# Patient Record
Sex: MALE | Race: WHITE | NOT HISPANIC OR LATINO | Employment: OTHER | URBAN - METROPOLITAN AREA
[De-identification: names, ages, dates, MRNs, and addresses within clinical notes are randomized per-mention and may not be internally consistent; named-entity substitution may affect disease eponyms.]

---

## 2019-01-16 ENCOUNTER — HOSPITAL ENCOUNTER (OUTPATIENT)
Dept: ORTHOPEDIC SURGERY | Facility: CLINIC | Age: 64
Discharge: HOME | End: 2019-01-16
Attending: PHYSICIAN ASSISTANT | Admitting: PHYSICIAN ASSISTANT

## 2019-08-16 ENCOUNTER — TELEPHONE (OUTPATIENT)
Dept: ORTHOPEDIC SURGERY | Facility: CLINIC | Age: 64
End: 2019-08-16

## 2019-08-16 NOTE — TELEPHONE ENCOUNTER
Pts last appt in February requesting refill neurontin     I called pt and let him know he has not been seen in some time  I asked if he had continued issues and that we would likely do a 30 day supply on rx but either we need to see him or he needs to follow up with pcp.  Pt indicated he will be seeing pain management soon.    [place]

## 2021-01-23 ENCOUNTER — APPOINTMENT (OUTPATIENT)
Dept: GENERAL RADIOLOGY | Facility: HOSPITAL | Age: 66
End: 2021-01-23

## 2021-01-23 ENCOUNTER — HOSPITAL ENCOUNTER (EMERGENCY)
Facility: HOSPITAL | Age: 66
Discharge: HOME | End: 2021-01-23
Attending: EMERGENCY MEDICINE | Admitting: EMERGENCY MEDICINE

## 2021-01-23 VITALS
WEIGHT: 225.31 LBS | BODY MASS INDEX: 28.92 KG/M2 | DIASTOLIC BLOOD PRESSURE: 82 MMHG | HEIGHT: 74 IN | TEMPERATURE: 98 F | SYSTOLIC BLOOD PRESSURE: 122 MMHG | RESPIRATION RATE: 17 BRPM | OXYGEN SATURATION: 99 % | HEART RATE: 96 BPM

## 2021-01-23 DIAGNOSIS — S70.01XA CONTUSION OF RIGHT HIP, INITIAL ENCOUNTER: Primary | ICD-10-CM
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
## 2021-01-23 PROCEDURE — 99282 EMERGENCY DEPT VISIT SF MDM: CPT

## 2021-01-23 PROCEDURE — 72170 X-RAY EXAM OF PELVIS: CPT

## 2021-01-23 RX ORDER — ACETAMINOPHEN AND CODEINE PHOSPHATE 300; 30 MG/1; MG/1
1 TABLET ORAL EVERY 6 HOURS PRN
Qty: 12 TABLET | Refills: 0 | Status: SHIPPED | OUTPATIENT
Start: 2021-01-23 | End: 2022-01-05

## 2021-01-23 NOTE — ED PROVIDER NOTES
Subjective   Patient is a 65-year-old male who had slipped and fallen several days ago.  He complains of right hip pain.  The pain is constant and moderate in intensity.  He denies other complaint of injury          Review of Systems  Negative for head pain loss of consciousness dizziness vomiting neck pain chest pain abdominal pain back pain or other complaint of injury  No past medical history on file.    No Known Allergies    No past surgical history on file.    No family history on file.    Social History     Socioeconomic History   • Marital status:      Spouse name: Not on file   • Number of children: Not on file   • Years of education: Not on file   • Highest education level: Not on file           Objective   Physical Exam  HEENT exam is no tenderness.  Neck is nontender.  Lungs are clear.  Chest nontender.  Abdomen soft nontender.  Extremity exam shows mild pain on palpation of his right hip.  Does have full range of motion of his right leg.  Procedures      Xr Pelvis 1 Or 2 View    Result Date: 1/23/2021  1.No acute displaced fracture is visualized on this limited single view. 2.Mild bilateral hip osteoarthritis and mild degenerative changes at the sacroiliac joints and pubic symphysis.  Electronically Signed By-Fabio Holland MD On:1/23/2021 5:03 PM This report was finalized on [id number] by  Fabio Holland MD.        ED Course                                           MDM  Number of Diagnoses or Management Options  Diagnosis management comments: Patient has findings consistent with right hip contusion after falling.  Patient will be discharged.  Will be placed on Tylenol 3 and will see his MD for recheck as needed       Amount and/or Complexity of Data Reviewed  Tests in the radiology section of CPT®: reviewed    Risk of Complications, Morbidity, and/or Mortality  Presenting problems: moderate  Diagnostic procedures: moderate  Management options: moderate    Patient Progress  Patient progress:  stable      Final diagnoses:   Contusion of right hip, initial encounter   Fall, initial encounter            Brendon Geiger MD  01/23/21 3819

## 2022-01-05 ENCOUNTER — APPOINTMENT (OUTPATIENT)
Dept: GENERAL RADIOLOGY | Facility: HOSPITAL | Age: 67
End: 2022-01-05

## 2022-01-05 ENCOUNTER — HOSPITAL ENCOUNTER (INPATIENT)
Facility: HOSPITAL | Age: 67
LOS: 7 days | Discharge: HOME HEALTH | End: 2022-01-12
Attending: EMERGENCY MEDICINE | Admitting: HOSPITALIST

## 2022-01-05 DIAGNOSIS — J18.9 PNEUMONIA OF BOTH LOWER LOBES DUE TO INFECTIOUS ORGANISM: ICD-10-CM

## 2022-01-05 DIAGNOSIS — N17.9 ACUTE RENAL FAILURE, UNSPECIFIED ACUTE RENAL FAILURE TYPE: ICD-10-CM

## 2022-01-05 DIAGNOSIS — R09.02 HYPOXIA: ICD-10-CM

## 2022-01-05 DIAGNOSIS — R06.00 DYSPNEA, UNSPECIFIED TYPE: Primary | ICD-10-CM

## 2022-01-05 PROBLEM — Z72.0 TOBACCO ABUSE: Chronic | Status: ACTIVE | Noted: 2022-01-05

## 2022-01-05 PROBLEM — M54.50 LOW BACK PAIN: Status: ACTIVE | Noted: 2019-01-16

## 2022-01-05 PROBLEM — F12.90 MARIJUANA USE: Chronic | Status: ACTIVE | Noted: 2022-01-05

## 2022-01-05 PROBLEM — F10.10 ETOH ABUSE: Status: ACTIVE | Noted: 2022-01-05

## 2022-01-05 PROBLEM — E66.9 OBESITY: Status: ACTIVE | Noted: 2022-01-05

## 2022-01-05 PROBLEM — F90.9 ADHD: Status: ACTIVE | Noted: 2022-01-05

## 2022-01-05 PROBLEM — M41.9 ACQUIRED SCOLIOSIS: Status: ACTIVE | Noted: 2019-01-16

## 2022-01-05 PROBLEM — I10 HYPERTENSION: Status: ACTIVE | Noted: 2022-01-05

## 2022-01-05 PROBLEM — F32.A DEPRESSION: Status: ACTIVE | Noted: 2022-01-05

## 2022-01-05 PROBLEM — E78.5 HYPERLIPIDEMIA: Status: ACTIVE | Noted: 2022-01-05

## 2022-01-05 PROBLEM — M47.27 OSTEOARTHRITIS OF LUMBOSACRAL SPINE WITH RADICULOPATHY: Status: ACTIVE | Noted: 2019-01-16

## 2022-01-05 PROBLEM — D69.6 THROMBOCYTOPENIA (HCC): Status: ACTIVE | Noted: 2022-01-05

## 2022-01-05 PROBLEM — E78.2 MIXED HYPERLIPIDEMIA: Status: ACTIVE | Noted: 2022-01-05

## 2022-01-05 LAB
ALBUMIN SERPL-MCNC: 3 G/DL (ref 3.5–5.2)
ALBUMIN SERPL-MCNC: 3.1 G/DL (ref 3.5–5.2)
ALBUMIN/GLOB SERPL: 0.8 G/DL
ALBUMIN/GLOB SERPL: 0.8 G/DL
ALP SERPL-CCNC: 151 U/L (ref 39–117)
ALP SERPL-CCNC: 159 U/L (ref 39–117)
ALT SERPL W P-5'-P-CCNC: 24 U/L (ref 1–41)
ALT SERPL W P-5'-P-CCNC: 25 U/L (ref 1–41)
ANION GAP SERPL CALCULATED.3IONS-SCNC: 17 MMOL/L (ref 5–15)
ANION GAP SERPL CALCULATED.3IONS-SCNC: 20 MMOL/L (ref 5–15)
ANION GAP SERPL CALCULATED.3IONS-SCNC: 20 MMOL/L (ref 5–15)
APTT PPP: 30.7 SECONDS (ref 24–31)
ARTERIAL PATENCY WRIST A: POSITIVE
AST SERPL-CCNC: 37 U/L (ref 1–40)
AST SERPL-CCNC: 40 U/L (ref 1–40)
ATMOSPHERIC PRESS: ABNORMAL MM[HG]
B PARAPERT DNA SPEC QL NAA+PROBE: NOT DETECTED
B PERT DNA SPEC QL NAA+PROBE: NOT DETECTED
BASE EXCESS BLDA CALC-SCNC: -11.7 MMOL/L (ref 0–3)
BASOPHILS # BLD AUTO: 0.1 10*3/MM3 (ref 0–0.2)
BASOPHILS NFR BLD AUTO: 0.7 % (ref 0–1.5)
BDY SITE: ABNORMAL
BILIRUB SERPL-MCNC: 0.3 MG/DL (ref 0–1.2)
BILIRUB SERPL-MCNC: 0.3 MG/DL (ref 0–1.2)
BUN SERPL-MCNC: 155 MG/DL (ref 8–23)
BUN SERPL-MCNC: 156 MG/DL (ref 8–23)
BUN SERPL-MCNC: 159 MG/DL (ref 8–23)
BUN/CREAT SERPL: 12.4 (ref 7–25)
BUN/CREAT SERPL: 12.6 (ref 7–25)
BUN/CREAT SERPL: 12.9 (ref 7–25)
C PNEUM DNA NPH QL NAA+NON-PROBE: NOT DETECTED
CA-I SERPL ISE-MCNC: 1.24 MMOL/L (ref 1.2–1.3)
CALCIUM SPEC-SCNC: 9 MG/DL (ref 8.6–10.5)
CALCIUM SPEC-SCNC: 9.1 MG/DL (ref 8.6–10.5)
CALCIUM SPEC-SCNC: 9.6 MG/DL (ref 8.6–10.5)
CHLORIDE SERPL-SCNC: 102 MMOL/L (ref 98–107)
CHLORIDE SERPL-SCNC: 102 MMOL/L (ref 98–107)
CHLORIDE SERPL-SCNC: 103 MMOL/L (ref 98–107)
CO2 BLDA-SCNC: 13.9 MMOL/L (ref 22–29)
CO2 SERPL-SCNC: 11 MMOL/L (ref 22–29)
CO2 SERPL-SCNC: 14 MMOL/L (ref 22–29)
CO2 SERPL-SCNC: 15 MMOL/L (ref 22–29)
CREAT SERPL-MCNC: 12.35 MG/DL (ref 0.76–1.27)
CREAT SERPL-MCNC: 12.36 MG/DL (ref 0.76–1.27)
CREAT SERPL-MCNC: 12.52 MG/DL (ref 0.76–1.27)
CRP SERPL-MCNC: 17.18 MG/DL (ref 0–0.5)
D-LACTATE SERPL-SCNC: 0.9 MMOL/L (ref 0.5–2)
DEPRECATED RDW RBC AUTO: 49.9 FL (ref 37–54)
DEPRECATED RDW RBC AUTO: 50.3 FL (ref 37–54)
EOSINOPHIL # BLD AUTO: 0.2 10*3/MM3 (ref 0–0.4)
EOSINOPHIL NFR BLD AUTO: 2.2 % (ref 0.3–6.2)
ERYTHROCYTE [DISTWIDTH] IN BLOOD BY AUTOMATED COUNT: 14.3 % (ref 12.3–15.4)
ERYTHROCYTE [DISTWIDTH] IN BLOOD BY AUTOMATED COUNT: 14.4 % (ref 12.3–15.4)
ETHANOL UR QL: <0.01 %
FLUAV SUBTYP SPEC NAA+PROBE: NOT DETECTED
FLUBV RNA ISLT QL NAA+PROBE: NOT DETECTED
GFR SERPL CREATININE-BSD FRML MDRD: 4 ML/MIN/1.73
GFR SERPL CREATININE-BSD FRML MDRD: ABNORMAL ML/MIN/{1.73_M2}
GLOBULIN UR ELPH-MCNC: 3.8 GM/DL
GLOBULIN UR ELPH-MCNC: 3.9 GM/DL
GLUCOSE SERPL-MCNC: 108 MG/DL (ref 65–99)
GLUCOSE SERPL-MCNC: 149 MG/DL (ref 65–99)
GLUCOSE SERPL-MCNC: 208 MG/DL (ref 65–99)
HADV DNA SPEC NAA+PROBE: NOT DETECTED
HCO3 BLDA-SCNC: 13.1 MMOL/L (ref 21–28)
HCOV 229E RNA SPEC QL NAA+PROBE: NOT DETECTED
HCOV HKU1 RNA SPEC QL NAA+PROBE: NOT DETECTED
HCOV NL63 RNA SPEC QL NAA+PROBE: NOT DETECTED
HCOV OC43 RNA SPEC QL NAA+PROBE: NOT DETECTED
HCT VFR BLD AUTO: 24.2 % (ref 37.5–51)
HCT VFR BLD AUTO: 26.3 % (ref 37.5–51)
HEMODILUTION: NO
HGB BLD-MCNC: 8.3 G/DL (ref 13–17.7)
HGB BLD-MCNC: 9.1 G/DL (ref 13–17.7)
HMPV RNA NPH QL NAA+NON-PROBE: NOT DETECTED
HPIV1 RNA ISLT QL NAA+PROBE: NOT DETECTED
HPIV2 RNA SPEC QL NAA+PROBE: NOT DETECTED
HPIV3 RNA NPH QL NAA+PROBE: NOT DETECTED
HPIV4 P GENE NPH QL NAA+PROBE: NOT DETECTED
INHALED O2 CONCENTRATION: 21 %
INR PPP: 1.11 (ref 0.93–1.1)
LDH SERPL-CCNC: 255 U/L (ref 135–225)
LYMPHOCYTES # BLD AUTO: 0.8 10*3/MM3 (ref 0.7–3.1)
LYMPHOCYTES NFR BLD AUTO: 9.2 % (ref 19.6–45.3)
M PNEUMO IGG SER IA-ACNC: NOT DETECTED
MAGNESIUM SERPL-MCNC: 1.8 MG/DL (ref 1.6–2.4)
MAGNESIUM SERPL-MCNC: 1.9 MG/DL (ref 1.6–2.4)
MCH RBC QN AUTO: 33.5 PG (ref 26.6–33)
MCH RBC QN AUTO: 34.5 PG (ref 26.6–33)
MCHC RBC AUTO-ENTMCNC: 34.5 G/DL (ref 31.5–35.7)
MCHC RBC AUTO-ENTMCNC: 34.7 G/DL (ref 31.5–35.7)
MCV RBC AUTO: 100 FL (ref 79–97)
MCV RBC AUTO: 96.8 FL (ref 79–97)
MODALITY: ABNORMAL
MONOCYTES # BLD AUTO: 1.1 10*3/MM3 (ref 0.1–0.9)
MONOCYTES NFR BLD AUTO: 11.7 % (ref 5–12)
NEUTROPHILS NFR BLD AUTO: 6.9 10*3/MM3 (ref 1.7–7)
NEUTROPHILS NFR BLD AUTO: 76.2 % (ref 42.7–76)
NRBC BLD AUTO-RTO: 0.1 /100 WBC (ref 0–0.2)
NT-PROBNP SERPL-MCNC: ABNORMAL PG/ML (ref 0–900)
PCO2 BLDA: 25.5 MM HG (ref 35–48)
PH BLDA: 7.32 PH UNITS (ref 7.35–7.45)
PHOSPHATE SERPL-MCNC: 4.9 MG/DL (ref 2.5–4.5)
PLATELET # BLD AUTO: 113 10*3/MM3 (ref 140–450)
PLATELET # BLD AUTO: 92 10*3/MM3 (ref 140–450)
PMV BLD AUTO: 8.3 FL (ref 6–12)
PMV BLD AUTO: 8.7 FL (ref 6–12)
PO2 BLDA: 75.3 MM HG (ref 83–108)
POTASSIUM SERPL-SCNC: 6.2 MMOL/L (ref 3.5–5.2)
POTASSIUM SERPL-SCNC: 6.5 MMOL/L (ref 3.5–5.2)
POTASSIUM SERPL-SCNC: 6.7 MMOL/L (ref 3.5–5.2)
PROCALCITONIN SERPL-MCNC: 0.82 NG/ML (ref 0–0.25)
PROT SERPL-MCNC: 6.8 G/DL (ref 6–8.5)
PROT SERPL-MCNC: 7 G/DL (ref 6–8.5)
PROTHROMBIN TIME: 12.2 SECONDS (ref 9.6–11.7)
QT INTERVAL: 310 MS
RBC # BLD AUTO: 2.42 10*6/MM3 (ref 4.14–5.8)
RBC # BLD AUTO: 2.72 10*6/MM3 (ref 4.14–5.8)
RHINOVIRUS RNA SPEC NAA+PROBE: NOT DETECTED
RSV RNA NPH QL NAA+NON-PROBE: NOT DETECTED
SAO2 % BLDCOA: 94.1 % (ref 94–98)
SARS-COV-2 RNA NPH QL NAA+NON-PROBE: NOT DETECTED
SARS-COV-2 RNA PNL SPEC NAA+PROBE: NOT DETECTED
SODIUM SERPL-SCNC: 133 MMOL/L (ref 136–145)
SODIUM SERPL-SCNC: 134 MMOL/L (ref 136–145)
SODIUM SERPL-SCNC: 137 MMOL/L (ref 136–145)
TSH SERPL DL<=0.05 MIU/L-ACNC: 2.84 UIU/ML (ref 0.27–4.2)
WBC NRBC COR # BLD: 7.5 10*3/MM3 (ref 3.4–10.8)
WBC NRBC COR # BLD: 9 10*3/MM3 (ref 3.4–10.8)

## 2022-01-05 PROCEDURE — 94640 AIRWAY INHALATION TREATMENT: CPT

## 2022-01-05 PROCEDURE — 36415 COLL VENOUS BLD VENIPUNCTURE: CPT | Performed by: NURSE PRACTITIONER

## 2022-01-05 PROCEDURE — 85730 THROMBOPLASTIN TIME PARTIAL: CPT

## 2022-01-05 PROCEDURE — 25010000002 CEFTRIAXONE PER 250 MG: Performed by: EMERGENCY MEDICINE

## 2022-01-05 PROCEDURE — 0202U NFCT DS 22 TRGT SARS-COV-2: CPT | Performed by: NURSE PRACTITIONER

## 2022-01-05 PROCEDURE — 83735 ASSAY OF MAGNESIUM: CPT | Performed by: NURSE PRACTITIONER

## 2022-01-05 PROCEDURE — 94799 UNLISTED PULMONARY SVC/PX: CPT

## 2022-01-05 PROCEDURE — 87040 BLOOD CULTURE FOR BACTERIA: CPT | Performed by: EMERGENCY MEDICINE

## 2022-01-05 PROCEDURE — 25010000002 MORPHINE PER 10 MG: Performed by: EMERGENCY MEDICINE

## 2022-01-05 PROCEDURE — U0003 INFECTIOUS AGENT DETECTION BY NUCLEIC ACID (DNA OR RNA); SEVERE ACUTE RESPIRATORY SYNDROME CORONAVIRUS 2 (SARS-COV-2) (CORONAVIRUS DISEASE [COVID-19]), AMPLIFIED PROBE TECHNIQUE, MAKING USE OF HIGH THROUGHPUT TECHNOLOGIES AS DESCRIBED BY CMS-2020-01-R: HCPCS | Performed by: EMERGENCY MEDICINE

## 2022-01-05 PROCEDURE — 83615 LACTATE (LD) (LDH) ENZYME: CPT | Performed by: INTERNAL MEDICINE

## 2022-01-05 PROCEDURE — 83605 ASSAY OF LACTIC ACID: CPT | Performed by: NURSE PRACTITIONER

## 2022-01-05 PROCEDURE — 99284 EMERGENCY DEPT VISIT MOD MDM: CPT

## 2022-01-05 PROCEDURE — 63710000001 INSULIN REGULAR HUMAN PER 5 UNITS: Performed by: INTERNAL MEDICINE

## 2022-01-05 PROCEDURE — 82803 BLOOD GASES ANY COMBINATION: CPT

## 2022-01-05 PROCEDURE — 80050 GENERAL HEALTH PANEL: CPT | Performed by: EMERGENCY MEDICINE

## 2022-01-05 PROCEDURE — 84100 ASSAY OF PHOSPHORUS: CPT | Performed by: NURSE PRACTITIONER

## 2022-01-05 PROCEDURE — 93005 ELECTROCARDIOGRAM TRACING: CPT | Performed by: EMERGENCY MEDICINE

## 2022-01-05 PROCEDURE — 25010000002 CALCIUM GLUCONATE 2-0.675 GM/100ML-% SOLUTION: Performed by: INTERNAL MEDICINE

## 2022-01-05 PROCEDURE — 25010000002 THIAMINE PER 100 MG: Performed by: INTERNAL MEDICINE

## 2022-01-05 PROCEDURE — 25010000002 AZITHROMYCIN PER 500 MG: Performed by: EMERGENCY MEDICINE

## 2022-01-05 PROCEDURE — 83880 ASSAY OF NATRIURETIC PEPTIDE: CPT | Performed by: NURSE PRACTITIONER

## 2022-01-05 PROCEDURE — 85610 PROTHROMBIN TIME: CPT

## 2022-01-05 PROCEDURE — 86140 C-REACTIVE PROTEIN: CPT | Performed by: INTERNAL MEDICINE

## 2022-01-05 PROCEDURE — 85027 COMPLETE CBC AUTOMATED: CPT

## 2022-01-05 PROCEDURE — 71045 X-RAY EXAM CHEST 1 VIEW: CPT

## 2022-01-05 PROCEDURE — 93005 ELECTROCARDIOGRAM TRACING: CPT | Performed by: NURSE PRACTITIONER

## 2022-01-05 PROCEDURE — 84145 PROCALCITONIN (PCT): CPT | Performed by: NURSE PRACTITIONER

## 2022-01-05 PROCEDURE — 25010000002 METHYLPREDNISOLONE PER 125 MG: Performed by: NURSE PRACTITIONER

## 2022-01-05 PROCEDURE — 99222 1ST HOSP IP/OBS MODERATE 55: CPT | Performed by: NURSE PRACTITIONER

## 2022-01-05 PROCEDURE — 36600 WITHDRAWAL OF ARTERIAL BLOOD: CPT

## 2022-01-05 PROCEDURE — 25010000002 HEPARIN (PORCINE) PER 1000 UNITS: Performed by: NURSE PRACTITIONER

## 2022-01-05 PROCEDURE — 93005 ELECTROCARDIOGRAM TRACING: CPT

## 2022-01-05 PROCEDURE — 80053 COMPREHEN METABOLIC PANEL: CPT | Performed by: EMERGENCY MEDICINE

## 2022-01-05 PROCEDURE — 82330 ASSAY OF CALCIUM: CPT | Performed by: NURSE PRACTITIONER

## 2022-01-05 PROCEDURE — 82077 ASSAY SPEC XCP UR&BREATH IA: CPT | Performed by: NURSE PRACTITIONER

## 2022-01-05 RX ORDER — CHOLECALCIFEROL (VITAMIN D3) 125 MCG
5 CAPSULE ORAL NIGHTLY PRN
Status: DISCONTINUED | OUTPATIENT
Start: 2022-01-05 | End: 2022-01-12 | Stop reason: HOSPADM

## 2022-01-05 RX ORDER — METHYLPREDNISOLONE SODIUM SUCCINATE 125 MG/2ML
125 INJECTION, POWDER, LYOPHILIZED, FOR SOLUTION INTRAMUSCULAR; INTRAVENOUS ONCE
Status: COMPLETED | OUTPATIENT
Start: 2022-01-05 | End: 2022-01-05

## 2022-01-05 RX ORDER — SODIUM CHLORIDE 0.9 % (FLUSH) 0.9 %
10 SYRINGE (ML) INJECTION AS NEEDED
Status: DISCONTINUED | OUTPATIENT
Start: 2022-01-05 | End: 2022-01-12 | Stop reason: HOSPADM

## 2022-01-05 RX ORDER — IPRATROPIUM BROMIDE AND ALBUTEROL SULFATE 2.5; .5 MG/3ML; MG/3ML
3 SOLUTION RESPIRATORY (INHALATION)
Status: DISCONTINUED | OUTPATIENT
Start: 2022-01-05 | End: 2022-01-12 | Stop reason: HOSPADM

## 2022-01-05 RX ORDER — ONDANSETRON 2 MG/ML
4 INJECTION INTRAMUSCULAR; INTRAVENOUS EVERY 6 HOURS PRN
Status: DISCONTINUED | OUTPATIENT
Start: 2022-01-05 | End: 2022-01-12 | Stop reason: HOSPADM

## 2022-01-05 RX ORDER — MORPHINE SULFATE 2 MG/ML
2 INJECTION, SOLUTION INTRAMUSCULAR; INTRAVENOUS ONCE
Status: COMPLETED | OUTPATIENT
Start: 2022-01-05 | End: 2022-01-05

## 2022-01-05 RX ORDER — ACETAMINOPHEN 650 MG/1
650 SUPPOSITORY RECTAL EVERY 4 HOURS PRN
Status: DISCONTINUED | OUTPATIENT
Start: 2022-01-05 | End: 2022-01-12 | Stop reason: HOSPADM

## 2022-01-05 RX ORDER — ALUMINA, MAGNESIA, AND SIMETHICONE 2400; 2400; 240 MG/30ML; MG/30ML; MG/30ML
15 SUSPENSION ORAL EVERY 6 HOURS PRN
Status: DISCONTINUED | OUTPATIENT
Start: 2022-01-05 | End: 2022-01-12 | Stop reason: HOSPADM

## 2022-01-05 RX ORDER — ACETAMINOPHEN 325 MG/1
650 TABLET ORAL EVERY 4 HOURS PRN
Status: DISCONTINUED | OUTPATIENT
Start: 2022-01-05 | End: 2022-01-12 | Stop reason: HOSPADM

## 2022-01-05 RX ORDER — ALBUTEROL SULFATE 2.5 MG/3ML
10 SOLUTION RESPIRATORY (INHALATION) ONCE
Status: COMPLETED | OUTPATIENT
Start: 2022-01-05 | End: 2022-01-05

## 2022-01-05 RX ORDER — METHYLPREDNISOLONE SODIUM SUCCINATE 40 MG/ML
40 INJECTION, POWDER, LYOPHILIZED, FOR SOLUTION INTRAMUSCULAR; INTRAVENOUS EVERY 8 HOURS
Status: DISCONTINUED | OUTPATIENT
Start: 2022-01-06 | End: 2022-01-08

## 2022-01-05 RX ORDER — NITROGLYCERIN 0.4 MG/1
0.4 TABLET SUBLINGUAL
Status: DISCONTINUED | OUTPATIENT
Start: 2022-01-05 | End: 2022-01-12 | Stop reason: HOSPADM

## 2022-01-05 RX ORDER — DEXTROSE MONOHYDRATE 25 G/50ML
50 INJECTION, SOLUTION INTRAVENOUS ONCE
Status: COMPLETED | OUTPATIENT
Start: 2022-01-05 | End: 2022-01-05

## 2022-01-05 RX ORDER — LABETALOL HYDROCHLORIDE 5 MG/ML
10 INJECTION, SOLUTION INTRAVENOUS EVERY 6 HOURS PRN
Status: DISCONTINUED | OUTPATIENT
Start: 2022-01-05 | End: 2022-01-12 | Stop reason: HOSPADM

## 2022-01-05 RX ORDER — CALCIUM GLUCONATE 20 MG/ML
2 INJECTION, SOLUTION INTRAVENOUS ONCE
Status: COMPLETED | OUTPATIENT
Start: 2022-01-05 | End: 2022-01-06

## 2022-01-05 RX ORDER — SODIUM CHLORIDE 0.9 % (FLUSH) 0.9 %
10 SYRINGE (ML) INJECTION EVERY 12 HOURS SCHEDULED
Status: DISCONTINUED | OUTPATIENT
Start: 2022-01-05 | End: 2022-01-12 | Stop reason: HOSPADM

## 2022-01-05 RX ORDER — HEPARIN SODIUM 5000 [USP'U]/ML
5000 INJECTION, SOLUTION INTRAVENOUS; SUBCUTANEOUS EVERY 12 HOURS SCHEDULED
Status: DISCONTINUED | OUTPATIENT
Start: 2022-01-05 | End: 2022-01-12 | Stop reason: HOSPADM

## 2022-01-05 RX ORDER — ACETAMINOPHEN 160 MG/5ML
650 SOLUTION ORAL EVERY 4 HOURS PRN
Status: DISCONTINUED | OUTPATIENT
Start: 2022-01-05 | End: 2022-01-12 | Stop reason: HOSPADM

## 2022-01-05 RX ORDER — ONDANSETRON 4 MG/1
4 TABLET, FILM COATED ORAL EVERY 6 HOURS PRN
Status: DISCONTINUED | OUTPATIENT
Start: 2022-01-05 | End: 2022-01-12 | Stop reason: HOSPADM

## 2022-01-05 RX ADMIN — ALBUTEROL SULFATE 10 MG: 2.5 SOLUTION RESPIRATORY (INHALATION) at 21:50

## 2022-01-05 RX ADMIN — DEXTROSE MONOHYDRATE 50 ML: 25 INJECTION, SOLUTION INTRAVENOUS at 22:15

## 2022-01-05 RX ADMIN — AZITHROMYCIN MONOHYDRATE 500 MG: 500 INJECTION, POWDER, LYOPHILIZED, FOR SOLUTION INTRAVENOUS at 19:39

## 2022-01-05 RX ADMIN — METHYLPREDNISOLONE SODIUM SUCCINATE 125 MG: 125 INJECTION, POWDER, FOR SOLUTION INTRAMUSCULAR; INTRAVENOUS at 22:40

## 2022-01-05 RX ADMIN — SODIUM CHLORIDE, PRESERVATIVE FREE 10 ML: 5 INJECTION INTRAVENOUS at 22:21

## 2022-01-05 RX ADMIN — MORPHINE SULFATE 2 MG: 2 INJECTION, SOLUTION INTRAMUSCULAR; INTRAVENOUS at 19:46

## 2022-01-05 RX ADMIN — INSULIN HUMAN 10 UNITS: 100 INJECTION, SOLUTION PARENTERAL at 22:16

## 2022-01-05 RX ADMIN — CEFTRIAXONE 2 G: 10 INJECTION, POWDER, FOR SOLUTION INTRAVENOUS at 19:08

## 2022-01-05 RX ADMIN — SODIUM BICARBONATE 100 MEQ: 84 INJECTION, SOLUTION INTRAVENOUS at 22:15

## 2022-01-05 RX ADMIN — IPRATROPIUM BROMIDE AND ALBUTEROL SULFATE 3 ML: 2.5; .5 SOLUTION RESPIRATORY (INHALATION) at 22:00

## 2022-01-05 RX ADMIN — SODIUM BICARBONATE 150 MEQ: 84 INJECTION, SOLUTION INTRAVENOUS at 22:42

## 2022-01-05 RX ADMIN — THIAMINE HYDROCHLORIDE 100 MG: 100 INJECTION, SOLUTION INTRAMUSCULAR; INTRAVENOUS at 23:59

## 2022-01-05 RX ADMIN — SODIUM ZIRCONIUM CYCLOSILICATE 10 G: 10 POWDER, FOR SUSPENSION ORAL at 23:01

## 2022-01-05 RX ADMIN — HEPARIN SODIUM 5000 UNITS: 5000 INJECTION INTRAVENOUS; SUBCUTANEOUS at 21:14

## 2022-01-05 RX ADMIN — CALCIUM GLUCONATE 2 G: 20 INJECTION, SOLUTION INTRAVENOUS at 22:29

## 2022-01-06 ENCOUNTER — APPOINTMENT (OUTPATIENT)
Dept: CT IMAGING | Facility: HOSPITAL | Age: 67
End: 2022-01-06

## 2022-01-06 ENCOUNTER — APPOINTMENT (OUTPATIENT)
Dept: CARDIOLOGY | Facility: HOSPITAL | Age: 67
End: 2022-01-06

## 2022-01-06 ENCOUNTER — APPOINTMENT (OUTPATIENT)
Dept: GENERAL RADIOLOGY | Facility: HOSPITAL | Age: 67
End: 2022-01-06

## 2022-01-06 ENCOUNTER — APPOINTMENT (OUTPATIENT)
Dept: ULTRASOUND IMAGING | Facility: HOSPITAL | Age: 67
End: 2022-01-06

## 2022-01-06 LAB
AMMONIA BLD-SCNC: 17 UMOL/L (ref 16–60)
AMPHET+METHAMPHET UR QL: NEGATIVE
BARBITURATES UR QL SCN: NEGATIVE
BASOPHILS # BLD AUTO: 0 10*3/MM3 (ref 0–0.2)
BASOPHILS NFR BLD AUTO: 0 % (ref 0–1.5)
BENZODIAZ UR QL SCN: NEGATIVE
BILIRUB UR QL STRIP: NEGATIVE
CANNABINOIDS SERPL QL: NEGATIVE
CLARITY UR: CLEAR
COCAINE UR QL: NEGATIVE
COLOR UR: YELLOW
D-LACTATE SERPL-SCNC: 1.5 MMOL/L (ref 0.5–2)
DEPRECATED RDW RBC AUTO: 49.4 FL (ref 37–54)
DEPRECATED RDW RBC AUTO: 50.3 FL (ref 37–54)
EOSINOPHIL # BLD AUTO: 0 10*3/MM3 (ref 0–0.4)
EOSINOPHIL NFR BLD AUTO: 0.1 % (ref 0.3–6.2)
ERYTHROCYTE [DISTWIDTH] IN BLOOD BY AUTOMATED COUNT: 14.4 % (ref 12.3–15.4)
ERYTHROCYTE [DISTWIDTH] IN BLOOD BY AUTOMATED COUNT: 14.5 % (ref 12.3–15.4)
FERRITIN SERPL-MCNC: 433 NG/ML (ref 30–400)
GLUCOSE BLDC GLUCOMTR-MCNC: 258 MG/DL (ref 70–105)
GLUCOSE BLDC GLUCOMTR-MCNC: 307 MG/DL (ref 70–105)
GLUCOSE UR STRIP-MCNC: NEGATIVE MG/DL
HAPTOGLOB SERPL-MCNC: 184 MG/DL (ref 30–200)
HBV SURFACE AB SER RIA-ACNC: NORMAL
HBV SURFACE AG SERPL QL IA: NORMAL
HCT VFR BLD AUTO: 21.9 % (ref 37.5–51)
HCT VFR BLD AUTO: 23.9 % (ref 37.5–51)
HCV AB SER DONR QL: NORMAL
HGB BLD-MCNC: 7.7 G/DL (ref 13–17.7)
HGB BLD-MCNC: 8.4 G/DL (ref 13–17.7)
HGB UR QL STRIP.AUTO: NEGATIVE
IRON 24H UR-MRATE: 21 MCG/DL (ref 59–158)
IRON SATN MFR SERPL: 10 % (ref 20–50)
KETONES UR QL STRIP: NEGATIVE
L PNEUMO1 AG UR QL IA: NEGATIVE
LEUKOCYTE ESTERASE UR QL STRIP.AUTO: NEGATIVE
LYMPHOCYTES # BLD AUTO: 0.1 10*3/MM3 (ref 0.7–3.1)
LYMPHOCYTES NFR BLD AUTO: 1.3 % (ref 19.6–45.3)
MCH RBC QN AUTO: 33.7 PG (ref 26.6–33)
MCH RBC QN AUTO: 35 PG (ref 26.6–33)
MCHC RBC AUTO-ENTMCNC: 35.1 G/DL (ref 31.5–35.7)
MCHC RBC AUTO-ENTMCNC: 35.2 G/DL (ref 31.5–35.7)
MCV RBC AUTO: 95.8 FL (ref 79–97)
MCV RBC AUTO: 99.7 FL (ref 79–97)
METHADONE UR QL SCN: NEGATIVE
MONOCYTES # BLD AUTO: 0 10*3/MM3 (ref 0.1–0.9)
MONOCYTES NFR BLD AUTO: 0.6 % (ref 5–12)
NEUTROPHILS NFR BLD AUTO: 7.3 10*3/MM3 (ref 1.7–7)
NEUTROPHILS NFR BLD AUTO: 98 % (ref 42.7–76)
NITRITE UR QL STRIP: NEGATIVE
NRBC BLD AUTO-RTO: 0 /100 WBC (ref 0–0.2)
OPIATES UR QL: NEGATIVE
OSMOLALITY UR: 262 MOSM/KG (ref 300–800)
OXYCODONE UR QL SCN: NEGATIVE
PH UR STRIP.AUTO: 5.5 [PH] (ref 5–8)
PLATELET # BLD AUTO: 107 10*3/MM3 (ref 140–450)
PLATELET # BLD AUTO: 90 10*3/MM3 (ref 140–450)
PMV BLD AUTO: 8.3 FL (ref 6–12)
PMV BLD AUTO: 8.5 FL (ref 6–12)
PROT UR QL STRIP: NEGATIVE
QT INTERVAL: 310 MS
RBC # BLD AUTO: 2.28 10*6/MM3 (ref 4.14–5.8)
RBC # BLD AUTO: 2.39 10*6/MM3 (ref 4.14–5.8)
S PNEUM AG SPEC QL LA: NEGATIVE
SODIUM UR-SCNC: 46 MMOL/L
SP GR UR STRIP: 1.01 (ref 1–1.03)
TIBC SERPL-MCNC: 201 MCG/DL (ref 298–536)
TRANSFERRIN SERPL-MCNC: 135 MG/DL (ref 200–360)
UROBILINOGEN UR QL STRIP: NORMAL
VIT B12 BLD-MCNC: 696 PG/ML (ref 211–946)
WBC NRBC COR # BLD: 10 10*3/MM3 (ref 3.4–10.8)
WBC NRBC COR # BLD: 7.5 10*3/MM3 (ref 3.4–10.8)

## 2022-01-06 PROCEDURE — 86037 ANCA TITER EACH ANTIBODY: CPT | Performed by: INTERNAL MEDICINE

## 2022-01-06 PROCEDURE — 84156 ASSAY OF PROTEIN URINE: CPT | Performed by: INTERNAL MEDICINE

## 2022-01-06 PROCEDURE — 86038 ANTINUCLEAR ANTIBODIES: CPT | Performed by: INTERNAL MEDICINE

## 2022-01-06 PROCEDURE — 99233 SBSQ HOSP IP/OBS HIGH 50: CPT | Performed by: STUDENT IN AN ORGANIZED HEALTH CARE EDUCATION/TRAINING PROGRAM

## 2022-01-06 PROCEDURE — 76775 US EXAM ABDO BACK WALL LIM: CPT

## 2022-01-06 PROCEDURE — 25010000002 METHYLPREDNISOLONE PER 40 MG: Performed by: NURSE PRACTITIONER

## 2022-01-06 PROCEDURE — 82784 ASSAY IGA/IGD/IGG/IGM EACH: CPT | Performed by: INTERNAL MEDICINE

## 2022-01-06 PROCEDURE — 80048 BASIC METABOLIC PNL TOTAL CA: CPT | Performed by: STUDENT IN AN ORGANIZED HEALTH CARE EDUCATION/TRAINING PROGRAM

## 2022-01-06 PROCEDURE — 94799 UNLISTED PULMONARY SVC/PX: CPT

## 2022-01-06 PROCEDURE — 83520 IMMUNOASSAY QUANT NOS NONAB: CPT | Performed by: INTERNAL MEDICINE

## 2022-01-06 PROCEDURE — 83010 ASSAY OF HAPTOGLOBIN QUANT: CPT | Performed by: INTERNAL MEDICINE

## 2022-01-06 PROCEDURE — 5A1D70Z PERFORMANCE OF URINARY FILTRATION, INTERMITTENT, LESS THAN 6 HOURS PER DAY: ICD-10-PCS | Performed by: HOSPITALIST

## 2022-01-06 PROCEDURE — 71045 X-RAY EXAM CHEST 1 VIEW: CPT

## 2022-01-06 PROCEDURE — 84466 ASSAY OF TRANSFERRIN: CPT | Performed by: INTERNAL MEDICINE

## 2022-01-06 PROCEDURE — 85027 COMPLETE CBC AUTOMATED: CPT | Performed by: STUDENT IN AN ORGANIZED HEALTH CARE EDUCATION/TRAINING PROGRAM

## 2022-01-06 PROCEDURE — 83605 ASSAY OF LACTIC ACID: CPT | Performed by: NURSE PRACTITIONER

## 2022-01-06 PROCEDURE — 25010000002 CEFTRIAXONE PER 250 MG: Performed by: STUDENT IN AN ORGANIZED HEALTH CARE EDUCATION/TRAINING PROGRAM

## 2022-01-06 PROCEDURE — 82570 ASSAY OF URINE CREATININE: CPT | Performed by: INTERNAL MEDICINE

## 2022-01-06 PROCEDURE — 80307 DRUG TEST PRSMV CHEM ANLYZR: CPT | Performed by: NURSE PRACTITIONER

## 2022-01-06 PROCEDURE — 81003 URINALYSIS AUTO W/O SCOPE: CPT | Performed by: NURSE PRACTITIONER

## 2022-01-06 PROCEDURE — 63710000001 INSULIN LISPRO (HUMAN) PER 5 UNITS: Performed by: STUDENT IN AN ORGANIZED HEALTH CARE EDUCATION/TRAINING PROGRAM

## 2022-01-06 PROCEDURE — 83516 IMMUNOASSAY NONANTIBODY: CPT | Performed by: INTERNAL MEDICINE

## 2022-01-06 PROCEDURE — 86334 IMMUNOFIX E-PHORESIS SERUM: CPT | Performed by: INTERNAL MEDICINE

## 2022-01-06 PROCEDURE — 86706 HEP B SURFACE ANTIBODY: CPT | Performed by: INTERNAL MEDICINE

## 2022-01-06 PROCEDURE — 25010000002 HEPARIN (PORCINE) PER 1000 UNITS: Performed by: NURSE PRACTITIONER

## 2022-01-06 PROCEDURE — 87899 AGENT NOS ASSAY W/OPTIC: CPT | Performed by: NURSE PRACTITIONER

## 2022-01-06 PROCEDURE — 83540 ASSAY OF IRON: CPT | Performed by: INTERNAL MEDICINE

## 2022-01-06 PROCEDURE — 76937 US GUIDE VASCULAR ACCESS: CPT

## 2022-01-06 PROCEDURE — 93306 TTE W/DOPPLER COMPLETE: CPT | Performed by: INTERNAL MEDICINE

## 2022-01-06 PROCEDURE — 86704 HEP B CORE ANTIBODY TOTAL: CPT | Performed by: INTERNAL MEDICINE

## 2022-01-06 PROCEDURE — 85397 CLOTTING FUNCT ACTIVITY: CPT | Performed by: INTERNAL MEDICINE

## 2022-01-06 PROCEDURE — 02HV33Z INSERTION OF INFUSION DEVICE INTO SUPERIOR VENA CAVA, PERCUTANEOUS APPROACH: ICD-10-PCS | Performed by: HOSPITALIST

## 2022-01-06 PROCEDURE — 25010000002 THIAMINE PER 100 MG: Performed by: INTERNAL MEDICINE

## 2022-01-06 PROCEDURE — 93306 TTE W/DOPPLER COMPLETE: CPT

## 2022-01-06 PROCEDURE — 71250 CT THORAX DX C-: CPT

## 2022-01-06 PROCEDURE — 84165 PROTEIN E-PHORESIS SERUM: CPT | Performed by: INTERNAL MEDICINE

## 2022-01-06 PROCEDURE — 74176 CT ABD & PELVIS W/O CONTRAST: CPT

## 2022-01-06 PROCEDURE — 93005 ELECTROCARDIOGRAM TRACING: CPT | Performed by: NURSE PRACTITIONER

## 2022-01-06 PROCEDURE — 86803 HEPATITIS C AB TEST: CPT | Performed by: INTERNAL MEDICINE

## 2022-01-06 PROCEDURE — 87340 HEPATITIS B SURFACE AG IA: CPT | Performed by: INTERNAL MEDICINE

## 2022-01-06 PROCEDURE — 82962 GLUCOSE BLOOD TEST: CPT

## 2022-01-06 PROCEDURE — 36556 INSERT NON-TUNNEL CV CATH: CPT

## 2022-01-06 PROCEDURE — 82140 ASSAY OF AMMONIA: CPT | Performed by: NURSE PRACTITIONER

## 2022-01-06 PROCEDURE — 83521 IG LIGHT CHAINS FREE EACH: CPT | Performed by: INTERNAL MEDICINE

## 2022-01-06 PROCEDURE — 82607 VITAMIN B-12: CPT | Performed by: INTERNAL MEDICINE

## 2022-01-06 PROCEDURE — 83935 ASSAY OF URINE OSMOLALITY: CPT | Performed by: INTERNAL MEDICINE

## 2022-01-06 PROCEDURE — 84155 ASSAY OF PROTEIN SERUM: CPT | Performed by: INTERNAL MEDICINE

## 2022-01-06 PROCEDURE — 82728 ASSAY OF FERRITIN: CPT | Performed by: INTERNAL MEDICINE

## 2022-01-06 PROCEDURE — 84300 ASSAY OF URINE SODIUM: CPT | Performed by: INTERNAL MEDICINE

## 2022-01-06 PROCEDURE — 85025 COMPLETE CBC W/AUTO DIFF WBC: CPT | Performed by: NURSE PRACTITIONER

## 2022-01-06 RX ORDER — OLANZAPINE 10 MG/2ML
1 INJECTION, POWDER, LYOPHILIZED, FOR SOLUTION INTRAMUSCULAR
Status: DISCONTINUED | OUTPATIENT
Start: 2022-01-06 | End: 2022-01-08 | Stop reason: SDUPTHER

## 2022-01-06 RX ORDER — LORAZEPAM 2 MG/ML
1 INJECTION INTRAMUSCULAR
Status: DISCONTINUED | OUTPATIENT
Start: 2022-01-06 | End: 2022-01-12 | Stop reason: HOSPADM

## 2022-01-06 RX ORDER — FLUMAZENIL 0.1 MG/ML
0.5 INJECTION INTRAVENOUS 3 TIMES DAILY PRN
Status: DISCONTINUED | OUTPATIENT
Start: 2022-01-06 | End: 2022-01-12 | Stop reason: HOSPADM

## 2022-01-06 RX ORDER — LORAZEPAM 2 MG/ML
2 INJECTION INTRAMUSCULAR
Status: DISCONTINUED | OUTPATIENT
Start: 2022-01-06 | End: 2022-01-06 | Stop reason: DRUGHIGH

## 2022-01-06 RX ORDER — LIDOCAINE HYDROCHLORIDE 20 MG/ML
JELLY TOPICAL AS NEEDED
Status: DISCONTINUED | OUTPATIENT
Start: 2022-01-06 | End: 2022-01-12 | Stop reason: HOSPADM

## 2022-01-06 RX ORDER — LORAZEPAM 1 MG/1
2 TABLET ORAL
Status: DISCONTINUED | OUTPATIENT
Start: 2022-01-06 | End: 2022-01-12 | Stop reason: HOSPADM

## 2022-01-06 RX ORDER — AZITHROMYCIN 250 MG/1
250 TABLET, FILM COATED ORAL NIGHTLY
Status: DISCONTINUED | OUTPATIENT
Start: 2022-01-06 | End: 2022-01-07

## 2022-01-06 RX ORDER — NICOTINE POLACRILEX 4 MG
15 LOZENGE BUCCAL
Status: DISCONTINUED | OUTPATIENT
Start: 2022-01-06 | End: 2022-01-12 | Stop reason: HOSPADM

## 2022-01-06 RX ORDER — ALBUMIN (HUMAN) 12.5 G/50ML
12.5 SOLUTION INTRAVENOUS AS NEEDED
Status: ACTIVE | OUTPATIENT
Start: 2022-01-06 | End: 2022-01-07

## 2022-01-06 RX ORDER — INSULIN LISPRO 100 [IU]/ML
0-7 INJECTION, SOLUTION INTRAVENOUS; SUBCUTANEOUS AS NEEDED
Status: DISCONTINUED | OUTPATIENT
Start: 2022-01-06 | End: 2022-01-07

## 2022-01-06 RX ORDER — DEXTROSE MONOHYDRATE 25 G/50ML
25 INJECTION, SOLUTION INTRAVENOUS
Status: DISCONTINUED | OUTPATIENT
Start: 2022-01-06 | End: 2022-01-08 | Stop reason: SDUPTHER

## 2022-01-06 RX ORDER — INSULIN LISPRO 100 [IU]/ML
0-7 INJECTION, SOLUTION INTRAVENOUS; SUBCUTANEOUS
Status: DISCONTINUED | OUTPATIENT
Start: 2022-01-06 | End: 2022-01-07

## 2022-01-06 RX ORDER — LORAZEPAM 1 MG/1
1 TABLET ORAL
Status: DISCONTINUED | OUTPATIENT
Start: 2022-01-06 | End: 2022-01-12 | Stop reason: HOSPADM

## 2022-01-06 RX ORDER — LORAZEPAM 2 MG/ML
2 INJECTION INTRAMUSCULAR
Status: DISCONTINUED | OUTPATIENT
Start: 2022-01-06 | End: 2022-01-12 | Stop reason: HOSPADM

## 2022-01-06 RX ORDER — FOLIC ACID 1 MG/1
1 TABLET ORAL DAILY
Status: DISCONTINUED | OUTPATIENT
Start: 2022-01-06 | End: 2022-01-12 | Stop reason: HOSPADM

## 2022-01-06 RX ORDER — GABAPENTIN 100 MG/1
100 CAPSULE ORAL 3 TIMES DAILY
Status: DISCONTINUED | OUTPATIENT
Start: 2022-01-06 | End: 2022-01-12 | Stop reason: HOSPADM

## 2022-01-06 RX ORDER — LIDOCAINE 50 MG/G
1 PATCH TOPICAL
Status: DISCONTINUED | OUTPATIENT
Start: 2022-01-06 | End: 2022-01-12 | Stop reason: HOSPADM

## 2022-01-06 RX ORDER — CHLORDIAZEPOXIDE HYDROCHLORIDE 5 MG/1
5 CAPSULE, GELATIN COATED ORAL 3 TIMES DAILY
Status: DISCONTINUED | OUTPATIENT
Start: 2022-01-06 | End: 2022-01-12 | Stop reason: HOSPADM

## 2022-01-06 RX ADMIN — THIAMINE HYDROCHLORIDE 100 MG: 100 INJECTION, SOLUTION INTRAMUSCULAR; INTRAVENOUS at 22:32

## 2022-01-06 RX ADMIN — HEPARIN SODIUM 5000 UNITS: 5000 INJECTION INTRAVENOUS; SUBCUTANEOUS at 08:13

## 2022-01-06 RX ADMIN — GABAPENTIN 100 MG: 100 CAPSULE ORAL at 21:37

## 2022-01-06 RX ADMIN — CEFTRIAXONE 1 G: 1 INJECTION, POWDER, FOR SOLUTION INTRAMUSCULAR; INTRAVENOUS at 21:25

## 2022-01-06 RX ADMIN — SODIUM CHLORIDE, PRESERVATIVE FREE 10 ML: 5 INJECTION INTRAVENOUS at 21:27

## 2022-01-06 RX ADMIN — IPRATROPIUM BROMIDE AND ALBUTEROL SULFATE 3 ML: 2.5; .5 SOLUTION RESPIRATORY (INHALATION) at 06:53

## 2022-01-06 RX ADMIN — SODIUM CHLORIDE, PRESERVATIVE FREE 10 ML: 5 INJECTION INTRAVENOUS at 08:14

## 2022-01-06 RX ADMIN — LIDOCAINE 1 PATCH: 50 PATCH TOPICAL at 16:09

## 2022-01-06 RX ADMIN — SODIUM ZIRCONIUM CYCLOSILICATE 10 G: 10 POWDER, FOR SUSPENSION ORAL at 16:09

## 2022-01-06 RX ADMIN — SODIUM BICARBONATE 150 MEQ: 84 INJECTION, SOLUTION INTRAVENOUS at 14:09

## 2022-01-06 RX ADMIN — HEPARIN SODIUM 5000 UNITS: 5000 INJECTION INTRAVENOUS; SUBCUTANEOUS at 21:25

## 2022-01-06 RX ADMIN — INSULIN LISPRO 4 UNITS: 100 INJECTION, SOLUTION INTRAVENOUS; SUBCUTANEOUS at 17:13

## 2022-01-06 RX ADMIN — AZITHROMYCIN 250 MG: 250 TABLET, FILM COATED ORAL at 21:40

## 2022-01-06 RX ADMIN — SODIUM ZIRCONIUM CYCLOSILICATE 10 G: 10 POWDER, FOR SUSPENSION ORAL at 08:13

## 2022-01-06 RX ADMIN — ACETAMINOPHEN 650 MG: 325 TABLET, FILM COATED ORAL at 03:47

## 2022-01-06 RX ADMIN — FOLIC ACID 1 MG: 1 TABLET ORAL at 21:38

## 2022-01-06 RX ADMIN — METHYLPREDNISOLONE SODIUM SUCCINATE 40 MG: 40 INJECTION, POWDER, FOR SOLUTION INTRAMUSCULAR; INTRAVENOUS at 21:25

## 2022-01-06 RX ADMIN — LORAZEPAM 1 MG: 1 TABLET ORAL at 21:39

## 2022-01-06 RX ADMIN — METHYLPREDNISOLONE SODIUM SUCCINATE 40 MG: 40 INJECTION, POWDER, FOR SOLUTION INTRAMUSCULAR; INTRAVENOUS at 03:51

## 2022-01-06 NOTE — CONSULTS
INITIAL CONSULT NOTE      Patient Name: Anthony Moss  : 1955  MRN: [id number]  Primary Care Physician: Marva Gaviria APRN  Date of admission: 2022    Patient Care Team:  Marva Gaviria APRN as PCP - General (Nurse Practitioner)        Reason for Consult:       CARLOS ALBERTO, hyperkalemia. Acidosis.     Subjective   History of Present Illness:   Chief Complaint:   Chief Complaint   Patient presents with   • Shortness of Breath     HISTORY:  Anthony Moss is a 66 y.o. male with past medical history of hypertension, hyperlipidemia, chronic back pain on NSAIDs, alcohol use, who presented with a complaint of weakness and shortness of air, chest x-ray concerning for bilateral pneumonia, we are concerned for acute kidney injury complicated with severe hyperkalemia, metabolic acidosis, and anuria.  Patient stated he had not been feeling well since [place] day, initially started with some cough, shortness of air, he thought was bronchitis, somewhat expecting to get better, but he continued to get worsen, he was brought in by his friends, who are in the room,  He has been complaining of tiredness, weakness,  Have been having a diminished urine output, also lately have been having issues with urinary incontinence and was supposed to see urology, never had any history of kidney problem in the past, on admission, creatinine was 2.5, with a BUN of 155, bicarb 14, potassium was 6.5, recheck potassium was 6.7, patient continues to be oligoanuric, last urine output yesterday, but states has been dimished lately.   Has not been losing any weight, actually stepped gaining weight, no history of CHF,          Review of systems:    Constitutional: feverish, weakness.   HEENT:  No headache, otalgia, itchy eyes, nasal discharge or sore throat.  Cardiac:  No chest pain, dyspnea, orthopnea or PND.  Chest:              Cough, wheezing.   Abdomen:  No abdominal pain, nausea or vomiting.  Neuro:  No focal weakness,  abnormal movements orseizure like activity.  :   dimished urine output.   ROS was otherwise negative except as mentioned in the [place].       Personal History:     Past Medical History: History reviewed. No pertinent past medical history.    Surgical History:    No past surgical history on file.    Family History: family history is not on file. Otherwise pertinent FHx was reviewed and unremarkable.     Social History:  reports that he has never smoked. He has never used smokeless tobacco.    Medications:  Prior to Admission medications    Medication Sig Start Date End Date Taking? Authorizing Provider   acetaminophen-codeine (TYLENOL #3) 300-30 MG per tablet Take 1 tablet by mouth Every 6 (Six) Hours As Needed for Moderate Pain . 1/23/21 1/5/22  Brendon Geiger MD     Scheduled Meds:[START ON 1/6/2022] cefTRIAXone, 1 g, Intravenous, Q24H   And  [START ON 1/6/2022] azithromycin, 500 mg, Intravenous, Q24H  heparin (porcine), 5,000 Units, Subcutaneous, Q12H  ipratropium-albuterol, 3 mL, Nebulization, Q4H - RT  [START ON 1/6/2022] methylPREDNISolone sodium succinate, 40 mg, Intravenous, Q8H  sodium chloride, 10 mL, Intravenous, Q12H  sodium zirconium cyclosilicate, 10 g, Oral, TID      Continuous Infusions:sodium bicarbonate drip (greater than 75 mEq/bag), 150 mEq, Last Rate: 150 mEq (01/05/22 2242)      PRN Meds:•  acetaminophen **OR** acetaminophen **OR** acetaminophen  •  aluminum-magnesium hydroxide-simethicone  •  melatonin  •  nitroglycerin  •  ondansetron **OR** ondansetron  •  [COMPLETED] Insert peripheral IV **AND** sodium chloride  •  sodium chloride  Allergies:  No Known Allergies    Objective   Exam:     Vital Signs  Temp:  [98.8 °F (37.1 °C)] 98.8 °F (37.1 °C)  Heart Rate:  [101-119] 117  Resp:  [18-28] 18  BP: (133-155)/(69-98) 154/89  SpO2:  [92 %-100 %] 98 %  on  Flow (L/min):  [4] 4;   Device (Oxygen Therapy): nasal cannula  Body mass index is 32.69 kg/m².  EXAM  General:  Weakness.     Head:       Normocephalic and atraumatic.    Eyes:      PERRL/EOM intact, conjunctivae and sclerae clear without nystagmus.    Neck:      No masses, thyromegaly,  trachea central   Lungs:   B/l wheezing.   Heart:      Regular rate and rhythm, no murmur no gallop  Abd:        Soft, nontender, not distended, bowel sounds positive, no shifting dullness.  Msk:        No deformity or scoliosis noted of thoracic or lumbar spine.    Pulses:   Pulses normal in all 4 extremities.    Extremities:    Trace sacral swelling.    Neuro:    No focal deficits.   alert oriented x3  Skin:       Intact without lesions or rashes.    Psych:    Alert and cooperative; normal mood and affect; normal attention span       Results Review:  I have personally reviewed most recent Data :  [facility]  CBC    Results from last 7 days   Lab Units 01/05/22  1850   WBC 10*3/mm3 9.00   HEMOGLOBIN g/dL 9.1*   PLATELETS 10*3/mm3 113*     CMP   Results from last 7 days   Lab Units 01/05/22 2120 01/05/22  1850   SODIUM mmol/L 134* 133*   POTASSIUM mmol/L 6.7* 6.5*   CHLORIDE mmol/L 103 102   CO2 mmol/L 11.0* 14.0*   BUN mg/dL 159* 155*   CREATININE mg/dL 12.35* 12.52*   GLUCOSE mg/dL 149* 108*   ALBUMIN g/dL 3.10*  --    BILIRUBIN mg/dL 0.3  --    ALK PHOS U/L 159*  --    AST (SGOT) U/L 40  --    ALT (SGPT) U/L 25  --      ABG    Results from last 7 days   Lab Units 01/05/22  1846   PH, ARTERIAL pH units 7.320*   PCO2, ARTERIAL mm Hg 25.5*   PO2 ART mm Hg 75.3*   O2 SATURATION ART % 94.1   BASE EXCESS ART mmol/L -11.7*     XR Chest 1 View    Result Date: 1/5/2022  Bilateral pneumonia more pronounced on the left  Electronically Signed By-Phuc Morrison On:1/5/2022 5:31 PM This report was finalized on [id number] by  Phuc Morrison, .            Assessment/Plan   Assessment and Plan:         Acute renal failure (ARF) (HCC)    Mixed hyperlipidemia    Essential hypertension    Obesity    Bilateral pneumonia    Thrombocytopenia  ([facility])    ASSESSMENT:  Acute kidney injury, stage III, oligoanuric, metabolic differential at this this time, including sepsis, with bilateral pneumonia, but also with a presentation with anemia, thrombocytopenia, have to rule out any TMA.  Patient apparently did have labs with his primary care doctor, we have to open the labs, to start with baseline creatinine never been told about any kidney problems in the past,  Had been taking Aleve, for the back pain,  Hyperkalemia,  Weakness and tiredness, with a BUN of 59, very concerning for uremia,  Metabolic acidosis, gap and nongap, DKA, azotemia, lactic acid normal,  Alcohol use,  Chronic back pain,using NSAID  B/l pneumonia.  Surprisingly, WBC stable,        Plan:     Patient with dramatic presentation with cough and shortness of air, chest x-ray with bilateral pneumonia, but normal WBC, and underlying anemia, thrombocytopenia, alcohol use, presented with acute kidney injury with severe hyperkalemia and metabolic acidosis, had discussed with the ER team earlier, orderd  for the start sodium bicarb, insulin dextrose, calcium gluconate, repeat potassium higher, 6.7, creatinine still more than 12, significant azotemia with concern about uremia, has been complaining of weakness and tiredness, has been anuric.   Obtain records from PCP, to establish kidney function,   Denies, any kidney problem in the past.   Avoid NSAID use.   At this time, patient will need to stat hemodialysis, I discussed this with the patient and his friends were in the room, I discussed all indications as well as risks and complication including hypotension, arrhythmia, he is agreeable and wants to proceed.   D/w ICU RN.  Request Lillian with ICU team, and I have ordered for the stat hemodialysis, with a low potassium bath, short treatment of 2 and half hour. On lower blood flow.   Significant acidosis, start bicarb fluids at 50 ml/hr.   As discussed, broad differential for acute kidney injury, with a  presentation with a thrombocytopenia anemia, though can be contributed to alcoholic liver disease, have to rule out, TMA activity. Send work up , Adams13 activity aptoglobin, LDH  With his lung and kidney, involvement, rule out any pulmorenal syndrome, including lupus, vasculitis, paraprotein  Start thiamine  Broad coverage antibiotics as per eGFR less than 10.   Send procal  Will need ECHO,   Detail urine studies.   Repeat BMP , 4 hour post hd, likely will need more HD tomorrow.   Iron profile.   Phos and PTH  We will closely follow.     Note started  by Darrius Ferrer MD,   [facility] kidney consultant  [phone]

## 2022-01-06 NOTE — NURSING NOTE
Pt placed into yellow gown, fall precautions intimated.  Pt. HOB up,(aspiration precautions in place)  healthy choice roasted turkey meal provided, with 240 oz of water.

## 2022-01-06 NOTE — H&P
[facility] Medicine Services      Patient Name: Anthony Moss  : 1955  MRN: [id number]  Primary Care Physician:  Marva Gaviria APRN  Date of admission: 2022      Subjective      Chief Complaint: Dyspnea    History of Present Illness:  Anthony Moss is a 66 y.o. male w/PMH of HTN, HLD, ADHD, depression, scoliosis, low back pain, obesity, OA who presents to [facility] ED due to dyspnea.  Patient states dyspnea has progressively worsened over the last 10 days, dyspnea is worse with exertion and supine position, relieved with rest and elevated HOB.  Friends at bedside report a 2-week history of alternating incontinence, oliguria with anuria that began yesterday.  Patient admits to cough, diarrhea, chills, edema, weight gain of approximately 45 pounds over the last year, alcohol use, tobacco use, marijuana use.  Patient denies chest pain, fever, palpitations, syncope.  As dyspnea did not improve he decided to go to [facility] ED.  Patient states it has been approximately 1 year since he has seen a provider, currently takes no medications.      Upon arrival to the ED vitals temp 98.8, , RR 20, /98, O2 sat 94% on room air.  Labs notable for pH 7.320, CO2 25.5, PO2 75.3, HCO3 13.1, base excess -11.7, O2 sat 94.1, CO2 13.9.  Labs notable for glucose 108, , K6.5, CO2 14, creatinine 12.52, , GFR 4, WBC 9.0, Hgb 9.1, platelets 113, neutrophils 76.2.  Chest x-ray shows bilateral pneumonia more pronounced on the left.  Covid test negative.  Chest x-ray shows sinus tachycardia rate 110 otherwise normal EKG.  Treated in ED with IV azithromycin, IV Rocephin, IV morphine.      Review of Systems   Constitutional: Positive for chills, malaise/fatigue and weight gain.   HENT: Positive for congestion.    Eyes: Negative.  Negative for visual disturbance.   Cardiovascular: Positive for dyspnea on exertion, leg swelling and orthopnea.   Respiratory:  Positive for cough and shortness of breath.    Endocrine: Negative.    Hematologic/Lymphatic: Negative.    Skin: Negative.    Musculoskeletal: Positive for myalgias.   Gastrointestinal: Negative for bloating, abdominal pain, melena, nausea and vomiting.   Genitourinary: Positive for bladder incontinence, hesitancy, incomplete emptying and urgency. Negative for hematuria.   Neurological: Positive for weakness.   Psychiatric/Behavioral: The patient is nervous/anxious.    All other systems reviewed and are negative.       Personal History     History reviewed. No pertinent past medical history.    No past surgical history on file.    Family History:  Otherwise pertinent FHx was reviewed and not pertinent to current issue.    Social History:  reports that he has never smoked. He has never used smokeless tobacco.    Home Medications:  Prior to Admission Medications     Prescriptions Last Dose Informant Patient Reported? Taking?    acetaminophen-codeine (TYLENOL #3) 300-30 MG per tablet   No No    Take 1 tablet by mouth Every 6 (Six) Hours As Needed for Moderate Pain .            Allergies:  No Known Allergies    Objective      Vitals:   Temp:  [98.8 °F (37.1 °C)] 98.8 °F (37.1 °C)  Heart Rate:  [101-119] 117  Resp:  [18-28] 18  BP: (133-155)/(69-98) 154/89  Flow (L/min):  [4] 4    Physical Exam  Vitals and nursing note reviewed.   Constitutional:       Appearance: He is obese. He is ill-appearing.   HENT:      Head: Normocephalic and atraumatic.      Right Ear: External ear normal.      Left Ear: External ear normal.      Nose: Congestion present.      Mouth/Throat:      Mouth: Mucous membranes are dry.   Eyes:      Pupils: Pupils are equal, round, and reactive to light.   Cardiovascular:      Rate and Rhythm: Regular rhythm. Tachycardia present.      Pulses: Decreased pulses.      Heart sounds: Normal heart sounds.   Pulmonary:      Effort: Tachypnea, accessory muscle usage and prolonged expiration present.      Breath  sounds: Decreased air movement present. Examination of the right-upper field reveals wheezing. Examination of the left-upper field reveals wheezing. Examination of the right-middle field reveals wheezing. Examination of the left-middle field reveals wheezing. Examination of the right-lower field reveals wheezing. Examination of the left-lower field reveals wheezing. Wheezing present.   Chest:      Chest wall: Tenderness present.   Abdominal:      General: Bowel sounds are decreased. There is distension.      Palpations: Abdomen is soft.   Musculoskeletal:      Cervical back: Normal range of motion. No pain with movement. Normal range of motion.      Right lower leg: 3+ Edema present.      Left lower leg: 3+ Edema present.   Skin:     General: Skin is warm and moist.      Coloration: Skin is pale.      Findings: Abrasion, bruising and ecchymosis present.   Neurological:      Mental Status: He is alert.   Psychiatric:         Attention and Perception: Attention normal.         Mood and Affect: Mood is anxious.         Behavior: Behavior is cooperative.          Result Review    Result Review:  I have personally reviewed the results from the time of this admission to 1/5/2022 23:28 EST and agree with these findings:  [x]  Laboratory  [x]  Microbiology  [x]  Radiology  [x]  EKG/Telemetry   [x]  Cardiology/Vascular   []  Pathology  []  Old records  []  Other:        Assessment/Plan        Active Hospital Problems:  Active Hospital Problems    Diagnosis    • **Acute renal failure (ARF) (HCC)    • Bilateral pneumonia    • Thrombocytopenia (HCC)    • Essential hypertension    • Mixed hyperlipidemia    • ETOH abuse    • Obesity    • Tobacco abuse    • Marijuana use      Acute renal failure:  -Upon arrival to the ED creatinine 12.52, , GFR 4  -Consult nephrology, patient may require stat dialysis  -Stat EKG, proBNP, magnesium, phosphorus, UA, urine drug screen ionized calcium  -Renal diet  -Avoid nephrotoxic dyes and  medications unless urgently indicated  -Continuous cardiac monitoring  -Monitor BMP    Bilateral pneumonia:  -Chest x-ray reviewed  -Covid negative  -Treated in ED with IV azithromycin, IV Rocephin, IV morphine  -Continue IV azithromycin, IV Rocephin, discussed renal function with pharmacist  -Trend lactic acid levels  -Procalcitonin, sputum culture, respiratory PCR panel  -Blood culture obtained and are pending  -Check sputum culture, check urine antigen: Strep Pneumo & legionella  -DuoNebs, oxygen, incentive spirometry  -Continuous pulse oximetry    Thrombocytopenia:  -Upon arrival to the ED platelets 113  -Monitor CBC during admission  -Continue to monitor, consult hematology if indicated    ETOH abuse:  Initiate EtOH withdrawal protocol  -Monitor vital signs every 4hrs  -Seizure precautions  -Ammonia level, ethanol level  -Vitamin replacement, Ativan dosing held due to acute renal failure    Tobacco abuse marijuana use:  -Tobacco cessation education    Essential hypertension:  -Encourage lifestyle modifications  -Low-sodium diet  -No home medications noted    Mixed hyperlipidemia:  -Encourage lifestyle modifications  -Encourage dietary modifications  -No Home statin noted    Obesity:  -Encourage lifestyle modifications  -Encourage dietary modifications        DVT prophylaxis:  Medical and mechanical DVT prophylaxis orders are present.      Home medications verified by nursing and/or pharmacy prior to provider review      CODE STATUS:    Code Status (Patient has no pulse and is not breathing): CPR (Attempt to Resuscitate)  Medical Interventions (Patient has pulse or is breathing): Full Support    Admission Status:  I believe this patient meets inpatient status.          The patient was interviewed wearing appropriate personal protective equipment.      Signature: Electronically signed by DANIS Schaeffer, 01/05/22, 11:46 PM EST.

## 2022-01-06 NOTE — ED PROVIDER NOTES
Subjective   Patient is a 66-year-old male complaint of cough congestion shortness of breath over the past several days.  He states he has had low-grade fever.  Nuys vomiting diarrhea or other complaint.          Review of Systems  Negative for headache earache sore throat chest pain abdominal pain vomiting diarrhea dysuria achiness weight loss or other complaint.  A complete system was obtained and is otherwise negative  History reviewed. No pertinent past medical history.    No Known Allergies    No past surgical history on file.    History reviewed. No pertinent family history.    Social History     Socioeconomic History   • Marital status:    Tobacco Use   • Smoking status: Never Smoker   • Smokeless tobacco: Never Used           Objective   Physical Exam  HEENT exam shows TMs to be clear.  Oropharynx comers but sclerae nonicteric.  Neck has no adenopathy JVD or bruits.  Lungs have rhonchi at the bases.  Heart has a regular rate rhythm without murmur rub or gallop.  Chest is nontender.  Abdomen is soft nontender.  Extremities M is no cyanosis or edema.  Procedures  My EKG interpretation shows sinus tachycardia at a rate of 110 with no acute ST change         ED Course      Results for orders placed or performed during the hospital encounter of 01/05/22   COVID-19,CEPHEID/UMM,COR/KIRBY/PAD/JULIÁN IN-HOUSE(OR EMERGENT/ADD-ON),NP SWAB IN TRANSPORT MEDIA 3-4 HR TAT, RT-PCR - Swab, Nasopharynx    Specimen: Nasopharynx; Swab   Result Value Ref Range    COVID19 Not Detected Not Detected - Ref. Range   Basic Metabolic Panel    Specimen: Blood   Result Value Ref Range    Glucose 108 (H) 65 - 99 mg/dL     (H) 8 - 23 mg/dL    Creatinine 12.52 (H) 0.76 - 1.27 mg/dL    Sodium 133 (L) 136 - 145 mmol/L    Potassium 6.5 (C) 3.5 - 5.2 mmol/L    Chloride 102 98 - 107 mmol/L    CO2 14.0 (L) 22.0 - 29.0 mmol/L    Calcium 9.6 8.6 - 10.5 mg/dL    eGFR  African Amer      eGFR Non African Amer 4 (L) >60 mL/min/1.73     BUN/Creatinine Ratio 12.4 7.0 - 25.0    Anion Gap 17.0 (H) 5.0 - 15.0 mmol/L   CBC Auto Differential    Specimen: Blood   Result Value Ref Range    WBC 9.00 3.40 - 10.80 10*3/mm3    RBC 2.72 (L) 4.14 - 5.80 10*6/mm3    Hemoglobin 9.1 (L) 13.0 - 17.7 g/dL    Hematocrit 26.3 (L) 37.5 - 51.0 %    MCV 96.8 79.0 - 97.0 fL    MCH 33.5 (H) 26.6 - 33.0 pg    MCHC 34.7 31.5 - 35.7 g/dL    RDW 14.3 12.3 - 15.4 %    RDW-SD 49.9 37.0 - 54.0 fl    MPV 8.3 6.0 - 12.0 fL    Platelets 113 (L) 140 - 450 10*3/mm3    Neutrophil % 76.2 (H) 42.7 - 76.0 %    Lymphocyte % 9.2 (L) 19.6 - 45.3 %    Monocyte % 11.7 5.0 - 12.0 %    Eosinophil % 2.2 0.3 - 6.2 %    Basophil % 0.7 0.0 - 1.5 %    Neutrophils, Absolute 6.90 1.70 - 7.00 10*3/mm3    Lymphocytes, Absolute 0.80 0.70 - 3.10 10*3/mm3    Monocytes, Absolute 1.10 (H) 0.10 - 0.90 10*3/mm3    Eosinophils, Absolute 0.20 0.00 - 0.40 10*3/mm3    Basophils, Absolute 0.10 0.00 - 0.20 10*3/mm3    nRBC 0.1 0.0 - 0.2 /100 WBC   Blood Gas, Arterial -    Specimen: Arterial Blood   Result Value Ref Range    Site Right Radial     Tavares's Test Positive     pH, Arterial 7.320 (L) 7.350 - 7.450 pH units    pCO2, Arterial 25.5 (L) 35.0 - 48.0 mm Hg    pO2, Arterial 75.3 (L) 83.0 - 108.0 mm Hg    HCO3, Arterial 13.1 (L) 21.0 - 28.0 mmol/L    Base Excess, Arterial -11.7 (L) 0.0 - 3.0 mmol/L    O2 Saturation, Arterial 94.1 94.0 - 98.0 %    CO2 Content 13.9 (L) 22 - 29 mmol/L    Barometric Pressure for Blood Gas      Modality Room Air     FIO2 21 %    Hemodilution No    Magnesium    Specimen: Blood   Result Value Ref Range    Magnesium 1.9 1.6 - 2.4 mg/dL   Phosphorus    Specimen: Blood   Result Value Ref Range    Phosphorus 4.9 (H) 2.5 - 4.5 mg/dL   ECG 12 Lead   Result Value Ref Range    QT Interval 319 ms   ECG 12 Lead   Result Value Ref Range    QT Interval 310 ms     XR Chest 1 View    Result Date: 1/5/2022  Bilateral pneumonia more pronounced on the left  Electronically Signed By-Phuc Morrison  On:1/5/2022 5:31 PM This report was finalized on [id number] by  Phuc Morrison, .                                               MDM  Number of Diagnoses or Management Options  Diagnosis management comments: Patient has bibasilar pneumonia on chest x-ray.  There is no effusions or pneumothorax noted.  Patient is Covid negative.  Metabolic panel is at baseline.  Blood gas shows patient had mild hypoxia.  The patient is tachypneic.  Will be admitted for IV antibiotics and respiratory support.  I did speak the on-call hospitalist.       Amount and/or Complexity of Data Reviewed  Clinical lab tests: reviewed  Tests in the radiology section of CPT®: reviewed  Tests in the medicine section of CPT®: reviewed    Risk of Complications, Morbidity, and/or Mortality  Presenting problems: high  Diagnostic procedures: high  Management options: high    Patient Progress  Patient progress: stable      Final diagnoses:   Dyspnea, unspecified type   Pneumonia of both lower lobes due to infectious organism   Hypoxia   Acute renal failure, unspecified acute renal failure type (HCC)       ED Disposition  ED Disposition     ED Disposition Condition Comment    Decision to Admit  Level of Care: Telemetry [5]   Diagnosis: Acute renal failure (ARF) (HCC) [776717]   Admitting Physician: MAUDE HOOVER [[id number]]   Attending Physician: MAUDE HOOVER [[id number]]   Certification: I Certify That Inpatient Hospital Services Are Medically Necessary For Greater Than 2 Midnights            No follow-up provider specified.       Medication List      No changes were made to your prescriptions during this visit.          Brendon Geiger MD  01/05/22 1938       Brendon Geiger MD  01/05/22 [id number]

## 2022-01-06 NOTE — PLAN OF CARE
Goal Outcome Evaluation:              Outcome Summary: Consults on board. Pt is currently off the floor for dialysis. Will continue to observe.

## 2022-01-06 NOTE — PROGRESS NOTES
[facility] Medicine Services Daily Progress Note    Patient Name: Anthony Moss  : 1955  MRN: [id number]  Primary Care Physician:  Marva Gaviria APRN  Date of admission: 2022      Subjective      Chief Complaint: Dyspnea      2022  Shiley placed overnight, HD scheduled for today.  Patient complains of pain from failed cath attempt.    ROS   Constitutional: Positive for chills, malaise/fatigue and weight gain.   HENT: Positive for congestion.    Eyes: Negative.  Negative for visual disturbance.   Cardiovascular: Positive for dyspnea on exertion, leg swelling and orthopnea.   Respiratory: Positive for cough and shortness of breath.    Endocrine: Negative.    Hematologic/Lymphatic: Negative.    Skin: Negative.    Musculoskeletal: Positive for myalgias.   Gastrointestinal: Negative for bloating, abdominal pain, melena, nausea and vomiting.   Genitourinary: Positive for bladder incontinence, hesitancy, incomplete emptying and urgency. Negative for hematuria.   Neurological: Positive for weakness.   Psychiatric/Behavioral: The patient is nervous/anxious.    All other systems reviewed and are negative.    Objective      Vitals:   Temp:  [98 °F (36.7 °C)-98.8 °F (37.1 °C)] 98 °F (36.7 °C)  Heart Rate:  [100-126] 106  Resp:  [18-28] 18  BP: (128-156)/(69-98) 156/79  Flow (L/min):  [4] 4    Physical Exam  Constitutional:       General: He is not in acute distress.     Appearance: Normal appearance. He is obese.   HENT:      Head: Normocephalic and atraumatic.      Nose: No congestion.      Mouth/Throat:      Pharynx: No oropharyngeal exudate.   Eyes:      General: No scleral icterus.  Neck:      Comments: Left sided shiley present  Cardiovascular:      Rate and Rhythm: Normal rate and regular rhythm.      Heart sounds: No murmur heard.  No friction rub. No gallop.    Pulmonary:      Breath sounds: Rhonchi and rales present. No wheezing.   Abdominal:      General: There is no  distension.      Tenderness: There is no abdominal tenderness. There is no guarding.   Musculoskeletal:         General: No deformity or signs of injury.      Cervical back: Normal range of motion. No rigidity.      Right lower leg: Edema present.      Left lower leg: Edema present.   Skin:     Coloration: Skin is not jaundiced.      Findings: No bruising or lesion.   Neurological:      General: No focal deficit present.      Mental Status: He is alert and oriented to person, place, and time.      Motor: No weakness.             Result Review    Result Review:  I have personally reviewed the results from the time of this admission to 1/6/2022 14:35 EST and agree with these findings:  [x]  Laboratory  []  Microbiology  [x]  Radiology  []  EKG/Telemetry   []  Cardiology/Vascular   []  Pathology  []  Old records  []  Other:          Assessment/Plan      Brief Patient Summary:  Anthony Moss is a 66 y.o. male who presented due to dyspnea and was found to be in acute renal failure complicated by lung involvement.  Abx started, Nephrology consulted.  Shiley placed by ICU.  Pulm consulted and urology consulted for trujillo placement to obtain urine.  HD stared 1/6/2022      azithromycin, 250 mg, Oral, Nightly  cefTRIAXone, 1 g, Intravenous, Q24H  gabapentin, 100 mg, Oral, TID  heparin (porcine), 5,000 Units, Subcutaneous, Q12H  insulin lispro, 0-7 Units, Subcutaneous, TID AC  ipratropium-albuterol, 3 mL, Nebulization, Q4H - RT  lidocaine, 1 patch, Transdermal, Q24H  methylPREDNISolone sodium succinate, 40 mg, Intravenous, Q8H  sodium chloride, 10 mL, Intravenous, Q12H  sodium zirconium cyclosilicate, 10 g, Oral, TID  thiamine (VITAMIN B1) IVPB, 100 mg, Intravenous, BID       sodium bicarbonate drip (greater than 75 mEq/bag), 150 mEq, Last Rate: 150 mEq (01/06/22 1409)         Active Hospital Problems:  Active Hospital Problems    Diagnosis    • **Acute renal failure (ARF) (HCC)    • Obesity    • Essential hypertension    •  Mixed hyperlipidemia    • Bilateral pneumonia    • Thrombocytopenia (HCC)    • ETOH abuse    • Tobacco abuse    • Marijuana use      Plan:   Acute renal failure:    -Upon arrival to the ED creatinine 12.52, , GFR 4    -Consult nephrology, patient may require stat dialysis    -Shiley placed by ICU 01/06    - HD started 01/06    - Renal imaging pending    - Urology consulted for trujillo (nursing failed) to obtain urine    - consider steroids    - renal biopsy to be performed     #Bilateral pneumonia:    -Chest x-ray reviewed    -Covid negative    -Treated in ED with IV azithromycin, IV Rocephin, IV morphine    -Continue IV azithromycin, IV Rocephin, discussed renal function with pharmacist    -Trend lactic acid levels    -Procalcitonin 0.82 > abx warranted    -Blood culture obtained and are pending    -Check sputum culture, check urine antigen: Strep Pneumo & legionella    -DuoNebs, oxygen, incentive spirometry    - Possible related to ANCA, pulm consulted by nephro     #Thrombocytopenia:    -Upon arrival to the ED platelets 113    -Monitor CBC during admission    -Continue to monitor, consult hematology if indicated    - most likely 2/2 renal failure or bone marrow suppression     #ETOH abuse:    -Initiate EtOH withdrawal protocol    -Monitor vital signs every 4hrs    -Seizure precautions    -Ammonia level, ethanol level    -Vitamin replacement, Ativan dosing held due to acute renal failure     #Tobacco abuse marijuana use:    -Tobacco cessation education     #Essential hypertension:    -Encourage lifestyle modifications    -Low-sodium diet    -No home medications noted     #Mixed hyperlipidemia:    -Encourage lifestyle modifications    -Encourage dietary modifications    -No Home statin noted     #Obesity:    -Encourage lifestyle modifications    -BMI 32.61       DVT prophylaxis:  Medical and mechanical DVT prophylaxis orders are present.    CODE STATUS:    Code Status (Patient has no pulse and is not  breathing): CPR (Attempt to Resuscitate)  Medical Interventions (Patient has pulse or is breathing): Full Support      Disposition:  I expect patient to be discharged in 5-6 days.    This patient has been examined wearing appropriate Personal Protective Equipment and discussed with Patient and nephrology. 01/06/22      Electronically signed by Ede Mcdaniel DO, 01/06/22, 14:35 EST.  [facility]

## 2022-01-06 NOTE — CONSULTS
Group: [facility]         CONSULT NOTE    Patient Identification:  Anthony Moss  66 y.o.  male  1955  [id number]            Requesting physician: Attending physician    Reason for Consultation: Shortness of breath      History of Present Illness:  66 y.o. male with past medical history of HTN, HLD, ADHD, depression, scoliosis, low back pain, obesity, OA , who presented with a complaint of weakness and shortness of air, chest x-ray concerning for bilateral pneumonia,  Complaints started 12/25/2021 with shortness of breath and productive cough that progressed.  Associated with weakness and fatigue.  Decreased urine output.  He has oliguria with elevated BUN and creatinine and low bicarb    Assessment:  Acute renal failure (ARF) (HCC)  Bilateral pneumonia: Respiratory panel negative  Hypoxemia  CARLOS ALBERTO  Thrombocytopenia  Alcohol abuse  Tobacco abuse  Essential hypertension  Obesity  Anemia    Plan:  Oxygen supplement and titration  Antibiotics Rocephin and azithromycin  Solu-Medrol 40 mg every 8 hours  Bronchodilators  Nephrology consultation following for renal function  DVT/GI prophylaxis  Check daily labs and correct electrolytes as needed          Review of Sytems:  Review of Systems  Constitutional: Positive for chills, malaise/fatigue and weight gain.   HENT: Positive for congestion.    Eyes: Negative.  Negative for visual disturbance.   Cardiovascular: Positive for dyspnea on exertion, leg swelling and orthopnea.   Respiratory: Positive for cough and shortness of breath.    Endocrine: Negative.    Hematologic/Lymphatic: Negative.    Skin: Negative.    Musculoskeletal: Positive for myalgias.   Gastrointestinal: Negative for bloating, abdominal pain, melena, nausea and vomiting.   Genitourinary: Positive for bladder incontinence, hesitancy, incomplete emptying and urgency. Negative for hematuria.   Neurological: Positive for weakness.   Psychiatric/Behavioral: The patient is  "nervous/anxious.    All other systems reviewed and are negative.  Past Medical History:  History reviewed. No pertinent past medical history.    Past Surgical History:  No past surgical history on file.     Home Meds:  No medications prior to admission.       Allergies:  No Known Allergies    Social History:   Social History     Socioeconomic History   • Marital status:    Tobacco Use   • Smoking status: Never Smoker   • Smokeless tobacco: Never Used       Family History:  History reviewed. No pertinent family history.    Physical Exam:  /79 (BP Location: Left arm, Patient Position: Lying)   Pulse 106   Temp 98 °F (36.7 °C) (Oral)   Resp 18   Ht 188 cm (74\")   Wt 115 kg (254 lb)   SpO2 99%   BMI 32.61 kg/m²  Body mass index is 32.61 kg/m². 99% 115 kg (254 lb)  Physical Exam    LABS:  Lab Results   Component Value Date    CALCIUM 9.1 01/05/2022    PHOS 4.9 (H) 01/05/2022     Results from last 7 days   Lab Units 01/06/22  0638 01/05/22  2306 01/05/22 2120 01/05/22  2120 01/05/22  1850 01/05/22  1850   MAGNESIUM mg/dL  --   --   --  1.8  --  1.9   SODIUM mmol/L  --  137  --  134*  --  133*   POTASSIUM mmol/L  --  6.2*  --  6.7*  --  6.5*   CHLORIDE mmol/L  --  102  --  103  --  102   CO2 mmol/L  --  15.0*  --  11.0*  --  14.0*   BUN mg/dL  --  156*  --  159*  --  155*   CREATININE mg/dL  --  12.36*  --  12.35*  --  12.52*   GLUCOSE mg/dL  --  208*   < > 149*   < > 108*   CALCIUM mg/dL  --  9.1  --  9.0  --  9.6   WBC 10*3/mm3 7.50 7.50  --   --   --  9.00   HEMOGLOBIN g/dL 8.4* 8.3*  --   --   --  9.1*   PLATELETS 10*3/mm3 90* 92*  --   --   --  113*   ALT (SGPT) U/L  --  24  --  25  --   --    AST (SGOT) U/L  --  37  --  40  --   --    PROBNP pg/mL  --   --   --   --   --  13,980.0*   PROCALCITONIN ng/mL  --   --   --   --   --  0.82*    < > = values in this interval not displayed.     No results found for: CKTOTAL, CKMB, CKMBINDEX, TROPONINI, TROPONINT          Results from last 7 days   Lab " Units 01/06/22  0638 01/05/22  2120 01/05/22  1850   PROCALCITONIN ng/mL  --   --  0.82*   LACTATE mmol/L 1.5 0.9  --      Results from last 7 days   Lab Units 01/05/22  1846   PH, ARTERIAL pH units 7.320*   PCO2, ARTERIAL mm Hg 25.5*   PO2 ART mm Hg 75.3*   O2 SATURATION ART % 94.1   MODALITY  Room Air     Results from last 7 days   Lab Units 01/05/22  1705   ADENOVIRUS DETECTION BY PCR  Not Detected   CORONAVIRUS 229E  Not Detected   CORONAVIRUS HKU1  Not Detected   CORONAVIRUS NL63  Not Detected   CORONAVIRUS OC43  Not Detected   HUMAN METAPNEUMOVIRUS  Not Detected   HUMAN RHINOVIRUS/ENTEROVIRUS  Not Detected   INFLUENZA B PCR  Not Detected   PARAINFLUENZA 1  Not Detected   PARAINFLUENZA VIRUS 2  Not Detected   PARAINFLUENZA VIRUS 3  Not Detected   PARAINFLUENZA VIRUS 4  Not Detected   BORDETELLA PERTUSSIS PCR  Not Detected   CHLAMYDOPHILA PNEUMONIAE PCR  Not Detected   MYCOPLAMA PNEUMO PCR  Not Detected   INFLUENZA A PCR  Not Detected   RSV, PCR  Not Detected     Results from last 7 days   Lab Units 01/05/22  2306   INR  1.11*         Lab Results   Component Value Date    TSH 2.840 01/05/2022     Estimated Creatinine Clearance: 8.1 mL/min (A) (by C-G formula based on SCr of 12.36 mg/dL (H)).         Imaging:  Imaging Results (Last 24 Hours)     Procedure Component Value Units Date/Time    US Renal Bilateral [[id number]] Collected: 01/06/22 1553     Updated: 01/06/22 1557    Narrative:      DATE OF EXAM:  1/6/2022 3:06 PM     PROCEDURE:  US RENAL BILATERAL-     INDICATIONS:  CARLOS ALBERTO; R06.00-Dyspnea, unspecified; J18.9-Pneumonia, unspecified organism;  R09.02-Hypoxemia; N17.9-Acute kidney failure, unspecified     COMPARISON:  CT abdomen and pelvis without contrast 1/6/2022.     TECHNIQUE:   Grayscale and color Doppler ultrasound evaluation of the kidneys and  urinary bladder was performed.        FINDINGS:  The right kidney measures 13.4 x 5.3 x 7.9 cm. The left kidney measures  11.9 x 4.7 x 5.5 cm. There is mild to  moderate bilateral hydronephrosis.  Renal cortical thickness and echotexture is normal. No obstructing  etiology is seen. The urinary bladder is significantly distended with  fluid, with a prevoid volume of nearly 1800 cc. No urinary bladder  lesion is identified. The technologist states the patient was unable to  void due to their clinical condition.        Impression:         1. Marked urinary bladder distention with fluid. The patient was unable  to void. Consider Roberts catheter decompression.  2. Mild to moderate bilateral hydronephrosis. No obstructing abnormality  is seen.     Electronically Signed By-Dana Shanks MD On:1/6/2022 3:54 PM  This report was finalized on [id number] by  Dana Shanks MD.    CT Chest Without Contrast Diagnostic [[id number]] Resulted: 01/06/22 1529     Updated: 01/06/22 1545    CT Abdomen Pelvis Stone Protocol [[id number]] Resulted: 01/06/22 1529     Updated: 01/06/22 1545    XR Chest 1 View [[id number]] Collected: 01/06/22 0148     Updated: 01/06/22 0155    Narrative:      DATE OF EXAM:  1/6/2022 1:26 AM     PROCEDURE:  XR CHEST 1 VW-     INDICATIONS:  shiley placement left; R06.00-Dyspnea, unspecified; J18.9-Pneumonia,  unspecified organism; R09.02-Hypoxemia; N17.9-Acute kidney failure,  unspecified     COMPARISON:  01/05/2022, 5:20 PM.     TECHNIQUE:   Single radiographic AP upright portable view of the chest was obtained.     FINDINGS:   A single AP upright portable chest radiograph is provided for review.  There has been interval placement of a left IJ (internal jugular)  central venous line with its tip in the expected location of the lower  superior vena cava (SVC), near the cavoatrial junction.  Again  demonstrated are bilateral infiltrates, greater on the left than the  right. The findings are most suggestive of infectious multifocal  pneumonia. Pulmonary edema is thought to be less likely. There is  pulmonary hypoinflation. There may be borderline cardiac  enlargement.  External artifacts obscure detail. No pneumothorax is seen. No definite  pneumomediastinum. No subcutaneous emphysema is appreciated.        Impression:      Interval insertion of a left-sided central venous line is noted with its  tip near the expected location of the cavoatrial junction.      No pneumothorax is seen.      Diffuse bilateral infiltrates persist and may be slightly increased in  degree since the prior study.      Please see above comments for further detail.     Electronically Signed By-Nitesh Li MD On:1/6/2022 1:53 AM  This report was finalized on [id number] by  Nitesh Li MD.    XR Chest 1 View [[id number]] Collected: 01/05/22 1730     Updated: 01/05/22 1733    Narrative:      DATE OF EXAM:  1/5/2022 5:20 PM     PROCEDURE:  XR CHEST 1 VW-     INDICATIONS:  Dyspnea     COMPARISON:  No Comparisons Available     TECHNIQUE:   Single radiographic AP view of the chest was obtained.     FINDINGS:  Heart size felt to be normal accounting for projection and lung volumes.  Groundglass and airspace disease present in the left perihilar region  and left base. Subtle perihilar linear opacities are present on the  right. Costophrenic angles are sharp        Impression:      Bilateral pneumonia more pronounced on the left     Electronically Signed By-Phuc Morrison On:1/5/2022 5:31 PM  This report was finalized on [id number] by  Phuc Morrison, .            Current Meds:   SCHEDULE  azithromycin, 250 mg, Oral, Nightly  cefTRIAXone, 1 g, Intravenous, Q24H  gabapentin, 100 mg, Oral, TID  heparin (porcine), 5,000 Units, Subcutaneous, Q12H  insulin lispro, 0-7 Units, Subcutaneous, TID AC  ipratropium-albuterol, 3 mL, Nebulization, Q4H - RT  lidocaine, 1 patch, Transdermal, Q24H  methylPREDNISolone sodium succinate, 40 mg, Intravenous, Q8H  sodium chloride, 10 mL, Intravenous, Q12H  sodium zirconium cyclosilicate, 10 g, Oral, TID  thiamine (VITAMIN B1) IVPB, 100 mg, Intravenous,  BID      Infusions  sodium bicarbonate drip (greater than 75 mEq/bag), 150 mEq, Last Rate: 150 mEq (01/06/22 1409)      PRNs  •  acetaminophen **OR** acetaminophen **OR** acetaminophen  •  albumin human  •  aluminum-magnesium hydroxide-simethicone  •  dextrose  •  dextrose  •  glucagon (human recombinant)  •  insulin lispro **AND** insulin lispro  •  labetalol  •  Lidocaine HCl gel  •  melatonin  •  nitroglycerin  •  ondansetron **OR** ondansetron  •  [COMPLETED] Insert peripheral IV **AND** sodium chloride  •  sodium chloride        Zach Sheridan MD  1/6/2022  16:05 EST      Much of this encounter note is an electronic transcription/translation of spoken language to printed text using Dragon Software.

## 2022-01-06 NOTE — PROCEDURES
"Non-Tunneled Catheter    Date/Time: 1/6/2022 1:23 AM  Performed by: Crys Sidhu APRN  Authorized by: Crys Sidhu APRN   Consent: Written consent obtained.  Risks and benefits: risks, benefits and alternatives were discussed  Consent given by: patient  Patient understanding: patient states understanding of the procedure being performed  Patient consent: the patient's understanding of the procedure matches consent given  Procedure consent: procedure consent matches procedure scheduled  Required items: required blood products, implants, devices, and special equipment available  Patient identity confirmed: verbally with patient and arm band  Time out: Immediately prior to procedure a \"time out\" was called to verify the correct patient, procedure, equipment, support staff and site/side marked as required.  Indications: vascular access (patient needing stat dialysis)  Anesthesia: local infiltration    Anesthesia:  Local Anesthetic: lidocaine 1% without epinephrine  Anesthetic total: 5 mL    Sedation:  Patient sedated: no    Preparation: skin prepped with ChloraPrep  Skin prep agent dried: skin prep agent completely dried prior to procedure  Sterile barriers: all five maximum sterile barriers used - cap, mask, sterile gown, sterile gloves, and large sterile sheet  Hand hygiene: hand hygiene performed prior to central venous catheter insertion  Location details: left internal jugular  Patient position: flat  Catheter type: triple lumen  Catheter size: 12 Fr  Ultrasound guidance: yes  Number of attempts: 1  Successful placement: yes  Post-procedure: line sutured and dressing applied  Assessment: blood return through all ports,  free fluid flow,  placement verified by x-ray and no pneumothorax on x-ray  Patient tolerance: patient tolerated the procedure well with no immediate complications      Electronically signed by DANIS Lei, 01/06/22, 2:01 AM EST.    "

## 2022-01-06 NOTE — CASE MANAGEMENT/SOCIAL WORK
Discharge Planning Assessment   [place]     Patient Name: Anthony Moss  MRN: [id number]  Today's Date: 1/6/2022    Admit Date: 1/5/2022     Discharge Needs Assessment     Row Name 01/06/22 1643       Living Environment    Lives With alone    Current Living Arrangements home/apartment/condo    Primary Care Provided by self    Provides Primary Care For no one    Family Caregiver if Needed friend(s)    Quality of Family Relationships helpful; involved; supportive    Able to Return to Prior Arrangements yes       Resource/Environmental Concerns    Resource/Environmental Concerns none    Transportation Concerns car, none       Transition Planning    Patient/Family Anticipates Transition to home; home with help/services    Patient/Family Anticipated Services at Transition none    Transportation Anticipated family or friend will provide       Discharge Needs Assessment    Readmission Within the Last 30 Days no previous admission in last 30 days    Equipment Currently Used at Home walker, standard; cane, straight    Concerns to be Addressed discharge planning; care coordination/care conferences    Anticipated Changes Related to Illness none    Equipment Needed After Discharge shower chair    Provided Post Acute Provider List? N/A    Provided Post Acute Provider Quality & Resource List? N/A               Discharge Plan     Row Name 01/06/22 1841       Plan    Plan DC plan: anticipate return home pending hospital course.    Provided Post Acute Provider List? N/A    Provided Post Acute Provider Quality & Resource List? N/A    Patient/Family in Agreement with Plan yes    Plan Comments Pt VALENTÍN earlier, at dialysis. Called and spoke to patient over the phone. Pt states he lives at home alone and cares for himself. Pt can drive but has not had a car for past 2 years-friends Annita and Maximo assist with transportation. PCP and pharmacy confirmed. Pt uses a straight cane and has a walker if needed. States he could use a shower  chair (will f/u with Bianca on 1/7). Anticipate return home pending patient's clinical course.              Continued Care and Services - Admitted Since 1/5/2022     Durable Medical Equipment     Service Provider Request Status Selected Services Address Phone Fax Patient Preferred    [facility]  Pending - Request Sent N/A [id number], [facility] [address] [phone] --               Demographic Summary     Row Name 01/06/22 1643       General Information    Admission Type inpatient    Arrived From emergency department    Referral Source admission list    Reason for Consult discharge planning; care coordination/care conference    Preferred Language English     Used During This Interaction no       Contact Information    Permission Granted to Share Info With                Functional Status     Row Name 01/06/22 1643       Functional Status    Usual Activity Tolerance good    Current Activity Tolerance good       Functional Status, IADL    Medications independent    Meal Preparation independent    Housekeeping independent    Laundry independent    Shopping independent              Phone communication or documentation only - no physical contact with patient or family.    Megan Naegele, RN      Office Phone: [phone]  Office Cell: [phone]

## 2022-01-06 NOTE — PROGRESS NOTES
PROGRESS NOTE      Patient Name: Anthony Moss  : 1955  MRN: [id number]  Primary Care Physician: Marva Gaviria APRN  Date of admission: 2022    Patient Care Team:  Marva Gaviria APRN as PCP - General (Nurse Practitioner)        Subjective   Subjective:     Patient is in same condition as yesterday no significant change still complaining of shortness of breath no significant urine output overnight.  Review of systems:  All other review of system unremarkable      Allergies:  No Known Allergies    Objective   Exam:     Vital Signs  Temp:  [98.2 °F (36.8 °C)-98.8 °F (37.1 °C)] 98.2 °F (36.8 °C)  Heart Rate:  [100-126] 100  Resp:  [18-28] 20  BP: (128-155)/(69-98) 150/83  SpO2:  [92 %-100 %] 99 %  on  Flow (L/min):  [4] 4;   Device (Oxygen Therapy): nasal cannula  Body mass index is 32.69 kg/m².    General: Middle-aged obese  male in no acute distress.    Head:      Normocephalic and atraumatic.    Eyes:      PERRL/EOM intact, conjunctiva and sclera clear with out nystagmus.    Neck:      No masses, thyromegaly,  trachea central with normal respiratory effort   Lungs:    Crackles bilaterally to auscultation.    Heart:      Regular rate and rhythm, no murmur no gallop  Abd:        Soft, nontender, not distended, bowel sounds positive, no shifting dullness   Pulses:   Pulses palpable  Extr:        No cyanosis or clubbing--+1 edema.    Neuro:    No focal deficits.   alert oriented x3  Skin:       Intact without lesions or rashes.    Psych:    Alert and cooperative; normal mood and affect; .      Results Review:  I have personally reviewed most recent Data :  CBC    Results from last 7 days   Lab Units 22  0638 22  23022  1850   WBC 10*3/mm3 7.50 7.50 9.00   HEMOGLOBIN g/dL 8.4* 8.3* 9.1*   PLATELETS 10*3/mm3 90* 92* 113*     CMP   Results from last 7 days   Lab Units 22  0638 22  23022  1850   SODIUM mmol/L  --  137 134* 133*   POTASSIUM  mmol/L  --  6.2* 6.7* 6.5*   CHLORIDE mmol/L  --  102 103 102   CO2 mmol/L  --  15.0* 11.0* 14.0*   BUN mg/dL  --  156* 159* 155*   CREATININE mg/dL  --  12.36* 12.35* 12.52*   GLUCOSE mg/dL  --  208* 149* 108*   ALBUMIN g/dL  --  3.00* 3.10*  --    BILIRUBIN mg/dL  --  0.3 0.3  --    ALK PHOS U/L  --  151* 159*  --    AST (SGOT) U/L  --  37 40  --    ALT (SGPT) U/L  --  24 25  --    AMMONIA umol/L 17  --   --   --      ABG    Results from last 7 days   Lab Units 01/05/22  1846   PH, ARTERIAL pH units 7.320*   PCO2, ARTERIAL mm Hg 25.5*   PO2 ART mm Hg 75.3*   O2 SATURATION ART % 94.1   BASE EXCESS ART mmol/L -11.7*     XR Chest 1 View    Result Date: 1/6/2022  Interval insertion of a left-sided central venous line is noted with its tip near the expected location of the cavoatrial junction.  No pneumothorax is seen.  Diffuse bilateral infiltrates persist and may be slightly increased in degree since the prior study.  Please see above comments for further detail.  Electronically Signed By-Nitesh Li MD On:1/6/2022 1:53 AM This report was finalized on [id number] by  Nitesh Li MD.    XR Chest 1 View    Result Date: 1/5/2022  Bilateral pneumonia more pronounced on the left  Electronically Signed By-Phuc Morrison On:1/5/2022 5:31 PM This report was finalized on [id number] by  Phuc Morrison, .        Scheduled Meds:azithromycin, 250 mg, Oral, Nightly  cefTRIAXone, 1 g, Intravenous, Q24H  heparin (porcine), 5,000 Units, Subcutaneous, Q12H  ipratropium-albuterol, 3 mL, Nebulization, Q4H - RT  methylPREDNISolone sodium succinate, 40 mg, Intravenous, Q8H  sodium chloride, 10 mL, Intravenous, Q12H  sodium zirconium cyclosilicate, 10 g, Oral, TID  thiamine (VITAMIN B1) IVPB, 100 mg, Intravenous, BID      Continuous Infusions:sodium bicarbonate drip (greater than 75 mEq/bag), 150 mEq, Last Rate: 50 mL/hr at 01/06/22 0201      PRN Meds:•  acetaminophen **OR** acetaminophen **OR** acetaminophen  •   aluminum-magnesium hydroxide-simethicone  •  labetalol  •  Lidocaine HCl gel  •  melatonin  •  nitroglycerin  •  ondansetron **OR** ondansetron  •  [COMPLETED] Insert peripheral IV **AND** sodium chloride  •  sodium chloride    Assessment/Plan   Assessment and Plan:         Acute renal failure (ARF) (HCC)    Mixed hyperlipidemia    Essential hypertension    Obesity    Bilateral pneumonia    Thrombocytopenia (HCC)    ETOH abuse    Tobacco abuse    Marijuana use    ASSESSMENT:  Acute kidney injury, stage III, oligoanuric, metabolic differential at this this time, including sepsis, with bilateral pneumonia, but also with a presentation with anemia, thrombocytopenia, have to rule out any TMA.  Patient apparently did have labs with his primary care doctor, we have to open the labs, to start with baseline creatinine never been told about any kidney problems in the past,  Had been taking Aleve, for the back pain,  Hyperkalemia,  Weakness and tiredness, with a BUN of 59, very concerning for uremia,  Metabolic acidosis, gap and nongap, DKA, azotemia, lactic acid normal,  Alcohol use,  Chronic back pain,using NSAID  B/l pneumonia.  Surprisingly, WBC stable,           Plan:      Patient with dramatic presentation with cough and shortness of air, chest x-ray with bilateral pneumonia, but normal WBC, and underlying anemia, thrombocytopenia, alcohol use, presented with acute kidney injury with severe hyperkalemia and metabolic acidosis, had discussed with the ER team earlier, orderd  for the start sodium bicarb, insulin dextrose, calcium gluconate, repeat potassium higher, 6.7, creatinine still more than 12, significant azotemia with concern about uremia, has been complaining of weakness and tiredness, has been anuric.   No history of any kidney problem in the past I did I have not received any labs from the primary provider avoid NSAID use.   Patient needs stat hemodialysis because of presence of acidosis acute renal failure  hyperkalemia.  Hemodialysis will be done today potassium level 6.2 still significantly acidotic  Check a stat renal ultrasound and also going to get urology consult for Roberts catheter insertion  As discussed, broad differential for acute kidney injury, with a presentation with a thrombocytopenia anemia, though can be contributed to alcoholic liver disease, have to rule out, TMA activity. Send work up , Adams13 activity aptoglobin, LDH  With his lung and kidney, involvement, rule out any pulmorenal syndrome, including lupus, vasculitis, paraprotein  Start thiamine  Continue antibiotics for lung involvement   Will get pulmonary consult follow-up with echo,   Urine studies are still pending          Electronically signed by Jamie Houston MD,   [facility] kidney consultant  [phone]

## 2022-01-06 NOTE — NURSING NOTE
Bladder scan shows 461,541 ml of urine  Order to place urinary cath.      Attempted to insert 14 Sami urinary cath , pt. Immediately screaming out at top of his voice with cleansing with betadine, screaming and yelling out and grabbing staff hands and arms during attempt to place cath.  Pt. Demanding to stop attempt, cath removed.  Unsuccessful cath attempt.

## 2022-01-06 NOTE — DISCHARGE PLACEMENT REQUEST
"Anthony Moss (66 y.o. Male)             Date of Birth Social Security Number Address Home Phone MRN    1955  [address] [place] [address] [phone] [id number]    Sikh Marital Status             None        Admission Date Admission Type Admitting Provider Attending Provider Department, Room/Bed    1/5/22 Emergency Ede Mcdaniel DO Taylor, [place], DO [facility] 3C MEDICAL INPATIENT, 368/1    Discharge Date Discharge Disposition Discharge Destination                         Attending Provider: Ede Mcdaniel DO    Allergies: No Known Allergies    Isolation: None   Infection: COVID (rule out) (01/05/22)   Code Status: CPR   Advance Care Planning Activity    Ht: 188 cm (74\")   Wt: 115 kg (254 lb)    Admission Cmt: None   Principal Problem: Acute renal failure (ARF) (HCC) [N17.9]                 Active Insurance as of 1/5/2022     Primary Coverage     Payor Plan Insurance Group Employer/Plan Group    [employer] [id number]     Payor Plan Address Payor Plan Phone Number Payor Plan Fax Number Effective Dates    MAIL STOP:   4/1/2020 - None Entered    [address] [address]       [facility] [address]       Subscriber Name Subscriber Birth Date Member ID       ANTHONY MOSS 1955 [id number]                 Emergency Contacts      (Rel.) Home Phone Work Phone Mobile Phone    DURAN ERWIN (Friend) [phone] -- [phone]    YANETH PEREZ (Friend) [phone] -- [phone]          "

## 2022-01-06 NOTE — NURSING NOTE
Contacted ER, requested to speak to assign nurse Breana, advised she is unavailable, left message to have to contact me, inquiry regarding urinary cath attempt, nothing documented.  Pt. Reports was attempted in the ER and they were unable to get.  Currently there is a order for a urinary cath, dx CARLOS ALBERTO, Urinary Retention and severe hyperkalemia currently 6.2    0500  Return call, advised cath attempt x1 unsuccessful in ER also, pt. Yelling, screaming and demanding to stop.

## 2022-01-06 NOTE — PAYOR COMM NOTE
"AUTHORIZATION PENDING:   PLEASE CALL OR FAX DETERMINATION TO CONTACT BELOW. THANK YOU.        Cristin Etienne, RN MSN  /UR  [facility]  [phone] office  [phone] fax  [email or web address]    [facility]  NPI: [id number]  Tax: [phone]          Anthony Moss (66 y.o. Male)             Date of Birth Social Security Number Address Home Phone MRN    1955  [address] [place] [address] [phone] [id number]    [place] Marital Status             None        Admission Date Admission Type Admitting Provider Attending Provider Department, Room/Bed    1/5/22 Emergency Ede Mcdaniel DO Taylor, Wait[place], DO [facility] [place] 3C MEDICAL INPATIENT, 368/1    Discharge Date Discharge Disposition Discharge Destination                         Attending Provider: Ede Mcdaniel DO    Allergies: No Known Allergies    Isolation: None   Infection: COVID (rule out) (01/05/22)   Code Status: CPR   Advance Care Planning Activity    Ht: 188 cm (74\")   Wt: 115 kg (254 lb 10.1 oz)    Admission Cmt: None   Principal Problem: Acute renal failure (ARF) (HCC) [N17.9]                 Active Insurance as of 1/5/2022     Primary Coverage     Payor Plan Insurance Group Employer/Plan Group    [id number]     Payor Plan Address Payor Plan Phone Number Payor Plan Fax Number Effective Dates    MAIL STOP:   4/1/2020 - None Entered    [address] [address]       [facility] [address]       Subscriber Name Subscriber Birth Date Member ID       ANTHONY MOSS 1955 [id number]                 Emergency Contacts      (Rel.) Home Phone Work Phone Mobile Phone    DURAN ERWIN (Friend) [phone] -- [phone]    YANETH PEREZ (Friend) [phone] -- [phone]        01/05/22 2051  Inpatient Admission  Once     Completed     Level of Care: Telemetry    Diagnosis: Acute renal failure (ARF) (HCC) " [[id number]]    Admitting Physician: MAUDE HOOVER [[id number]]    Attending Physician: MAUDE HOOVER [[id number]]    Certification: I Certify That Inpatient Hospital Services Are Medically Necessary For Greater Than 2 Midnights                   History & Physical      Renee Rajan APRN at 22              [facility] Medicine Services      Patient Name: Anthony Moss  : 1955  MRN: [id number]  Primary Care Physician:  Marva Gaviria APRN  Date of admission: 2022      Subjective      Chief Complaint: Dyspnea    History of Present Illness:  Anthony Moss is a 66 y.o. male w/PMH of HTN, HLD, ADHD, depression, scoliosis, low back pain, obesity, OA who presents to [facility] ED due to dyspnea.  Patient states dyspnea has progressively worsened over the last 10 days, dyspnea is worse with exertion and supine position, relieved with rest and elevated HOB.  Friends at bedside report a 2-week history of alternating incontinence, oliguria with anuria that began yesterday.  Patient admits to cough, diarrhea, chills, edema, weight gain of approximately 45 pounds over the last year, alcohol use, tobacco use, marijuana use.  Patient denies chest pain, fever, palpitations, syncope.  As dyspnea did not improve he decided to go to [facility] ED.  Patient states it has been approximately 1 year since he has seen a provider, currently takes no medications.      Upon arrival to the ED vitals temp 98.8, , RR 20, /98, O2 sat 94% on room air.  Labs notable for pH 7.320, CO2 25.5, PO2 75.3, HCO3 13.1, base excess -11.7, O2 sat 94.1, CO2 13.9.  Labs notable for glucose 108, , K6.5, CO2 14, creatinine 12.52, , GFR 4, WBC 9.0, Hgb 9.1, platelets 113, neutrophils 76.2.  Chest x-ray shows bilateral pneumonia more pronounced on the left.  Covid test negative.  Chest x-ray shows sinus tachycardia rate 110 otherwise normal EKG.  Treated in ED with IV  azithromycin, IV Rocephin, IV morphine.      Review of Systems   Constitutional: Positive for chills, malaise/fatigue and weight gain.   HENT: Positive for congestion.    Eyes: Negative.  Negative for visual disturbance.   Cardiovascular: Positive for dyspnea on exertion, leg swelling and orthopnea.   Respiratory: Positive for cough and shortness of breath.    Endocrine: Negative.    Hematologic/Lymphatic: Negative.    Skin: Negative.    Musculoskeletal: Positive for myalgias.   Gastrointestinal: Negative for bloating, abdominal pain, melena, nausea and vomiting.   Genitourinary: Positive for bladder incontinence, hesitancy, incomplete emptying and urgency. Negative for hematuria.   Neurological: Positive for weakness.   Psychiatric/Behavioral: The patient is nervous/anxious.    All other systems reviewed and are negative.       Personal History     History reviewed. No pertinent past medical history.    No past surgical history on file.    Family History:  Otherwise pertinent FHx was reviewed and not pertinent to current issue.    Social History:  reports that he has never smoked. He has never used smokeless tobacco.    Home Medications:  Prior to Admission Medications     Prescriptions Last Dose Informant Patient Reported? Taking?    acetaminophen-codeine (TYLENOL #3) 300-30 MG per tablet   No No    Take 1 tablet by mouth Every 6 (Six) Hours As Needed for Moderate Pain .            Allergies:  No Known Allergies    Objective      Vitals:   Temp:  [98.8 °F (37.1 °C)] 98.8 °F (37.1 °C)  Heart Rate:  [101-119] 117  Resp:  [18-28] 18  BP: (133-155)/(69-98) 154/89  Flow (L/min):  [4] 4    Physical Exam  Vitals and nursing note reviewed.   Constitutional:       Appearance: He is obese. He is ill-appearing.   HENT:      Head: Normocephalic and atraumatic.      Right Ear: External ear normal.      Left Ear: External ear normal.      Nose: Congestion present.      Mouth/Throat:      Mouth: Mucous membranes are dry.   Eyes:       Pupils: Pupils are equal, round, and reactive to light.   Cardiovascular:      Rate and Rhythm: Regular rhythm. Tachycardia present.      Pulses: Decreased pulses.      Heart sounds: Normal heart sounds.   Pulmonary:      Effort: Tachypnea, accessory muscle usage and prolonged expiration present.      Breath sounds: Decreased air movement present. Examination of the right-upper field reveals wheezing. Examination of the left-upper field reveals wheezing. Examination of the right-middle field reveals wheezing. Examination of the left-middle field reveals wheezing. Examination of the right-lower field reveals wheezing. Examination of the left-lower field reveals wheezing. Wheezing present.   Chest:      Chest wall: Tenderness present.   Abdominal:      General: Bowel sounds are decreased. There is distension.      Palpations: Abdomen is soft.   Musculoskeletal:      Cervical back: Normal range of motion. No pain with movement. Normal range of motion.      Right lower leg: 3+ Edema present.      Left lower leg: 3+ Edema present.   Skin:     General: Skin is warm and moist.      Coloration: Skin is pale.      Findings: Abrasion, bruising and ecchymosis present.   Neurological:      Mental Status: He is alert.   Psychiatric:         Attention and Perception: Attention normal.         Mood and Affect: Mood is anxious.         Behavior: Behavior is cooperative.          Result Review    Result Review:  I have personally reviewed the results from the time of this admission to 1/5/2022 23:28 EST and agree with these findings:  [x]  Laboratory  [x]  Microbiology  [x]  Radiology  [x]  EKG/Telemetry   [x]  Cardiology/Vascular   []  Pathology  []  Old records  []  Other:        Assessment/Plan        Active Hospital Problems:  Active Hospital Problems    Diagnosis    • **Acute renal failure (ARF) (HCC)    • Bilateral pneumonia    • Thrombocytopenia (HCC)    • Essential hypertension    • Mixed hyperlipidemia    • ETOH abuse     • Obesity    • Tobacco abuse    • Marijuana use      Acute renal failure:  -Upon arrival to the ED creatinine 12.52, , GFR 4  -Consult nephrology, patient may require stat dialysis  -Stat EKG, proBNP, magnesium, phosphorus, UA, urine drug screen ionized calcium  -Renal diet  -Avoid nephrotoxic dyes and medications unless urgently indicated  -Continuous cardiac monitoring  -Monitor BMP    Bilateral pneumonia:  -Chest x-ray reviewed  -Covid negative  -Treated in ED with IV azithromycin, IV Rocephin, IV morphine  -Continue IV azithromycin, IV Rocephin, discussed renal function with pharmacist  -Trend lactic acid levels  -Procalcitonin, sputum culture, respiratory PCR panel  -Blood culture obtained and are pending  -Check sputum culture, check urine antigen: Strep Pneumo & legionella  -DuoNebs, oxygen, incentive spirometry  -Continuous pulse oximetry    Thrombocytopenia:  -Upon arrival to the ED platelets 113  -Monitor CBC during admission  -Continue to monitor, consult hematology if indicated    ETOH abuse:  Initiate EtOH withdrawal protocol  -Monitor vital signs every 4hrs  -Seizure precautions  -Ammonia level, ethanol level  -Vitamin replacement, Ativan dosing held due to acute renal failure    Tobacco abuse marijuana use:  -Tobacco cessation education    Essential hypertension:  -Encourage lifestyle modifications  -Low-sodium diet  -No home medications noted    Mixed hyperlipidemia:  -Encourage lifestyle modifications  -Encourage dietary modifications  -No Home statin noted    Obesity:  -Encourage lifestyle modifications  -Encourage dietary modifications        DVT prophylaxis:  Medical and mechanical DVT prophylaxis orders are present.      Home medications verified by nursing and/or pharmacy prior to provider review      CODE STATUS:    Code Status (Patient has no pulse and is not breathing): CPR (Attempt to Resuscitate)  Medical Interventions (Patient has pulse or is breathing): Full Support    Admission  Status:  I believe this patient meets inpatient status.          The patient was interviewed wearing appropriate personal protective equipment.      Signature: Electronically signed by DANIS Schaeffer, 01/05/22, 11:46 PM EST.      Electronically signed by Renee Rajan APRN at 01/05/22 2346          Emergency Department Notes      Marisa Hart RN at 01/05/22 1831        RT called for ABG     Marisa Hart RN  01/05/22 1831      Electronically signed by Marisa Hart RN at 01/05/22 1831     Brendon Geiger MD at 01/05/22 1931          Subjective   Patient is a 66-year-old male complaint of cough congestion shortness of breath over the past several days.  He states he has had low-grade fever.  Nuys vomiting diarrhea or other complaint.          Review of Systems  Negative for headache earache sore throat chest pain abdominal pain vomiting diarrhea dysuria achiness weight loss or other complaint.  A complete system was obtained and is otherwise negative  History reviewed. No pertinent past medical history.    No Known Allergies    No past surgical history on file.    History reviewed. No pertinent family history.    Social History     Socioeconomic History   • Marital status:    Tobacco Use   • Smoking status: Never Smoker   • Smokeless tobacco: Never Used           Objective   Physical Exam  HEENT exam shows TMs to be clear.  Oropharynx comers but sclerae nonicteric.  Neck has no adenopathy JVD or bruits.  Lungs have rhonchi at the bases.  Heart has a regular rate rhythm without murmur rub or gallop.  Chest is nontender.  Abdomen is soft nontender.  Extremities M is no cyanosis or edema.  Procedures  My EKG interpretation shows sinus tachycardia at a rate of 110 with no acute ST change        ED Course      Results for orders placed or performed during the hospital encounter of 01/05/22   COVID-19,CEPHEID/UMM,COR/KIRBY/PAD/JULIÁN IN-HOUSE(OR EMERGENT/ADD-ON),NP SWAB IN TRANSPORT MEDIA 3-4 HR TAT, RT-PCR  - Swab, Nasopharynx    Specimen: Nasopharynx; Swab   Result Value Ref Range    COVID19 Not Detected Not Detected - Ref. Range   Basic Metabolic Panel    Specimen: Blood   Result Value Ref Range    Glucose 108 (H) 65 - 99 mg/dL     (H) 8 - 23 mg/dL    Creatinine 12.52 (H) 0.76 - 1.27 mg/dL    Sodium 133 (L) 136 - 145 mmol/L    Potassium 6.5 (C) 3.5 - 5.2 mmol/L    Chloride 102 98 - 107 mmol/L    CO2 14.0 (L) 22.0 - 29.0 mmol/L    Calcium 9.6 8.6 - 10.5 mg/dL    eGFR  African Amer      eGFR Non African Amer 4 (L) >60 mL/min/1.73    BUN/Creatinine Ratio 12.4 7.0 - 25.0    Anion Gap 17.0 (H) 5.0 - 15.0 mmol/L   CBC Auto Differential    Specimen: Blood   Result Value Ref Range    WBC 9.00 3.40 - 10.80 10*3/mm3    RBC 2.72 (L) 4.14 - 5.80 10*6/mm3    Hemoglobin 9.1 (L) 13.0 - 17.7 g/dL    Hematocrit 26.3 (L) 37.5 - 51.0 %    MCV 96.8 79.0 - 97.0 fL    MCH 33.5 (H) 26.6 - 33.0 pg    MCHC 34.7 31.5 - 35.7 g/dL    RDW 14.3 12.3 - 15.4 %    RDW-SD 49.9 37.0 - 54.0 fl    MPV 8.3 6.0 - 12.0 fL    Platelets 113 (L) 140 - 450 10*3/mm3    Neutrophil % 76.2 (H) 42.7 - 76.0 %    Lymphocyte % 9.2 (L) 19.6 - 45.3 %    Monocyte % 11.7 5.0 - 12.0 %    Eosinophil % 2.2 0.3 - 6.2 %    Basophil % 0.7 0.0 - 1.5 %    Neutrophils, Absolute 6.90 1.70 - 7.00 10*3/mm3    Lymphocytes, Absolute 0.80 0.70 - 3.10 10*3/mm3    Monocytes, Absolute 1.10 (H) 0.10 - 0.90 10*3/mm3    Eosinophils, Absolute 0.20 0.00 - 0.40 10*3/mm3    Basophils, Absolute 0.10 0.00 - 0.20 10*3/mm3    nRBC 0.1 0.0 - 0.2 /100 WBC   Blood Gas, Arterial -    Specimen: Arterial Blood   Result Value Ref Range    Site Right Radial     Tavares's Test Positive     pH, Arterial 7.320 (L) 7.350 - 7.450 pH units    pCO2, Arterial 25.5 (L) 35.0 - 48.0 mm Hg    pO2, Arterial 75.3 (L) 83.0 - 108.0 mm Hg    HCO3, Arterial 13.1 (L) 21.0 - 28.0 mmol/L    Base Excess, Arterial -11.7 (L) 0.0 - 3.0 mmol/L    O2 Saturation, Arterial 94.1 94.0 - 98.0 %    CO2 Content 13.9 (L) 22 - 29 mmol/L     Barometric Pressure for Blood Gas      Modality Room Air     FIO2 21 %    Hemodilution No    Magnesium    Specimen: Blood   Result Value Ref Range    Magnesium 1.9 1.6 - 2.4 mg/dL   Phosphorus    Specimen: Blood   Result Value Ref Range    Phosphorus 4.9 (H) 2.5 - 4.5 mg/dL   ECG 12 Lead   Result Value Ref Range    QT Interval 319 ms   ECG 12 Lead   Result Value Ref Range    QT Interval 310 ms     XR Chest 1 View    Result Date: 1/5/2022  Bilateral pneumonia more pronounced on the left  Electronically Signed By-Phuc Morrison On:1/5/2022 5:31 PM This report was finalized on [id number] by  Phuc Morrison, .                                               MDM  Number of Diagnoses or Management Options  Diagnosis management comments: Patient has bibasilar pneumonia on chest x-ray.  There is no effusions or pneumothorax noted.  Patient is Covid negative.  Metabolic panel is at baseline.  Blood gas shows patient had mild hypoxia.  The patient is tachypneic.  Will be admitted for IV antibiotics and respiratory support.  I did speak the on-call hospitalist.       Amount and/or Complexity of Data Reviewed  Clinical lab tests: reviewed  Tests in the radiology section of CPT®: reviewed  Tests in the medicine section of CPT®: reviewed    Risk of Complications, Morbidity, and/or Mortality  Presenting problems: high  Diagnostic procedures: high  Management options: high    Patient Progress  Patient progress: stable      Final diagnoses:   Dyspnea, unspecified type   Pneumonia of both lower lobes due to infectious organism   Hypoxia   Acute renal failure, unspecified acute renal failure type (HCC)       ED Disposition  ED Disposition     ED Disposition Condition Comment    Decision to Admit  Level of Care: Telemetry [5]   Diagnosis: Acute renal failure (ARF) (HCC) [586349]   Admitting Physician: MAUDE HOOVER [[id number]]   Attending Physician: MAUDE HOOVER [[id number]]   Certification: I Certify That Inpatient Hospital Services  Are Medically Necessary For Greater Than 2 Midnights            No follow-up provider specified.       Medication List      No changes were made to your prescriptions during this visit.          Brendon Geiger MD  22       Brendon Geiger MD  22      Electronically signed by Brendon Geiger MD at 22          Physician Progress Notes (all)      Jamie Houston MD at 22 0922              PROGRESS NOTE      Patient Name: Anthony Moss  : 1955  MRN: [id number]  Primary Care Physician: Marva Gaviria APRN  Date of admission: 2022    Patient Care Team:  Marva Gaviria APRN as PCP - General (Nurse Practitioner)        Subjective   Subjective:     Patient is in same condition as yesterday no significant change still complaining of shortness of breath no significant urine output overnight.  Review of systems:  All other review of system unremarkable      Allergies:  No Known Allergies    Objective   Exam:     Vital Signs  Temp:  [98.2 °F (36.8 °C)-98.8 °F (37.1 °C)] 98.2 °F (36.8 °C)  Heart Rate:  [100-126] 100  Resp:  [18-28] 20  BP: (128-155)/(69-98) 150/83  SpO2:  [92 %-100 %] 99 %  on  Flow (L/min):  [4] 4;   Device (Oxygen Therapy): nasal cannula  Body mass index is 32.69 kg/m².    General: Middle-aged obese  male in no acute distress.    Head:      Normocephalic and atraumatic.    Eyes:      PERRL/EOM intact, conjunctiva and sclera clear with out nystagmus.    Neck:      No masses, thyromegaly,  trachea central with normal respiratory effort   Lungs:    Crackles bilaterally to auscultation.    Heart:      Regular rate and rhythm, no murmur no gallop  Abd:        Soft, nontender, not distended, bowel sounds positive, no shifting dullness   Pulses:   Pulses palpable  Extr:        No cyanosis or clubbing--+1 edema.    Neuro:    No focal deficits.   alert oriented x3  Skin:       Intact without lesions or rashes.    Psych:    Alert and cooperative; normal mood and  affect; .      Results Review:  I have personally reviewed most recent Data :  CBC    Results from last 7 days   Lab Units 01/06/22  0638 01/05/22 2306 01/05/22  1850   WBC 10*3/mm3 7.50 7.50 9.00   HEMOGLOBIN g/dL 8.4* 8.3* 9.1*   PLATELETS 10*3/mm3 90* 92* 113*     CMP   Results from last 7 days   Lab Units 01/06/22  0638 01/05/22 2306 01/05/22 2120 01/05/22  1850   SODIUM mmol/L  --  137 134* 133*   POTASSIUM mmol/L  --  6.2* 6.7* 6.5*   CHLORIDE mmol/L  --  102 103 102   CO2 mmol/L  --  15.0* 11.0* 14.0*   BUN mg/dL  --  156* 159* 155*   CREATININE mg/dL  --  12.36* 12.35* 12.52*   GLUCOSE mg/dL  --  208* 149* 108*   ALBUMIN g/dL  --  3.00* 3.10*  --    BILIRUBIN mg/dL  --  0.3 0.3  --    ALK PHOS U/L  --  151* 159*  --    AST (SGOT) U/L  --  37 40  --    ALT (SGPT) U/L  --  24 25  --    AMMONIA umol/L 17  --   --   --      ABG    Results from last 7 days   Lab Units 01/05/22  1846   PH, ARTERIAL pH units 7.320*   PCO2, ARTERIAL mm Hg 25.5*   PO2 ART mm Hg 75.3*   O2 SATURATION ART % 94.1   BASE EXCESS ART mmol/L -11.7*     XR Chest 1 View    Result Date: 1/6/2022  Interval insertion of a left-sided central venous line is noted with its tip near the expected location of the cavoatrial junction.  No pneumothorax is seen.  Diffuse bilateral infiltrates persist and may be slightly increased in degree since the prior study.  Please see above comments for further detail.  Electronically Signed By-Nitesh Li MD On:1/6/2022 1:53 AM This report was finalized on [id number] by  Nitesh Li MD.    XR Chest 1 View    Result Date: 1/5/2022  Bilateral pneumonia more pronounced on the left  Electronically Signed By-Phuc Morrison On:1/5/2022 5:31 PM This report was finalized on [id number] by  Phuc Morrison, .        Scheduled Meds:azithromycin, 250 mg, Oral, Nightly  cefTRIAXone, 1 g, Intravenous, Q24H  heparin (porcine), 5,000 Units, Subcutaneous, Q12H  ipratropium-albuterol, 3 mL, Nebulization, Q4H -  RT  methylPREDNISolone sodium succinate, 40 mg, Intravenous, Q8H  sodium chloride, 10 mL, Intravenous, Q12H  sodium zirconium cyclosilicate, 10 g, Oral, TID  thiamine (VITAMIN B1) IVPB, 100 mg, Intravenous, BID      Continuous Infusions:sodium bicarbonate drip (greater than 75 mEq/bag), 150 mEq, Last Rate: 50 mL/hr at 01/06/22 0201      PRN Meds:•  acetaminophen **OR** acetaminophen **OR** acetaminophen  •  aluminum-magnesium hydroxide-simethicone  •  labetalol  •  Lidocaine HCl gel  •  melatonin  •  nitroglycerin  •  ondansetron **OR** ondansetron  •  [COMPLETED] Insert peripheral IV **AND** sodium chloride  •  sodium chloride    Assessment/Plan   Assessment and Plan:         Acute renal failure (ARF) (HCC)    Mixed hyperlipidemia    Essential hypertension    Obesity    Bilateral pneumonia    Thrombocytopenia (HCC)    ETOH abuse    Tobacco abuse    Marijuana use    ASSESSMENT:  · Acute kidney injury, stage III, oligoanuric, metabolic differential at this this time, including sepsis, with bilateral pneumonia, but also with a presentation with anemia, thrombocytopenia, have to rule out any TMA.  Patient apparently did have labs with his primary care doctor, we have to open the labs, to start with baseline creatinine never been told about any kidney problems in the past,  Had been taking Aleve, for the back pain,  · Hyperkalemia,  · Weakness and tiredness, with a BUN of 59, very concerning for uremia,  · Metabolic acidosis, gap and nongap, DKA, azotemia, lactic acid normal,  · Alcohol use,  · Chronic back pain,using NSAID  · B/l pneumonia.  Surprisingly, WBC stable,           Plan:      · Patient with dramatic presentation with cough and shortness of air, chest x-ray with bilateral pneumonia, but normal WBC, and underlying anemia, thrombocytopenia, alcohol use, presented with acute kidney injury with severe hyperkalemia and metabolic acidosis, had discussed with the ER team earlier, orderd  for the start sodium  bicarb, insulin dextrose, calcium gluconate, repeat potassium higher, 6.7, creatinine still more than 12, significant azotemia with concern about uremia, has been complaining of weakness and tiredness, has been anuric.   · No history of any kidney problem in the past I did I have not received any labs from the primary provider avoid NSAID use.   · Patient needs stat hemodialysis because of presence of acidosis acute renal failure hyperkalemia.  · Hemodialysis will be done today potassium level 6.2 still significantly acidotic  · Check a stat renal ultrasound and also going to get urology consult for Roberts catheter insertion  · As discussed, broad differential for acute kidney injury, with a presentation with a thrombocytopenia anemia, though can be contributed to alcoholic liver disease, have to rule out, TMA activity. Send work up , Adams13 activity aptoglobin, LDH  · With his lung and kidney, involvement, rule out any pulmorenal syndrome, including lupus, vasculitis, paraprotein  · Start thiamine  · Continue antibiotics for lung involvement   · Will get pulmonary consult follow-up with echo,   · Urine studies are still pending          Electronically signed by Jamie Houston MD,   [facility] kidney consultant  [phone]          Electronically signed by Jamie Houston MD at 22 0925          Consult Notes (all)      Darrius Ferrer MD at 22 2310      Consult Orders    1. Inpatient Nephrology Consult [[id number]] ordered by Renee Rajan APRN at 22                   INITIAL CONSULT NOTE      Patient Name: Anthony Moss  : 1955  MRN: [id number]  Primary Care Physician: Marva Gaviria APRN  Date of admission: 2022    Patient Care Team:  Marva Gaviria APRN as PCP - General (Nurse Practitioner)        Reason for Consult:       CARLOS ALBERTO, hyperkalemia. Acidosis.     Subjective   History of Present Illness:   Chief Complaint:   Chief Complaint   Patient presents with   •  Shortness of Breath     HISTORY:  Anthony Moss is a 66 y.o. male with past medical history of hypertension, hyperlipidemia, chronic back pain on NSAIDs, alcohol use, who presented with a complaint of weakness and shortness of air, chest x-ray concerning for bilateral pneumonia, we are concerned for acute kidney injury complicated with severe hyperkalemia, metabolic acidosis, and anuria.  Patient stated he had not been feeling well since [place] day, initially started with some cough, shortness of air, he thought was bronchitis, somewhat expecting to get better, but he continued to get worsen, he was brought in by his friends, who are in the room,  He has been complaining of tiredness, weakness,  Have been having a diminished urine output, also lately have been having issues with urinary incontinence and was supposed to see urology, never had any history of kidney problem in the past, on admission, creatinine was 2.5, with a BUN of 155, bicarb 14, potassium was 6.5, recheck potassium was 6.7, patient continues to be oligoanuric, last urine output yesterday, but states has been dimished lately.   Has not been losing any weight, actually stepped gaining weight, no history of CHF,          Review of systems:    Constitutional: feverish, weakness.   HEENT:  No headache, otalgia, itchy eyes, nasal discharge or sore throat.  Cardiac:  No chest pain, dyspnea, orthopnea or PND.  Chest:              Cough, wheezing.   Abdomen:  No abdominal pain, nausea or vomiting.  Neuro:  No focal weakness, abnormal movements orseizure like activity.  :   dimished urine output.   ROS was otherwise negative except as mentioned in the [place].       Personal History:     Past Medical History: History reviewed. No pertinent past medical history.    Surgical History:    No past surgical history on file.    Family History: family history is not on file. Otherwise pertinent FHx was reviewed and unremarkable.     Social History:  reports  that he has never smoked. He has never used smokeless tobacco.    Medications:  Prior to Admission medications    Medication Sig Start Date End Date Taking? Authorizing Provider   acetaminophen-codeine (TYLENOL #3) 300-30 MG per tablet Take 1 tablet by mouth Every 6 (Six) Hours As Needed for Moderate Pain . 1/23/21 1/5/22  Brendon Geiger MD     Scheduled Meds:[START ON 1/6/2022] cefTRIAXone, 1 g, Intravenous, Q24H   And  [START ON 1/6/2022] azithromycin, 500 mg, Intravenous, Q24H  heparin (porcine), 5,000 Units, Subcutaneous, Q12H  ipratropium-albuterol, 3 mL, Nebulization, Q4H - RT  [START ON 1/6/2022] methylPREDNISolone sodium succinate, 40 mg, Intravenous, Q8H  sodium chloride, 10 mL, Intravenous, Q12H  sodium zirconium cyclosilicate, 10 g, Oral, TID      Continuous Infusions:sodium bicarbonate drip (greater than 75 mEq/bag), 150 mEq, Last Rate: 150 mEq (01/05/22 2242)      PRN Meds:•  acetaminophen **OR** acetaminophen **OR** acetaminophen  •  aluminum-magnesium hydroxide-simethicone  •  melatonin  •  nitroglycerin  •  ondansetron **OR** ondansetron  •  [COMPLETED] Insert peripheral IV **AND** sodium chloride  •  sodium chloride  Allergies:  No Known Allergies    Objective   Exam:     Vital Signs  Temp:  [98.8 °F (37.1 °C)] 98.8 °F (37.1 °C)  Heart Rate:  [101-119] 117  Resp:  [18-28] 18  BP: (133-155)/(69-98) 154/89  SpO2:  [92 %-100 %] 98 %  on  Flow (L/min):  [4] 4;   Device (Oxygen Therapy): nasal cannula  Body mass index is 32.69 kg/m².  EXAM  General:  Weakness.     Head:      Normocephalic and atraumatic.    Eyes:      PERRL/EOM intact, conjunctivae and sclerae clear without nystagmus.    Neck:      No masses, thyromegaly,  trachea central   Lungs:   B/l wheezing.   Heart:      Regular rate and rhythm, no murmur no gallop  Abd:        Soft, nontender, not distended, bowel sounds positive, no shifting dullness.  Msk:        No deformity or scoliosis noted of thoracic or lumbar spine.    Pulses:   Pulses  normal in all 4 extremities.    Extremities:    Trace sacral swelling.    Neuro:    No focal deficits.   alert oriented x3  Skin:       Intact without lesions or rashes.    Psych:    Alert and cooperative; normal mood and affect; normal attention span       Results Review:  I have personally reviewed most recent Data :  [facility]AV @[facility](creatinine:10)  CBC    Results from last 7 days   Lab Units 01/05/22  1850   WBC 10*3/mm3 9.00   HEMOGLOBIN g/dL 9.1*   PLATELETS 10*3/mm3 113*     CMP   Results from last 7 days   Lab Units 01/05/22  2120 01/05/22  1850   SODIUM mmol/L 134* 133*   POTASSIUM mmol/L 6.7* 6.5*   CHLORIDE mmol/L 103 102   CO2 mmol/L 11.0* 14.0*   BUN mg/dL 159* 155*   CREATININE mg/dL 12.35* 12.52*   GLUCOSE mg/dL 149* 108*   ALBUMIN g/dL 3.10*  --    BILIRUBIN mg/dL 0.3  --    ALK PHOS U/L 159*  --    AST (SGOT) U/L 40  --    ALT (SGPT) U/L 25  --      ABG    Results from last 7 days   Lab Units 01/05/22  1846   PH, ARTERIAL pH units 7.320*   PCO2, ARTERIAL mm Hg 25.5*   PO2 ART mm Hg 75.3*   O2 SATURATION ART % 94.1   BASE EXCESS ART mmol/L -11.7*     XR Chest 1 View    Result Date: 1/5/2022  Bilateral pneumonia more pronounced on the left  Electronically Signed By-Phuc Morrison On:1/5/2022 5:31 PM This report was finalized on [id number] by  Phuc Morrison, .            Assessment/Plan   Assessment and Plan:         Acute renal failure (ARF) (HCC)    Mixed hyperlipidemia    Essential hypertension    Obesity    Bilateral pneumonia    Thrombocytopenia (HCC)    ASSESSMENT:  • Acute kidney injury, stage III, oligoanuric, metabolic differential at this this time, including sepsis, with bilateral pneumonia, but also with a presentation with anemia, thrombocytopenia, have to rule out any TMA.  Patient apparently did have labs with his primary care doctor, we have to open the labs, to start with baseline creatinine never been told about any kidney problems in the past,  Had been taking Aleve, for the back  pain,  • Hyperkalemia,  • Weakness and tiredness, with a BUN of 59, very concerning for uremia,  • Metabolic acidosis, gap and nongap, DKA, azotemia, lactic acid normal,  • Alcohol use,  • Chronic back pain,using NSAID  • B/l pneumonia.  Surprisingly, WBC stable,        Plan:     • Patient with dramatic presentation with cough and shortness of air, chest x-ray with bilateral pneumonia, but normal WBC, and underlying anemia, thrombocytopenia, alcohol use, presented with acute kidney injury with severe hyperkalemia and metabolic acidosis, had discussed with the ER team earlier, orderd  for the start sodium bicarb, insulin dextrose, calcium gluconate, repeat potassium higher, 6.7, creatinine still more than 12, significant azotemia with concern about uremia, has been complaining of weakness and tiredness, has been anuric.   Obtain records from PCP, to establish kidney function,   Denies, any kidney problem in the past.   Avoid NSAID use.   • At this time, patient will need to stat hemodialysis, I discussed this with the patient and his friends were in the room, I discussed all indications as well as risks and complication including hypotension, arrhythmia, he is agreeable and wants to proceed.   • D/w ICU RN.  Request Lillian with ICU team, and I have ordered for the stat hemodialysis, with a low potassium bath, short treatment of 2 and half hour. On lower blood flow.   Significant acidosis, start bicarb fluids at 50 ml/hr.   • As discussed, broad differential for acute kidney injury, with a presentation with a thrombocytopenia anemia, though can be contributed to alcoholic liver disease, have to rule out, TMA activity. Send work up , Adams13 activity aptoglobin, LDH  • With his lung and kidney, involvement, rule out any pulmorenal syndrome, including lupus, vasculitis, paraprotein  • Start thiamine  • Broad coverage antibiotics as per eGFR less than 10.   • Send procal  • Will need ECHO,   • Detail urine studies.    • Repeat BMP , 4 hour post hd, likely will need more HD tomorrow.   • Iron profile.   • Phos and PTH  • We will closely follow.     Note started  by Darrius Ferrer MD,   [facility] kidney consultant  [phone]          Electronically signed by Darrius Ferrer MD at 01/05/22 9278

## 2022-01-06 NOTE — NURSING NOTE
Pt provided with urinal, and sputum specimen container with instructions.  Pt. Verbalized understanding.

## 2022-01-07 LAB
ABO GROUP BLD: NORMAL
ALBUMIN SERPL ELPH-MCNC: 2.9 G/DL (ref 2.9–4.4)
ALBUMIN/GLOB SERPL: 0.8 {RATIO} (ref 0.7–1.7)
ALPHA1 GLOB SERPL ELPH-MCNC: 0.5 G/DL (ref 0–0.4)
ALPHA2 GLOB SERPL ELPH-MCNC: 0.7 G/DL (ref 0.4–1)
ANA SER QL: NEGATIVE
ANION GAP SERPL CALCULATED.3IONS-SCNC: 17 MMOL/L (ref 5–15)
B-GLOBULIN SERPL ELPH-MCNC: 1 G/DL (ref 0.7–1.3)
BACTERIA UR QL AUTO: ABNORMAL /HPF
BH CV ECHO MEAS - ACS: 2.1 CM
BH CV ECHO MEAS - AO MAX PG (FULL): 5.6 MMHG
BH CV ECHO MEAS - AO MAX PG: 10.1 MMHG
BH CV ECHO MEAS - AO MEAN PG (FULL): 4.1 MMHG
BH CV ECHO MEAS - AO MEAN PG: 6.5 MMHG
BH CV ECHO MEAS - AO ROOT AREA (BSA CORRECTED): 0.91
BH CV ECHO MEAS - AO ROOT AREA: 7.9 CM^2
BH CV ECHO MEAS - AO ROOT DIAM: 3.2 CM
BH CV ECHO MEAS - AO V2 MAX: 159 CM/SEC
BH CV ECHO MEAS - AO V2 MEAN: 123.3 CM/SEC
BH CV ECHO MEAS - AO V2 VTI: 27.9 CM
BH CV ECHO MEAS - AVA(I,A): 3 CM^2
BH CV ECHO MEAS - AVA(I,D): 3 CM^2
BH CV ECHO MEAS - AVA(V,A): 2.6 CM^2
BH CV ECHO MEAS - AVA(V,D): 2.6 CM^2
BH CV ECHO MEAS - BSA(HAYCOCK): 2.1 M^2
BH CV ECHO MEAS - BSA: 3.5 M^2
BH CV ECHO MEAS - BZI_BMI: 0.81 KILOGRAMS/M^2
BH CV ECHO MEAS - BZI_METRIC_HEIGHT: 645.2 CM
BH CV ECHO MEAS - BZI_METRIC_WEIGHT: 33.6 KG
BH CV ECHO MEAS - EDV(CUBED): 101.3 ML
BH CV ECHO MEAS - EDV(MOD-SP4): 119.7 ML
BH CV ECHO MEAS - EDV(TEICH): 100.4 ML
BH CV ECHO MEAS - EF(CUBED): 47.7 %
BH CV ECHO MEAS - EF(MOD-SP4): 51.2 %
BH CV ECHO MEAS - EF(TEICH): 40 %
BH CV ECHO MEAS - ESV(CUBED): 53 ML
BH CV ECHO MEAS - ESV(MOD-SP4): 58.4 ML
BH CV ECHO MEAS - ESV(TEICH): 60.3 ML
BH CV ECHO MEAS - FS: 19.4 %
BH CV ECHO MEAS - IVS/LVPW: 1.2
BH CV ECHO MEAS - IVSD: 1.1 CM
BH CV ECHO MEAS - LA DIMENSION(2D): 4.4 CM
BH CV ECHO MEAS - LA DIMENSION: 4.5 CM
BH CV ECHO MEAS - LA/AO: 1.4
BH CV ECHO MEAS - LV DIASTOLIC VOL/BSA (35-75): 34.4 ML/M^2
BH CV ECHO MEAS - LV MASS(C)D: 178 GRAMS
BH CV ECHO MEAS - LV MASS(C)DI: 51.1 GRAMS/M^2
BH CV ECHO MEAS - LV MAX PG: 4.6 MMHG
BH CV ECHO MEAS - LV MEAN PG: 2.4 MMHG
BH CV ECHO MEAS - LV SYSTOLIC VOL/BSA (12-30): 16.8 ML/M^2
BH CV ECHO MEAS - LV V1 MAX: 106.7 CM/SEC
BH CV ECHO MEAS - LV V1 MEAN: 70.9 CM/SEC
BH CV ECHO MEAS - LV V1 VTI: 21.3 CM
BH CV ECHO MEAS - LVIDD: 4.7 CM
BH CV ECHO MEAS - LVIDS: 3.8 CM
BH CV ECHO MEAS - LVOT AREA: 3.9 CM^2
BH CV ECHO MEAS - LVOT DIAM: 2.2 CM
BH CV ECHO MEAS - LVPWD: 0.99 CM
BH CV ECHO MEAS - MV MAX PG: 7.7 MMHG
BH CV ECHO MEAS - MV MEAN PG: 3 MMHG
BH CV ECHO MEAS - MV V2 MAX: 138.9 CM/SEC
BH CV ECHO MEAS - MV V2 MEAN: 80.1 CM/SEC
BH CV ECHO MEAS - MV V2 VTI: 21.8 CM
BH CV ECHO MEAS - MVA(VTI): 3.8 CM^2
BH CV ECHO MEAS - PA ACC TIME: 0.07 SEC
BH CV ECHO MEAS - PA MAX PG (FULL): 4.1 MMHG
BH CV ECHO MEAS - PA MAX PG: 7 MMHG
BH CV ECHO MEAS - PA MEAN PG (FULL): 2.4 MMHG
BH CV ECHO MEAS - PA MEAN PG: 4.2 MMHG
BH CV ECHO MEAS - PA PR(ACCEL): 47.9 MMHG
BH CV ECHO MEAS - PA V2 MAX: 132.7 CM/SEC
BH CV ECHO MEAS - PA V2 MEAN: 97.2 CM/SEC
BH CV ECHO MEAS - PA V2 VTI: 22 CM
BH CV ECHO MEAS - PI END-D VEL: 112 CM/SEC
BH CV ECHO MEAS - PI MAX PG: 11.8 MMHG
BH CV ECHO MEAS - PI MAX VEL: 169.9 CM/SEC
BH CV ECHO MEAS - RAP SYSTOLE: 3 MMHG
BH CV ECHO MEAS - RV MAX PG: 3 MMHG
BH CV ECHO MEAS - RV MEAN PG: 1.8 MMHG
BH CV ECHO MEAS - RV V1 MAX: 86.2 CM/SEC
BH CV ECHO MEAS - RV V1 MEAN: 63.8 CM/SEC
BH CV ECHO MEAS - RV V1 VTI: 16.8 CM
BH CV ECHO MEAS - RVDD: 4.3 CM
BH CV ECHO MEAS - RVSP: 8.6 MMHG
BH CV ECHO MEAS - SI(AO): 62.9 ML/M^2
BH CV ECHO MEAS - SI(CUBED): 13.9 ML/M^2
BH CV ECHO MEAS - SI(LVOT): 23.7 ML/M^2
BH CV ECHO MEAS - SI(MOD-SP4): 17.6 ML/M^2
BH CV ECHO MEAS - SI(TEICH): 11.5 ML/M^2
BH CV ECHO MEAS - SV(AO): 218.9 ML
BH CV ECHO MEAS - SV(CUBED): 48.3 ML
BH CV ECHO MEAS - SV(LVOT): 82.4 ML
BH CV ECHO MEAS - SV(MOD-SP4): 61.3 ML
BH CV ECHO MEAS - SV(TEICH): 40.2 ML
BH CV ECHO MEAS - TR MAX VEL: 117.3 CM/SEC
BILIRUB UR QL STRIP: NEGATIVE
BLD GP AB SCN SERPL QL: NEGATIVE
BUN SERPL-MCNC: 120 MG/DL (ref 8–23)
BUN/CREAT SERPL: 13.3 (ref 7–25)
CALCIUM SPEC-SCNC: 8.6 MG/DL (ref 8.6–10.5)
CHLORIDE SERPL-SCNC: 97 MMOL/L (ref 98–107)
CLARITY UR: CLEAR
CO2 SERPL-SCNC: 18 MMOL/L (ref 22–29)
COLOR UR: YELLOW
CREAT SERPL-MCNC: 9.01 MG/DL (ref 0.76–1.27)
CREAT UR-MCNC: 45.2 MG/DL
GAMMA GLOB SERPL ELPH-MCNC: 1.5 G/DL (ref 0.4–1.8)
GFR SERPL CREATININE-BSD FRML MDRD: 6 ML/MIN/1.73
GFR SERPL CREATININE-BSD FRML MDRD: ABNORMAL ML/MIN/{1.73_M2}
GLOBULIN SER CALC-MCNC: 3.8 G/DL (ref 2.2–3.9)
GLUCOSE BLDC GLUCOMTR-MCNC: 244 MG/DL (ref 70–105)
GLUCOSE BLDC GLUCOMTR-MCNC: 249 MG/DL (ref 70–105)
GLUCOSE BLDC GLUCOMTR-MCNC: 391 MG/DL (ref 70–105)
GLUCOSE BLDC GLUCOMTR-MCNC: 407 MG/DL (ref 70–105)
GLUCOSE BLDC GLUCOMTR-MCNC: 421 MG/DL (ref 70–105)
GLUCOSE SERPL-MCNC: 238 MG/DL (ref 65–99)
GLUCOSE UR STRIP-MCNC: NEGATIVE MG/DL
HBA1C MFR BLD: 6 % (ref 3.5–5.6)
HBV CORE AB SERPL QL IA: NEGATIVE
HGB UR QL STRIP.AUTO: ABNORMAL
HYALINE CASTS UR QL AUTO: ABNORMAL /LPF
IGA SERPL-MCNC: 742 MG/DL (ref 61–437)
IGG SERPL-MCNC: 1345 MG/DL (ref 603–1613)
IGM SERPL-MCNC: 52 MG/DL (ref 20–172)
KAPPA LC FREE SER-MCNC: 188.9 MG/L (ref 3.3–19.4)
KAPPA LC FREE/LAMBDA FREE SER: 1.18 {RATIO} (ref 0.26–1.65)
KETONES UR QL STRIP: NEGATIVE
LABORATORY COMMENT REPORT: ABNORMAL
LAMBDA LC FREE SERPL-MCNC: 160.7 MG/L (ref 5.7–26.3)
LEUKOCYTE ESTERASE UR QL STRIP.AUTO: NEGATIVE
M PROTEIN SERPL ELPH-MCNC: ABNORMAL G/DL
NITRITE UR QL STRIP: NEGATIVE
PH UR STRIP.AUTO: 5.5 [PH] (ref 5–8)
POTASSIUM SERPL-SCNC: 5.5 MMOL/L (ref 3.5–5.2)
PROT ?TM UR-MCNC: 10 MG/DL
PROT PATTERN SERPL IFE-IMP: ABNORMAL
PROT SERPL-MCNC: 6.7 G/DL (ref 6–8.5)
PROT UR QL STRIP: NEGATIVE
PROT/CREAT UR: 221.2 MG/G CREA (ref 0–200)
RBC # UR STRIP: ABNORMAL /HPF
REF LAB TEST METHOD: ABNORMAL
RH BLD: POSITIVE
SODIUM SERPL-SCNC: 132 MMOL/L (ref 136–145)
SP GR UR STRIP: 1.01 (ref 1–1.03)
SQUAMOUS #/AREA URNS HPF: ABNORMAL /HPF
T&S EXPIRATION DATE: NORMAL
UROBILINOGEN UR QL STRIP: ABNORMAL
WBC # UR STRIP: ABNORMAL /HPF

## 2022-01-07 PROCEDURE — 63710000001 INSULIN GLARGINE PER 5 UNITS: Performed by: STUDENT IN AN ORGANIZED HEALTH CARE EDUCATION/TRAINING PROGRAM

## 2022-01-07 PROCEDURE — 99232 SBSQ HOSP IP/OBS MODERATE 35: CPT | Performed by: STUDENT IN AN ORGANIZED HEALTH CARE EDUCATION/TRAINING PROGRAM

## 2022-01-07 PROCEDURE — 94799 UNLISTED PULMONARY SVC/PX: CPT

## 2022-01-07 PROCEDURE — 86900 BLOOD TYPING SEROLOGIC ABO: CPT

## 2022-01-07 PROCEDURE — 97530 THERAPEUTIC ACTIVITIES: CPT

## 2022-01-07 PROCEDURE — 97162 PT EVAL MOD COMPLEX 30 MIN: CPT

## 2022-01-07 PROCEDURE — 86901 BLOOD TYPING SEROLOGIC RH(D): CPT | Performed by: STUDENT IN AN ORGANIZED HEALTH CARE EDUCATION/TRAINING PROGRAM

## 2022-01-07 PROCEDURE — 25010000002 CEFTRIAXONE PER 250 MG: Performed by: STUDENT IN AN ORGANIZED HEALTH CARE EDUCATION/TRAINING PROGRAM

## 2022-01-07 PROCEDURE — 25010000002 THIAMINE PER 100 MG: Performed by: INTERNAL MEDICINE

## 2022-01-07 PROCEDURE — 83036 HEMOGLOBIN GLYCOSYLATED A1C: CPT | Performed by: STUDENT IN AN ORGANIZED HEALTH CARE EDUCATION/TRAINING PROGRAM

## 2022-01-07 PROCEDURE — 82962 GLUCOSE BLOOD TEST: CPT

## 2022-01-07 PROCEDURE — 86901 BLOOD TYPING SEROLOGIC RH(D): CPT

## 2022-01-07 PROCEDURE — 86900 BLOOD TYPING SEROLOGIC ABO: CPT | Performed by: STUDENT IN AN ORGANIZED HEALTH CARE EDUCATION/TRAINING PROGRAM

## 2022-01-07 PROCEDURE — 63710000001 INSULIN LISPRO (HUMAN) PER 5 UNITS: Performed by: STUDENT IN AN ORGANIZED HEALTH CARE EDUCATION/TRAINING PROGRAM

## 2022-01-07 PROCEDURE — 86923 COMPATIBILITY TEST ELECTRIC: CPT

## 2022-01-07 PROCEDURE — 25010000002 HEPARIN (PORCINE) PER 1000 UNITS: Performed by: NURSE PRACTITIONER

## 2022-01-07 PROCEDURE — 86850 RBC ANTIBODY SCREEN: CPT | Performed by: STUDENT IN AN ORGANIZED HEALTH CARE EDUCATION/TRAINING PROGRAM

## 2022-01-07 PROCEDURE — 81001 URINALYSIS AUTO W/SCOPE: CPT | Performed by: UROLOGY

## 2022-01-07 PROCEDURE — 25010000002 METHYLPREDNISOLONE PER 40 MG: Performed by: NURSE PRACTITIONER

## 2022-01-07 RX ORDER — INSULIN LISPRO 100 [IU]/ML
0-9 INJECTION, SOLUTION INTRAVENOUS; SUBCUTANEOUS AS NEEDED
Status: DISCONTINUED | OUTPATIENT
Start: 2022-01-07 | End: 2022-01-08

## 2022-01-07 RX ORDER — INSULIN LISPRO 100 [IU]/ML
0-9 INJECTION, SOLUTION INTRAVENOUS; SUBCUTANEOUS
Status: DISCONTINUED | OUTPATIENT
Start: 2022-01-07 | End: 2022-01-08

## 2022-01-07 RX ADMIN — GABAPENTIN 100 MG: 100 CAPSULE ORAL at 15:49

## 2022-01-07 RX ADMIN — INSULIN LISPRO 3 UNITS: 100 INJECTION, SOLUTION INTRAVENOUS; SUBCUTANEOUS at 08:34

## 2022-01-07 RX ADMIN — METHYLPREDNISOLONE SODIUM SUCCINATE 40 MG: 40 INJECTION, POWDER, FOR SOLUTION INTRAMUSCULAR; INTRAVENOUS at 12:20

## 2022-01-07 RX ADMIN — INSULIN LISPRO 4 UNITS: 100 INJECTION, SOLUTION INTRAVENOUS; SUBCUTANEOUS at 12:20

## 2022-01-07 RX ADMIN — IPRATROPIUM BROMIDE AND ALBUTEROL SULFATE 3 ML: 2.5; .5 SOLUTION RESPIRATORY (INHALATION) at 16:43

## 2022-01-07 RX ADMIN — SODIUM BICARBONATE 150 MEQ: 84 INJECTION, SOLUTION INTRAVENOUS at 12:20

## 2022-01-07 RX ADMIN — GABAPENTIN 100 MG: 100 CAPSULE ORAL at 08:34

## 2022-01-07 RX ADMIN — METHYLPREDNISOLONE SODIUM SUCCINATE 40 MG: 40 INJECTION, POWDER, FOR SOLUTION INTRAMUSCULAR; INTRAVENOUS at 21:14

## 2022-01-07 RX ADMIN — SODIUM CHLORIDE, PRESERVATIVE FREE 10 ML: 5 INJECTION INTRAVENOUS at 21:17

## 2022-01-07 RX ADMIN — FOLIC ACID 1 MG: 1 TABLET ORAL at 08:34

## 2022-01-07 RX ADMIN — GABAPENTIN 100 MG: 100 CAPSULE ORAL at 21:14

## 2022-01-07 RX ADMIN — METHYLPREDNISOLONE SODIUM SUCCINATE 40 MG: 40 INJECTION, POWDER, FOR SOLUTION INTRAMUSCULAR; INTRAVENOUS at 05:10

## 2022-01-07 RX ADMIN — IPRATROPIUM BROMIDE AND ALBUTEROL SULFATE 3 ML: 2.5; .5 SOLUTION RESPIRATORY (INHALATION) at 12:12

## 2022-01-07 RX ADMIN — THIAMINE HYDROCHLORIDE 100 MG: 100 INJECTION, SOLUTION INTRAMUSCULAR; INTRAVENOUS at 08:35

## 2022-01-07 RX ADMIN — HEPARIN SODIUM 5000 UNITS: 5000 INJECTION INTRAVENOUS; SUBCUTANEOUS at 08:35

## 2022-01-07 RX ADMIN — SODIUM CHLORIDE, PRESERVATIVE FREE 10 ML: 5 INJECTION INTRAVENOUS at 08:35

## 2022-01-07 RX ADMIN — INSULIN LISPRO 8 UNITS: 100 INJECTION, SOLUTION INTRAVENOUS; SUBCUTANEOUS at 16:50

## 2022-01-07 RX ADMIN — HEPARIN SODIUM 5000 UNITS: 5000 INJECTION INTRAVENOUS; SUBCUTANEOUS at 21:14

## 2022-01-07 RX ADMIN — INSULIN GLARGINE 5 UNITS: 100 INJECTION, SOLUTION SUBCUTANEOUS at 08:36

## 2022-01-07 RX ADMIN — IPRATROPIUM BROMIDE AND ALBUTEROL SULFATE 3 ML: 2.5; .5 SOLUTION RESPIRATORY (INHALATION) at 20:24

## 2022-01-07 RX ADMIN — INSULIN LISPRO 9 UNITS: 100 INJECTION, SOLUTION INTRAVENOUS; SUBCUTANEOUS at 21:14

## 2022-01-07 RX ADMIN — CHLORDIAZEPOXIDE HYDROCHLORIDE 5 MG: 5 CAPSULE ORAL at 21:14

## 2022-01-07 RX ADMIN — CHLORDIAZEPOXIDE HYDROCHLORIDE 5 MG: 5 CAPSULE ORAL at 15:49

## 2022-01-07 RX ADMIN — CEFTRIAXONE 1 G: 1 INJECTION, POWDER, FOR SOLUTION INTRAMUSCULAR; INTRAVENOUS at 21:14

## 2022-01-07 RX ADMIN — LIDOCAINE 1 PATCH: 50 PATCH TOPICAL at 08:36

## 2022-01-07 RX ADMIN — IPRATROPIUM BROMIDE AND ALBUTEROL SULFATE 3 ML: 2.5; .5 SOLUTION RESPIRATORY (INHALATION) at 08:44

## 2022-01-07 NOTE — CONSULTS
[facility] CONSULT      Patient Identification:  NAME:  Anthony Moss  Age:  66 y.o.   Sex:  male   :  1955   MRN:  [id number]       Chief complaint/Reason for consult: Retention    History of present illness:  66 y.o. male admitted with dyspnea found to have bilateral pneumonia and acute renal failure. CT scan showed distended bladder with bilateral hydronephrosis. Creatinine was over 10. Patient yelling and screaming with attempted catheterization by nurses. He is refusing catheterization. I saw the patient early this a.m. he was not arousable, I shook and woke patient he acknowledged and refused catheterization I discussed with him the risks and benefits. I then resaw the patient later this a.m. he was more arousable and amenable to catheterization. He did void overnight approximately 200 cc.      Past medical history:  History reviewed. No pertinent past medical history.    Past surgical history:  No past surgical history on file.    Allergies:  Patient has no known allergies.    Home medications:  No medications prior to admission.        Hospital medications:  cefTRIAXone, 1 g, Intravenous, Q24H  chlordiazePOXIDE, 5 mg, Oral, TID  folic acid, 1 mg, Oral, Daily  gabapentin, 100 mg, Oral, TID  heparin (porcine), 5,000 Units, Subcutaneous, Q12H  insulin glargine, 5 Units, Subcutaneous, Daily  insulin lispro, 0-9 Units, Subcutaneous, TID AC  ipratropium-albuterol, 3 mL, Nebulization, Q4H - RT  lidocaine, 1 patch, Transdermal, Q24H  methylPREDNISolone sodium succinate, 40 mg, Intravenous, Q8H  sodium chloride, 10 mL, Intravenous, Q12H  thiamine (VITAMIN B1) IVPB, 100 mg, Intravenous, BID      sodium bicarbonate drip (greater than 75 mEq/bag), 150 mEq, Last Rate: 150 mEq (22 1220)      •  acetaminophen **OR** acetaminophen **OR** acetaminophen  •  albumin human  •  aluminum-magnesium hydroxide-simethicone  •  dextrose  •  dextrose  •  flumazenil  •  glucagon (human recombinant)  •  insulin  lispro **AND** insulin lispro  •  labetalol  •  Lidocaine HCl gel  •  LORazepam **OR** LORazepam **OR** LORazepam **OR** [DISCONTINUED] LORazepam **OR** [DISCONTINUED] LORazepam **OR** LORazepam  •  melatonin  •  nitroglycerin  •  ondansetron **OR** ondansetron  •  [COMPLETED] Insert peripheral IV **AND** sodium chloride  •  sodium chloride    Family history:  History reviewed. No pertinent family history.    Social history:  Social History     Tobacco Use   • Smoking status: Never Smoker   • Smokeless tobacco: Never Used   Substance Use Topics   • Alcohol use: Not on file     Comment: occationally    • Drug use: Not on file       REVIEW OF SYSTEMS:  Constitutional - Negative for fevers, chills, lethargy  Eyes/Ears/Nose/Mouth/Throat - Negative for changes in vision or hearing  Cardiovascular - Negative for increased edema or cyanosis  Respiratory -positive dyspnea  Gastrointestinal - Negative for nausea or vomiting  Genitourinary - Negative for dysuria or hematuria  Hematologic/Lymphatic - Negative for bruising or cyanosis  Skin - Negative for erythema or rash  Psych - Negative     Objective:  TMax 24 hours:   Temp (24hrs), Av.6 °F (36.4 °C), Min:97.3 °F (36.3 °C), Max:98 °F (36.7 °C)      Vitals Ranges:   Temp:  [97.3 °F (36.3 °C)-98 °F (36.7 °C)] 97.5 °F (36.4 °C)  Heart Rate:  [] 107  Resp:  [16-20] 20  BP: (131-156)/(79-91) 138/83    Intake/Output Last 3 shifts:  I/O last 3 completed shifts:  In: 1380 [P.O.:1180; IV Piggyback:200]  Out: 1200 [Urine:200; Other:1000]     Physical Exam:    General Appearance:   Arousable with significant effort   HEENT:    No trauma, pupils reactive, hearing intact   Lungs:     Respirations unlabored, no audible wheezing    Heart:    No cyanosis, No significant edema   Abdomen:     Soft, ND    :   16 [place] Roberts placed with return of clear yellow urine over 2 L. Patient screamed the entire time even with gently swiping Betadine but did not refuse catheterization    Extremities:   No significant edema, no deformity   Lymphatic:   No neck or groin LAD   Skin:   No bleeding, bruising or rashes           Results review:   I reviewed the patient's new clinical results.    Data review:  Lab Results (last 24 hours)     Procedure Component Value Units Date/Time    POC Glucose Once [[id number]]  (Abnormal) Collected: 01/07/22 1206    Specimen: Blood Updated: 01/07/22 1208     Glucose 244 mg/dL      Comment: Serial Number: [id number]:  [id number]       Protein / Creatinine Ratio, Urine - Urine, Clean Catch [[id number]]  (Abnormal) Collected: 01/06/22 2105    Specimen: Urine, Clean Catch Updated: 01/07/22 1204     Protein/Creatinine Ratio, Urine 221.2 mg/G Crea      Creatinine, Urine 45.2 mg/dL      Total Protein, Urine 10.0 mg/dL     Hemoglobin A1c [[id number]]  (Abnormal) Collected: 01/07/22 1000    Specimen: Blood Updated: 01/07/22 1147     Hemoglobin A1C 6.0 %     Narrative:      Hemoglobin A1C Reference Range:    <5.7 %        Normal  5.7-6.4 %     Increased risk for diabetes  > 6.4 %        Diabetes       These guidelines have been recommended by the American Diabetic Association for Hgb A1c.      The following 2010 guidelines have been recommended by the American Diabetes Association for Hemoglobin A1c.    HBA1c 5.7-6.4% Increased risk for future diabetes (pre-diabetes)  HBA1c     >6.4% Diabetes      MARI [[id number]]  (Normal) Collected: 01/06/22 0638    Specimen: Blood Updated: 01/07/22 1105     Antinuclear Antibodies (MARI) Negative    Hepatitis B Core Antibody, Total [[id number]] Collected: 01/06/22 0638    Specimen: Blood Updated: 01/07/22 0713     Hep B Core Total Ab Negative    Narrative:      Performed at:  [address] [place] [address]  [id number]  : Koko Moore PhD, Phone:  [phone]    POC Glucose Once [[id number]]  (Abnormal) Collected: 01/07/22 0649    Specimen: Blood Updated: 01/07/22 0650     Glucose 249 mg/dL      Comment:  Serial Number: [id number]:  [id number]       Basic Metabolic Panel [[id number]]  (Abnormal) Collected: 01/06/22 2322    Specimen: Blood Updated: 01/07/22 0013     Glucose 238 mg/dL       mg/dL      Creatinine 9.01 mg/dL      Sodium 132 mmol/L      Potassium 5.5 mmol/L      Chloride 97 mmol/L      CO2 18.0 mmol/L      Calcium 8.6 mg/dL      eGFR   Amer --     Comment: <15 Indicative of kidney failure.        eGFR Non African Amer 6 mL/min/1.73      Comment: <15 Indicative of kidney failure.        BUN/Creatinine Ratio 13.3     Anion Gap 17.0 mmol/L     Narrative:      GFR Normal >60  Chronic Kidney Disease <60  Kidney Failure <15      CBC (No Diff) [[id number]]  (Abnormal) Collected: 01/06/22 2322    Specimen: Blood Updated: 01/06/22 2336     WBC 10.00 10*3/mm3      RBC 2.28 10*6/mm3      Hemoglobin 7.7 g/dL      Hematocrit 21.9 %      MCV 95.8 fL      MCH 33.7 pg      MCHC 35.2 g/dL      RDW 14.4 %      RDW-SD 49.4 fl      MPV 8.3 fL      Platelets 107 10*3/mm3     S. Pneumo Ag Urine or CSF - Urine, Urine, Random Void [[id number]]  (Normal) Collected: 01/06/22 2104    Specimen: Urine, Random Void Updated: 01/06/22 2330     Strep Pneumo Ag Negative    Legionella Antigen, Urine - Urine, Urine, Random Void [[id number]]  (Normal) Collected: 01/06/22 2104    Specimen: Urine, Random Void Updated: 01/06/22 2330     LEGIONELLA ANTIGEN, URINE Negative    Urinalysis With Culture If Indicated - Urine, Random Void [[id number]]  (Normal) Collected: 01/06/22 2104    Specimen: Urine, Random Void Updated: 01/06/22 2231     Color, UA Yellow     Appearance, UA Clear     pH, UA 5.5     Specific Gravity, UA 1.008     Glucose, UA Negative     Ketones, UA Negative     Bilirubin, UA Negative     Blood, UA Negative     Protein, UA Negative     Leuk Esterase, UA Negative     Nitrite, UA Negative     Urobilinogen, UA 0.2 E.U./dL    Narrative:      Urine microscopic not indicated.    Urine Drug Screen - Urine, Clean Catch  [[id number]]  (Normal) Collected: 01/06/22 2105    Specimen: Urine, Clean Catch Updated: 01/06/22 2205     Amphet/Methamphet, Screen Negative     Barbiturates Screen, Urine Negative     Benzodiazepine Screen, Urine Negative     Cocaine Screen, Urine Negative     Opiate Screen Negative     THC, Screen, Urine Negative     Methadone Screen, Urine Negative     Oxycodone Screen, Urine Negative    Narrative:      Negative Thresholds Per Drugs Screened:    Amphetamines                 500 ng/ml  Barbiturates                 200 ng/ml  Benzodiazepines              100 ng/ml  Cocaine                      300 ng/ml  Methadone                    300 ng/ml  Opiates                      300 ng/ml  Oxycodone                    100 ng/ml  THC                           50 ng/ml    The Normal Value for all drugs tested is negative. This report includes final unconfirmed screening results to be used for medical treatment purposes only. Unconfirmed results must not be used for non-medical purposes such as employment or legal testing. Clinical consideration should be applied to any drug of abuse test, particularly when unconfirmed results are used.          All urine drugs of abuse requests without chain of custody are for medical screening purposes only.  False positives are possible.      Osmolality, Urine - Urine, Clean Catch [[id number]]  (Abnormal) Collected: 01/06/22 2105    Specimen: Urine, Clean Catch Updated: 01/06/22 2156     Osmolality, Urine 262 mOsm/kg     Sodium, Urine, Random - Urine, Clean Catch [[id number]] Collected: 01/06/22 2105    Specimen: Urine, Clean Catch Updated: 01/06/22 2156     Sodium, Urine 46 mmol/L     Narrative:      Reference intervals for random urine have not been established.  Clinical usage is dependent upon physician's interpretation in combination with other laboratory tests.       POC Glucose Once [[id number]]  (Abnormal) Collected: 01/06/22 1933    Specimen: Blood Updated: 01/06/22 1934     Glucose  307 mg/dL      Comment: Serial Number: [id number]:  [id number]       Blood Culture - Blood, Arm, Right [[id number]]  (Normal) Collected: 01/05/22 1908    Specimen: Blood from Arm, Right Updated: 01/06/22 1915     Blood Culture No growth at 24 hours    Blood Culture - Blood, Arm, Left [[id number]]  (Normal) Collected: 01/05/22 1851    Specimen: Blood from Arm, Left Updated: 01/06/22 1900     Blood Culture No growth at 24 hours    POC Glucose Once [[id number]]  (Abnormal) Collected: 01/06/22 1651    Specimen: Blood Updated: 01/06/22 1652     Glucose 258 mg/dL      Comment: Serial Number: [id number]:  [id number]              Imaging:  Imaging Results (Last 24 Hours)     Procedure Component Value Units Date/Time    CT Chest Without Contrast Diagnostic [[id number]] Collected: 01/06/22 1616     Updated: 01/06/22 1628    Narrative:         DATE OF EXAM:  1/6/2022 3:28 PM     PROCEDURE:  CT CHEST WO CONTRAST DIAGNOSTIC-, CT ABDOMEN PELVIS STONE PROTOCOL-     INDICATIONS:   Dyspnea, chronic, unclear etiology     COMPARISON:   No Comparisons Available     TECHNIQUE:  Routine transaxial slices were obtained through the chest without the  administration of intravenous contrast. Reconstructed coronal and  sagittal images were also obtained. Automated exposure control and  iterative construction methods were used.     FINDINGS:  Chest:     Mediastinum: Left IJ central venous line terminates at the cavoatrial  junction. No mediastinal hematoma. Thoracic aorta normal in caliber. No  mediastinal adenopathy. Mild coronary artery calcification. No  pericardial effusion     Lungs/pleura: Extensive mixed groundglass/interstitial opacities  throughout the left lung, with patchy similar opacities in the right  upper lobe. No pleural effusion     Bones/soft tissues: No acute bony abnormality           Abdomen/pelvis:     Organs: Moderate bilateral hydroureteronephrosis to the urinary bladder.  No urolithiasis. Remaining solid  organs have an unremarkable noncontrast  appearance. Gallbladder contracted due to recently ingested meal     GASTROINTESTINAL: No gastrointestinal abnormality demonstrated. Normal  appendix     Pelvis: Severe urinary bladder distention to above the umbilicus. Normal  sized prostate. No abnormal pelvic fluid collection or adenopathy     Peritoneum/retroperitoneum: No ascites or pneumoperitoneum. Normal  caliber aorta     Bones/soft tissues: No acute bony abnormality. Diffuse lumbar  degenerative disease. L5 pars defects with no anterolisthesis        Impression:         1. Bilateral pneumonia more severe on the left, Covid pattern  2. Severe urinary bladder distention with secondary bilateral  hydronephrosis. Bladder dysfunction and outlet obstruction considered,  however the prostate gland is normal in size     Electronically Signed By-Phuc Morrison On:1/6/2022 4:26 PM  This report was finalized on [id number] by  Phuc Morrison, .    CT Abdomen Pelvis Stone Protocol [[id number]] Collected: 01/06/22 1616     Updated: 01/06/22 1628    Narrative:         DATE OF EXAM:  1/6/2022 3:28 PM     PROCEDURE:  CT CHEST WO CONTRAST DIAGNOSTIC-, CT ABDOMEN PELVIS STONE PROTOCOL-     INDICATIONS:   Dyspnea, chronic, unclear etiology     COMPARISON:   No Comparisons Available     TECHNIQUE:  Routine transaxial slices were obtained through the chest without the  administration of intravenous contrast. Reconstructed coronal and  sagittal images were also obtained. Automated exposure control and  iterative construction methods were used.     FINDINGS:  Chest:     Mediastinum: Left IJ central venous line terminates at the cavoatrial  junction. No mediastinal hematoma. Thoracic aorta normal in caliber. No  mediastinal adenopathy. Mild coronary artery calcification. No  pericardial effusion     Lungs/pleura: Extensive mixed groundglass/interstitial opacities  throughout the left lung, with patchy similar opacities in the  right  upper lobe. No pleural effusion     Bones/soft tissues: No acute bony abnormality           Abdomen/pelvis:     Organs: Moderate bilateral hydroureteronephrosis to the urinary bladder.  No urolithiasis. Remaining solid organs have an unremarkable noncontrast  appearance. Gallbladder contracted due to recently ingested meal     GASTROINTESTINAL: No gastrointestinal abnormality demonstrated. Normal  appendix     Pelvis: Severe urinary bladder distention to above the umbilicus. Normal  sized prostate. No abnormal pelvic fluid collection or adenopathy     Peritoneum/retroperitoneum: No ascites or pneumoperitoneum. Normal  caliber aorta     Bones/soft tissues: No acute bony abnormality. Diffuse lumbar  degenerative disease. L5 pars defects with no anterolisthesis        Impression:         1. Bilateral pneumonia more severe on the left, Covid pattern  2. Severe urinary bladder distention with secondary bilateral  hydronephrosis. Bladder dysfunction and outlet obstruction considered,  however the prostate gland is normal in size     Electronically Signed By-Phuc Morrison On:1/6/2022 4:26 PM  This report was finalized on [id number] by  Phuc Morrison, .    US Renal Bilateral [[id number]] Collected: 01/06/22 1553     Updated: 01/06/22 1557    Narrative:      DATE OF EXAM:  1/6/2022 3:06 PM     PROCEDURE:  US RENAL BILATERAL-     INDICATIONS:  CARLOS ALBERTO; R06.00-Dyspnea, unspecified; J18.9-Pneumonia, unspecified organism;  R09.02-Hypoxemia; N17.9-Acute kidney failure, unspecified     COMPARISON:  CT abdomen and pelvis without contrast 1/6/2022.     TECHNIQUE:   Grayscale and color Doppler ultrasound evaluation of the kidneys and  urinary bladder was performed.        FINDINGS:  The right kidney measures 13.4 x 5.3 x 7.9 cm. The left kidney measures  11.9 x 4.7 x 5.5 cm. There is mild to moderate bilateral hydronephrosis.  Renal cortical thickness and echotexture is normal. No obstructing  etiology is seen. The urinary  bladder is significantly distended with  fluid, with a prevoid volume of nearly 1800 cc. No urinary bladder  lesion is identified. The technologist states the patient was unable to  void due to their clinical condition.        Impression:         1. Marked urinary bladder distention with fluid. The patient was unable  to void. Consider Roberts catheter decompression.  2. Mild to moderate bilateral hydronephrosis. No obstructing abnormality  is seen.     Electronically Signed By-Dana Shanks MD On:1/6/2022 3:54 PM  This report was finalized on [id number] by  Dana Shanks MD.             Assessment:       Acute renal failure (ARF) (HCC)    Mixed hyperlipidemia    Essential hypertension    Obesity    Bilateral pneumonia    Thrombocytopenia (HCC)    ETOH abuse    Tobacco abuse    Marijuana use    Large volume retention  Bilateral hydronephrosis  Renal failure    Plan:     CT images personally reviewed, prostate appears small, bladder very distended with bilateral hydronephrosis  16 [place] Roberts placed without resistance but patient screamed throughout the entire process but did not refuse catheterization this time   Start Flomax  Discussed he would need to leave the Roberts in until his renal function normalizes then we will consider voiding trial. Patient would not be able to have cystoscopy due to his combative behavior during procedures  Leave Roberts in place for at least 1 week        Xu Montero MD  [facility]  [address], [address]  [place] [address] [phone]  01/07/22  12:38 EST
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
## 2022-01-07 NOTE — CASE MANAGEMENT/SOCIAL WORK
Continued Stay Note   [place]     Patient Name: Anthony Moss  MRN: [id number]  Today's Date: 1/7/2022    Admit Date: 1/5/2022     Discharge Plan     Row Name 01/07/22 1617       Plan    Plan DC plan: pending hospital course. PT/OT ordered.    Plan Comments 3-in-1 BSC/shower chair delivered by [place] to bedside. PT/OT ordered to evaluate.              Phone communication or documentation only - no physical contact with patient or family.      Megan Naegele, RN      Office Phone: [phone]  Office Cell: [phone]

## 2022-01-07 NOTE — PROGRESS NOTES
PROGRESS NOTE      Patient Name: Anthony Moss  : 1955  MRN: [id number]  Primary Care Physician: Marva Gaviria APRN  Date of admission: 2022    Patient Care Team:  Marva Gaviria APRN as PCP - General (Nurse Practitioner)        Subjective   Subjective:     Patient is in same condition as yesterday no significant change still complaining of shortness of breath no significant urine output overnight.  Review of systems:  All other review of system unremarkable      Allergies:  No Known Allergies    Objective   Exam:     Vital Signs  Temp:  [97.3 °F (36.3 °C)-98 °F (36.7 °C)] 97.6 °F (36.4 °C)  Heart Rate:  [] 97  Resp:  [16-19] 17  BP: (131-156)/(79-91) 148/91  SpO2:  [85 %-97 %] 97 %  on  Flow (L/min):  [4-5] 5;   Device (Oxygen Therapy): nasal cannula  Body mass index is 35.69 kg/m².    General: Middle-aged obese  male in no acute distress.    Head:      Normocephalic and atraumatic.    Eyes:      PERRL/EOM intact, conjunctiva and sclera clear with out nystagmus.    Neck:      No masses, thyromegaly,  trachea central with normal respiratory effort   Lungs:    Crackles bilaterally to auscultation.    Heart:      Regular rate and rhythm, no murmur no gallop  Abd:        Soft, nontender, not distended, bowel sounds positive, no shifting dullness   Pulses:   Pulses palpable  Extr:        No cyanosis or clubbing--+1 edema.    Neuro:    No focal deficits.   alert oriented x3  Skin:       Intact without lesions or rashes.    Psych:    Alert and cooperative; normal mood and affect; .      Results Review:  I have personally reviewed most recent Data :  CBC    Results from last 7 days   Lab Units 22  0638 22  2306 22  1850   WBC 10*3/mm3 10.00 7.50 7.50 9.00   HEMOGLOBIN g/dL 7.7* 8.4* 8.3* 9.1*   PLATELETS 10*3/mm3 107* 90* 92* 113*     CMP   Results from last 7 days   Lab Units 22  2322 22  0638 22  2306 22  2120 22  185    SODIUM mmol/L 132*  --  137 134* 133*   POTASSIUM mmol/L 5.5*  --  6.2* 6.7* 6.5*   CHLORIDE mmol/L 97*  --  102 103 102   CO2 mmol/L 18.0*  --  15.0* 11.0* 14.0*   BUN mg/dL 120*  --  156* 159* 155*   CREATININE mg/dL 9.01*  --  12.36* 12.35* 12.52*   GLUCOSE mg/dL 238*  --  208* 149* 108*   ALBUMIN g/dL  --   --  3.00* 3.10*  --    BILIRUBIN mg/dL  --   --  0.3 0.3  --    ALK PHOS U/L  --   --  151* 159*  --    AST (SGOT) U/L  --   --  37 40  --    ALT (SGPT) U/L  --   --  24 25  --    AMMONIA umol/L  --  17  --   --   --      ABG    Results from last 7 days   Lab Units 01/05/22  1846   PH, ARTERIAL pH units 7.320*   PCO2, ARTERIAL mm Hg 25.5*   PO2 ART mm Hg 75.3*   O2 SATURATION ART % 94.1   BASE EXCESS ART mmol/L -11.7*     CT Chest Without Contrast Diagnostic    Result Date: 1/6/2022   1. Bilateral pneumonia more severe on the left, Covid pattern 2. Severe urinary bladder distention with secondary bilateral hydronephrosis. Bladder dysfunction and outlet obstruction considered, however the prostate gland is normal in size  Electronically Signed By-Phuc Morrison On:1/6/2022 4:26 PM This report was finalized on [id number] by  Phuc Morrison, .    XR Chest 1 View    Result Date: 1/6/2022  Interval insertion of a left-sided central venous line is noted with its tip near the expected location of the cavoatrial junction.  No pneumothorax is seen.  Diffuse bilateral infiltrates persist and may be slightly increased in degree since the prior study.  Please see above comments for further detail.  Electronically Signed By-Nitesh Li MD On:1/6/2022 1:53 AM This report was finalized on [id number] by  Nitesh Li MD.    XR Chest 1 View    Result Date: 1/5/2022  Bilateral pneumonia more pronounced on the left  Electronically Signed By-Phuc Morrison On:1/5/2022 5:31 PM This report was finalized on [id number] by  Phuc Morrison, .    US Renal Bilateral    Result Date: 1/6/2022   1. Marked urinary bladder  distention with fluid. The patient was unable to void. Consider Roberts catheter decompression. 2. Mild to moderate bilateral hydronephrosis. No obstructing abnormality is seen.  Electronically Signed By-Dana Shanks MD On:1/6/2022 3:54 PM This report was finalized on [id number] by  Dana Shanks MD.    CT Abdomen Pelvis Stone Protocol    Result Date: 1/6/2022   1. Bilateral pneumonia more severe on the left, Covid pattern 2. Severe urinary bladder distention with secondary bilateral hydronephrosis. Bladder dysfunction and outlet obstruction considered, however the prostate gland is normal in size  Electronically Signed By-Phuc Morrison On:1/6/2022 4:26 PM This report was finalized on [id number] by  Phuc Morrison, .        Scheduled Meds:azithromycin, 250 mg, Oral, Nightly  cefTRIAXone, 1 g, Intravenous, Q24H  chlordiazePOXIDE, 5 mg, Oral, TID  folic acid, 1 mg, Oral, Daily  gabapentin, 100 mg, Oral, TID  heparin (porcine), 5,000 Units, Subcutaneous, Q12H  insulin glargine, 5 Units, Subcutaneous, Daily  insulin lispro, 0-7 Units, Subcutaneous, TID AC  ipratropium-albuterol, 3 mL, Nebulization, Q4H - RT  lidocaine, 1 patch, Transdermal, Q24H  methylPREDNISolone sodium succinate, 40 mg, Intravenous, Q8H  sodium chloride, 10 mL, Intravenous, Q12H  thiamine (VITAMIN B1) IVPB, 100 mg, Intravenous, BID      Continuous Infusions:sodium bicarbonate drip (greater than 75 mEq/bag), 150 mEq, Last Rate: 150 mEq (01/06/22 1409)      PRN Meds:•  acetaminophen **OR** acetaminophen **OR** acetaminophen  •  albumin human  •  aluminum-magnesium hydroxide-simethicone  •  dextrose  •  dextrose  •  flumazenil  •  glucagon (human recombinant)  •  insulin lispro **AND** insulin lispro  •  labetalol  •  Lidocaine HCl gel  •  LORazepam **OR** LORazepam **OR** LORazepam **OR** [DISCONTINUED] LORazepam **OR** [DISCONTINUED] LORazepam **OR** LORazepam  •  melatonin  •  nitroglycerin  •  ondansetron **OR** ondansetron  •  [COMPLETED]  Insert peripheral IV **AND** sodium chloride  •  sodium chloride    Assessment/Plan   Assessment and Plan:         Acute renal failure (ARF) (HCC)    Mixed hyperlipidemia    Essential hypertension    Obesity    Bilateral pneumonia    Thrombocytopenia (HCC)    ETOH abuse    Tobacco abuse    Marijuana use    ASSESSMENT:  Acute kidney injury, stage III, oligoanuric, metabolic differential at this this time, including sepsis, with bilateral pneumonia, but also with a presentation with anemia, thrombocytopenia, have to rule out any TMA.  Patient apparently did have labs with his primary care doctor, we have to open the labs, to start with baseline creatinine never been told about any kidney problems in the past,  Had been taking Aleve, for the back pain,  Hyperkalemia,  Weakness and tiredness, with a BUN of 59, very concerning for uremia,  Metabolic acidosis, gap and nongap, DKA, azotemia, lactic acid normal,  Alcohol use,  Chronic back pain,using NSAID  B/l pneumonia.  Surprisingly, WBC stable,           Plan:      Patient with dramatic presentation with cough and shortness of air, chest x-ray with bilateral pneumonia, but normal WBC, and underlying anemia, thrombocytopenia, alcohol use, presented with acute kidney injury with severe hyperkalemia and metabolic acidosis, had discussed with the ER team earlier, orderd  for the start sodium bicarb, insulin dextrose, calcium gluconate, repeat potassium higher, 6.7, creatinine still more than 12, significant azotemia with concern about uremia, has been complaining of weakness and tiredness, has been anuric.   Patient had obstructive uropathy which is improved after passing urine yesterday need a Roberts catheter patient is unable to get Roberts catheter inserted by the nurses   Urology consult was asked for yesterday still pending   No need for dialysis as long as patient is making urine he should get better  Continue to treat patient pneumonia aggressively  Renal ultrasound and  CAT scan appreciated obstructive uropathy bilateral hydro   Unlikely to be any secondary cause of renal failure serology so far unremarkable but I think it will be negative as urine has no significant proteinuria or active sediment   Pulmonary consult appreciated        Electronically signed by Jamie Houston MD,   [facility] kidney consultant  [phone]

## 2022-01-07 NOTE — PLAN OF CARE
Goal Outcome Evaluation:  Plan of Care Reviewed With: patient           Outcome Summary: pt resting in bed throughout shift. trujillo was placed by urology this morning. pt has no complaints of pain at this time. will continue with current plan of care

## 2022-01-07 NOTE — PROGRESS NOTES
Daily Progress Note        Acute renal failure (ARF) (HCC)    Mixed hyperlipidemia    Essential hypertension    Obesity    Bilateral pneumonia    Thrombocytopenia (HCC)    ETOH abuse    Tobacco abuse    Marijuana use      Assessment  Acute renal failure (ARF) (HCC)  Bilateral pneumonia: Respiratory panel negative  Hypoxemia  CARLOS ALBERTO  Anemia  Thrombocytopenia  Alcohol abuse  Tobacco abuse  Essential hypertension  Obesity       Plan:  Oxygen supplement and titration  Antibiotics Rocephin and azithromycin  Solu-Medrol 40 mg every 8 hours  Bronchodilators  Nephrology consultation following for renal function  Glucose control  DVT/GI prophylaxis  Check daily labs and correct electrolytes as needed              LOS: 2 days     Subjective         Objective     Vital signs for last 24 hours:  Vitals:    01/07/22 0407 01/07/22 0844 01/07/22 0846 01/07/22 1157   BP: 148/91   138/83   BP Location: Left arm   Left arm   Patient Position: Lying   Lying   Pulse: 97 92 102 106   Resp: 17 20 20 18   Temp: 97.6 °F (36.4 °C)   97.5 °F (36.4 °C)   TempSrc: Oral   Oral   SpO2: 97% 98% 100% 94%   Weight: 126 kg (278 lb)      Height:           Intake/Output last 3 shifts:  I/O last 3 completed shifts:  In: 1380 [P.O.:1180; IV Piggyback:200]  Out: 1200 [Urine:200; Other:1000]  Intake/Output this shift:  I/O this shift:  In: 2000 [P.O.:2000]  Out: -       Radiology  Imaging Results (Last 24 Hours)     Procedure Component Value Units Date/Time    CT Chest Without Contrast Diagnostic [[id number]] Collected: 01/06/22 1616     Updated: 01/06/22 1628    Narrative:         DATE OF EXAM:  1/6/2022 3:28 PM     PROCEDURE:  CT CHEST WO CONTRAST DIAGNOSTIC-, CT ABDOMEN PELVIS STONE PROTOCOL-     INDICATIONS:   Dyspnea, chronic, unclear etiology     COMPARISON:   No Comparisons Available     TECHNIQUE:  Routine transaxial slices were obtained through the chest without the  administration of intravenous contrast. Reconstructed coronal and  sagittal images  were also obtained. Automated exposure control and  iterative construction methods were used.     FINDINGS:  Chest:     Mediastinum: Left IJ central venous line terminates at the cavoatrial  junction. No mediastinal hematoma. Thoracic aorta normal in caliber. No  mediastinal adenopathy. Mild coronary artery calcification. No  pericardial effusion     Lungs/pleura: Extensive mixed groundglass/interstitial opacities  throughout the left lung, with patchy similar opacities in the right  upper lobe. No pleural effusion     Bones/soft tissues: No acute bony abnormality           Abdomen/pelvis:     Organs: Moderate bilateral hydroureteronephrosis to the urinary bladder.  No urolithiasis. Remaining solid organs have an unremarkable noncontrast  appearance. Gallbladder contracted due to recently ingested meal     GASTROINTESTINAL: No gastrointestinal abnormality demonstrated. Normal  appendix     Pelvis: Severe urinary bladder distention to above the umbilicus. Normal  sized prostate. No abnormal pelvic fluid collection or adenopathy     Peritoneum/retroperitoneum: No ascites or pneumoperitoneum. Normal  caliber aorta     Bones/soft tissues: No acute bony abnormality. Diffuse lumbar  degenerative disease. L5 pars defects with no anterolisthesis        Impression:         1. Bilateral pneumonia more severe on the left, Covid pattern  2. Severe urinary bladder distention with secondary bilateral  hydronephrosis. Bladder dysfunction and outlet obstruction considered,  however the prostate gland is normal in size     Electronically Signed By-Phuc Morrison On:1/6/2022 4:26 PM  This report was finalized on [id number] by  Phuc Morrison, .    CT Abdomen Pelvis Stone Protocol [[id number]] Collected: 01/06/22 1616     Updated: 01/06/22 1628    Narrative:         DATE OF EXAM:  1/6/2022 3:28 PM     PROCEDURE:  CT CHEST WO CONTRAST DIAGNOSTIC-, CT ABDOMEN PELVIS STONE PROTOCOL-     INDICATIONS:   Dyspnea, chronic, unclear  etiology     COMPARISON:   No Comparisons Available     TECHNIQUE:  Routine transaxial slices were obtained through the chest without the  administration of intravenous contrast. Reconstructed coronal and  sagittal images were also obtained. Automated exposure control and  iterative construction methods were used.     FINDINGS:  Chest:     Mediastinum: Left IJ central venous line terminates at the cavoatrial  junction. No mediastinal hematoma. Thoracic aorta normal in caliber. No  mediastinal adenopathy. Mild coronary artery calcification. No  pericardial effusion     Lungs/pleura: Extensive mixed groundglass/interstitial opacities  throughout the left lung, with patchy similar opacities in the right  upper lobe. No pleural effusion     Bones/soft tissues: No acute bony abnormality           Abdomen/pelvis:     Organs: Moderate bilateral hydroureteronephrosis to the urinary bladder.  No urolithiasis. Remaining solid organs have an unremarkable noncontrast  appearance. Gallbladder contracted due to recently ingested meal     GASTROINTESTINAL: No gastrointestinal abnormality demonstrated. Normal  appendix     Pelvis: Severe urinary bladder distention to above the umbilicus. Normal  sized prostate. No abnormal pelvic fluid collection or adenopathy     Peritoneum/retroperitoneum: No ascites or pneumoperitoneum. Normal  caliber aorta     Bones/soft tissues: No acute bony abnormality. Diffuse lumbar  degenerative disease. L5 pars defects with no anterolisthesis        Impression:         1. Bilateral pneumonia more severe on the left, Covid pattern  2. Severe urinary bladder distention with secondary bilateral  hydronephrosis. Bladder dysfunction and outlet obstruction considered,  however the prostate gland is normal in size     Electronically Signed By-Phuc Morrison On:1/6/2022 4:26 PM  This report was finalized on [id number] by  Phuc Morrison, .    US Renal Bilateral [[id number]] Collected: 01/06/22 1553      Updated: 01/06/22 1557    Narrative:      DATE OF EXAM:  1/6/2022 3:06 PM     PROCEDURE:  US RENAL BILATERAL-     INDICATIONS:  CARLOS ALBERTO; R06.00-Dyspnea, unspecified; J18.9-Pneumonia, unspecified organism;  R09.02-Hypoxemia; N17.9-Acute kidney failure, unspecified     COMPARISON:  CT abdomen and pelvis without contrast 1/6/2022.     TECHNIQUE:   Grayscale and color Doppler ultrasound evaluation of the kidneys and  urinary bladder was performed.        FINDINGS:  The right kidney measures 13.4 x 5.3 x 7.9 cm. The left kidney measures  11.9 x 4.7 x 5.5 cm. There is mild to moderate bilateral hydronephrosis.  Renal cortical thickness and echotexture is normal. No obstructing  etiology is seen. The urinary bladder is significantly distended with  fluid, with a prevoid volume of nearly 1800 cc. No urinary bladder  lesion is identified. The technologist states the patient was unable to  void due to their clinical condition.        Impression:         1. Marked urinary bladder distention with fluid. The patient was unable  to void. Consider Roberts catheter decompression.  2. Mild to moderate bilateral hydronephrosis. No obstructing abnormality  is seen.     Electronically Signed By-Dana Shanks MD On:1/6/2022 3:54 PM  This report was finalized on [id number] by  Dana Shanks MD.          Labs:  Results from last 7 days   Lab Units 01/06/22 2322   WBC 10*3/mm3 10.00   HEMOGLOBIN g/dL 7.7*   HEMATOCRIT % 21.9*   PLATELETS 10*3/mm3 107*     Results from last 7 days   Lab Units 01/06/22  2322 01/05/22  2306 01/05/22  2306   SODIUM mmol/L 132*   < > 137   POTASSIUM mmol/L 5.5*   < > 6.2*   CHLORIDE mmol/L 97*   < > 102   CO2 mmol/L 18.0*   < > 15.0*   BUN mg/dL 120*   < > 156*   CREATININE mg/dL 9.01*   < > 12.36*   CALCIUM mg/dL 8.6   < > 9.1   BILIRUBIN mg/dL  --   --  0.3   ALK PHOS U/L  --   --  151*   ALT (SGPT) U/L  --   --  24   AST (SGOT) U/L  --   --  37   GLUCOSE mg/dL 238*   < > 208*    < > = values in this  interval not displayed.     Results from last 7 days   Lab Units 01/05/22  1846   PH, ARTERIAL pH units 7.320*   PO2 ART mm Hg 75.3*   PCO2, ARTERIAL mm Hg 25.5*   HCO3 ART mmol/L 13.1*     Results from last 7 days   Lab Units 01/05/22  2306 01/05/22  2120   ALBUMIN g/dL 3.00* 3.10*         Results from last 7 days   Lab Units 01/06/22  0638   MARI  Negative     Results from last 7 days   Lab Units 01/05/22  2120   MAGNESIUM mg/dL 1.8     Results from last 7 days   Lab Units 01/05/22  2306   INR  1.11*   APTT seconds 30.7     Results from last 7 days   Lab Units 01/05/22  1850   TSH uIU/mL 2.840           Meds:   SCHEDULE  cefTRIAXone, 1 g, Intravenous, Q24H  chlordiazePOXIDE, 5 mg, Oral, TID  folic acid, 1 mg, Oral, Daily  gabapentin, 100 mg, Oral, TID  heparin (porcine), 5,000 Units, Subcutaneous, Q12H  insulin glargine, 5 Units, Subcutaneous, Daily  insulin lispro, 0-9 Units, Subcutaneous, TID AC  ipratropium-albuterol, 3 mL, Nebulization, Q4H - RT  lidocaine, 1 patch, Transdermal, Q24H  methylPREDNISolone sodium succinate, 40 mg, Intravenous, Q8H  sodium chloride, 10 mL, Intravenous, Q12H  thiamine (VITAMIN B1) IVPB, 100 mg, Intravenous, BID      Infusions  sodium bicarbonate drip (greater than 75 mEq/bag), 150 mEq, Last Rate: 150 mEq (01/06/22 1409)      PRNs  •  acetaminophen **OR** acetaminophen **OR** acetaminophen  •  albumin human  •  aluminum-magnesium hydroxide-simethicone  •  dextrose  •  dextrose  •  flumazenil  •  glucagon (human recombinant)  •  insulin lispro **AND** insulin lispro  •  labetalol  •  Lidocaine HCl gel  •  LORazepam **OR** LORazepam **OR** LORazepam **OR** [DISCONTINUED] LORazepam **OR** [DISCONTINUED] LORazepam **OR** LORazepam  •  melatonin  •  nitroglycerin  •  ondansetron **OR** ondansetron  •  [COMPLETED] Insert peripheral IV **AND** sodium chloride  •  sodium chloride    Physical Exam:  Physical Exam  General Appearance: Lethargic  HEENT:  Normocephalic, without obvious  abnormality, Conjunctiva/corneas clear,.  Normal external ear canals, Nares normal, no drainage     Neck:  Supple, symmetrical, trachea midline. No JVD.  Lungs /Chest wall: Mild wheezing and bilateral basal rhonchi, respirations unlabored symmetrical wall movement.     Heart:  Regular rate and rhythm, systolic murmur PMI left sternal border  Abdomen: Soft, non-tender, no masses, no organomegaly.    Extremities: No edema, no clubbing or cyanosis  ROS  Review of Systems  Unobtainable due to patient lethargy

## 2022-01-07 NOTE — PLAN OF CARE
Goal Outcome Evaluation:  Plan of Care Reviewed With: patient        Progress: improving   patient slept through the night. patient woke up confused. patient voided twice, once was unmeasurable. the other was 200ml at 1930.

## 2022-01-07 NOTE — PROGRESS NOTES
[facility] Medicine Services Daily Progress Note    Patient Name: Anthony Moss  : 1955  MRN: [id number]  Primary Care Physician:  Marva Gaviria APRN  Date of admission: 2022      Subjective      Chief Complaint: Dyspnea      2022  Shiley placed overnight, HD scheduled for today.  Patient complains of pain from failed cath attempt.    2022  Patient underwent HD yesterday.  Roberts cath placed by urology as CT a/p showed b/l hydronephrosis with bladder distension    ROS   Constitutional: Positive for chills, malaise/fatigue and weight gain.   HENT: Positive for congestion.    Eyes: Negative.  Negative for visual disturbance.   Cardiovascular: Positive for dyspnea on exertion, leg swelling and orthopnea.   Respiratory: Positive for cough and shortness of breath.    Endocrine: Negative.    Hematologic/Lymphatic: Negative.    Skin: Negative.    Musculoskeletal: Positive for myalgias.   Gastrointestinal: Negative for bloating, abdominal pain, melena, nausea and vomiting.   Genitourinary: Positive for bladder incontinence, hesitancy, incomplete emptying and urgency. Negative for hematuria.   Neurological: Positive for weakness.   Psychiatric/Behavioral: The patient is nervous/anxious.    All other systems reviewed and are negative.    Objective      Vitals:   Temp:  [97.3 °F (36.3 °C)-97.8 °F (36.6 °C)] 97.5 °F (36.4 °C)  Heart Rate:  [] 107  Resp:  [16-20] 20  BP: (131-155)/(83-91) 138/83  Flow (L/min):  [2-5] 2    Physical Exam  Constitutional:       General: He is not in acute distress.     Appearance: Normal appearance. He is obese. He is not ill-appearing.   HENT:      Head: Normocephalic and atraumatic.      Nose: No congestion.      Mouth/Throat:      Pharynx: No oropharyngeal exudate.   Eyes:      General: No scleral icterus.  Neck:      Comments: Left sided shiley present  Cardiovascular:      Rate and Rhythm: Normal rate and regular rhythm.      Heart  sounds: No murmur heard.  No friction rub. No gallop.    Pulmonary:      Breath sounds: Rhonchi and rales present. No wheezing.   Abdominal:      General: There is no distension.      Tenderness: There is no abdominal tenderness. There is no guarding.   Musculoskeletal:         General: No deformity or signs of injury.      Cervical back: Normal range of motion. No rigidity.      Right lower leg: Edema present.      Left lower leg: Edema present.   Skin:     Coloration: Skin is not jaundiced.      Findings: No bruising or lesion.   Neurological:      General: No focal deficit present.      Mental Status: He is alert and oriented to person, place, and time.      Motor: No weakness.             Result Review    Result Review:  I have personally reviewed the results from the time of this admission to 1/7/2022 14:39 EST and agree with these findings:  [x]  Laboratory  []  Microbiology  [x]  Radiology  []  EKG/Telemetry   []  Cardiology/Vascular   []  Pathology  []  Old records  []  Other:          Assessment/Plan      Brief Patient Summary:  Anthony Moss is a 66 y.o. male who presented due to dyspnea and was found to be in acute renal failure complicated by lung involvement.  Abx started, Nephrology consulted.  Shiley placed by ICU.  Pulm consulted and urology consulted for trujillo placement to obtain urine.  HD stared 1/6/2022.  CT a/p showed b/l hydronephrosis with bladder distension, urology placed trujillo.  Cytoscopy unable to be done 2/2 noncompliance.      cefTRIAXone, 1 g, Intravenous, Q24H  chlordiazePOXIDE, 5 mg, Oral, TID  folic acid, 1 mg, Oral, Daily  gabapentin, 100 mg, Oral, TID  heparin (porcine), 5,000 Units, Subcutaneous, Q12H  insulin glargine, 5 Units, Subcutaneous, Daily  insulin lispro, 0-9 Units, Subcutaneous, TID AC  ipratropium-albuterol, 3 mL, Nebulization, Q4H - RT  lidocaine, 1 patch, Transdermal, Q24H  methylPREDNISolone sodium succinate, 40 mg, Intravenous, Q8H  sodium chloride, 10 mL,  Intravenous, Q12H  thiamine (VITAMIN B1) IVPB, 100 mg, Intravenous, BID       sodium bicarbonate drip (greater than 75 mEq/bag), 150 mEq, Last Rate: 150 mEq (01/07/22 1220)         Active Hospital Problems:  Active Hospital Problems    Diagnosis    • **Acute renal failure (ARF) (HCC)    • Obesity    • Essential hypertension    • Mixed hyperlipidemia    • Bilateral pneumonia    • Thrombocytopenia (HCC)    • ETOH abuse    • Tobacco abuse    • Marijuana use      Plan:   #Acute renal failure:  #b/l hydronephrosis  #Bladder distension    -Upon arrival to the ED creatinine 12.52, , GFR 4    -Consult nephrology, patient may require stat dialysis    -Shiley placed by ICU 01/06    - HD performed 01/06    - CT a/p shows bilateral hydronephrosis with bladder distension    - Urology consulted for trujillo (nursing failed) to obtain urine    - steroids given by pulm for lung function    - renal biopsy to be performed    - creatinine 12.5 > 9.01    - trujillo placed by urology    - cystoscopy unable to be performed as patient will not tolerated    - will assess how creatinine improves with obstruction relived      #Bilateral pneumonia:    -Chest x-ray reviewed    -Covid negative    -Treated in ED with IV azithromycin, IV Rocephin, IV morphine    -Continue IV Rocephin, discussed renal function with pharmacist    - azithromycin d/c    -Trend lactic acid levels    -Procalcitonin 0.82 > abx warranted    -Blood culture obtained and are pending    -Check sputum culture, check urine antigen:  Strep Pneumo & legionella    -DuoNebs, oxygen, incentive spirometry    - Possible related to ANCA, pulm consulted by nephro    - pulm consulted by nephrology     #Thrombocytopenia:    -Upon arrival to the ED platelets 113    -Monitor CBC during admission    -Continue to monitor, consult hematology if indicated    - most likely 2/2 renal failure or bone marrow suppression     #ETOH abuse:    -Initiate EtOH withdrawal protocol    -Monitor vital  signs every 4hrs    -Seizure precautions    -Ammonia level, ethanol level    -Vitamin replacement, Ativan dosing held due to acute renal failure     #Tobacco abuse marijuana use:    -Tobacco cessation education     #Essential hypertension:    -Encourage lifestyle modifications    -Low-sodium diet    -No home medications noted     #Mixed hyperlipidemia:    -Encourage lifestyle modifications    -Encourage dietary modifications    -No Home statin noted     #Obesity:    -Encourage lifestyle modifications    -BMI 32.61       DVT prophylaxis:  Medical and mechanical DVT prophylaxis orders are present.    CODE STATUS:    Code Status (Patient has no pulse and is not breathing): CPR (Attempt to Resuscitate)  Medical Interventions (Patient has pulse or is breathing): Full Support      Disposition:  I expect patient to be discharged in 5-6 days.    This patient has been examined wearing appropriate Personal Protective Equipment and discussed with Patient and nephrology. 01/07/22      Electronically signed by Ede Mcdaniel DO, 01/07/22, 14:39 EST.  [facility]

## 2022-01-07 NOTE — PLAN OF CARE
Goal Outcome Evaluation:     Outcome Summary: Pt is a 67 y/o male who presents to [facility] secondary to acute renal failure, B PNA, and shiley placed 1/6/22. At [facility] pt lives alone with 3 stepped entry x3 to [facility]. He has good support from friends who complete his grocery shopping for him. He was mod I for bed mobility and CGA for transfers and ambulation x50ft w/ RW. Pt would likely have been able to ambulate with straight cane, but due to limited availability used RW. Pt demonstrated no LOB during ambulation. Shower chair/BSC was delivered during PT evaluation. Recommend home with home health PT and assist from friend for a safe transition home.

## 2022-01-08 LAB
ANION GAP SERPL CALCULATED.3IONS-SCNC: 20 MMOL/L (ref 5–15)
BUN SERPL-MCNC: 124 MG/DL (ref 8–23)
BUN/CREAT SERPL: 15 (ref 7–25)
C-ANCA TITR SER IF: NORMAL TITER
CALCIUM SPEC-SCNC: 8.6 MG/DL (ref 8.6–10.5)
CHLORIDE SERPL-SCNC: 96 MMOL/L (ref 98–107)
CO2 SERPL-SCNC: 21 MMOL/L (ref 22–29)
CREAT SERPL-MCNC: 8.25 MG/DL (ref 0.76–1.27)
DEPRECATED RDW RBC AUTO: 49.4 FL (ref 37–54)
ERYTHROCYTE [DISTWIDTH] IN BLOOD BY AUTOMATED COUNT: 14.2 % (ref 12.3–15.4)
GFR SERPL CREATININE-BSD FRML MDRD: 7 ML/MIN/1.73
GFR SERPL CREATININE-BSD FRML MDRD: ABNORMAL ML/MIN/{1.73_M2}
GLUCOSE BLDC GLUCOMTR-MCNC: 327 MG/DL (ref 70–105)
GLUCOSE BLDC GLUCOMTR-MCNC: 378 MG/DL (ref 70–105)
GLUCOSE BLDC GLUCOMTR-MCNC: 482 MG/DL (ref 70–105)
GLUCOSE BLDC GLUCOMTR-MCNC: 487 MG/DL (ref 70–105)
GLUCOSE BLDC GLUCOMTR-MCNC: 538 MG/DL (ref 70–105)
GLUCOSE BLDC GLUCOMTR-MCNC: >600 MG/DL (ref 70–105)
GLUCOSE SERPL-MCNC: 416 MG/DL (ref 65–99)
HCT VFR BLD AUTO: 23.1 % (ref 37.5–51)
HGB BLD-MCNC: 8.1 G/DL (ref 13–17.7)
MCH RBC QN AUTO: 34.7 PG (ref 26.6–33)
MCHC RBC AUTO-ENTMCNC: 34.9 G/DL (ref 31.5–35.7)
MCV RBC AUTO: 99.6 FL (ref 79–97)
MYELOPEROXIDASE AB SER IA-ACNC: <9 U/ML (ref 0–9)
P-ANCA ATYPICAL TITR SER IF: NORMAL TITER
P-ANCA TITR SER IF: NORMAL TITER
PLATELET # BLD AUTO: 129 10*3/MM3 (ref 140–450)
PMV BLD AUTO: 8.7 FL (ref 6–12)
POTASSIUM SERPL-SCNC: 4.6 MMOL/L (ref 3.5–5.2)
PROTEINASE3 AB SER IA-ACNC: <3.5 U/ML (ref 0–3.5)
RBC # BLD AUTO: 2.32 10*6/MM3 (ref 4.14–5.8)
SODIUM SERPL-SCNC: 137 MMOL/L (ref 136–145)
VWF CP ACT/NOR PPP CHRO: 42.7 %
WBC NRBC COR # BLD: 10.8 10*3/MM3 (ref 3.4–10.8)
WHOLE BLOOD HOLD SPECIMEN: NORMAL

## 2022-01-08 PROCEDURE — 97535 SELF CARE MNGMENT TRAINING: CPT

## 2022-01-08 PROCEDURE — 85027 COMPLETE CBC AUTOMATED: CPT | Performed by: STUDENT IN AN ORGANIZED HEALTH CARE EDUCATION/TRAINING PROGRAM

## 2022-01-08 PROCEDURE — 82962 GLUCOSE BLOOD TEST: CPT

## 2022-01-08 PROCEDURE — 94799 UNLISTED PULMONARY SVC/PX: CPT

## 2022-01-08 PROCEDURE — 25010000002 CEFTRIAXONE PER 250 MG: Performed by: STUDENT IN AN ORGANIZED HEALTH CARE EDUCATION/TRAINING PROGRAM

## 2022-01-08 PROCEDURE — 63710000001 INSULIN LISPRO (HUMAN) PER 5 UNITS: Performed by: STUDENT IN AN ORGANIZED HEALTH CARE EDUCATION/TRAINING PROGRAM

## 2022-01-08 PROCEDURE — 63710000001 INSULIN GLARGINE PER 5 UNITS: Performed by: STUDENT IN AN ORGANIZED HEALTH CARE EDUCATION/TRAINING PROGRAM

## 2022-01-08 PROCEDURE — 99233 SBSQ HOSP IP/OBS HIGH 50: CPT | Performed by: STUDENT IN AN ORGANIZED HEALTH CARE EDUCATION/TRAINING PROGRAM

## 2022-01-08 PROCEDURE — 25010000002 METHYLPREDNISOLONE PER 40 MG: Performed by: NURSE PRACTITIONER

## 2022-01-08 PROCEDURE — 80048 BASIC METABOLIC PNL TOTAL CA: CPT | Performed by: INTERNAL MEDICINE

## 2022-01-08 PROCEDURE — 25010000002 HEPARIN (PORCINE) PER 1000 UNITS: Performed by: NURSE PRACTITIONER

## 2022-01-08 PROCEDURE — 97530 THERAPEUTIC ACTIVITIES: CPT

## 2022-01-08 PROCEDURE — 97166 OT EVAL MOD COMPLEX 45 MIN: CPT

## 2022-01-08 RX ORDER — INSULIN LISPRO 100 [IU]/ML
0-24 INJECTION, SOLUTION INTRAVENOUS; SUBCUTANEOUS AS NEEDED
Status: DISCONTINUED | OUTPATIENT
Start: 2022-01-08 | End: 2022-01-08

## 2022-01-08 RX ORDER — INSULIN LISPRO 100 [IU]/ML
0-24 INJECTION, SOLUTION INTRAVENOUS; SUBCUTANEOUS
Status: DISCONTINUED | OUTPATIENT
Start: 2022-01-08 | End: 2022-01-08

## 2022-01-08 RX ORDER — INSULIN LISPRO 100 [IU]/ML
5 INJECTION, SOLUTION INTRAVENOUS; SUBCUTANEOUS
Status: DISCONTINUED | OUTPATIENT
Start: 2022-01-08 | End: 2022-01-09

## 2022-01-08 RX ORDER — OLANZAPINE 10 MG/2ML
1 INJECTION, POWDER, LYOPHILIZED, FOR SOLUTION INTRAMUSCULAR
Status: DISCONTINUED | OUTPATIENT
Start: 2022-01-08 | End: 2022-01-12 | Stop reason: HOSPADM

## 2022-01-08 RX ORDER — NICOTINE POLACRILEX 4 MG
15 LOZENGE BUCCAL
Status: DISCONTINUED | OUTPATIENT
Start: 2022-01-08 | End: 2022-01-08 | Stop reason: SDUPTHER

## 2022-01-08 RX ORDER — INSULIN LISPRO 100 [IU]/ML
0-24 INJECTION, SOLUTION INTRAVENOUS; SUBCUTANEOUS AS NEEDED
Status: DISCONTINUED | OUTPATIENT
Start: 2022-01-08 | End: 2022-01-12 | Stop reason: HOSPADM

## 2022-01-08 RX ORDER — DEXTROSE MONOHYDRATE 25 G/50ML
25 INJECTION, SOLUTION INTRAVENOUS
Status: DISCONTINUED | OUTPATIENT
Start: 2022-01-08 | End: 2022-01-08 | Stop reason: SDUPTHER

## 2022-01-08 RX ORDER — OLANZAPINE 10 MG/2ML
1 INJECTION, POWDER, LYOPHILIZED, FOR SOLUTION INTRAMUSCULAR
Status: DISCONTINUED | OUTPATIENT
Start: 2022-01-08 | End: 2022-01-08 | Stop reason: SDUPTHER

## 2022-01-08 RX ORDER — DEXTROSE MONOHYDRATE 25 G/50ML
25 INJECTION, SOLUTION INTRAVENOUS
Status: DISCONTINUED | OUTPATIENT
Start: 2022-01-08 | End: 2022-01-12 | Stop reason: HOSPADM

## 2022-01-08 RX ORDER — INSULIN LISPRO 100 [IU]/ML
0-24 INJECTION, SOLUTION INTRAVENOUS; SUBCUTANEOUS
Status: DISCONTINUED | OUTPATIENT
Start: 2022-01-08 | End: 2022-01-12 | Stop reason: HOSPADM

## 2022-01-08 RX ORDER — INSULIN LISPRO 100 [IU]/ML
0-14 INJECTION, SOLUTION INTRAVENOUS; SUBCUTANEOUS
Status: DISCONTINUED | OUTPATIENT
Start: 2022-01-08 | End: 2022-01-08

## 2022-01-08 RX ORDER — INSULIN LISPRO 100 [IU]/ML
0-14 INJECTION, SOLUTION INTRAVENOUS; SUBCUTANEOUS AS NEEDED
Status: DISCONTINUED | OUTPATIENT
Start: 2022-01-08 | End: 2022-01-08

## 2022-01-08 RX ADMIN — INSULIN LISPRO 5 UNITS: 100 INJECTION, SOLUTION INTRAVENOUS; SUBCUTANEOUS at 19:46

## 2022-01-08 RX ADMIN — IPRATROPIUM BROMIDE AND ALBUTEROL SULFATE 3 ML: 2.5; .5 SOLUTION RESPIRATORY (INHALATION) at 00:04

## 2022-01-08 RX ADMIN — INSULIN GLARGINE 15 UNITS: 100 INJECTION, SOLUTION SUBCUTANEOUS at 10:25

## 2022-01-08 RX ADMIN — FOLIC ACID 1 MG: 1 TABLET ORAL at 10:26

## 2022-01-08 RX ADMIN — SODIUM BICARBONATE 150 MEQ: 84 INJECTION, SOLUTION INTRAVENOUS at 16:35

## 2022-01-08 RX ADMIN — INSULIN LISPRO 24 UNITS: 100 INJECTION, SOLUTION INTRAVENOUS; SUBCUTANEOUS at 12:07

## 2022-01-08 RX ADMIN — GABAPENTIN 100 MG: 100 CAPSULE ORAL at 22:16

## 2022-01-08 RX ADMIN — INSULIN LISPRO 5 UNITS: 100 INJECTION, SOLUTION INTRAVENOUS; SUBCUTANEOUS at 10:27

## 2022-01-08 RX ADMIN — CHLORDIAZEPOXIDE HYDROCHLORIDE 5 MG: 5 CAPSULE ORAL at 16:36

## 2022-01-08 RX ADMIN — IPRATROPIUM BROMIDE AND ALBUTEROL SULFATE 3 ML: 2.5; .5 SOLUTION RESPIRATORY (INHALATION) at 15:10

## 2022-01-08 RX ADMIN — IPRATROPIUM BROMIDE AND ALBUTEROL SULFATE 3 ML: 2.5; .5 SOLUTION RESPIRATORY (INHALATION) at 04:07

## 2022-01-08 RX ADMIN — CHLORDIAZEPOXIDE HYDROCHLORIDE 5 MG: 5 CAPSULE ORAL at 22:16

## 2022-01-08 RX ADMIN — GABAPENTIN 100 MG: 100 CAPSULE ORAL at 16:36

## 2022-01-08 RX ADMIN — CEFTRIAXONE 1 G: 1 INJECTION, POWDER, FOR SOLUTION INTRAMUSCULAR; INTRAVENOUS at 22:16

## 2022-01-08 RX ADMIN — METHYLPREDNISOLONE SODIUM SUCCINATE 40 MG: 40 INJECTION, POWDER, FOR SOLUTION INTRAMUSCULAR; INTRAVENOUS at 04:29

## 2022-01-08 RX ADMIN — INSULIN LISPRO 5 UNITS: 100 INJECTION, SOLUTION INTRAVENOUS; SUBCUTANEOUS at 12:07

## 2022-01-08 RX ADMIN — IPRATROPIUM BROMIDE AND ALBUTEROL SULFATE 3 ML: 2.5; .5 SOLUTION RESPIRATORY (INHALATION) at 19:55

## 2022-01-08 RX ADMIN — HEPARIN SODIUM 5000 UNITS: 5000 INJECTION INTRAVENOUS; SUBCUTANEOUS at 10:26

## 2022-01-08 RX ADMIN — HEPARIN SODIUM 5000 UNITS: 5000 INJECTION INTRAVENOUS; SUBCUTANEOUS at 22:16

## 2022-01-08 RX ADMIN — SODIUM CHLORIDE, PRESERVATIVE FREE 10 ML: 5 INJECTION INTRAVENOUS at 10:27

## 2022-01-08 RX ADMIN — GABAPENTIN 100 MG: 100 CAPSULE ORAL at 10:26

## 2022-01-08 RX ADMIN — IPRATROPIUM BROMIDE AND ALBUTEROL SULFATE 3 ML: 2.5; .5 SOLUTION RESPIRATORY (INHALATION) at 07:18

## 2022-01-08 RX ADMIN — CHLORDIAZEPOXIDE HYDROCHLORIDE 5 MG: 5 CAPSULE ORAL at 10:26

## 2022-01-08 RX ADMIN — INSULIN LISPRO 10 UNITS: 100 INJECTION, SOLUTION INTRAVENOUS; SUBCUTANEOUS at 10:27

## 2022-01-08 RX ADMIN — IPRATROPIUM BROMIDE AND ALBUTEROL SULFATE 3 ML: 2.5; .5 SOLUTION RESPIRATORY (INHALATION) at 10:48

## 2022-01-08 RX ADMIN — INSULIN LISPRO 24 UNITS: 100 INJECTION, SOLUTION INTRAVENOUS; SUBCUTANEOUS at 19:46

## 2022-01-08 RX ADMIN — LIDOCAINE 1 PATCH: 50 PATCH TOPICAL at 10:27

## 2022-01-08 RX ADMIN — INSULIN LISPRO 24 UNITS: 100 INJECTION, SOLUTION INTRAVENOUS; SUBCUTANEOUS at 14:13

## 2022-01-08 RX ADMIN — SODIUM CHLORIDE, PRESERVATIVE FREE 10 ML: 5 INJECTION INTRAVENOUS at 22:17
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
## 2022-01-08 NOTE — PROGRESS NOTES
[facility] Medicine Services Daily Progress Note    Patient Name: Anthony Moss  : 1955  MRN: [id number]  Primary Care Physician:  Marva Gaviria APRN  Date of admission: 2022      Subjective      Chief Complaint: Dyspnea      2022  Shiley placed overnight, HD scheduled for today.  Patient complains of pain from failed cath attempt.    2022  Patient underwent HD yesterday.  Roberts cath placed by urology as CT a/p showed b/l hydronephrosis with bladder distension    22  Creatinine slowly improving.  Glucose uncontrolled due to steroid induced hyperglycemia, will dc steroids as doubtful disease is auto-immune.  Incease lantus and add keli lispro.    ROS   Constitutional: Positive for chills, malaise/fatigue and weight gain.   HENT: Positive for congestion.    Eyes: Negative.  Negative for visual disturbance.   Cardiovascular: Positive for dyspnea on exertion, leg swelling and orthopnea.   Respiratory: Positive for cough and shortness of breath.    Endocrine: Negative.    Hematologic/Lymphatic: Negative.    Skin: Negative.    Musculoskeletal: Positive for myalgias.   Gastrointestinal: Negative for bloating, abdominal pain, melena, nausea and vomiting.   Genitourinary: Positive for bladder incontinence, hesitancy, incomplete emptying and urgency. Negative for hematuria.   Neurological: Positive for weakness.   Psychiatric/Behavioral: The patient is nervous/anxious.    All other systems reviewed and are negative.    Objective      Vitals:   Temp:  [97.4 °F (36.3 °C)-97.6 °F (36.4 °C)] 97.6 °F (36.4 °C)  Heart Rate:  [102-115] 110  Resp:  [18-22] 18  BP: (138-149)/(80-86) 142/86  Flow (L/min):  [2-3] 2    Physical Exam  Constitutional:       General: He is not in acute distress.     Appearance: Normal appearance. He is obese. He is not ill-appearing.   HENT:      Head: Normocephalic and atraumatic.      Nose: No congestion.      Mouth/Throat:      Pharynx: No  oropharyngeal exudate.   Eyes:      General: No scleral icterus.  Neck:      Comments: Left sided shiley present  Cardiovascular:      Rate and Rhythm: Normal rate and regular rhythm.      Heart sounds: No murmur heard.  No friction rub. No gallop.    Pulmonary:      Breath sounds: Rhonchi present. No wheezing or rales.   Abdominal:      General: There is no distension.      Tenderness: There is no abdominal tenderness. There is no guarding.   Musculoskeletal:         General: No deformity or signs of injury.      Cervical back: Normal range of motion. No rigidity.      Right lower leg: Edema present.      Left lower leg: Edema present.   Skin:     Coloration: Skin is not jaundiced.      Findings: No bruising or lesion.   Neurological:      General: No focal deficit present.      Mental Status: He is alert and oriented to person, place, and time.      Motor: No weakness.             Result Review    Result Review:  I have personally reviewed the results from the time of this admission to 1/8/2022 11:17 EST and agree with these findings:  [x]  Laboratory  []  Microbiology  [x]  Radiology  []  EKG/Telemetry   []  Cardiology/Vascular   []  Pathology  []  Old records  []  Other:          Assessment/Plan      Brief Patient Summary:  Anthony Moss is a 66 y.o. male who presented due to dyspnea and was found to be in acute renal failure complicated by lung involvement.  Abx started, Nephrology consulted.  Shiley placed by ICU.  Pulm consulted and urology consulted for trujillo placement to obtain urine.  HD stared 1/6/2022.  CT a/p showed b/l hydronephrosis with bladder distension, urology placed trujillo.  Cytoscopy unable to be done 2/2 noncompliance.      cefTRIAXone, 1 g, Intravenous, Q24H  chlordiazePOXIDE, 5 mg, Oral, TID  folic acid, 1 mg, Oral, Daily  gabapentin, 100 mg, Oral, TID  heparin (porcine), 5,000 Units, Subcutaneous, Q12H  insulin glargine, 15 Units, Subcutaneous, Daily  insulin lispro, 0-14 Units,  Subcutaneous, TID AC  insulin lispro, 5 Units, Subcutaneous, TID With Meals  ipratropium-albuterol, 3 mL, Nebulization, Q4H - RT  lidocaine, 1 patch, Transdermal, Q24H  methylPREDNISolone sodium succinate, 40 mg, Intravenous, Q8H  sodium chloride, 10 mL, Intravenous, Q12H       sodium bicarbonate drip (greater than 75 mEq/bag), 150 mEq, Last Rate: 150 mEq (01/07/22 1220)         Active Hospital Problems:  Active Hospital Problems    Diagnosis    • **Acute renal failure (ARF) (HCC)    • Obesity    • Essential hypertension    • Mixed hyperlipidemia    • Bilateral pneumonia    • Thrombocytopenia (HCC)    • ETOH abuse    • Tobacco abuse    • Marijuana use      Plan:   #Acute renal failure:  #b/l hydronephrosis  #Bladder distension    -Upon arrival to the ED creatinine 12.52, , GFR 4    -Consult nephrology, patient may require stat dialysis    -Shiley placed by ICU 01/06    - HD performed 01/06    - CT a/p shows bilateral hydronephrosis with bladder distension    - Urology consulted for trujillo (nursing failed) to obtain urine    - will dc steroids as on 2L without wheezing and renal failure does not appear to be autoimmune    - renal biopsy to be held at this time per discussion with nephro as renal failure 2/2 obstruction    - creatinine 12.5 > 9.01 > 8.25    - trujillo placed by urology    - cystoscopy unable to be performed as patient will not tolerated    - will assess how creatinine improves with obstruction relived      #Bilateral pneumonia:    -Chest x-ray reviewed    -Covid negative    -Treated in ED with IV azithromycin, IV Rocephin, IV morphine    -Continue IV Rocephin, discussed renal function with pharmacist    - azithromycin d/c    -Trend lactic acid levels    -Procalcitonin 0.82 > abx warranted    -Blood culture obtained and are pending    -Check sputum culture, check urine antigen:  Strep Pneumo & legionella    -DuoNebs, oxygen, incentive spirometry    - Possible related to ANCA, pulm consulted by  nephro    - pulm consulted by nephrology    #Steroid induced hyperglycemia    - glucose 416 this am    - start lantus 15u SC daily    - expand ISS    - lispro 5u SC AC    - stop IV steroids    - hypoglycemia protocol     #Thrombocytopenia:    -Upon arrival to the ED platelets 113    -Monitor CBC during admission    -Continue to monitor, consult hematology if indicated    - most likely 2/2 renal failure or bone marrow suppression     #ETOH abuse:    -Initiate EtOH withdrawal protocol    -Monitor vital signs every 4hrs    -Seizure precautions    -Ammonia level, ethanol level    -Vitamin replacement, Ativan dosing held due to acute renal failure     #Tobacco abuse marijuana use:    -Tobacco cessation education     #Essential hypertension:    -Encourage lifestyle modifications    -Low-sodium diet    -No home medications noted     #Mixed hyperlipidemia:    -Encourage lifestyle modifications    -Encourage dietary modifications    -No Home statin noted     #Obesity:    -Encourage lifestyle modifications    -BMI 32.61       DVT prophylaxis:  Medical and mechanical DVT prophylaxis orders are present.    CODE STATUS:    Code Status (Patient has no pulse and is not breathing): CPR (Attempt to Resuscitate)  Medical Interventions (Patient has pulse or is breathing): Full Support      Disposition:  I expect patient to be discharged in 5-6 days.    This patient has been examined wearing appropriate Personal Protective Equipment and discussed with Patient and nephrology. 01/08/22      Electronically signed by Ede Mcdaniel DO, 01/08/22, 11:17 EST.  [facility] Hospitalist Team

## 2022-01-08 NOTE — PLAN OF CARE
Continue to monitor and assess pain.  Pt is awake and informed him that he can order according to his diet but will not get any extra food since his sugar is high.    Problem: Adult Inpatient Plan of Care  Goal: Plan of Care Review  Outcome: Ongoing, Progressing  Flowsheets  Taken 1/8/2022 0422 by Joanne Norris LPN  Outcome Summary: Patient did well during the night.  Pt fails to use call light and finds enjoyment screaming out for help.  No complaints at this time.  Will continue to monitor.  Taken 1/7/2022 1340 by Ira Ca LPN  Plan of Care Reviewed With: patient  Taken 1/7/2022 0430 by Po Inman RN  Progress: improving  Goal: Patient-Specific Goal (Individualized)  Outcome: Ongoing, Progressing  Goal: Absence of Hospital-Acquired Illness or Injury  Outcome: Ongoing, Progressing  Intervention: Identify and Manage Fall Risk  Flowsheets (Taken 1/8/2022 0517 by Joanne Norris LPN)  Safety Promotion/Fall Prevention:   safety round/check completed   toileting scheduled   room organization consistent   assistive device/personal items within reach   clutter free environment maintained  Intervention: Prevent Skin Injury  Flowsheets (Taken 1/7/2022 1939 by Joanne Norris LPN)  Body Position: position changed independently  Intervention: Prevent and Manage VTE (venous thromboembolism) Risk  Flowsheets (Taken 1/8/2022 0730)  VTE Prevention/Management: (see MAR) other (see comments)  Intervention: Prevent Infection  Flowsheets (Taken 1/8/2022 0730)  Infection Prevention:   hand hygiene promoted   personal protective equipment utilized   rest/sleep promoted  Goal: Optimal Comfort and Wellbeing  Outcome: Ongoing, Progressing  Intervention: Provide Person-Centered Care  Flowsheets (Taken 1/7/2022 1939 by Joanne Norris LPN)  Trust Relationship/Rapport: care explained  Goal: Readiness for Transition of Care  Outcome: Ongoing, Progressing  Intervention: Mutually Develop Transition Plan  Flowsheets (Taken 1/6/2022  1643 by Naegele, Megan, RN)  Equipment Needed After Discharge: shower chair  Equipment Currently Used at Home:   walker, standard   cane, straight  Anticipated Changes Related to Illness: none  Transportation Anticipated: family or friend will provide  Transportation Concerns: car, none  Concerns to be Addressed:   discharge planning   care coordination/care conferences  Readmission Within the Last 30 Days: no previous admission in last 30 days  Patient/Family Anticipated Services at Transition: none  Patient/Family Anticipates Transition to:   home   home with help/services     Problem: Pain Acute  Goal: Optimal Pain Control  Outcome: Ongoing, Progressing  Intervention: Develop Pain Management Plan  Flowsheets (Taken 1/6/2022 1930 by Po Inman, RN)  Pain Management Interventions: medication offered but refused  Intervention: Prevent or Manage Pain  Flowsheets (Taken 1/7/2022 1939 by Joanne Norris LPN)  Sensory Stimulation Regulation: television on  Intervention: Optimize Psychosocial Wellbeing  Flowsheets (Taken 1/7/2022 1939 by Joanne Norris LPN)  Diversional Activities: television     Problem: Skin Injury Risk Increased  Goal: Skin Health and Integrity  Outcome: Ongoing, Progressing  Intervention: Optimize Skin Protection  Flowsheets (Taken 1/7/2022 1939 by Joanne Norris LPN)  Head of Bed (HOB): HOB elevated  Intervention: Promote and Optimize Oral Intake  Flowsheets (Taken 1/8/2022 6230)  Oral Nutrition Promotion:   rest periods promoted   calorie-dense foods provided   calorie-dense liquids provided   Goal Outcome Evaluation:

## 2022-01-08 NOTE — PROGRESS NOTES
PROGRESS NOTE      Patient Name: Anthony Moss  : 1955  MRN: [id number]  Primary Care Physician: Marva Gaviria APRN  Date of admission: 2022    Patient Care Team:  Marva Gaviria APRN as PCP - General (Nurse Practitioner)        Subjective   Subjective:     Patient is in same condition as yesterday no significant change still complaining of shortness of breath no significant urine output overnight.  Review of systems:  All other review of system unremarkable      Allergies:  No Known Allergies    Objective   Exam:     Vital Signs  Temp:  [97.4 °F (36.3 °C)-97.6 °F (36.4 °C)] 97.6 °F (36.4 °C)  Heart Rate:  [] 106  Resp:  [18-22] 20  BP: (138-149)/(80-86) 142/86  SpO2:  [93 %-100 %] 95 %  on  Flow (L/min):  [2-3] 2;   Device (Oxygen Therapy): nasal cannula  Body mass index is 34.76 kg/m².    General: Middle-aged obese  male in no acute distress.    Head:      Normocephalic and atraumatic.    Eyes:      PERRL/EOM intact, conjunctiva and sclera clear with out nystagmus.    Neck:      No masses, thyromegaly,  trachea central with normal respiratory effort   Lungs:    Crackles bilaterally to auscultation.    Heart:      Regular rate and rhythm, no murmur no gallop  Abd:        Soft, nontender, not distended, bowel sounds positive, no shifting dullness   Pulses:   Pulses palpable  Extr:        No cyanosis or clubbing--+1 edema.    Neuro:    No focal deficits.   alert oriented x3  Skin:       Intact without lesions or rashes.    Psych:    Alert and cooperative; normal mood and affect; .      Results Review:  I have personally reviewed most recent Data :  CBC    Results from last 7 days   Lab Units 22  0638 22  2306 22  1850   WBC 10*3/mm3 10.00 7.50 7.50 9.00   HEMOGLOBIN g/dL 7.7* 8.4* 8.3* 9.1*   PLATELETS 10*3/mm3 107* 90* 92* 113*     CMP   Results from last 7 days   Lab Units 22  2322 22  0638 22  2306 22  2120 22  1859    SODIUM mmol/L 132*  --  137 134* 133*   POTASSIUM mmol/L 5.5*  --  6.2* 6.7* 6.5*   CHLORIDE mmol/L 97*  --  102 103 102   CO2 mmol/L 18.0*  --  15.0* 11.0* 14.0*   BUN mg/dL 120*  --  156* 159* 155*   CREATININE mg/dL 9.01*  --  12.36* 12.35* 12.52*   GLUCOSE mg/dL 238*  --  208* 149* 108*   ALBUMIN g/dL  --  2.9 3.00* 3.10*  --    BILIRUBIN mg/dL  --   --  0.3 0.3  --    ALK PHOS U/L  --   --  151* 159*  --    AST (SGOT) U/L  --   --  37 40  --    ALT (SGPT) U/L  --   --  24 25  --    AMMONIA umol/L  --  17  --   --   --      ABG    Results from last 7 days   Lab Units 01/05/22  1846   PH, ARTERIAL pH units 7.320*   PCO2, ARTERIAL mm Hg 25.5*   PO2 ART mm Hg 75.3*   O2 SATURATION ART % 94.1   BASE EXCESS ART mmol/L -11.7*     CT Chest Without Contrast Diagnostic    Result Date: 1/6/2022   1. Bilateral pneumonia more severe on the left, Covid pattern 2. Severe urinary bladder distention with secondary bilateral hydronephrosis. Bladder dysfunction and outlet obstruction considered, however the prostate gland is normal in size  Electronically Signed By-Phuc Morrison On:1/6/2022 4:26 PM This report was finalized on [id number] by  Phuc Morrison, .    US Renal Bilateral    Result Date: 1/6/2022   1. Marked urinary bladder distention with fluid. The patient was unable to void. Consider Roberts catheter decompression. 2. Mild to moderate bilateral hydronephrosis. No obstructing abnormality is seen.  Electronically Signed By-aDna Shanks MD On:1/6/2022 3:54 PM This report was finalized on [id number] by  Dana Shanks MD.    CT Abdomen Pelvis Stone Protocol    Result Date: 1/6/2022   1. Bilateral pneumonia more severe on the left, Covid pattern 2. Severe urinary bladder distention with secondary bilateral hydronephrosis. Bladder dysfunction and outlet obstruction considered, however the prostate gland is normal in size  Electronically Signed By-Phuc Morrison On:1/6/2022 4:26 PM This report was finalized on  [id number] by  Phuc Morrison .      Results for orders placed during the hospital encounter of 01/05/22    Adult Transthoracic Echo Complete w/ Color, Spectral and Contrast if Necessary Per Protocol    Interpretation Summary  · Left ventricular ejection fraction appears to be 56 - 60%.  · The right ventricular cavity is moderately dilated.  · No pericardial effusion noted    Scheduled Meds:cefTRIAXone, 1 g, Intravenous, Q24H  chlordiazePOXIDE, 5 mg, Oral, TID  folic acid, 1 mg, Oral, Daily  gabapentin, 100 mg, Oral, TID  heparin (porcine), 5,000 Units, Subcutaneous, Q12H  insulin glargine, 5 Units, Subcutaneous, Daily  insulin lispro, 0-9 Units, Subcutaneous, TID AC  ipratropium-albuterol, 3 mL, Nebulization, Q4H - RT  lidocaine, 1 patch, Transdermal, Q24H  methylPREDNISolone sodium succinate, 40 mg, Intravenous, Q8H  sodium chloride, 10 mL, Intravenous, Q12H      Continuous Infusions:sodium bicarbonate drip (greater than 75 mEq/bag), 150 mEq, Last Rate: 150 mEq (01/07/22 1220)      PRN Meds:•  acetaminophen **OR** acetaminophen **OR** acetaminophen  •  aluminum-magnesium hydroxide-simethicone  •  dextrose  •  dextrose  •  flumazenil  •  glucagon (human recombinant)  •  insulin lispro **AND** insulin lispro  •  labetalol  •  Lidocaine HCl gel  •  LORazepam **OR** LORazepam **OR** LORazepam **OR** [DISCONTINUED] LORazepam **OR** [DISCONTINUED] LORazepam **OR** LORazepam  •  melatonin  •  nitroglycerin  •  ondansetron **OR** ondansetron  •  [COMPLETED] Insert peripheral IV **AND** sodium chloride  •  sodium chloride    Assessment/Plan   Assessment and Plan:         Acute renal failure (ARF) (HCC)    Mixed hyperlipidemia    Essential hypertension    Obesity    Bilateral pneumonia    Thrombocytopenia (HCC)    ETOH abuse    Tobacco abuse    Marijuana use    ASSESSMENT:  Acute kidney injury, stage III, oligoanuric, metabolic differential at this this time, including sepsis, with bilateral pneumonia, but also with a  presentation with anemia, thrombocytopenia, have to rule out any TMA.  Patient apparently did have labs with his primary care doctor, we have to open the labs, to start with baseline creatinine never been told about any kidney problems in the past,  Had been taking Aleve, for the back pain,  Hyperkalemia,  Weakness and tiredness, with a BUN of 59, very concerning for uremia,  Metabolic acidosis, gap and nongap, DKA, azotemia, lactic acid normal,  Alcohol use,  Chronic back pain,using NSAID  B/l pneumonia.  Surprisingly, WBC stable,           Plan:      Patient with dramatic presentation with cough and shortness of air, chest x-ray with bilateral pneumonia, but normal WBC, and underlying anemia, thrombocytopenia, alcohol use, presented with acute kidney injury with severe hyperkalemia and metabolic acidosis, had discussed with the ER team earlier, orderd  for the start sodium bicarb, insulin dextrose, calcium gluconate, repeat potassium higher, 6.7, creatinine still more than 12, significant azotemia with concern about uremia, has been complaining of weakness and tiredness, has been anuric.   Patient had obstructive uropathy which is improved after passing urine yesterday need a Roberts catheter patient is unable to get Roberts catheter inserted by the nurses   Urology consult was asked for yesterday still pending   No need for dialysis as long as patient is making urine he should get better  Continue to treat patient pneumonia aggressively  Renal ultrasound and CAT scan appreciated obstructive uropathy bilateral hydro   Unlikely to be any secondary cause of renal failure serology so far unremarkable but I think it will be negative as urine has no significant proteinuria or active sediment   Pulmonary consult appreciated        Electronically signed by Jamie Houston MD,   [facility] kidney consultant  [phone]
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
## 2022-01-08 NOTE — PLAN OF CARE
Goal Outcome Evaluation:              Outcome Summary: Patient did well during the night.  Pt fails to use call light and finds enjoyment screaming out for help.  No complaints at this time.  Will continue to monitor.

## 2022-01-09 LAB
ANION GAP SERPL CALCULATED.3IONS-SCNC: 16 MMOL/L (ref 5–15)
BUN SERPL-MCNC: 133 MG/DL (ref 8–23)
BUN/CREAT SERPL: 23.1 (ref 7–25)
CALCIUM SPEC-SCNC: 8.6 MG/DL (ref 8.6–10.5)
CHLORIDE SERPL-SCNC: 98 MMOL/L (ref 98–107)
CO2 SERPL-SCNC: 27 MMOL/L (ref 22–29)
CREAT SERPL-MCNC: 5.77 MG/DL (ref 0.76–1.27)
DEPRECATED RDW RBC AUTO: 49 FL (ref 37–54)
ERYTHROCYTE [DISTWIDTH] IN BLOOD BY AUTOMATED COUNT: 14.3 % (ref 12.3–15.4)
GFR SERPL CREATININE-BSD FRML MDRD: 10 ML/MIN/1.73
GFR SERPL CREATININE-BSD FRML MDRD: ABNORMAL ML/MIN/{1.73_M2}
GLUCOSE BLDC GLUCOMTR-MCNC: 180 MG/DL (ref 70–105)
GLUCOSE BLDC GLUCOMTR-MCNC: 226 MG/DL (ref 70–105)
GLUCOSE BLDC GLUCOMTR-MCNC: 230 MG/DL (ref 70–105)
GLUCOSE BLDC GLUCOMTR-MCNC: 232 MG/DL (ref 70–105)
GLUCOSE SERPL-MCNC: 207 MG/DL (ref 65–99)
HCT VFR BLD AUTO: 19.6 % (ref 37.5–51)
HCT VFR BLD AUTO: 19.6 % (ref 37.5–51)
HCT VFR BLD AUTO: 21.2 % (ref 37.5–51)
HGB BLD-MCNC: 6.8 G/DL (ref 13–17.7)
HGB BLD-MCNC: 6.8 G/DL (ref 13–17.7)
HGB BLD-MCNC: 7.5 G/DL (ref 13–17.7)
MCH RBC QN AUTO: 33 PG (ref 26.6–33)
MCHC RBC AUTO-ENTMCNC: 34.8 G/DL (ref 31.5–35.7)
MCV RBC AUTO: 94.9 FL (ref 79–97)
PLATELET # BLD AUTO: 141 10*3/MM3 (ref 140–450)
PMV BLD AUTO: 8.3 FL (ref 6–12)
POTASSIUM SERPL-SCNC: 4 MMOL/L (ref 3.5–5.2)
QT INTERVAL: 319 MS
RBC # BLD AUTO: 2.06 10*6/MM3 (ref 4.14–5.8)
SODIUM SERPL-SCNC: 141 MMOL/L (ref 136–145)
WBC NRBC COR # BLD: 12 10*3/MM3 (ref 3.4–10.8)

## 2022-01-09 PROCEDURE — P9040 RBC LEUKOREDUCED IRRADIATED: HCPCS

## 2022-01-09 PROCEDURE — 63710000001 INSULIN GLARGINE PER 5 UNITS: Performed by: STUDENT IN AN ORGANIZED HEALTH CARE EDUCATION/TRAINING PROGRAM

## 2022-01-09 PROCEDURE — 94799 UNLISTED PULMONARY SVC/PX: CPT

## 2022-01-09 PROCEDURE — 99233 SBSQ HOSP IP/OBS HIGH 50: CPT | Performed by: STUDENT IN AN ORGANIZED HEALTH CARE EDUCATION/TRAINING PROGRAM

## 2022-01-09 PROCEDURE — 80048 BASIC METABOLIC PNL TOTAL CA: CPT | Performed by: STUDENT IN AN ORGANIZED HEALTH CARE EDUCATION/TRAINING PROGRAM

## 2022-01-09 PROCEDURE — 63710000001 INSULIN LISPRO (HUMAN) PER 5 UNITS: Performed by: STUDENT IN AN ORGANIZED HEALTH CARE EDUCATION/TRAINING PROGRAM

## 2022-01-09 PROCEDURE — 63710000001 DIPHENHYDRAMINE PER 50 MG: Performed by: STUDENT IN AN ORGANIZED HEALTH CARE EDUCATION/TRAINING PROGRAM

## 2022-01-09 PROCEDURE — 85027 COMPLETE CBC AUTOMATED: CPT | Performed by: STUDENT IN AN ORGANIZED HEALTH CARE EDUCATION/TRAINING PROGRAM

## 2022-01-09 PROCEDURE — 85018 HEMOGLOBIN: CPT | Performed by: STUDENT IN AN ORGANIZED HEALTH CARE EDUCATION/TRAINING PROGRAM

## 2022-01-09 PROCEDURE — 25010000002 CEFTRIAXONE PER 250 MG: Performed by: STUDENT IN AN ORGANIZED HEALTH CARE EDUCATION/TRAINING PROGRAM

## 2022-01-09 PROCEDURE — 86900 BLOOD TYPING SEROLOGIC ABO: CPT

## 2022-01-09 PROCEDURE — P9016 RBC LEUKOCYTES REDUCED: HCPCS

## 2022-01-09 PROCEDURE — 36430 TRANSFUSION BLD/BLD COMPNT: CPT

## 2022-01-09 PROCEDURE — 25010000002 HEPARIN (PORCINE) PER 1000 UNITS: Performed by: NURSE PRACTITIONER

## 2022-01-09 PROCEDURE — 82962 GLUCOSE BLOOD TEST: CPT

## 2022-01-09 PROCEDURE — 85014 HEMATOCRIT: CPT | Performed by: STUDENT IN AN ORGANIZED HEALTH CARE EDUCATION/TRAINING PROGRAM

## 2022-01-09 RX ORDER — PANTOPRAZOLE SODIUM 40 MG/10ML
40 INJECTION, POWDER, LYOPHILIZED, FOR SOLUTION INTRAVENOUS
Status: DISCONTINUED | OUTPATIENT
Start: 2022-01-09 | End: 2022-01-10

## 2022-01-09 RX ORDER — GUAIFENESIN 600 MG/1
600 TABLET, EXTENDED RELEASE ORAL EVERY 12 HOURS PRN
Status: DISCONTINUED | OUTPATIENT
Start: 2022-01-09 | End: 2022-01-12 | Stop reason: HOSPADM

## 2022-01-09 RX ORDER — DIPHENHYDRAMINE HCL 25 MG
25 CAPSULE ORAL ONCE
Status: COMPLETED | OUTPATIENT
Start: 2022-01-09 | End: 2022-01-09

## 2022-01-09 RX ADMIN — INSULIN LISPRO 8 UNITS: 100 INJECTION, SOLUTION INTRAVENOUS; SUBCUTANEOUS at 12:07

## 2022-01-09 RX ADMIN — IPRATROPIUM BROMIDE AND ALBUTEROL SULFATE 3 ML: 2.5; .5 SOLUTION RESPIRATORY (INHALATION) at 07:05

## 2022-01-09 RX ADMIN — CHLORDIAZEPOXIDE HYDROCHLORIDE 5 MG: 5 CAPSULE ORAL at 09:04

## 2022-01-09 RX ADMIN — SODIUM CHLORIDE, PRESERVATIVE FREE 10 ML: 5 INJECTION INTRAVENOUS at 09:05

## 2022-01-09 RX ADMIN — INSULIN LISPRO 8 UNITS: 100 INJECTION, SOLUTION INTRAVENOUS; SUBCUTANEOUS at 18:33

## 2022-01-09 RX ADMIN — GABAPENTIN 100 MG: 100 CAPSULE ORAL at 09:04

## 2022-01-09 RX ADMIN — SODIUM CHLORIDE, PRESERVATIVE FREE 10 ML: 5 INJECTION INTRAVENOUS at 21:15

## 2022-01-09 RX ADMIN — CEFTRIAXONE 1 G: 1 INJECTION, POWDER, FOR SOLUTION INTRAMUSCULAR; INTRAVENOUS at 21:15

## 2022-01-09 RX ADMIN — IPRATROPIUM BROMIDE AND ALBUTEROL SULFATE 3 ML: 2.5; .5 SOLUTION RESPIRATORY (INHALATION) at 23:53

## 2022-01-09 RX ADMIN — DIPHENHYDRAMINE HYDROCHLORIDE 25 MG: 25 CAPSULE ORAL at 10:13

## 2022-01-09 RX ADMIN — IPRATROPIUM BROMIDE AND ALBUTEROL SULFATE 3 ML: 2.5; .5 SOLUTION RESPIRATORY (INHALATION) at 10:46

## 2022-01-09 RX ADMIN — GABAPENTIN 100 MG: 100 CAPSULE ORAL at 21:15

## 2022-01-09 RX ADMIN — IPRATROPIUM BROMIDE AND ALBUTEROL SULFATE 3 ML: 2.5; .5 SOLUTION RESPIRATORY (INHALATION) at 03:31

## 2022-01-09 RX ADMIN — CHLORDIAZEPOXIDE HYDROCHLORIDE 5 MG: 5 CAPSULE ORAL at 21:15

## 2022-01-09 RX ADMIN — PANTOPRAZOLE SODIUM 40 MG: 40 INJECTION, POWDER, FOR SOLUTION INTRAVENOUS at 18:33

## 2022-01-09 RX ADMIN — GUAIFENESIN 600 MG: 600 TABLET ORAL at 22:13

## 2022-01-09 RX ADMIN — SODIUM BICARBONATE 150 MEQ: 84 INJECTION, SOLUTION INTRAVENOUS at 18:33

## 2022-01-09 RX ADMIN — PANTOPRAZOLE SODIUM 40 MG: 40 INJECTION, POWDER, FOR SOLUTION INTRAVENOUS at 09:56

## 2022-01-09 RX ADMIN — GABAPENTIN 100 MG: 100 CAPSULE ORAL at 18:34

## 2022-01-09 RX ADMIN — IPRATROPIUM BROMIDE AND ALBUTEROL SULFATE 3 ML: 2.5; .5 SOLUTION RESPIRATORY (INHALATION) at 19:44

## 2022-01-09 RX ADMIN — IPRATROPIUM BROMIDE AND ALBUTEROL SULFATE 3 ML: 2.5; .5 SOLUTION RESPIRATORY (INHALATION) at 14:35

## 2022-01-09 RX ADMIN — CHLORDIAZEPOXIDE HYDROCHLORIDE 5 MG: 5 CAPSULE ORAL at 18:34

## 2022-01-09 RX ADMIN — LIDOCAINE 1 PATCH: 50 PATCH TOPICAL at 09:04

## 2022-01-09 RX ADMIN — HEPARIN SODIUM 5000 UNITS: 5000 INJECTION INTRAVENOUS; SUBCUTANEOUS at 09:03

## 2022-01-09 RX ADMIN — ACETAMINOPHEN 650 MG: 325 TABLET, FILM COATED ORAL at 10:13

## 2022-01-09 RX ADMIN — FOLIC ACID 1 MG: 1 TABLET ORAL at 09:04

## 2022-01-09 RX ADMIN — INSULIN GLARGINE 15 UNITS: 100 INJECTION, SOLUTION SUBCUTANEOUS at 09:02

## 2022-01-09 RX ADMIN — HEPARIN SODIUM 5000 UNITS: 5000 INJECTION INTRAVENOUS; SUBCUTANEOUS at 21:15

## 2022-01-09 RX ADMIN — INSULIN LISPRO 4 UNITS: 100 INJECTION, SOLUTION INTRAVENOUS; SUBCUTANEOUS at 09:03

## 2022-01-09 NOTE — PLAN OF CARE
Goal Outcome Evaluation:              Outcome Summary: Pt resting with no complaints at this time, will continue to observe.

## 2022-01-09 NOTE — PLAN OF CARE
Continue to monitor and assess for pain.  Pt is resting in his bed.   Problem: Adult Inpatient Plan of Care  Goal: Plan of Care Review  Outcome: Ongoing, Progressing  Flowsheets  Taken 1/9/2022 0319 by Linda Barrett RN  Outcome Summary: Pt resting with no complaints at this time, will continue to observe.  Taken 1/8/2022 1521 by Yajaira Reed OT  Progress: no change  Plan of Care Reviewed With: patient  Goal: Patient-Specific Goal (Individualized)  Outcome: Ongoing, Progressing  Goal: Absence of Hospital-Acquired Illness or Injury  Outcome: Ongoing, Progressing  Intervention: Identify and Manage Fall Risk  Flowsheets (Taken 1/9/2022 0609 by Padmini Quinteros PCT)  Safety Promotion/Fall Prevention:   safety round/check completed   room organization consistent   nonskid shoes/slippers when out of bed   lighting adjusted   fall prevention program maintained   assistive device/personal items within reach   clutter free environment maintained  Intervention: Prevent Skin Injury  Flowsheets  Taken 1/9/2022 0609 by Aldair, Padmini, PCT  Body Position: position changed independently  Taken 1/8/2022 2226 by Brigitte Whitney, JOSE  Skin Protection:   skin-to-skin areas padded   protective footwear used  Intervention: Prevent and Manage VTE (venous thromboembolism) Risk  Flowsheets (Taken 1/8/2022 2226 by Brigitte Whitney, JOSE)  VTE Prevention/Management:   bilateral   sequential compression devices off  Intervention: Prevent Infection  Flowsheets (Taken 1/9/2022 0609 by Padmini Quinteros PCT)  Infection Prevention:   single patient room provided   rest/sleep promoted   personal protective equipment utilized   hand hygiene promoted   equipment surfaces disinfected   environmental surveillance performed  Goal: Optimal Comfort and Wellbeing  Outcome: Ongoing, Progressing  Intervention: Provide Person-Centered Care  Flowsheets (Taken 1/8/2022 2226 by Brigitte Whitney, JOSE)  Trust Relationship/Rapport:   care explained    choices provided   thoughts/feelings acknowledged  Goal: Readiness for Transition of Care  Outcome: Ongoing, Progressing  Intervention: Mutually Develop Transition Plan  Flowsheets (Taken 1/6/2022 1643 by Naegele, Megan, RN)  Equipment Needed After Discharge: shower chair  Equipment Currently Used at Home:   walker, standard   cane, straight  Anticipated Changes Related to Illness: none  Transportation Anticipated: family or friend will provide  Transportation Concerns: car, none  Concerns to be Addressed:   discharge planning   care coordination/care conferences  Readmission Within the Last 30 Days: no previous admission in last 30 days  Patient/Family Anticipated Services at Transition: none  Patient/Family Anticipates Transition to:   home   home with help/services     Problem: Pain Acute  Goal: Optimal Pain Control  Outcome: Ongoing, Progressing  Intervention: Develop Pain Management Plan  Flowsheets (Taken 1/6/2022 1930 by Po Inman, RN)  Pain Management Interventions: medication offered but refused  Intervention: Prevent or Manage Pain  Flowsheets (Taken 1/7/2022 1939 by Joanne Norris LPN)  Sensory Stimulation Regulation: television on  Intervention: Optimize Psychosocial Wellbeing  Flowsheets (Taken 1/8/2022 2226 by Brigitte Whitney, RN)  Supportive Measures:   active listening utilized   self-care encouraged  Diversional Activities: television     Problem: Skin Injury Risk Increased  Goal: Skin Health and Integrity  Outcome: Ongoing, Progressing  Intervention: Optimize Skin Protection  Flowsheets  Taken 1/9/2022 0609 by Padmini Quinteros PCT  Head of Bed (HOB): HOB elevated  Taken 1/8/2022 2226 by Brigitte Whitney, RN  Pressure Reduction Techniques:   frequent weight shift encouraged   weight shift assistance provided   heels elevated off bed  Pressure Reduction Devices:   pressure-redistributing mattress utilized   feet on footrest/footstool  Skin Protection:   skin-to-skin areas padded   protective  footwear used  Intervention: Promote and Optimize Oral Intake  Flowsheets (Taken 1/8/2022 2226 by Brigitte Whitney, RN)  Oral Nutrition Promotion:   calorie-dense foods provided   calorie-dense liquids provided   Goal Outcome Evaluation:

## 2022-01-09 NOTE — PROGRESS NOTES
[facility] Medicine Services Daily Progress Note    Patient Name: Anthony Moss  : 1955  MRN: [id number]  Primary Care Physician:  Marva Gaviria APRN  Date of admission: 2022      Subjective      Chief Complaint: Dyspnea      2022  Shiley placed overnight, HD scheduled for today.  Patient complains of pain from failed cath attempt.    2022  Patient underwent HD yesterday.  Roberts cath placed by urology as CT a/p showed b/l hydronephrosis with bladder distension    22  Creatinine slowly improving.  Glucose uncontrolled due to steroid induced hyperglycemia, will dc steroids as doubtful disease is auto-immune.  Incease lantus and add keli lispro.    22  Steroid induced hyperglycemia resolving, will de-escalate insulin.  Creatinine continues to improve.  Hgb at 6.8 this morning, no overt bleeding, will transfuse 1u prbc and have GI assess due to history of alcoholism.    ROS   Constitutional: Positive for chills, malaise/fatigue and weight gain.   HENT: Positive for congestion.    Eyes: Negative.  Negative for visual disturbance.   Cardiovascular: Positive for dyspnea on exertion, leg swelling and orthopnea.   Respiratory: Positive for cough and shortness of breath.    Endocrine: Negative.    Hematologic/Lymphatic: Negative.    Skin: Negative.    Musculoskeletal: Positive for myalgias.   Gastrointestinal: Negative for bloating, abdominal pain, melena, nausea and vomiting.   Genitourinary: Positive for bladder incontinence, hesitancy, incomplete emptying and urgency. Negative for hematuria.   Neurological: Positive for weakness.   Psychiatric/Behavioral: The patient is nervous/anxious.    All other systems reviewed and are negative.    Objective      Vitals:   Temp:  [97.3 °F (36.3 °C)-97.7 °F (36.5 °C)] 97.6 °F (36.4 °C)  Heart Rate:  [103-118] 108  Resp:  [18-22] 20  BP: (112-138)/(67-87) 114/72  Flow (L/min):  [2] 2    Physical Exam  Constitutional:        General: He is not in acute distress.     Appearance: Normal appearance. He is obese. He is not ill-appearing.   HENT:      Head: Normocephalic and atraumatic.      Nose: No congestion.      Mouth/Throat:      Pharynx: No oropharyngeal exudate.   Eyes:      General: No scleral icterus.  Neck:      Comments: Left sided shiley present  Cardiovascular:      Rate and Rhythm: Normal rate and regular rhythm.      Heart sounds: No murmur heard.  No friction rub. No gallop.    Pulmonary:      Breath sounds: Rhonchi present. No wheezing or rales.   Abdominal:      General: There is no distension.      Tenderness: There is no abdominal tenderness. There is no guarding.   Musculoskeletal:         General: No deformity or signs of injury.      Cervical back: Normal range of motion. No rigidity.      Right lower leg: Edema present.      Left lower leg: Edema present.   Skin:     Coloration: Skin is not jaundiced.      Findings: No bruising or lesion.   Neurological:      General: No focal deficit present.      Mental Status: He is alert and oriented to person, place, and time.      Motor: No weakness.             Result Review    Result Review:  I have personally reviewed the results from the time of this admission to 1/9/2022 11:03 EST and agree with these findings:  [x]  Laboratory  []  Microbiology  [x]  Radiology  []  EKG/Telemetry   []  Cardiology/Vascular   []  Pathology  []  Old records  []  Other:          Assessment/Plan      Brief Patient Summary:  Anthony Moss is a 66 y.o. male who presented due to dyspnea and was found to be in acute renal failure complicated by lung involvement.  Abx started, Nephrology consulted.  Shiley placed by ICU.  Pulm consulted and urology consulted for trujillo placement to obtain urine.  HD stared 1/6/2022.  CT a/p showed b/l hydronephrosis with bladder distension, urology placed trujillo.  Cytoscopy unable to be done 2/2 noncompliance.      cefTRIAXone, 1 g, Intravenous,  Q24H  chlordiazePOXIDE, 5 mg, Oral, TID  folic acid, 1 mg, Oral, Daily  gabapentin, 100 mg, Oral, TID  heparin (porcine), 5,000 Units, Subcutaneous, Q12H  insulin glargine, 15 Units, Subcutaneous, Daily  insulin lispro, 0-24 Units, Subcutaneous, TID AC  ipratropium-albuterol, 3 mL, Nebulization, Q4H - RT  lidocaine, 1 patch, Transdermal, Q24H  pantoprazole, 40 mg, Intravenous, BID AC  sodium chloride, 10 mL, Intravenous, Q12H       sodium bicarbonate drip (greater than 75 mEq/bag), 150 mEq, Last Rate: 50 mL/hr at 01/08/22 8075         Active Hospital Problems:  Active Hospital Problems    Diagnosis    • **Acute renal failure (ARF) (HCC)    • Obesity    • Essential hypertension    • Mixed hyperlipidemia    • Bilateral pneumonia    • Thrombocytopenia (HCC)    • ETOH abuse    • Tobacco abuse    • Marijuana use      Plan:   #Acute renal failure:  #b/l hydronephrosis  #Bladder distension    -Upon arrival to the ED creatinine 12.52, , GFR 4    -Consult nephrology, patient may require stat dialysis    -Shiley placed by ICU 01/06    - HD performed 01/06    - CT a/p shows bilateral hydronephrosis with bladder distension    - Urology consulted for trujillo (nursing failed) to obtain urine    - will dc steroids as on 2L without wheezing and renal failure does not appear to be autoimmune    - renal biopsy to be held     - creatinine 12.5 > 9.01 > 8.25 > 5.77    - trujillo placed by urology    - cystoscopy unable to be performed as patient will not tolerated    - will assess how creatinine improves with obstruction relieved      #Bilateral pneumonia:    -Chest x-ray reviewed    -Covid negative    -Treated in ED with IV azithromycin, IV Rocephin    -Continue IV Rocephin, discussed renal function with pharmacist    - azithromycin d/c    -Trend lactic acid levels    -Procalcitonin 0.82 > abx warranted    -Blood culture obtained and are pending    -Check sputum culture, check urine antigen:  Strep Pneumo & legionella    -Magy,  oxygen, incentive spirometry    - pulm consulted by nephrology    - currently on 2LNC, afebrile    #Steroid induced hyperglycemia - resolving    - glucose peaked at 600 on 1/8/22 due to steroids    - continue lantus 15u SC daily    - expand ISS    - lispro 5u SC AC has been held as hyperglycemia resolving    - stop IV steroids    - hypoglycemia protocol    #Anemia    - hgb 8.1 > 6.8    - suspect combination of renal disease and iron deficiency    - However, due to elevated BUN with history of alcoholism and elevated BUN will have GI assess    - protonix 40mg IV BID    - transfuse 1u prbc on 1/9/22     #Thrombocytopenia:    -Upon arrival to the ED platelets 113 >141    -Monitor CBC during admission    - most likely 2/2 renal failure or bone marrow suppression     #ETOH abuse:    -Initiate EtOH withdrawal protocol    -Monitor vital signs every 4hrs    -Seizure precautions    -Ammonia level, ethanol level    -Vitamin replacement, Ativan dosing held due to acute renal failure     #Tobacco abuse marijuana use:    -Tobacco cessation education     #Essential hypertension:    -Encourage lifestyle modifications    -Low-sodium diet    -No home medications noted     #Mixed hyperlipidemia:    -Encourage lifestyle modifications    -Encourage dietary modifications    -No Home statin noted     #Obesity:    -Encourage lifestyle modifications    -BMI 32.61       DVT prophylaxis:  Medical and mechanical DVT prophylaxis orders are present.    CODE STATUS:    Code Status (Patient has no pulse and is not breathing): CPR (Attempt to Resuscitate)  Medical Interventions (Patient has pulse or is breathing): Full Support      Disposition:  I expect patient to be discharged in 3-4 days.    This patient has been examined wearing appropriate Personal Protective Equipment and discussed with Patient and nephrology. 01/09/22      Electronically signed by Ede Mcdaniel DO, 01/09/22, 11:03 EST.  [facility]ist Team

## 2022-01-09 NOTE — PROGRESS NOTES
PROGRESS NOTE      Patient Name: Anthony Moss  : 1955  MRN: [id number]  Primary Care Physician: Marva Gaviria APRN  Date of admission: 2022    Patient Care Team:  Marva Gaviria APRN as PCP - General (Nurse Practitioner)        Subjective   Subjective:     Patient is in same condition as yesterday no significant change still complaining of shortness of breath no significant urine output overnight.  Review of systems:  All other review of system unremarkable      Allergies:  No Known Allergies    Objective   Exam:     Vital Signs  Temp:  [97.3 °F (36.3 °C)-97.7 °F (36.5 °C)] 97.7 °F (36.5 °C)  Heart Rate:  [103-118] 106  Resp:  [18-22] 20  BP: (112-138)/(67-87) 130/87  SpO2:  [90 %-99 %] 95 %  on  Flow (L/min):  [2] 2;   Device (Oxygen Therapy): nasal cannula  Body mass index is 32.98 kg/m².    General: Middle-aged obese  male in no acute distress.    Head:      Normocephalic and atraumatic.    Eyes:      PERRL/EOM intact, conjunctiva and sclera clear with out nystagmus.    Neck:      No masses, thyromegaly,  trachea central with normal respiratory effort   Lungs:    Crackles bilaterally to auscultation.    Heart:      Regular rate and rhythm, no murmur no gallop  Abd:        Soft, nontender, not distended, bowel sounds positive, no shifting dullness   Pulses:   Pulses palpable  Extr:        No cyanosis or clubbing--+1 edema.    Neuro:    No focal deficits.   alert oriented x3  Skin:       Intact without lesions or rashes.    Psych:    Alert and cooperative; normal mood and affect; .      Results Review:  I have personally reviewed most recent Data :  CBC    Results from last 7 days   Lab Units 22  0911 22  0401 22  0624 22  2322 22  0638 22  2306 22  1850   WBC 10*3/mm3  --  12.00* 10.80 10.00 7.50 7.50 9.00   HEMOGLOBIN g/dL 6.8* 6.8* 8.1* 7.7* 8.4* 8.3* 9.1*   PLATELETS 10*3/mm3  --  141 129* 107* 90* 92* 113*     CMP   Results from last 7 days    Lab Units 01/09/22  0401 01/08/22  0624 01/06/22  2322 01/06/22  0638 01/05/22  2306 01/05/22  2120 01/05/22  1850   SODIUM mmol/L 141 137 132*  --  137 134* 133*   POTASSIUM mmol/L 4.0 4.6 5.5*  --  6.2* 6.7* 6.5*   CHLORIDE mmol/L 98 96* 97*  --  102 103 102   CO2 mmol/L 27.0 21.0* 18.0*  --  15.0* 11.0* 14.0*   BUN mg/dL 133* 124* 120*  --  156* 159* 155*   CREATININE mg/dL 5.77* 8.25* 9.01*  --  12.36* 12.35* 12.52*   GLUCOSE mg/dL 207* 416* 238*  --  208* 149* 108*   ALBUMIN g/dL  --   --   --  2.9 3.00* 3.10*  --    BILIRUBIN mg/dL  --   --   --   --  0.3 0.3  --    ALK PHOS U/L  --   --   --   --  151* 159*  --    AST (SGOT) U/L  --   --   --   --  37 40  --    ALT (SGPT) U/L  --   --   --   --  24 25  --    AMMONIA umol/L  --   --   --  17  --   --   --      ABG    Results from last 7 days   Lab Units 01/05/22  1846   PH, ARTERIAL pH units 7.320*   PCO2, ARTERIAL mm Hg 25.5*   PO2 ART mm Hg 75.3*   O2 SATURATION ART % 94.1   BASE EXCESS ART mmol/L -11.7*     No radiology results for the last day    Results for orders placed during the hospital encounter of 01/05/22    Adult Transthoracic Echo Complete w/ Color, Spectral and Contrast if Necessary Per Protocol    Interpretation Summary  · Left ventricular ejection fraction appears to be 56 - 60%.  · The right ventricular cavity is moderately dilated.  · No pericardial effusion noted    Scheduled Meds:cefTRIAXone, 1 g, Intravenous, Q24H  chlordiazePOXIDE, 5 mg, Oral, TID  folic acid, 1 mg, Oral, Daily  gabapentin, 100 mg, Oral, TID  heparin (porcine), 5,000 Units, Subcutaneous, Q12H  insulin glargine, 15 Units, Subcutaneous, Daily  insulin lispro, 0-24 Units, Subcutaneous, TID AC  ipratropium-albuterol, 3 mL, Nebulization, Q4H - RT  lidocaine, 1 patch, Transdermal, Q24H  pantoprazole, 40 mg, Intravenous, BID AC  sodium chloride, 10 mL, Intravenous, Q12H      Continuous Infusions:sodium bicarbonate drip (greater than 75 mEq/bag), 150 mEq, Last Rate: 50 mL/hr  at 01/08/22 4867      PRN Meds:•  acetaminophen **OR** acetaminophen **OR** acetaminophen  •  aluminum-magnesium hydroxide-simethicone  •  dextrose  •  dextrose  •  flumazenil  •  glucagon (human recombinant)  •  insulin lispro **AND** insulin lispro  •  labetalol  •  Lidocaine HCl gel  •  LORazepam **OR** LORazepam **OR** LORazepam **OR** [DISCONTINUED] LORazepam **OR** [DISCONTINUED] LORazepam **OR** LORazepam  •  melatonin  •  nitroglycerin  •  ondansetron **OR** ondansetron  •  [COMPLETED] Insert peripheral IV **AND** sodium chloride  •  sodium chloride    Assessment/Plan   Assessment and Plan:         Acute renal failure (ARF) (HCC)    Mixed hyperlipidemia    Essential hypertension    Obesity    Bilateral pneumonia    Thrombocytopenia (HCC)    ETOH abuse    Tobacco abuse    Marijuana use    ASSESSMENT:  Acute kidney injury, stage III, oligoanuric, metabolic differential at this this time, including sepsis, with bilateral pneumonia, but also with a presentation with anemia, thrombocytopenia, have to rule out any TMA.  Patient apparently did have labs with his primary care doctor, we have to open the labs, to start with baseline creatinine never been told about any kidney problems in the past,  Had been taking Aleve, for the back pain,  Hyperkalemia,  Weakness and tiredness, with a BUN of 59, very concerning for uremia,  Metabolic acidosis, gap and nongap, DKA, azotemia, lactic acid normal,  Alcohol use,  Chronic back pain,using NSAID  B/l pneumonia.  Surprisingly, WBC stable,           Plan:      Hold off dialysis and reevaluate in the morning.  Patient had obstructive uropathy which is improved after   Roberts catheter  No need for dialysis as long as patient is making urine he should get better  Continue to treat patient pneumonia aggressively  Renal ultrasound and CAT scan appreciated obstructive uropathy bilateral hydro   Unlikely to be any secondary cause of renal failure serology so far unremarkable but I  think it will be negative as urine has no significant proteinuria or active sediment   Pulmonary consult appreciated        Electronically signed by Jamie Houston MD,   [facility] kidney consultant  [phone]

## 2022-01-09 NOTE — PROGRESS NOTES
Daily Progress Note        Acute renal failure (ARF) (HCC)    Mixed hyperlipidemia    Essential hypertension    Obesity    Bilateral pneumonia    Thrombocytopenia (HCC)    ETOH abuse    Tobacco abuse    Marijuana use      Assessment  Acute renal failure (ARF) (HCC)  Bilateral pneumonia: Respiratory panel negative  Hypoxemia  CARLOS ALBERTO  Anemia  Thrombocytopenia  Alcohol abuse  Tobacco abuse  Essential hypertension  Obesity       Plan:  Oxygen supplement and titration  Antibiotics Rocephin and azithromycin  Solu-Medrol 40 mg every 8 hours  Bronchodilators  Nephrology consultation following for renal function  Glucose control  DVT/GI prophylaxis  Check daily labs and correct electrolytes as needed              LOS: 3 days     Subjective         Objective     Vital signs for last 24 hours:  Vitals:    01/08/22 1519 01/08/22 1955 01/08/22 2002 01/08/22 2032   BP:    138/87   BP Location:    Left arm   Patient Position:    Lying   Pulse: 110 108 103 107   Resp: 18 18 18 18   Temp:    97.7 °F (36.5 °C)   TempSrc:    Oral   SpO2: 94% 90% 99% 97%   Weight:    121 kg (266 lb 1.5 oz)   Height:           Intake/Output last 3 shifts:  I/O last 3 completed shifts:  In: 5800 [P.O.:5800]  Out: 9750 [Urine:9750]  Intake/Output this shift:  I/O this shift:  In: 480 [P.O.:480]  Out: 1800 [Urine:1800]      Radiology  Imaging Results (Last 24 Hours)     ** No results found for the last 24 hours. **          Labs:  Results from last 7 days   Lab Units 01/08/22  0624   WBC 10*3/mm3 10.80   HEMOGLOBIN g/dL 8.1*   HEMATOCRIT % 23.1*   PLATELETS 10*3/mm3 129*     Results from last 7 days   Lab Units 01/08/22  0624 01/06/22  2322 01/05/22  2306   SODIUM mmol/L 137   < > 137   POTASSIUM mmol/L 4.6   < > 6.2*   CHLORIDE mmol/L 96*   < > 102   CO2 mmol/L 21.0*   < > 15.0*   BUN mg/dL 124*   < > 156*   CREATININE mg/dL 8.25*   < > 12.36*   CALCIUM mg/dL 8.6   < > 9.1   BILIRUBIN mg/dL  --   --  0.3   ALK PHOS U/L  --   --  151*   ALT (SGPT) U/L  --    --  24   AST (SGOT) U/L  --   --  37   GLUCOSE mg/dL 416*   < > 208*    < > = values in this interval not displayed.     Results from last 7 days   Lab Units 01/05/22  1846   PH, ARTERIAL pH units 7.320*   PO2 ART mm Hg 75.3*   PCO2, ARTERIAL mm Hg 25.5*   HCO3 ART mmol/L 13.1*     Results from last 7 days   Lab Units 01/06/22  0638 01/05/22  2306 01/05/22  2120   ALBUMIN g/dL 2.9 3.00* 3.10*         Results from last 7 days   Lab Units 01/06/22  0638   MARI  Negative     Results from last 7 days   Lab Units 01/05/22  2120   MAGNESIUM mg/dL 1.8     Results from last 7 days   Lab Units 01/05/22  2306   INR  1.11*   APTT seconds 30.7     Results from last 7 days   Lab Units 01/05/22  1850   TSH uIU/mL 2.840           Meds:   SCHEDULE  cefTRIAXone, 1 g, Intravenous, Q24H  chlordiazePOXIDE, 5 mg, Oral, TID  folic acid, 1 mg, Oral, Daily  gabapentin, 100 mg, Oral, TID  heparin (porcine), 5,000 Units, Subcutaneous, Q12H  insulin glargine, 15 Units, Subcutaneous, Daily  insulin lispro, 0-24 Units, Subcutaneous, TID AC  insulin lispro, 5 Units, Subcutaneous, TID With Meals  ipratropium-albuterol, 3 mL, Nebulization, Q4H - RT  lidocaine, 1 patch, Transdermal, Q24H  sodium chloride, 10 mL, Intravenous, Q12H      Infusions  sodium bicarbonate drip (greater than 75 mEq/bag), 150 mEq, Last Rate: 150 mEq (01/08/22 1635)      PRNs  •  acetaminophen **OR** acetaminophen **OR** acetaminophen  •  aluminum-magnesium hydroxide-simethicone  •  dextrose  •  dextrose  •  flumazenil  •  glucagon (human recombinant)  •  insulin lispro **AND** insulin lispro  •  labetalol  •  Lidocaine HCl gel  •  LORazepam **OR** LORazepam **OR** LORazepam **OR** [DISCONTINUED] LORazepam **OR** [DISCONTINUED] LORazepam **OR** LORazepam  •  melatonin  •  nitroglycerin  •  ondansetron **OR** ondansetron  •  [COMPLETED] Insert peripheral IV **AND** sodium chloride  •  sodium chloride    Physical Exam:  Physical Exam  Cardiovascular:      Heart sounds: Murmur  heard.       Pulmonary:      Breath sounds: Rhonchi and rales present.       General Appearance: Lethargic  HEENT:  Normocephalic, without obvious abnormality, Conjunctiva/corneas clear,.  Normal external ear canals, Nares normal, no drainage     Neck:  Supple, symmetrical, trachea midline. No JVD.  Lungs /Chest wall: Mild wheezing and bilateral basal rhonchi, respirations unlabored symmetrical wall movement.     Heart:  Regular rate and rhythm, systolic murmur PMI left sternal border  Abdomen: Soft, non-tender, no masses, no organomegaly.    Extremities: No edema, no clubbing or cyanosis  ROS  Review of Systems   Respiratory: Positive for cough and shortness of breath.      Unobtainable due to patient lethargy

## 2022-01-09 NOTE — CASE MANAGEMENT/SOCIAL WORK
Continued Stay Note  [facility]     Patient Name: Anthony Moss  MRN: [id number]  Today's Date: 1/9/2022    Admit Date: 1/5/2022     Discharge Plan     Row Name 01/09/22 1818       Plan    Plan OT recommending inpatient rehab. PT recommends home with HH. Patient declined both.    Patient/Family in Agreement with Plan yes    Plan Comments Met with patient at bedside to discuss discharge plans and PT/OT recommendations. Patient declined needing either at this time. Home health and inpatient rehab lists left at bedside.              Met with patient in room wearing PPE: mask, face shield/goggles.    Maintained distance greater than six feet and spent less than 15 minutes in the room.    Belkis Peralta [place] [address]  Office: [phone]  Fax: [phone]  [email or web address]

## 2022-01-10 ENCOUNTER — INPATIENT HOSPITAL (OUTPATIENT)
Dept: URBAN - METROPOLITAN AREA HOSPITAL 84 | Facility: HOSPITAL | Age: 67
End: 2022-01-10
Payer: COMMERCIAL

## 2022-01-10 DIAGNOSIS — R74.8 ABNORMAL LEVELS OF OTHER SERUM ENZYMES: ICD-10-CM

## 2022-01-10 DIAGNOSIS — J18.9 PNEUMONIA, UNSPECIFIED ORGANISM: ICD-10-CM

## 2022-01-10 DIAGNOSIS — N17.9 ACUTE KIDNEY FAILURE, UNSPECIFIED: ICD-10-CM

## 2022-01-10 DIAGNOSIS — D50.9 IRON DEFICIENCY ANEMIA, UNSPECIFIED: ICD-10-CM

## 2022-01-10 DIAGNOSIS — J96.01 ACUTE RESPIRATORY FAILURE WITH HYPOXIA: ICD-10-CM

## 2022-01-10 LAB
ANION GAP SERPL CALCULATED.3IONS-SCNC: 15 MMOL/L (ref 5–15)
BACTERIA SPEC AEROBE CULT: NORMAL
BACTERIA SPEC AEROBE CULT: NORMAL
BH BB BLOOD EXPIRATION DATE: NORMAL
BH BB BLOOD TYPE BARCODE: 5100
BH BB DISPENSE STATUS: NORMAL
BH BB PRODUCT CODE: NORMAL
BH BB UNIT NUMBER: NORMAL
BUN SERPL-MCNC: 123 MG/DL (ref 8–23)
BUN/CREAT SERPL: 25.5 (ref 7–25)
CALCIUM SPEC-SCNC: 7.9 MG/DL (ref 8.6–10.5)
CHLORIDE SERPL-SCNC: 99 MMOL/L (ref 98–107)
CO2 SERPL-SCNC: 30 MMOL/L (ref 22–29)
CREAT SERPL-MCNC: 4.83 MG/DL (ref 0.76–1.27)
CROSSMATCH INTERPRETATION: NORMAL
DEPRECATED RDW RBC AUTO: 50.8 FL (ref 37–54)
ERYTHROCYTE [DISTWIDTH] IN BLOOD BY AUTOMATED COUNT: 15 % (ref 12.3–15.4)
GBM IGG SER-ACNC: 5 UNITS (ref 0–20)
GFR SERPL CREATININE-BSD FRML MDRD: 12 ML/MIN/1.73
GFR SERPL CREATININE-BSD FRML MDRD: ABNORMAL ML/MIN/{1.73_M2}
GGT SERPL-CCNC: 77 U/L (ref 8–61)
GLUCOSE BLDC GLUCOMTR-MCNC: 142 MG/DL (ref 70–105)
GLUCOSE BLDC GLUCOMTR-MCNC: 154 MG/DL (ref 70–105)
GLUCOSE BLDC GLUCOMTR-MCNC: 258 MG/DL (ref 70–105)
GLUCOSE BLDC GLUCOMTR-MCNC: 260 MG/DL (ref 70–105)
GLUCOSE SERPL-MCNC: 154 MG/DL (ref 65–99)
HCT VFR BLD AUTO: 21.2 % (ref 37.5–51)
HGB BLD-MCNC: 7.6 G/DL (ref 13–17.7)
MCH RBC QN AUTO: 33.4 PG (ref 26.6–33)
MCHC RBC AUTO-ENTMCNC: 35.6 G/DL (ref 31.5–35.7)
MCV RBC AUTO: 93.7 FL (ref 79–97)
PLATELET # BLD AUTO: 141 10*3/MM3 (ref 140–450)
PMV BLD AUTO: 8.2 FL (ref 6–12)
POTASSIUM SERPL-SCNC: 3.4 MMOL/L (ref 3.5–5.2)
RBC # BLD AUTO: 2.27 10*6/MM3 (ref 4.14–5.8)
SODIUM SERPL-SCNC: 144 MMOL/L (ref 136–145)
UNIT  ABO: NORMAL
UNIT  RH: NORMAL
WBC NRBC COR # BLD: 9.8 10*3/MM3 (ref 3.4–10.8)

## 2022-01-10 PROCEDURE — 94799 UNLISTED PULMONARY SVC/PX: CPT

## 2022-01-10 PROCEDURE — 82962 GLUCOSE BLOOD TEST: CPT

## 2022-01-10 PROCEDURE — 99232 SBSQ HOSP IP/OBS MODERATE 35: CPT | Performed by: HOSPITALIST

## 2022-01-10 PROCEDURE — 63710000001 INSULIN LISPRO (HUMAN) PER 5 UNITS: Performed by: STUDENT IN AN ORGANIZED HEALTH CARE EDUCATION/TRAINING PROGRAM

## 2022-01-10 PROCEDURE — 99222 1ST HOSP IP/OBS MODERATE 55: CPT | Performed by: NURSE PRACTITIONER

## 2022-01-10 PROCEDURE — 82977 ASSAY OF GGT: CPT | Performed by: NURSE PRACTITIONER

## 2022-01-10 PROCEDURE — 80048 BASIC METABOLIC PNL TOTAL CA: CPT | Performed by: STUDENT IN AN ORGANIZED HEALTH CARE EDUCATION/TRAINING PROGRAM

## 2022-01-10 PROCEDURE — 25010000002 LORAZEPAM PER 2 MG: Performed by: STUDENT IN AN ORGANIZED HEALTH CARE EDUCATION/TRAINING PROGRAM

## 2022-01-10 PROCEDURE — 63710000001 INSULIN GLARGINE PER 5 UNITS: Performed by: STUDENT IN AN ORGANIZED HEALTH CARE EDUCATION/TRAINING PROGRAM

## 2022-01-10 PROCEDURE — 25010000002 CEFTRIAXONE PER 250 MG: Performed by: STUDENT IN AN ORGANIZED HEALTH CARE EDUCATION/TRAINING PROGRAM

## 2022-01-10 PROCEDURE — 25010000002 HEPARIN (PORCINE) PER 1000 UNITS: Performed by: NURSE PRACTITIONER

## 2022-01-10 PROCEDURE — 85027 COMPLETE CBC AUTOMATED: CPT | Performed by: STUDENT IN AN ORGANIZED HEALTH CARE EDUCATION/TRAINING PROGRAM

## 2022-01-10 RX ORDER — PANTOPRAZOLE SODIUM 40 MG/1
40 TABLET, DELAYED RELEASE ORAL
Status: DISCONTINUED | OUTPATIENT
Start: 2022-01-10 | End: 2022-01-12 | Stop reason: HOSPADM

## 2022-01-10 RX ADMIN — FOLIC ACID 1 MG: 1 TABLET ORAL at 09:59

## 2022-01-10 RX ADMIN — IPRATROPIUM BROMIDE AND ALBUTEROL SULFATE 3 ML: 2.5; .5 SOLUTION RESPIRATORY (INHALATION) at 07:53

## 2022-01-10 RX ADMIN — INSULIN LISPRO 4 UNITS: 100 INJECTION, SOLUTION INTRAVENOUS; SUBCUTANEOUS at 09:59

## 2022-01-10 RX ADMIN — SODIUM CHLORIDE, PRESERVATIVE FREE 10 ML: 5 INJECTION INTRAVENOUS at 20:29

## 2022-01-10 RX ADMIN — GABAPENTIN 100 MG: 100 CAPSULE ORAL at 20:28

## 2022-01-10 RX ADMIN — INSULIN LISPRO 12 UNITS: 100 INJECTION, SOLUTION INTRAVENOUS; SUBCUTANEOUS at 12:54

## 2022-01-10 RX ADMIN — CHLORDIAZEPOXIDE HYDROCHLORIDE 5 MG: 5 CAPSULE ORAL at 18:09

## 2022-01-10 RX ADMIN — IPRATROPIUM BROMIDE AND ALBUTEROL SULFATE 3 ML: 2.5; .5 SOLUTION RESPIRATORY (INHALATION) at 04:13

## 2022-01-10 RX ADMIN — INSULIN GLARGINE 15 UNITS: 100 INJECTION, SOLUTION SUBCUTANEOUS at 10:01

## 2022-01-10 RX ADMIN — PANTOPRAZOLE SODIUM 40 MG: 40 INJECTION, POWDER, FOR SOLUTION INTRAVENOUS at 09:59

## 2022-01-10 RX ADMIN — PANTOPRAZOLE SODIUM 40 MG: 40 TABLET, DELAYED RELEASE ORAL at 17:35

## 2022-01-10 RX ADMIN — CHLORDIAZEPOXIDE HYDROCHLORIDE 5 MG: 5 CAPSULE ORAL at 10:04

## 2022-01-10 RX ADMIN — HEPARIN SODIUM 5000 UNITS: 5000 INJECTION INTRAVENOUS; SUBCUTANEOUS at 20:27

## 2022-01-10 RX ADMIN — CHLORDIAZEPOXIDE HYDROCHLORIDE 5 MG: 5 CAPSULE ORAL at 20:54

## 2022-01-10 RX ADMIN — IPRATROPIUM BROMIDE AND ALBUTEROL SULFATE 3 ML: 2.5; .5 SOLUTION RESPIRATORY (INHALATION) at 19:58

## 2022-01-10 RX ADMIN — GABAPENTIN 100 MG: 100 CAPSULE ORAL at 17:35

## 2022-01-10 RX ADMIN — CEFTRIAXONE 1 G: 1 INJECTION, POWDER, FOR SOLUTION INTRAMUSCULAR; INTRAVENOUS at 20:28

## 2022-01-10 RX ADMIN — SODIUM CHLORIDE, PRESERVATIVE FREE 10 ML: 5 INJECTION INTRAVENOUS at 09:58

## 2022-01-10 RX ADMIN — LIDOCAINE 1 PATCH: 50 PATCH TOPICAL at 10:14

## 2022-01-10 RX ADMIN — GABAPENTIN 100 MG: 100 CAPSULE ORAL at 09:59

## 2022-01-10 RX ADMIN — SODIUM BICARBONATE 150 MEQ: 84 INJECTION, SOLUTION INTRAVENOUS at 20:37

## 2022-01-10 RX ADMIN — LORAZEPAM 1 MG: 2 INJECTION INTRAMUSCULAR; INTRAVENOUS at 20:27

## 2022-01-10 RX ADMIN — HEPARIN SODIUM 5000 UNITS: 5000 INJECTION INTRAVENOUS; SUBCUTANEOUS at 10:00

## 2022-01-10 RX ADMIN — IPRATROPIUM BROMIDE AND ALBUTEROL SULFATE 3 ML: 2.5; .5 SOLUTION RESPIRATORY (INHALATION) at 15:55

## 2022-01-10 RX ADMIN — INSULIN LISPRO 12 UNITS: 100 INJECTION, SOLUTION INTRAVENOUS; SUBCUTANEOUS at 17:35

## 2022-01-10 RX ADMIN — IPRATROPIUM BROMIDE AND ALBUTEROL SULFATE 3 ML: 2.5; .5 SOLUTION RESPIRATORY (INHALATION) at 11:02

## 2022-01-10 NOTE — PROGRESS NOTES
[facility] Medicine Services Daily Progress Note    Patient Name: Anthony Moss  : 1955  MRN: [id number]  Primary Care Physician:  Marva Gaviria APRN  Date of admission: 2022      Subjective      Chief Complaint: Dyspnea      2022  Shiley placed overnight, HD scheduled for today.  Patient complains of pain from failed cath attempt.    2022  Patient underwent HD yesterday.  Roberts cath placed by urology as CT a/p showed b/l hydronephrosis with bladder distension    22  Creatinine slowly improving.  Glucose uncontrolled due to steroid induced hyperglycemia, will dc steroids as doubtful disease is auto-immune.  Incease lantus and add keli lispro.    22  Steroid induced hyperglycemia resolving, will de-escalate insulin.  Creatinine continues to improve.  Hgb at 6.8 this morning, no overt bleeding, will transfuse 1u prbc and have GI assess due to history of alcoholism.    1/10/2022  Denies for any nausea or vomiting, no chest pain.    ROS   Constitutional: Positive for chills, malaise/fatigue and weight gain.   HENT: Positive for congestion.    Eyes: Negative.  Negative for visual disturbance.   Cardiovascular: Positive for dyspnea on exertion, leg swelling and orthopnea.   Respiratory: Positive for cough and shortness of breath.    Endocrine: Negative.    Hematologic/Lymphatic: Negative.    Skin: Negative.    Musculoskeletal: Positive for myalgias.   Gastrointestinal: Negative for bloating, abdominal pain, melena, nausea and vomiting.   Genitourinary: Positive for bladder incontinence, hesitancy, incomplete emptying and urgency. Negative for hematuria.   Neurological: Positive for weakness.   Psychiatric/Behavioral: The patient is nervous/anxious.    All other systems reviewed and are negative.    Objective      Vitals:   Temp:  [96.1 °F (35.6 °C)-98.4 °F (36.9 °C)] 98.4 °F (36.9 °C)  Heart Rate:  [] 95  Resp:  [18-20] 18  BP: (112-132)/(73-79)  131/76  Flow (L/min):  [1-4] 2    Physical Exam  Constitutional:       General: He is not in acute distress.     Appearance: Normal appearance. He is obese. He is not ill-appearing.   HENT:      Head: Normocephalic and atraumatic.      Nose: No congestion.      Mouth/Throat:      Pharynx: No oropharyngeal exudate.   Eyes:      General: No scleral icterus.  Neck:      Comments: Left sided shiley present  Cardiovascular:      Rate and Rhythm: Normal rate and regular rhythm.      Heart sounds: No murmur heard.  No friction rub. No gallop.    Pulmonary:      Breath sounds: Rhonchi present. No wheezing or rales.   Abdominal:      General: There is no distension.      Tenderness: There is no abdominal tenderness. There is no guarding.   Musculoskeletal:         General: No deformity or signs of injury.      Cervical back: Normal range of motion. No rigidity.      Right lower leg: Edema present.      Left lower leg: Edema present.   Skin:     Coloration: Skin is not jaundiced.      Findings: No bruising or lesion.   Neurological:      General: No focal deficit present.      Mental Status: He is alert and oriented to person, place, and time.      Motor: No weakness.             Result Review    Result Review:  I have personally reviewed the results from the time of this admission to 1/10/2022 17:14 EST and agree with these findings:  [x]  Laboratory  []  Microbiology  [x]  Radiology  []  EKG/Telemetry   []  Cardiology/Vascular   []  Pathology  []  Old records  []  Other:          Assessment/Plan      Brief Patient Summary:  Anthony Moss is a 66 y.o. male who presented due to dyspnea and was found to be in acute renal failure complicated by lung involvement.  Abx started, Nephrology consulted.  Shiley placed by ICU.  Pulm consulted and urology consulted for trujillo placement to obtain urine.  HD stared 1/6/2022.  CT a/p showed b/l hydronephrosis with bladder distension, urology placed trujillo.  Cytoscopy unable to be done  2/2 noncompliance.      cefTRIAXone, 1 g, Intravenous, Q24H  chlordiazePOXIDE, 5 mg, Oral, TID  folic acid, 1 mg, Oral, Daily  gabapentin, 100 mg, Oral, TID  heparin (porcine), 5,000 Units, Subcutaneous, Q12H  insulin glargine, 15 Units, Subcutaneous, Daily  insulin lispro, 0-24 Units, Subcutaneous, TID AC  ipratropium-albuterol, 3 mL, Nebulization, Q4H - RT  lidocaine, 1 patch, Transdermal, Q24H  pantoprazole, 40 mg, Oral, BID AC  sodium chloride, 10 mL, Intravenous, Q12H       sodium bicarbonate drip (greater than 75 mEq/bag), 150 mEq, Last Rate: 150 mEq (01/09/22 2853)         Active Hospital Problems:  Active Hospital Problems    Diagnosis    • **Acute renal failure (ARF) (HCC)    • Obesity    • Essential hypertension    • Mixed hyperlipidemia    • Bilateral pneumonia    • Thrombocytopenia (HCC)    • ETOH abuse    • Tobacco abuse    • Marijuana use      Plan:   #Acute renal failure:  #b/l hydronephrosis  #Bladder distension    -Upon arrival to the ED creatinine 12.52, , GFR 4    -Consult nephrology, patient may require stat dialysis    -Shiley placed by ICU 01/06    - HD performed 01/06    - CT a/p shows bilateral hydronephrosis with bladder distension    - Urology consulted for trujillo (nursing failed) to obtain urine    - will dc steroids as on 2L without wheezing and renal failure does not appear to be autoimmune    - renal biopsy to be held     - creatinine 12.5 > 9.01 > 8.25 > 5.77    - trujillo placed by urology    - cystoscopy unable to be performed as patient will not tolerated    - will assess how creatinine improves with obstruction relieved  , nephrology on board, will let them decide further need additional dialysis.    #Bilateral pneumonia:    -Chest x-ray reviewed    -Covid negative    -Treated in ED with IV azithromycin, IV Rocephin    -Continue IV Rocephin, discussed renal function with pharmacist    - azithromycin d/c    -Trend lactic acid levels    -Procalcitonin 0.82 > abx warranted     -Blood culture obtained and are pending    -Check sputum culture, check urine antigen:  Strep Pneumo & legionella    -DuoNebs, oxygen, incentive spirometry    - pulm consulted by nephrology    - currently on 2LNC, afebrile    #Steroid induced hyperglycemia - resolving    - glucose peaked at 600 on 1/8/22 due to steroids    - continue lantus 15u SC daily    - expand ISS    - lispro 5u SC AC has been held as hyperglycemia resolving    - stop IV steroids    - hypoglycemia protocol    #Anemia    - hgb 8.1 > 6.8    - suspect combination of renal disease and iron deficiency    - However, due to elevated BUN with history of alcoholism and elevated BUN will have GI assess    - protonix 40mg IV BID    - transfuse 1u prbc on 1/9/22     #Thrombocytopenia:    -Upon arrival to the ED platelets 113 >141    -Monitor CBC during admission    - most likely 2/2 renal failure or bone marrow suppression     #ETOH abuse:    -Initiate EtOH withdrawal protocol    -Monitor vital signs every 4hrs    -Seizure precautions    -Ammonia level, ethanol level    -Vitamin replacement, Ativan dosing held due to acute renal failure     #Tobacco abuse marijuana use:    -Tobacco cessation education     #Essential hypertension:    -Encourage lifestyle modifications    -Low-sodium diet    -No home medications noted     #Mixed hyperlipidemia:    -Encourage lifestyle modifications    -Encourage dietary modifications    -No Home statin noted     #Obesity:    -Encourage lifestyle modifications    -BMI 32.61       DVT prophylaxis:  Medical and mechanical DVT prophylaxis orders are present.    CODE STATUS:    Code Status (Patient has no pulse and is not breathing): CPR (Attempt to Resuscitate)  Medical Interventions (Patient has pulse or is breathing): Full Support      Disposition:  I expect patient to be discharged in 3-4 days.    This patient has been examined wearing appropriate Personal Protective Equipment and discussed with Patient and nephrology.  01/10/22      Electronically signed by Andrey Valdez MD, 01/10/22, 17:14 EST.  [facility] Hospitalist Team

## 2022-01-10 NOTE — PROGRESS NOTES
PROGRESS NOTE      Patient Name: Anthony Moss  : 1955  MRN: [id number]  Primary Care Physician: Marva Gaviria APRN  Date of admission: 2022    Patient Care Team:  Marva Gaviria APRN as PCP - General (Nurse Practitioner)        Subjective   Subjective:     Patient is feeling a lot better than yesterday Roberts catheter is clear with 1 8.5 of urine in last 24-hour  Review of systems:  All other review of system unremarkable      Allergies:  No Known Allergies    Objective   Exam:     Vital Signs  Temp:  [96.1 °F (35.6 °C)-98.4 °F (36.9 °C)] 98.4 °F (36.9 °C)  Heart Rate:  [] 95  Resp:  [18-20] 18  BP: (112-132)/(73-79) 131/76  SpO2:  [91 %-100 %] 96 %  on  Flow (L/min):  [1-4] 2;   Device (Oxygen Therapy): nasal cannula  Body mass index is 33.03 kg/m².    General: Middle-aged obese  male in no acute distress.    Head:      Normocephalic and atraumatic.    Eyes:      PERRL/EOM intact, conjunctiva and sclera clear with out nystagmus.    Neck:      No masses, thyromegaly,  trachea central with normal respiratory effort   Lungs:    Crackles bilaterally to auscultation.    Heart:      Regular rate and rhythm, no murmur no gallop  Abd:        Soft, nontender, not distended, bowel sounds positive, no shifting dullness   Pulses:   Pulses palpable  Extr:        No cyanosis or clubbing--+1 edema.    Neuro:    No focal deficits.   alert oriented x3  Skin:       Intact without lesions or rashes.    Psych:    Alert and cooperative; normal mood and affect; .      Results Review:  I have personally reviewed most recent Data :  CBC    Results from last 7 days   Lab Units 01/10/22  0257 22  1819 22  0911 22  0401 22  0624 22  2322 22  0638 22  2306 22  2306 22  1850 22  1850   WBC 10*3/mm3 9.80  --   --  12.00* 10.80 10.00 7.50  --  7.50  --  9.00   HEMOGLOBIN g/dL 7.6* 7.5* 6.8* 6.8* 8.1* 7.7* 8.4*   < > 8.3*   < > 9.1*   PLATELETS 10*3/mm3  141  --   --  141 129* 107* 90*  --  92*  --  113*    < > = values in this interval not displayed.     CMP   Results from last 7 days   Lab Units 01/10/22  0257 01/09/22  0401 01/08/22  0624 01/06/22  2322 01/06/22  0638 01/05/22  2306 01/05/22  2120 01/05/22  1850   SODIUM mmol/L 144 141 137 132*  --  137 134* 133*   POTASSIUM mmol/L 3.4* 4.0 4.6 5.5*  --  6.2* 6.7* 6.5*   CHLORIDE mmol/L 99 98 96* 97*  --  102 103 102   CO2 mmol/L 30.0* 27.0 21.0* 18.0*  --  15.0* 11.0* 14.0*   BUN mg/dL 123* 133* 124* 120*  --  156* 159* 155*   CREATININE mg/dL 4.83* 5.77* 8.25* 9.01*  --  12.36* 12.35* 12.52*   GLUCOSE mg/dL 154* 207* 416* 238*  --  208* 149* 108*   ALBUMIN g/dL  --   --   --   --  2.9 3.00* 3.10*  --    BILIRUBIN mg/dL  --   --   --   --   --  0.3 0.3  --    ALK PHOS U/L  --   --   --   --   --  151* 159*  --    AST (SGOT) U/L  --   --   --   --   --  37 40  --    ALT (SGPT) U/L  --   --   --   --   --  24 25  --    AMMONIA umol/L  --   --   --   --  17  --   --   --      ABG    Results from last 7 days   Lab Units 01/05/22  1846   PH, ARTERIAL pH units 7.320*   PCO2, ARTERIAL mm Hg 25.5*   PO2 ART mm Hg 75.3*   O2 SATURATION ART % 94.1   BASE EXCESS ART mmol/L -11.7*     No radiology results for the last day    Results for orders placed during the hospital encounter of 01/05/22    Adult Transthoracic Echo Complete w/ Color, Spectral and Contrast if Necessary Per Protocol    Interpretation Summary  · Left ventricular ejection fraction appears to be 56 - 60%.  · The right ventricular cavity is moderately dilated.  · No pericardial effusion noted    Scheduled Meds:cefTRIAXone, 1 g, Intravenous, Q24H  chlordiazePOXIDE, 5 mg, Oral, TID  folic acid, 1 mg, Oral, Daily  gabapentin, 100 mg, Oral, TID  heparin (porcine), 5,000 Units, Subcutaneous, Q12H  insulin glargine, 15 Units, Subcutaneous, Daily  insulin lispro, 0-24 Units, Subcutaneous, TID AC  ipratropium-albuterol, 3 mL, Nebulization, Q4H - RT  lidocaine, 1  patch, Transdermal, Q24H  pantoprazole, 40 mg, Oral, BID AC  sodium chloride, 10 mL, Intravenous, Q12H      Continuous Infusions:sodium bicarbonate drip (greater than 75 mEq/bag), 150 mEq, Last Rate: 150 mEq (01/09/22 0363)      PRN Meds:•  acetaminophen **OR** acetaminophen **OR** acetaminophen  •  aluminum-magnesium hydroxide-simethicone  •  dextrose  •  dextrose  •  flumazenil  •  glucagon (human recombinant)  •  guaiFENesin  •  insulin lispro **AND** insulin lispro  •  labetalol  •  Lidocaine HCl gel  •  LORazepam **OR** LORazepam **OR** LORazepam **OR** [DISCONTINUED] LORazepam **OR** [DISCONTINUED] LORazepam **OR** LORazepam  •  melatonin  •  nitroglycerin  •  ondansetron **OR** ondansetron  •  [COMPLETED] Insert peripheral IV **AND** sodium chloride  •  sodium chloride    Assessment/Plan   Assessment and Plan:         Acute renal failure (ARF) (HCC)    Mixed hyperlipidemia    Essential hypertension    Obesity    Bilateral pneumonia    Thrombocytopenia (HCC)    ETOH abuse    Tobacco abuse    Marijuana use    ASSESSMENT:  Acute kidney injury, stage III, oligoanuric, metabolic differential at this this time, including sepsis, with bilateral pneumonia, but also with a presentation with anemia, thrombocytopenia, have to rule out any TMA.  Patient apparently did have labs with his primary care doctor, we have to open the labs, to start with baseline creatinine never been told about any kidney problems in the past,  Had been taking Aleve, for the back pain,  Hyperkalemia,  Weakness and tiredness, with a BUN of 59, very concerning for uremia,  Metabolic acidosis, gap and nongap, DKA, azotemia, lactic acid normal,  Alcohol use,  Chronic back pain,using NSAID  B/l pneumonia.  Surprisingly, WBC stable,           Plan:      No need for dialysis anymore potassium level is stable no significant metabolic acidosis  Patient had obstructive uropathy which is improved after   Roberts catheter  No need for dialysis as long as  patient is making urine he should get better  Creatinine continue to improve his last 24-hour  Continue to treat patient pneumonia aggressively  We will get a repeat ultrasound in a few weeks to reassess kidney echotexture   Unlikely to be any secondary cause of renal failure serology so far unremarkable but I think it will be negative as urine has no significant proteinuria or active sediment   Pulmonary consult appreciated        Electronically signed by Jamie Houston MD,   [facility] kidney consultant  [phone]

## 2022-01-10 NOTE — PROGRESS NOTES
Daily Progress Note        Acute renal failure (ARF) (HCC)    Mixed hyperlipidemia    Essential hypertension    Obesity    Bilateral pneumonia    Thrombocytopenia (HCC)    ETOH abuse    Tobacco abuse    Marijuana use      Assessment  Acute renal failure (ARF) (HCC)  Bilateral pneumonia: Respiratory panel negative  Hypoxemia  CARLOS ALBERTO  Anemia  Thrombocytopenia  Alcohol abuse  Tobacco abuse  Essential hypertension  Obesity       Plan:  Oxygen supplement and titration  Antibiotics Rocephin and azithromycin  Solu-Medrol 40 mg every 8 hours  Bronchodilators  Nephrology consultation following for renal function  Glucose control  DVT/GI prophylaxis  Check daily labs and correct electrolytes as needed              LOS: 4 days     Subjective         Objective     Vital signs for last 24 hours:  Vitals:    01/09/22 1542 01/09/22 1944 01/09/22 1949 01/09/22 2008   BP: 112/76   132/79   BP Location:    Right arm   Patient Position:    Lying   Pulse: 107 107 107 105   Resp: 18 18 18 19   Temp: 97.7 °F (36.5 °C)   97.6 °F (36.4 °C)   TempSrc: Oral   Oral   SpO2: 96% 95% 96% 94%   Weight:       Height:           Intake/Output last 3 shifts:  I/O last 3 completed shifts:  In: 3002 [P.O.:2720; Blood:282]  Out: 98815 [Urine:20337]  Intake/Output this shift:  I/O this shift:  In: 320 [P.O.:320]  Out: -       Radiology  Imaging Results (Last 24 Hours)     ** No results found for the last 24 hours. **          Labs:  Results from last 7 days   Lab Units 01/09/22  1819 01/09/22  0911 01/09/22  0401   WBC 10*3/mm3  --   --  12.00*   HEMOGLOBIN g/dL 7.5*   < > 6.8*   HEMATOCRIT % 21.2*   < > 19.6*   PLATELETS 10*3/mm3  --   --  141    < > = values in this interval not displayed.     Results from last 7 days   Lab Units 01/09/22  0401 01/06/22  2322 01/05/22  2306   SODIUM mmol/L 141   < > 137   POTASSIUM mmol/L 4.0   < > 6.2*   CHLORIDE mmol/L 98   < > 102   CO2 mmol/L 27.0   < > 15.0*   BUN mg/dL 133*   < > 156*   CREATININE mg/dL 5.77*   < >  12.36*   CALCIUM mg/dL 8.6   < > 9.1   BILIRUBIN mg/dL  --   --  0.3   ALK PHOS U/L  --   --  151*   ALT (SGPT) U/L  --   --  24   AST (SGOT) U/L  --   --  37   GLUCOSE mg/dL 207*   < > 208*    < > = values in this interval not displayed.     Results from last 7 days   Lab Units 01/05/22  1846   PH, ARTERIAL pH units 7.320*   PO2 ART mm Hg 75.3*   PCO2, ARTERIAL mm Hg 25.5*   HCO3 ART mmol/L 13.1*     Results from last 7 days   Lab Units 01/06/22  0638 01/05/22  2306 01/05/22  2120   ALBUMIN g/dL 2.9 3.00* 3.10*         Results from last 7 days   Lab Units 01/06/22  0638   MARI  Negative     Results from last 7 days   Lab Units 01/05/22  2120   MAGNESIUM mg/dL 1.8     Results from last 7 days   Lab Units 01/05/22  2306   INR  1.11*   APTT seconds 30.7     Results from last 7 days   Lab Units 01/05/22  1850   TSH uIU/mL 2.840           Meds:   SCHEDULE  cefTRIAXone, 1 g, Intravenous, Q24H  chlordiazePOXIDE, 5 mg, Oral, TID  folic acid, 1 mg, Oral, Daily  gabapentin, 100 mg, Oral, TID  heparin (porcine), 5,000 Units, Subcutaneous, Q12H  insulin glargine, 15 Units, Subcutaneous, Daily  insulin lispro, 0-24 Units, Subcutaneous, TID AC  ipratropium-albuterol, 3 mL, Nebulization, Q4H - RT  lidocaine, 1 patch, Transdermal, Q24H  pantoprazole, 40 mg, Intravenous, BID AC  sodium chloride, 10 mL, Intravenous, Q12H      Infusions  sodium bicarbonate drip (greater than 75 mEq/bag), 150 mEq, Last Rate: 150 mEq (01/09/22 1833)      PRNs  •  acetaminophen **OR** acetaminophen **OR** acetaminophen  •  aluminum-magnesium hydroxide-simethicone  •  dextrose  •  dextrose  •  flumazenil  •  glucagon (human recombinant)  •  insulin lispro **AND** insulin lispro  •  labetalol  •  Lidocaine HCl gel  •  LORazepam **OR** LORazepam **OR** LORazepam **OR** [DISCONTINUED] LORazepam **OR** [DISCONTINUED] LORazepam **OR** LORazepam  •  melatonin  •  nitroglycerin  •  ondansetron **OR** ondansetron  •  [COMPLETED] Insert peripheral IV **AND**  sodium chloride  •  sodium chloride    Physical Exam:  Physical Exam  Cardiovascular:      Heart sounds: Murmur heard.       Pulmonary:      Breath sounds: Rhonchi and rales present.       General Appearance: Lethargic  HEENT:  Normocephalic, without obvious abnormality, Conjunctiva/corneas clear,.  Normal external ear canals, Nares normal, no drainage     Neck:  Supple, symmetrical, trachea midline. No JVD.  Lungs /Chest wall: Mild wheezing and bilateral basal rhonchi, respirations unlabored symmetrical wall movement.     Heart:  Regular rate and rhythm, systolic murmur PMI left sternal border  Abdomen: Soft, non-tender, no masses, no organomegaly.    Extremities: No edema, no clubbing or cyanosis  ROS  Review of Systems   Respiratory: Positive for cough and shortness of breath.      Unobtainable due to patient lethargy

## 2022-01-10 NOTE — PLAN OF CARE
Goal Outcome Evaluation:  Plan of Care Reviewed With: patient           Outcome Summary: pt has had no complaints today. pt been in bed sleeping all day. pt has trujillo catheter still in place. will continue to monitor

## 2022-01-10 NOTE — PROGRESS NOTES
Daily Progress Note        Acute renal failure (ARF) (HCC)    Mixed hyperlipidemia    Essential hypertension    Obesity    Bilateral pneumonia    Thrombocytopenia (HCC)    ETOH abuse    Tobacco abuse    Marijuana use      Assessment    Bilateral pneumonia: Respiratory panel negative  Hypoxemia    Acute renal failure    Anemia  Thrombocytopenia  Alcohol abuse  Tobacco abuse  Essential hypertension  Obesity     Echo 1/6/2022  EF 56 to 60%  RVSP 8.6 mmHg    Plan:    Antibiotics Rocephin     Titrate oxygen  -Currently requiring 2 L    Solu-Medrol 40 mg every 8 hours  DVT prophylaxis Heparin sq.  Bronchodilators    Nephrology following for renal function       Completed azithromycin       LOS: 5 days     Subjective         Objective     Vital signs for last 24 hours:  Vitals:    01/10/22 0338 01/10/22 0413 01/10/22 0419 01/10/22 0753   BP: 122/78      BP Location: Right arm      Patient Position: Lying      Pulse: 104 103 104    Resp: 20 18 18    Temp: 96.1 °F (35.6 °C)      TempSrc: Oral      SpO2: 91% 98% 99% 97%   Weight: 117 kg (257 lb 4.4 oz)      Height:           Intake/Output last 3 shifts:  I/O last 3 completed shifts:  In: 2022 [P.O.:1640; Blood:282; IV Piggyback:100]  Out: 18523 [Urine:60160]  Intake/Output this shift:  No intake/output data recorded.      Radiology  Imaging Results (Last 24 Hours)     ** No results found for the last 24 hours. **          Labs:  Results from last 7 days   Lab Units 01/10/22  0257   WBC 10*3/mm3 9.80   HEMOGLOBIN g/dL 7.6*   HEMATOCRIT % 21.2*   PLATELETS 10*3/mm3 141     Results from last 7 days   Lab Units 01/10/22  0257 01/06/22  2322 01/05/22  2306   SODIUM mmol/L 144   < > 137   POTASSIUM mmol/L 3.4*   < > 6.2*   CHLORIDE mmol/L 99   < > 102   CO2 mmol/L 30.0*   < > 15.0*   BUN mg/dL 123*   < > 156*   CREATININE mg/dL 4.83*   < > 12.36*   CALCIUM mg/dL 7.9*   < > 9.1   BILIRUBIN mg/dL  --   --  0.3   ALK PHOS U/L  --   --  151*   ALT (SGPT) U/L  --   --  24   AST (SGOT)  U/L  --   --  37   GLUCOSE mg/dL 154*   < > 208*    < > = values in this interval not displayed.     Results from last 7 days   Lab Units 01/05/22  1846   PH, ARTERIAL pH units 7.320*   PO2 ART mm Hg 75.3*   PCO2, ARTERIAL mm Hg 25.5*   HCO3 ART mmol/L 13.1*     Results from last 7 days   Lab Units 01/06/22  0638 01/05/22  2306 01/05/22  2120   ALBUMIN g/dL 2.9 3.00* 3.10*         Results from last 7 days   Lab Units 01/06/22  0638   MARI  Negative     Results from last 7 days   Lab Units 01/05/22  2120   MAGNESIUM mg/dL 1.8     Results from last 7 days   Lab Units 01/05/22  2306   INR  1.11*   APTT seconds 30.7     Results from last 7 days   Lab Units 01/05/22  1850   TSH uIU/mL 2.840           Meds:   SCHEDULE  cefTRIAXone, 1 g, Intravenous, Q24H  chlordiazePOXIDE, 5 mg, Oral, TID  folic acid, 1 mg, Oral, Daily  gabapentin, 100 mg, Oral, TID  heparin (porcine), 5,000 Units, Subcutaneous, Q12H  insulin glargine, 15 Units, Subcutaneous, Daily  insulin lispro, 0-24 Units, Subcutaneous, TID AC  ipratropium-albuterol, 3 mL, Nebulization, Q4H - RT  lidocaine, 1 patch, Transdermal, Q24H  pantoprazole, 40 mg, Intravenous, BID AC  sodium chloride, 10 mL, Intravenous, Q12H      Infusions  sodium bicarbonate drip (greater than 75 mEq/bag), 150 mEq, Last Rate: 150 mEq (01/09/22 1833)      PRNs  •  acetaminophen **OR** acetaminophen **OR** acetaminophen  •  aluminum-magnesium hydroxide-simethicone  •  dextrose  •  dextrose  •  flumazenil  •  glucagon (human recombinant)  •  guaiFENesin  •  insulin lispro **AND** insulin lispro  •  labetalol  •  Lidocaine HCl gel  •  LORazepam **OR** LORazepam **OR** LORazepam **OR** [DISCONTINUED] LORazepam **OR** [DISCONTINUED] LORazepam **OR** LORazepam  •  melatonin  •  nitroglycerin  •  ondansetron **OR** ondansetron  •  [COMPLETED] Insert peripheral IV **AND** sodium chloride  •  sodium chloride    Physical Exam:  Physical Exam  Vitals reviewed.   Cardiovascular:      Heart sounds: Murmur  heard.       Pulmonary:      Breath sounds: Rhonchi and rales present.   Skin:     General: Skin is warm and dry.   Neurological:      Mental Status: He is alert and oriented to person, place, and time.       General Appearance: Lethargic  HEENT:  Normocephalic, without obvious abnormality, Conjunctiva/corneas clear,.  Normal external ear canals, Nares normal, no drainage     Neck:  Supple, symmetrical, trachea midline. No JVD.  Lungs /Chest wall: Mild wheezing and bilateral basal rhonchi, respirations unlabored symmetrical wall movement.     Heart:  Regular rate and rhythm, systolic murmur PMI left sternal border  Abdomen: Soft, non-tender, no masses, no organomegaly.    Extremities: No edema, no clubbing or cyanosis  ROS  Review of Systems   Respiratory: Positive for cough and shortness of breath.      I reviewed the patient's new clinical results    Electronically signed by DANIS Espana

## 2022-01-10 NOTE — CONSULTS
"GI CONSULT  NOTE:    Referring Provider:  Dr. Valdez    Chief complaint: Iron deficiency anemia    Subjective . \"  I was so short of breath\"    History of present illness: Anthony Moss is a 66 y.o. male who has a history of hypertension, hyperlipidemia, stable arthritis and scoliosis.  He presented on January fifth with increased shortness of breath and a weight gain of 45 pounds.  He was found to be in acute renal failure with bilateral pneumonia and was admitted for further evaluation.  GI was asked to consult for anemia with iron deficiency.  He denies history of anemia but does have occasional weakness.  Mild nausea at times but no vomiting.  Mild occasional heartburn that resolves with drinking milk.  No difficulty swallowing or unintentional weight loss.  No chest pain.  No constipation, diarrhea, melena or rectal bleeding.  No abdominal pain.  Occasional Aleve use but denies daily use.  Denies history of liver or pancreatic disease.  He does report a history of heavy alcohol use with 2 cases on the weekend but just drinks occasionally at this time.    Endo History:  No previous EGD  12/2008 colonoscopy by Dr. Driver -normal    Past Medical History:  Hypertension, osteoarthritis, hyperlipidemia    Past Surgical History:  Colonoscopy    Social History:  Social History     Tobacco Use   • Smoking status: Never Smoker   • Smokeless tobacco: Never Used   Substance Use Topics   • Alcohol use: Not on file     Comment: occationally    • Drug use: Not on file   Occasional alcohol use    Family History:  No family history of colorectal cancer    Medications:  No medications prior to admission.       Scheduled Meds:cefTRIAXone, 1 g, Intravenous, Q24H  chlordiazePOXIDE, 5 mg, Oral, TID  folic acid, 1 mg, Oral, Daily  gabapentin, 100 mg, Oral, TID  heparin (porcine), 5,000 Units, Subcutaneous, Q12H  insulin glargine, 15 Units, Subcutaneous, Daily  insulin lispro, 0-24 Units, Subcutaneous, TID " AC  ipratropium-albuterol, 3 mL, Nebulization, Q4H - RT  lidocaine, 1 patch, Transdermal, Q24H  pantoprazole, 40 mg, Intravenous, BID AC  sodium chloride, 10 mL, Intravenous, Q12H      Continuous Infusions:sodium bicarbonate drip (greater than 75 mEq/bag), 150 mEq, Last Rate: 150 mEq (01/09/22 0763)      PRN Meds:.•  acetaminophen **OR** acetaminophen **OR** acetaminophen  •  aluminum-magnesium hydroxide-simethicone  •  dextrose  •  dextrose  •  flumazenil  •  glucagon (human recombinant)  •  guaiFENesin  •  insulin lispro **AND** insulin lispro  •  labetalol  •  Lidocaine HCl gel  •  LORazepam **OR** LORazepam **OR** LORazepam **OR** [DISCONTINUED] LORazepam **OR** [DISCONTINUED] LORazepam **OR** LORazepam  •  melatonin  •  nitroglycerin  •  ondansetron **OR** ondansetron  •  [COMPLETED] Insert peripheral IV **AND** sodium chloride  •  sodium chloride    ALLERGIES:  Patient has no known allergies.    ROS:  The following systems were reviewed  Constitution:  No fevers, chills, no unintentional weight loss  Skin: no rash, no jaundice  Eyes:  No blurry vision, no eye pain  HENT:  No change in hearing or smell  Resp: Dyspnea  CV:  No chest pain or palpitations  :  No dysuria, hematuria  Musculoskeletal:  No leg cramps or arthralgias  Neuro:  No tremor, no numbness  Psych:  No depression or confusion    Objective Resting in bed, no acute distress, aide in room    Vital Signs:   Vitals:    01/10/22 0753 01/10/22 0957 01/10/22 1102 01/10/22 1105   BP:  112/73     BP Location:  Right arm     Patient Position:  Lying     Pulse:  106 105 105   Resp:  18 20 18   Temp:  97.9 °F (36.6 °C)     TempSrc:  Oral     SpO2: 97% 100% 100% 100%   Weight:       Height:           Physical Exam:       General Appearance:    Awake and alert, in no acute distress, obese   Head:    Normocephalic, without obvious abnormality, atraumatic   Throat:   No oral lesions, no thrush, oral mucosa moist   Lungs:     Respirations regular, even and  unlabored   Chest Wall:    No abnormalities observed   Abdomen:     Soft, non-tender, nondistended   Rectal:     Deferred   Extremities:   Moves all extremities,  no cyanosis       Skin:   No rash, no jaundice, normal palpation       Neurologic:   Cranial nerves 2 - 12 grossly intact       Results Review:   I reviewed the patient's labs and imaging.  CBC    Results from last 7 days   Lab Units 01/10/22  0257 01/09/22  1819 01/09/22  0911 01/09/22  0401 01/08/22 0624 01/06/22 2322 01/06/22 0638 01/05/22  2306 01/05/22  2306 01/05/22  1850 01/05/22  1850   WBC 10*3/mm3 9.80  --   --  12.00* 10.80 10.00 7.50  --  7.50  --  9.00   HEMOGLOBIN g/dL 7.6* 7.5* 6.8* 6.8* 8.1* 7.7* 8.4*   < > 8.3*   < > 9.1*   PLATELETS 10*3/mm3 141  --   --  141 129* 107* 90*  --  92*  --  113*    < > = values in this interval not displayed.     CMP   Results from last 7 days   Lab Units 01/10/22  0257 01/09/22  0401 01/08/22 0624 01/06/22 2322 01/06/22 0638 01/05/22  2306 01/05/22  2120 01/05/22  1850   SODIUM mmol/L 144 141 137 132*  --  137 134* 133*   POTASSIUM mmol/L 3.4* 4.0 4.6 5.5*  --  6.2* 6.7* 6.5*   CHLORIDE mmol/L 99 98 96* 97*  --  102 103 102   CO2 mmol/L 30.0* 27.0 21.0* 18.0*  --  15.0* 11.0* 14.0*   BUN mg/dL 123* 133* 124* 120*  --  156* 159* 155*   CREATININE mg/dL 4.83* 5.77* 8.25* 9.01*  --  12.36* 12.35* 12.52*   GLUCOSE mg/dL 154* 207* 416* 238*  --  208* 149* 108*   ALBUMIN g/dL  --   --   --   --  2.9 3.00* 3.10*  --    BILIRUBIN mg/dL  --   --   --   --   --  0.3 0.3  --    ALK PHOS U/L  --   --   --   --   --  151* 159*  --    AST (SGOT) U/L  --   --   --   --   --  37 40  --    ALT (SGPT) U/L  --   --   --   --   --  24 25  --    MAGNESIUM mg/dL  --   --   --   --   --   --  1.8 1.9   PHOSPHORUS mg/dL  --   --   --   --   --   --   --  4.9*   AMMONIA umol/L  --   --   --   --  17  --   --   --      Cr Clearance Estimated Creatinine Clearance: 21 mL/min (A) (by C-G formula based on SCr of 4.83 mg/dL  (H)).  Coag   Results from last 7 days   Lab Units 01/05/22  2306   INR  1.11*   APTT seconds 30.7     HbA1C   Lab Results   Component Value Date    HGBA1C 6.0 (H) 01/07/2022     Blood Glucose   Glucose   Date/Time Value Ref Range Status   01/10/2022 0702 154 (H) 70 - 105 mg/dL Final     Comment:     Serial Number: [id number]:  [id number]   01/09/2022 2101 232 (H) 70 - 105 mg/dL Final     Comment:     Serial Number: [id number]:  [id number]   01/09/2022 1620 230 (H) 70 - 105 mg/dL Final     Comment:     Serial Number: [id number]:  [id number]   01/09/2022 1112 226 (H) 70 - 105 mg/dL Final     Comment:     Serial Number: [id number]:  [id number]   01/09/2022 0722 180 (H) 70 - 105 mg/dL Final     Comment:     Serial Number: [id number]:  [id number]   01/08/2022 2130 378 (H) 70 - 105 mg/dL Final     Comment:     Serial Number: [id number]:  [id number]   01/08/2022 1616 487 (C) 70 - 105 mg/dL Final     Comment:     Serial Number: [id number]:  [id number]   01/08/2022 1537 482 (C) 70 - 105 mg/dL Final     Comment:     Serial Number: [id number]:  [id number]     Infection   Results from last 7 days   Lab Units 01/05/22  1908 01/05/22  1851 01/05/22  1850   BLOODCX  No growth at 4 days No growth at 4 days  --    PROCALCITONIN ng/mL  --   --  0.82*     UA    Results from last 7 days   Lab Units 01/07/22  1210   NITRITE UA  Negative   WBC UA /HPF 0-2*   BACTERIA UA /HPF None Seen   SQUAM EPITHEL UA /HPF 0-2     Radiology(recent) No radiology results for the last day       ASSESSMENT:  Normocytic anemia with iron deficiency  Elevated alkaline phosphatase  Acute renal failure  Bilateral pneumonia/hypoxemia  Obstructive uropathy s/p Roberts catheter  Chronic back pain    PLAN:  Patient presents with shortness of breath with bilateral pneumonia and acute renal failure.  CT with distended bladder and hydronephrosis s/p Roberts catheter placement.  Urology plans to leave in Roberts catheter for 1  week.  Nephrology following and plan to hold off on dialysis at this time.  Pulmonary following, on Solu-Medrol, Rocephin and breathing treatments.  He reports improvement.  They have him on Librium although patient denies recent alcohol use but reports heavy use in the past.  Alk phos noted, check GGT iron deficiency anemia without overt GI bleeding.  Would recommend outpatient EGD and colonoscopy once renal failure/pneumonia has improved.  Little for GI to add inpatient but will follow-up as an outpatient for endoscopy.  We will see inpatient as needed, call with questions or concerns.    I discussed the patients findings and my recommendations with the patient.    We appreciate the referral    Electronically signed by DANIS Waller, 01/10/22, 11:36 AM EST.

## 2022-01-10 NOTE — PLAN OF CARE
Goal Outcome Evaluation:              Outcome Summary: Pt continues with fluids.  Prn mucinex given for cough and congestion.  Will continue to monitor.

## 2022-01-11 ENCOUNTER — APPOINTMENT (OUTPATIENT)
Dept: ULTRASOUND IMAGING | Facility: HOSPITAL | Age: 67
End: 2022-01-11

## 2022-01-11 LAB
ANION GAP SERPL CALCULATED.3IONS-SCNC: 13 MMOL/L (ref 5–15)
BUN SERPL-MCNC: 102 MG/DL (ref 8–23)
BUN/CREAT SERPL: 28.9 (ref 7–25)
CALCIUM SPEC-SCNC: 7.5 MG/DL (ref 8.6–10.5)
CHLORIDE SERPL-SCNC: 97 MMOL/L (ref 98–107)
CO2 SERPL-SCNC: 33 MMOL/L (ref 22–29)
CREAT SERPL-MCNC: 3.53 MG/DL (ref 0.76–1.27)
DEPRECATED RDW RBC AUTO: 51.6 FL (ref 37–54)
ERYTHROCYTE [DISTWIDTH] IN BLOOD BY AUTOMATED COUNT: 15.3 % (ref 12.3–15.4)
GFR SERPL CREATININE-BSD FRML MDRD: 18 ML/MIN/1.73
GLUCOSE BLDC GLUCOMTR-MCNC: 157 MG/DL (ref 70–105)
GLUCOSE BLDC GLUCOMTR-MCNC: 166 MG/DL (ref 70–105)
GLUCOSE BLDC GLUCOMTR-MCNC: 212 MG/DL (ref 70–105)
GLUCOSE BLDC GLUCOMTR-MCNC: 220 MG/DL (ref 70–105)
GLUCOSE SERPL-MCNC: 158 MG/DL (ref 65–99)
HCT VFR BLD AUTO: 21 % (ref 37.5–51)
HGB BLD-MCNC: 7.4 G/DL (ref 13–17.7)
MCH RBC QN AUTO: 33.1 PG (ref 26.6–33)
MCHC RBC AUTO-ENTMCNC: 35 G/DL (ref 31.5–35.7)
MCV RBC AUTO: 94.7 FL (ref 79–97)
PLATELET # BLD AUTO: 155 10*3/MM3 (ref 140–450)
PMV BLD AUTO: 8.4 FL (ref 6–12)
POTASSIUM SERPL-SCNC: 3.2 MMOL/L (ref 3.5–5.2)
RBC # BLD AUTO: 2.22 10*6/MM3 (ref 4.14–5.8)
SODIUM SERPL-SCNC: 143 MMOL/L (ref 136–145)
WBC NRBC COR # BLD: 10.5 10*3/MM3 (ref 3.4–10.8)

## 2022-01-11 PROCEDURE — 85027 COMPLETE CBC AUTOMATED: CPT | Performed by: STUDENT IN AN ORGANIZED HEALTH CARE EDUCATION/TRAINING PROGRAM

## 2022-01-11 PROCEDURE — 63710000001 INSULIN GLARGINE PER 5 UNITS: Performed by: STUDENT IN AN ORGANIZED HEALTH CARE EDUCATION/TRAINING PROGRAM

## 2022-01-11 PROCEDURE — 94799 UNLISTED PULMONARY SVC/PX: CPT

## 2022-01-11 PROCEDURE — 99232 SBSQ HOSP IP/OBS MODERATE 35: CPT | Performed by: HOSPITALIST

## 2022-01-11 PROCEDURE — 97116 GAIT TRAINING THERAPY: CPT

## 2022-01-11 PROCEDURE — 80048 BASIC METABOLIC PNL TOTAL CA: CPT | Performed by: STUDENT IN AN ORGANIZED HEALTH CARE EDUCATION/TRAINING PROGRAM

## 2022-01-11 PROCEDURE — 76705 ECHO EXAM OF ABDOMEN: CPT

## 2022-01-11 PROCEDURE — 94618 PULMONARY STRESS TESTING: CPT

## 2022-01-11 PROCEDURE — 25010000002 HEPARIN (PORCINE) PER 1000 UNITS: Performed by: NURSE PRACTITIONER

## 2022-01-11 PROCEDURE — 82962 GLUCOSE BLOOD TEST: CPT

## 2022-01-11 PROCEDURE — 25010000002 CEFTRIAXONE PER 250 MG: Performed by: STUDENT IN AN ORGANIZED HEALTH CARE EDUCATION/TRAINING PROGRAM

## 2022-01-11 PROCEDURE — 63710000001 INSULIN LISPRO (HUMAN) PER 5 UNITS: Performed by: STUDENT IN AN ORGANIZED HEALTH CARE EDUCATION/TRAINING PROGRAM

## 2022-01-11 PROCEDURE — 97530 THERAPEUTIC ACTIVITIES: CPT

## 2022-01-11 RX ORDER — FOLIC ACID 1 MG/1
1 TABLET ORAL DAILY
Qty: 30 TABLET | Refills: 3 | Status: ON HOLD | OUTPATIENT
Start: 2022-01-12 | End: 2023-03-01

## 2022-01-11 RX ORDER — FAMOTIDINE 20 MG/1
20 TABLET, FILM COATED ORAL 2 TIMES DAILY
Qty: 30 TABLET | Refills: 3 | Status: ON HOLD | OUTPATIENT
Start: 2022-01-11 | End: 2022-05-25

## 2022-01-11 RX ORDER — METHION/INOS/CHOL BT/B COM/LIV 110MG-86MG
100 CAPSULE ORAL DAILY
Qty: 30 TABLET | Refills: 3 | Status: SHIPPED | OUTPATIENT
Start: 2022-01-11 | End: 2022-05-25 | Stop reason: HOSPADM

## 2022-01-11 RX ADMIN — LIDOCAINE 1 PATCH: 50 PATCH TOPICAL at 09:16

## 2022-01-11 RX ADMIN — SODIUM CHLORIDE, PRESERVATIVE FREE 10 ML: 5 INJECTION INTRAVENOUS at 09:16

## 2022-01-11 RX ADMIN — INSULIN LISPRO 4 UNITS: 100 INJECTION, SOLUTION INTRAVENOUS; SUBCUTANEOUS at 09:23

## 2022-01-11 RX ADMIN — IPRATROPIUM BROMIDE AND ALBUTEROL SULFATE 3 ML: 2.5; .5 SOLUTION RESPIRATORY (INHALATION) at 00:00

## 2022-01-11 RX ADMIN — PANTOPRAZOLE SODIUM 40 MG: 40 TABLET, DELAYED RELEASE ORAL at 17:19

## 2022-01-11 RX ADMIN — IPRATROPIUM BROMIDE AND ALBUTEROL SULFATE 3 ML: 2.5; .5 SOLUTION RESPIRATORY (INHALATION) at 16:00

## 2022-01-11 RX ADMIN — PANTOPRAZOLE SODIUM 40 MG: 40 TABLET, DELAYED RELEASE ORAL at 09:17

## 2022-01-11 RX ADMIN — CHLORDIAZEPOXIDE HYDROCHLORIDE 5 MG: 5 CAPSULE ORAL at 09:23

## 2022-01-11 RX ADMIN — GABAPENTIN 100 MG: 100 CAPSULE ORAL at 09:17

## 2022-01-11 RX ADMIN — INSULIN LISPRO 4 UNITS: 100 INJECTION, SOLUTION INTRAVENOUS; SUBCUTANEOUS at 17:18

## 2022-01-11 RX ADMIN — GABAPENTIN 100 MG: 100 CAPSULE ORAL at 15:47

## 2022-01-11 RX ADMIN — CHLORDIAZEPOXIDE HYDROCHLORIDE 5 MG: 5 CAPSULE ORAL at 15:47

## 2022-01-11 RX ADMIN — HEPARIN SODIUM 5000 UNITS: 5000 INJECTION INTRAVENOUS; SUBCUTANEOUS at 09:17

## 2022-01-11 RX ADMIN — SODIUM CHLORIDE, PRESERVATIVE FREE 10 ML: 5 INJECTION INTRAVENOUS at 20:54

## 2022-01-11 RX ADMIN — CHLORDIAZEPOXIDE HYDROCHLORIDE 5 MG: 5 CAPSULE ORAL at 20:54

## 2022-01-11 RX ADMIN — INSULIN LISPRO 8 UNITS: 100 INJECTION, SOLUTION INTRAVENOUS; SUBCUTANEOUS at 11:49

## 2022-01-11 RX ADMIN — INSULIN GLARGINE 15 UNITS: 100 INJECTION, SOLUTION SUBCUTANEOUS at 09:17

## 2022-01-11 RX ADMIN — FOLIC ACID 1 MG: 1 TABLET ORAL at 09:17

## 2022-01-11 RX ADMIN — IPRATROPIUM BROMIDE AND ALBUTEROL SULFATE 3 ML: 2.5; .5 SOLUTION RESPIRATORY (INHALATION) at 07:08

## 2022-01-11 RX ADMIN — HEPARIN SODIUM 5000 UNITS: 5000 INJECTION INTRAVENOUS; SUBCUTANEOUS at 20:54

## 2022-01-11 RX ADMIN — SODIUM BICARBONATE 150 MEQ: 84 INJECTION, SOLUTION INTRAVENOUS at 09:03

## 2022-01-11 RX ADMIN — GABAPENTIN 100 MG: 100 CAPSULE ORAL at 20:54

## 2022-01-11 NOTE — THERAPY TREATMENT NOTE
Subjective: Pt agreeable to therapeutic plan of care.    Objective:     Bed mobility - Min-A sit to supine  Transfers - CGA and with rolling walker  Ambulation - 12 feet and 5 feet CGA and with rolling walker slow, step though gait pattern, pt slightly stooped forward.  Pt had to urgently sit after 12 feet due to fatigue    Pain: 4 VAS back pain  Education: Provided education on importance of mobility and skilled verbal / tactile cueing throughout intervention.     Assessment: Anthony Moss presents with functional mobility impairments which indicate the need for skilled intervention. Tolerating session today without incident. Pt seems to have had a decline in his strength since initial eval on .  Pt with poor endurance.  Pt is motivated and has good potential to recover his mobility.  Will continue to follow and progress as tolerated.     Plan/Recommendations:   Pt would benefit from Inpatient Rehabilitation placement at discharge from facility   Pt desires Inpatient Rehabilitation placement at discharge. Pt cooperative; agreeable to therapeutic recommendations and plan of care.     Basic Mobility 6-click:  Rollin = Total, A lot = 2, A little = 3; 4 = None  Supine>Sit:   1 = Total, A lot = 2, A little = 3; 4 = None   Sit>Stand with arms:  1 = Total, A lot = 2, A little = 3; 4 = None  Bed>Chair:   1 = Total, A lot = 2, A little = 3; 4 = None  Ambulate in room:  1 = Total, A lot = 2, A little = 3; 4 = None  3-5 Steps with railin = Total, A lot = 2, A little = 3; 4 = None  Score: 18    Modified [place]: 4 = Moderately severe disability (Unable to attend to own bodily needs without assistance, and unable to walk unassisted)     Post-Tx Position: Supine with HOB Elevated, Alarms activated and Call light and personal items within reach   PPE: gloves, surgical mask, eyewear protection

## 2022-01-11 NOTE — PLAN OF CARE
Goal Outcome Evaluation:  Plan of Care Reviewed With: patient      Progress: improving     Alert and oriented x 4. Able to verbalize needs and wants. Takes medication whole and tolerate well. Dialysis access site removed, no bleeding/swelling to site noted. No SOB noted. No c/o pain/discomfort noted. Per ronnie Estes to remain in place and will be removed on out patient basis. No questions/concerns noted by patient. Currently in bed, awake. Discharge orders in place, patient agreeable. Call bell in reach.

## 2022-01-11 NOTE — DISCHARGE PLACEMENT REQUEST
"Anthony Moss (66 y.o. Male)             Date of Birth Social Security Number Address Home Phone MRN    1955  [id number] [place] [address] [phone] [id number]    L[employer]-day Marital Status             None        Admission Date Admission Type Admitting Provider Attending Provider Department, Room/Bed    1/5/22 Emergency Andrey Valdez MD Jaisinghani, Salgram, MD [facility] 3C MEDICAL INPATIENT, 368/1    Discharge Date Discharge Disposition Discharge Destination           Home or Self Care              Attending Provider: Andrey Valdez MD    Allergies: No Known Allergies    Isolation: None   Infection: None   Code Status: CPR   Advance Care Planning Activity    Ht: 188 cm (74\")   Wt: 117 kg (257 lb 4.4 oz)    Admission Cmt: None   Principal Problem: Acute renal failure (ARF) (HCC) [N17.9]                 Active Insurance as of 1/5/2022     Primary Coverage     Payor Plan Insurance Group Employer/Plan Group    [employer] [place] [id number]     Payor Plan Address Payor Plan Phone Number Payor Plan Fax Number Effective Dates    MAIL STOP:   4/1/2020 - None Entered    [address] [address]       [facility] [address]       Subscriber Name Subscriber Birth Date Member ID       ANTHONY MOSS 1955 [id number]                 Emergency Contacts      (Rel.) Home Phone Work Phone Mobile Phone    DURAN ERWIN (Friend) [phone] -- [phone]    YANETH PEREZ (Friend) [phone] -- [phone]          "

## 2022-01-11 NOTE — PLAN OF CARE
Goal Outcome Evaluation:     Bed mobility - Min-A sit to supine  Transfers - CGA and with rolling walker  Ambulation - 12 feet and 5 feet CGA and with rolling walker slow, step though gait pattern, pt slightly stooped forward.  Pt had to urgently sit after 12 feet due to fatigue       Pt would benefit from Inpatient Rehabilitation placement at discharge from facility   Pt desires Inpatient Rehabilitation placement at discharge. Pt cooperative; agreeable to therapeutic recommendations and plan of care.

## 2022-01-11 NOTE — PLAN OF CARE
Goal Outcome Evaluation:      Patient very verbally aggressive throughout the time this nurse took over care. Security called to bedside to diffuse situation. Patient upset about NPO status and threaten staff if they did not allow him to drink what he wanted.

## 2022-01-11 NOTE — PROGRESS NOTES
Exercise Oximetry    Patient Name:Anthony Moss   MRN: [id number]   Date: 01/11/22             ROOM AIR BASELINE   SpO2% 86   Heart Rate    Blood Pressure      EXERCISE ON ROOM AIR SpO2% EXERCISE ON O2 @ 2 LPM SpO2%   1 MINUTE  1 MINUTE 86   2 MINUTES  2 MINUTES 87   3 MINUTES  3 MINUTES 89   4 MINUTES  4 MINUTES 89   5 MINUTES  5 MINUTES 91   6 MINUTES  6 MINUTES 93              Distance Walked   Distance Walked   Dyspnea (Solo Scale)   Dyspnea (Solo Scale)   Fatigue (Solo Scale)   Fatigue (Solo Scale)   SpO2% Post Exercise   SpO2% Post Exercise   HR Post Exercise   HR Post Exercise   Time to Recovery   Time to Recovery     Comments: pts room air spo2 was 86%, placed 2L NC on pt and he recovered nicely to 94%      Mary Rueda, CRT  1/11/2022  17:03 EST

## 2022-01-11 NOTE — PROGRESS NOTES
Daily Progress Note        Acute renal failure (ARF) (HCC)    Mixed hyperlipidemia    Essential hypertension    Obesity    Bilateral pneumonia    Thrombocytopenia (HCC)    ETOH abuse    Tobacco abuse    Marijuana use      Assessment    Bilateral pneumonia: Respiratory panel negative  Hypoxemia    Acute renal failure    Anemia  Thrombocytopenia  Alcohol abuse  Tobacco abuse  Essential hypertension  Obesity     Echo 1/6/2022  EF 56 to 60%  RVSP 8.6 mmHg    Plan:    Antibiotics Rocephin     Titrate oxygen  -Currently requiring 2 L    Solu-Medrol 40 mg every 8 hours  DVT prophylaxis Heparin sq.  Bronchodilators    Nephrology following for renal function       Completed azithromycin       LOS: 6 days     Subjective     Mild shortness of breath    Objective   Not in distress  Vital signs for last 24 hours:  Vitals:    01/11/22 0351 01/11/22 0436 01/11/22 0708 01/11/22 0711   BP:  100/67     BP Location:  Left arm     Patient Position:  Lying     Pulse:  99 95 95   Resp:  18 18 18   Temp:  97.8 °F (36.6 °C)     TempSrc:  Oral     SpO2: 95% 97% 92% 92%   Weight:       Height:           Intake/Output last 3 shifts:  I/O last 3 completed shifts:  In: 2470 [P.O.:1270; I.V.:1000; IV Piggyback:200]  Out: 54519 [Urine:53527]  Intake/Output this shift:  No intake/output data recorded.      Radiology  Imaging Results (Last 24 Hours)       Procedure Component Value Units Date/Time    US Abdomen Limited [[id number]] Collected: 01/11/22 0620     Updated: 01/11/22 0625    Narrative:      EXAMINATION: RIGHT UPPER QUADRANT ULTRASOUND      DATE OF EXAM: 1/11/2022 5:26 AM    HISTORY: Elevated liver enzymes    COMPARISON EXAM: 1/6/2022    FINDINGS:    Liver: Coarsened echotexture. No evidence of focal mass or intrahepatic biliary ductal dilatation. Normal hepatopetal portal venous flow.    Gallbladder:  No gallstones, gallbladder wall thickening or pericholecystic fluid. Negative sonographic Lua sign.    Common bile duct: Measures 5 mm;  this is within normal limits.    Pancreas: Obscured by bowel gas.    Right kidney: The right kidney measures 11.2 cm. No hydronephrosis or nephrolithiasis.    Ascites: None.        Impression:        1.  Coarse hepatic echotexture suggests nonspecific hepatocellular disease. No focal abnormality.  2.  Normal gallbladder and right kidney.  3.  Pancreas is obscured by bowel gas.  4.  No free fluid.      Slot 66    Electronically signed by:  Prince Sims D.O.    1/11/2022 4:24 AM            Labs:  Results from last 7 days   Lab Units 01/11/22 0212   WBC 10*3/mm3 10.50   HEMOGLOBIN g/dL 7.4*   HEMATOCRIT % 21.0*   PLATELETS 10*3/mm3 155     Results from last 7 days   Lab Units 01/11/22 0212 01/06/22 2322 01/05/22  2306   SODIUM mmol/L 143   < > 137   POTASSIUM mmol/L 3.2*   < > 6.2*   CHLORIDE mmol/L 97*   < > 102   CO2 mmol/L 33.0*   < > 15.0*   BUN mg/dL 102*   < > 156*   CREATININE mg/dL 3.53*   < > 12.36*   CALCIUM mg/dL 7.5*   < > 9.1   BILIRUBIN mg/dL  --   --  0.3   ALK PHOS U/L  --   --  151*   ALT (SGPT) U/L  --   --  24   AST (SGOT) U/L  --   --  37   GLUCOSE mg/dL 158*   < > 208*    < > = values in this interval not displayed.     Results from last 7 days   Lab Units 01/05/22  1846   PH, ARTERIAL pH units 7.320*   PO2 ART mm Hg 75.3*   PCO2, ARTERIAL mm Hg 25.5*   HCO3 ART mmol/L 13.1*     Results from last 7 days   Lab Units 01/06/22  0638 01/05/22  2306 01/05/22  2120   ALBUMIN g/dL 2.9 3.00* 3.10*         Results from last 7 days   Lab Units 01/06/22  0638   MARI  Negative     Results from last 7 days   Lab Units 01/05/22  2120   MAGNESIUM mg/dL 1.8     Results from last 7 days   Lab Units 01/05/22  2306   INR  1.11*   APTT seconds 30.7     Results from last 7 days   Lab Units 01/05/22  1850   TSH uIU/mL 2.840           Meds:   SCHEDULE  cefTRIAXone, 1 g, Intravenous, Q24H  chlordiazePOXIDE, 5 mg, Oral, TID  folic acid, 1 mg, Oral, Daily  gabapentin, 100 mg, Oral, TID  heparin (porcine), 5,000 Units,  Subcutaneous, Q12H  insulin glargine, 15 Units, Subcutaneous, Daily  insulin lispro, 0-24 Units, Subcutaneous, TID AC  ipratropium-albuterol, 3 mL, Nebulization, Q4H - RT  lidocaine, 1 patch, Transdermal, Q24H  pantoprazole, 40 mg, Oral, BID AC  sodium chloride, 10 mL, Intravenous, Q12H      Infusions  sodium bicarbonate drip (greater than 75 mEq/bag), 150 mEq, Last Rate: 150 mEq (01/10/22 2037)      PRNs    acetaminophen **OR** acetaminophen **OR** acetaminophen    aluminum-magnesium hydroxide-simethicone    dextrose    dextrose    flumazenil    glucagon (human recombinant)    guaiFENesin    insulin lispro **AND** insulin lispro    labetalol    Lidocaine HCl gel    LORazepam **OR** LORazepam **OR** LORazepam **OR** [DISCONTINUED] LORazepam **OR** [DISCONTINUED] LORazepam **OR** LORazepam    melatonin    nitroglycerin    ondansetron **OR** ondansetron    [COMPLETED] Insert peripheral IV **AND** sodium chloride    sodium chloride    Physical Exam:  Physical Exam  Vitals reviewed.   Cardiovascular:      Heart sounds: Murmur heard.       Pulmonary:      Breath sounds: Rhonchi and rales present.   Skin:     General: Skin is warm and dry.   Neurological:      Mental Status: He is alert and oriented to person, place, and time.       General Appearance: Lethargic  HEENT:  Normocephalic, without obvious abnormality, Conjunctiva/corneas clear,.  Normal external ear canals, Nares normal, no drainage     Neck:  Supple, symmetrical, trachea midline. No JVD.  Lungs /Chest wall: Mild wheezing and bilateral basal rhonchi, respirations unlabored symmetrical wall movement.     Heart:  Regular rate and rhythm, systolic murmur PMI left sternal border  Abdomen: Soft, non-tender, no masses, no organomegaly.    Extremities: No edema, no clubbing or cyanosis  ROS  Review of Systems   Respiratory: Positive for cough and shortness of breath.          I have reviewed current clinicals.     Electronically signed by DANIS Muhammad,  01/11/22, 8:20 AM EST.     Late chart entry, I have examined the patient on the day of the encounter and reviewed and verified the above findings and agree with the assessment and plan as documented

## 2022-01-11 NOTE — PROGRESS NOTES
Daily Progress Note        Acute renal failure (ARF) (HCC)    Mixed hyperlipidemia    Essential hypertension    Obesity    Bilateral pneumonia    Thrombocytopenia (HCC)    ETOH abuse    Tobacco abuse    Marijuana use      Assessment    Bilateral pneumonia: Respiratory panel negative  Hypoxemia    Acute renal failure    Anemia  Thrombocytopenia  Alcohol abuse  Tobacco abuse  Essential hypertension  Obesity     Echo 1/6/2022  EF 56 to 60%  RVSP 8.6 mmHg    Plan:    Antibiotics Rocephin     Titrate oxygen  -Currently requiring 2 L    DVT prophylaxis Heparin sq.  Bronchodilators  Dialysis per nephrology    Completed azithromycin       LOS: 6 days     Subjective         Objective     Vital signs for last 24 hours:  Vitals:    01/11/22 0351 01/11/22 0436 01/11/22 0708 01/11/22 0711   BP:  100/67     BP Location:  Left arm     Patient Position:  Lying     Pulse:  99 95 95   Resp:  18 18 18   Temp:  97.8 °F (36.6 °C)     TempSrc:  Oral     SpO2: 95% 97% 92% 92%   Weight:       Height:           Intake/Output last 3 shifts:  I/O last 3 completed shifts:  In: 2470 [P.O.:1270; I.V.:1000; IV Piggyback:200]  Out: 55244 [Urine:34132]  Intake/Output this shift:  No intake/output data recorded.      Radiology  Imaging Results (Last 24 Hours)     Procedure Component Value Units Date/Time    US Abdomen Limited [[id number]] Collected: 01/11/22 0620     Updated: 01/11/22 0625    Narrative:      EXAMINATION: RIGHT UPPER QUADRANT ULTRASOUND      DATE OF EXAM: 1/11/2022 5:26 AM    HISTORY: Elevated liver enzymes    COMPARISON EXAM: 1/6/2022    FINDINGS:    Liver: Coarsened echotexture. No evidence of focal mass or intrahepatic biliary ductal dilatation. Normal hepatopetal portal venous flow.    Gallbladder:  No gallstones, gallbladder wall thickening or pericholecystic fluid. Negative sonographic Lua sign.    Common bile duct: Measures 5 mm; this is within normal limits.    Pancreas: Obscured by bowel gas.    Right kidney: The right  kidney measures 11.2 cm. No hydronephrosis or nephrolithiasis.    Ascites: None.        Impression:        1.  Coarse hepatic echotexture suggests nonspecific hepatocellular disease. No focal abnormality.  2.  Normal gallbladder and right kidney.  3.  Pancreas is obscured by bowel gas.  4.  No free fluid.      Slot 66    Electronically signed by:  Prince Sims D.O.    1/11/2022 4:24 AM          Labs:  Results from last 7 days   Lab Units 01/11/22  0212   WBC 10*3/mm3 10.50   HEMOGLOBIN g/dL 7.4*   HEMATOCRIT % 21.0*   PLATELETS 10*3/mm3 155     Results from last 7 days   Lab Units 01/11/22 0212 01/06/22 2322 01/05/22  2306   SODIUM mmol/L 143   < > 137   POTASSIUM mmol/L 3.2*   < > 6.2*   CHLORIDE mmol/L 97*   < > 102   CO2 mmol/L 33.0*   < > 15.0*   BUN mg/dL 102*   < > 156*   CREATININE mg/dL 3.53*   < > 12.36*   CALCIUM mg/dL 7.5*   < > 9.1   BILIRUBIN mg/dL  --   --  0.3   ALK PHOS U/L  --   --  151*   ALT (SGPT) U/L  --   --  24   AST (SGOT) U/L  --   --  37   GLUCOSE mg/dL 158*   < > 208*    < > = values in this interval not displayed.     Results from last 7 days   Lab Units 01/05/22  1846   PH, ARTERIAL pH units 7.320*   PO2 ART mm Hg 75.3*   PCO2, ARTERIAL mm Hg 25.5*   HCO3 ART mmol/L 13.1*     Results from last 7 days   Lab Units 01/06/22  0638 01/05/22  2306 01/05/22  2120   ALBUMIN g/dL 2.9 3.00* 3.10*         Results from last 7 days   Lab Units 01/06/22  0638   MARI  Negative     Results from last 7 days   Lab Units 01/05/22  2120   MAGNESIUM mg/dL 1.8     Results from last 7 days   Lab Units 01/05/22  2306   INR  1.11*   APTT seconds 30.7     Results from last 7 days   Lab Units 01/05/22  1850   TSH uIU/mL 2.840           Meds:   SCHEDULE  cefTRIAXone, 1 g, Intravenous, Q24H  chlordiazePOXIDE, 5 mg, Oral, TID  folic acid, 1 mg, Oral, Daily  gabapentin, 100 mg, Oral, TID  heparin (porcine), 5,000 Units, Subcutaneous, Q12H  insulin glargine, 15 Units, Subcutaneous, Daily  insulin lispro, 0-24  Units, Subcutaneous, TID AC  ipratropium-albuterol, 3 mL, Nebulization, Q4H - RT  lidocaine, 1 patch, Transdermal, Q24H  pantoprazole, 40 mg, Oral, BID AC  sodium chloride, 10 mL, Intravenous, Q12H      Infusions  sodium bicarbonate drip (greater than 75 mEq/bag), 150 mEq, Last Rate: 150 mEq (01/10/22 2037)      PRNs  •  acetaminophen **OR** acetaminophen **OR** acetaminophen  •  aluminum-magnesium hydroxide-simethicone  •  dextrose  •  dextrose  •  flumazenil  •  glucagon (human recombinant)  •  guaiFENesin  •  insulin lispro **AND** insulin lispro  •  labetalol  •  Lidocaine HCl gel  •  LORazepam **OR** LORazepam **OR** LORazepam **OR** [DISCONTINUED] LORazepam **OR** [DISCONTINUED] LORazepam **OR** LORazepam  •  melatonin  •  nitroglycerin  •  ondansetron **OR** ondansetron  •  [COMPLETED] Insert peripheral IV **AND** sodium chloride  •  sodium chloride    Physical Exam:  Physical Exam  Vitals reviewed.   HENT:      Head:      Comments: Presence of Shiley  Cardiovascular:      Heart sounds: Murmur heard.       Pulmonary:      Breath sounds: Rhonchi present.   Skin:     General: Skin is warm and dry.   Neurological:      Mental Status: He is alert and oriented to person, place, and time.       General Appearance: Lethargic  HEENT:  Normocephalic, without obvious abnormality, Conjunctiva/corneas clear,.  Normal external ear canals, Nares normal, no drainage     Neck:  Supple, symmetrical, trachea midline. No JVD.  Lungs /Chest wall: Mild wheezing and bilateral basal rhonchi, respirations unlabored symmetrical wall movement.     Heart:  Regular rate and rhythm, systolic murmur PMI left sternal border  Abdomen: Soft, non-tender, no masses, no organomegaly.    Extremities: No edema, no clubbing or cyanosis  ROS  Review of Systems   Respiratory: Positive for cough and shortness of breath.      I reviewed the patient's new clinical results    Electronically signed by DANIS Espana

## 2022-01-11 NOTE — CASE MANAGEMENT/SOCIAL WORK
Continued Stay Note   [place]     Patient Name: Anthony Moss  MRN: [id number]  Today's Date: 1/11/2022    Admit Date: 1/5/2022     Discharge Plan     Row Name 01/11/22 1610       Plan    Plan DC plan: return home with possible HHC.    Plan Comments Met with patient at bedside and spoke about home with HHC v. rehab. Pt wants to go home and is agreeable to HHC. Agency list provided. Called patient and patient does not have preferred agency. Sent to [facility]-unable to accept pt until next week d/t staffing. [facility] and [facility] do not accept pt's insurance. Awaiting on liaison [facility] to confirm HHC details.              Met with patient in room wearing PPE: mask, face shield/goggles.      Maintained distance greater than six feet and spent less than 15 minutes in the room.      Megan Naegele, RN      Office Phone: [phone]  Office Cell: [phone]

## 2022-01-12 ENCOUNTER — READMISSION MANAGEMENT (OUTPATIENT)
Dept: CALL CENTER | Facility: HOSPITAL | Age: 67
End: 2022-01-12

## 2022-01-12 VITALS
HEIGHT: 74 IN | TEMPERATURE: 97.4 F | HEART RATE: 90 BPM | SYSTOLIC BLOOD PRESSURE: 106 MMHG | RESPIRATION RATE: 20 BRPM | OXYGEN SATURATION: 95 % | BODY MASS INDEX: 31.52 KG/M2 | DIASTOLIC BLOOD PRESSURE: 70 MMHG | WEIGHT: 245.59 LBS

## 2022-01-12 LAB
ANION GAP SERPL CALCULATED.3IONS-SCNC: 13 MMOL/L (ref 5–15)
BUN SERPL-MCNC: 82 MG/DL (ref 8–23)
BUN/CREAT SERPL: 29.9 (ref 7–25)
CALCIUM SPEC-SCNC: 7.2 MG/DL (ref 8.6–10.5)
CHLORIDE SERPL-SCNC: 96 MMOL/L (ref 98–107)
CO2 SERPL-SCNC: 34 MMOL/L (ref 22–29)
CREAT SERPL-MCNC: 2.74 MG/DL (ref 0.76–1.27)
DEPRECATED RDW RBC AUTO: 50.3 FL (ref 37–54)
ERYTHROCYTE [DISTWIDTH] IN BLOOD BY AUTOMATED COUNT: 14.7 % (ref 12.3–15.4)
GFR SERPL CREATININE-BSD FRML MDRD: 24 ML/MIN/1.73
GLUCOSE BLDC GLUCOMTR-MCNC: 123 MG/DL (ref 70–105)
GLUCOSE BLDC GLUCOMTR-MCNC: 132 MG/DL (ref 70–105)
GLUCOSE SERPL-MCNC: 193 MG/DL (ref 65–99)
HCT VFR BLD AUTO: 20.1 % (ref 37.5–51)
HGB BLD-MCNC: 7 G/DL (ref 13–17.7)
MCH RBC QN AUTO: 33 PG (ref 26.6–33)
MCHC RBC AUTO-ENTMCNC: 34.6 G/DL (ref 31.5–35.7)
MCV RBC AUTO: 95.3 FL (ref 79–97)
PLATELET # BLD AUTO: 136 10*3/MM3 (ref 140–450)
PMV BLD AUTO: 8.5 FL (ref 6–12)
POTASSIUM SERPL-SCNC: 3.3 MMOL/L (ref 3.5–5.2)
RBC # BLD AUTO: 2.11 10*6/MM3 (ref 4.14–5.8)
SODIUM SERPL-SCNC: 143 MMOL/L (ref 136–145)
WBC NRBC COR # BLD: 9.3 10*3/MM3 (ref 3.4–10.8)

## 2022-01-12 PROCEDURE — 99239 HOSP IP/OBS DSCHRG MGMT >30: CPT | Performed by: HOSPITALIST

## 2022-01-12 PROCEDURE — 82962 GLUCOSE BLOOD TEST: CPT

## 2022-01-12 PROCEDURE — 94799 UNLISTED PULMONARY SVC/PX: CPT

## 2022-01-12 PROCEDURE — 80048 BASIC METABOLIC PNL TOTAL CA: CPT | Performed by: STUDENT IN AN ORGANIZED HEALTH CARE EDUCATION/TRAINING PROGRAM

## 2022-01-12 PROCEDURE — 85027 COMPLETE CBC AUTOMATED: CPT | Performed by: STUDENT IN AN ORGANIZED HEALTH CARE EDUCATION/TRAINING PROGRAM

## 2022-01-12 PROCEDURE — 25010000002 HEPARIN (PORCINE) PER 1000 UNITS: Performed by: NURSE PRACTITIONER

## 2022-01-12 PROCEDURE — 63710000001 INSULIN GLARGINE PER 5 UNITS: Performed by: STUDENT IN AN ORGANIZED HEALTH CARE EDUCATION/TRAINING PROGRAM

## 2022-01-12 RX ADMIN — PANTOPRAZOLE SODIUM 40 MG: 40 TABLET, DELAYED RELEASE ORAL at 09:31

## 2022-01-12 RX ADMIN — INSULIN GLARGINE 15 UNITS: 100 INJECTION, SOLUTION SUBCUTANEOUS at 09:30

## 2022-01-12 RX ADMIN — HEPARIN SODIUM 5000 UNITS: 5000 INJECTION INTRAVENOUS; SUBCUTANEOUS at 09:30

## 2022-01-12 RX ADMIN — IPRATROPIUM BROMIDE AND ALBUTEROL SULFATE 3 ML: 2.5; .5 SOLUTION RESPIRATORY (INHALATION) at 06:30

## 2022-01-12 RX ADMIN — IPRATROPIUM BROMIDE AND ALBUTEROL SULFATE 3 ML: 2.5; .5 SOLUTION RESPIRATORY (INHALATION) at 00:03

## 2022-01-12 RX ADMIN — LIDOCAINE 1 PATCH: 50 PATCH TOPICAL at 09:30

## 2022-01-12 RX ADMIN — FOLIC ACID 1 MG: 1 TABLET ORAL at 09:30

## 2022-01-12 RX ADMIN — IPRATROPIUM BROMIDE AND ALBUTEROL SULFATE 3 ML: 2.5; .5 SOLUTION RESPIRATORY (INHALATION) at 04:18

## 2022-01-12 RX ADMIN — CHLORDIAZEPOXIDE HYDROCHLORIDE 5 MG: 5 CAPSULE ORAL at 09:34

## 2022-01-12 RX ADMIN — GABAPENTIN 100 MG: 100 CAPSULE ORAL at 09:30

## 2022-01-12 NOTE — CASE MANAGEMENT/SOCIAL WORK
Continued Stay Note   [place]     Patient Name: Anthony Moss  MRN: [id number]  Today's Date: 1/12/2022    Admit Date: 1/5/2022     Discharge Plan     Row Name 01/12/22 1623       Plan    Plan DC plan: home, [facility] still pending acceptance.    Plan Comments Pt completed walking oximetry test and qualifies for 2L O2. Notified Shirley with [facility] and order placed. Soco with [facility] has contacted patient's MD to follow for home health but has not received acceptance.              Expected Discharge Date and Time     Expected Discharge Date Expected Discharge Time    Jan 12, 2022         Phone communication or documentation only - no physical contact with patient or family.      Megan Naegele, RN      Office Phone: [phone]  Office Cell: [phone]

## 2022-01-12 NOTE — PROGRESS NOTES
Daily Progress Note        Acute renal failure (ARF) (HCC)    Mixed hyperlipidemia    Essential hypertension    Obesity    Bilateral pneumonia    Thrombocytopenia (HCC)    ETOH abuse    Tobacco abuse    Marijuana use      Assessment    Bilateral pneumonia: Respiratory panel negative  Hypoxemia    Acute renal failure    Anemia  Thrombocytopenia  Alcohol abuse  Tobacco abuse  Essential hypertension  Obesity     Echo 1/6/2022  EF 56 to 60%  RVSP 8.6 mmHg    Plan:    Antibiotics Rocephin, discontinue after today's dose    Titrate oxygen  -Currently requiring 2 L    DVT prophylaxis Heparin sq.  Bronchodilators  Dialysis per nephrology    Completed azithromycin       LOS: 7 days     Subjective         Objective     Vital signs for last 24 hours:  Vitals:    01/12/22 0003 01/12/22 0009 01/12/22 0418 01/12/22 0424   BP:    106/70   BP Location:    Right arm   Patient Position:    Lying   Pulse: 95 96 95 91   Resp: 18 20 20 20   Temp:    97.4 °F (36.3 °C)   TempSrc:    Oral   SpO2: 96% 97% 90% 96%   Weight:    111 kg (245 lb 9.5 oz)   Height:           Intake/Output last 3 shifts:  I/O last 3 completed shifts:  In: 2370 [P.O.:1270; I.V.:1000; IV Piggyback:100]  Out: 54360 [Urine:50222]  Intake/Output this shift:  I/O this shift:  In: 360 [P.O.:360]  Out: 2700 [Urine:2700]      Radiology  Imaging Results (Last 24 Hours)     ** No results found for the last 24 hours. **          Labs:  Results from last 7 days   Lab Units 01/12/22  0334   WBC 10*3/mm3 9.30   HEMOGLOBIN g/dL 7.0*   HEMATOCRIT % 20.1*   PLATELETS 10*3/mm3 136*     Results from last 7 days   Lab Units 01/12/22  0334 01/06/22  2322 01/05/22  2306   SODIUM mmol/L 143   < > 137   POTASSIUM mmol/L 3.3*   < > 6.2*   CHLORIDE mmol/L 96*   < > 102   CO2 mmol/L 34.0*   < > 15.0*   BUN mg/dL 82*   < > 156*   CREATININE mg/dL 2.74*   < > 12.36*   CALCIUM mg/dL 7.2*   < > 9.1   BILIRUBIN mg/dL  --   --  0.3   ALK PHOS U/L  --   --  151*   ALT (SGPT) U/L  --   --  24   AST  (SGOT) U/L  --   --  37   GLUCOSE mg/dL 193*   < > 208*    < > = values in this interval not displayed.     Results from last 7 days   Lab Units 01/05/22  1846   PH, ARTERIAL pH units 7.320*   PO2 ART mm Hg 75.3*   PCO2, ARTERIAL mm Hg 25.5*   HCO3 ART mmol/L 13.1*     Results from last 7 days   Lab Units 01/06/22  0638 01/05/22  2306 01/05/22  2120   ALBUMIN g/dL 2.9 3.00* 3.10*         Results from last 7 days   Lab Units 01/06/22  0638   MARI  Negative     Results from last 7 days   Lab Units 01/05/22  2120   MAGNESIUM mg/dL 1.8     Results from last 7 days   Lab Units 01/05/22  2306   INR  1.11*   APTT seconds 30.7     Results from last 7 days   Lab Units 01/05/22  1850   TSH uIU/mL 2.840           Meds:   SCHEDULE  cefTRIAXone, 1 g, Intravenous, Q24H  chlordiazePOXIDE, 5 mg, Oral, TID  folic acid, 1 mg, Oral, Daily  gabapentin, 100 mg, Oral, TID  heparin (porcine), 5,000 Units, Subcutaneous, Q12H  insulin glargine, 15 Units, Subcutaneous, Daily  insulin lispro, 0-24 Units, Subcutaneous, TID AC  ipratropium-albuterol, 3 mL, Nebulization, Q4H - RT  lidocaine, 1 patch, Transdermal, Q24H  pantoprazole, 40 mg, Oral, BID AC  sodium chloride, 10 mL, Intravenous, Q12H      Infusions  sodium bicarbonate drip (greater than 75 mEq/bag), 150 mEq, Last Rate: Stopped (01/11/22 1639)      PRNs  •  acetaminophen **OR** acetaminophen **OR** acetaminophen  •  aluminum-magnesium hydroxide-simethicone  •  dextrose  •  dextrose  •  flumazenil  •  glucagon (human recombinant)  •  guaiFENesin  •  insulin lispro **AND** insulin lispro  •  labetalol  •  Lidocaine HCl gel  •  LORazepam **OR** LORazepam **OR** LORazepam **OR** [DISCONTINUED] LORazepam **OR** [DISCONTINUED] LORazepam **OR** LORazepam  •  melatonin  •  nitroglycerin  •  ondansetron **OR** ondansetron  •  [COMPLETED] Insert peripheral IV **AND** sodium chloride  •  sodium chloride    Physical Exam:  Physical Exam  Vitals reviewed.   HENT:      Head:      Comments: Presence  ciara Snyder  Cardiovascular:      Heart sounds: Murmur heard.       Pulmonary:      Breath sounds: Rhonchi present.   Skin:     General: Skin is warm and dry.   Neurological:      Mental Status: He is alert and oriented to person, place, and time.       General Appearance: Lethargic  HEENT:  Normocephalic, without obvious abnormality, Conjunctiva/corneas clear,.  Normal external ear canals, Nares normal, no drainage     Neck:  Supple, symmetrical, trachea midline. No JVD.  Lungs /Chest wall: Mild wheezing and bilateral basal rhonchi, respirations unlabored symmetrical wall movement.     Heart:  Regular rate and rhythm, systolic murmur PMI left sternal border  Abdomen: Soft, non-tender, no masses, no organomegaly.    Extremities: No edema, no clubbing or cyanosis  ROS  Review of Systems   Respiratory: Positive for cough and shortness of breath.      I reviewed the patient's new clinical results    Electronically signed by DANIS Espana

## 2022-01-12 NOTE — PROGRESS NOTES
PROGRESS NOTE      Patient Name: Anthony Moss  : 1955  MRN: [id number]  Primary Care Physician: Marva Gaviria APRN  Date of admission: 2022    Patient Care Team:  Marva Gaviria APRN as PCP - General (Nurse Practitioner)        Subjective   Subjective:     Patient is feeling a lot better than yesterday Roberts catheter is clear with 1 8.5 of urine in last 24-hour  Review of systems:  All other review of system unremarkable      Allergies:  No Known Allergies    Objective   Exam:     Vital Signs  Temp:  [97 °F (36.1 °C)-98 °F (36.7 °C)] 97.5 °F (36.4 °C)  Heart Rate:  [] 85  Resp:  [18] 18  BP: ()/(55-69) 89/55  SpO2:  [90 %-99 %] 92 %  on  Flow (L/min):  [2] 2;   Device (Oxygen Therapy): nasal cannula  Body mass index is 33.03 kg/m².    General: Middle-aged obese  male in no acute distress.    Head:      Normocephalic and atraumatic.    Eyes:      PERRL/EOM intact, conjunctiva and sclera clear with out nystagmus.    Neck:      No masses, thyromegaly,  trachea central with normal respiratory effort   Lungs:    Crackles bilaterally to auscultation.    Heart:      Regular rate and rhythm, no murmur no gallop  Abd:        Soft, nontender, not distended, bowel sounds positive, no shifting dullness   Pulses:   Pulses palpable  Extr:        No cyanosis or clubbing--+1 edema.    Neuro:    No focal deficits.   alert oriented x3  Skin:       Intact without lesions or rashes.    Psych:    Alert and cooperative; normal mood and affect; .      Results Review:  I have personally reviewed most recent Data :  CBC    Results from last 7 days   Lab Units 22  0212 01/10/22  0257 22  1819 22  0911 22  0401 22  0624 22  2322 22  0638 22  0638 22  2306 22  2306   WBC 10*3/mm3 10.50 9.80  --   --  12.00* 10.80 10.00  --  7.50  --  7.50   HEMOGLOBIN g/dL 7.4* 7.6* 7.5* 6.8* 6.8* 8.1* 7.7*   < > 8.4*   < > 8.3*   PLATELETS 10*3/mm3 155 141  --    --  141 129* 107*  --  90*  --  92*    < > = values in this interval not displayed.     CMP   Results from last 7 days   Lab Units 01/11/22  0212 01/10/22  0257 01/09/22  0401 01/08/22  0624 01/06/22  2322 01/06/22  0638 01/05/22  2306 01/05/22  2120   SODIUM mmol/L 143 144 141 137 132*  --  137 134*   POTASSIUM mmol/L 3.2* 3.4* 4.0 4.6 5.5*  --  6.2* 6.7*   CHLORIDE mmol/L 97* 99 98 96* 97*  --  102 103   CO2 mmol/L 33.0* 30.0* 27.0 21.0* 18.0*  --  15.0* 11.0*   BUN mg/dL 102* 123* 133* 124* 120*  --  156* 159*   CREATININE mg/dL 3.53* 4.83* 5.77* 8.25* 9.01*  --  12.36* 12.35*   GLUCOSE mg/dL 158* 154* 207* 416* 238*  --  208* 149*   ALBUMIN g/dL  --   --   --   --   --  2.9 3.00* 3.10*   BILIRUBIN mg/dL  --   --   --   --   --   --  0.3 0.3   ALK PHOS U/L  --   --   --   --   --   --  151* 159*   AST (SGOT) U/L  --   --   --   --   --   --  37 40   ALT (SGPT) U/L  --   --   --   --   --   --  24 25   AMMONIA umol/L  --   --   --   --   --  17  --   --      ABG    Results from last 7 days   Lab Units 01/05/22  1846   PH, ARTERIAL pH units 7.320*   PCO2, ARTERIAL mm Hg 25.5*   PO2 ART mm Hg 75.3*   O2 SATURATION ART % 94.1   BASE EXCESS ART mmol/L -11.7*     US Abdomen Limited    Result Date: 1/11/2022  1.  Coarse hepatic echotexture suggests nonspecific hepatocellular disease. No focal abnormality. 2.  Normal gallbladder and right kidney. 3.  Pancreas is obscured by bowel gas. 4.  No free fluid. Slot 66 Electronically signed by:  Prince Sims D.O.  1/11/2022 4:24 AM      Results for orders placed during the hospital encounter of 01/05/22    Adult Transthoracic Echo Complete w/ Color, Spectral and Contrast if Necessary Per Protocol    Interpretation Summary  · Left ventricular ejection fraction appears to be 56 - 60%.  · The right ventricular cavity is moderately dilated.  · No pericardial effusion noted    Scheduled Meds:cefTRIAXone, 1 g, Intravenous, Q24H  chlordiazePOXIDE, 5 mg, Oral, TID  folic acid, 1  mg, Oral, Daily  gabapentin, 100 mg, Oral, TID  heparin (porcine), 5,000 Units, Subcutaneous, Q12H  insulin glargine, 15 Units, Subcutaneous, Daily  insulin lispro, 0-24 Units, Subcutaneous, TID AC  ipratropium-albuterol, 3 mL, Nebulization, Q4H - RT  lidocaine, 1 patch, Transdermal, Q24H  pantoprazole, 40 mg, Oral, BID AC  sodium chloride, 10 mL, Intravenous, Q12H      Continuous Infusions:sodium bicarbonate drip (greater than 75 mEq/bag), 150 mEq, Last Rate: Stopped (01/11/22 1579)      PRN Meds:•  acetaminophen **OR** acetaminophen **OR** acetaminophen  •  aluminum-magnesium hydroxide-simethicone  •  dextrose  •  dextrose  •  flumazenil  •  glucagon (human recombinant)  •  guaiFENesin  •  insulin lispro **AND** insulin lispro  •  labetalol  •  Lidocaine HCl gel  •  LORazepam **OR** LORazepam **OR** LORazepam **OR** [DISCONTINUED] LORazepam **OR** [DISCONTINUED] LORazepam **OR** LORazepam  •  melatonin  •  nitroglycerin  •  ondansetron **OR** ondansetron  •  [COMPLETED] Insert peripheral IV **AND** sodium chloride  •  sodium chloride    Assessment/Plan   Assessment and Plan:         Acute renal failure (ARF) (HCC)    Mixed hyperlipidemia    Essential hypertension    Obesity    Bilateral pneumonia    Thrombocytopenia (HCC)    ETOH abuse    Tobacco abuse    Marijuana use    ASSESSMENT:  Acute kidney injury, stage III, oligoanuric, metabolic differential at this this time, including sepsis, with bilateral pneumonia, but also with a presentation with anemia, thrombocytopenia, have to rule out any TMA.  Patient apparently did have labs with his primary care doctor, we have to open the labs, to start with baseline creatinine never been told about any kidney problems in the past,  Had been taking Aleve, for the back pain,  Hyperkalemia,  Weakness and tiredness, with a BUN of 59, very concerning for uremia,  Metabolic acidosis, gap and nongap, DKA, azotemia, lactic acid normal,  Alcohol use,  Chronic back pain,using  NSAID  B/l pneumonia.  Surprisingly, WBC stable,           Plan:      No need for dialysis anymore potassium level is stable no significant metabolic acidosis  Patient had obstructive uropathy which is improved after   Roberts catheter  Remove Shiley cathter  No need for dialysis as long as patient is making urine he should get better  Creatinine continue to improve his last 24-hour  Continue to treat patient pneumonia aggressively  We will get a repeat ultrasound in a few weeks to reassess kidney echotexture   Unlikely to be any secondary cause of renal failure serology so far unremarkable but I think it will be negative as urine has no significant proteinuria or active sediment   Pulmonary consult appreciated        Electronically signed by Jamie Houston MD,   [facility] kidney consultant  [phone]

## 2022-01-12 NOTE — PROGRESS NOTES
[facility] Medicine Services Daily Progress Note    Patient Name: Anthony Moss  : 1955  MRN: [id number]  Primary Care Physician:  Marva Gaviria APRN  Date of admission: 2022      Subjective      Chief Complaint: Dyspnea      2022  Shiley placed overnight, HD scheduled for today.  Patient complains of pain from failed cath attempt.    2022  Patient underwent HD yesterday.  Roberts cath placed by urology as CT a/p showed b/l hydronephrosis with bladder distension    22  Creatinine slowly improving.  Glucose uncontrolled due to steroid induced hyperglycemia, will dc steroids as doubtful disease is auto-immune.  Incease lantus and add keli lispro.    22  Steroid induced hyperglycemia resolving, will de-escalate insulin.  Creatinine continues to improve.  Hgb at 6.8 this morning, no overt bleeding, will transfuse 1u prbc and have GI assess due to history of alcoholism.    2022  Denies for any new complaint, no nausea or vomiting.      ROS   Constitutional: Positive for chills, malaise/fatigue and weight gain.   HENT: Positive for congestion.    Eyes: Negative.  Negative for visual disturbance.   Cardiovascular: Positive for dyspnea on exertion, leg swelling and orthopnea.   Respiratory: Positive for cough and shortness of breath.    Endocrine: Negative.    Hematologic/Lymphatic: Negative.    Skin: Negative.    Musculoskeletal: Positive for myalgias.   Gastrointestinal: Negative for bloating, abdominal pain, melena, nausea and vomiting.   Genitourinary: Positive for bladder incontinence, hesitancy, incomplete emptying and urgency. Negative for hematuria.   Neurological: Positive for weakness.   Psychiatric/Behavioral: The patient is nervous/anxious.    All other systems reviewed and are negative.    Objective      Vitals:   Temp:  [97 °F (36.1 °C)-98 °F (36.7 °C)] 97.5 °F (36.4 °C)  Heart Rate:  [] 85  Resp:  [18] 18  BP: ()/(55-69)  89/55  Flow (L/min):  [2] 2    Physical Exam  Constitutional:       General: He is not in acute distress.     Appearance: Normal appearance. He is obese. He is not ill-appearing.   HENT:      Head: Normocephalic and atraumatic.      Nose: No congestion.      Mouth/Throat:      Pharynx: No oropharyngeal exudate.   Eyes:      General: No scleral icterus.  Neck:      Comments: Left sided shiley present  Cardiovascular:      Rate and Rhythm: Normal rate and regular rhythm.      Heart sounds: No murmur heard.  No friction rub. No gallop.    Pulmonary:      Breath sounds: Rhonchi present. No wheezing or rales.   Abdominal:      General: There is no distension.      Tenderness: There is no abdominal tenderness. There is no guarding.   Musculoskeletal:         General: No deformity or signs of injury.      Cervical back: Normal range of motion. No rigidity.      Right lower leg: Edema present.      Left lower leg: Edema present.   Skin:     Coloration: Skin is not jaundiced.      Findings: No bruising or lesion.   Neurological:      General: No focal deficit present.      Mental Status: He is alert and oriented to person, place, and time.      Motor: No weakness.             Result Review    Result Review:  I have personally reviewed the results from the time of this admission to 1/11/2022 20:11 EST and agree with these findings:  [x]  Laboratory  []  Microbiology  [x]  Radiology  []  EKG/Telemetry   []  Cardiology/Vascular   []  Pathology  []  Old records  []  Other:          Assessment/Plan      Brief Patient Summary:  Anthony Moss is a 66 y.o. male who presented due to dyspnea and was found to be in acute renal failure complicated by lung involvement.  Abx started, Nephrology consulted.  Shiley placed by ICU.  Pulm consulted and urology consulted for trujillo placement to obtain urine.  HD stared 1/6/2022.  CT a/p showed b/l hydronephrosis with bladder distension, urology placed trujillo.  Cytoscopy unable to be done 2/2  noncompliance.      cefTRIAXone, 1 g, Intravenous, Q24H  chlordiazePOXIDE, 5 mg, Oral, TID  folic acid, 1 mg, Oral, Daily  gabapentin, 100 mg, Oral, TID  heparin (porcine), 5,000 Units, Subcutaneous, Q12H  insulin glargine, 15 Units, Subcutaneous, Daily  insulin lispro, 0-24 Units, Subcutaneous, TID AC  ipratropium-albuterol, 3 mL, Nebulization, Q4H - RT  lidocaine, 1 patch, Transdermal, Q24H  pantoprazole, 40 mg, Oral, BID AC  sodium chloride, 10 mL, Intravenous, Q12H       sodium bicarbonate drip (greater than 75 mEq/bag), 150 mEq, Last Rate: Stopped (01/11/22 9869)         Active Hospital Problems:  Active Hospital Problems    Diagnosis    • **Acute renal failure (ARF) (HCC)    • Obesity    • Essential hypertension    • Mixed hyperlipidemia    • Bilateral pneumonia    • Thrombocytopenia (HCC)    • ETOH abuse    • Tobacco abuse    • Marijuana use      Plan:   #Acute renal failure:  #b/l hydronephrosis  #Bladder distension    -Upon arrival to the ED creatinine 12.52, , GFR 4    -Consult nephrology, patient may require stat dialysis    -Shiley placed by ICU 01/06 ... will d/c toni today.    - HD performed 01/06    - CT a/p shows bilateral hydronephrosis with bladder distension    - Urology consulted for trujillo (nursing failed) to obtain urine    - will dc steroids as on 2L without wheezing and renal failure does not appear to be autoimmune    - renal biopsy to be held     - creatinine 12.5 > 9.01 > 8.25 > 5.77    - trujillo placed by urology    - cystoscopy unable to be performed as patient will not tolerated    - will assess how creatinine improves with obstruction relieved  , nephrology on board, will let them decide further need additional dialysis.    #Bilateral pneumonia:    -Chest x-ray reviewed    -Covid negative    -Treated in ED with IV azithromycin, IV Rocephin    -Continue IV Rocephin, discussed renal function with pharmacist    - azithromycin d/c    -Trend lactic acid levels    -Procalcitonin 0.82 >  abx warranted    -Blood culture obtained and are pending    -Check sputum culture, check urine antigen:  Strep Pneumo & legionella    -DuoNebs, oxygen, incentive spirometry    - pulm consulted by nephrology    - currently on 2LNC, afebrile    #Steroid induced hyperglycemia - resolving    - glucose peaked at 600 on 1/8/22 due to steroids    - continue lantus 15u SC daily    - expand ISS    - lispro 5u SC AC has been held as hyperglycemia resolving    - stop IV steroids    - hypoglycemia protocol    #Anemia    - hgb 8.1 > 6.8    - suspect combination of renal disease and iron deficiency    - However, due to elevated BUN with history of alcoholism and elevated BUN will have GI assess    - protonix 40mg IV BID    - transfuse 1u prbc on 1/9/22     #Thrombocytopenia:    -Upon arrival to the ED platelets 113 >141    -Monitor CBC during admission    - most likely 2/2 renal failure or bone marrow suppression     #ETOH abuse:    -Initiate EtOH withdrawal protocol    -Monitor vital signs every 4hrs    -Seizure precautions    -Ammonia level, ethanol level    -Vitamin replacement, Ativan dosing held due to acute renal failure     #Tobacco abuse marijuana use:    -Tobacco cessation education     #Essential hypertension:    -Encourage lifestyle modifications    -Low-sodium diet    -No home medications noted     #Mixed hyperlipidemia:    -Encourage lifestyle modifications    -Encourage dietary modifications    -No Home statin noted     #Obesity:    -Encourage lifestyle modifications    -BMI 32.61    Will discharge once arrangement are done.       DVT prophylaxis:  Medical and mechanical DVT prophylaxis orders are present.    CODE STATUS:    Code Status (Patient has no pulse and is not breathing): CPR (Attempt to Resuscitate)  Medical Interventions (Patient has pulse or is breathing): Full Support      Disposition:  I expect patient to be discharged in 3-4 days.    This patient has been examined wearing appropriate Personal  Protective Equipment and discussed with Patient and nephrology. 01/11/22      Electronically signed by Andrey Valdez MD, 01/11/22, 20:11 EST.  [facility] Hospitalist Team

## 2022-01-12 NOTE — PAYOR COMM NOTE
"NOTIFICATION OF DISCHARGE:        Anthony Moss (66 y.o. Male) 1955  AUTH # [id number]        DISCHARGED TO HOME WITH HOME HEALTH 1/12/2022            AUTHORIZATION PENDING:   PLEASE CALL OR FAX DETERMINATION TO CONTACT BELOW. THANK YOU.        Cristin Etienne, RN MSN  /UR  [facility]  [phone] office  [phone] fax  [email or web address]    [facility]  NPI: [id number]  Tax: [phone]          Anthony Moss (66 y.o. Male)             Date of Birth Social Security Number Address Home Phone MRN    1955  [address] [place] [address] [phone] [id number]    [employer] Marital Status             None        Admission Date Admission Type Admitting Provider Attending Provider Department, Room/Bed    1/5/22 Emergency Andrey Valdez MD  [facility] 3C MEDICAL INPATIENT, 368/1    Discharge Date Discharge Disposition Discharge Destination          1/12/2022 Home-Health Care Svc              Attending Provider: (none)   Allergies: No Known Allergies    Isolation: None   Infection: None   Code Status: CPR   Advance Care Planning Activity    Ht: 188 cm (74\")   Wt: 111 kg (245 lb 9.5 oz)    Admission Cmt: None   Principal Problem: Acute renal failure (ARF) (HCC) [N17.9]                 Active Insurance as of 1/5/2022     Primary Coverage     Payor Plan Insurance Group Employer/Plan Group    [employer] [place] [id number]     Payor Plan Address Payor Plan Phone Number Payor Plan Fax Number Effective Dates    MAIL STOP:   4/1/2020 - None Entered    [address] [address]       [facility] [address]       Subscriber Name Subscriber Birth Date Member ID       ANTHONY MOSS 1955 [id number]                 Emergency Contacts      (Rel.) Home Phone Work Phone Mobile Phone    DURAN ERWIN (Friend) [phone] -- [phone]    YANETH PEREZ (Friend) [phone] -- [phone] "            Discharge Summary    No notes of this type exist for this encounter.         Discharge Order (From admission, onward)     Start     Ordered    01/12/22 0933  Discharge patient  Once        Expected Discharge Date: 01/12/22    Expected Discharge Time: Morning    Discharge Disposition: Home-Health Care [facility]    Physician of Record for Attribution - Please select from Treatment Team: MARIA REAVES [[id number]]    Review needed by CMO to determine Physician of Record: No       Question Answer Comment   Physician of Record for Attribution - Please select from Treatment Team MARIA REAVES    Review needed by CMO to determine Physician of Record No        01/12/22 0932    01/11/22 1029  Discharge patient  Once        Expected Discharge Date: 01/11/22    Expected Discharge Time: Morning    Discharge Disposition: Home or Self Care    Physician of Record for Attribution - Please select from Treatment Team: MARIA REAVES [[id number]]    Review needed by CMO to determine Physician of Record: No       Question Answer Comment   Physician of Record for Attribution - Please select from Treatment Team MARIA REAVES    Review needed by CMO to determine Physician of Record No        01/11/22 1032

## 2022-01-12 NOTE — PROGRESS NOTES
PROGRESS NOTE      Patient Name: nAthony Moss  : 1955  MRN: [id number]  Primary Care Physician: Marva Gaviria APRN  Date of admission: 2022    Patient Care Team:  Marva Gaviria APRN as PCP - General (Nurse Practitioner)        Subjective   Subjective:     Patient is feeling a lot better than yesterday Roberts catheter is clear with 1 8.5 of urine in last 24-hour  Review of systems:  All other review of system unremarkable      Allergies:  No Known Allergies    Objective   Exam:     Vital Signs  Temp:  [97.4 °F (36.3 °C)-98.1 °F (36.7 °C)] 97.4 °F (36.3 °C)  Heart Rate:  [] 90  Resp:  [18-20] 20  BP: ()/(63-70) 106/70  SpO2:  [90 %-97 %] 95 %  on  Flow (L/min):  [2] 2;   Device (Oxygen Therapy): nasal cannula  Body mass index is 31.53 kg/m².    General: Middle-aged obese  male in no acute distress.    Head:      Normocephalic and atraumatic.    Eyes:      PERRL/EOM intact, conjunctiva and sclera clear with out nystagmus.    Neck:      No masses, thyromegaly,  trachea central with normal respiratory effort   Lungs:    Crackles bilaterally to auscultation.    Heart:      Regular rate and rhythm, no murmur no gallop  Abd:        Soft, nontender, not distended, bowel sounds positive, no shifting dullness   Pulses:   Pulses palpable  Extr:        No cyanosis or clubbing--+1 edema.    Neuro:    No focal deficits.   alert oriented x3  Skin:       Intact without lesions or rashes.    Psych:    Alert and cooperative; normal mood and affect; .      Results Review:  I have personally reviewed most recent Data :  CBC    Results from last 7 days   Lab Units 22  0334 22  0212 01/10/22  0257 22  1819 22  0911 22  0401 22  0624 22  2322 22  2322 22  0638 22  0638   WBC 10*3/mm3 9.30 10.50 9.80  --   --  12.00* 10.80  --  10.00  --  7.50   HEMOGLOBIN g/dL 7.0* 7.4* 7.6* 7.5* 6.8* 6.8* 8.1*   < > 7.7*   < > 8.4*   PLATELETS 10*3/mm3  136* 155 141  --   --  141 129*  --  107*  --  90*    < > = values in this interval not displayed.     CMP   Results from last 7 days   Lab Units 01/12/22  0334 01/11/22  0212 01/10/22  0257 01/09/22  0401 01/08/22  0624 01/06/22  2322 01/06/22  0638 01/05/22  2306 01/05/22 2120 01/05/22 2120   SODIUM mmol/L 143 143 144 141 137 132*  --  137   < > 134*   POTASSIUM mmol/L 3.3* 3.2* 3.4* 4.0 4.6 5.5*  --  6.2*   < > 6.7*   CHLORIDE mmol/L 96* 97* 99 98 96* 97*  --  102   < > 103   CO2 mmol/L 34.0* 33.0* 30.0* 27.0 21.0* 18.0*  --  15.0*   < > 11.0*   BUN mg/dL 82* 102* 123* 133* 124* 120*  --  156*   < > 159*   CREATININE mg/dL 2.74* 3.53* 4.83* 5.77* 8.25* 9.01*  --  12.36*   < > 12.35*   GLUCOSE mg/dL 193* 158* 154* 207* 416* 238*  --  208*   < > 149*   ALBUMIN g/dL  --   --   --   --   --   --  2.9 3.00*  --  3.10*   BILIRUBIN mg/dL  --   --   --   --   --   --   --  0.3  --  0.3   ALK PHOS U/L  --   --   --   --   --   --   --  151*  --  159*   AST (SGOT) U/L  --   --   --   --   --   --   --  37  --  40   ALT (SGPT) U/L  --   --   --   --   --   --   --  24  --  25   AMMONIA umol/L  --   --   --   --   --   --  17  --   --   --     < > = values in this interval not displayed.     ABG    Results from last 7 days   Lab Units 01/05/22  1846   PH, ARTERIAL pH units 7.320*   PCO2, ARTERIAL mm Hg 25.5*   PO2 ART mm Hg 75.3*   O2 SATURATION ART % 94.1   BASE EXCESS ART mmol/L -11.7*     US Abdomen Limited    Result Date: 1/11/2022  1.  Coarse hepatic echotexture suggests nonspecific hepatocellular disease. No focal abnormality. 2.  Normal gallbladder and right kidney. 3.  Pancreas is obscured by bowel gas. 4.  No free fluid. Slot 66 Electronically signed by:  Prince Sims D.O.  1/11/2022 4:24 AM      Results for orders placed during the hospital encounter of 01/05/22    Adult Transthoracic Echo Complete w/ Color, Spectral and Contrast if Necessary Per Protocol    Interpretation Summary  · Left ventricular ejection  fraction appears to be 56 - 60%.  · The right ventricular cavity is moderately dilated.  · No pericardial effusion noted    Scheduled Meds:cefTRIAXone, 1 g, Intravenous, Q24H  chlordiazePOXIDE, 5 mg, Oral, TID  folic acid, 1 mg, Oral, Daily  gabapentin, 100 mg, Oral, TID  heparin (porcine), 5,000 Units, Subcutaneous, Q12H  insulin glargine, 15 Units, Subcutaneous, Daily  insulin lispro, 0-24 Units, Subcutaneous, TID AC  ipratropium-albuterol, 3 mL, Nebulization, Q4H - RT  lidocaine, 1 patch, Transdermal, Q24H  pantoprazole, 40 mg, Oral, BID AC  sodium chloride, 10 mL, Intravenous, Q12H      Continuous Infusions:sodium bicarbonate drip (greater than 75 mEq/bag), 150 mEq, Last Rate: Stopped (01/11/22 9253)      PRN Meds:•  acetaminophen **OR** acetaminophen **OR** acetaminophen  •  aluminum-magnesium hydroxide-simethicone  •  dextrose  •  dextrose  •  flumazenil  •  glucagon (human recombinant)  •  guaiFENesin  •  insulin lispro **AND** insulin lispro  •  labetalol  •  Lidocaine HCl gel  •  LORazepam **OR** LORazepam **OR** LORazepam **OR** [DISCONTINUED] LORazepam **OR** [DISCONTINUED] LORazepam **OR** LORazepam  •  melatonin  •  nitroglycerin  •  ondansetron **OR** ondansetron  •  [COMPLETED] Insert peripheral IV **AND** sodium chloride  •  sodium chloride    Assessment/Plan   Assessment and Plan:         Acute renal failure (ARF) (HCC)    Mixed hyperlipidemia    Essential hypertension    Obesity    Bilateral pneumonia    Thrombocytopenia (HCC)    ETOH abuse    Tobacco abuse    Marijuana use    ASSESSMENT:  Acute kidney injury, stage III, oligoanuric, metabolic differential at this this time, including sepsis, with bilateral pneumonia, but also with a presentation with anemia, thrombocytopenia, have to rule out any TMA.  Patient apparently did have labs with his primary care doctor, we have to open the labs, to start with baseline creatinine never been told about any kidney problems in the past,Had been taking  Aleve, for the back pain,  Hyperkalemia,  Weakness and tiredness, with a BUN of 59, very concerning for uremia,  Metabolic acidosis, gap and nongap, DKA, azotemia, lactic acid normal,  Alcohol use,  Chronic back pain,using NSAID  B/l pneumonia.  Surprisingly, WBC stable,           Plan:      Patient renal function continues to improve.  Shiley catheter removed  Electrolytes acceptable  Significant anemia etiology uncertain patient was transfused  Continue to treat patient pneumonia aggressively  We will get a repeat ultrasound in a few weeks to reassess kidney echotexture   Unlikely to be any secondary cause of renal failure serology so far unremarkable but I think it will be negative as urine has no significant proteinuria or active sediment   Pulmonary consult appreciated  Renal clinic 4 to 6 weeks after discharge        Electronically signed by Jamie Houston MD,   [facility] kidney consultant  [phone]

## 2022-01-12 NOTE — PROGRESS NOTES
Spoke to patient and friend  Demographics verified   Agrees to PT services   Pt has FC will make appt with Dr Cabrera for next week to have removed   LM with SIOBHAN Gaviria NP office requesting to follow no call back yet   SC sent to LUIS Cabrera requesting to follow no answer yet      Pt aware of Pending MD agreeing to follow for [facility] services He is going to call and make follow up appt with NP now

## 2022-01-13 NOTE — CASE MANAGEMENT/SOCIAL WORK
Case Management Discharge Note      Final Note: [facility] services still pending acceptance.      Selected Continued Care - Discharged on 1/12/2022 Admission date: 1/5/2022 - Discharge disposition: Home-Health Care Svc        Durable Medical Equipment Coordination complete.    Service Provider Selected Services Address Phone Fax Patient Preferred    [facility]  Durable Medical Equipment [id number] [id number] [phone] --                Final Discharge Disposition Code: 01 - home or self-care

## 2022-01-13 NOTE — DISCHARGE SUMMARY
[facility] Medicine Services  DISCHARGE SUMMARY    Patient Name: Anthony Moss  : 1955  MRN: [id number]    Date of Admission: 2022  Date of Discharge:  2022  Primary Care Physician: Marva Gaviria APRN      Presenting Problem:   Hypoxia [R09.02]  Acute renal failure (ARF) (HCC) [N17.9]  Pneumonia of both lower lobes due to infectious organism [J18.9]  Acute renal failure, unspecified acute renal failure type (HCC) [N17.9]  Dyspnea, unspecified type [R06.00]    Active and Resolved Hospital Problems:  Active Hospital Problems    Diagnosis POA   • **Acute renal failure (ARF) (HCC) [N17.9] Yes   • Obesity [E66.9] Yes   • Essential hypertension [I10] Yes   • Mixed hyperlipidemia [E78.2] Yes   • Bilateral pneumonia [J18.9] Yes   • Thrombocytopenia (HCC) [D69.6] Yes   • ETOH abuse [F10.10] Yes   • Tobacco abuse [Z72.0] Yes   • Marijuana use [F12.90] Yes      Resolved Hospital Problems   No resolved problems to display.         Hospital Course     Hospital Course by problem list.    #Acute renal failure secondary to obstructive uropathy  #b/l hydronephrosis  #Bladder distension    -Upon arrival to the ED creatinine 12.52, , GFR 4    -Consult nephrology,    -Shiley placed by ICU  ... will d/c shiley today.    - HD performed     - CT a/p shows bilateral hydronephrosis with bladder distension    - Urology consulted for trujillo (nursing failed) to obtain urine    -  dc steroids as on 2L without wheezing and renal failure does not appear to be autoimmune    - renal biopsy to be held     - creatinine 12.5 > 9.01 > 8.25 > 5.77    - trujillo placed by urology    - cystoscopy unable to be performed as patient will not tolerated, pt will follow with urology service outpatient next week for removal of trujillo with voiding trial and possible cystoscopy.  - creatinine continue to improve, advised no dialysis ... shiley catheter was d/c.     #possible Bilateral  pneumonia:    -Chest x-ray reviewed    -Covid negative  - treated with antibiotics while in house, will be discharge off the antibiotics.     #Steroid induced hyperglycemia - resolving    - glucose peaked at 600 on 1/8/22 due to steroids    - continue lantus 15u SC daily    - expand ISS    - lispro 5u SC AC has been held as hyperglycemia resolving    - stop IV steroids    - hypoglycemia protocol     #Anemia    - hgb 8.1 > 6.8    - suspect combination of renal disease and iron deficiency    - However, due to elevated BUN with history of alcoholism and elevated BUN will have GI assess    - protonix 40mg IV BID    - transfuse 1u prbc on 1/9/22  -  Outpatient monitoring with PCP.     # Mild relative thrombocytopenia.     #ETOH abuse:  Pt advised strongly to quiet or cut down significantly, pt verbalized understanding.     #Tobacco abuse marijuana use:    -Tobacco cessation education     #Essential hypertension:    -Encourage lifestyle modifications    -Low-sodium diet    -No home medications noted     #Mixed hyperlipidemia:    -Encourage lifestyle modifications    -Encourage dietary modifications    -No Home statin noted     #Obesity:    -Encourage lifestyle modifications    -BMI 32.61    Condition on discharge stable.      DISCHARGE Follow Up with PCP in a week time.  Follow up with Nephrology service in two week time.      Reasons For Change In Medications and Indications for New Medications:      Day of Discharge     Vital Signs:       Physical Exam:  Physical Exam  Vitals and nursing note reviewed.   Constitutional:       General: He is not in acute distress.     Appearance: Normal appearance. He is well-developed. He is not ill-appearing, toxic-appearing or diaphoretic.   HENT:      Head: Normocephalic and atraumatic.      Right Ear: Ear canal and external ear normal.      Left Ear: Ear canal and external ear normal.      Nose: Nose normal. No congestion or rhinorrhea.      Mouth/Throat:      Mouth: Mucous  membranes are moist.      Pharynx: No oropharyngeal exudate.   Eyes:      General: No scleral icterus.        Right eye: No discharge.         Left eye: No discharge.      Extraocular Movements: Extraocular movements intact.      Conjunctiva/sclera: Conjunctivae normal.      Pupils: Pupils are equal, round, and reactive to light.   Neck:      Thyroid: No thyromegaly.      Vascular: No carotid bruit or JVD.      Trachea: No tracheal deviation.   Cardiovascular:      Rate and Rhythm: Normal rate and regular rhythm.      Pulses: Normal pulses.      Heart sounds: Normal heart sounds. No murmur heard.  No friction rub. No gallop.    Pulmonary:      Effort: Pulmonary effort is normal. No respiratory distress.      Breath sounds: Normal breath sounds. No stridor. No wheezing, rhonchi or rales.   Chest:      Chest wall: No tenderness.   Abdominal:      General: Bowel sounds are normal. There is no distension.      Palpations: Abdomen is soft. There is no mass.      Tenderness: There is no abdominal tenderness. There is no guarding or rebound.      Hernia: No hernia is present.   Musculoskeletal:         General: No swelling, tenderness, deformity or signs of injury. Normal range of motion.      Cervical back: Normal range of motion and neck supple. No rigidity. No muscular tenderness.      Right lower leg: No edema.      Left lower leg: No edema.   Lymphadenopathy:      Cervical: No cervical adenopathy.   Skin:     General: Skin is warm and dry.      Coloration: Skin is not jaundiced or pale.      Findings: No bruising, erythema or rash.   Neurological:      General: No focal deficit present.      Mental Status: He is alert and oriented to person, place, and time. Mental status is at baseline.      Cranial Nerves: No cranial nerve deficit.      Sensory: No sensory deficit.      Motor: No weakness or abnormal muscle tone.      Coordination: Coordination normal.   Psychiatric:         Mood and Affect: Mood normal.          Behavior: Behavior normal.         Thought Content: Thought content normal.         Judgment: Judgment normal.              Pertinent  and/or Most Recent Results     LAB RESULTS:      Lab 01/12/22  0334 01/11/22  0212 01/10/22  0257 01/09/22  1819 01/09/22  0911 01/09/22  0401 01/09/22  0401 01/08/22  0624 01/08/22  0624   WBC 9.30 10.50 9.80  --   --   --  12.00*  --  10.80   HEMOGLOBIN 7.0* 7.4* 7.6* 7.5* 6.8*   < > 6.8*   < > 8.1*   HEMATOCRIT 20.1* 21.0* 21.2* 21.2* 19.6*   < > 19.6*   < > 23.1*   PLATELETS 136* 155 141  --   --   --  141  --  129*   MCV 95.3 94.7 93.7  --   --   --  94.9  --  99.6*    < > = values in this interval not displayed.         Lab 01/12/22  0334 01/11/22  0212 01/10/22  0257 01/09/22  0401 01/08/22  0624 01/07/22  1000 01/06/22  2322   SODIUM 143 143 144 141 137  --    < >   POTASSIUM 3.3* 3.2* 3.4* 4.0 4.6  --    < >   CHLORIDE 96* 97* 99 98 96*  --    < >   CO2 34.0* 33.0* 30.0* 27.0 21.0*  --    < >   ANION GAP 13.0 13.0 15.0 16.0* 20.0*  --    < >   BUN 82* 102* 123* 133* 124*  --    < >   CREATININE 2.74* 3.53* 4.83* 5.77* 8.25*  --    < >   GLUCOSE 193* 158* 154* 207* 416*  --    < >   CALCIUM 7.2* 7.5* 7.9* 8.6 8.6  --    < >   HEMOGLOBIN A1C  --   --   --   --   --  6.0*  --     < > = values in this interval not displayed.                     Lab 01/07/22  1000   ABO TYPING O   RH TYPING Positive   ANTIBODY SCREEN Negative         Brief Urine Lab Results  (Last result in the past 365 days)      Color   Clarity   Blood   Leuk Est   Nitrite   Protein   CREAT   Urine HCG        01/07/22 1210 Yellow   Clear   Trace   Negative   Negative   Negative               Microbiology Results (last 10 days)     Procedure Component Value - Date/Time    Legionella Antigen, Urine - Urine, Urine, Random Void [[id number]]  (Normal) Collected: 01/06/22 2104    Lab Status: Final result Specimen: Urine, Random Void Updated: 01/06/22 2330     LEGIONELLA ANTIGEN, URINE Negative    S. Pneumo Ag Urine or  CSF - Urine, Urine, Random Void [[id number]]  (Normal) Collected: 01/06/22 2104    Lab Status: Final result Specimen: Urine, Random Void Updated: 01/06/22 2330     Strep Pneumo Ag Negative    Blood Culture - Blood, Arm, Right [[id number]]  (Normal) Collected: 01/05/22 1908    Lab Status: Final result Specimen: Blood from Arm, Right Updated: 01/10/22 1915     Blood Culture No growth at 5 days    Blood Culture - Blood, Arm, Left [[id number]]  (Normal) Collected: 01/05/22 1851    Lab Status: Final result Specimen: Blood from Arm, Left Updated: 01/10/22 1900     Blood Culture No growth at 5 days    COVID PRE-OP / PRE-PROCEDURE SCREENING ORDER (NO ISOLATION) - Swab, Nasopharynx [[id number]]  (Normal) Collected: 01/05/22 1705    Lab Status: Final result Specimen: Swab from Nasopharynx Updated: 01/05/22 1745    Narrative:      The following orders were created for panel order COVID PRE-OP / PRE-PROCEDURE SCREENING ORDER (NO ISOLATION) - Swab, Nasopharynx.  Procedure                               Abnormality         Status                     ---------                               -----------         ------                     COVID-19,CEPHEID/UMM,CO...[[id number]]  Normal              Final result                 Please view results for these tests on the individual orders.    COVID-19,CEPHEID/UMM,COR/KIRBY/PAD/JULIÁN IN-HOUSE(OR EMERGENT/ADD-ON),NP SWAB IN TRANSPORT MEDIA 3-4 HR TAT, RT-PCR - Swab, Nasopharynx [[id number]]  (Normal) Collected: 01/05/22 1705    Lab Status: Final result Specimen: Swab from Nasopharynx Updated: 01/05/22 1745     COVID19 Not Detected    Narrative:      Fact sheet for providers: https://www.fda.gov/media/023593/download     Fact sheet for patients: https://www.fda.gov/media/449109/download  Fact sheet for providers: https://www.fda.gov/media/007258/download     Fact sheet for patients: https://www.fda.gov/media/861383/download    Respiratory Panel PCR w/COVID-19(SARS-CoV-2) [facility]/KIRBY/PAD/COR/MAD/LUZ ELENA  In-House, NP Swab in [facility], 3-4 HR TAT - Swab, Nasopharynx [[id number]]  (Normal) Collected: 01/05/22 1705    Lab Status: Final result Specimen: Swab from Nasopharynx Updated: 01/05/22 2215     ADENOVIRUS, PCR Not Detected     Coronavirus 229E Not Detected     Coronavirus HKU1 Not Detected     Coronavirus NL63 Not Detected     Coronavirus OC43 Not Detected     COVID19 Not Detected     Human Metapneumovirus Not Detected     Human Rhinovirus/Enterovirus Not Detected     Influenza A PCR Not Detected     Influenza B PCR Not Detected     Parainfluenza Virus 1 Not Detected     Parainfluenza Virus 2 Not Detected     Parainfluenza Virus 3 Not Detected     Parainfluenza Virus 4 Not Detected     RSV, PCR Not Detected     Bordetella pertussis pcr Not Detected     Bordetella parapertussis PCR Not Detected     Chlamydophila pneumoniae PCR Not Detected     Mycoplasma pneumo by PCR Not Detected    Narrative:      In the setting of a positive respiratory panel with a viral infection PLUS a negative procalcitonin without other underlying concern for bacterial infection, consider observing off antibiotics or discontinuation of antibiotics and continue supportive care. If the respiratory panel is positive for atypical bacterial infection (Bordetella pertussis, Chlamydophila pneumoniae, or Mycoplasma pneumoniae), consider antibiotic de-escalation to target atypical bacterial infection.          CT Chest Without Contrast Diagnostic    Result Date: 1/6/2022  Impression:  1. Bilateral pneumonia more severe on the left, Covid pattern 2. Severe urinary bladder distention with secondary bilateral hydronephrosis. Bladder dysfunction and outlet obstruction considered, however the prostate gland is normal in size  Electronically Signed By-Phuc Morrison On:1/6/2022 4:26 PM This report was finalized on [id number] by  Phuc Morrison, .    XR Chest 1 View    Result Date: 1/6/2022  Impression: Interval insertion of a left-sided central venous  line is noted with its tip near the expected location of the cavoatrial junction.  No pneumothorax is seen.  Diffuse bilateral infiltrates persist and may be slightly increased in degree since the prior study.  Please see above comments for further detail.  Electronically Signed By-Nitesh Li MD On:1/6/2022 1:53 AM This report was finalized on [id number] by  Nitesh Li MD.    XR Chest 1 View    Result Date: 1/5/2022  Impression: Bilateral pneumonia more pronounced on the left  Electronically Signed By-Phuc Morrison On:1/5/2022 5:31 PM This report was finalized on [id number] by  Phuc Morrison, .    US Abdomen Limited    Result Date: 1/11/2022  Impression: 1.  Coarse hepatic echotexture suggests nonspecific hepatocellular disease. No focal abnormality. 2.  Normal gallbladder and right kidney. 3.  Pancreas is obscured by bowel gas. 4.  No free fluid. Slot 66 Electronically signed by:  Prince Sims D.O.  1/11/2022 4:24 AM    US Renal Bilateral    Result Date: 1/6/2022  Impression:  1. Marked urinary bladder distention with fluid. The patient was unable to void. Consider Roberts catheter decompression. 2. Mild to moderate bilateral hydronephrosis. No obstructing abnormality is seen.  Electronically Signed By-Dana Shanks MD On:1/6/2022 3:54 PM This report was finalized on [id number] by  Dana Shanks MD.    CT Abdomen Pelvis Stone Protocol    Result Date: 1/6/2022  Impression:  1. Bilateral pneumonia more severe on the left, Covid pattern 2. Severe urinary bladder distention with secondary bilateral hydronephrosis. Bladder dysfunction and outlet obstruction considered, however the prostate gland is normal in size  Electronically Signed By-Phuc Morrison On:1/6/2022 4:26 PM This report was finalized on [id number] by  Phuc Morrison, .              Results for orders placed during the hospital encounter of 01/05/22    Adult Transthoracic Echo Complete w/ Color, Spectral and Contrast if  Necessary Per Protocol    Interpretation Summary  · Left ventricular ejection fraction appears to be 56 - 60%.  · The right ventricular cavity is moderately dilated.  · No pericardial effusion noted      Labs Pending at Discharge:      Procedures Performed           Consults:   Consults     Date and Time Order Name Status Description    1/9/2022  8:15 AM Inpatient Gastroenterology Consult Completed     1/6/2022  8:15 AM Inpatient Urology Consult Completed     1/6/2022  7:56 AM Inpatient Pulmonology Consult Completed     1/5/2022  8:50 PM Inpatient Nephrology Consult Completed             Discharge Details        Discharge Medications      New Medications      Instructions Start Date   famotidine 20 MG tablet  Commonly known as: Pepcid   20 mg, Oral, 2 Times Daily      folic acid 1 MG tablet  Commonly known as: FOLVITE   1 mg, Oral, Daily      thiamine 100 MG tablet tablet  Commonly known as: VITAMIN B-1   100 mg, Oral, Daily             No Known Allergies      Discharge Disposition:   Home-Health Care [facility]    Diet:  Hospital:No active diet order        Discharge Activity:         CODE STATUS:  Code Status and Medical Interventions:   Ordered at: 01/05/22 2108     Code Status (Patient has no pulse and is not breathing):    CPR (Attempt to Resuscitate)     Medical Interventions (Patient has pulse or is breathing):    Full Support         No future appointments.    Additional Instructions for the Follow-ups that You Need to Schedule     Discharge Follow-up with PCP   As directed       Currently Documented PCP:    Marva Gaviria APRN    PCP Phone Number:    [phone]     Follow Up Details: one week time.               Time spent on Discharge including face to face service 35 minutes    This patient has been examined wearing appropriate Personal Protective Equipment and discussed with hospital infection control department. 01/13/22      Signature:

## 2022-01-13 NOTE — OUTREACH NOTE
Prep Survey      Responses   [facility] facility patient discharged from? [facility]   Is LACE score < 7 ? No   Emergency Room discharge w/ pulse ox? No   Eligibility Not Eligible   What are the reasons patient is not eligible? Other  [ETOH abuse]   Does the patient have one of the following disease processes/diagnoses(primary or secondary)? COPD/Pneumonia  [Acute renal failure, Bilateral pneumonia]   Prep survey completed? Yes          Lalitha Betancourt RN

## 2022-05-20 RX ORDER — TAMSULOSIN HYDROCHLORIDE 0.4 MG/1
1 CAPSULE ORAL 2 TIMES DAILY
COMMUNITY
End: 2022-05-25 | Stop reason: HOSPADM

## 2022-05-23 ENCOUNTER — LAB (OUTPATIENT)
Dept: LAB | Facility: HOSPITAL | Age: 67
End: 2022-05-23

## 2022-05-23 ENCOUNTER — HOSPITAL ENCOUNTER (OUTPATIENT)
Dept: CARDIOLOGY | Facility: HOSPITAL | Age: 67
Discharge: HOME | End: 2022-05-23

## 2022-05-23 ENCOUNTER — HOSPITAL ENCOUNTER (OUTPATIENT)
Dept: GENERAL RADIOLOGY | Facility: HOSPITAL | Age: 67
Discharge: HOME | End: 2022-05-23

## 2022-05-23 LAB
ANION GAP SERPL CALCULATED.3IONS-SCNC: 11.5 MMOL/L (ref 5–15)
BUN SERPL-MCNC: 21 MG/DL (ref 8–23)
BUN/CREAT SERPL: 22.8 (ref 7–25)
CALCIUM SPEC-SCNC: 9.2 MG/DL (ref 8.6–10.5)
CHLORIDE SERPL-SCNC: 103 MMOL/L (ref 98–107)
CO2 SERPL-SCNC: 20.5 MMOL/L (ref 22–29)
CREAT SERPL-MCNC: 0.92 MG/DL (ref 0.76–1.27)
DEPRECATED RDW RBC AUTO: 51.7 FL (ref 37–54)
EGFRCR SERPLBLD CKD-EPI 2021: 92.9 ML/MIN/1.73
ERYTHROCYTE [DISTWIDTH] IN BLOOD BY AUTOMATED COUNT: 14.5 % (ref 12.3–15.4)
GLUCOSE SERPL-MCNC: 111 MG/DL (ref 65–99)
HCT VFR BLD AUTO: 33.2 % (ref 37.5–51)
HGB BLD-MCNC: 11.2 G/DL (ref 13–17.7)
MCH RBC QN AUTO: 32.8 PG (ref 26.6–33)
MCHC RBC AUTO-ENTMCNC: 33.7 G/DL (ref 31.5–35.7)
MCV RBC AUTO: 97.4 FL (ref 79–97)
PLATELET # BLD AUTO: 198 10*3/MM3 (ref 140–450)
PMV BLD AUTO: 10.3 FL (ref 6–12)
POTASSIUM SERPL-SCNC: 4.5 MMOL/L (ref 3.5–5.2)
QT INTERVAL: 343 MS
RBC # BLD AUTO: 3.41 10*6/MM3 (ref 4.14–5.8)
SARS-COV-2 ORF1AB RESP QL NAA+PROBE: NOT DETECTED
SODIUM SERPL-SCNC: 135 MMOL/L (ref 136–145)
WBC NRBC COR # BLD: 8.84 10*3/MM3 (ref 3.4–10.8)

## 2022-05-23 PROCEDURE — 85027 COMPLETE CBC AUTOMATED: CPT

## 2022-05-23 PROCEDURE — 93005 ELECTROCARDIOGRAM TRACING: CPT | Performed by: UROLOGY

## 2022-05-23 PROCEDURE — 71046 X-RAY EXAM CHEST 2 VIEWS: CPT

## 2022-05-23 PROCEDURE — U0004 COV-19 TEST NON-CDC HGH THRU: HCPCS

## 2022-05-23 PROCEDURE — 80048 BASIC METABOLIC PNL TOTAL CA: CPT

## 2022-05-23 PROCEDURE — 93010 ELECTROCARDIOGRAM REPORT: CPT | Performed by: INTERNAL MEDICINE

## 2022-05-23 PROCEDURE — C9803 HOPD COVID-19 SPEC COLLECT: HCPCS

## 2022-05-24 ENCOUNTER — ANESTHESIA EVENT (OUTPATIENT)
Dept: PERIOP | Facility: HOSPITAL | Age: 67
End: 2022-05-24

## 2022-05-25 ENCOUNTER — HOSPITAL ENCOUNTER (OUTPATIENT)
Facility: HOSPITAL | Age: 67
Setting detail: HOSPITAL OUTPATIENT SURGERY
Discharge: HOME | End: 2022-05-25
Attending: UROLOGY | Admitting: UROLOGY

## 2022-05-25 ENCOUNTER — ANESTHESIA (OUTPATIENT)
Dept: PERIOP | Facility: HOSPITAL | Age: 67
End: 2022-05-25

## 2022-05-25 VITALS
WEIGHT: 219.2 LBS | RESPIRATION RATE: 18 BRPM | BODY MASS INDEX: 28.13 KG/M2 | TEMPERATURE: 97.9 F | OXYGEN SATURATION: 98 % | HEIGHT: 74 IN | HEART RATE: 79 BPM | DIASTOLIC BLOOD PRESSURE: 87 MMHG | SYSTOLIC BLOOD PRESSURE: 144 MMHG

## 2022-05-25 DIAGNOSIS — R33.9 RETENTION OF URINE, UNSPECIFIED: Primary | ICD-10-CM

## 2022-05-25 PROCEDURE — 25010000002 FENTANYL CITRATE (PF) 100 MCG/2ML SOLUTION: Performed by: NURSE ANESTHETIST, CERTIFIED REGISTERED

## 2022-05-25 PROCEDURE — 25010000002 ONDANSETRON PER 1 MG: Performed by: NURSE ANESTHETIST, CERTIFIED REGISTERED

## 2022-05-25 PROCEDURE — 0 MORPHINE SULFATE 4 MG/ML SOLUTION: Performed by: NURSE ANESTHETIST, CERTIFIED REGISTERED

## 2022-05-25 PROCEDURE — 25010000002 CEFAZOLIN PER 500 MG: Performed by: UROLOGY

## 2022-05-25 PROCEDURE — C1894 INTRO/SHEATH, NON-LASER: HCPCS | Performed by: UROLOGY

## 2022-05-25 RX ORDER — OXYCODONE HYDROCHLORIDE 5 MG/1
5 TABLET ORAL ONCE AS NEEDED
Status: COMPLETED | OUTPATIENT
Start: 2022-05-25 | End: 2022-05-25

## 2022-05-25 RX ORDER — DIPHENHYDRAMINE HCL 25 MG
25 CAPSULE ORAL
Status: DISCONTINUED | OUTPATIENT
Start: 2022-05-25 | End: 2022-05-25 | Stop reason: HOSPADM

## 2022-05-25 RX ORDER — ACETAMINOPHEN 650 MG/1
650 SUPPOSITORY RECTAL ONCE AS NEEDED
Status: DISCONTINUED | OUTPATIENT
Start: 2022-05-25 | End: 2022-05-25 | Stop reason: HOSPADM

## 2022-05-25 RX ORDER — LABETALOL HYDROCHLORIDE 5 MG/ML
5 INJECTION, SOLUTION INTRAVENOUS
Status: DISCONTINUED | OUTPATIENT
Start: 2022-05-25 | End: 2022-05-25 | Stop reason: HOSPADM

## 2022-05-25 RX ORDER — SODIUM CHLORIDE 0.9 % (FLUSH) 0.9 %
10 SYRINGE (ML) INJECTION AS NEEDED
Status: DISCONTINUED | OUTPATIENT
Start: 2022-05-25 | End: 2022-05-25 | Stop reason: HOSPADM

## 2022-05-25 RX ORDER — ONDANSETRON 2 MG/ML
INJECTION INTRAMUSCULAR; INTRAVENOUS AS NEEDED
Status: DISCONTINUED | OUTPATIENT
Start: 2022-05-25 | End: 2022-05-25 | Stop reason: SURG

## 2022-05-25 RX ORDER — SODIUM CHLORIDE 0.9 % (FLUSH) 0.9 %
10 SYRINGE (ML) INJECTION EVERY 12 HOURS SCHEDULED
Status: DISCONTINUED | OUTPATIENT
Start: 2022-05-25 | End: 2022-05-25 | Stop reason: HOSPADM

## 2022-05-25 RX ORDER — DOCUSATE SODIUM 100 MG/1
100 CAPSULE, LIQUID FILLED ORAL 2 TIMES DAILY PRN
Qty: 30 CAPSULE | Refills: 1 | Status: ON HOLD | OUTPATIENT
Start: 2022-05-25 | End: 2023-03-01

## 2022-05-25 RX ORDER — MORPHINE SULFATE 4 MG/ML
2 INJECTION, SOLUTION INTRAMUSCULAR; INTRAVENOUS
Status: DISCONTINUED | OUTPATIENT
Start: 2022-05-25 | End: 2022-05-25 | Stop reason: HOSPADM

## 2022-05-25 RX ORDER — LIDOCAINE HYDROCHLORIDE 20 MG/ML
INJECTION, SOLUTION EPIDURAL; INFILTRATION; INTRACAUDAL; PERINEURAL AS NEEDED
Status: DISCONTINUED | OUTPATIENT
Start: 2022-05-25 | End: 2022-05-25 | Stop reason: SURG

## 2022-05-25 RX ORDER — HYDRALAZINE HYDROCHLORIDE 20 MG/ML
5 INJECTION INTRAMUSCULAR; INTRAVENOUS
Status: DISCONTINUED | OUTPATIENT
Start: 2022-05-25 | End: 2022-05-25 | Stop reason: HOSPADM

## 2022-05-25 RX ORDER — PROMETHAZINE HYDROCHLORIDE 25 MG/1
25 SUPPOSITORY RECTAL ONCE AS NEEDED
Status: DISCONTINUED | OUTPATIENT
Start: 2022-05-25 | End: 2022-05-25 | Stop reason: HOSPADM

## 2022-05-25 RX ORDER — MORPHINE SULFATE 4 MG/ML
INJECTION, SOLUTION INTRAMUSCULAR; INTRAVENOUS AS NEEDED
Status: DISCONTINUED | OUTPATIENT
Start: 2022-05-25 | End: 2022-05-25 | Stop reason: SURG

## 2022-05-25 RX ORDER — PROMETHAZINE HYDROCHLORIDE 25 MG/1
25 TABLET ORAL ONCE AS NEEDED
Status: DISCONTINUED | OUTPATIENT
Start: 2022-05-25 | End: 2022-05-25 | Stop reason: HOSPADM

## 2022-05-25 RX ORDER — ONDANSETRON 2 MG/ML
4 INJECTION INTRAMUSCULAR; INTRAVENOUS ONCE AS NEEDED
Status: DISCONTINUED | OUTPATIENT
Start: 2022-05-25 | End: 2022-05-25 | Stop reason: HOSPADM

## 2022-05-25 RX ORDER — MEPERIDINE HYDROCHLORIDE 25 MG/ML
12.5 INJECTION INTRAMUSCULAR; INTRAVENOUS; SUBCUTANEOUS
Status: DISCONTINUED | OUTPATIENT
Start: 2022-05-25 | End: 2022-05-25 | Stop reason: HOSPADM

## 2022-05-25 RX ORDER — CEPHALEXIN 500 MG/1
500 CAPSULE ORAL 3 TIMES DAILY
Qty: 15 CAPSULE | Refills: 0 | Status: SHIPPED | OUTPATIENT
Start: 2022-05-25 | End: 2022-05-30

## 2022-05-25 RX ORDER — ACETAMINOPHEN 325 MG/1
650 TABLET ORAL ONCE AS NEEDED
Status: DISCONTINUED | OUTPATIENT
Start: 2022-05-25 | End: 2022-05-25 | Stop reason: HOSPADM

## 2022-05-25 RX ORDER — FENTANYL CITRATE 50 UG/ML
INJECTION, SOLUTION INTRAMUSCULAR; INTRAVENOUS AS NEEDED
Status: DISCONTINUED | OUTPATIENT
Start: 2022-05-25 | End: 2022-05-25 | Stop reason: SURG

## 2022-05-25 RX ORDER — DIPHENHYDRAMINE HYDROCHLORIDE 50 MG/ML
12.5 INJECTION INTRAMUSCULAR; INTRAVENOUS
Status: DISCONTINUED | OUTPATIENT
Start: 2022-05-25 | End: 2022-05-25 | Stop reason: HOSPADM

## 2022-05-25 RX ORDER — SODIUM CHLORIDE, SODIUM LACTATE, POTASSIUM CHLORIDE, CALCIUM CHLORIDE 600; 310; 30; 20 MG/100ML; MG/100ML; MG/100ML; MG/100ML
9 INJECTION, SOLUTION INTRAVENOUS CONTINUOUS PRN
Status: DISCONTINUED | OUTPATIENT
Start: 2022-05-25 | End: 2022-05-25 | Stop reason: HOSPADM

## 2022-05-25 RX ORDER — HYDROCODONE BITARTRATE AND ACETAMINOPHEN 7.5; 325 MG/1; MG/1
1 TABLET ORAL EVERY 6 HOURS PRN
Qty: 20 TABLET | Refills: 0 | Status: ON HOLD | OUTPATIENT
Start: 2022-05-25 | End: 2023-03-01

## 2022-05-25 RX ADMIN — SODIUM CHLORIDE, SODIUM LACTATE, POTASSIUM CHLORIDE, AND CALCIUM CHLORIDE: .6; .31; .03; .02 INJECTION, SOLUTION INTRAVENOUS at 14:12

## 2022-05-25 RX ADMIN — LIDOCAINE HYDROCHLORIDE 50 MG: 20 INJECTION, SOLUTION EPIDURAL; INFILTRATION; INTRACAUDAL; PERINEURAL at 14:16

## 2022-05-25 RX ADMIN — FENTANYL CITRATE 50 MCG: 50 INJECTION, SOLUTION INTRAMUSCULAR; INTRAVENOUS at 14:26

## 2022-05-25 RX ADMIN — CEFAZOLIN 2 G: 2 INJECTION, POWDER, FOR SOLUTION INTRAMUSCULAR; INTRAVENOUS at 14:22

## 2022-05-25 RX ADMIN — FENTANYL CITRATE 50 MCG: 50 INJECTION, SOLUTION INTRAMUSCULAR; INTRAVENOUS at 14:16

## 2022-05-25 RX ADMIN — OXYCODONE 5 MG: 5 TABLET ORAL at 15:28

## 2022-05-25 RX ADMIN — ONDANSETRON 4 MG: 2 INJECTION INTRAMUSCULAR; INTRAVENOUS at 14:32

## 2022-05-25 RX ADMIN — MORPHINE SULFATE 4 MG: 4 INJECTION INTRAVENOUS at 14:26

## 2022-05-25 NOTE — ANESTHESIA PREPROCEDURE EVALUATION
Anesthesia Evaluation     Patient summary reviewed and Nursing notes reviewed                Airway   Mallampati: II  TM distance: >3 FB  Neck ROM: full  No difficulty expected  Dental - normal exam     Pulmonary - normal exam   (+) pneumonia resolved ,   Cardiovascular - normal exam    (+) hypertension, hyperlipidemia,       Neuro/Psych  (+) psychiatric history,    GI/Hepatic/Renal/Endo    (+)   renal disease CRI,     Musculoskeletal (-) negative ROS    Abdominal  - normal exam    Bowel sounds: normal.   Substance History - negative use     OB/GYN negative ob/gyn ROS         Other                        Anesthesia Plan    ASA 3     general     intravenous induction     Anesthetic plan, all risks, benefits, and alternatives have been provided, discussed and informed consent has been obtained with: patient.    Plan discussed with CRNA and CAA.        CODE STATUS:

## 2022-05-25 NOTE — OP NOTE
Urology Operative Note    5/25/2022    Anthony Moss  66 y.o.  1955  male  [id number]      Surgeon(s) and Role:  Xu Montero MD - Primary     Pre-operative Diagnosis: Retention    Post-operative Diagnosis: Same    Complications: None    Procedures:    Cystoscopy, suprapubic tube placement    Indications   Anthony Moss is a 66 y.o. male presented and worked up for chronic retention.  Atonic bladder.  Elected for suprapubic tube placement    During the informed consent process, the procedure was discussed in detail including the risks of bleeding, infection, and damage to surrounding structures.      Description of procedure:  The patient was properly identified in the preoperative holding area and taken to the operating room where general anesthesia was induced. The patient was prepped and draped in a sterile fashion. The patient was given antibiotics intravenously before the start of the surgery. After ensuring that all of the required equipment was ready and available a surgical timeout was performed.     Cystoscopy was performed which revealed nonobstructing prostate there was some catheter edema.  Bladder was distended with multiple trabeculations.  Suprapubic area was palpated 2 fingerbreadths above the pubic bone.  Spinal needle was used to locate the bladder and confirmed direct cystoscopic.  Incision was then made with the Bovie over suprapubic area and the 16 Hungarian trocar was placed and advanced carefully confirmed directly into the bladder on cystoscopy.  16 Hungarian Roberts was then placed and balloon was inflated.  2-0 silk was used to stitch the tube into place.  Urine was clear and draining freely.    There were no apparent complications. The patient woke up in the operating room and was taken to the recovery room in stable condition.     I was present and scrubbed for the entire procedure.     Specimens: None    Estimated Blood Loss: 5 cc      Plan   -Follow up with Dr Montero 1 month for  suprapubic tube change         Xu Montero MD  [facility]  [address], [address]  [place] [address] [phone]

## 2022-05-25 NOTE — ANESTHESIA POSTPROCEDURE EVALUATION
Patient: Anthony Moss    Procedure Summary     Date: 05/25/22 Room / Location: [facility] OR 06 / [facility] MAIN OR    Anesthesia Start: 1412 Anesthesia Stop: 1445    Procedure: ASPIRATION BLADDER, SUPRAPUBIC CATHETER INSERTION (N/A ) Diagnosis:       Urinary tract infection      (Urinary tract infection [N39.0])    Surgeons: Xu Montero MD Provider: Harpal Torres MD    Anesthesia Type: general ASA Status: 3          Anesthesia Type: general    Vitals  Vitals Value Taken Time   /67 05/25/22 1508   Temp 97.3 °F (36.3 °C) 05/25/22 1510   Pulse 79 05/25/22 1510   Resp 18 05/25/22 1510   SpO2 100 % 05/25/22 1510   Vitals shown include unvalidated device data.        Post Anesthesia Care and Evaluation    Patient location during evaluation: PACU  Patient participation: complete - patient participated  Level of consciousness: awake  Pain scale: See nurse's notes for pain score.  Pain management: adequate  Airway patency: patent  Anesthetic complications: No anesthetic complications  PONV Status: none  Cardiovascular status: acceptable  Respiratory status: acceptable  Hydration status: acceptable    Comments: Patient seen and examined postoperatively; vital signs stable; SpO2 greater than or equal to 90%; cardiopulmonary status stable; nausea/vomiting adequately controlled; pain adequately controlled; no apparent anesthesia complications; patient discharged from anesthesia care when discharge criteria were met
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
## 2022-05-25 NOTE — ANESTHESIA PROCEDURE NOTES
Airway  Urgency: elective    Date/Time: 5/25/2022 2:17 PM  Airway not difficult    General Information and Staff    Patient location during procedure: OR    Indications and Patient Condition  Indications for airway management: airway protection    Preoxygenated: yes  MILS maintained throughout  Mask difficulty assessment: 0 - not attempted    Final Airway Details  Final airway type: supraglottic airway      Successful airway: LMA  Size 5    Number of attempts at approach: 1  Assessment: lips, teeth, and gum same as pre-op and atraumatic intubation

## 2022-10-13 NOTE — NURSING NOTE
"Rashid catheter present in left side of patients neck, however is not charted on the LDA. Placed on LDA by this nurse for purposes of charting care. \"Rashid catheter\"  Not an option on lda menu, placed as hemodialysis access, nontunneled catheter.  " Detail Level: Detailed Detail Level: Zone Detail Level: Generalized Patient Specific Counseling (Will Not Stick From Patient To Patient): Mole was unchanged since last visit

## 2023-02-28 ENCOUNTER — APPOINTMENT (OUTPATIENT)
Dept: GENERAL RADIOLOGY | Facility: HOSPITAL | Age: 68
DRG: 659 | End: 2023-02-28
Payer: MEDICARE

## 2023-02-28 ENCOUNTER — HOSPITAL ENCOUNTER (INPATIENT)
Facility: HOSPITAL | Age: 68
LOS: 20 days | Discharge: NURSING, REHAB OR LONG-TERM CARE | DRG: 659 | End: 2023-03-20
Attending: EMERGENCY MEDICINE | Admitting: INTERNAL MEDICINE
Payer: MEDICARE

## 2023-02-28 ENCOUNTER — APPOINTMENT (OUTPATIENT)
Dept: CT IMAGING | Facility: HOSPITAL | Age: 68
DRG: 659 | End: 2023-02-28
Payer: MEDICARE

## 2023-02-28 DIAGNOSIS — R31.9 HEMATURIA, UNSPECIFIED TYPE: ICD-10-CM

## 2023-02-28 DIAGNOSIS — R31.0 GROSS HEMATURIA: ICD-10-CM

## 2023-02-28 DIAGNOSIS — N17.9 ACUTE KIDNEY INJURY: ICD-10-CM

## 2023-02-28 DIAGNOSIS — Z91.89 AT RISK OF UTI: ICD-10-CM

## 2023-02-28 DIAGNOSIS — A41.9 SEPSIS, DUE TO UNSPECIFIED ORGANISM, UNSPECIFIED WHETHER ACUTE ORGAN DYSFUNCTION PRESENT: Primary | ICD-10-CM

## 2023-02-28 PROBLEM — M47.27 OSTEOARTHRITIS OF LUMBOSACRAL SPINE WITH RADICULOPATHY: Status: RESOLVED | Noted: 2019-01-16 | Resolved: 2023-02-28

## 2023-02-28 PROBLEM — F10.10 ETOH ABUSE: Status: RESOLVED | Noted: 2022-01-05 | Resolved: 2023-02-28

## 2023-02-28 PROBLEM — D62 ACUTE BLOOD LOSS ANEMIA: Status: ACTIVE | Noted: 2023-02-28

## 2023-02-28 PROBLEM — M41.9 ACQUIRED SCOLIOSIS: Chronic | Status: ACTIVE | Noted: 2019-01-16

## 2023-02-28 PROBLEM — F90.9 ADHD: Chronic | Status: ACTIVE | Noted: 2022-01-05

## 2023-02-28 PROBLEM — D69.6 THROMBOCYTOPENIA (HCC): Status: RESOLVED | Noted: 2022-01-05 | Resolved: 2023-02-28

## 2023-02-28 PROBLEM — N39.0 UTI (URINARY TRACT INFECTION): Status: ACTIVE | Noted: 2023-02-28

## 2023-02-28 PROBLEM — I10 ESSENTIAL HYPERTENSION: Chronic | Status: ACTIVE | Noted: 2022-01-05

## 2023-02-28 PROBLEM — F32.A DEPRESSION: Chronic | Status: ACTIVE | Noted: 2022-01-05

## 2023-02-28 PROBLEM — E78.2 MIXED HYPERLIPIDEMIA: Chronic | Status: ACTIVE | Noted: 2022-01-05

## 2023-02-28 PROBLEM — M54.50 LOW BACK PAIN: Status: RESOLVED | Noted: 2019-01-16 | Resolved: 2023-02-28

## 2023-02-28 PROBLEM — E66.9 OBESITY: Status: RESOLVED | Noted: 2022-01-05 | Resolved: 2023-02-28

## 2023-02-28 LAB
ABO GROUP BLD: NORMAL
ALBUMIN SERPL-MCNC: 3.5 G/DL (ref 3.5–5.2)
ALBUMIN/GLOB SERPL: 1.1 G/DL
ALP SERPL-CCNC: 80 U/L (ref 39–117)
ALT SERPL W P-5'-P-CCNC: 5 U/L (ref 1–41)
ANION GAP SERPL CALCULATED.3IONS-SCNC: 13 MMOL/L (ref 5–15)
AST SERPL-CCNC: 9 U/L (ref 1–40)
BACTERIA UR QL AUTO: ABNORMAL /HPF
BASOPHILS # BLD AUTO: 0.1 10*3/MM3 (ref 0–0.2)
BASOPHILS NFR BLD AUTO: 1.1 % (ref 0–1.5)
BILIRUB SERPL-MCNC: 0.2 MG/DL (ref 0–1.2)
BILIRUB UR QL STRIP: ABNORMAL
BLD GP AB SCN SERPL QL: NEGATIVE
BUN SERPL-MCNC: 56 MG/DL (ref 8–23)
BUN/CREAT SERPL: 18.2 (ref 7–25)
CALCIUM SPEC-SCNC: 8.3 MG/DL (ref 8.6–10.5)
CHLORIDE SERPL-SCNC: 101 MMOL/L (ref 98–107)
CLARITY UR: ABNORMAL
CO2 SERPL-SCNC: 18 MMOL/L (ref 22–29)
COLOR UR: ABNORMAL
CREAT SERPL-MCNC: 3.08 MG/DL (ref 0.76–1.27)
D-LACTATE SERPL-SCNC: 2.5 MMOL/L (ref 0.3–2)
D-LACTATE SERPL-SCNC: 2.5 MMOL/L (ref 0.5–2)
DEPRECATED RDW RBC AUTO: 53.8 FL (ref 37–54)
EGFRCR SERPLBLD CKD-EPI 2021: 21.7 ML/MIN/1.73
EOSINOPHIL # BLD AUTO: 0.2 10*3/MM3 (ref 0–0.4)
EOSINOPHIL NFR BLD AUTO: 3.4 % (ref 0.3–6.2)
ERYTHROCYTE [DISTWIDTH] IN BLOOD BY AUTOMATED COUNT: 16.4 % (ref 12.3–15.4)
GLOBULIN UR ELPH-MCNC: 3.2 GM/DL
GLUCOSE SERPL-MCNC: 98 MG/DL (ref 65–99)
GLUCOSE UR STRIP-MCNC: NEGATIVE MG/DL
HCT VFR BLD AUTO: 14.5 % (ref 37.5–51)
HGB BLD-MCNC: 4.8 G/DL (ref 13–17.7)
HGB UR QL STRIP.AUTO: ABNORMAL
HYALINE CASTS UR QL AUTO: ABNORMAL /LPF
KETONES UR QL STRIP: NEGATIVE
LEUKOCYTE ESTERASE UR QL STRIP.AUTO: ABNORMAL
LYMPHOCYTES # BLD AUTO: 0.9 10*3/MM3 (ref 0.7–3.1)
LYMPHOCYTES NFR BLD AUTO: 14.2 % (ref 19.6–45.3)
MCH RBC QN AUTO: 28.9 PG (ref 26.6–33)
MCHC RBC AUTO-ENTMCNC: 32.9 G/DL (ref 31.5–35.7)
MCV RBC AUTO: 87.8 FL (ref 79–97)
MONOCYTES # BLD AUTO: 0.8 10*3/MM3 (ref 0.1–0.9)
MONOCYTES NFR BLD AUTO: 13.4 % (ref 5–12)
NEUTROPHILS NFR BLD AUTO: 4.1 10*3/MM3 (ref 1.7–7)
NEUTROPHILS NFR BLD AUTO: 67.9 % (ref 42.7–76)
NITRITE UR QL STRIP: POSITIVE
NRBC BLD AUTO-RTO: 0.1 /100 WBC (ref 0–0.2)
PH UR STRIP.AUTO: <=5 [PH] (ref 5–8)
PLATELET # BLD AUTO: 177 10*3/MM3 (ref 140–450)
PMV BLD AUTO: 7.1 FL (ref 6–12)
POTASSIUM SERPL-SCNC: 4.5 MMOL/L (ref 3.5–5.2)
PROT SERPL-MCNC: 6.7 G/DL (ref 6–8.5)
PROT UR QL STRIP: ABNORMAL
RBC # BLD AUTO: 1.65 10*6/MM3 (ref 4.14–5.8)
RBC # UR STRIP: ABNORMAL /HPF
REF LAB TEST METHOD: ABNORMAL
RH BLD: POSITIVE
SODIUM SERPL-SCNC: 132 MMOL/L (ref 136–145)
SP GR UR STRIP: <=1.005 (ref 1–1.03)
SQUAMOUS #/AREA URNS HPF: ABNORMAL /HPF
T&S EXPIRATION DATE: NORMAL
UROBILINOGEN UR QL STRIP: ABNORMAL
WBC # UR STRIP: ABNORMAL /HPF
WBC NRBC COR # BLD: 6.1 10*3/MM3 (ref 3.4–10.8)

## 2023-02-28 PROCEDURE — 36430 TRANSFUSION BLD/BLD COMPNT: CPT

## 2023-02-28 PROCEDURE — 81001 URINALYSIS AUTO W/SCOPE: CPT | Performed by: NURSE PRACTITIONER

## 2023-02-28 PROCEDURE — 80307 DRUG TEST PRSMV CHEM ANLYZR: CPT | Performed by: STUDENT IN AN ORGANIZED HEALTH CARE EDUCATION/TRAINING PROGRAM

## 2023-02-28 PROCEDURE — 99285 EMERGENCY DEPT VISIT HI MDM: CPT

## 2023-02-28 PROCEDURE — 25010000002 VANCOMYCIN 10 G RECONSTITUTED SOLUTION: Performed by: NURSE PRACTITIONER

## 2023-02-28 PROCEDURE — 74176 CT ABD & PELVIS W/O CONTRAST: CPT

## 2023-02-28 PROCEDURE — 86923 COMPATIBILITY TEST ELECTRIC: CPT

## 2023-02-28 PROCEDURE — P9016 RBC LEUKOCYTES REDUCED: HCPCS

## 2023-02-28 PROCEDURE — 87077 CULTURE AEROBIC IDENTIFY: CPT | Performed by: NURSE PRACTITIONER

## 2023-02-28 PROCEDURE — 87186 SC STD MICRODIL/AGAR DIL: CPT | Performed by: NURSE PRACTITIONER

## 2023-02-28 PROCEDURE — 36415 COLL VENOUS BLD VENIPUNCTURE: CPT | Performed by: EMERGENCY MEDICINE

## 2023-02-28 PROCEDURE — 86850 RBC ANTIBODY SCREEN: CPT | Performed by: NURSE PRACTITIONER

## 2023-02-28 PROCEDURE — 86900 BLOOD TYPING SEROLOGIC ABO: CPT

## 2023-02-28 PROCEDURE — 25010000002 ONDANSETRON PER 1 MG: Performed by: EMERGENCY MEDICINE

## 2023-02-28 PROCEDURE — 99284 EMERGENCY DEPT VISIT MOD MDM: CPT

## 2023-02-28 PROCEDURE — 83605 ASSAY OF LACTIC ACID: CPT

## 2023-02-28 PROCEDURE — 71045 X-RAY EXAM CHEST 1 VIEW: CPT

## 2023-02-28 PROCEDURE — P9612 CATHETERIZE FOR URINE SPEC: HCPCS

## 2023-02-28 PROCEDURE — 87040 BLOOD CULTURE FOR BACTERIA: CPT | Performed by: NURSE PRACTITIONER

## 2023-02-28 PROCEDURE — 80053 COMPREHEN METABOLIC PANEL: CPT | Performed by: NURSE PRACTITIONER

## 2023-02-28 PROCEDURE — 25010000002 MORPHINE PER 10 MG: Performed by: EMERGENCY MEDICINE

## 2023-02-28 PROCEDURE — 87086 URINE CULTURE/COLONY COUNT: CPT | Performed by: NURSE PRACTITIONER

## 2023-02-28 PROCEDURE — 86900 BLOOD TYPING SEROLOGIC ABO: CPT | Performed by: NURSE PRACTITIONER

## 2023-02-28 PROCEDURE — 86901 BLOOD TYPING SEROLOGIC RH(D): CPT | Performed by: NURSE PRACTITIONER

## 2023-02-28 PROCEDURE — 85025 COMPLETE CBC W/AUTO DIFF WBC: CPT | Performed by: NURSE PRACTITIONER

## 2023-02-28 PROCEDURE — 25010000002 CEFEPIME PER 500 MG: Performed by: NURSE PRACTITIONER

## 2023-02-28 RX ORDER — ACETAMINOPHEN 325 MG/1
650 TABLET ORAL EVERY 4 HOURS PRN
Status: DISCONTINUED | OUTPATIENT
Start: 2023-02-28 | End: 2023-03-20 | Stop reason: HOSPADM

## 2023-02-28 RX ORDER — ONDANSETRON 2 MG/ML
4 INJECTION INTRAMUSCULAR; INTRAVENOUS ONCE
Status: COMPLETED | OUTPATIENT
Start: 2023-02-28 | End: 2023-02-28

## 2023-02-28 RX ORDER — SODIUM CHLORIDE 9 MG/ML
40 INJECTION, SOLUTION INTRAVENOUS AS NEEDED
Status: DISCONTINUED | OUTPATIENT
Start: 2023-02-28 | End: 2023-03-20 | Stop reason: HOSPADM

## 2023-02-28 RX ORDER — SODIUM CHLORIDE 0.9 % (FLUSH) 0.9 %
10 SYRINGE (ML) INJECTION AS NEEDED
Status: DISCONTINUED | OUTPATIENT
Start: 2023-02-28 | End: 2023-03-20 | Stop reason: HOSPADM

## 2023-02-28 RX ORDER — POLYETHYLENE GLYCOL 3350 17 G/17G
17 POWDER, FOR SOLUTION ORAL DAILY PRN
Status: DISCONTINUED | OUTPATIENT
Start: 2023-02-28 | End: 2023-03-01

## 2023-02-28 RX ORDER — BISACODYL 5 MG/1
5 TABLET, DELAYED RELEASE ORAL DAILY PRN
Status: DISCONTINUED | OUTPATIENT
Start: 2023-02-28 | End: 2023-03-01

## 2023-02-28 RX ORDER — ACETAMINOPHEN 650 MG/1
650 SUPPOSITORY RECTAL EVERY 4 HOURS PRN
Status: DISCONTINUED | OUTPATIENT
Start: 2023-02-28 | End: 2023-03-20 | Stop reason: HOSPADM

## 2023-02-28 RX ORDER — ONDANSETRON 4 MG/1
4 TABLET, FILM COATED ORAL EVERY 6 HOURS PRN
Status: DISCONTINUED | OUTPATIENT
Start: 2023-02-28 | End: 2023-03-20 | Stop reason: HOSPADM

## 2023-02-28 RX ORDER — SODIUM CHLORIDE 0.9 % (FLUSH) 0.9 %
10 SYRINGE (ML) INJECTION EVERY 12 HOURS SCHEDULED
Status: DISCONTINUED | OUTPATIENT
Start: 2023-02-28 | End: 2023-03-20 | Stop reason: HOSPADM

## 2023-02-28 RX ORDER — PANTOPRAZOLE SODIUM 40 MG/10ML
40 INJECTION, POWDER, LYOPHILIZED, FOR SOLUTION INTRAVENOUS
Status: DISCONTINUED | OUTPATIENT
Start: 2023-03-01 | End: 2023-03-02

## 2023-02-28 RX ORDER — VANCOMYCIN 2 GRAM/500 ML IN 0.9 % SODIUM CHLORIDE INTRAVENOUS
20 ONCE
Status: COMPLETED | OUTPATIENT
Start: 2023-02-28 | End: 2023-02-28

## 2023-02-28 RX ORDER — AMOXICILLIN 250 MG
2 CAPSULE ORAL 2 TIMES DAILY
Status: DISCONTINUED | OUTPATIENT
Start: 2023-03-01 | End: 2023-03-01

## 2023-02-28 RX ORDER — ONDANSETRON 2 MG/ML
4 INJECTION INTRAMUSCULAR; INTRAVENOUS EVERY 6 HOURS PRN
Status: DISCONTINUED | OUTPATIENT
Start: 2023-02-28 | End: 2023-03-20 | Stop reason: HOSPADM

## 2023-02-28 RX ORDER — BISACODYL 10 MG
10 SUPPOSITORY, RECTAL RECTAL DAILY PRN
Status: DISCONTINUED | OUTPATIENT
Start: 2023-02-28 | End: 2023-03-01

## 2023-02-28 RX ADMIN — CEFEPIME 2 G: 2 INJECTION, POWDER, FOR SOLUTION INTRAVENOUS at 22:01

## 2023-02-28 RX ADMIN — MORPHINE SULFATE 4 MG: 4 INJECTION INTRAVENOUS at 21:32

## 2023-02-28 RX ADMIN — VANCOMYCIN HYDROCHLORIDE 2000 MG: 10 INJECTION, POWDER, LYOPHILIZED, FOR SOLUTION INTRAVENOUS at 23:07

## 2023-02-28 RX ADMIN — ONDANSETRON 4 MG: 2 INJECTION INTRAMUSCULAR; INTRAVENOUS at 21:32

## 2023-02-28 NOTE — Clinical Note
Level of Care: Critical Care [6]   Admitting Physician: SANAM CEE [[id number]]   Attending Physician: SANAM CEE [[id number]]

## 2023-03-01 PROBLEM — R31.0 GROSS HEMATURIA: Status: ACTIVE | Noted: 2023-03-01

## 2023-03-01 PROBLEM — Z72.0 TOBACCO ABUSE: Chronic | Status: RESOLVED | Noted: 2022-01-05 | Resolved: 2023-03-01

## 2023-03-01 PROBLEM — A41.9 SEPSIS, DUE TO UNSPECIFIED ORGANISM, UNSPECIFIED WHETHER ACUTE ORGAN DYSFUNCTION PRESENT: Status: RESOLVED | Noted: 2023-02-28 | Resolved: 2023-03-01

## 2023-03-01 LAB
ALBUMIN SERPL-MCNC: 3 G/DL (ref 3.5–5.2)
ALBUMIN SERPL-MCNC: 3.5 G/DL (ref 3.5–5.2)
ALBUMIN/GLOB SERPL: 1 G/DL
ALBUMIN/GLOB SERPL: 1.2 G/DL
ALP SERPL-CCNC: 71 U/L (ref 39–117)
ALP SERPL-CCNC: 79 U/L (ref 39–117)
ALT SERPL W P-5'-P-CCNC: <5 U/L (ref 1–41)
ALT SERPL W P-5'-P-CCNC: <5 U/L (ref 1–41)
AMPHET+METHAMPHET UR QL: NEGATIVE
ANION GAP SERPL CALCULATED.3IONS-SCNC: 12 MMOL/L (ref 5–15)
ANION GAP SERPL CALCULATED.3IONS-SCNC: 13 MMOL/L (ref 5–15)
AST SERPL-CCNC: 9 U/L (ref 1–40)
AST SERPL-CCNC: 9 U/L (ref 1–40)
BARBITURATES UR QL SCN: NEGATIVE
BASOPHILS # BLD AUTO: 0.1 10*3/MM3 (ref 0–0.2)
BASOPHILS NFR BLD AUTO: 1.2 % (ref 0–1.5)
BENZODIAZ UR QL SCN: NEGATIVE
BILIRUB SERPL-MCNC: 0.2 MG/DL (ref 0–1.2)
BILIRUB SERPL-MCNC: 0.5 MG/DL (ref 0–1.2)
BUN SERPL-MCNC: 53 MG/DL (ref 8–23)
BUN SERPL-MCNC: 53 MG/DL (ref 8–23)
BUN/CREAT SERPL: 17.8 (ref 7–25)
BUN/CREAT SERPL: 17.9 (ref 7–25)
CA-I SERPL ISE-MCNC: 1.14 MMOL/L (ref 1.2–1.3)
CALCIUM SPEC-SCNC: 7.9 MG/DL (ref 8.6–10.5)
CALCIUM SPEC-SCNC: 8.1 MG/DL (ref 8.6–10.5)
CANNABINOIDS SERPL QL: NEGATIVE
CHLORIDE SERPL-SCNC: 102 MMOL/L (ref 98–107)
CHLORIDE SERPL-SCNC: 103 MMOL/L (ref 98–107)
CHOLEST SERPL-MCNC: 118 MG/DL (ref 0–200)
CO2 SERPL-SCNC: 18 MMOL/L (ref 22–29)
CO2 SERPL-SCNC: 18 MMOL/L (ref 22–29)
COCAINE UR QL: NEGATIVE
CREAT SERPL-MCNC: 2.96 MG/DL (ref 0.76–1.27)
CREAT SERPL-MCNC: 2.98 MG/DL (ref 0.76–1.27)
DEPRECATED RDW RBC AUTO: 49.9 FL (ref 37–54)
EGFRCR SERPLBLD CKD-EPI 2021: 22.5 ML/MIN/1.73
EGFRCR SERPLBLD CKD-EPI 2021: 22.7 ML/MIN/1.73
EOSINOPHIL # BLD AUTO: 0.2 10*3/MM3 (ref 0–0.4)
EOSINOPHIL NFR BLD AUTO: 3.2 % (ref 0.3–6.2)
ERYTHROCYTE [DISTWIDTH] IN BLOOD BY AUTOMATED COUNT: 15.9 % (ref 12.3–15.4)
ETHANOL UR QL: <0.01 %
FLUAV SUBTYP SPEC NAA+PROBE: NOT DETECTED
FLUBV RNA ISLT QL NAA+PROBE: NOT DETECTED
GLOBULIN UR ELPH-MCNC: 2.9 GM/DL
GLOBULIN UR ELPH-MCNC: 3 GM/DL
GLUCOSE SERPL-MCNC: 102 MG/DL (ref 65–99)
GLUCOSE SERPL-MCNC: 125 MG/DL (ref 65–99)
HBA1C MFR BLD: 4.8 % (ref 3.5–5.6)
HCT VFR BLD AUTO: 16.5 % (ref 37.5–51)
HCT VFR BLD AUTO: 20 % (ref 37.5–51)
HCT VFR BLD AUTO: 24.1 % (ref 37.5–51)
HDLC SERPL-MCNC: 63 MG/DL (ref 40–60)
HGB BLD-MCNC: 5.4 G/DL (ref 13–17.7)
HGB BLD-MCNC: 6.6 G/DL (ref 13–17.7)
HGB BLD-MCNC: 7.8 G/DL (ref 13–17.7)
HOLD SPECIMEN: NORMAL
IRON 24H UR-MRATE: 80 MCG/DL (ref 59–158)
IRON SATN MFR SERPL: 22 % (ref 20–50)
LDLC SERPL CALC-MCNC: 41 MG/DL (ref 0–100)
LDLC/HDLC SERPL: 0.66 {RATIO}
LYMPHOCYTES # BLD AUTO: 1 10*3/MM3 (ref 0.7–3.1)
LYMPHOCYTES NFR BLD AUTO: 20.1 % (ref 19.6–45.3)
MAGNESIUM SERPL-MCNC: 1.6 MG/DL (ref 1.6–2.4)
MCH RBC QN AUTO: 28.9 PG (ref 26.6–33)
MCHC RBC AUTO-ENTMCNC: 32.5 G/DL (ref 31.5–35.7)
MCV RBC AUTO: 88.9 FL (ref 79–97)
METHADONE UR QL SCN: NEGATIVE
MONOCYTES # BLD AUTO: 0.8 10*3/MM3 (ref 0.1–0.9)
MONOCYTES NFR BLD AUTO: 17 % (ref 5–12)
MRSA DNA SPEC QL NAA+PROBE: ABNORMAL
NEUTROPHILS NFR BLD AUTO: 2.9 10*3/MM3 (ref 1.7–7)
NEUTROPHILS NFR BLD AUTO: 58.5 % (ref 42.7–76)
NRBC BLD AUTO-RTO: 0 /100 WBC (ref 0–0.2)
OPIATES UR QL: NEGATIVE
OXYCODONE UR QL SCN: NEGATIVE
PHOSPHATE SERPL-MCNC: 4.3 MG/DL (ref 2.5–4.5)
PLATELET # BLD AUTO: 177 10*3/MM3 (ref 140–450)
PMV BLD AUTO: 7.3 FL (ref 6–12)
POTASSIUM SERPL-SCNC: 4.7 MMOL/L (ref 3.5–5.2)
POTASSIUM SERPL-SCNC: 4.9 MMOL/L (ref 3.5–5.2)
PROT SERPL-MCNC: 5.9 G/DL (ref 6–8.5)
PROT SERPL-MCNC: 6.5 G/DL (ref 6–8.5)
RBC # BLD AUTO: 1.85 10*6/MM3 (ref 4.14–5.8)
SARS-COV-2 RNA RESP QL NAA+PROBE: NOT DETECTED
SODIUM SERPL-SCNC: 133 MMOL/L (ref 136–145)
SODIUM SERPL-SCNC: 133 MMOL/L (ref 136–145)
TIBC SERPL-MCNC: 358 MCG/DL (ref 298–536)
TRANSFERRIN SERPL-MCNC: 240 MG/DL (ref 200–360)
TRIGL SERPL-MCNC: 67 MG/DL (ref 0–150)
TSH SERPL DL<=0.05 MIU/L-ACNC: 4.26 UIU/ML (ref 0.27–4.2)
VLDLC SERPL-MCNC: 14 MG/DL (ref 5–40)
WBC NRBC COR # BLD: 4.9 10*3/MM3 (ref 3.4–10.8)

## 2023-03-01 PROCEDURE — 36430 TRANSFUSION BLD/BLD COMPNT: CPT

## 2023-03-01 PROCEDURE — 83735 ASSAY OF MAGNESIUM: CPT | Performed by: STUDENT IN AN ORGANIZED HEALTH CARE EDUCATION/TRAINING PROGRAM

## 2023-03-01 PROCEDURE — 25010000002 MAGNESIUM SULFATE IN D5W 1G/100ML (PREMIX) 1-5 GM/100ML-% SOLUTION: Performed by: STUDENT IN AN ORGANIZED HEALTH CARE EDUCATION/TRAINING PROGRAM

## 2023-03-01 PROCEDURE — 85018 HEMOGLOBIN: CPT | Performed by: INTERNAL MEDICINE

## 2023-03-01 PROCEDURE — 80053 COMPREHEN METABOLIC PANEL: CPT | Performed by: STUDENT IN AN ORGANIZED HEALTH CARE EDUCATION/TRAINING PROGRAM

## 2023-03-01 PROCEDURE — 85014 HEMATOCRIT: CPT | Performed by: INTERNAL MEDICINE

## 2023-03-01 PROCEDURE — 84100 ASSAY OF PHOSPHORUS: CPT | Performed by: STUDENT IN AN ORGANIZED HEALTH CARE EDUCATION/TRAINING PROGRAM

## 2023-03-01 PROCEDURE — P9016 RBC LEUKOCYTES REDUCED: HCPCS

## 2023-03-01 PROCEDURE — 83540 ASSAY OF IRON: CPT | Performed by: STUDENT IN AN ORGANIZED HEALTH CARE EDUCATION/TRAINING PROGRAM

## 2023-03-01 PROCEDURE — 25010000002 CEFTRIAXONE PER 250 MG: Performed by: INTERNAL MEDICINE

## 2023-03-01 PROCEDURE — 87636 SARSCOV2 & INF A&B AMP PRB: CPT | Performed by: NURSE PRACTITIONER

## 2023-03-01 PROCEDURE — 82077 ASSAY SPEC XCP UR&BREATH IA: CPT | Performed by: STUDENT IN AN ORGANIZED HEALTH CARE EDUCATION/TRAINING PROGRAM

## 2023-03-01 PROCEDURE — 94799 UNLISTED PULMONARY SVC/PX: CPT

## 2023-03-01 PROCEDURE — 80061 LIPID PANEL: CPT | Performed by: STUDENT IN AN ORGANIZED HEALTH CARE EDUCATION/TRAINING PROGRAM

## 2023-03-01 PROCEDURE — 86900 BLOOD TYPING SEROLOGIC ABO: CPT

## 2023-03-01 PROCEDURE — 85025 COMPLETE CBC W/AUTO DIFF WBC: CPT | Performed by: STUDENT IN AN ORGANIZED HEALTH CARE EDUCATION/TRAINING PROGRAM

## 2023-03-01 PROCEDURE — 83036 HEMOGLOBIN GLYCOSYLATED A1C: CPT | Performed by: INTERNAL MEDICINE

## 2023-03-01 PROCEDURE — 82330 ASSAY OF CALCIUM: CPT | Performed by: STUDENT IN AN ORGANIZED HEALTH CARE EDUCATION/TRAINING PROGRAM

## 2023-03-01 PROCEDURE — 84466 ASSAY OF TRANSFERRIN: CPT | Performed by: STUDENT IN AN ORGANIZED HEALTH CARE EDUCATION/TRAINING PROGRAM

## 2023-03-01 PROCEDURE — 84443 ASSAY THYROID STIM HORMONE: CPT | Performed by: STUDENT IN AN ORGANIZED HEALTH CARE EDUCATION/TRAINING PROGRAM

## 2023-03-01 PROCEDURE — 87641 MR-STAPH DNA AMP PROBE: CPT | Performed by: STUDENT IN AN ORGANIZED HEALTH CARE EDUCATION/TRAINING PROGRAM

## 2023-03-01 RX ORDER — DEXTROSE, SODIUM CHLORIDE, SODIUM LACTATE, POTASSIUM CHLORIDE, AND CALCIUM CHLORIDE 5; .6; .31; .03; .02 G/100ML; G/100ML; G/100ML; G/100ML; G/100ML
60 INJECTION, SOLUTION INTRAVENOUS CONTINUOUS
Status: DISCONTINUED | OUTPATIENT
Start: 2023-03-01 | End: 2023-03-01

## 2023-03-01 RX ORDER — TAMSULOSIN HYDROCHLORIDE 0.4 MG/1
1 CAPSULE ORAL 2 TIMES DAILY
COMMUNITY
End: 2023-03-20 | Stop reason: HOSPADM

## 2023-03-01 RX ORDER — MAGNESIUM SULFATE 1 G/100ML
1 INJECTION INTRAVENOUS ONCE
Status: COMPLETED | OUTPATIENT
Start: 2023-03-01 | End: 2023-03-01

## 2023-03-01 RX ORDER — NITROFURANTOIN MACROCRYSTALS 50 MG/1
50 CAPSULE ORAL DAILY
COMMUNITY
End: 2023-03-20 | Stop reason: HOSPADM

## 2023-03-01 RX ORDER — FOLIC ACID 1 MG/1
1 TABLET ORAL 2 TIMES DAILY
COMMUNITY
End: 2023-03-20 | Stop reason: HOSPADM

## 2023-03-01 RX ADMIN — CEFTRIAXONE 2 G: 2 INJECTION, POWDER, FOR SOLUTION INTRAMUSCULAR; INTRAVENOUS at 10:00

## 2023-03-01 RX ADMIN — PANTOPRAZOLE SODIUM 40 MG: 40 INJECTION, POWDER, LYOPHILIZED, FOR SOLUTION INTRAVENOUS at 06:15

## 2023-03-01 RX ADMIN — SODIUM CHLORIDE, SODIUM LACTATE, POTASSIUM CHLORIDE, CALCIUM CHLORIDE AND DEXTROSE MONOHYDRATE 60 ML/HR: 5; 600; 310; 30; 20 INJECTION, SOLUTION INTRAVENOUS at 09:53

## 2023-03-01 RX ADMIN — Medication 10 ML: at 09:55

## 2023-03-01 RX ADMIN — CEFTRIAXONE 2 G: 2 INJECTION, POWDER, FOR SOLUTION INTRAMUSCULAR; INTRAVENOUS at 13:16

## 2023-03-01 RX ADMIN — Medication 10 ML: at 21:23

## 2023-03-01 RX ADMIN — MAGNESIUM SULFATE IN DEXTROSE 1 G: 10 INJECTION, SOLUTION INTRAVENOUS at 06:15

## 2023-03-01 NOTE — PROGRESS NOTES
Critical Care Progress Note   Anthony Moss : 1955 MRN:[id number] LOS:1     Principal Problem: Acute blood loss anemia     Reason for follow up: All the medical problems listed below    Summary     A 67 y.o. male with PMH of chronic indwelling Roberts with hematuria presented to the hospital for progressive weakness and hematuria and was admitted with a principal diagnosis of Acute blood loss anemia.  Found to have severe anemia with initial hemoglobin of 4.8, also had hematuria.  Urology was consulted, 2 units of PRBC given after which hemoglobin improved to 6.6.    Significant events     23 : Serial H&H checks, hemodynamically stable and transfer to floor.    Assessment / Plan     Hematuria /acute blood loss anemia  · Urology consulted, s/p 2 PRBC.  · We will give another PRBC to aim for a target hemoglobin> 7.  · Likely will need cystoscopy.  · Monitor H&H every 6 hours.  Hemodynamically stable.    Suspected UTI: De-escalate antibiotics to ceftriaxone alone, follow urine cultures.  No clinical evidence of pneumonia.    Acute Kidney Injury:   • Remains non oliguric.   • Likely prerenal. C/w IV fluids.   • Monitor Input/Output very closely.   • Net IO Since Admission: 1,034 mL [23 1236]       Code status:   Code Status (Patient has no pulse and is not breathing): CPR (Attempt to Resuscitate)  Medical Interventions (Patient has pulse or is breathing): Full Support       Nutrition: NPO Diet NPO Type: Strict NPO   Patient isn't on Tube Feeding    DVT prophylaxis:   Mechanical Order History:      Ordered        23 2350  Place Sequential Compression Device  Once            23 2350  Maintain Sequential Compression Device  Continuous                    Pharmalogical Order History:     None           Subjective / Review of systems     Review of Systems   Feels much better, no chest pain, no shortness of breath, no weakness.  Feels his urine is clearing up.  Wanted to know when he can go  home.  Objective / Physical Exam   Vital signs:  Temp: 98.4 °F (36.9 °C)  BP: 116/75  Heart Rate: 89  Resp: 18  SpO2: 94 %  Weight: 97.5 kg (214 lb 15.2 oz)    Admission Weight: Weight: 99.2 kg (218 lb 11.1 oz)  Current Weight: Weight: 97.5 kg (214 lb 15.2 oz)    Input/Output in last 24 hours:    Intake/Output Summary (Last 24 hours) at 3/1/2023 1235  Last data filed at 3/1/2023 0849  Gross per 24 hour   Intake 1034 ml   Output --   Net 1034 ml      Physical Exam  Vitals and nursing note reviewed.   Constitutional:       General: He is not in acute distress.     Appearance: Normal appearance.   HENT:      Mouth/Throat:      Mouth: Mucous membranes are moist.      Pharynx: Oropharynx is clear.   Eyes:      General: No scleral icterus.     Extraocular Movements: Extraocular movements intact.      Conjunctiva/sclera: Conjunctivae normal.   Cardiovascular:      Rate and Rhythm: Normal rate.      Heart sounds: No murmur heard.    No gallop.   Pulmonary:      Breath sounds: Normal breath sounds. No wheezing or rales.   Abdominal:      General: Bowel sounds are normal.      Palpations: Abdomen is soft.      Tenderness: There is no abdominal tenderness.   Genitourinary:     Comments: Suprapubic catheter in place, clear urine  Musculoskeletal:         General: No tenderness.      Right lower leg: No edema.      Left lower leg: No edema.   Neurological:      General: No focal deficit present.      Mental Status: He is alert and oriented to person, place, and time.        Radiology and Labs     Results from last 7 days   Lab Units 03/01/23  0940 03/01/23  0345 02/28/23  2228   WBC 10*3/mm3  --  4.90 6.10   HEMATOCRIT % 20.0* 16.5* 14.5*   PLATELETS 10*3/mm3  --  177 177      Results from last 7 days   Lab Units 03/01/23  0345 03/01/23  0025 02/28/23  2128   SODIUM mmol/L 133* 133* 132*   POTASSIUM mmol/L 4.7 4.9 4.5   CHLORIDE mmol/L 103 102 101   CO2 mmol/L 18.0* 18.0* 18.0*   BUN mg/dL 53* 53* 56*   CREATININE mg/dL 2.96*  2.98* 3.08*      Current medications   Scheduled Meds: cefTRIAXone, 2 g, Intravenous, Q24H  pantoprazole, 40 mg, Intravenous, Q AM  sodium chloride, 500 mL, Intravenous, Once  sodium chloride, 10 mL, Intravenous, Q12H      Continuous Infusions: dextrose 5 % and lactated Ringer's, 60 mL/hr, Last Rate: 60 mL/hr (03/01/23 0953)        Plan discussed with RN. Reviewed all other data in the last 24 hours, including but not limited to vitals, labs, microbiology, imaging and pertinent notes from other providers.     Curtis Zazueta MD   Critical Care  03/01/23   12:35 EST
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
## 2023-03-01 NOTE — PLAN OF CARE
Goal Outcome Evaluation:  Plan of Care Reviewed With: patient        Progress: improving  Outcome Evaluation: Pt rec'd two units PRBCs this shift. Hgb up to 7.6. Suprapubic catheter exchanged by Urology at bedside. Awaiting floor bed. VSS
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
## 2023-03-01 NOTE — PROGRESS NOTES
"Pharmacy Antimicrobial Dosing Service    Subjective:  Anthony Moss is a 67 y.o.male admitted with abdominal discomfort and hematuria. Pharmacy has been consulted to dose Vancomycin and Cefepime for possible sepsis.        Assessment/Plan    1. Day #1 Vancomycin: Pulse dosing d/t renal dysfxn. Vancomycin 2000mg (20 mg/kg ABW) x 1 dose. Scr 2.98. Will collect random level and plan to redose when level is expected to be <20 mcg/ml.    2. Day #1 Cefepime: 2 IV q12h for CrCl 34 mL/min.    Will continue to monitor drug levels, renal function, culture and sensitivities, and patient clinical status.       Objective:  Relevant clinical data and objective history reviewed:  188 cm (74\")   97.5 kg (214 lb 15.2 oz)   Ideal body weight: 82.2 kg (181 lb 3.5 oz)  Adjusted ideal body weight: 88.3 kg (194 lb 11.4 oz)  Body mass index is 27.6 kg/m².        Results from last 7 days   Lab Units 03/01/23  0025 02/28/23  2128   CREATININE mg/dL 2.98* 3.08*     Estimated Creatinine Clearance: 34.1 mL/min (A) (by C-G formula based on SCr of 2.98 mg/dL (H)).  No intake/output data recorded.    Results from last 7 days   Lab Units 02/28/23  2228   WBC 10*3/mm3 6.10     Temperature    03/01/23 0008 03/01/23 0105 03/01/23 0338   Temp: 97.9 °F (36.6 °C) 98.1 °F (36.7 °C) 98.3 °F (36.8 °C)     Baseline culture/source/susceptibility:  Microbiology Results (last 10 days)       Procedure Component Value - Date/Time    COVID-19 and FLU A/B PCR - Swab, Nasopharynx [[id number]]  (Normal) Collected: 03/01/23 0025    Lab Status: Final result Specimen: Swab from Nasopharynx Updated: 03/01/23 0052     COVID19 Not Detected     Influenza A PCR Not Detected     Influenza B PCR Not Detected    Narrative:      Fact sheet for providers: https://www.fda.gov/media/165889/download    Fact sheet for patients: https://www.fda.gov/media/709866/download    Test performed by PCR.    MRSA Screen, PCR (Inpatient) - Swab, Nares [[id number]]  (Abnormal) Collected: " 03/01/23 0025    Lab Status: Final result Specimen: Swab from Nares Updated: 03/01/23 0200     MRSA PCR MRSA Detected    Narrative:      The negative predictive value of this diagnostic test is high and should only be used to consider de-escalating anti-MRSA therapy. A positive result may indicate colonization with MRSA and must be correlated clinically.            Anti-Infectives (From admission, onward)      Ordered     Dose/Rate Route Frequency Start Stop    02/28/23 2355  cefepime 2 gm IVPB in 100 ml NS (MBP)        Ordering Provider: Katlyn Murillo APRN    2 g  over 4 Hours Intravenous Every 24 Hours 03/01/23 2200 03/06/23 2159 02/28/23 2355  Pharmacy to dose vancomycin        Ordering Provider: Katlyn Murillo APRN     Does not apply Continuous PRN 02/28/23 2355 03/03/23 2354 02/28/23 2143  cefepime 2 gm IVPB in 100 ml NS (MBP)        Ordering Provider: Katlyn Wallis APRN    2 g  over 30 Minutes Intravenous Once 02/28/23 2145 02/28/23 2350 02/28/23 2143  vancomycin IVPB 2000 mg in 0.9% Sodium Chloride (premix) 500 mL        Ordering Provider: Katlyn Wallis APRN    20 mg/kg × 99.2 kg Intravenous Once 02/28/23 2145 02/28/23 2307            Parris Medina RPH  03/01/23 04:04 EST

## 2023-03-01 NOTE — PLAN OF CARE
Goal Outcome Evaluation:      Pt receiving 2 units PRBC, tolerating well. Pt's suprapubic insertion site leaking, NP aware, urology consult placed. A&O x4, on room air, HR has been NSR/Sinus tach. Vital signs are currently stable

## 2023-03-01 NOTE — CASE MANAGEMENT/SOCIAL WORK
Discharge Planning Assessment  [facility]     Patient Name: Anthony Moss  MRN: [id number]  Today's Date: 3/1/2023    Admit Date: 2/28/2023    Plan: DC Plan: Anticipate Routine Home pending clinical course.   Discharge Needs Assessment     Row Name 03/01/23 1339       Living Environment    People in Home alone    Current Living Arrangements home    Potentially Unsafe Housing Conditions none    Primary Care Provided by self    Provides Primary Care For no one    Family Caregiver if Needed friend(s)    Family Caregiver Names Maximo Reed or Annita Duke    Quality of Family Relationships helpful;supportive    Able to Return to Prior Arrangements yes       Resource/Environmental Concerns    Resource/Environmental Concerns none    Transportation Concerns none       Food Insecurity    Within the past 12 months, you worried that your food would run out before you got the money to buy more. Never true    Within the past 12 months, the food you bought just didn't last and you didn't have money to get more. Never true       Transition Planning    Patient/Family Anticipates Transition to home    Patient/Family Anticipated Services at Transition none    Transportation Anticipated family or friend will provide       Discharge Needs Assessment    Readmission Within the Last 30 Days no previous admission in last 30 days    Equipment Currently Used at Home none    Concerns to be Addressed no discharge needs identified;denies needs/concerns at this time    Anticipated Changes Related to Illness none    Equipment Needed After Discharge none    Provided Post Acute Provider List? N/A    Provided Post Acute Provider Quality & Resource List? N/A    Current Discharge Risk lives alone               Discharge Plan     Row Name 03/01/23 1351       Plan    Plan DC Plan: Anticipate Routine Home pending clinical course.    Provided Post Acute Provider List? N/A    Provided Post Acute Provider Quality & Resource List? N/A    Plan Comments CM  spoke with patient at bedside to discuss admission assessment and discharge planning. Patient confirms PCP and pharmacy. Patient already enrolled in meds to bed program. Patient denies any difficulty affording medications at this time. Patient denies any additional needs for services or DME at this time. Patient states a friend will drive hime home when ready for Dc.CM spoke with patient’s nurse, intensivist, and NP to obtain clinical updates. Nursing reports urine leaking around SP catheter. Blood noted in urine. PRBC x2 units infusing. Plan to downgrade to PCU level of care if Urology agreeable. CM will continue to follow for any further needs and adjust discharge plan accordingly. DC Barriers: IVF's, IV abx, transfusion, monitor H/H, pending cultures, and Urology Consult.           Expected Discharge Date and Time     Expected Discharge Date Expected Discharge Time    Mar 6, 2023          Demographic Summary     Row Name 03/01/23 1338       General Information    Admission Type inpatient    Arrived From emergency department;home    Required Notices Provided Important Message from Medicare    Referral Source admission list    Reason for Consult discharge planning    Preferred Language English       Contact Information    Permission Granted to Share Info With                Functional Status     Row Name 03/01/23 1338       Functional Status    Usual Activity Tolerance good    Current Activity Tolerance moderate       Physical Activity    On average, how many days per week do you engage in moderate to strenuous exercise (like a brisk walk)? 1 day    On average, how many minutes do you engage in exercise at this level? 30 min  Patient states he used to ride a stationary bike Daily for nearly two hours, but his health has prevented that in recent history.    Number of minutes of exercise per week 30       Functional Status, IADL    Medications independent    Meal Preparation independent    Housekeeping  independent    Laundry independent    Shopping independent       Mental Status    General Appearance WDL WDL       Mental Status Summary    Recent Changes in Mental Status/Cognitive Functioning no changes       Employment/    Employment Status retired;, previous service    Current or Previous Occupation not applicable           Current or Previous  Service none              Met with patient in room wearing PPE: mask, and gown.    Maintain distance greater than six feet and spent less than fifteen minutes in the room.      Glendy Goldstein RN     Office Phone: [phone]  Office Cell:     [phone]

## 2023-03-02 ENCOUNTER — APPOINTMENT (OUTPATIENT)
Dept: CT IMAGING | Facility: HOSPITAL | Age: 68
DRG: 659 | End: 2023-03-02
Payer: MEDICARE

## 2023-03-02 PROBLEM — Z87.898 HISTORY OF ALCOHOL USE DISORDER: Status: ACTIVE | Noted: 2023-03-02

## 2023-03-02 PROBLEM — N40.0 BPH (BENIGN PROSTATIC HYPERPLASIA): Status: ACTIVE | Noted: 2023-03-02

## 2023-03-02 LAB
ALBUMIN SERPL-MCNC: 2.9 G/DL (ref 3.5–5.2)
ALBUMIN/GLOB SERPL: 0.9 G/DL
ALP SERPL-CCNC: 79 U/L (ref 39–117)
ALT SERPL W P-5'-P-CCNC: 5 U/L (ref 1–41)
ANION GAP SERPL CALCULATED.3IONS-SCNC: 10 MMOL/L (ref 5–15)
AST SERPL-CCNC: 9 U/L (ref 1–40)
BASOPHILS # BLD AUTO: 0.1 10*3/MM3 (ref 0–0.2)
BASOPHILS NFR BLD AUTO: 1.1 % (ref 0–1.5)
BH BB BLOOD EXPIRATION DATE: NORMAL
BH BB BLOOD TYPE BARCODE: 5100
BH BB DISPENSE STATUS: NORMAL
BH BB PRODUCT CODE: NORMAL
BH BB UNIT NUMBER: NORMAL
BILIRUB SERPL-MCNC: 0.3 MG/DL (ref 0–1.2)
BUN SERPL-MCNC: 58 MG/DL (ref 8–23)
BUN/CREAT SERPL: 14.8 (ref 7–25)
CALCIUM SPEC-SCNC: 7.9 MG/DL (ref 8.6–10.5)
CHLORIDE SERPL-SCNC: 107 MMOL/L (ref 98–107)
CO2 SERPL-SCNC: 20 MMOL/L (ref 22–29)
CREAT SERPL-MCNC: 3.92 MG/DL (ref 0.76–1.27)
CROSSMATCH INTERPRETATION: NORMAL
DEPRECATED RDW RBC AUTO: 48.6 FL (ref 37–54)
EGFRCR SERPLBLD CKD-EPI 2021: 16.2 ML/MIN/1.73
EOSINOPHIL # BLD AUTO: 0.1 10*3/MM3 (ref 0–0.4)
EOSINOPHIL NFR BLD AUTO: 1.9 % (ref 0.3–6.2)
ERYTHROCYTE [DISTWIDTH] IN BLOOD BY AUTOMATED COUNT: 15.7 % (ref 12.3–15.4)
GLOBULIN UR ELPH-MCNC: 3.3 GM/DL
GLUCOSE SERPL-MCNC: 113 MG/DL (ref 65–99)
HCT VFR BLD AUTO: 21.5 % (ref 37.5–51)
HCT VFR BLD AUTO: 21.8 % (ref 37.5–51)
HCT VFR BLD AUTO: 22.2 % (ref 37.5–51)
HCT VFR BLD AUTO: 22.2 % (ref 37.5–51)
HCT VFR BLD AUTO: 22.9 % (ref 37.5–51)
HGB BLD-MCNC: 7 G/DL (ref 13–17.7)
HGB BLD-MCNC: 7.2 G/DL (ref 13–17.7)
HGB BLD-MCNC: 7.2 G/DL (ref 13–17.7)
HGB BLD-MCNC: 7.3 G/DL (ref 13–17.7)
HGB BLD-MCNC: 7.3 G/DL (ref 13–17.7)
LYMPHOCYTES # BLD AUTO: 1 10*3/MM3 (ref 0.7–3.1)
LYMPHOCYTES NFR BLD AUTO: 15.4 % (ref 19.6–45.3)
MAGNESIUM SERPL-MCNC: 1.8 MG/DL (ref 1.6–2.4)
MCH RBC QN AUTO: 28.6 PG (ref 26.6–33)
MCHC RBC AUTO-ENTMCNC: 32.5 G/DL (ref 31.5–35.7)
MCV RBC AUTO: 88 FL (ref 79–97)
MONOCYTES # BLD AUTO: 1.1 10*3/MM3 (ref 0.1–0.9)
MONOCYTES NFR BLD AUTO: 16.7 % (ref 5–12)
NEUTROPHILS NFR BLD AUTO: 4.1 10*3/MM3 (ref 1.7–7)
NEUTROPHILS NFR BLD AUTO: 64.9 % (ref 42.7–76)
NRBC BLD AUTO-RTO: 0 /100 WBC (ref 0–0.2)
PHOSPHATE SERPL-MCNC: 4.7 MG/DL (ref 2.5–4.5)
PLATELET # BLD AUTO: 183 10*3/MM3 (ref 140–450)
PMV BLD AUTO: 6.9 FL (ref 6–12)
POTASSIUM SERPL-SCNC: 5.3 MMOL/L (ref 3.5–5.2)
PROT SERPL-MCNC: 6.2 G/DL (ref 6–8.5)
RBC # BLD AUTO: 2.52 10*6/MM3 (ref 4.14–5.8)
SODIUM SERPL-SCNC: 137 MMOL/L (ref 136–145)
UNIT  ABO: NORMAL
UNIT  RH: NORMAL
WBC NRBC COR # BLD: 6.4 10*3/MM3 (ref 3.4–10.8)

## 2023-03-02 PROCEDURE — 36415 COLL VENOUS BLD VENIPUNCTURE: CPT | Performed by: STUDENT IN AN ORGANIZED HEALTH CARE EDUCATION/TRAINING PROGRAM

## 2023-03-02 PROCEDURE — 85018 HEMOGLOBIN: CPT | Performed by: INTERNAL MEDICINE

## 2023-03-02 PROCEDURE — 80053 COMPREHEN METABOLIC PANEL: CPT | Performed by: STUDENT IN AN ORGANIZED HEALTH CARE EDUCATION/TRAINING PROGRAM

## 2023-03-02 PROCEDURE — 85025 COMPLETE CBC W/AUTO DIFF WBC: CPT | Performed by: STUDENT IN AN ORGANIZED HEALTH CARE EDUCATION/TRAINING PROGRAM

## 2023-03-02 PROCEDURE — 74176 CT ABD & PELVIS W/O CONTRAST: CPT

## 2023-03-02 PROCEDURE — 85014 HEMATOCRIT: CPT | Performed by: INTERNAL MEDICINE

## 2023-03-02 PROCEDURE — 83735 ASSAY OF MAGNESIUM: CPT | Performed by: STUDENT IN AN ORGANIZED HEALTH CARE EDUCATION/TRAINING PROGRAM

## 2023-03-02 PROCEDURE — 25010000002 CEFTRIAXONE PER 250 MG: Performed by: INTERNAL MEDICINE

## 2023-03-02 PROCEDURE — 84100 ASSAY OF PHOSPHORUS: CPT | Performed by: STUDENT IN AN ORGANIZED HEALTH CARE EDUCATION/TRAINING PROGRAM

## 2023-03-02 RX ORDER — SODIUM BICARBONATE 650 MG/1
1300 TABLET ORAL ONCE
Status: COMPLETED | OUTPATIENT
Start: 2023-03-02 | End: 2023-03-02

## 2023-03-02 RX ORDER — SODIUM BICARBONATE 650 MG/1
650 TABLET ORAL 3 TIMES DAILY
Status: DISCONTINUED | OUTPATIENT
Start: 2023-03-02 | End: 2023-03-20 | Stop reason: HOSPADM

## 2023-03-02 RX ADMIN — SODIUM ZIRCONIUM CYCLOSILICATE 10 G: 10 POWDER, FOR SUSPENSION ORAL at 13:49

## 2023-03-02 RX ADMIN — Medication 10 ML: at 21:04

## 2023-03-02 RX ADMIN — PANTOPRAZOLE SODIUM 40 MG: 40 INJECTION, POWDER, LYOPHILIZED, FOR SOLUTION INTRAVENOUS at 05:58

## 2023-03-02 RX ADMIN — Medication 10 ML: at 09:48

## 2023-03-02 RX ADMIN — SODIUM BICARBONATE 650 MG: 650 TABLET ORAL at 21:03

## 2023-03-02 RX ADMIN — CEFTRIAXONE 2 G: 2 INJECTION, POWDER, FOR SOLUTION INTRAMUSCULAR; INTRAVENOUS at 12:19

## 2023-03-02 RX ADMIN — SODIUM BICARBONATE 1300 MG: 650 TABLET ORAL at 13:49

## 2023-03-02 NOTE — PAYOR COMM NOTE
RECEIVED VOICEMAIL FROM MANUEL AT [facility] STATING THIS PATIENT'S INSURANCE LAPSED AS OF 03/01/2023 AND THAT PENDING INPATIENT CASE WITH PENDING AUTH # [id number] WOULD BE CANCELLED SINCE THE ADMIT DATE SHOWS AS 03/05/203. NOT SURE IF THIS WAS A SYSTEM ERROR BUT THE INPATIENT ADMIT DATE IS 02/28/2023 WHICH FALLS UNDER THE PATIENT'S [facility] PLAN. [employer]'S HISTORY ON THIS INPATIENT CASE SHOWS THAT THE CORRECT ADMIT DATE OF 02/28/2023 WAS PLACED AS SEEN BELOW. INPATIENT CASE NEEDS TO BE CORRECTED TO REFLECT CORRECT ADMIT DATE AND SHOULD NOT BE CANCELLED. THANKS        Anthony Moss ANY (67 y.o. Male) 1955 02/28/23 2333  Inpatient Admission  Once     Completed     Level of Care: Critical Care    Diagnosis: Sepsis, due to unspecified organism, unspecified whether acute organ dysfunction present (HCC) [4732420]    Admitting Physician: SANAM CEE [[id number]]    Attending Physician: SANAM CEE [[id number]]    Certification: I Certify That Inpatient Hospital Services Are Medically Necessary For Greater Than 2 Midnights                         AUTHORIZATION PENDING:   PLEASE CALL OR FAX DETERMINATION TO CONTACT BELOW. THANK YOU.        Cristin Etienne RN MSN  /UR  [facility]  [phone] office  [phone] fax  [email or web address]    [facility]  NPI: [id number]  Tax: [phone]              Anthony Moss (67 y.o. Male)     Date of Birth   1955    Social Security Number       Address   [address] [place] [address] [address] [place] [address]    Home Phone   [phone]    MRN   [id number]       Quaker   None    Marital Status                               Admission Date   2/28/23    Admission Type   Emergency    Admitting Provider   Sanam Cee MD    Attending Provider   Shukri Mayers MD    Department, Room/Bed   [facility] INTENSIVE CARE UNIT, 2308/1       Discharge Date       Discharge Disposition        "Discharge Destination                               Attending Provider: Shukri Mayers MD    Allergies: No Known Allergies    Isolation: None   Infection: MRSA No Isolation this Admit (03/01/23)   Code Status: CPR    Ht: 188 cm (74\")   Wt: 96.3 kg (212 lb 4.9 oz)    Admission Cmt: None   Principal Problem: Acute blood loss anemia [D62]                 Active Insurance as of 2/28/2023     Primary Coverage     Payor Plan Insurance Group Employer/Plan Group    ANTHEM MEDICARE REPLACEMENT ANTHEM MEDICARE ADVANTAGE [id number]     Payor Plan Address Payor Plan Phone Number Payor Plan Fax Number Effective Dates    [address] [phone]  2/1/2023 - None Entered    [facility] [id number]       Subscriber Name Subscriber Birth Date Member ID       NIRMALAHUMERA AUSTIN ANY 1955 [id number]           Secondary Coverage     Payor Plan Insurance Group Employer/Plan Group    [place]      Payor Plan Address Payor Plan Phone Number Payor Plan Fax Number Effective Dates    [address]   2/28/2023 - None Entered    [facility] [id number]       Subscriber Name Subscriber Birth Date Member ID       NIRMALANORMANHUMERA AGARWAL 1955 [id number]                 Emergency Contacts      (Rel.) Home Phone Work Phone Mobile Phone    DURAN ERWIN (Friend) [phone] -- [phone]    YANETH PEREZ (Friend) [phone] -- [phone]            "

## 2023-03-02 NOTE — PLAN OF CARE
Goal Outcome Evaluation:    Patient on room air. No gtts running. Suprapubic catheter replaced today by Dr. Burkett. Nephrology consulted. Hemoglobin stable. Patient remains alert and oriented. Patient NSR with stable vitals.

## 2023-03-02 NOTE — PROGRESS NOTES
"  [facility] DAILY PROGRESS NOTE    Patient Identification  Name: Anthony Moss  Age: 67 y.o.  Sex: male  :  1955  MRN: [id number]    Date: 3/2/2023             Subjective:  Interval History: urine clearing but Cr up further     Objective:    Scheduled Meds:cefTRIAXone, 2 g, Intravenous, Q24H  pantoprazole, 40 mg, Intravenous, Q AM  sodium chloride, 500 mL, Intravenous, Once  sodium chloride, 10 mL, Intravenous, Q12H      Continuous Infusions:   PRN Meds:•  acetaminophen **OR** acetaminophen  •  influenza vaccine  •  ondansetron **OR** ondansetron  •  [COMPLETED] Insert Peripheral IV **AND** sodium chloride  •  sodium chloride  •  sodium chloride    Vital signs in last 24 hours:  Temp:  [98.1 °F (36.7 °C)-99.7 °F (37.6 °C)] 99.2 °F (37.3 °C)  Heart Rate:  [] 84  Resp:  [14-18] 14  BP: (116-144)/(54-97) 138/81    Intake/Output:    Intake/Output Summary (Last 24 hours) at 3/2/2023 0821  Last data filed at 3/2/2023 0400  Gross per 24 hour   Intake 3685 ml   Output 3350 ml   Net 335 ml       Exam:  /81   Pulse 84   Temp 99.2 °F (37.3 °C) (Oral)   Resp 14   Ht 188 cm (74\")   Wt 96.3 kg (212 lb 4.9 oz)   SpO2 98%   BMI 27.26 kg/m²     General Appearance:    Alert, cooperative, no distress, appears stated age               Abdomen:     Soft, ND   :    No suprapubic distention, SP tube yellow in tubing                Data Review:  All labs (24hrs):   Recent Results (from the past 24 hour(s))   Hemoglobin & Hematocrit, Blood    Collection Time: 23  9:40 AM    Specimen: Blood   Result Value Ref Range    Hemoglobin 6.6 (C) 13.0 - 17.7 g/dL    Hematocrit 20.0 (C) 37.5 - 51.0 %   Hemoglobin A1c    Collection Time: 23  9:40 AM    Specimen: Blood   Result Value Ref Range    Hemoglobin A1C 4.8 3.5 - 5.6 %   Hemoglobin & Hematocrit, Blood    Collection Time: 23  2:53 PM    Specimen: Blood   Result Value Ref Range    Hemoglobin 7.8 (L) 13.0 - 17.7 g/dL    Hematocrit 24.1 (L) 37.5 - " 51.0 %   Prepare RBC, 2 Units    Collection Time: 03/02/23  2:00 AM   Result Value Ref Range    Product Code S7292A43     Unit Number [id number]     UNIT  ABO O     UNIT  RH POS     Crossmatch Interpretation Compatible     Dispense Status PT     Blood Expiration Date 202303152359     Blood Type Barcode 5100     Product Code M6931R26     Unit Number [id number]     UNIT  ABO O     UNIT  RH POS     Crossmatch Interpretation Compatible     Dispense Status PT     Blood Expiration Date 202303152359     Blood Type Barcode 5100    Prepare RBC, 1 Units    Collection Time: 03/02/23  2:00 AM   Result Value Ref Range    Product Code Q7601M50     Unit Number [id number]     UNIT  ABO O     UNIT  RH POS     Crossmatch Interpretation Compatible     Dispense Status PT     Blood Expiration Date 202303182359     Blood Type Barcode 5100    Magnesium    Collection Time: 03/02/23  2:12 AM    Specimen: Blood   Result Value Ref Range    Magnesium 1.8 1.6 - 2.4 mg/dL   Phosphorus    Collection Time: 03/02/23  2:12 AM    Specimen: Blood   Result Value Ref Range    Phosphorus 4.7 (H) 2.5 - 4.5 mg/dL   Comprehensive Metabolic Panel    Collection Time: 03/02/23  2:12 AM    Specimen: Blood   Result Value Ref Range    Glucose 113 (H) 65 - 99 mg/dL    BUN 58 (H) 8 - 23 mg/dL    Creatinine 3.92 (H) 0.76 - 1.27 mg/dL    Sodium 137 136 - 145 mmol/L    Potassium 5.3 (H) 3.5 - 5.2 mmol/L    Chloride 107 98 - 107 mmol/L    CO2 20.0 (L) 22.0 - 29.0 mmol/L    Calcium 7.9 (L) 8.6 - 10.5 mg/dL    Total Protein 6.2 6.0 - 8.5 g/dL    Albumin 2.9 (L) 3.5 - 5.2 g/dL    ALT (SGPT) 5 1 - 41 U/L    AST (SGOT) 9 1 - 40 U/L    Alkaline Phosphatase 79 39 - 117 U/L    Total Bilirubin 0.3 0.0 - 1.2 mg/dL    Globulin 3.3 gm/dL    A/G Ratio 0.9 g/dL    BUN/Creatinine Ratio 14.8 7.0 - 25.0    Anion Gap 10.0 5.0 - 15.0 mmol/L    eGFR 16.2 (L) >60.0 mL/min/1.73   CBC Auto Differential    Collection Time: 03/02/23  2:12 AM    Specimen: Blood   Result Value Ref  Range    WBC 6.40 3.40 - 10.80 10*3/mm3    RBC 2.52 (L) 4.14 - 5.80 10*6/mm3    Hemoglobin 7.2 (L) 13.0 - 17.7 g/dL    Hematocrit 22.2 (L) 37.5 - 51.0 %    MCV 88.0 79.0 - 97.0 fL    MCH 28.6 26.6 - 33.0 pg    MCHC 32.5 31.5 - 35.7 g/dL    RDW 15.7 (H) 12.3 - 15.4 %    RDW-SD 48.6 37.0 - 54.0 fl    MPV 6.9 6.0 - 12.0 fL    Platelets 183 140 - 450 10*3/mm3    Neutrophil % 64.9 42.7 - 76.0 %    Lymphocyte % 15.4 (L) 19.6 - 45.3 %    Monocyte % 16.7 (H) 5.0 - 12.0 %    Eosinophil % 1.9 0.3 - 6.2 %    Basophil % 1.1 0.0 - 1.5 %    Neutrophils, Absolute 4.10 1.70 - 7.00 10*3/mm3    Lymphocytes, Absolute 1.00 0.70 - 3.10 10*3/mm3    Monocytes, Absolute 1.10 (H) 0.10 - 0.90 10*3/mm3    Eosinophils, Absolute 0.10 0.00 - 0.40 10*3/mm3    Basophils, Absolute 0.10 0.00 - 0.20 10*3/mm3    nRBC 0.0 0.0 - 0.2 /100 WBC   Hemoglobin & Hematocrit, Blood    Collection Time: 03/02/23  3:59 AM    Specimen: Blood   Result Value Ref Range    Hemoglobin 7.3 (L) 13.0 - 17.7 g/dL    Hematocrit 22.2 (L) 37.5 - 51.0 %      Imaging Results (Last 24 Hours)     ** No results found for the last 24 hours. **           Assessment:    Acute blood loss anemia    Acute renal failure (ARF) (HCC)    Acquired scoliosis    ADHD    Depression    Mixed hyperlipidemia    Essential hypertension    Bilateral pneumonia    Marijuana use    UTI (urinary tract infection)    Gross hematuria    Neurogenic bladder with suprapubic tube  Severe bladder wall thickening causing bilateral hydronephrosis  Cystitis  Hematuria and anemia     Plan:      Good UOP and clear but Cr up further, recheck CT today and plan cystoscopy, poss jose stent placement tomorrow, poss TURBT, NPO at midnight  Continue antibiotics he has acute on chronic cystitis multiple course of antibiotics recently  Transfusions as needed    Xu Montero MD  [facility]  [address] [facility] [address] [facility] [address], [address]  [place] [address]  Office: [phone]  Cell: [phone]  Also available via Epic Secure  Chat  03/02/23  08:21 EST

## 2023-03-02 NOTE — PROGRESS NOTES
Patient seen by Dr Houston earlier this year  Will defer renal; consult to him.  D/W RN  Thank you.
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
## 2023-03-02 NOTE — CASE MANAGEMENT/SOCIAL WORK
Continued Stay Note   [place]     Patient Name: Anthony Moss  MRN: [id number]  Today's Date: 3/2/2023    Admit Date: 2/28/2023    Plan: DC Plan: Anticipate Routine Home pending clinical course.   Discharge Plan     Row Name 03/02/23 1130       Plan    Plan DC Plan: Anticipate Routine Home pending clinical course.    Provided Post Acute Provider List? N/A    Provided Post Acute Provider Quality & Resource List? N/A    Plan Comments CM reviewed chart documentation to obtain clinical updates. Plan for patient to have Cystoscopy with stent placment and possible tumor resection on 3/3/23.CM will continue to follow for any further needs and adjust discharge plan accordingly. DC Barriers: IV abx, planned procedure, abnormal labs, and monitor H/H.           Expected Discharge Date and Time     Expected Discharge Date Expected Discharge Time    Mar 6, 2023         Phone communication or documentation only- no physical contact with patient or family.      Glendy Goldstein RN     Office Phone: [phone]  Office Cell:     [phone]

## 2023-03-03 ENCOUNTER — APPOINTMENT (OUTPATIENT)
Dept: INTERVENTIONAL RADIOLOGY/VASCULAR | Facility: HOSPITAL | Age: 68
DRG: 659 | End: 2023-03-03
Payer: MEDICARE

## 2023-03-03 ENCOUNTER — ANESTHESIA (OUTPATIENT)
Dept: PERIOP | Facility: HOSPITAL | Age: 68
DRG: 659 | End: 2023-03-03
Payer: MEDICARE

## 2023-03-03 ENCOUNTER — ANESTHESIA EVENT (OUTPATIENT)
Dept: PERIOP | Facility: HOSPITAL | Age: 68
DRG: 659 | End: 2023-03-03
Payer: MEDICARE

## 2023-03-03 LAB
ALBUMIN SERPL-MCNC: 3 G/DL (ref 3.5–5.2)
ALBUMIN/GLOB SERPL: 1 G/DL
ALP SERPL-CCNC: 73 U/L (ref 39–117)
ALT SERPL W P-5'-P-CCNC: <5 U/L (ref 1–41)
ANION GAP SERPL CALCULATED.3IONS-SCNC: 14 MMOL/L (ref 5–15)
ANION GAP SERPL CALCULATED.3IONS-SCNC: 8 MMOL/L (ref 5–15)
AST SERPL-CCNC: 8 U/L (ref 1–40)
BACTERIA SPEC AEROBE CULT: ABNORMAL
BASOPHILS # BLD AUTO: 0.1 10*3/MM3 (ref 0–0.2)
BASOPHILS NFR BLD AUTO: 1 % (ref 0–1.5)
BILIRUB SERPL-MCNC: 0.2 MG/DL (ref 0–1.2)
BUN SERPL-MCNC: 55 MG/DL (ref 8–23)
BUN SERPL-MCNC: 56 MG/DL (ref 8–23)
BUN/CREAT SERPL: 15.3 (ref 7–25)
BUN/CREAT SERPL: 15.6 (ref 7–25)
CALCIUM SPEC-SCNC: 7.9 MG/DL (ref 8.6–10.5)
CALCIUM SPEC-SCNC: 8.5 MG/DL (ref 8.6–10.5)
CHLORIDE SERPL-SCNC: 106 MMOL/L (ref 98–107)
CHLORIDE SERPL-SCNC: 109 MMOL/L (ref 98–107)
CO2 SERPL-SCNC: 20 MMOL/L (ref 22–29)
CO2 SERPL-SCNC: 21 MMOL/L (ref 22–29)
CREAT SERPL-MCNC: 3.53 MG/DL (ref 0.76–1.27)
CREAT SERPL-MCNC: 3.66 MG/DL (ref 0.76–1.27)
DEPRECATED RDW RBC AUTO: 51.2 FL (ref 37–54)
EGFRCR SERPLBLD CKD-EPI 2021: 17.6 ML/MIN/1.73
EGFRCR SERPLBLD CKD-EPI 2021: 18.4 ML/MIN/1.73
EOSINOPHIL # BLD AUTO: 0.2 10*3/MM3 (ref 0–0.4)
EOSINOPHIL NFR BLD AUTO: 3.4 % (ref 0.3–6.2)
ERYTHROCYTE [DISTWIDTH] IN BLOOD BY AUTOMATED COUNT: 16.4 % (ref 12.3–15.4)
GLOBULIN UR ELPH-MCNC: 2.9 GM/DL
GLUCOSE SERPL-MCNC: 123 MG/DL (ref 65–99)
GLUCOSE SERPL-MCNC: 99 MG/DL (ref 65–99)
HCT VFR BLD AUTO: 21.7 % (ref 37.5–51)
HCT VFR BLD AUTO: 22.3 % (ref 37.5–51)
HCT VFR BLD AUTO: 23.4 % (ref 37.5–51)
HCT VFR BLD AUTO: 26.5 % (ref 37.5–51)
HGB BLD-MCNC: 7 G/DL (ref 13–17.7)
HGB BLD-MCNC: 7.2 G/DL (ref 13–17.7)
HGB BLD-MCNC: 7.6 G/DL (ref 13–17.7)
HGB BLD-MCNC: 8.2 G/DL (ref 13–17.7)
INR PPP: 1.12 (ref 0.93–1.1)
LYMPHOCYTES # BLD AUTO: 1.1 10*3/MM3 (ref 0.7–3.1)
LYMPHOCYTES NFR BLD AUTO: 18.9 % (ref 19.6–45.3)
MAGNESIUM SERPL-MCNC: 1.6 MG/DL (ref 1.6–2.4)
MCH RBC QN AUTO: 29.1 PG (ref 26.6–33)
MCHC RBC AUTO-ENTMCNC: 32.5 G/DL (ref 31.5–35.7)
MCV RBC AUTO: 89.6 FL (ref 79–97)
MONOCYTES # BLD AUTO: 1 10*3/MM3 (ref 0.1–0.9)
MONOCYTES NFR BLD AUTO: 16.6 % (ref 5–12)
NEUTROPHILS NFR BLD AUTO: 3.6 10*3/MM3 (ref 1.7–7)
NEUTROPHILS NFR BLD AUTO: 60.1 % (ref 42.7–76)
NRBC BLD AUTO-RTO: 0 /100 WBC (ref 0–0.2)
PHOSPHATE SERPL-MCNC: 5.1 MG/DL (ref 2.5–4.5)
PLATELET # BLD AUTO: 173 10*3/MM3 (ref 140–450)
PMV BLD AUTO: 7.5 FL (ref 6–12)
POTASSIUM SERPL-SCNC: 4.8 MMOL/L (ref 3.5–5.2)
POTASSIUM SERPL-SCNC: 4.9 MMOL/L (ref 3.5–5.2)
POTASSIUM SERPL-SCNC: 5.5 MMOL/L (ref 3.5–5.2)
PROT SERPL-MCNC: 5.9 G/DL (ref 6–8.5)
PROTHROMBIN TIME: 11.5 SECONDS (ref 9.6–11.7)
RBC # BLD AUTO: 2.42 10*6/MM3 (ref 4.14–5.8)
SODIUM SERPL-SCNC: 138 MMOL/L (ref 136–145)
SODIUM SERPL-SCNC: 140 MMOL/L (ref 136–145)
WBC NRBC COR # BLD: 5.9 10*3/MM3 (ref 3.4–10.8)

## 2023-03-03 PROCEDURE — 25010000002 DEXAMETHASONE PER 1 MG

## 2023-03-03 PROCEDURE — 25010000002 CALCIUM GLUCONATE-NACL 1-0.675 GM/50ML-% SOLUTION: Performed by: INTERNAL MEDICINE

## 2023-03-03 PROCEDURE — 25010000002 HYDROMORPHONE 1 MG/ML SOLUTION

## 2023-03-03 PROCEDURE — 84100 ASSAY OF PHOSPHORUS: CPT | Performed by: STUDENT IN AN ORGANIZED HEALTH CARE EDUCATION/TRAINING PROGRAM

## 2023-03-03 PROCEDURE — 25510000001 IOPAMIDOL PER 1 ML: Performed by: INTERNAL MEDICINE

## 2023-03-03 PROCEDURE — 85025 COMPLETE CBC W/AUTO DIFF WBC: CPT | Performed by: STUDENT IN AN ORGANIZED HEALTH CARE EDUCATION/TRAINING PROGRAM

## 2023-03-03 PROCEDURE — C1729 CATH, DRAINAGE: HCPCS

## 2023-03-03 PROCEDURE — 85018 HEMOGLOBIN: CPT | Performed by: UROLOGY

## 2023-03-03 PROCEDURE — 85018 HEMOGLOBIN: CPT | Performed by: INTERNAL MEDICINE

## 2023-03-03 PROCEDURE — 80053 COMPREHEN METABOLIC PANEL: CPT | Performed by: STUDENT IN AN ORGANIZED HEALTH CARE EDUCATION/TRAINING PROGRAM

## 2023-03-03 PROCEDURE — 63710000001 INSULIN REGULAR HUMAN PER 5 UNITS: Performed by: INTERNAL MEDICINE

## 2023-03-03 PROCEDURE — 85014 HEMATOCRIT: CPT | Performed by: INTERNAL MEDICINE

## 2023-03-03 PROCEDURE — 85610 PROTHROMBIN TIME: CPT | Performed by: UROLOGY

## 2023-03-03 PROCEDURE — 88305 TISSUE EXAM BY PATHOLOGIST: CPT | Performed by: UROLOGY

## 2023-03-03 PROCEDURE — 0T7D8ZZ DILATION OF URETHRA, VIA NATURAL OR ARTIFICIAL OPENING ENDOSCOPIC: ICD-10-PCS | Performed by: UROLOGY

## 2023-03-03 PROCEDURE — 25010000002 MIDAZOLAM PER 1 MG: Performed by: RADIOLOGY

## 2023-03-03 PROCEDURE — 25010000002 FENTANYL CITRATE (PF) 100 MCG/2ML SOLUTION

## 2023-03-03 PROCEDURE — C1769 GUIDE WIRE: HCPCS | Performed by: UROLOGY

## 2023-03-03 PROCEDURE — C2627 CATH, SUPRAPUBIC/CYSTOSCOPIC: HCPCS | Performed by: UROLOGY

## 2023-03-03 PROCEDURE — 25010000002 ONDANSETRON PER 1 MG

## 2023-03-03 PROCEDURE — 88341 IMHCHEM/IMCYTCHM EA ADD ANTB: CPT | Performed by: UROLOGY

## 2023-03-03 PROCEDURE — 25010000002 PROPOFOL 200 MG/20ML EMULSION

## 2023-03-03 PROCEDURE — 99152 MOD SED SAME PHYS/QHP 5/>YRS: CPT

## 2023-03-03 PROCEDURE — C1758 CATHETER, URETERAL: HCPCS | Performed by: UROLOGY

## 2023-03-03 PROCEDURE — 0 LIDOCAINE 1 % SOLUTION: Performed by: RADIOLOGY

## 2023-03-03 PROCEDURE — 99153 MOD SED SAME PHYS/QHP EA: CPT

## 2023-03-03 PROCEDURE — 25010000002 FENTANYL CITRATE (PF) 50 MCG/ML SOLUTION: Performed by: RADIOLOGY

## 2023-03-03 PROCEDURE — 83735 ASSAY OF MAGNESIUM: CPT | Performed by: STUDENT IN AN ORGANIZED HEALTH CARE EDUCATION/TRAINING PROGRAM

## 2023-03-03 PROCEDURE — 0TBB8ZX EXCISION OF BLADDER, VIA NATURAL OR ARTIFICIAL OPENING ENDOSCOPIC, DIAGNOSTIC: ICD-10-PCS | Performed by: UROLOGY

## 2023-03-03 PROCEDURE — 88342 IMHCHEM/IMCYTCHM 1ST ANTB: CPT

## 2023-03-03 PROCEDURE — 85014 HEMATOCRIT: CPT | Performed by: UROLOGY

## 2023-03-03 PROCEDURE — 88341 IMHCHEM/IMCYTCHM EA ADD ANTB: CPT

## 2023-03-03 PROCEDURE — 0T143JD BYPASS LEFT KIDNEY PELVIS TO CUTANEOUS WITH SYNTHETIC SUBSTITUTE, PERCUTANEOUS APPROACH: ICD-10-PCS | Performed by: RADIOLOGY

## 2023-03-03 PROCEDURE — C1769 GUIDE WIRE: HCPCS

## 2023-03-03 PROCEDURE — 88342 IMHCHEM/IMCYTCHM 1ST ANTB: CPT | Performed by: UROLOGY

## 2023-03-03 RX ORDER — LIDOCAINE HYDROCHLORIDE 10 MG/ML
INJECTION, SOLUTION INFILTRATION; PERINEURAL AS NEEDED
Status: COMPLETED | OUTPATIENT
Start: 2023-03-03 | End: 2023-03-03

## 2023-03-03 RX ORDER — FENTANYL CITRATE 50 UG/ML
50 INJECTION, SOLUTION INTRAMUSCULAR; INTRAVENOUS
Status: DISCONTINUED | OUTPATIENT
Start: 2023-03-03 | End: 2023-03-03 | Stop reason: HOSPADM

## 2023-03-03 RX ORDER — HYDROCODONE BITARTRATE AND ACETAMINOPHEN 7.5; 325 MG/1; MG/1
1 TABLET ORAL ONCE AS NEEDED
Status: DISCONTINUED | OUTPATIENT
Start: 2023-03-03 | End: 2023-03-03 | Stop reason: HOSPADM

## 2023-03-03 RX ORDER — HYDRALAZINE HYDROCHLORIDE 20 MG/ML
5 INJECTION INTRAMUSCULAR; INTRAVENOUS
Status: DISCONTINUED | OUTPATIENT
Start: 2023-03-03 | End: 2023-03-03 | Stop reason: HOSPADM

## 2023-03-03 RX ORDER — FENTANYL CITRATE 50 UG/ML
INJECTION, SOLUTION INTRAMUSCULAR; INTRAVENOUS AS NEEDED
Status: DISCONTINUED | OUTPATIENT
Start: 2023-03-03 | End: 2023-03-03 | Stop reason: SURG

## 2023-03-03 RX ORDER — DEXAMETHASONE SODIUM PHOSPHATE 4 MG/ML
INJECTION, SOLUTION INTRA-ARTICULAR; INTRALESIONAL; INTRAMUSCULAR; INTRAVENOUS; SOFT TISSUE AS NEEDED
Status: DISCONTINUED | OUTPATIENT
Start: 2023-03-03 | End: 2023-03-03 | Stop reason: SURG

## 2023-03-03 RX ORDER — DIPHENHYDRAMINE HYDROCHLORIDE 50 MG/ML
12.5 INJECTION INTRAMUSCULAR; INTRAVENOUS ONCE AS NEEDED
Status: DISCONTINUED | OUTPATIENT
Start: 2023-03-03 | End: 2023-03-03 | Stop reason: HOSPADM

## 2023-03-03 RX ORDER — ONDANSETRON 2 MG/ML
4 INJECTION INTRAMUSCULAR; INTRAVENOUS ONCE AS NEEDED
Status: DISCONTINUED | OUTPATIENT
Start: 2023-03-03 | End: 2023-03-03 | Stop reason: HOSPADM

## 2023-03-03 RX ORDER — MIDAZOLAM HYDROCHLORIDE 1 MG/ML
INJECTION INTRAMUSCULAR; INTRAVENOUS AS NEEDED
Status: COMPLETED | OUTPATIENT
Start: 2023-03-03 | End: 2023-03-03

## 2023-03-03 RX ORDER — ONDANSETRON 2 MG/ML
INJECTION INTRAMUSCULAR; INTRAVENOUS AS NEEDED
Status: DISCONTINUED | OUTPATIENT
Start: 2023-03-03 | End: 2023-03-03 | Stop reason: SURG

## 2023-03-03 RX ORDER — MAGNESIUM HYDROXIDE 1200 MG/15ML
LIQUID ORAL AS NEEDED
Status: DISCONTINUED | OUTPATIENT
Start: 2023-03-03 | End: 2023-03-03 | Stop reason: HOSPADM

## 2023-03-03 RX ORDER — DEXTROSE MONOHYDRATE 25 G/50ML
50 INJECTION, SOLUTION INTRAVENOUS ONCE
Status: COMPLETED | OUTPATIENT
Start: 2023-03-03 | End: 2023-03-03

## 2023-03-03 RX ORDER — PROCHLORPERAZINE EDISYLATE 5 MG/ML
10 INJECTION INTRAMUSCULAR; INTRAVENOUS ONCE AS NEEDED
Status: DISCONTINUED | OUTPATIENT
Start: 2023-03-03 | End: 2023-03-03 | Stop reason: HOSPADM

## 2023-03-03 RX ORDER — FENTANYL CITRATE 50 UG/ML
25 INJECTION, SOLUTION INTRAMUSCULAR; INTRAVENOUS
Status: DISCONTINUED | OUTPATIENT
Start: 2023-03-03 | End: 2023-03-03 | Stop reason: HOSPADM

## 2023-03-03 RX ORDER — ROCURONIUM BROMIDE 10 MG/ML
INJECTION, SOLUTION INTRAVENOUS AS NEEDED
Status: DISCONTINUED | OUTPATIENT
Start: 2023-03-03 | End: 2023-03-03 | Stop reason: SURG

## 2023-03-03 RX ORDER — LABETALOL HYDROCHLORIDE 5 MG/ML
5 INJECTION, SOLUTION INTRAVENOUS
Status: DISCONTINUED | OUTPATIENT
Start: 2023-03-03 | End: 2023-03-03 | Stop reason: HOSPADM

## 2023-03-03 RX ORDER — CALCIUM GLUCONATE 20 MG/ML
1 INJECTION, SOLUTION INTRAVENOUS ONCE
Status: COMPLETED | OUTPATIENT
Start: 2023-03-03 | End: 2023-03-03

## 2023-03-03 RX ORDER — NALOXONE HCL 0.4 MG/ML
0.4 VIAL (ML) INJECTION AS NEEDED
Status: DISCONTINUED | OUTPATIENT
Start: 2023-03-03 | End: 2023-03-03 | Stop reason: HOSPADM

## 2023-03-03 RX ORDER — LIDOCAINE HYDROCHLORIDE 10 MG/ML
INJECTION, SOLUTION EPIDURAL; INFILTRATION; INTRACAUDAL; PERINEURAL AS NEEDED
Status: DISCONTINUED | OUTPATIENT
Start: 2023-03-03 | End: 2023-03-03 | Stop reason: SURG

## 2023-03-03 RX ORDER — FLUMAZENIL 0.1 MG/ML
0.1 INJECTION INTRAVENOUS AS NEEDED
Status: DISCONTINUED | OUTPATIENT
Start: 2023-03-03 | End: 2023-03-03 | Stop reason: HOSPADM

## 2023-03-03 RX ORDER — SODIUM CHLORIDE 9 MG/ML
INJECTION, SOLUTION INTRAVENOUS CONTINUOUS PRN
Status: DISCONTINUED | OUTPATIENT
Start: 2023-03-03 | End: 2023-03-03 | Stop reason: SURG

## 2023-03-03 RX ORDER — PROPOFOL 10 MG/ML
INJECTION, EMULSION INTRAVENOUS AS NEEDED
Status: DISCONTINUED | OUTPATIENT
Start: 2023-03-03 | End: 2023-03-03 | Stop reason: SURG

## 2023-03-03 RX ORDER — PHENYLEPHRINE HCL IN 0.9% NACL 1 MG/10 ML
SYRINGE (ML) INTRAVENOUS AS NEEDED
Status: DISCONTINUED | OUTPATIENT
Start: 2023-03-03 | End: 2023-03-03 | Stop reason: SURG

## 2023-03-03 RX ORDER — IPRATROPIUM BROMIDE AND ALBUTEROL SULFATE 2.5; .5 MG/3ML; MG/3ML
3 SOLUTION RESPIRATORY (INHALATION) ONCE AS NEEDED
Status: DISCONTINUED | OUTPATIENT
Start: 2023-03-03 | End: 2023-03-03 | Stop reason: HOSPADM

## 2023-03-03 RX ORDER — FENTANYL CITRATE 50 UG/ML
INJECTION, SOLUTION INTRAMUSCULAR; INTRAVENOUS AS NEEDED
Status: COMPLETED | OUTPATIENT
Start: 2023-03-03 | End: 2023-03-03

## 2023-03-03 RX ADMIN — PROPOFOL 60 MG: 10 INJECTION, EMULSION INTRAVENOUS at 12:43

## 2023-03-03 RX ADMIN — ONDANSETRON 4 MG: 2 INJECTION INTRAMUSCULAR; INTRAVENOUS at 13:02

## 2023-03-03 RX ADMIN — SODIUM CHLORIDE: 0.9 INJECTION, SOLUTION INTRAVENOUS at 12:21

## 2023-03-03 RX ADMIN — DEXAMETHASONE SODIUM PHOSPHATE 4 MG: 4 INJECTION, SOLUTION INTRAMUSCULAR; INTRAVENOUS at 13:14

## 2023-03-03 RX ADMIN — SUGAMMADEX 200 MG: 100 INJECTION, SOLUTION INTRAVENOUS at 13:23

## 2023-03-03 RX ADMIN — ROCURONIUM BROMIDE 10 MG: 50 INJECTION, SOLUTION INTRAVENOUS at 12:59

## 2023-03-03 RX ADMIN — FENTANYL CITRATE 100 MCG: 50 INJECTION, SOLUTION INTRAMUSCULAR; INTRAVENOUS at 12:25

## 2023-03-03 RX ADMIN — HYDROMORPHONE HYDROCHLORIDE 0.25 MG: 1 INJECTION, SOLUTION INTRAMUSCULAR; INTRAVENOUS; SUBCUTANEOUS at 13:52

## 2023-03-03 RX ADMIN — CEFTRIAXONE 2 G: 2 INJECTION, POWDER, FOR SOLUTION INTRAMUSCULAR; INTRAVENOUS at 12:34

## 2023-03-03 RX ADMIN — SODIUM BICARBONATE 650 MG: 650 TABLET ORAL at 18:36

## 2023-03-03 RX ADMIN — Medication 100 MCG: at 13:17

## 2023-03-03 RX ADMIN — FENTANYL CITRATE 50 MCG: 50 INJECTION, SOLUTION INTRAMUSCULAR; INTRAVENOUS at 17:12

## 2023-03-03 RX ADMIN — Medication 100 MCG: at 13:14

## 2023-03-03 RX ADMIN — ROCURONIUM BROMIDE 30 MG: 50 INJECTION, SOLUTION INTRAVENOUS at 12:26

## 2023-03-03 RX ADMIN — FENTANYL CITRATE 100 MCG: 50 INJECTION, SOLUTION INTRAMUSCULAR; INTRAVENOUS at 17:07

## 2023-03-03 RX ADMIN — SODIUM BICARBONATE 650 MG: 650 TABLET ORAL at 08:31

## 2023-03-03 RX ADMIN — MIDAZOLAM 1 MG: 1 INJECTION INTRAMUSCULAR; INTRAVENOUS at 17:07

## 2023-03-03 RX ADMIN — DEXTROSE MONOHYDRATE 50 ML: 25 INJECTION, SOLUTION INTRAVENOUS at 04:46

## 2023-03-03 RX ADMIN — Medication 200 MCG: at 13:20

## 2023-03-03 RX ADMIN — Medication 10 ML: at 21:47

## 2023-03-03 RX ADMIN — PROPOFOL 140 MG: 10 INJECTION, EMULSION INTRAVENOUS at 12:25

## 2023-03-03 RX ADMIN — INSULIN HUMAN 10 UNITS: 100 INJECTION, SOLUTION PARENTERAL at 04:46

## 2023-03-03 RX ADMIN — HYDROMORPHONE HYDROCHLORIDE 0.25 MG: 1 INJECTION, SOLUTION INTRAMUSCULAR; INTRAVENOUS; SUBCUTANEOUS at 13:57

## 2023-03-03 RX ADMIN — SODIUM BICARBONATE 650 MG: 650 TABLET ORAL at 21:46

## 2023-03-03 RX ADMIN — LIDOCAINE HYDROCHLORIDE 50 MG: 10 INJECTION, SOLUTION EPIDURAL; INFILTRATION; INTRACAUDAL; PERINEURAL at 12:25

## 2023-03-03 RX ADMIN — Medication 10 ML: at 08:32

## 2023-03-03 RX ADMIN — SODIUM BICARBONATE 50 MEQ: 84 INJECTION, SOLUTION INTRAVENOUS at 04:45

## 2023-03-03 RX ADMIN — ROCURONIUM BROMIDE 10 MG: 50 INJECTION, SOLUTION INTRAVENOUS at 12:43

## 2023-03-03 RX ADMIN — SODIUM ZIRCONIUM CYCLOSILICATE 10 G: 10 POWDER, FOR SUSPENSION ORAL at 08:55

## 2023-03-03 RX ADMIN — LIDOCAINE HYDROCHLORIDE 10 ML: 10 INJECTION, SOLUTION INFILTRATION; PERINEURAL at 17:16

## 2023-03-03 RX ADMIN — LIDOCAINE HYDROCHLORIDE 10 ML: 10 INJECTION, SOLUTION INFILTRATION; PERINEURAL at 17:39

## 2023-03-03 RX ADMIN — IOPAMIDOL 15 ML: 755 INJECTION, SOLUTION INTRAVENOUS at 17:53

## 2023-03-03 RX ADMIN — MIDAZOLAM 1 MG: 1 INJECTION INTRAMUSCULAR; INTRAVENOUS at 17:14

## 2023-03-03 RX ADMIN — CALCIUM GLUCONATE 1 G: 20 INJECTION, SOLUTION INTRAVENOUS at 04:46

## 2023-03-03 NOTE — PROGRESS NOTES
PROGRESS NOTE      Patient Name: Anthony Moss  : 1955  MRN: [id number]  Primary Care Physician: Reji Topete MD  Date of admission: 2023    Patient Care Team:  Reji Topete MD as PCP - General (Family Medicine)        Subjective   Subjective:     Patient is feeling slightly better than yesterday urine output is better  Review of systems:  All other review of system unremarkable      Allergies:  No Known Allergies    Objective   Exam:     Vital Signs  Temp:  [97.8 °F (36.6 °C)-99.1 °F (37.3 °C)] 98.9 °F (37.2 °C)  Heart Rate:  [] 84  Resp:  [13-15] 13  BP: ()/(46-78) 143/78  SpO2:  [80 %-99 %] 94 %  on   ;   Device (Oxygen Therapy): room air  Body mass index is 27.53 kg/m².    General: Middle-age  male in no acute distress.    Head:      Normocephalic and atraumatic.    Eyes:      PERRL/EOM intact, conjunctiva and sclera clear with out nystagmus.    Neck:      No masses, thyromegaly,  trachea central with normal respiratory effort   Lungs:    Clear bilaterally to auscultation.    Heart:      Regular rate and rhythm, no murmur no gallop  Abd:        Soft, nontender, not distended, bowel sounds positive, no shifting dullness, suprapubic catheter with good urine  Pulses:   Pulses palpable  Extr:        No cyanosis or clubbing--no significant edema.    Neuro:    No focal deficits.   alert oriented x3  Skin:       Intact without lesions or rashes.    Psych:    Alert and cooperative; normal mood and affect; .      Results Review:  I have personally reviewed most recent Data :  CBC    Results from last 7 days   Lab Units 23  0825 23  0703 23  0035 23  1707 23  1319 23  0839 23  0359 23  0212 23  0940 23  0345 23  2228   WBC 10*3/mm3  --   --  5.90  --   --   --   --  6.40  --  4.90 6.10   HEMOGLOBIN g/dL 7.6* 7.2* 7.0* 7.0* 7.3* 7.2* 7.3* 7.2*   < > 5.4* 4.8*   PLATELETS 10*3/mm3  --   --  173  --   --    --   --  183  --  177 177    < > = values in this interval not displayed.     CMP   Results from last 7 days   Lab Units 03/03/23  0825 03/03/23  0035 03/02/23  0212 03/01/23  0345 03/01/23  0025 02/28/23  2128   SODIUM mmol/L  --  138 137 133* 133* 132*   POTASSIUM mmol/L 4.8 5.5* 5.3* 4.7 4.9 4.5   CHLORIDE mmol/L  --  109* 107 103 102 101   CO2 mmol/L  --  21.0* 20.0* 18.0* 18.0* 18.0*   BUN mg/dL  --  56* 58* 53* 53* 56*   CREATININE mg/dL  --  3.66* 3.92* 2.96* 2.98* 3.08*   GLUCOSE mg/dL  --  123* 113* 125* 102* 98   ALBUMIN g/dL  --  3.0* 2.9* 3.0* 3.5 3.5   BILIRUBIN mg/dL  --  0.2 0.3 0.5 0.2 0.2   ALK PHOS U/L  --  73 79 71 79 80   AST (SGOT) U/L  --  8 9 9 9 9   ALT (SGPT) U/L  --  <5 5 <5 <5 5     ABG      CT Abdomen Pelvis Without Contrast    Result Date: 3/2/2023  Impression: 1. Persistent severe bilateral hydroureteronephrosis. 2. Irregular wall thickening of bladder may relate to intraluminal bladder mass versus changes related to cystitis or chronic neurogenic bladder, recommend cystoscopy. Suprapubic catheter in stable position. Prostatomegaly. 3. Nonspecific scattered ground glass opacities at lung bases suggesting infectious or inflammatory process, unchanged with small bilateral pleural effusions. 4. Additional chronic findings above. Electronically Signed: Kuldip Mcginnisr  3/2/2023 10:30 AM EST  Workstation ID: [id number]      Results for orders placed during the hospital encounter of 01/05/22    Adult Transthoracic Echo Complete w/ Color, Spectral and Contrast if Necessary Per Protocol    Interpretation Summary  · Left ventricular ejection fraction appears to be 56 - 60%.  · The right ventricular cavity is moderately dilated.  · No pericardial effusion noted    Scheduled Meds:cefTRIAXone, 2 g, Intravenous, Q24H  [MAR Hold] sodium bicarbonate, 650 mg, Oral, TID  [MAR Hold] sodium chloride, 500 mL, Intravenous, Once  [MAR Hold] sodium chloride, 10 mL, Intravenous, Q12H      Continuous Infusions:    PRN Meds:•  [MAR Hold] acetaminophen **OR** [MAR Hold] acetaminophen  •  [MAR Hold] influenza vaccine  •  [MAR Hold] ondansetron **OR** [MAR Hold] ondansetron  •  [COMPLETED] Insert Peripheral IV **AND** [MAR Hold] sodium chloride  •  [MAR Hold] sodium chloride  •  [MAR Hold] sodium chloride    Assessment & Plan   Assessment and Plan:         Acute blood loss anemia    Acute renal failure (ARF) (HCC)    Acquired scoliosis    ADHD    Depression    Mixed hyperlipidemia    Primary hypertension    Bilateral pneumonia    Marijuana use    UTI (urinary tract infection)    Gross hematuria    BPH (benign prostatic hyperplasia)    History of alcohol use disorder    ASSESSMENT:  • Acute kidney injury, stage III, oligoanuric, metabolic differential at this this time, including sepsis, with bilateral pneumonia, now with obstructive uropathy had been taking Aleve, for the back pain,  • Hyperkalemia, secondary to CARLOS ALBERTO  • Bolick acidosis secondary to CARLOS ALBERTO  • Weakness and tiredness, with a BUN of 59, very concerning for uremia, but patient is improving since he has  • Metabolic acidosis,   • History of alcohol use,  • Chronic back pain,using NSAID in the past  • B/l pneumonia.  Surprisingly, WBC stable,           Plan:      • At this time acute kidney injury likely obstructive uropathy which will take time to recover once patient has bilateral stent placement I do believe there will be a slow improvement in the renal function   • Follow-up with repeat labs later today  • Still has significant hyperkalemia we will give another dose of Lokelma will also continue sodium bicarbonate  • We will see some improvement in potassium later today and tomorrow morning  • Repeat labs at 5 PM  • I think there will be a gradual improvement in renal function in next 24 to 48 hours after getting the bilateral ureteral stent placement  • Echocardiogram done last time showed ejection fraction 56 to 60%  • Repeat labs tomorrow  morning  •           Electronically signed by Jamie Houston MD,   [facility] kidney consultant  [phone]  3/3/2023  11:43 EST

## 2023-03-03 NOTE — PLAN OF CARE
Goal Outcome Evaluation:               Patient to have drain placed by IR today. Pleasant and cooperative. Burning in urethra area

## 2023-03-03 NOTE — PROGRESS NOTES
[facility] ER Medicine Services Daily Progress Note    Patient Name: Anthony Moss  : 1955  MRN: [id number]  Primary Care Physician:  Reji Topete MD  Date of admission: 2023      Subjective      Chief Complaint: Hematuria and weakness.      Patient Reports:        2023.  Patient was seen and examined.  Patient reported slight improvement in his symptoms.      Review of Systems   Constitutional: Positive for malaise/fatigue.   HENT: Negative.    Eyes: Negative.    Cardiovascular: Negative.    Respiratory: Negative.    Endocrine: Negative.    Hematologic/Lymphatic: Negative.    Skin: Negative.    Musculoskeletal: Negative.    Gastrointestinal: Negative.    Genitourinary: Negative.    Neurological: Negative.    Psychiatric/Behavioral: Negative.    Allergic/Immunologic: Negative.             Objective      Vitals:   Temp:  [98.1 °F (36.7 °C)-99.2 °F (37.3 °C)] 98.3 °F (36.8 °C)  Heart Rate:  [] 79  Resp:  [15] 15  BP: (110-139)/(59-97) 114/62    Physical Exam  Vitals reviewed.   Constitutional:       General: He is not in acute distress.  HENT:      Head: Normocephalic.      Nose: Nose normal.      Mouth/Throat:      Mouth: Mucous membranes are dry.      Pharynx: Oropharynx is clear.   Eyes:      Extraocular Movements: Extraocular movements intact.      Conjunctiva/sclera: Conjunctivae normal.      Pupils: Pupils are equal, round, and reactive to light.   Cardiovascular:      Pulses: Normal pulses.      Heart sounds: No murmur heard.    No friction rub. No gallop.      Comments: S1 and S2 present.  No tachycardia.  Pulmonary:      Effort: Pulmonary effort is normal.      Breath sounds: No stridor. No wheezing or rales.   Chest:      Chest wall: No tenderness.   Abdominal:      General: Bowel sounds are normal. There is no distension.      Palpations: Abdomen is soft.      Tenderness: There is no abdominal tenderness. There is no right CVA tenderness or  guarding.   Musculoskeletal:         General: No swelling, tenderness, deformity or signs of injury.      Cervical back: Normal range of motion. No rigidity.      Right lower leg: No edema.      Left lower leg: No edema.   Skin:     General: Skin is warm and dry.      Capillary Refill: Capillary refill takes less than 2 seconds.      Coloration: Skin is not jaundiced.      Findings: No bruising, erythema, lesion or rash.   Neurological:      Comments: No facial asymmetry noted.  Gait and station not tested.   Psychiatric:      Comments: No agitation.               Result Review    Result Review:  I have personally reviewed the results from the time of this admission to 3/2/2023 19:30 EST and agree with these findings:  []  Laboratory  []  Microbiology  []  Radiology  []  EKG/Telemetry   []  Cardiology/Vascular   []  Pathology  []  Old records  []  Other:  Most notable findings include:          Assessment & Plan    Previous notes and with minor updates.      Brief Patient Summary:    Patient is a 67-year-old male with history of alcohol use disorder, BPH, osteoarthritis, chronic respiratory failure on home oxygen, thrombocytopenia who presented to the emergency room because of hematuria and weakness.  Patient was admitted to ICU because of his severe anemia.  Patient was diagnosed with acute blood anemia with hemoglobin of 4.  Patient was also diagnosed with gross hematuria and urology consult was completed.  Patient was also diagnosed with acute kidney injury and a nephrology consult was completed.        cefTRIAXone, 2 g, Intravenous, Q24H  sodium bicarbonate, 650 mg, Oral, TID  sodium chloride, 500 mL, Intravenous, Once  sodium chloride, 10 mL, Intravenous, Q12H             Active Hospital Problems:  Active Hospital Problems    Diagnosis    • **Acute blood loss anemia    • Gross hematuria    • UTI (urinary tract infection)    • Acute renal failure (ARF) (HCC)    • Bilateral pneumonia    • BPH (benign prostatic  hyperplasia)    • History of alcohol use disorder    • Marijuana use    • ADHD    • Depression    • Mixed hyperlipidemia    • Primary hypertension    • Acquired scoliosis          Plan:      -Continue appropriate patient's home medications for the chronic medical conditions.  -Continue the present level of care.  -Patient and family agreed with the plan of care.  -Follow urology recommendations for gross hematuria.  -Monitor H&H because of acute blood loss anemia.  -Treat acute kidney injury with IV fluids.  -Follow nephrology recommendations.  -Treating pneumonia with antibiotics.    -Follow cultures.   -Completed marijuana cessation counseling.        DVT prophylaxis:  Mechanical DVT prophylaxis orders are present.    CODE STATUS:    Code Status (Patient has no pulse and is not breathing): CPR (Attempt to Resuscitate)  Medical Interventions (Patient has pulse or is breathing): Full Support      Disposition: Pending clinical improvement.    This patient has been examined wearing appropriate Personal Protective Equipment and discussed with hospital infection control department, [facility], infectious disease specialist and pulmonologist. 03/02/23      Electronically signed by Shukri Mayers MD, FACP, 03/02/23, 19:30 ANDREW.      [facility] Hospitalist Team

## 2023-03-03 NOTE — PROGRESS NOTES
[facility] Medicine Services Daily Progress Note    Patient Name: Anthony Moss  : 1955  MRN: [id number]  Primary Care Physician:  Reji Topete MD  Date of admission: 2023      Subjective      Chief Complaint: Hematuria and weakness.      Patient Reports:        2023.  Patient was seen and examined.  Patient reported slight improvement in his symptoms.      2023.  Patient was seen and examined.  Patient reported moderate improvement in his symptoms.      Review of Systems   Constitutional: Negative for malaise/fatigue.   HENT: Negative.    Eyes: Negative.    Cardiovascular: Negative.    Respiratory: Negative.    Endocrine: Negative.    Hematologic/Lymphatic: Negative.    Skin: Negative.    Musculoskeletal: Negative.    Gastrointestinal: Negative.    Genitourinary: Negative.    Neurological: Positive for weakness.   Psychiatric/Behavioral: Negative.    Allergic/Immunologic: Negative.             Objective      Vitals:   Temp:  [97.8 °F (36.6 °C)-99.1 °F (37.3 °C)] 98.9 °F (37.2 °C)  Heart Rate:  [77-99] 84  Resp:  [13-15] 13  BP: ()/(46-78) 143/78    Physical Exam  Vitals reviewed.   Constitutional:       Comments: Patient is lying comfortably in bed and in no acute distress.   HENT:      Head: Normocephalic.      Nose: Nose normal.      Mouth/Throat:      Mouth: Mucous membranes are dry.      Pharynx: Oropharynx is clear.   Eyes:      Extraocular Movements: Extraocular movements intact.      Conjunctiva/sclera: Conjunctivae normal.      Pupils: Pupils are equal, round, and reactive to light.   Cardiovascular:      Pulses: Normal pulses.      Heart sounds: No murmur heard.    No friction rub. No gallop.      Comments: S1 and S2 present.  No tachycardia.  Pulmonary:      Effort: Pulmonary effort is normal.      Breath sounds: No stridor. No wheezing or rales.   Chest:      Chest wall: No tenderness.   Abdominal:      General: Bowel sounds are normal.  There is no distension.      Palpations: Abdomen is soft.      Tenderness: There is no abdominal tenderness. There is no right CVA tenderness or guarding.   Musculoskeletal:         General: No swelling, tenderness, deformity or signs of injury.      Cervical back: Normal range of motion. No rigidity.      Right lower leg: No edema.      Left lower leg: No edema.   Skin:     General: Skin is warm and dry.      Capillary Refill: Capillary refill takes less than 2 seconds.      Coloration: Skin is not jaundiced.      Findings: No bruising, erythema, lesion or rash.   Neurological:      Comments: No facial asymmetry noted.  Gait and station not tested.   Psychiatric:      Comments: No agitation.               Result Review    Result Review:  I have personally reviewed the results from the time of this admission to 3/3/2023 12:40 EST and agree with these findings:  []  Laboratory  []  Microbiology  []  Radiology  []  EKG/Telemetry   []  Cardiology/Vascular   []  Pathology  []  Old records  []  Other:  Most notable findings include:          Assessment & Plan    Previous notes and with minor updates.      Brief Patient Summary:    Patient is a 67-year-old male with history of thrombocytopenia, alcohol use disorder, BPH, osteoarthritis, chronic respiratory failure on home oxygen who presented to the emergency room because of hematuria and weakness.  Patient was admitted to ICU because of his severe anemia.  Patient was diagnosed with acute blood anemia with hemoglobin of 4.  Patient was also diagnosed with gross hematuria and urology consult was completed.  Patient was also diagnosed with acute kidney injury and a nephrology consult was completed.        cefTRIAXone, 2 g, Intravenous, Q24H  [MAR Hold] sodium bicarbonate, 650 mg, Oral, TID  [MAR Hold] sodium chloride, 500 mL, Intravenous, Once  [MAR Hold] sodium chloride, 10 mL, Intravenous, Q12H             Active Hospital Problems:  Active Hospital Problems    Diagnosis     • **Acute blood loss anemia    • Gross hematuria    • UTI (urinary tract infection)    • Acute renal failure (ARF) (HCC)    • Bilateral pneumonia    • BPH (benign prostatic hyperplasia)    • History of alcohol use disorder    • Marijuana use    • ADHD    • Depression    • Mixed hyperlipidemia    • Primary hypertension    • Acquired scoliosis          Plan:      -Continue appropriate patient's home medications for the chronic medical conditions.  -Continue the present level of care.  -Patient and family agreed with the plan of care.  -Follow urology recommendations for gross hematuria.  -Monitor H&H because of acute blood loss anemia.  -Treat acute kidney injury with IV fluids.  -Follow nephrology recommendations.  -Treating pneumonia with antibiotics.    -Follow cultures.   -Completed marijuana cessation counseling.        DVT prophylaxis:  Mechanical DVT prophylaxis orders are present.    CODE STATUS:    Code Status (Patient has no pulse and is not breathing): CPR (Attempt to Resuscitate)  Medical Interventions (Patient has pulse or is breathing): Full Support      Disposition: Pending clinical improvement.    This patient has been examined wearing appropriate Personal Protective Equipment and discussed with hospital infection control department, [facility], infectious disease specialist and pulmonologist. 03/03/23      Electronically signed by Shukri Mayers MD, FACP, 03/03/23, 12:40 EST.      [facility] Hospitalist Team

## 2023-03-03 NOTE — ANESTHESIA PREPROCEDURE EVALUATION
Anesthesia Evaluation     Patient summary reviewed and Nursing notes reviewed   NPO Solid Status: > 8 hours             Airway   Mallampati: II  TM distance: >3 FB  Neck ROM: full  No difficulty expected  Dental      Comment: Very poor dentition       Pulmonary - normal exam   (-) sleep apnea, not a smoker  Cardiovascular - normal exam    ECG reviewed    (+) hypertension,       Neuro/Psych  (+) psychiatric history Anxiety, Depression and ADHD,    GI/Hepatic/Renal/Endo    (+)   renal disease,     Musculoskeletal     Abdominal  - normal exam    Bowel sounds: normal.   Substance History   (+) alcohol use,      OB/GYN          Other   arthritis,                      Anesthesia Plan    ASA 2     general       Anesthetic plan, risks, benefits, and alternatives have been provided, discussed and informed consent has been obtained with: patient.        CODE STATUS:    Code Status (Patient has no pulse and is not breathing): CPR (Attempt to Resuscitate)  Medical Interventions (Patient has pulse or is breathing): Full Support

## 2023-03-03 NOTE — OP NOTE
Urology Operative Note    3/3/2023    Anthony Moss  67 y.o.  1955  male  [id number]      Surgeon(s) and Role:  Xu Montero MD - Primary     Pre-operative Diagnosis: Neurogenic bladder, gross hematuria, bladder tumors, bilateral hydronephrosis and CARLOS ALBERTO    Post-operative Diagnosis: Same    Complications: None    Procedures:    Cystoscopy, urethral dilation, clot evacuation, bladder biopsy and fulguration of lesions, suprapubic tube upsizing and change    Indications   Anthony Moss is a 67 y.o. male with suprapubic tube placed last year for neurogenic bladder.  He presented with anemia and gross hematuria.  CT scan showing very thick-walled bladder due to cystitis versus tumors.  Had bilateral hydronephrosis not improved with antibiotics.  He was brought to the OR for better vesication of his bladder with possible stent placement and possible transurethral resection of bladder tumor.    During the informed consent process, the procedure was discussed in detail including the risks of bleeding, infection, and damage to surrounding structures.    Findings   Very narrow and difficult to dilate prostatic urethra.  Bladder tumors seen across the bladder.  Unable to place resectoscope so rigid forceps used to biopsy and then cauterized with Bugbee.  Suprapubic tube upsized to 22 Ugandan and 22 Ugandan Robinson placed per urethra for continuous irrigation    Description of procedure:  The patient was properly identified in the preoperative holding area and taken to the operating room where general anesthesia was induced. The patient was prepped and draped in a sterile fashion. The patient was given antibiotics intravenously before the start of the surgery. After ensuring that all of the required equipment was ready and available a surgical timeout was performed.     Cystoscopy was performed the anterior urethra was normal at the bulbar urethra there was severe narrowing with possible tumor and scope could not  pass.  Wire was able to pass confirmed fluoroscopically in the bladder.  Soft curved S dilators were then used to dilate to 20 French the scope still would not pass so the Royal sounds were used and dilated to 24 French.  Scope was then able to be passed clots were irrigated out of the bladder and there were bladder tumors noted they did not appear papillary like typical papillary UCC.  There was some oozing from this.  The rigid biopsy forceps were used to take a sample of this tumor because the resectoscope could not be placed the tumors cannot be resected.  Bugbee was used for extensive cauterization.  The suprapubic tract was then dilated and upsized to 22 French and a 22 French Roberts was placed.  A 22 French Councill catheter was placed over a wire in the urethra into the current to the bladder.  The bladder was irrigated and urine was clear on CBI through the suprapubic tube tract and out the urethral Roberts.    There were no apparent complications. The patient woke up in the operating room and was taken to the recovery room in stable condition.     I was present and scrubbed for the entire procedure.     Specimens: Bladder tumor biopsy    Estimated Blood Loss: Minimal      Plan   -He will require bilateral nephrostomy tubes today, IR notified  -Continuous irrigation of normal saline through the suprapubic tube and out the urethral Roberts, irrigate as needed  -He will require cystectomy and ileal conduit for unresectable bladder tumor, neurogenic bladder and severe hematuria requiring transfusions        Xu Montero MD  [facility]  [address], [address]  [place] [address] [phone]

## 2023-03-03 NOTE — PROGRESS NOTES
"  [facility] DAILY PROGRESS NOTE    Patient Identification  Name: Anthony Moss  Age: 67 y.o.  Sex: male  :  1955  MRN: [id number]    Date: 3/3/2023             Subjective:  Interval History: urine clear but creatinine remains high and hydronephrosis persists    Objective:    Scheduled Meds:cefTRIAXone, 2 g, Intravenous, Q24H  sodium bicarbonate, 650 mg, Oral, TID  sodium chloride, 500 mL, Intravenous, Once  sodium chloride, 10 mL, Intravenous, Q12H  sodium zirconium cyclosilicate, 10 g, Oral, Once      Continuous Infusions:   PRN Meds:•  acetaminophen **OR** acetaminophen  •  influenza vaccine  •  ondansetron **OR** ondansetron  •  [COMPLETED] Insert Peripheral IV **AND** sodium chloride  •  sodium chloride  •  sodium chloride    Vital signs in last 24 hours:  Temp:  [97.8 °F (36.6 °C)-99.1 °F (37.3 °C)] 97.8 °F (36.6 °C)  Heart Rate:  [] 88  Resp:  [15] 15  BP: ()/(46-75) 145/75    Intake/Output:    Intake/Output Summary (Last 24 hours) at 3/3/2023 0843  Last data filed at 3/3/2023 0400  Gross per 24 hour   Intake 680 ml   Output 2450 ml   Net -1770 ml       Exam:  /75   Pulse 88   Temp 97.8 °F (36.6 °C) (Oral)   Resp 15   Ht 188 cm (74.02\")   Wt 97.3 kg (214 lb 8.1 oz)   SpO2 91%   BMI 27.53 kg/m²     General Appearance:    Alert, cooperative, no distress, appears stated age               Abdomen:     Soft, ND   :    No suprapubic distention, SP tube yellow in tubing                Data Review:  All labs (24hrs):   Recent Results (from the past 24 hour(s))   Hemoglobin & Hematocrit, Blood    Collection Time: 23  1:19 PM    Specimen: Blood   Result Value Ref Range    Hemoglobin 7.3 (L) 13.0 - 17.7 g/dL    Hematocrit 22.9 (L) 37.5 - 51.0 %   Hemoglobin & Hematocrit, Blood    Collection Time: 23  5:07 PM    Specimen: Blood   Result Value Ref Range    Hemoglobin 7.0 (L) 13.0 - 17.7 g/dL    Hematocrit 21.5 (L) 37.5 - 51.0 %   Magnesium    Collection Time: 23 " 12:35 AM    Specimen: Blood   Result Value Ref Range    Magnesium 1.6 1.6 - 2.4 mg/dL   Phosphorus    Collection Time: 03/03/23 12:35 AM    Specimen: Blood   Result Value Ref Range    Phosphorus 5.1 (H) 2.5 - 4.5 mg/dL   Comprehensive Metabolic Panel    Collection Time: 03/03/23 12:35 AM    Specimen: Blood   Result Value Ref Range    Glucose 123 (H) 65 - 99 mg/dL    BUN 56 (H) 8 - 23 mg/dL    Creatinine 3.66 (H) 0.76 - 1.27 mg/dL    Sodium 138 136 - 145 mmol/L    Potassium 5.5 (H) 3.5 - 5.2 mmol/L    Chloride 109 (H) 98 - 107 mmol/L    CO2 21.0 (L) 22.0 - 29.0 mmol/L    Calcium 7.9 (L) 8.6 - 10.5 mg/dL    Total Protein 5.9 (L) 6.0 - 8.5 g/dL    Albumin 3.0 (L) 3.5 - 5.2 g/dL    ALT (SGPT) <5 1 - 41 U/L    AST (SGOT) 8 1 - 40 U/L    Alkaline Phosphatase 73 39 - 117 U/L    Total Bilirubin 0.2 0.0 - 1.2 mg/dL    Globulin 2.9 gm/dL    A/G Ratio 1.0 g/dL    BUN/Creatinine Ratio 15.3 7.0 - 25.0    Anion Gap 8.0 5.0 - 15.0 mmol/L    eGFR 17.6 (L) >60.0 mL/min/1.73   CBC Auto Differential    Collection Time: 03/03/23 12:35 AM    Specimen: Blood   Result Value Ref Range    WBC 5.90 3.40 - 10.80 10*3/mm3    RBC 2.42 (L) 4.14 - 5.80 10*6/mm3    Hemoglobin 7.0 (L) 13.0 - 17.7 g/dL    Hematocrit 21.7 (L) 37.5 - 51.0 %    MCV 89.6 79.0 - 97.0 fL    MCH 29.1 26.6 - 33.0 pg    MCHC 32.5 31.5 - 35.7 g/dL    RDW 16.4 (H) 12.3 - 15.4 %    RDW-SD 51.2 37.0 - 54.0 fl    MPV 7.5 6.0 - 12.0 fL    Platelets 173 140 - 450 10*3/mm3    Neutrophil % 60.1 42.7 - 76.0 %    Lymphocyte % 18.9 (L) 19.6 - 45.3 %    Monocyte % 16.6 (H) 5.0 - 12.0 %    Eosinophil % 3.4 0.3 - 6.2 %    Basophil % 1.0 0.0 - 1.5 %    Neutrophils, Absolute 3.60 1.70 - 7.00 10*3/mm3    Lymphocytes, Absolute 1.10 0.70 - 3.10 10*3/mm3    Monocytes, Absolute 1.00 (H) 0.10 - 0.90 10*3/mm3    Eosinophils, Absolute 0.20 0.00 - 0.40 10*3/mm3    Basophils, Absolute 0.10 0.00 - 0.20 10*3/mm3    nRBC 0.0 0.0 - 0.2 /100 WBC   Hemoglobin & Hematocrit, Blood    Collection Time: 03/03/23   7:03 AM    Specimen: Blood   Result Value Ref Range    Hemoglobin 7.2 (L) 13.0 - 17.7 g/dL    Hematocrit 22.3 (L) 37.5 - 51.0 %   Hemoglobin & Hematocrit, Blood    Collection Time: 03/03/23  8:25 AM    Specimen: Blood   Result Value Ref Range    Hemoglobin 7.6 (L) 13.0 - 17.7 g/dL    Hematocrit 23.4 (L) 37.5 - 51.0 %   Potassium    Collection Time: 03/03/23  8:25 AM    Specimen: Blood   Result Value Ref Range    Potassium 4.8 3.5 - 5.2 mmol/L      Imaging Results (Last 24 Hours)     Procedure Component Value Units Date/Time    CT Abdomen Pelvis Without Contrast [[id number]] Collected: 03/02/23 1014     Updated: 03/02/23 1032    Narrative:      CT ABDOMEN PELVIS WO CONTRAST    Date of Exam: 3/2/2023 9:34 AM EST    Indication: hydronephrosis.    Comparison: CT abdomen and pelvis 2/28/2023, 1/6/2022    Technique: Axial CT images were obtained of the abdomen and pelvis without the administration of contrast. Sagittal and coronal reconstructions were performed.  Automated exposure control and iterative reconstruction methods were used.     Findings:  Lower chest demonstrates scattered ground glass opacities most compatible with multifocal infectious or inflammatory process not significantly changed. Heart enlarged. Coronary artery calcifications noted. Hypodensity of the blood flow which suggests   anemia. Small bilateral pleural effusions.    Lack of contrast limits assessment of abdominal organs and vasculature. Liver is mildly nodular in contour. The spleen is normal in size. Normal adrenal glands. Gallbladder present. No pericholecystic inflammation. Pancreas without findings of   pancreatitis.    There is persistent severe bilateral hydroureteronephrosis. Negative for ureteral calculus. Suprapubic catheter noted in bladder. There are areas of eccentric bladder wall thickening asymmetric to right which could relate to bladder mass versus eccentric   wall thickening in the setting of cystitis or chronic neurogenic  bladder. Small amount of underlying hemorrhage in the bladder is difficult to exclude given the appearance. Stranding surrounding bladder similar to the prior exam. Prostate mildly   enlarged.    Negative for pneumoperitoneum. No bowel obstruction. No fluid collection the abdomen or pelvis. Normal appendix. Small area of fat necrosis suspected in the right lower quadrant and medial to the appendix measuring 2.4 cm on image 103, unchanged.   Multilevel disc narrowing in the lumbar spine. No aggressive osseous lesion or fracture. Small area of sclerosis involving the supra aspect of the medial right iliac bone unchanged.      Impression:      Impression:  1. Persistent severe bilateral hydroureteronephrosis.  2. Irregular wall thickening of bladder may relate to intraluminal bladder mass versus changes related to cystitis or chronic neurogenic bladder, recommend cystoscopy. Suprapubic catheter in stable position. Prostatomegaly.  3. Nonspecific scattered ground glass opacities at lung bases suggesting infectious or inflammatory process, unchanged with small bilateral pleural effusions.  4. Additional chronic findings above.      Electronically Signed: Kuldip Daniels    3/2/2023 10:30 AM EST    Workstation ID: [id number]           Assessment:    Acute blood loss anemia    Acute renal failure (ARF) (HCC)    Acquired scoliosis    ADHD    Depression    Mixed hyperlipidemia    Primary hypertension    Bilateral pneumonia    Marijuana use    UTI (urinary tract infection)    Gross hematuria    BPH (benign prostatic hyperplasia)    History of alcohol use disorder    Neurogenic bladder with suprapubic tube  Severe bladder wall thickening causing bilateral hydronephrosis  Cystitis  Hematuria and anemia     Plan:      Good UOP and clear but Cr still up   Plan for cystoscopy, poss jose stent placement tomorrow, poss TURBT, NPO  Continue antibiotics he has acute on chronic cystitis multiple course of antibiotics recently  Transfusions as  needed  All risks, benefits and alternatives to surgery were discussed with the patient preoperatively including but not limited to need for multiple procedures, infection, sepsis, bleeding, risk of anesthesia and damage to other organs. The details of the procedure have been fully reviewed with the patient and informed consent has been obtained.      Xu Montero MD  [facility]  [facility], [facility] [address]  [place] [address]  Office: [phone]  Cell: [phone]  Also available via [employer]  03/03/23  08:43 EST

## 2023-03-03 NOTE — ANESTHESIA PROCEDURE NOTES
Airway  Urgency: elective    Date/Time: 3/3/2023 12:27 PM  End Time:3/3/2023 12:28 PM  Airway not difficult    General Information and Staff    Patient location during procedure: OR  Anesthesiologist: Nitesh Galeana MD  CRNA/CAA: Ella Bass CAA    Indications and Patient Condition  Indications for airway management: airway protection and CNS depression    Preoxygenated: yes  Mask difficulty assessment: 1 - vent by mask    Final Airway Details  Final airway type: endotracheal airway      Successful airway: ETT  Cuffed: yes   Successful intubation technique: direct laryngoscopy  Endotracheal tube insertion site: oral  Blade: Sanches  Blade size: 3  ETT size (mm): 7.5  Cormack-Lehane Classification: grade I - full view of glottis  Placement verified by: chest auscultation and capnometry   Measured from: teeth  ETT/EBT  to teeth (cm): 22  Number of attempts at approach: 1  Assessment: lips, teeth, and gum same as pre-op

## 2023-03-03 NOTE — ANESTHESIA POSTPROCEDURE EVALUATION
Patient: Anthony Moss    Procedure Summary     Date: 03/03/23 Room / Location: [facility] OR 01 / [facility] MAIN OR    Anesthesia Start: 1216 Anesthesia Stop: 1330    Procedures:       CYSTOSCOPY URETHERAL DILATION, BLADDER BIOPSY (Bilateral: Bladder)      SUPRAPUBIC CATHETER EXCHANGE  (Bladder) Diagnosis:       Gross hematuria      (Gross hematuria [R31.0])    Surgeons: Xu Montero MD Provider: Nitesh Galeana MD    Anesthesia Type: general ASA Status: 2          Anesthesia Type: general    Vitals  Vitals Value Taken Time   /78 03/03/23 1424   Temp 97.5 °F (36.4 °C) 03/03/23 1423   Pulse 75 03/03/23 1424   Resp 14 03/03/23 1423   SpO2 100 % 03/03/23 1424   Vitals shown include unvalidated device data.        Post Anesthesia Care and Evaluation    Patient location during evaluation: PACU  Patient participation: complete - patient participated  Level of consciousness: awake  Pain score: 0  Pain management: adequate  Anesthetic complications: No anesthetic complications  PONV Status: none  Cardiovascular status: acceptable  Respiratory status: acceptable  Hydration status: acceptable

## 2023-03-04 LAB
ALBUMIN SERPL-MCNC: 2.9 G/DL (ref 3.5–5.2)
ALBUMIN/GLOB SERPL: 0.8 G/DL
ALP SERPL-CCNC: 71 U/L (ref 39–117)
ALT SERPL W P-5'-P-CCNC: 5 U/L (ref 1–41)
ANION GAP SERPL CALCULATED.3IONS-SCNC: 11 MMOL/L (ref 5–15)
AST SERPL-CCNC: 13 U/L (ref 1–40)
BASOPHILS # BLD AUTO: 0 10*3/MM3 (ref 0–0.2)
BASOPHILS NFR BLD AUTO: 0.2 % (ref 0–1.5)
BILIRUB SERPL-MCNC: <0.2 MG/DL (ref 0–1.2)
BUN SERPL-MCNC: 56 MG/DL (ref 8–23)
BUN/CREAT SERPL: 16.6 (ref 7–25)
CALCIUM SPEC-SCNC: 8 MG/DL (ref 8.6–10.5)
CHLORIDE SERPL-SCNC: 107 MMOL/L (ref 98–107)
CO2 SERPL-SCNC: 21 MMOL/L (ref 22–29)
CREAT SERPL-MCNC: 3.37 MG/DL (ref 0.76–1.27)
DEPRECATED RDW RBC AUTO: 51.6 FL (ref 37–54)
EGFRCR SERPLBLD CKD-EPI 2021: 19.4 ML/MIN/1.73
EOSINOPHIL # BLD AUTO: 0 10*3/MM3 (ref 0–0.4)
EOSINOPHIL NFR BLD AUTO: 0 % (ref 0.3–6.2)
ERYTHROCYTE [DISTWIDTH] IN BLOOD BY AUTOMATED COUNT: 16.2 % (ref 12.3–15.4)
GLOBULIN UR ELPH-MCNC: 3.7 GM/DL
GLUCOSE SERPL-MCNC: 198 MG/DL (ref 65–99)
HCT VFR BLD AUTO: 22.8 % (ref 37.5–51)
HCT VFR BLD AUTO: 23.2 % (ref 37.5–51)
HCT VFR BLD AUTO: 24.6 % (ref 37.5–51)
HCT VFR BLD AUTO: 24.9 % (ref 37.5–51)
HGB BLD-MCNC: 7.3 G/DL (ref 13–17.7)
HGB BLD-MCNC: 7.3 G/DL (ref 13–17.7)
HGB BLD-MCNC: 7.9 G/DL (ref 13–17.7)
HGB BLD-MCNC: 8.1 G/DL (ref 13–17.7)
LYMPHOCYTES # BLD AUTO: 0.4 10*3/MM3 (ref 0.7–3.1)
LYMPHOCYTES NFR BLD AUTO: 6 % (ref 19.6–45.3)
MAGNESIUM SERPL-MCNC: 1.5 MG/DL (ref 1.6–2.4)
MCH RBC QN AUTO: 28.6 PG (ref 26.6–33)
MCHC RBC AUTO-ENTMCNC: 31.7 G/DL (ref 31.5–35.7)
MCV RBC AUTO: 90.4 FL (ref 79–97)
MONOCYTES # BLD AUTO: 0.5 10*3/MM3 (ref 0.1–0.9)
MONOCYTES NFR BLD AUTO: 7 % (ref 5–12)
NEUTROPHILS NFR BLD AUTO: 6.2 10*3/MM3 (ref 1.7–7)
NEUTROPHILS NFR BLD AUTO: 86.8 % (ref 42.7–76)
NRBC BLD AUTO-RTO: 0 /100 WBC (ref 0–0.2)
PHOSPHATE SERPL-MCNC: 3.2 MG/DL (ref 2.5–4.5)
PLATELET # BLD AUTO: 187 10*3/MM3 (ref 140–450)
PMV BLD AUTO: 7.4 FL (ref 6–12)
POTASSIUM SERPL-SCNC: 5 MMOL/L (ref 3.5–5.2)
PROT SERPL-MCNC: 6.6 G/DL (ref 6–8.5)
RBC # BLD AUTO: 2.56 10*6/MM3 (ref 4.14–5.8)
SODIUM SERPL-SCNC: 139 MMOL/L (ref 136–145)
WBC NRBC COR # BLD: 7.1 10*3/MM3 (ref 3.4–10.8)

## 2023-03-04 PROCEDURE — 25010000002 MAGNESIUM SULFATE IN D5W 1G/100ML (PREMIX) 1-5 GM/100ML-% SOLUTION: Performed by: NURSE PRACTITIONER

## 2023-03-04 PROCEDURE — 85014 HEMATOCRIT: CPT | Performed by: UROLOGY

## 2023-03-04 PROCEDURE — 85025 COMPLETE CBC W/AUTO DIFF WBC: CPT | Performed by: UROLOGY

## 2023-03-04 PROCEDURE — 84100 ASSAY OF PHOSPHORUS: CPT | Performed by: UROLOGY

## 2023-03-04 PROCEDURE — 36415 COLL VENOUS BLD VENIPUNCTURE: CPT | Performed by: UROLOGY

## 2023-03-04 PROCEDURE — 85018 HEMOGLOBIN: CPT | Performed by: UROLOGY

## 2023-03-04 PROCEDURE — 25010000002 CEFTRIAXONE PER 250 MG: Performed by: UROLOGY

## 2023-03-04 PROCEDURE — 83735 ASSAY OF MAGNESIUM: CPT | Performed by: UROLOGY

## 2023-03-04 PROCEDURE — 80053 COMPREHEN METABOLIC PANEL: CPT | Performed by: UROLOGY

## 2023-03-04 RX ORDER — LACTULOSE 10 G/15ML
20 SOLUTION ORAL EVERY 4 HOURS
Status: COMPLETED | OUTPATIENT
Start: 2023-03-04 | End: 2023-03-04

## 2023-03-04 RX ORDER — MAGNESIUM SULFATE 1 G/100ML
1 INJECTION INTRAVENOUS ONCE
Status: COMPLETED | OUTPATIENT
Start: 2023-03-04 | End: 2023-03-04

## 2023-03-04 RX ORDER — OXYCODONE HYDROCHLORIDE 5 MG/1
5 TABLET ORAL EVERY 4 HOURS PRN
Status: DISPENSED | OUTPATIENT
Start: 2023-03-04 | End: 2023-03-11

## 2023-03-04 RX ADMIN — SODIUM BICARBONATE 650 MG: 650 TABLET ORAL at 08:14

## 2023-03-04 RX ADMIN — SODIUM BICARBONATE 650 MG: 650 TABLET ORAL at 20:31

## 2023-03-04 RX ADMIN — OXYCODONE HYDROCHLORIDE 5 MG: 5 TABLET ORAL at 21:07

## 2023-03-04 RX ADMIN — Medication 10 ML: at 20:31

## 2023-03-04 RX ADMIN — LACTULOSE 20 G: 20 SOLUTION ORAL at 13:44

## 2023-03-04 RX ADMIN — Medication 10 ML: at 08:14

## 2023-03-04 RX ADMIN — CEFTRIAXONE 2 G: 2 INJECTION, POWDER, FOR SOLUTION INTRAMUSCULAR; INTRAVENOUS at 13:24

## 2023-03-04 RX ADMIN — MAGNESIUM SULFATE IN DEXTROSE 1 G: 10 INJECTION, SOLUTION INTRAVENOUS at 11:39

## 2023-03-04 RX ADMIN — OXYCODONE HYDROCHLORIDE 5 MG: 5 TABLET ORAL at 08:54

## 2023-03-04 RX ADMIN — LACTULOSE 20 G: 20 SOLUTION ORAL at 17:30

## 2023-03-04 RX ADMIN — SODIUM BICARBONATE 650 MG: 650 TABLET ORAL at 17:30

## 2023-03-04 NOTE — PROGRESS NOTES
[facility] Medicine Services Daily Progress Note    Patient Name: Anthony Moss  : 1955  MRN: [id number]  Primary Care Physician:  Reji Topete MD  Date of admission: 2023      Subjective      Chief Complaint: Hematuria and weakness.      Patient Reports:        2023.  Patient was seen and examined.  Patient reported slight improvement in his symptoms.      2023.  Patient was seen and examined.  Patient reported moderate improvement in his symptoms.      2023.  Patient was seen and examined.  Patient complained of constant constipation.  Patient reported last bowel movement to be 2023.  Lactulose was ordered.      Review of Systems   Constitutional: Negative for malaise/fatigue.   HENT: Negative.    Eyes: Negative.    Cardiovascular: Negative.    Respiratory: Negative.    Endocrine: Negative.    Hematologic/Lymphatic: Negative.    Skin: Negative.    Musculoskeletal: Negative.    Gastrointestinal: Positive for constipation.   Genitourinary: Negative.    Neurological: Positive for weakness.   Psychiatric/Behavioral: Negative.    Allergic/Immunologic: Negative.             Objective      Vitals:   Temp:  [97.5 °F (36.4 °C)-98.4 °F (36.9 °C)] 98 °F (36.7 °C)  Heart Rate:  [72-99] 99  Resp:  [7-22] 18  BP: (120-148)/(61-87) 120/70  Flow (L/min):  [2-6] 2    Physical Exam  Vitals reviewed.   Constitutional:       General: He is not in acute distress.  HENT:      Head: Normocephalic.      Nose: Nose normal.      Mouth/Throat:      Mouth: Mucous membranes are dry.      Pharynx: Oropharynx is clear.   Eyes:      Extraocular Movements: Extraocular movements intact.      Conjunctiva/sclera: Conjunctivae normal.      Pupils: Pupils are equal, round, and reactive to light.   Cardiovascular:      Pulses: Normal pulses.      Heart sounds: No murmur heard.    No friction rub. No gallop.      Comments: S1 and S2 present.  No tachycardia.  Pulmonary:       Effort: Pulmonary effort is normal.      Breath sounds: No stridor. No wheezing or rales.   Chest:      Chest wall: No tenderness.   Abdominal:      General: Bowel sounds are normal. There is no distension.      Palpations: Abdomen is soft.      Tenderness: There is no abdominal tenderness. There is no right CVA tenderness or guarding.   Musculoskeletal:         General: No swelling, tenderness, deformity or signs of injury.      Cervical back: Normal range of motion. No rigidity.      Right lower leg: No edema.      Left lower leg: No edema.   Skin:     General: Skin is warm and dry.      Capillary Refill: Capillary refill takes less than 2 seconds.      Coloration: Skin is not jaundiced.      Findings: No bruising, erythema, lesion or rash.   Neurological:      Comments: No facial asymmetry noted.  Gait and station not tested.   Psychiatric:      Comments: No agitation.               Result Review    Result Review:  I have personally reviewed the results from the time of this admission to 3/4/2023 13:24 EST and agree with these findings:  []  Laboratory  []  Microbiology  []  Radiology  []  EKG/Telemetry   []  Cardiology/Vascular   []  Pathology  []  Old records  []  Other:  Most notable findings include:          Assessment & Plan    Previous notes and with minor updates.      Brief Patient Summary:    Patient is a 67-year-old male with history of BPH, thrombocytopenia, alcohol use disorder, osteoarthritis, chronic respiratory failure on home oxygen who presented to the emergency room because of hematuria and weakness.  Patient was admitted to ICU because of his severe anemia.  Patient was diagnosed with acute blood anemia with hemoglobin of 4.  Patient was also diagnosed with gross hematuria and urology consult was completed.  Patient was also diagnosed with acute kidney injury and a nephrology consult was completed.        cefTRIAXone, 2 g, Intravenous, Q24H  lactulose, 20 g, Oral, Q4H  sodium bicarbonate, 650  mg, Oral, TID  sodium chloride, 500 mL, Intravenous, Once  sodium chloride, 10 mL, Intravenous, Q12H             Active Hospital Problems:  Active Hospital Problems    Diagnosis    • **Acute blood loss anemia    • Gross hematuria    • UTI (urinary tract infection)    • Acute renal failure (ARF) (HCC)    • Bilateral pneumonia    • BPH (benign prostatic hyperplasia)    • History of alcohol use disorder    • Marijuana use    • ADHD    • Depression    • Mixed hyperlipidemia    • Primary hypertension    • Acquired scoliosis          Plan:      -Continue appropriate patient's home medications for the chronic medical conditions.  -Continue the present level of care.  -Patient and family agreed with the plan of care.  -Follow urology recommendations for gross hematuria.  -Monitor H&H because of acute blood loss anemia.  -Treat acute kidney injury with IV fluids.  -Follow nephrology recommendations.  -Treating pneumonia with antibiotics.    -Follow cultures.   -Completed marijuana cessation counseling.  -Treat constipation with lactulose.        DVT prophylaxis:  Mechanical DVT prophylaxis orders are present.    CODE STATUS:    Code Status (Patient has no pulse and is not breathing): CPR (Attempt to Resuscitate)  Medical Interventions (Patient has pulse or is breathing): Full Support      Disposition: Pending clinical improvement.    This patient has been examined wearing appropriate Personal Protective Equipment and discussed with hospital infection control department, [facility], infectious disease specialist and pulmonologist. 03/04/23      Electronically signed by Shukri Mayers MD, FACP, 03/04/23, 13:24 EST.      [facility] Hospitalist Team

## 2023-03-04 NOTE — PROGRESS NOTES
PROGRESS NOTE      Patient Name: Anthony Moss  : 1955  MRN: [id number]  Primary Care Physician: Reji Topete MD  Date of admission: 2023    Patient Care Team:  Reji Topete MD as PCP - General (Family Medicine)        Subjective   Subjective:     Seen and examined  Resting in bed, no distress or new complaints    Review of systems:  All other review of system unremarkable      Allergies:  No Known Allergies    Objective   Exam:     Vital Signs  Temp:  [97.5 °F (36.4 °C)-98.9 °F (37.2 °C)] 98 °F (36.7 °C)  Heart Rate:  [72-96] 84  Resp:  [7-22] 17  BP: (129-150)/(61-87) 144/86  SpO2:  [88 %-100 %] 96 %  on  Flow (L/min):  [2-6] 2;   Device (Oxygen Therapy): room air  Body mass index is 27.53 kg/m².    General: Middle-age  male in no acute distress.    Head:      Normocephalic and atraumatic.    Eyes:      PERRL/EOM intact, conjunctiva and sclera clear with out nystagmus.    Neck:      No masses, thyromegaly,  trachea central with normal respiratory effort   Lungs:    Clear bilaterally to auscultation.    Heart:      Regular rate and rhythm, no murmur no gallop  Abd:        Soft, nontender, not distended, bowel sounds positive, no shifting dullness, suprapubic catheter with good urine  Pulses:   Pulses palpable  Extr:        No cyanosis or clubbing--no significant edema.    Neuro:    No focal deficits.   alert oriented x3  Skin:       Intact without lesions or rashes.    Psych:    Alert and cooperative; normal mood and affect; .      Results Review:  I have personally reviewed most recent Data :  CBC    Results from last 7 days   Lab Units 23  0832 23  0101 23  1921 23  0825 23  0703 23  0035 23  1707 23  0359 23  0212 23  0940 23  0345 23  2228   WBC 10*3/mm3  --  7.10  --   --   --  5.90  --   --  6.40  --  4.90 6.10   HEMOGLOBIN g/dL 7.9* 7.3* 8.2* 7.6* 7.2* 7.0* 7.0*   < > 7.2*   < > 5.4* 4.8*    PLATELETS 10*3/mm3  --  187  --   --   --  173  --   --  183  --  177 177    < > = values in this interval not displayed.     CMP   Results from last 7 days   Lab Units 03/04/23  0101 03/03/23  0825 03/03/23  0035 03/02/23  0212 03/01/23  0345 03/01/23  0025 02/28/23  2128   SODIUM mmol/L 139 140 138 137 133* 133* 132*   POTASSIUM mmol/L 5.0 4.9  4.8 5.5* 5.3* 4.7 4.9 4.5   CHLORIDE mmol/L 107 106 109* 107 103 102 101   CO2 mmol/L 21.0* 20.0* 21.0* 20.0* 18.0* 18.0* 18.0*   BUN mg/dL 56* 55* 56* 58* 53* 53* 56*   CREATININE mg/dL 3.37* 3.53* 3.66* 3.92* 2.96* 2.98* 3.08*   GLUCOSE mg/dL 198* 99 123* 113* 125* 102* 98   ALBUMIN g/dL 2.9*  --  3.0* 2.9* 3.0* 3.5 3.5   BILIRUBIN mg/dL <0.2  --  0.2 0.3 0.5 0.2 0.2   ALK PHOS U/L 71  --  73 79 71 79 80   AST (SGOT) U/L 13  --  8 9 9 9 9   ALT (SGPT) U/L 5  --  <5 5 <5 <5 5     ABG      IR Nephrostomy Tube Placement    Result Date: 3/3/2023  Impression: 1. Technically successful right-sided percutaneous nephrostomy using in the left intercostal interspace approach with both sonographic and fluoroscopic guidance, with placement of a 10 Gabonese nephrostomy catheter. 2. Technically successful left-sided percutaneous nephrostomy using a subcostal approach, also both sonographic and fluoroscopic guidance, also with a 10 Gabonese external drainage catheter placed. 3. Severe bilateral malignant obstructive uropathy. 4. Additional findings as described above. Electronically Signed: Anahi Jacob  3/3/2023 6:17 PM EST  Workstation ID: [id number]    IR Nephrostomy Tube Placement    Result Date: 3/3/2023  Impression: 1. Technically successful right-sided percutaneous nephrostomy using in the left intercostal interspace approach with both sonographic and fluoroscopic guidance, with placement of a 10 Gabonese nephrostomy catheter. 2. Technically successful left-sided percutaneous nephrostomy using a subcostal approach, also both sonographic and fluoroscopic guidance, also with a 10  Belarusian external drainage catheter placed. 3. Severe bilateral malignant obstructive uropathy. 4. Additional findings as described above. Electronically Signed: Anahi Jacob  3/3/2023 6:17 PM EST  Workstation ID: [id number]      Results for orders placed during the hospital encounter of 01/05/22    Adult Transthoracic Echo Complete w/ Color, Spectral and Contrast if Necessary Per Protocol    Interpretation Summary  · Left ventricular ejection fraction appears to be 56 - 60%.  · The right ventricular cavity is moderately dilated.  · No pericardial effusion noted    Scheduled Meds:cefTRIAXone, 2 g, Intravenous, Q24H  sodium bicarbonate, 650 mg, Oral, TID  sodium chloride, 500 mL, Intravenous, Once  sodium chloride, 10 mL, Intravenous, Q12H      Continuous Infusions:   PRN Meds:•  acetaminophen **OR** acetaminophen  •  influenza vaccine  •  ondansetron **OR** ondansetron  •  oxyCODONE  •  [COMPLETED] Insert Peripheral IV **AND** sodium chloride  •  sodium chloride  •  sodium chloride    Assessment & Plan   Assessment and Plan:         Acute blood loss anemia    Acute renal failure (ARF) (HCC)    Acquired scoliosis    ADHD    Depression    Mixed hyperlipidemia    Primary hypertension    Bilateral pneumonia    Marijuana use    UTI (urinary tract infection)    Gross hematuria    BPH (benign prostatic hyperplasia)    History of alcohol use disorder    ASSESSMENT:  • Acute kidney injury, stage III, oligoanuric, metabolic differential at this this time, including sepsis, with bilateral pneumonia, now with obstructive uropathy had been taking Aleve, for the back pain,  • Hyperkalemia, secondary to CARLOS ALBERTO  • Bolick acidosis secondary to CARLOS ALEBRTO  • Weakness and tiredness, with a BUN of 59, very concerning for uremia, but patient is improving since he has  • Metabolic acidosis,   • History of alcohol use,  • Chronic back pain,using NSAID in the past  • B/l pneumonia.  Surprisingly, WBC stable,           Plan:      • CARLOS ALBERTO 2/2 obstructive  uropathy s/p bilateral stents  • Kidney function improving slowly sCr 3.37  • Mild acidosis but electrolytes are acceptable.   • Hyperkalemia improved s/p lokelma and sodium bicarbonate  • We will see some improvement in potassium later today and tomorrow morning  • I think there will be a gradual improvement in renal function in next 24 to 48 hours after getting the bilateral ureteral stent placement  • Echocardiogram done last time showed ejection fraction 56 to 60%  • Repeat labs tomorrow morning  • We will follow closely      Note transcribed for Dr. Tolbert      Electronically signed by DANIS Rajan,   [facility] kidney consultant  [phone]  3/4/2023  10:07 EST    I have reviewed the notes, assessments, and/or procedures performed by danis, I concur with her   documentation of progress note,plans,assessment.    Tom Tolbert MD.  [facility] kidney consultans.

## 2023-03-04 NOTE — PLAN OF CARE
Goal Outcome Evaluation:      Patient alert oriented able to make needs known. Patient has had no complaints of pain this shift. Patient has 2 nephrostomy tubes in place to bag drain. The left NT is clear and the right NT pink tinged drainage. Roberts cathter and suprapubic catheter in place to CBI out put is clear. Patient on oxygen 2L/NC. Patient is telemetry monitored and is running Sinus Tachycardia. Bed in low position, call light in reach, room near nursing station.

## 2023-03-04 NOTE — PLAN OF CARE
Goal Outcome Evaluation:            Pt is resting in bed. C/o lower back pain. Medicated per MAR. Continue CBI. Nephrostomy tubes in place and continues to drain. Awaiting bowel movement. VSS. Will continue to monitor.

## 2023-03-05 LAB
ALBUMIN SERPL-MCNC: 2.9 G/DL (ref 3.5–5.2)
ALBUMIN/GLOB SERPL: 0.8 G/DL
ALP SERPL-CCNC: 74 U/L (ref 39–117)
ALT SERPL W P-5'-P-CCNC: 6 U/L (ref 1–41)
ANION GAP SERPL CALCULATED.3IONS-SCNC: 12 MMOL/L (ref 5–15)
AST SERPL-CCNC: 14 U/L (ref 1–40)
BACTERIA SPEC AEROBE CULT: NORMAL
BACTERIA SPEC AEROBE CULT: NORMAL
BASOPHILS # BLD AUTO: 0.1 10*3/MM3 (ref 0–0.2)
BASOPHILS NFR BLD AUTO: 1.1 % (ref 0–1.5)
BILIRUB SERPL-MCNC: <0.2 MG/DL (ref 0–1.2)
BUN SERPL-MCNC: 51 MG/DL (ref 8–23)
BUN/CREAT SERPL: 18.5 (ref 7–25)
CALCIUM SPEC-SCNC: 8 MG/DL (ref 8.6–10.5)
CHLORIDE SERPL-SCNC: 105 MMOL/L (ref 98–107)
CO2 SERPL-SCNC: 23 MMOL/L (ref 22–29)
CREAT SERPL-MCNC: 2.75 MG/DL (ref 0.76–1.27)
DEPRECATED RDW RBC AUTO: 53.8 FL (ref 37–54)
EGFRCR SERPLBLD CKD-EPI 2021: 24.8 ML/MIN/1.73
EOSINOPHIL # BLD AUTO: 0.3 10*3/MM3 (ref 0–0.4)
EOSINOPHIL NFR BLD AUTO: 2.7 % (ref 0.3–6.2)
ERYTHROCYTE [DISTWIDTH] IN BLOOD BY AUTOMATED COUNT: 16.4 % (ref 12.3–15.4)
GLOBULIN UR ELPH-MCNC: 3.7 GM/DL
GLUCOSE SERPL-MCNC: 114 MG/DL (ref 65–99)
HCT VFR BLD AUTO: 22.4 % (ref 37.5–51)
HCT VFR BLD AUTO: 22.5 % (ref 37.5–51)
HCT VFR BLD AUTO: 24.1 % (ref 37.5–51)
HCT VFR BLD AUTO: 26 % (ref 37.5–51)
HGB BLD-MCNC: 7.3 G/DL (ref 13–17.7)
HGB BLD-MCNC: 7.3 G/DL (ref 13–17.7)
HGB BLD-MCNC: 7.7 G/DL (ref 13–17.7)
HGB BLD-MCNC: 8.2 G/DL (ref 13–17.7)
LYMPHOCYTES # BLD AUTO: 1.7 10*3/MM3 (ref 0.7–3.1)
LYMPHOCYTES NFR BLD AUTO: 17.6 % (ref 19.6–45.3)
MAGNESIUM SERPL-MCNC: 1.6 MG/DL (ref 1.6–2.4)
MCH RBC QN AUTO: 29.1 PG (ref 26.6–33)
MCHC RBC AUTO-ENTMCNC: 32.6 G/DL (ref 31.5–35.7)
MCV RBC AUTO: 89.1 FL (ref 79–97)
MONOCYTES # BLD AUTO: 0.9 10*3/MM3 (ref 0.1–0.9)
MONOCYTES NFR BLD AUTO: 9.6 % (ref 5–12)
NEUTROPHILS NFR BLD AUTO: 6.8 10*3/MM3 (ref 1.7–7)
NEUTROPHILS NFR BLD AUTO: 69 % (ref 42.7–76)
NRBC BLD AUTO-RTO: 0 /100 WBC (ref 0–0.2)
PHOSPHATE SERPL-MCNC: 4 MG/DL (ref 2.5–4.5)
PLATELET # BLD AUTO: 209 10*3/MM3 (ref 140–450)
PMV BLD AUTO: 7.4 FL (ref 6–12)
POTASSIUM SERPL-SCNC: 4.8 MMOL/L (ref 3.5–5.2)
PROT SERPL-MCNC: 6.6 G/DL (ref 6–8.5)
RBC # BLD AUTO: 2.51 10*6/MM3 (ref 4.14–5.8)
SODIUM SERPL-SCNC: 140 MMOL/L (ref 136–145)
WBC NRBC COR # BLD: 9.8 10*3/MM3 (ref 3.4–10.8)

## 2023-03-05 PROCEDURE — 85018 HEMOGLOBIN: CPT | Performed by: UROLOGY

## 2023-03-05 PROCEDURE — 85025 COMPLETE CBC W/AUTO DIFF WBC: CPT | Performed by: UROLOGY

## 2023-03-05 PROCEDURE — 84100 ASSAY OF PHOSPHORUS: CPT | Performed by: UROLOGY

## 2023-03-05 PROCEDURE — 85014 HEMATOCRIT: CPT | Performed by: UROLOGY

## 2023-03-05 PROCEDURE — 83735 ASSAY OF MAGNESIUM: CPT | Performed by: UROLOGY

## 2023-03-05 PROCEDURE — 80053 COMPREHEN METABOLIC PANEL: CPT | Performed by: UROLOGY

## 2023-03-05 PROCEDURE — 25010000002 MAGNESIUM SULFATE 2 GM/50ML SOLUTION: Performed by: NURSE PRACTITIONER

## 2023-03-05 PROCEDURE — 36415 COLL VENOUS BLD VENIPUNCTURE: CPT | Performed by: UROLOGY

## 2023-03-05 PROCEDURE — 25010000002 CEFTRIAXONE PER 250 MG: Performed by: INTERNAL MEDICINE

## 2023-03-05 RX ORDER — MAGNESIUM SULFATE HEPTAHYDRATE 40 MG/ML
2 INJECTION, SOLUTION INTRAVENOUS ONCE
Status: COMPLETED | OUTPATIENT
Start: 2023-03-05 | End: 2023-03-05

## 2023-03-05 RX ADMIN — Medication 10 ML: at 08:05

## 2023-03-05 RX ADMIN — SODIUM BICARBONATE 650 MG: 650 TABLET ORAL at 15:49

## 2023-03-05 RX ADMIN — CEFTRIAXONE 2 G: 2 INJECTION, POWDER, FOR SOLUTION INTRAMUSCULAR; INTRAVENOUS at 13:22

## 2023-03-05 RX ADMIN — Medication 10 ML: at 20:52

## 2023-03-05 RX ADMIN — SODIUM BICARBONATE 650 MG: 650 TABLET ORAL at 08:05

## 2023-03-05 RX ADMIN — OXYCODONE HYDROCHLORIDE 5 MG: 5 TABLET ORAL at 04:38

## 2023-03-05 RX ADMIN — SODIUM BICARBONATE 650 MG: 650 TABLET ORAL at 20:52

## 2023-03-05 RX ADMIN — MAGNESIUM SULFATE HEPTAHYDRATE 2 G: 2 INJECTION, SOLUTION INTRAVENOUS at 08:05

## 2023-03-05 NOTE — PROGRESS NOTES
PROGRESS NOTE      Patient Name: Anthony Moss  : 1955  MRN: [id number]  Primary Care Physician: Reji Topete MD  Date of admission: 2023    Patient Care Team:  Reji Topete MD as PCP - General (Family Medicine)        Subjective   Subjective:     Seen and examined  Resting in bed, no distress or new complaints  Kidney function improving    Review of systems:  All other review of system unremarkable      Allergies:  No Known Allergies    Objective   Exam:     Vital Signs  Temp:  [97.7 °F (36.5 °C)-98 °F (36.7 °C)] 97.7 °F (36.5 °C)  Heart Rate:  [84-99] 84  Resp:  [18-22] 21  BP: (120-145)/(70-80) 138/76  SpO2:  [94 %-97 %] 94 %  on   ;   Device (Oxygen Therapy): room air  Body mass index is 27.53 kg/m².    General: Middle-age  male in no acute distress.    Head:      Normocephalic and atraumatic.    Eyes:      PERRL/EOM intact, conjunctiva and sclera clear with out nystagmus.    Neck:      No masses, thyromegaly,  trachea central with normal respiratory effort   Lungs:    Clear bilaterally to auscultation.    Heart:      Regular rate and rhythm, no murmur no gallop  Abd:        Soft, nontender, not distended, bowel sounds positive, no shifting dullness, suprapubic catheter with good urine  Pulses:   Pulses palpable  Extr:        No cyanosis or clubbing--no significant edema.    Neuro:    No focal deficits.   alert oriented x3  Skin:       Intact without lesions or rashes.    Psych:    Alert and cooperative; normal mood and affect; .      Results Review:  I have personally reviewed most recent Data :  CBC    Results from last 7 days   Lab Units 23  0758 23  0114 237 23  1417 23  0832 23  0101 23  1921 23  0703 23  0035 23  0359 23  0212 23  0940 23  0345 23  2228   WBC 10*3/mm3  --  9.80  --   --   --  7.10  --   --  5.90  --  6.40  --  4.90 6.10   HEMOGLOBIN g/dL 7.3* 7.3* 7.3* 8.1*  7.9* 7.3* 8.2*   < > 7.0*   < > 7.2*   < > 5.4* 4.8*   PLATELETS 10*3/mm3  --  209  --   --   --  187  --   --  173  --  183  --  177 177    < > = values in this interval not displayed.     CMP   Results from last 7 days   Lab Units 03/05/23  0114 03/04/23  0101 03/03/23  0825 03/03/23  0035 03/02/23  0212 03/01/23  0345 03/01/23  0025 02/28/23  2128   SODIUM mmol/L 140 139 140 138 137 133* 133* 132*   POTASSIUM mmol/L 4.8 5.0 4.9  4.8 5.5* 5.3* 4.7 4.9 4.5   CHLORIDE mmol/L 105 107 106 109* 107 103 102 101   CO2 mmol/L 23.0 21.0* 20.0* 21.0* 20.0* 18.0* 18.0* 18.0*   BUN mg/dL 51* 56* 55* 56* 58* 53* 53* 56*   CREATININE mg/dL 2.75* 3.37* 3.53* 3.66* 3.92* 2.96* 2.98* 3.08*   GLUCOSE mg/dL 114* 198* 99 123* 113* 125* 102* 98   ALBUMIN g/dL 2.9* 2.9*  --  3.0* 2.9* 3.0* 3.5 3.5   BILIRUBIN mg/dL <0.2 <0.2  --  0.2 0.3 0.5 0.2 0.2   ALK PHOS U/L 74 71  --  73 79 71 79 80   AST (SGOT) U/L 14 13  --  8 9 9 9 9   ALT (SGPT) U/L 6 5  --  <5 5 <5 <5 5     ABG      IR Nephrostomy Tube Placement    Result Date: 3/3/2023  Impression: 1. Technically successful right-sided percutaneous nephrostomy using in the left intercostal interspace approach with both sonographic and fluoroscopic guidance, with placement of a 10 Urdu nephrostomy catheter. 2. Technically successful left-sided percutaneous nephrostomy using a subcostal approach, also both sonographic and fluoroscopic guidance, also with a 10 Urdu external drainage catheter placed. 3. Severe bilateral malignant obstructive uropathy. 4. Additional findings as described above. Electronically Signed: Anahi Jacob  3/3/2023 6:17 PM EST  Workstation ID: [id number]    IR Nephrostomy Tube Placement    Result Date: 3/3/2023  Impression: 1. Technically successful right-sided percutaneous nephrostomy using in the left intercostal interspace approach with both sonographic and fluoroscopic guidance, with placement of a 10 Urdu nephrostomy catheter. 2. Technically successful  left-sided percutaneous nephrostomy using a subcostal approach, also both sonographic and fluoroscopic guidance, also with a 10 [place] external drainage catheter placed. 3. Severe bilateral malignant obstructive uropathy. 4. Additional findings as described above. Electronically Signed: Collinmaria isabel Nidia  3/3/2023 6:17 PM EST  Workstation ID: [id number]      Results for orders placed during the hospital encounter of 01/05/22    Adult Transthoracic Echo Complete w/ Color, Spectral and Contrast if Necessary Per Protocol    Interpretation Summary  · Left ventricular ejection fraction appears to be 56 - 60%.  · The right ventricular cavity is moderately dilated.  · No pericardial effusion noted    Scheduled Meds:sodium bicarbonate, 650 mg, Oral, TID  sodium chloride, 500 mL, Intravenous, Once  sodium chloride, 10 mL, Intravenous, Q12H      Continuous Infusions:   PRN Meds:•  acetaminophen **OR** acetaminophen  •  influenza vaccine  •  ondansetron **OR** ondansetron  •  oxyCODONE  •  [COMPLETED] Insert Peripheral IV **AND** sodium chloride  •  sodium chloride  •  sodium chloride    Assessment & Plan   Assessment and Plan:         Acute blood loss anemia    Acute renal failure (ARF) (HCC)    Acquired scoliosis    ADHD    Depression    Mixed hyperlipidemia    Primary hypertension    Bilateral pneumonia    Marijuana use    UTI (urinary tract infection)    Gross hematuria    BPH (benign prostatic hyperplasia)    History of alcohol use disorder    ASSESSMENT:  • Acute kidney injury, stage III, oligoanuric, metabolic differential at this this time, including sepsis, with bilateral pneumonia, now with obstructive uropathy had been taking Aleve, for the back pain,  • Hyperkalemia, secondary to CARLOS ALBERTO  • [place] acidosis secondary to CARLOS ALBERTO  • Weakness and tiredness, with a BUN of 59, very concerning for uremia, but patient is improving since he has  • Metabolic acidosis,   • History of alcohol use,  • Chronic back pain,using NSAID in the  past  • B/l pneumonia.  Surprisingly, WBC stable,           Plan:      • CARLOS ALBERTO 2/2 obstructive uropathy s/p bilateral stents  • Kidney function improving slowly sCr 2.75mg/dL  • Acid/base and electrolytes are acceptable. Will give magnesium 2g IV x1. Hyperkalemia resolved.   • Hg 7.3. Transfuse to keep over 7.  • I think there will be a gradual improvement in renal function in next 24 to 48 hours after getting the bilateral ureteral stent placement  • Echocardiogram done last time showed ejection fraction 56 to 60%  • Repeat labs tomorrow morning  • We will follow closely      Note transcribed for Dr. Tolbert      Electronically signed by JESSICA Rajan,   [facility] kidney consultant  [phone]  3/5/2023  11:53 EST    I have reviewed the notes, assessments, and/or procedures performed by jessica, I concur with her   documentation of progress note,plans,assessment.    Tom Tolbert MD.  [facility] kidney consultans.

## 2023-03-05 NOTE — PLAN OF CARE
Goal Outcome Evaluation:            Pt is resting in bed. No complaints at this time. CBI still in place. Nephrostomy tubes in place and draining well. Replacing magnesium. VSS. Will continue to monitor.

## 2023-03-05 NOTE — PROGRESS NOTES
[facility] Daily Progress Note    Patient Name: Anthony Moss  : 1955  MRN: [id number]  Primary Care Physician:  Reji Topete MD  Date of admission: 2023      Subjective      Chief Complaint: Hematuria and weakness.      Patient Reports:        2023.  Patient was seen and examined.  Patient reported slight improvement in his symptoms.      2023.  Patient was seen and examined.  Patient reported moderate improvement in his symptoms.      2023.  Patient was seen and examined.  Patient complained of constant constipation.  Patient reported last bowel movement to be 2023.  Lactulose was ordered.      2023.  Patient was seen and examined.  No overnight events noted.  Neurology and nephrology are following.      Review of Systems   Constitutional: Negative for malaise/fatigue.   HENT: Negative.    Eyes: Negative.    Cardiovascular: Negative.    Respiratory: Negative.    Endocrine: Negative.    Hematologic/Lymphatic: Negative.    Skin: Negative.    Musculoskeletal: Negative.    Gastrointestinal: Negative for constipation.   Genitourinary: Negative.    Neurological: Positive for weakness.   Psychiatric/Behavioral: Negative.    Allergic/Immunologic: Negative.             Objective      Vitals:   Temp:  [97.7 °F (36.5 °C)-98.9 °F (37.2 °C)] 98.9 °F (37.2 °C)  Heart Rate:  [84-94] 89  Resp:  [17-22] 17  BP: (138-146)/(76-87) 146/87    Physical Exam  Vitals reviewed.   Constitutional:       General: He is not in acute distress.  HENT:      Head: Normocephalic.      Nose: Nose normal.      Mouth/Throat:      Mouth: Mucous membranes are dry.      Pharynx: Oropharynx is clear.   Eyes:      Extraocular Movements: Extraocular movements intact.      Conjunctiva/sclera: Conjunctivae normal.      Pupils: Pupils are equal, round, and reactive to light.   Cardiovascular:      Pulses: Normal pulses.      Heart sounds: No murmur heard.    No  friction rub. No gallop.      Comments: S1 and S2 present.  No tachycardia.  Pulmonary:      Effort: Pulmonary effort is normal.      Breath sounds: No stridor. No wheezing or rales.   Chest:      Chest wall: No tenderness.   Abdominal:      General: Bowel sounds are normal. There is no distension.      Palpations: Abdomen is soft.      Tenderness: There is no abdominal tenderness. There is no right CVA tenderness or guarding.   Musculoskeletal:         General: No swelling, tenderness, deformity or signs of injury.      Cervical back: Normal range of motion. No rigidity.      Right lower leg: No edema.      Left lower leg: No edema.   Skin:     General: Skin is warm and dry.      Capillary Refill: Capillary refill takes less than 2 seconds.      Coloration: Skin is not jaundiced.      Findings: No bruising, erythema, lesion or rash.   Neurological:      Comments: No facial asymmetry noted.  Gait and station not tested.   Psychiatric:      Comments: No agitation.               Result Review    Result Review:  I have personally reviewed the results from the time of this admission to 3/5/2023 16:48 EST and agree with these findings:  []  Laboratory  []  Microbiology  []  Radiology  []  EKG/Telemetry   []  Cardiology/Vascular   []  Pathology  []  Old records  []  Other:  Most notable findings include:          Assessment & Plan    Previous notes and with minor updates.      Brief Patient Summary:    Patient is a 67-year-old male with history of osteoarthritis, BPH, thrombocytopenia, alcohol use disorder, chronic respiratory failure on home oxygen who presented to the emergency room because of hematuria and weakness.  Patient was admitted to ICU because of his severe anemia.  Patient was diagnosed with acute blood anemia with hemoglobin of 4.  Patient was also diagnosed with gross hematuria and urology consult was completed.  Patient was also diagnosed with acute kidney injury and a nephrology consult was  completed.        sodium bicarbonate, 650 mg, Oral, TID  sodium chloride, 500 mL, Intravenous, Once  sodium chloride, 10 mL, Intravenous, Q12H             Active Hospital Problems:  Active Hospital Problems    Diagnosis    • **Acute blood loss anemia    • Gross hematuria    • UTI (urinary tract infection)    • Acute renal failure (ARF) (HCC)    • Bilateral pneumonia    • BPH (benign prostatic hyperplasia)    • History of alcohol use disorder    • Marijuana use    • ADHD    • Depression    • Mixed hyperlipidemia    • Primary hypertension    • Acquired scoliosis          Plan:      -Continue appropriate patient's home medications for the chronic medical conditions.  -Continue the present level of care.  -Patient and family agreed with the plan of care.  -Follow urology recommendations for gross hematuria.  -Monitor H&H because of acute blood loss anemia.  -Treat acute kidney injury with IV fluids.  -Follow nephrology recommendations.  -Treating pneumonia with antibiotics.    -Follow cultures.   -Completed marijuana cessation counseling.  -Treat constipation with lactulose.  -Patient is status post cystoscopy.          DVT prophylaxis:  Mechanical DVT prophylaxis orders are present.    CODE STATUS:    Code Status (Patient has no pulse and is not breathing): CPR (Attempt to Resuscitate)  Medical Interventions (Patient has pulse or is breathing): Full Support      Disposition: Pending clinical improvement.    This patient has been examined wearing appropriate Personal Protective Equipment and discussed with hospital infection control department, [facility], infectious disease specialist and pulmonologist. 03/05/23      Electronically signed by Shukri Mayers MD, FACP, 03/05/23, 16:48 EST.      [facility] Hospitalist Team

## 2023-03-05 NOTE — PLAN OF CARE
Goal Outcome Evaluation:  Plan of Care Reviewed With: patient        Progress: improving  Outcome Evaluation: Pt resting in bed. Pt had large BM today. Pt has CBI infusing into his suprapubic cath and out his urethral cath. Bilateral nephrostomy tubes in place, the right one is more blood tinged and the left serosanguinous and has more output. VSS. Pain controlled.

## 2023-03-06 LAB
ALBUMIN SERPL-MCNC: 3 G/DL (ref 3.5–5.2)
ALBUMIN/GLOB SERPL: 0.8 G/DL
ALP SERPL-CCNC: 71 U/L (ref 39–117)
ALT SERPL W P-5'-P-CCNC: 7 U/L (ref 1–41)
ANION GAP SERPL CALCULATED.3IONS-SCNC: 11 MMOL/L (ref 5–15)
AST SERPL-CCNC: 13 U/L (ref 1–40)
BASOPHILS # BLD AUTO: 0.1 10*3/MM3 (ref 0–0.2)
BASOPHILS NFR BLD AUTO: 1.3 % (ref 0–1.5)
BILIRUB SERPL-MCNC: <0.2 MG/DL (ref 0–1.2)
BUN SERPL-MCNC: 51 MG/DL (ref 8–23)
BUN/CREAT SERPL: 20.5 (ref 7–25)
CALCIUM SPEC-SCNC: 8.3 MG/DL (ref 8.6–10.5)
CHLORIDE SERPL-SCNC: 101 MMOL/L (ref 98–107)
CO2 SERPL-SCNC: 23 MMOL/L (ref 22–29)
CREAT SERPL-MCNC: 2.49 MG/DL (ref 0.76–1.27)
DEPRECATED RDW RBC AUTO: 52.9 FL (ref 37–54)
EGFRCR SERPLBLD CKD-EPI 2021: 27.9 ML/MIN/1.73
EOSINOPHIL # BLD AUTO: 0.4 10*3/MM3 (ref 0–0.4)
EOSINOPHIL NFR BLD AUTO: 4.6 % (ref 0.3–6.2)
ERYTHROCYTE [DISTWIDTH] IN BLOOD BY AUTOMATED COUNT: 16.7 % (ref 12.3–15.4)
GLOBULIN UR ELPH-MCNC: 3.7 GM/DL
GLUCOSE SERPL-MCNC: 127 MG/DL (ref 65–99)
HCT VFR BLD AUTO: 23.5 % (ref 37.5–51)
HCT VFR BLD AUTO: 23.8 % (ref 37.5–51)
HCT VFR BLD AUTO: 25.7 % (ref 37.5–51)
HGB BLD-MCNC: 7.4 G/DL (ref 13–17.7)
HGB BLD-MCNC: 7.9 G/DL (ref 13–17.7)
HGB BLD-MCNC: 8.3 G/DL (ref 13–17.7)
LYMPHOCYTES # BLD AUTO: 1.6 10*3/MM3 (ref 0.7–3.1)
LYMPHOCYTES NFR BLD AUTO: 18.7 % (ref 19.6–45.3)
MAGNESIUM SERPL-MCNC: 1.7 MG/DL (ref 1.6–2.4)
MCH RBC QN AUTO: 28.4 PG (ref 26.6–33)
MCHC RBC AUTO-ENTMCNC: 31.5 G/DL (ref 31.5–35.7)
MCV RBC AUTO: 90 FL (ref 79–97)
MONOCYTES # BLD AUTO: 0.9 10*3/MM3 (ref 0.1–0.9)
MONOCYTES NFR BLD AUTO: 11 % (ref 5–12)
NEUTROPHILS NFR BLD AUTO: 5.5 10*3/MM3 (ref 1.7–7)
NEUTROPHILS NFR BLD AUTO: 64.4 % (ref 42.7–76)
NRBC BLD AUTO-RTO: 0 /100 WBC (ref 0–0.2)
PHOSPHATE SERPL-MCNC: 4 MG/DL (ref 2.5–4.5)
PLATELET # BLD AUTO: 222 10*3/MM3 (ref 140–450)
PMV BLD AUTO: 7.4 FL (ref 6–12)
POTASSIUM SERPL-SCNC: 4.9 MMOL/L (ref 3.5–5.2)
PROT SERPL-MCNC: 6.7 G/DL (ref 6–8.5)
RBC # BLD AUTO: 2.62 10*6/MM3 (ref 4.14–5.8)
SODIUM SERPL-SCNC: 135 MMOL/L (ref 136–145)
WBC NRBC COR # BLD: 8.5 10*3/MM3 (ref 3.4–10.8)

## 2023-03-06 PROCEDURE — 85014 HEMATOCRIT: CPT | Performed by: UROLOGY

## 2023-03-06 PROCEDURE — 85018 HEMOGLOBIN: CPT | Performed by: UROLOGY

## 2023-03-06 PROCEDURE — 97162 PT EVAL MOD COMPLEX 30 MIN: CPT

## 2023-03-06 RX ADMIN — SODIUM BICARBONATE 650 MG: 650 TABLET ORAL at 08:30

## 2023-03-06 RX ADMIN — SODIUM BICARBONATE 650 MG: 650 TABLET ORAL at 16:42

## 2023-03-06 RX ADMIN — Medication 10 ML: at 21:05

## 2023-03-06 RX ADMIN — OXYCODONE HYDROCHLORIDE 5 MG: 5 TABLET ORAL at 16:42

## 2023-03-06 RX ADMIN — Medication 10 ML: at 08:30

## 2023-03-06 RX ADMIN — SODIUM BICARBONATE 650 MG: 650 TABLET ORAL at 21:04
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
## 2023-03-06 NOTE — PLAN OF CARE
Goal Outcome Evaluation:  Plan of Care Reviewed With: patient           Outcome Evaluation: Pt is a 66 YO M admitted with blood in suprapubic catheter, states it has been this way for approx 1 month. Pt states he lives home alone, typically is independent with all ADLs ambulation with RWx and has not had any recent falls. Pt states he has few steps to enter home, and a basement that he does not need to travel down to. Pt this date demonstrates good mobility, requiring assitance with lines, but otherwise CGA for bed mobility, transfers and ambulation in room. Pt demonstrates impulsivity requiring cueing for safe navigation with lines. Pt appears safe to d/c home with continued family/friend assistance. Pt is safe to ambulate with nursing and does not reqiure skilled PT at this time. If pt mobiltiy status changes, new orders required.

## 2023-03-06 NOTE — CASE MANAGEMENT/SOCIAL WORK
Continued Stay Note  RADHA Ruffin     Patient Name: Anthony Moss  MRN: [id number]  Today's Date: 3/6/2023    Admit Date: 2/28/2023    Plan: Home with family. Patient will be planned readmission for surgery   Discharge Plan     Row Name 03/06/23 1527       Plan    Plan Home with family. Patient will be planned readmission for surgery    Plan Comments Barriers to dc per md notes: Patient has been tentatively scheduled for March 28 for radical cystectomy with ileal conduit  We will try to get his urine to clear up and get him discharged in preparation for surgery he will need to maintain bilateral nephrostomy tubes through surgery                            Expected Discharge Date and Time     Expected Discharge Date Expected Discharge Time    Mar 7, 2023             Katlyn Rutledge RN

## 2023-03-06 NOTE — PLAN OF CARE
Goal Outcome Evaluation:     Patient has had no complaints of pain on this shift. The patient is still on CBI.  Patient has slept on and off most of the shift. The patient is able to make needs known and the call light is with in reach. Will continue with patients care.

## 2023-03-06 NOTE — PROGRESS NOTES
PROGRESS NOTE      Patient Name: Anthony Moss  : 1955  MRN: [id number]  Primary Care Physician: Reji Topete MD  Date of admission: 2023    Patient Care Team:  Reji Topete MD as PCP - General (Family Medicine)        Subjective   Subjective:     Seen and examined  Resting in bed, no distress or new complaints  Kidney function improving    Review of systems:  All other review of system unremarkable      Allergies:  No Known Allergies    Objective   Exam:     Vital Signs  Temp:  [97.9 °F (36.6 °C)-98.8 °F (37.1 °C)] 98 °F (36.7 °C)  Heart Rate:  [84-96] 90  Resp:  [15-24] 24  BP: (115-152)/(78-89) 147/89  SpO2:  [94 %-96 %] 94 %  on   ;   Device (Oxygen Therapy): room air  Body mass index is 27.56 kg/m².    General: Middle-age  male in no acute distress.    Head:      Normocephalic and atraumatic.    Eyes:      PERRL/EOM intact, conjunctiva and sclera clear with out nystagmus.    Neck:      No masses, thyromegaly,  trachea central with normal respiratory effort   Lungs:    Clear bilaterally to auscultation.    Heart:      Regular rate and rhythm, no murmur no gallop  Abd:        Soft, nontender, not distended, bowel sounds positive, no shifting dullness, suprapubic catheter with good urine  Pulses:   Pulses palpable  Extr:        No cyanosis or clubbing--no significant edema.    Neuro:    No focal deficits.   alert oriented x3  Skin:       Intact without lesions or rashes.    Psych:    Alert and cooperative; normal mood and affect; .      Results Review:  I have personally reviewed most recent Data :  CBC    Results from last 7 days   Lab Units 23  0653 23  2322 23  2129 23  1436 23  0758 23  0114 23  2027 23  0832 23  0101 23  0703 23  0035 23  0359 23  0212 23  0940 23  0345 23  2228   WBC 10*3/mm3  --  8.50  --   --   --  9.80  --   --  7.10  --  5.90  --  6.40  --  4.90 6.10    HEMOGLOBIN g/dL 7.9* 7.4* 7.7* 8.2* 7.3* 7.3* 7.3*   < > 7.3*   < > 7.0*   < > 7.2*   < > 5.4* 4.8*   PLATELETS 10*3/mm3  --  222  --   --   --  209  --   --  187  --  173  --  183  --  177 177    < > = values in this interval not displayed.     CMP   Results from last 7 days   Lab Units 03/05/23  2322 03/05/23  0114 03/04/23  0101 03/03/23  0825 03/03/23  0035 03/02/23  0212 03/01/23  0345 03/01/23  0025   SODIUM mmol/L 135* 140 139 140 138 137 133* 133*   POTASSIUM mmol/L 4.9 4.8 5.0 4.9  4.8 5.5* 5.3* 4.7 4.9   CHLORIDE mmol/L 101 105 107 106 109* 107 103 102   CO2 mmol/L 23.0 23.0 21.0* 20.0* 21.0* 20.0* 18.0* 18.0*   BUN mg/dL 51* 51* 56* 55* 56* 58* 53* 53*   CREATININE mg/dL 2.49* 2.75* 3.37* 3.53* 3.66* 3.92* 2.96* 2.98*   GLUCOSE mg/dL 127* 114* 198* 99 123* 113* 125* 102*   ALBUMIN g/dL 3.0* 2.9* 2.9*  --  3.0* 2.9* 3.0* 3.5   BILIRUBIN mg/dL <0.2 <0.2 <0.2  --  0.2 0.3 0.5 0.2   ALK PHOS U/L 71 74 71  --  73 79 71 79   AST (SGOT) U/L 13 14 13  --  8 9 9 9   ALT (SGPT) U/L 7 6 5  --  <5 5 <5 <5     ABG      No radiology results for the last day    Results for orders placed during the hospital encounter of 01/05/22    Adult Transthoracic Echo Complete w/ Color, Spectral and Contrast if Necessary Per Protocol    Interpretation Summary  · Left ventricular ejection fraction appears to be 56 - 60%.  · The right ventricular cavity is moderately dilated.  · No pericardial effusion noted    Scheduled Meds:sodium bicarbonate, 650 mg, Oral, TID  sodium chloride, 500 mL, Intravenous, Once  sodium chloride, 10 mL, Intravenous, Q12H      Continuous Infusions:   PRN Meds:•  acetaminophen **OR** acetaminophen  •  influenza vaccine  •  ondansetron **OR** ondansetron  •  oxyCODONE  •  [COMPLETED] Insert Peripheral IV **AND** sodium chloride  •  sodium chloride  •  sodium chloride    Assessment & Plan   Assessment and Plan:         Acute blood loss anemia    Acute renal failure (ARF) (HCC)    Acquired scoliosis    ADHD     Depression    Mixed hyperlipidemia    Primary hypertension    Bilateral pneumonia    Marijuana use    UTI (urinary tract infection)    Gross hematuria    BPH (benign prostatic hyperplasia)    History of alcohol use disorder    ASSESSMENT:  • Acute kidney injury, stage III, oligoanuric, metabolic differential at this this time, including sepsis, with bilateral pneumonia, now with obstructive uropathy had been taking Aleve, for the back pain,  • Hyperkalemia, secondary to CARLOS ALBERTO  • Bolick acidosis secondary to CARLOS ALBERTO  • Weakness and tiredness, with a BUN of 59, very concerning for uremia, but patient is improving since he has  • Metabolic acidosis,   • History of alcohol use,  • Chronic back pain,using NSAID in the past  • B/l pneumonia.  Surprisingly, WBC stable,           Plan:      • CARLOS ALBERTO 2/2 obstructive uropathy s/p bilateral stents  • Kidney function improving slowly sCr 2.45mg/dL  • Acid/base and electrolytes are acceptable. Will give magnesium 2g IV x1. Hyperkalemia resolved.   • Patient has another episide of hematuria after dislodging Catheter  • Hg 7.9. Transfuse to keep over 7.  • I think there will be a gradual improvement in renal function in next 24 to 48 hours after getting the bilateral ureteral stent placement  • Echocardiogram done last time showed ejection fraction 56 to 60%  • Repeat labs tomorrow morning  • We will follow closely            Electronically signed by Jamie Houston MD,   [facility] kidney consultant  [phone]  3/6/2023  15:47 EST

## 2023-03-06 NOTE — PROGRESS NOTES
"  [facility] DAILY PROGRESS NOTE    Patient Identification  Name: Anthony Moss  Age: 67 y.o.  Sex: male  :  1955  MRN: [id number]    Date: 3/6/2023             Subjective:  Interval History: Urethral Roberts fell out, balloon was deflated, suprapubic tube balloon also deflated    Objective:    Scheduled Meds:sodium bicarbonate, 650 mg, Oral, TID  sodium chloride, 500 mL, Intravenous, Once  sodium chloride, 10 mL, Intravenous, Q12H      Continuous Infusions:   PRN Meds:•  acetaminophen **OR** acetaminophen  •  influenza vaccine  •  ondansetron **OR** ondansetron  •  oxyCODONE  •  [COMPLETED] Insert Peripheral IV **AND** sodium chloride  •  sodium chloride  •  sodium chloride    Vital signs in last 24 hours:  Temp:  [97.9 °F (36.6 °C)-98.9 °F (37.2 °C)] 98 °F (36.7 °C)  Heart Rate:  [84-96] 90  Resp:  [15-24] 24  BP: (115-152)/(78-89) 147/89    Intake/Output:    Intake/Output Summary (Last 24 hours) at 3/6/2023 1215  Last data filed at 3/6/2023 1132  Gross per 24 hour   Intake 59658 ml   Output 79651 ml   Net -44841 ml       Exam:  /89 (BP Location: Right arm, Patient Position: Lying)   Pulse 90   Temp 98 °F (36.7 °C) (Oral)   Resp 24   Ht 188 cm (74.02\")   Wt 97.4 kg (214 lb 11.7 oz)   SpO2 94%   BMI 27.56 kg/m²     General Appearance:    Alert, cooperative, no distress, appears stated age               Abdomen:     Soft, ND   :   Suprapubic tube balloon was deflated it did appear to be working and holding fluid however, replaced with 24 three-way Simplastic, 20 Gabonese coudé placed per urethra complicated by stricture and irrigation restarted                Data Review:  All labs (24hrs):   Recent Results (from the past 24 hour(s))   Hemoglobin & Hematocrit, Blood    Collection Time: 23  2:36 PM    Specimen: Blood   Result Value Ref Range    Hemoglobin 8.2 (L) 13.0 - 17.7 g/dL    Hematocrit 26.0 (L) 37.5 - 51.0 %   Hemoglobin & Hematocrit, Blood    Collection Time: 23  9:29 PM "    Specimen: Blood   Result Value Ref Range    Hemoglobin 7.7 (L) 13.0 - 17.7 g/dL    Hematocrit 24.1 (L) 37.5 - 51.0 %   Magnesium    Collection Time: 03/05/23 11:22 PM    Specimen: Blood   Result Value Ref Range    Magnesium 1.7 1.6 - 2.4 mg/dL   Phosphorus    Collection Time: 03/05/23 11:22 PM    Specimen: Blood   Result Value Ref Range    Phosphorus 4.0 2.5 - 4.5 mg/dL   Comprehensive Metabolic Panel    Collection Time: 03/05/23 11:22 PM    Specimen: Blood   Result Value Ref Range    Glucose 127 (H) 65 - 99 mg/dL    BUN 51 (H) 8 - 23 mg/dL    Creatinine 2.49 (H) 0.76 - 1.27 mg/dL    Sodium 135 (L) 136 - 145 mmol/L    Potassium 4.9 3.5 - 5.2 mmol/L    Chloride 101 98 - 107 mmol/L    CO2 23.0 22.0 - 29.0 mmol/L    Calcium 8.3 (L) 8.6 - 10.5 mg/dL    Total Protein 6.7 6.0 - 8.5 g/dL    Albumin 3.0 (L) 3.5 - 5.2 g/dL    ALT (SGPT) 7 1 - 41 U/L    AST (SGOT) 13 1 - 40 U/L    Alkaline Phosphatase 71 39 - 117 U/L    Total Bilirubin <0.2 0.0 - 1.2 mg/dL    Globulin 3.7 gm/dL    A/G Ratio 0.8 g/dL    BUN/Creatinine Ratio 20.5 7.0 - 25.0    Anion Gap 11.0 5.0 - 15.0 mmol/L    eGFR 27.9 (L) >60.0 mL/min/1.73   CBC Auto Differential    Collection Time: 03/05/23 11:22 PM    Specimen: Blood   Result Value Ref Range    WBC 8.50 3.40 - 10.80 10*3/mm3    RBC 2.62 (L) 4.14 - 5.80 10*6/mm3    Hemoglobin 7.4 (L) 13.0 - 17.7 g/dL    Hematocrit 23.5 (L) 37.5 - 51.0 %    MCV 90.0 79.0 - 97.0 fL    MCH 28.4 26.6 - 33.0 pg    MCHC 31.5 31.5 - 35.7 g/dL    RDW 16.7 (H) 12.3 - 15.4 %    RDW-SD 52.9 37.0 - 54.0 fl    MPV 7.4 6.0 - 12.0 fL    Platelets 222 140 - 450 10*3/mm3    Neutrophil % 64.4 42.7 - 76.0 %    Lymphocyte % 18.7 (L) 19.6 - 45.3 %    Monocyte % 11.0 5.0 - 12.0 %    Eosinophil % 4.6 0.3 - 6.2 %    Basophil % 1.3 0.0 - 1.5 %    Neutrophils, Absolute 5.50 1.70 - 7.00 10*3/mm3    Lymphocytes, Absolute 1.60 0.70 - 3.10 10*3/mm3    Monocytes, Absolute 0.90 0.10 - 0.90 10*3/mm3    Eosinophils, Absolute 0.40 0.00 - 0.40 10*3/mm3     Basophils, Absolute 0.10 0.00 - 0.20 10*3/mm3    nRBC 0.0 0.0 - 0.2 /100 WBC   Hemoglobin & Hematocrit, Blood    Collection Time: 03/06/23  6:53 AM    Specimen: Blood   Result Value Ref Range    Hemoglobin 7.9 (L) 13.0 - 17.7 g/dL    Hematocrit 23.8 (L) 37.5 - 51.0 %      Imaging Results (Last 24 Hours)     ** No results found for the last 24 hours. **           Assessment:    Acute blood loss anemia    Acute renal failure (ARF) (HCC)    Acquired scoliosis    ADHD    Depression    Mixed hyperlipidemia    Primary hypertension    Bilateral pneumonia    Marijuana use    UTI (urinary tract infection)    Gross hematuria    BPH (benign prostatic hyperplasia)    History of alcohol use disorder    Neurogenic bladder with suprapubic tube  Severe bladder wall thickening causing bilateral hydronephrosis  Cystitis  Hematuria and anemia     Plan:      Status post bilateral nephrostomy tubes with improving creatinine  Both of his suprapubic and urethral catheters balloons were deflated and removed  24 French three-way Simplastic replaced suprapubic tube and 20 French urethral Roberts replaced, continue irrigation through the irrigation port in the suprapubic tube and out both the suprapubic tube and urethral Roberts to maximize drainage and irrigate as needed  Patient has been tentatively scheduled for March 28 for radical cystectomy with ileal conduit  We will try to get his urine to clear up and get him discharged in preparation for surgery he will need to maintain bilateral nephrostomy tubes through surgery      Xu Montero MD  [facility]  [address], [address]  [place] [address]  Office: [phone]  Cell: [phone]  Also available via [employer]  03/06/23  12:15 EST

## 2023-03-06 NOTE — PROGRESS NOTES
[facility] Medicine Services Daily Progress Note     Patient Name: Anthony Moss  : 1955  MRN: [id number]  Primary Care Physician:  Reji Topete MD  Date of admission: 2023        Subjective       Chief Complaint: Hematuria and weakness.        Patient Reports:           2023.  Patient was seen and examined.  Patient reported slight improvement in his symptoms.        2023.  Patient was seen and examined.  Patient reported moderate improvement in his symptoms.        2023.  Patient was seen and examined.  Patient complained of constant constipation.  Patient reported last bowel movement to be 2023.  Lactulose was ordered.        2023.  Patient was seen and examined.  No overnight events noted.  Neurology and nephrology are following.       3/6/2023; first time seeing the patient, extensive chart notes reviewed,, Roberts catheter came out, both suprapubic and Roberts catheter were replaced by urology for CBI, otherwise no new symptoms hemodynamically stable     Review of Systems   Constitutional: Negative for malaise/fatigue.   HENT: Negative.    Eyes: Negative.    Cardiovascular: Negative.    Respiratory: Negative.    Endocrine: Negative.    Hematologic/Lymphatic: Negative.    Skin: Negative.    Musculoskeletal: Negative.    Gastrointestinal: Negative for constipation.   Genitourinary: Negative.    Neurological: Positive for weakness.   Psychiatric/Behavioral: Negative.    Allergic/Immunologic: Negative.                Objective       Vitals:   Temp:  [97.9 °F (36.6 °C)-98.8 °F (37.1 °C)] 98 °F (36.7 °C)  Heart Rate:  [84-96] 90  Resp:  [15-24] 24  BP: (115-152)/(78-89) 147/89     Physical Exam  Vitals reviewed.   Constitutional:       General: He is not in acute distress.  HENT:      Head: Normocephalic.      Nose: Nose normal.      Mouth/Throat:      Mouth: Mucous membranes are dry.      Pharynx: Oropharynx is clear.   Eyes:       Extraocular Movements: Extraocular movements intact.      Conjunctiva/sclera: Conjunctivae normal.      Pupils: Pupils are equal, round, and reactive to light.   Cardiovascular:      Pulses: Normal pulses.      Heart sounds: No murmur heard.    No friction rub. No gallop.      Comments: S1 and S2 present.  No tachycardia.  Pulmonary:      Effort: Pulmonary effort is normal.      Breath sounds: No stridor. No wheezing or rales.   Chest:      Chest wall: No tenderness.   Abdominal:      General: Bowel sounds are normal. There is no distension.      Palpations: Abdomen is soft.      Tenderness: There is no abdominal tenderness. There is no right CVA tenderness or guarding.  Bilateral nephrostomy tubes in place, suprapubic catheter and Roberts catheter in place  Musculoskeletal:         General: No swelling, tenderness, deformity or signs of injury.      Cervical back: Normal range of motion. No rigidity.      Right lower leg: No edema.      Left lower leg: No edema.   Skin:     General: Skin is warm and dry.      Capillary Refill: Capillary refill takes less than 2 seconds.      Coloration: Skin is not jaundiced.      Findings: No bruising, erythema, lesion or rash.   Neurological:      Comments: No facial asymmetry noted.  Gait and station not tested.   Psychiatric:      Comments: No agitation.                             Result Review    Result Review:  I have personally reviewed the results from the time of this admission to 3/5/2023 16:48 EST and agree with these findings:  []?  Laboratory  []?  Microbiology  []?  Radiology  []?  EKG/Telemetry   []?  Cardiology/Vascular   []?  Pathology  []?  Old records  []?  Other:  Most notable findings include:        Lab Results   Component Value Date    GLUCOSE 127 (H) 03/05/2023    CALCIUM 8.3 (L) 03/05/2023     (L) 03/05/2023    K 4.9 03/05/2023    CO2 23.0 03/05/2023     03/05/2023    BUN 51 (H) 03/05/2023    CREATININE 2.49 (H) 03/05/2023    EGFR 27.9 (L)  03/05/2023    BCR 20.5 03/05/2023    ANIONGAP 11.0 03/05/2023   CT Abdomen Pelvis Without Contrast    Result Date: 3/2/2023  Impression: 1. Persistent severe bilateral hydroureteronephrosis. 2. Irregular wall thickening of bladder may relate to intraluminal bladder mass versus changes related to cystitis or chronic neurogenic bladder, recommend cystoscopy. Suprapubic catheter in stable position. Prostatomegaly. 3. Nonspecific scattered ground glass opacities at lung bases suggesting infectious or inflammatory process, unchanged with small bilateral pleural effusions. 4. Additional chronic findings above. Electronically Signed: Kuldip Daniels  3/2/2023 10:30 AM EST  Workstation ID: [id number]    CT Abdomen Pelvis Without Contrast    Result Date: 2/28/2023  1.Extensive multifocal groundglass patchy infiltrate/pneumonia throughout visualized lower lungs. Severe bilateral hydroureteronephrosis may represent a passed stone, reflux, or obstruction at bilateral trigone. 2.Severe circumferential urinary bladder wall thickening may represent cystitis or other etiologies. Please correlate with urinalysis. Suprapubic catheter is in place. Further workup and follow-up recommended to exclude urothelial malignancy.. 3.Small hiatal hernia. 4.Diverticulosis without diverticulitis. Electronically Signed: Pravin Tolbert  2/28/2023 10:30 PM EST  Workstation ID: [id number]    XR Chest 1 View    Result Date: 3/1/2023  Impression: Vague multifocal patchy areas of airspace disease concerning for pneumonia with groundglass airspace disease better demonstrated on the prior abdominal CT. Electronically Signed: Kuldip Daniels  3/1/2023 7:03 AM EST  Workstation ID: [id number]    IR Nephrostomy Tube Placement    Result Date: 3/3/2023  Impression: 1. Technically successful right-sided percutaneous nephrostomy using in the left intercostal interspace approach with both sonographic and fluoroscopic guidance, with placement of a 10 Sami nephrostomy catheter.  2. Technically successful left-sided percutaneous nephrostomy using a subcostal approach, also both sonographic and fluoroscopic guidance, also with a 10 Lithuanian external drainage catheter placed. 3. Severe bilateral malignant obstructive uropathy. 4. Additional findings as described above. Electronically Signed: Clolinmaria isabel Nidia  3/3/2023 6:17 PM EST  Workstation ID: [id number]    IR Nephrostomy Tube Placement    Result Date: 3/3/2023  Impression: 1. Technically successful right-sided percutaneous nephrostomy using in the left intercostal interspace approach with both sonographic and fluoroscopic guidance, with placement of a 10 Lithuanian nephrostomy catheter. 2. Technically successful left-sided percutaneous nephrostomy using a subcostal approach, also both sonographic and fluoroscopic guidance, also with a 10 Lithuanian external drainage catheter placed. 3. Severe bilateral malignant obstructive uropathy. 4. Additional findings as described above. Electronically Signed: owenmaria isabel Nidia  3/3/2023 6:17 PM EST  Workstation ID: [id number]       Assessment & Plan    Previous notes and with minor updates.        Brief Patient Summary:     Patient is a 67-year-old male with history of osteoarthritis, BPH, thrombocytopenia, alcohol use disorder, chronic respiratory failure on home oxygen who presented to the emergency room because of hematuria and weakness.  Patient was admitted to ICU because of his severe anemia.  Patient was diagnosed with acute blood anemia with hemoglobin of 4.  Patient was also diagnosed with gross hematuria and urology consult was completed.  Patient was also diagnosed with acute kidney injury and a nephrology consult was completed.           sodium bicarbonate, 650 mg, Oral, TID  sodium chloride, 500 mL, Intravenous, Once  sodium chloride, 10 mL, Intravenous, Q12H               Active Hospital Problems:       Active Hospital Problems     Diagnosis     • **Acute blood loss anemia     • Gross hematuria     • UTI  (urinary tract infection)     • Acute renal failure (ARF) (HCC)     • Bilateral pneumonia     • BPH (benign prostatic hyperplasia)     • History of alcohol use disorder     • Marijuana use     • ADHD     • Depression     • Mixed hyperlipidemia     • Primary hypertension     • Acquired scoliosis              Plan: S/p bilateral nephrostomy tubes by IR, with improving creatinine  Both suprapubic and urethral catheters balloons were deflated and removed 24 Croatian three-way Simplastic replaced suprapubic tube 20 Croatian urethral Roberts replaced continue irrigation through irrigation port in the suprapubic tube and out both the suprapubic tube under the referral to maximum drainage and irrigation as per urology.  Patient has been tentatively scheduled for March 20 teeth for radical cystectomy with ileal conduit.  Acute kidney injury; nephrology following creatinine improving  Electrolyte abnormalities being replaced  Acute blood loss anemia hemoglobin 7.8 transfuse to keep over 7 g/dL               DVT prophylaxis:  Mechanical DVT prophylaxis orders are present.     CODE STATUS:    Code Status (Patient has no pulse and is not breathing): CPR (Attempt to Resuscitate)  Medical Interventions (Patient has pulse or is breathing): Full Support        Disposition: Pending clinical improvement.     This patient has been examined wearing appropriate Personal Protective Equipment and discussed with hospital infection control department, [facility], infectious disease specialist and pulmonologist.     Electronically signed by Shira Meadows MD, 03/06/23, 2:59 PM EST.

## 2023-03-06 NOTE — PLAN OF CARE
Goal Outcome Evaluation:               Patient up in chair at this time. VSS. Urethral catheter replaced by urology this shift. Patient progressing with care plan.

## 2023-03-07 LAB
ALBUMIN SERPL-MCNC: 2.9 G/DL (ref 3.5–5.2)
ALBUMIN/GLOB SERPL: 0.8 G/DL
ALP SERPL-CCNC: 82 U/L (ref 39–117)
ALT SERPL W P-5'-P-CCNC: 7 U/L (ref 1–41)
ANION GAP SERPL CALCULATED.3IONS-SCNC: 13 MMOL/L (ref 5–15)
AST SERPL-CCNC: 15 U/L (ref 1–40)
BASOPHILS # BLD AUTO: 0.1 10*3/MM3 (ref 0–0.2)
BASOPHILS NFR BLD AUTO: 1.4 % (ref 0–1.5)
BILIRUB SERPL-MCNC: <0.2 MG/DL (ref 0–1.2)
BUN SERPL-MCNC: 51 MG/DL (ref 8–23)
BUN/CREAT SERPL: 22.3 (ref 7–25)
CALCIUM SPEC-SCNC: 8.4 MG/DL (ref 8.6–10.5)
CHLORIDE SERPL-SCNC: 101 MMOL/L (ref 98–107)
CO2 SERPL-SCNC: 22 MMOL/L (ref 22–29)
CREAT SERPL-MCNC: 2.29 MG/DL (ref 0.76–1.27)
DEPRECATED RDW RBC AUTO: 52.9 FL (ref 37–54)
EGFRCR SERPLBLD CKD-EPI 2021: 30.9 ML/MIN/1.73
EOSINOPHIL # BLD AUTO: 0.5 10*3/MM3 (ref 0–0.4)
EOSINOPHIL NFR BLD AUTO: 5 % (ref 0.3–6.2)
ERYTHROCYTE [DISTWIDTH] IN BLOOD BY AUTOMATED COUNT: 16.1 % (ref 12.3–15.4)
GLOBULIN UR ELPH-MCNC: 3.8 GM/DL
GLUCOSE SERPL-MCNC: 115 MG/DL (ref 65–99)
HCT VFR BLD AUTO: 23.1 % (ref 37.5–51)
HCT VFR BLD AUTO: 24.1 % (ref 37.5–51)
HCT VFR BLD AUTO: 24.5 % (ref 37.5–51)
HCT VFR BLD AUTO: 27.6 % (ref 37.5–51)
HGB BLD-MCNC: 7.6 G/DL (ref 13–17.7)
HGB BLD-MCNC: 7.9 G/DL (ref 13–17.7)
HGB BLD-MCNC: 7.9 G/DL (ref 13–17.7)
HGB BLD-MCNC: 8.7 G/DL (ref 13–17.7)
LYMPHOCYTES # BLD AUTO: 2.1 10*3/MM3 (ref 0.7–3.1)
LYMPHOCYTES NFR BLD AUTO: 22.5 % (ref 19.6–45.3)
MAGNESIUM SERPL-MCNC: 1.7 MG/DL (ref 1.6–2.4)
MCH RBC QN AUTO: 29.2 PG (ref 26.6–33)
MCHC RBC AUTO-ENTMCNC: 33.2 G/DL (ref 31.5–35.7)
MCV RBC AUTO: 88 FL (ref 79–97)
MONOCYTES # BLD AUTO: 1 10*3/MM3 (ref 0.1–0.9)
MONOCYTES NFR BLD AUTO: 10.6 % (ref 5–12)
NEUTROPHILS NFR BLD AUTO: 5.6 10*3/MM3 (ref 1.7–7)
NEUTROPHILS NFR BLD AUTO: 60.5 % (ref 42.7–76)
NRBC BLD AUTO-RTO: 0 /100 WBC (ref 0–0.2)
PHOSPHATE SERPL-MCNC: 4.1 MG/DL (ref 2.5–4.5)
PLATELET # BLD AUTO: 208 10*3/MM3 (ref 140–450)
PMV BLD AUTO: 7.9 FL (ref 6–12)
POTASSIUM SERPL-SCNC: 4.8 MMOL/L (ref 3.5–5.2)
PROT SERPL-MCNC: 6.7 G/DL (ref 6–8.5)
RBC # BLD AUTO: 2.62 10*6/MM3 (ref 4.14–5.8)
SODIUM SERPL-SCNC: 136 MMOL/L (ref 136–145)
WBC NRBC COR # BLD: 9.2 10*3/MM3 (ref 3.4–10.8)

## 2023-03-07 PROCEDURE — 85014 HEMATOCRIT: CPT | Performed by: UROLOGY

## 2023-03-07 PROCEDURE — 83735 ASSAY OF MAGNESIUM: CPT | Performed by: UROLOGY

## 2023-03-07 PROCEDURE — 85018 HEMOGLOBIN: CPT | Performed by: UROLOGY

## 2023-03-07 PROCEDURE — 80053 COMPREHEN METABOLIC PANEL: CPT | Performed by: UROLOGY

## 2023-03-07 PROCEDURE — 84100 ASSAY OF PHOSPHORUS: CPT | Performed by: UROLOGY

## 2023-03-07 PROCEDURE — 85025 COMPLETE CBC W/AUTO DIFF WBC: CPT | Performed by: UROLOGY

## 2023-03-07 PROCEDURE — 36415 COLL VENOUS BLD VENIPUNCTURE: CPT | Performed by: UROLOGY

## 2023-03-07 RX ADMIN — SODIUM BICARBONATE 650 MG: 650 TABLET ORAL at 09:12

## 2023-03-07 RX ADMIN — SODIUM BICARBONATE 650 MG: 650 TABLET ORAL at 16:05

## 2023-03-07 RX ADMIN — Medication 10 ML: at 09:12

## 2023-03-07 RX ADMIN — Medication 10 ML: at 21:00

## 2023-03-07 RX ADMIN — SODIUM BICARBONATE 650 MG: 650 TABLET ORAL at 20:58

## 2023-03-07 NOTE — PROGRESS NOTES
PROGRESS NOTE      Patient Name: Anthony Moss  : 1955  MRN: [id number]  Primary Care Physician: Reji Topete MD  Date of admission: 2023    Patient Care Team:  Reji Topete MD as PCP - General (Family Medicine)        Subjective   Subjective:     Seen and examined  Resting in bed, no distress or new complaints  Kidney function improving    Review of systems:  All other review of system unremarkable      Allergies:  No Known Allergies    Objective   Exam:     Vital Signs  Temp:  [97.8 °F (36.6 °C)-98.5 °F (36.9 °C)] 97.8 °F (36.6 °C)  Heart Rate:  [88-94] 88  Resp:  [20-24] 20  BP: (131-147)/(80-89) 131/80  SpO2:  [94 %-96 %] 94 %  on   ;   Device (Oxygen Therapy): room air  Body mass index is 27.53 kg/m².    General: Middle-age  male in no acute distress.    Head:      Normocephalic and atraumatic.    Eyes:      PERRL/EOM intact, conjunctiva and sclera clear with out nystagmus.    Neck:      No masses, thyromegaly,  trachea central with normal respiratory effort   Lungs:    Clear bilaterally to auscultation.    Heart:      Regular rate and rhythm, no murmur no gallop  Abd:        Soft, nontender, not distended, bowel sounds positive, no shifting dullness, suprapubic catheter with good urine  Pulses:   Pulses palpable  Extr:        No cyanosis or clubbing--no significant edema.    Neuro:    No focal deficits.   alert oriented x3  Skin:       Intact without lesions or rashes.    Psych:    Alert and cooperative; normal mood and affect; .      Results Review:  I have personally reviewed most recent Data :  CBC    Results from last 7 days   Lab Units 23  0707 23  0122 23  1702 23  0653 23  2322 23  2129 23  1436 23  0758 23  0114 23  0832 23  0101 23  0703 23  0035 23  0359 23  0212 23  0940 23  0345   WBC 10*3/mm3  --  9.20  --   --  8.50  --   --   --  9.80  --  7.10  --   5.90  --  6.40  --  4.90   HEMOGLOBIN g/dL 7.9* 7.6* 8.3* 7.9* 7.4* 7.7* 8.2*   < > 7.3*   < > 7.3*   < > 7.0*   < > 7.2*   < > 5.4*   PLATELETS 10*3/mm3  --  208  --   --  222  --   --   --  209  --  187  --  173  --  183  --  177    < > = values in this interval not displayed.     CMP   Results from last 7 days   Lab Units 03/07/23  0122 03/05/23  2322 03/05/23  0114 03/04/23  0101 03/03/23  0825 03/03/23  0035 03/02/23  0212 03/01/23  0345   SODIUM mmol/L 136 135* 140 139 140 138 137 133*   POTASSIUM mmol/L 4.8 4.9 4.8 5.0 4.9  4.8 5.5* 5.3* 4.7   CHLORIDE mmol/L 101 101 105 107 106 109* 107 103   CO2 mmol/L 22.0 23.0 23.0 21.0* 20.0* 21.0* 20.0* 18.0*   BUN mg/dL 51* 51* 51* 56* 55* 56* 58* 53*   CREATININE mg/dL 2.29* 2.49* 2.75* 3.37* 3.53* 3.66* 3.92* 2.96*   GLUCOSE mg/dL 115* 127* 114* 198* 99 123* 113* 125*   ALBUMIN g/dL 2.9* 3.0* 2.9* 2.9*  --  3.0* 2.9* 3.0*   BILIRUBIN mg/dL <0.2 <0.2 <0.2 <0.2  --  0.2 0.3 0.5   ALK PHOS U/L 82 71 74 71  --  73 79 71   AST (SGOT) U/L 15 13 14 13  --  8 9 9   ALT (SGPT) U/L 7 7 6 5  --  <5 5 <5     ABG      No radiology results for the last day    Results for orders placed during the hospital encounter of 01/05/22    Adult Transthoracic Echo Complete w/ Color, Spectral and Contrast if Necessary Per Protocol    Interpretation Summary  · Left ventricular ejection fraction appears to be 56 - 60%.  · The right ventricular cavity is moderately dilated.  · No pericardial effusion noted    Scheduled Meds:sodium bicarbonate, 650 mg, Oral, TID  sodium chloride, 500 mL, Intravenous, Once  sodium chloride, 10 mL, Intravenous, Q12H      Continuous Infusions:   PRN Meds:•  acetaminophen **OR** acetaminophen  •  influenza vaccine  •  ondansetron **OR** ondansetron  •  oxyCODONE  •  [COMPLETED] Insert Peripheral IV **AND** sodium chloride  •  sodium chloride  •  sodium chloride    Assessment & Plan   Assessment and Plan:         Acute blood loss anemia    Acute renal failure (ARF)  (HCC)    Acquired scoliosis    ADHD    Depression    Mixed hyperlipidemia    Primary hypertension    Bilateral pneumonia    Marijuana use    UTI (urinary tract infection)    Gross hematuria    BPH (benign prostatic hyperplasia)    History of alcohol use disorder    ASSESSMENT:  • Acute kidney injury, stage III, oligoanuric, metabolic differential at this this time, including sepsis, with bilateral pneumonia, now with obstructive uropathy had been taking Aleve, for the back pain,  • Hyperkalemia, secondary to CARLOS ALBERTO  • Bolick acidosis secondary to CARLOS ALBERTO  • Weakness and tiredness, with a BUN of 59, very concerning for uremia, but patient is improving since he has  • Metabolic acidosis,   • History of alcohol use,  • Chronic back pain,using NSAID in the past  • B/l pneumonia.  Surprisingly, WBC stable,           Plan:      • CARLOS ALBERTO 2/2 obstructive uropathy s/p bilateral stents  • Kidney function improving slowly sCr 2.25mg/dL  • Acid/base and electrolytes are acceptable..   • K borderline cont to f/u  • Patient has another episide of hematuria after dislodging Catheter  • Hg 7.9. Transfuse to keep over 7.  • I think there will be a gradual improvement in renal function in next 24 to 48 hours after getting the bilateral ureteral stent placement  • Echocardiogram done last time showed ejection fraction 56 to 60%  • Repeat labs tomorrow morning  • We will follow closely            Electronically signed by Jamie Houston MD,   [facility] kidney consultant  [phone]  3/7/2023  10:32 EST

## 2023-03-07 NOTE — PROGRESS NOTES
"  [facility] DAILY PROGRESS NOTE    Patient Identification  Name: Anthony Moss  Age: 67 y.o.  Sex: male  :  1955  MRN: [id number]    Date: 3/7/2023             Subjective:  Interval History: Urine clear now on CBI    Objective:    Scheduled Meds:sodium bicarbonate, 650 mg, Oral, TID  sodium chloride, 500 mL, Intravenous, Once  sodium chloride, 10 mL, Intravenous, Q12H      Continuous Infusions:   PRN Meds:•  acetaminophen **OR** acetaminophen  •  influenza vaccine  •  ondansetron **OR** ondansetron  •  oxyCODONE  •  [COMPLETED] Insert Peripheral IV **AND** sodium chloride  •  sodium chloride  •  sodium chloride    Vital signs in last 24 hours:  Temp:  [97.8 °F (36.6 °C)-98.5 °F (36.9 °C)] 97.8 °F (36.6 °C)  Heart Rate:  [88-94] 88  Resp:  [20-24] 20  BP: (131-147)/(80-89) 131/80    Intake/Output:    Intake/Output Summary (Last 24 hours) at 3/7/2023 0726  Last data filed at 3/7/2023 0655  Gross per 24 hour   Intake 20350 ml   Output 81592 ml   Net -83750 ml       Exam:  /80 (BP Location: Right arm, Patient Position: Lying)   Pulse 88   Temp 97.8 °F (36.6 °C) (Oral)   Resp 20   Ht 188 cm (74.02\")   Wt 97.3 kg (214 lb 8.1 oz)   SpO2 94%   BMI 27.53 kg/m²     General Appearance:    Alert, cooperative, no distress, appears stated age               Abdomen:     Soft, ND   :   Suprapubic tube 24 three-way Simplastic, 20 Vietnamese coudé per urethra urine clear on CBI                Data Review:  All labs (24hrs):   Recent Results (from the past 24 hour(s))   Hemoglobin & Hematocrit, Blood    Collection Time: 23  5:02 PM    Specimen: Blood   Result Value Ref Range    Hemoglobin 8.3 (L) 13.0 - 17.7 g/dL    Hematocrit 25.7 (L) 37.5 - 51.0 %   Magnesium    Collection Time: 23  1:22 AM    Specimen: Blood   Result Value Ref Range    Magnesium 1.7 1.6 - 2.4 mg/dL   Phosphorus    Collection Time: 23  1:22 AM    Specimen: Blood   Result Value Ref Range    Phosphorus 4.1 2.5 - 4.5 mg/dL "   Comprehensive Metabolic Panel    Collection Time: 03/07/23  1:22 AM    Specimen: Blood   Result Value Ref Range    Glucose 115 (H) 65 - 99 mg/dL    BUN 51 (H) 8 - 23 mg/dL    Creatinine 2.29 (H) 0.76 - 1.27 mg/dL    Sodium 136 136 - 145 mmol/L    Potassium 4.8 3.5 - 5.2 mmol/L    Chloride 101 98 - 107 mmol/L    CO2 22.0 22.0 - 29.0 mmol/L    Calcium 8.4 (L) 8.6 - 10.5 mg/dL    Total Protein 6.7 6.0 - 8.5 g/dL    Albumin 2.9 (L) 3.5 - 5.2 g/dL    ALT (SGPT) 7 1 - 41 U/L    AST (SGOT) 15 1 - 40 U/L    Alkaline Phosphatase 82 39 - 117 U/L    Total Bilirubin <0.2 0.0 - 1.2 mg/dL    Globulin 3.8 gm/dL    A/G Ratio 0.8 g/dL    BUN/Creatinine Ratio 22.3 7.0 - 25.0    Anion Gap 13.0 5.0 - 15.0 mmol/L    eGFR 30.9 (L) >60.0 mL/min/1.73   CBC Auto Differential    Collection Time: 03/07/23  1:22 AM    Specimen: Blood   Result Value Ref Range    WBC 9.20 3.40 - 10.80 10*3/mm3    RBC 2.62 (L) 4.14 - 5.80 10*6/mm3    Hemoglobin 7.6 (L) 13.0 - 17.7 g/dL    Hematocrit 23.1 (L) 37.5 - 51.0 %    MCV 88.0 79.0 - 97.0 fL    MCH 29.2 26.6 - 33.0 pg    MCHC 33.2 31.5 - 35.7 g/dL    RDW 16.1 (H) 12.3 - 15.4 %    RDW-SD 52.9 37.0 - 54.0 fl    MPV 7.9 6.0 - 12.0 fL    Platelets 208 140 - 450 10*3/mm3    Neutrophil % 60.5 42.7 - 76.0 %    Lymphocyte % 22.5 19.6 - 45.3 %    Monocyte % 10.6 5.0 - 12.0 %    Eosinophil % 5.0 0.3 - 6.2 %    Basophil % 1.4 0.0 - 1.5 %    Neutrophils, Absolute 5.60 1.70 - 7.00 10*3/mm3    Lymphocytes, Absolute 2.10 0.70 - 3.10 10*3/mm3    Monocytes, Absolute 1.00 (H) 0.10 - 0.90 10*3/mm3    Eosinophils, Absolute 0.50 (H) 0.00 - 0.40 10*3/mm3    Basophils, Absolute 0.10 0.00 - 0.20 10*3/mm3    nRBC 0.0 0.0 - 0.2 /100 WBC      Imaging Results (Last 24 Hours)     ** No results found for the last 24 hours. **           Assessment:    Acute blood loss anemia    Acute renal failure (ARF) (HCC)    Acquired scoliosis    ADHD    Depression    Mixed hyperlipidemia    Primary hypertension    Bilateral pneumonia    Marijuana  use    UTI (urinary tract infection)    Gross hematuria    BPH (benign prostatic hyperplasia)    History of alcohol use disorder    Neurogenic bladder with suprapubic tube  Severe bladder wall thickening causing bilateral hydronephrosis  Cystitis  Hematuria and anemia     Plan:      Status post bilateral nephrostomy tubes with improving creatinine  Continue CBI, wean slowly  Patient has been tentatively scheduled for March 28 for radical cystectomy with ileal conduit  We will try to get his urine to clear up and get him discharged in preparation for surgery he will need to maintain bilateral nephrostomy tubes through surgery      Xu Monteor MD  [facility]  [facility], [address]  [place] [address]  Office: [phone]  Cell: [phone]  Also available via Epic Secure Chat  03/07/23  07:26 EST

## 2023-03-07 NOTE — PLAN OF CARE
Goal Outcome Evaluation:  Plan of Care Reviewed With: patient         Good appetite today. Lg Bm. Denies pain. Jose Angel nephrostomy tubes in place and draining. Cont TURP to penis and ileostomy draining pink urine. Plan of care on going.

## 2023-03-07 NOTE — NURSING NOTE
67-year-old male who presented to the hospital on February 28 with acute blood loss.  Patient is currently seen by [facility] and has a planned radical cystectomy from March 28.  [facility] actually reached out to inpatient office for stoma marking.  Patient will be scheduled for stoma marking and education session on March 24 at 10 AM.  I did see the patient today to start some early education with him and I left him.  Written materials and did perform Hasek ostomy care.  I also did perform an initial ostomy marking.  I did taylor the patient and his right lower quadrant.  I marked him to avoid his waistline as well.  As skin folds.  Patient does have a suprapubic catheter and a rather soft abdomen.  We will likely need to remark him on the 24th as rather doubtful that the marking will hold that long of a time.  Patient is agreeable to returning to the outpatient clinic for an additional marking and education.  All questions were answered and I will follow-up with the patient tomorrow for further education.

## 2023-03-07 NOTE — PLAN OF CARE
Goal Outcome Evaluation:  Plan of Care Reviewed With: patient         Patient has had no complaints of pain on this shift. The patient is still getting CBI. Patient has a full sponge bath and bed liens were changed.  Patient is able to make needs known and the call light is with in reach. Will continue with patients care.

## 2023-03-07 NOTE — PROGRESS NOTES
[facility] Medicine Services Daily Progress Note     Patient Name: Anthony Moss  : 1955  MRN: [id number]  Primary Care Physician:  Reji Topete MD  Date of admission: 2023        Subjective       Chief Complaint: Hematuria and weakness.        Patient Reports:           2023.  Patient was seen and examined.  Patient reported slight improvement in his symptoms.        2023.  Patient was seen and examined.  Patient reported moderate improvement in his symptoms.        2023.  Patient was seen and examined.  Patient complained of constant constipation.  Patient reported last bowel movement to be 2023.  Lactulose was ordered.        2023.  Patient was seen and examined.  No overnight events noted.  Neurology and nephrology are following.       3/6/2023; first time seeing the patient, extensive chart notes reviewed,, Roberts catheter came out, both suprapubic and Roberts catheter were replaced by urology for CBI, otherwise no new symptoms hemodynamically stable  3/7/2023; patient still continues with hematuria/CBI, getting lighter, otherwise, hemodynamically stable, eating lunch in the bed,     Review of Systems   Constitutional: Negative for malaise/fatigue.   HENT: Negative.    Eyes: Negative.    Cardiovascular: Negative.    Respiratory: Negative.    Endocrine: Negative.    Hematologic/Lymphatic: Negative.    Skin: Negative.    Musculoskeletal: Negative.    Gastrointestinal: Negative for constipation.   Genitourinary: Negative.    Neurological: Positive for weakness.   Psychiatric/Behavioral: Negative.    Allergic/Immunologic: Negative.                Objective       Vitals:   Temp:  [97.8 °F (36.6 °C)-98.8 °F (37.1 °C)] 98.8 °F (37.1 °C)  Heart Rate:  [88-95] 95  Resp:  [19-20] 19  BP: (131-140)/(80-91) 135/91     Physical Exam  Vitals reviewed.   Constitutional:       General: He is not in acute distress.  HENT:      Head: Normocephalic.       Nose: Nose normal.      Mouth/Throat:      Mouth: Mucous membranes are dry.      Pharynx: Oropharynx is clear.   Eyes:      Extraocular Movements: Extraocular movements intact.      Conjunctiva/sclera: Conjunctivae normal.      Pupils: Pupils are equal, round, and reactive to light.   Cardiovascular:      Pulses: Normal pulses.      Heart sounds: No murmur heard.    No friction rub. No gallop.      Comments: S1 and S2 present.  No tachycardia.  Pulmonary:      Effort: Pulmonary effort is normal.      Breath sounds: No stridor. No wheezing or rales.   Chest:      Chest wall: No tenderness.   Abdominal:      General: Bowel sounds are normal. There is no distension.      Palpations: Abdomen is soft.      Tenderness: There is no abdominal tenderness. There is no right CVA tenderness or guarding.  Bilateral nephrostomy tubes in place, suprapubic catheter and Roberts catheter in place, Roberts bag with bright red urine  Musculoskeletal:         General: No swelling, tenderness, deformity or signs of injury.      Cervical back: Normal range of motion. No rigidity.      Right lower leg: No edema.      Left lower leg: No edema.   Skin:     General: Skin is warm and dry.      Capillary Refill: Capillary refill takes less than 2 seconds.      Coloration: Skin is not jaundiced.      Findings: No bruising, erythema, lesion or rash.   Neurological:      Comments: No facial asymmetry noted.  Gait and station not tested.   Psychiatric:      Comments: No agitation.                             Result Review    Result Review:  I have personally reviewed the results from the time of this admission to 3/5/2023 16:48 EST and agree with these findings:  []?  Laboratory  []?  Microbiology  []?  Radiology  []?  EKG/Telemetry   []?  Cardiology/Vascular   []?  Pathology  []?  Old records  []?  Other:  Most notable findings include:        Lab Results   Component Value Date    GLUCOSE 115 (H) 03/07/2023    CALCIUM 8.4 (L) 03/07/2023      03/07/2023    K 4.8 03/07/2023    CO2 22.0 03/07/2023     03/07/2023    BUN 51 (H) 03/07/2023    CREATININE 2.29 (H) 03/07/2023    EGFR 30.9 (L) 03/07/2023    BCR 22.3 03/07/2023    ANIONGAP 13.0 03/07/2023   CT Abdomen Pelvis Without Contrast    Result Date: 3/2/2023  Impression: 1. Persistent severe bilateral hydroureteronephrosis. 2. Irregular wall thickening of bladder may relate to intraluminal bladder mass versus changes related to cystitis or chronic neurogenic bladder, recommend cystoscopy. Suprapubic catheter in stable position. Prostatomegaly. 3. Nonspecific scattered ground glass opacities at lung bases suggesting infectious or inflammatory process, unchanged with small bilateral pleural effusions. 4. Additional chronic findings above. Electronically Signed: Kuldip Daniels  3/2/2023 10:30 AM EST  Workstation ID: [id number]    CT Abdomen Pelvis Without Contrast    Result Date: 2/28/2023  1.Extensive multifocal groundglass patchy infiltrate/pneumonia throughout visualized lower lungs. Severe bilateral hydroureteronephrosis may represent a passed stone, reflux, or obstruction at bilateral trigone. 2.Severe circumferential urinary bladder wall thickening may represent cystitis or other etiologies. Please correlate with urinalysis. Suprapubic catheter is in place. Further workup and follow-up recommended to exclude urothelial malignancy.. 3.Small hiatal hernia. 4.Diverticulosis without diverticulitis. Electronically Signed: Pravin Tolbert  2/28/2023 10:30 PM EST  Workstation ID: [id number]    XR Chest 1 View    Result Date: 3/1/2023  Impression: Vague multifocal patchy areas of airspace disease concerning for pneumonia with groundglass airspace disease better demonstrated on the prior abdominal CT. Electronically Signed: [employer]  3/1/2023 7:03 AM EST  Workstation ID: [id number]    IR Nephrostomy Tube Placement    Result Date: 3/3/2023  Impression: 1. Technically successful right-sided percutaneous nephrostomy  using in the left intercostal interspace approach with both sonographic and fluoroscopic guidance, with placement of a 10 Tanzanian nephrostomy catheter. 2. Technically successful left-sided percutaneous nephrostomy using a subcostal approach, also both sonographic and fluoroscopic guidance, also with a 10 Tanzanian external drainage catheter placed. 3. Severe bilateral malignant obstructive uropathy. 4. Additional findings as described above. Electronically Signed: Anahi Jacob  3/3/2023 6:17 PM EST  Workstation ID: [id number]    IR Nephrostomy Tube Placement    Result Date: 3/3/2023  Impression: 1. Technically successful right-sided percutaneous nephrostomy using in the left intercostal interspace approach with both sonographic and fluoroscopic guidance, with placement of a 10 Tanzanian nephrostomy catheter. 2. Technically successful left-sided percutaneous nephrostomy using a subcostal approach, also both sonographic and fluoroscopic guidance, also with a 10 Tanzanian external drainage catheter placed. 3. Severe bilateral malignant obstructive uropathy. 4. Additional findings as described above. Electronically Signed: Anahi Jacob  3/3/2023 6:17 PM EST  Workstation ID: [id number]       Assessment & Plan    Previous notes and with minor updates.        Brief Patient Summary:     Patient is a 67-year-old male with history of osteoarthritis, BPH, thrombocytopenia, alcohol use disorder, chronic respiratory failure on home oxygen who presented to the emergency room because of hematuria and weakness.  Patient was admitted to ICU because of his severe anemia.  Patient was diagnosed with acute blood anemia with hemoglobin of 4.  Patient was also diagnosed with gross hematuria and urology consult was completed.  Patient was also diagnosed with acute kidney injury and a nephrology consult was completed.           sodium bicarbonate, 650 mg, Oral, TID  sodium chloride, 500 mL, Intravenous, Once  sodium chloride, 10 mL, Intravenous,  Q12H               Active Hospital Problems:       Active Hospital Problems     Diagnosis     • **Acute blood loss anemia     • Gross hematuria     • UTI (urinary tract infection)     • Acute renal failure (ARF) (HCC)     • Bilateral pneumonia     • BPH (benign prostatic hyperplasia)     • History of alcohol use disorder     • Marijuana use     • ADHD     • Depression     • Mixed hyperlipidemia     • Primary hypertension     • Acquired scoliosis              Plan: S/p bilateral nephrostomy tubes by IR, with improving creatinine  Both suprapubic and urethral catheters balloons were deflated and removed 24 Emirati three-way Simplastic replaced suprapubic tube 20 Emirati urethral Roberts replaced continue irrigation through irrigation port in the suprapubic tube and out both the suprapubic tube under the referral to maximum drainage and irrigation as per urology.  Patient has been tentatively scheduled for March 20 teeth for radical cystectomy with ileal conduit.  Acute kidney injury; nephrology following creatinine improving down from 3.66-2.29  Electrolyte abnormalities being replaced  Acute blood loss anemia hemoglobin 7.8 transfuse to keep over 7 g/dL currently hemoglobin 8.7               DVT prophylaxis:  Mechanical DVT prophylaxis orders are present.     CODE STATUS:    Code Status (Patient has no pulse and is not breathing): CPR (Attempt to Resuscitate)  Medical Interventions (Patient has pulse or is breathing): Full Support        Disposition: Pending clinical improvement, continues with hematuria and continuous bladder irrigation likely discharge in 3 to 4 days.     This patient has been examined wearing appropriate Personal Protective Equipment and discussed with hospital infection control department, [facility] department, infectious disease specialist and pulmonologist.     Electronically signed by Shira Meadows MD, 03/07/23, 3:40 PM EST.

## 2023-03-08 LAB
ALBUMIN SERPL-MCNC: 3.2 G/DL (ref 3.5–5.2)
ALBUMIN/GLOB SERPL: 0.8 G/DL
ALP SERPL-CCNC: 84 U/L (ref 39–117)
ALT SERPL W P-5'-P-CCNC: 6 U/L (ref 1–41)
ANION GAP SERPL CALCULATED.3IONS-SCNC: 12 MMOL/L (ref 5–15)
AST SERPL-CCNC: 19 U/L (ref 1–40)
BASOPHILS # BLD AUTO: 0.1 10*3/MM3 (ref 0–0.2)
BASOPHILS NFR BLD AUTO: 1.2 % (ref 0–1.5)
BILIRUB SERPL-MCNC: <0.2 MG/DL (ref 0–1.2)
BUN SERPL-MCNC: 53 MG/DL (ref 8–23)
BUN/CREAT SERPL: 24 (ref 7–25)
CALCIUM SPEC-SCNC: 8.7 MG/DL (ref 8.6–10.5)
CHLORIDE SERPL-SCNC: 103 MMOL/L (ref 98–107)
CO2 SERPL-SCNC: 22 MMOL/L (ref 22–29)
CREAT SERPL-MCNC: 2.21 MG/DL (ref 0.76–1.27)
DEPRECATED RDW RBC AUTO: 54.3 FL (ref 37–54)
EGFRCR SERPLBLD CKD-EPI 2021: 32.3 ML/MIN/1.73
EOSINOPHIL # BLD AUTO: 0.5 10*3/MM3 (ref 0–0.4)
EOSINOPHIL NFR BLD AUTO: 5.2 % (ref 0.3–6.2)
ERYTHROCYTE [DISTWIDTH] IN BLOOD BY AUTOMATED COUNT: 16.3 % (ref 12.3–15.4)
GLOBULIN UR ELPH-MCNC: 3.8 GM/DL
GLUCOSE SERPL-MCNC: 143 MG/DL (ref 65–99)
HCT VFR BLD AUTO: 23.1 % (ref 37.5–51)
HCT VFR BLD AUTO: 23.8 % (ref 37.5–51)
HCT VFR BLD AUTO: 24.2 % (ref 37.5–51)
HCT VFR BLD AUTO: 24.9 % (ref 37.5–51)
HGB BLD-MCNC: 7.6 G/DL (ref 13–17.7)
HGB BLD-MCNC: 7.7 G/DL (ref 13–17.7)
HGB BLD-MCNC: 7.7 G/DL (ref 13–17.7)
HGB BLD-MCNC: 8.1 G/DL (ref 13–17.7)
LYMPHOCYTES # BLD AUTO: 2.1 10*3/MM3 (ref 0.7–3.1)
LYMPHOCYTES NFR BLD AUTO: 22.6 % (ref 19.6–45.3)
MAGNESIUM SERPL-MCNC: 1.8 MG/DL (ref 1.6–2.4)
MCH RBC QN AUTO: 28.5 PG (ref 26.6–33)
MCHC RBC AUTO-ENTMCNC: 32.3 G/DL (ref 31.5–35.7)
MCV RBC AUTO: 88.4 FL (ref 79–97)
MONOCYTES # BLD AUTO: 1 10*3/MM3 (ref 0.1–0.9)
MONOCYTES NFR BLD AUTO: 10.4 % (ref 5–12)
NEUTROPHILS NFR BLD AUTO: 5.6 10*3/MM3 (ref 1.7–7)
NEUTROPHILS NFR BLD AUTO: 60.6 % (ref 42.7–76)
NRBC BLD AUTO-RTO: 0 /100 WBC (ref 0–0.2)
PHOSPHATE SERPL-MCNC: 4.5 MG/DL (ref 2.5–4.5)
PLATELET # BLD AUTO: 211 10*3/MM3 (ref 140–450)
PMV BLD AUTO: 7.9 FL (ref 6–12)
POTASSIUM SERPL-SCNC: 5.1 MMOL/L (ref 3.5–5.2)
PROT SERPL-MCNC: 7 G/DL (ref 6–8.5)
RBC # BLD AUTO: 2.69 10*6/MM3 (ref 4.14–5.8)
SODIUM SERPL-SCNC: 137 MMOL/L (ref 136–145)
WBC NRBC COR # BLD: 9.3 10*3/MM3 (ref 3.4–10.8)

## 2023-03-08 PROCEDURE — 80053 COMPREHEN METABOLIC PANEL: CPT | Performed by: UROLOGY

## 2023-03-08 PROCEDURE — 85018 HEMOGLOBIN: CPT | Performed by: UROLOGY

## 2023-03-08 PROCEDURE — 36415 COLL VENOUS BLD VENIPUNCTURE: CPT | Performed by: UROLOGY

## 2023-03-08 PROCEDURE — 85014 HEMATOCRIT: CPT | Performed by: UROLOGY

## 2023-03-08 PROCEDURE — 83735 ASSAY OF MAGNESIUM: CPT | Performed by: UROLOGY

## 2023-03-08 PROCEDURE — 84100 ASSAY OF PHOSPHORUS: CPT | Performed by: UROLOGY

## 2023-03-08 PROCEDURE — 85025 COMPLETE CBC W/AUTO DIFF WBC: CPT | Performed by: UROLOGY

## 2023-03-08 RX ADMIN — Medication 10 ML: at 08:55

## 2023-03-08 RX ADMIN — SODIUM BICARBONATE 650 MG: 650 TABLET ORAL at 15:58

## 2023-03-08 RX ADMIN — SODIUM BICARBONATE 650 MG: 650 TABLET ORAL at 20:37

## 2023-03-08 RX ADMIN — SODIUM BICARBONATE 650 MG: 650 TABLET ORAL at 08:56

## 2023-03-08 RX ADMIN — SODIUM ZIRCONIUM CYCLOSILICATE 10 G: 10 POWDER, FOR SUSPENSION ORAL at 08:55

## 2023-03-08 RX ADMIN — Medication 10 ML: at 20:37

## 2023-03-08 NOTE — PLAN OF CARE
Goal Outcome Evaluation:  Plan of Care Reviewed With: patient         Up to the chair with stand by assist and to the bathroom. Denies pain at present. Continued TURP light pink to penis and ileostomy. Jose Angel nephrostomy tubes draining yellow urine. VSS. Plan of care on going.

## 2023-03-08 NOTE — PROGRESS NOTES
"     [facility] Daily Progress Note     Patient Name: Anthony Moss  : 1955  MRN: [id number]  Primary Care Physician:  Reji Topete MD  Date of admission: 2023        Subjective       Chief Complaint: Hematuria and weakness.        Patient Reports:           2023.  Patient was seen and examined.  Patient reported slight improvement in his symptoms.        2023.  Patient was seen and examined.  Patient reported moderate improvement in his symptoms.        2023.  Patient was seen and examined.  Patient complained of constant constipation.  Patient reported last bowel movement to be 2023.  Lactulose was ordered.        2023.  Patient was seen and examined.  No overnight events noted.  Neurology and nephrology are following.       3/6/2023; first time seeing the patient, extensive chart notes reviewed,, Roberts catheter came out, both suprapubic and Roberts catheter were replaced by urology for CBI, otherwise no new symptoms hemodynamically stable  3/7/2023; patient still continues with hematuria/CBI, getting lighter, otherwise, hemodynamically stable, eating lunch in the bed,  3/8/2023; patient still continues with hematuria, continue CBI,   height is 188 cm (74.02\") and weight is 97.3 kg (214 lb 8.1 oz). His axillary temperature is 97.8 °F (36.6 °C). His blood pressure is 126/80 and his pulse is 99. His respiration is 13 and oxygen saturation is 98%.        Review of Systems   Constitutional: Negative for malaise/fatigue.   HENT: Negative.    Eyes: Negative.    Cardiovascular: Negative.    Respiratory: Negative.    Endocrine: Negative.    Hematologic/Lymphatic: Negative.    Skin: Negative.    Musculoskeletal: Negative.    Gastrointestinal: Negative for constipation.   Genitourinary: Negative.    Neurological: Positive for weakness.   Psychiatric/Behavioral: Negative.    Allergic/Immunologic: Negative.                Objective     "   Vitals:   Temp:  [97.8 °F (36.6 °C)-98.5 °F (36.9 °C)] 97.8 °F (36.6 °C)  Heart Rate:  [83-99] 99  Resp:  [13-20] 13  BP: (126-132)/(79-82) 126/80     Physical Exam  Vitals reviewed.   Constitutional:       General: He is not in acute distress.  HENT:      Head: Normocephalic.      Nose: Nose normal.      Mouth/Throat:      Mouth: Mucous membranes are dry.      Pharynx: Oropharynx is clear.   Eyes:      Extraocular Movements: Extraocular movements intact.      Conjunctiva/sclera: Conjunctivae normal.      Pupils: Pupils are equal, round, and reactive to light.   Cardiovascular:      Pulses: Normal pulses.      Heart sounds: No murmur heard.    No friction rub. No gallop.      Comments: S1 and S2 present.  No tachycardia.  Pulmonary:      Effort: Pulmonary effort is normal.      Breath sounds: No stridor. No wheezing or rales.   Chest:      Chest wall: No tenderness.   Abdominal:      General: Bowel sounds are normal. There is no distension.      Palpations: Abdomen is soft.      Tenderness: There is no abdominal tenderness. There is no right CVA tenderness or guarding.  Bilateral nephrostomy tubes in place, suprapubic catheter and Roberts catheter in place, Roberts bag with bright red urine  Musculoskeletal:         General: No swelling, tenderness, deformity or signs of injury.      Cervical back: Normal range of motion. No rigidity.      Right lower leg: No edema.      Left lower leg: No edema.   Skin:     General: Skin is warm and dry.      Capillary Refill: Capillary refill takes less than 2 seconds.      Coloration: Skin is not jaundiced.      Findings: No bruising, erythema, lesion or rash.   Neurological:      Comments: No facial asymmetry noted.  Gait and station not tested.   Psychiatric:      Comments: No agitation.                             Result Review    Result Review:  I have personally reviewed the results from the time of this admission to 3/5/2023 16:48 EST and agree with these findings:  []?   Laboratory  []?  Microbiology  []?  Radiology  []?  EKG/Telemetry   []?  Cardiology/Vascular   []?  Pathology  []?  Old records  []?  Other:  Most notable findings include:        Lab Results   Component Value Date    GLUCOSE 143 (H) 03/08/2023    CALCIUM 8.7 03/08/2023     03/08/2023    K 5.1 03/08/2023    CO2 22.0 03/08/2023     03/08/2023    BUN 53 (H) 03/08/2023    CREATININE 2.21 (H) 03/08/2023    EGFR 32.3 (L) 03/08/2023    BCR 24.0 03/08/2023    ANIONGAP 12.0 03/08/2023   CT Abdomen Pelvis Without Contrast    Result Date: 3/2/2023  Impression: 1. Persistent severe bilateral hydroureteronephrosis. 2. Irregular wall thickening of bladder may relate to intraluminal bladder mass versus changes related to cystitis or chronic neurogenic bladder, recommend cystoscopy. Suprapubic catheter in stable position. Prostatomegaly. 3. Nonspecific scattered ground glass opacities at lung bases suggesting infectious or inflammatory process, unchanged with small bilateral pleural effusions. 4. Additional chronic findings above. Electronically Signed: Kuldip Daniels  3/2/2023 10:30 AM EST  Workstation ID: [id number]    CT Abdomen Pelvis Without Contrast    Result Date: 2/28/2023  1.Extensive multifocal groundglass patchy infiltrate/pneumonia throughout visualized lower lungs. Severe bilateral hydroureteronephrosis may represent a passed stone, reflux, or obstruction at bilateral trigone. 2.Severe circumferential urinary bladder wall thickening may represent cystitis or other etiologies. Please correlate with urinalysis. Suprapubic catheter is in place. Further workup and follow-up recommended to exclude urothelial malignancy.. 3.Small hiatal hernia. 4.Diverticulosis without diverticulitis. Electronically Signed: Pravin Tolbert  2/28/2023 10:30 PM EST  Workstation ID: [id number]    XR Chest 1 View    Result Date: 3/1/2023  Impression: Vague multifocal patchy areas of airspace disease concerning for pneumonia with groundglass  airspace disease better demonstrated on the prior abdominal CT. Electronically Signed: Kuldip Daniels  3/1/2023 7:03 AM EST  Workstation ID: [id number]    IR Nephrostomy Tube Placement    Result Date: 3/3/2023  Impression: 1. Technically successful right-sided percutaneous nephrostomy using in the left intercostal interspace approach with both sonographic and fluoroscopic guidance, with placement of a 10 Maltese nephrostomy catheter. 2. Technically successful left-sided percutaneous nephrostomy using a subcostal approach, also both sonographic and fluoroscopic guidance, also with a 10 Maltese external drainage catheter placed. 3. Severe bilateral malignant obstructive uropathy. 4. Additional findings as described above. Electronically Signed: Anahi Jacob  3/3/2023 6:17 PM EST  Workstation ID: [id number]    IR Nephrostomy Tube Placement    Result Date: 3/3/2023  Impression: 1. Technically successful right-sided percutaneous nephrostomy using in the left intercostal interspace approach with both sonographic and fluoroscopic guidance, with placement of a 10 Maltese nephrostomy catheter. 2. Technically successful left-sided percutaneous nephrostomy using a subcostal approach, also both sonographic and fluoroscopic guidance, also with a 10 Maltese external drainage catheter placed. 3. Severe bilateral malignant obstructive uropathy. 4. Additional findings as described above. Electronically Signed: Anahi Jacob  3/3/2023 6:17 PM EST  Workstation ID: [id number]       Assessment & Plan    Previous notes and with minor updates.        Brief Patient Summary:     Patient is a 67-year-old male with history of osteoarthritis, BPH, thrombocytopenia, alcohol use disorder, chronic respiratory failure on home oxygen who presented to the emergency room because of hematuria and weakness.  Patient was admitted to ICU because of his severe anemia.  Patient was diagnosed with acute blood anemia with hemoglobin of 4.  Patient was also diagnosed  with gross hematuria and urology consult was completed.  Patient was also diagnosed with acute kidney injury and a nephrology consult was completed.           sodium bicarbonate, 650 mg, Oral, TID  sodium chloride, 500 mL, Intravenous, Once  sodium chloride, 10 mL, Intravenous, Q12H               Active Hospital Problems:       Active Hospital Problems     Diagnosis     • **Acute blood loss anemia     • Gross hematuria     • UTI (urinary tract infection)     • Acute renal failure (ARF) (HCC)     • Bilateral pneumonia     • BPH (benign prostatic hyperplasia)     • History of alcohol use disorder     • Marijuana use     • ADHD     • Depression     • Mixed hyperlipidemia     • Primary hypertension     • Acquired scoliosis              Plan: S/p bilateral nephrostomy tubes by IR, with improving creatinine  Both suprapubic and urethral catheters balloons were deflated and removed 24 Colombian three-way Simplastic replaced suprapubic tube 20 Colombian urethral Roberts replaced continue irrigation through irrigation port in the suprapubic tube and out both the suprapubic tube under the referral to maximum drainage and irrigation as per urology.  Patient has been tentatively scheduled for March 20 teeth for radical cystectomy with ileal conduit.    Acute kidney injury; nephrology following creatinine improving down from 3.66-2.29--> 2.21    Electrolyte abnormalities being replaced  Acute blood loss anemia hemoglobin 7.8 transfuse to keep over 7 g/dL currently hemoglobin 8.7--> 8.1               DVT prophylaxis:  Mechanical DVT prophylaxis orders are present.     CODE STATUS:    Code Status (Patient has no pulse and is not breathing): CPR (Attempt to Resuscitate)  Medical Interventions (Patient has pulse or is breathing): Full Support        Disposition: Pending clinical improvement, continues with hematuria and continuous bladder irrigation likely discharge in 3 to 4 days.     This patient has been examined wearing appropriate  Personal Protective Equipment and discussed with hospital infection control department, [facility], infectious disease specialist and pulmonologist.       Electronically signed by Shira Meadows MD, 03/08/23, 2:24 PM EST.

## 2023-03-08 NOTE — PLAN OF CARE
Goal Outcome Evaluation:              Outcome Evaluation: Pt's VSS. Pt resting comfortably in between care. CBI continues. Nephostomy tubes have good output. Able to make needs known. Call light within reach. Plan of care ongoing. Will continue to monitor.

## 2023-03-08 NOTE — NURSING NOTE
Patient presented to the hospital on February 28 with complaints of bloody urine.  Patient seen today for follow-up.  He was marked for a ileoconduit in his right lower quadrant yesterday for an upcoming radical cystectomy on March 28.  Seen today for follow-up.  Comprehensive education is performed with the patient.  Discussed different pouching system.  Also demonstrated how to connect to a bedside drainage bag.  He was able to return demonstration on this.  Instructed patient on ordering supplies.  Instructed patient on normal appearance of the ostomy as well as urine.  Instructed patient on fluid intake and hydration and all questions were answered.  Patient verbalizes understanding.  We will continue to follow as needed    Patient has an upcoming appointment in the ostomy clinic on March 24 for additional ostomy marking as the surgery is so far out will likely need to be remarked at that time

## 2023-03-08 NOTE — PROGRESS NOTES
PROGRESS NOTE      Patient Name: Anthony Moss  : 1955  MRN: [id number]  Primary Care Physician: Reji Topete MD  Date of admission: 2023    Patient Care Team:  Reji Topete MD as PCP - General (Family Medicine)        Subjective   Subjective:     Seen and examined  Resting in bed, no distress or new complaints  Kidney function improving    Review of systems:  All other review of system unremarkable      Allergies:  No Known Allergies    Objective   Exam:     Vital Signs  Temp:  [98.1 °F (36.7 °C)-98.8 °F (37.1 °C)] 98.5 °F (36.9 °C)  Heart Rate:  [83-95] 83  Resp:  [17-20] 17  BP: (127-135)/(79-91) 127/82  SpO2:  [94 %-98 %] 95 %  on   ;   Device (Oxygen Therapy): room air  Body mass index is 27.53 kg/m².    General: Middle-age  male in no acute distress.    Head:      Normocephalic and atraumatic.    Eyes:      PERRL/EOM intact, conjunctiva and sclera clear with out nystagmus.    Neck:      No masses, thyromegaly,  trachea central with normal respiratory effort   Lungs:    Clear bilaterally to auscultation.    Heart:      Regular rate and rhythm, no murmur no gallop  Abd:        Soft, nontender, not distended, bowel sounds positive, no shifting dullness, suprapubic catheter with good urine  Pulses:   Pulses palpable  Extr:        No cyanosis or clubbing--no significant edema.    Neuro:    No focal deficits.   alert oriented x3  Skin:       Intact without lesions or rashes.    Psych:    Alert and cooperative; normal mood and affect; .      Results Review:  I have personally reviewed most recent Data :  CBC    Results from last 7 days   Lab Units 23  0623 23  0000 23  2039 23  1359 23  0707 23  0122 23  1702 23  0653 23  2322 23  0758 23  0114 23  0832 23  0101 23  0703 23  0035 23  0359 23  0212   WBC 10*3/mm3  --  9.30  --   --   --  9.20  --   --  8.50  --  9.80  --   7.10  --  5.90  --  6.40   HEMOGLOBIN g/dL 7.6* 7.7* 7.9* 8.7* 7.9* 7.6* 8.3*   < > 7.4*   < > 7.3*   < > 7.3*   < > 7.0*   < > 7.2*   PLATELETS 10*3/mm3  --  211  --   --   --  208  --   --  222  --  209  --  187  --  173  --  183    < > = values in this interval not displayed.     CMP   Results from last 7 days   Lab Units 03/08/23  0000 03/07/23  0122 03/05/23  2322 03/05/23  0114 03/04/23  0101 03/03/23  0825 03/03/23  0035 03/02/23  0212   SODIUM mmol/L 137 136 135* 140 139 140 138 137   POTASSIUM mmol/L 5.1 4.8 4.9 4.8 5.0 4.9  4.8 5.5* 5.3*   CHLORIDE mmol/L 103 101 101 105 107 106 109* 107   CO2 mmol/L 22.0 22.0 23.0 23.0 21.0* 20.0* 21.0* 20.0*   BUN mg/dL 53* 51* 51* 51* 56* 55* 56* 58*   CREATININE mg/dL 2.21* 2.29* 2.49* 2.75* 3.37* 3.53* 3.66* 3.92*   GLUCOSE mg/dL 143* 115* 127* 114* 198* 99 123* 113*   ALBUMIN g/dL 3.2* 2.9* 3.0* 2.9* 2.9*  --  3.0* 2.9*   BILIRUBIN mg/dL <0.2 <0.2 <0.2 <0.2 <0.2  --  0.2 0.3   ALK PHOS U/L 84 82 71 74 71  --  73 79   AST (SGOT) U/L 19 15 13 14 13  --  8 9   ALT (SGPT) U/L 6 7 7 6 5  --  <5 5     ABG      No radiology results for the last day    Results for orders placed during the hospital encounter of 01/05/22    Adult Transthoracic Echo Complete w/ Color, Spectral and Contrast if Necessary Per Protocol    Interpretation Summary  · Left ventricular ejection fraction appears to be 56 - 60%.  · The right ventricular cavity is moderately dilated.  · No pericardial effusion noted    Scheduled Meds:sodium bicarbonate, 650 mg, Oral, TID  sodium chloride, 500 mL, Intravenous, Once  sodium chloride, 10 mL, Intravenous, Q12H      Continuous Infusions:   PRN Meds:•  acetaminophen **OR** acetaminophen  •  influenza vaccine  •  ondansetron **OR** ondansetron  •  oxyCODONE  •  [COMPLETED] Insert Peripheral IV **AND** sodium chloride  •  sodium chloride  •  sodium chloride    Assessment & Plan   Assessment and Plan:         Acute blood loss anemia    Acute renal failure (ARF)  (HCC)    Acquired scoliosis    ADHD    Depression    Mixed hyperlipidemia    Primary hypertension    Bilateral pneumonia    Marijuana use    UTI (urinary tract infection)    Gross hematuria    BPH (benign prostatic hyperplasia)    History of alcohol use disorder    ASSESSMENT:  • Acute kidney injury, stage III, oligoanuric, metabolic differential at this this time, including sepsis, with bilateral pneumonia, now with obstructive uropathy had been taking Aleve, for the back pain,  • Bilateral nephrostomy tube placed 3/5/2023  • Hyperkalemia, secondary to CARLOS ALBERTO  • Bolick acidosis secondary to CARLOS ALBERTO  • Weakness and tiredness, with a BUN of 59, very concerning for uremia, but patient is improving since he has  • Metabolic acidosis,   • History of alcohol use,  • Chronic back pain,using NSAID in the past  • B/l pneumonia.  Surprisingly, WBC stable,           Plan:      • CARLOS ALBERTO 2/2 obstructive uropathy   • Bilateral nephrostomy tube are draining well  • Kidney function improving slowly sCr 2.25mg/dL  • Acid/base and electrolytes are acceptable..   • Potassium is still borderline we will give a dose of Lokelma today  • Patient has another episide of hematuria after dislodging Catheter  • hemoGlobin stable at seven-point  • I think there will be a gradual improvement in renal function in next 24 to 48 hours after getting the bilateral ureteral stent placement  • Echocardiogram done last time showed ejection fraction 56 to 60%  • Repeat labs tomorrow morning  • We will follow closely            Electronically signed by Jamie Houston MD,   [facility] kidney consultant  [phone]  3/8/2023  08:36 EST

## 2023-03-08 NOTE — PROGRESS NOTES
"  [facility] DAILY PROGRESS NOTE    Patient Identification  Name: Anthony Moss  Age: 67 y.o.  Sex: male  :  1955  MRN: [id number]    Date: 3/8/2023             Subjective:  Interval History: Urine clear on CBI    Objective:    Scheduled Meds:sodium bicarbonate, 650 mg, Oral, TID  sodium chloride, 500 mL, Intravenous, Once  sodium chloride, 10 mL, Intravenous, Q12H      Continuous Infusions:   PRN Meds:•  acetaminophen **OR** acetaminophen  •  influenza vaccine  •  ondansetron **OR** ondansetron  •  oxyCODONE  •  [COMPLETED] Insert Peripheral IV **AND** sodium chloride  •  sodium chloride  •  sodium chloride    Vital signs in last 24 hours:  Temp:  [97.8 °F (36.6 °C)-98.5 °F (36.9 °C)] 97.8 °F (36.6 °C)  Heart Rate:  [83-99] 99  Resp:  [13-20] 13  BP: (126-132)/(79-82) 126/80    Intake/Output:    Intake/Output Summary (Last 24 hours) at 3/8/2023 1423  Last data filed at 3/8/2023 1210  Gross per 24 hour   Intake 12964 ml   Output 46018 ml   Net -8500 ml       Exam:  /80 (BP Location: Right arm, Patient Position: Lying)   Pulse 99   Temp 97.8 °F (36.6 °C) (Axillary)   Resp 13   Ht 188 cm (74.02\")   Wt 97.3 kg (214 lb 8.1 oz)   SpO2 98%   BMI 27.53 kg/m²     General Appearance:    Alert, cooperative, no distress, appears stated age               Abdomen:     Soft, ND   :   Suprapubic tube 24 three-way Simplastic, 20 Malagasy coudé per urethra urine clear on CBI                Data Review:  All labs (24hrs):   Recent Results (from the past 24 hour(s))   Hemoglobin & Hematocrit, Blood    Collection Time: 23  8:39 PM    Specimen: Blood   Result Value Ref Range    Hemoglobin 7.9 (L) 13.0 - 17.7 g/dL    Hematocrit 24.5 (L) 37.5 - 51.0 %   Magnesium    Collection Time: 23 12:00 AM    Specimen: Blood   Result Value Ref Range    Magnesium 1.8 1.6 - 2.4 mg/dL   Phosphorus    Collection Time: 23 12:00 AM    Specimen: Blood   Result Value Ref Range    Phosphorus 4.5 2.5 - 4.5 mg/dL "   Comprehensive Metabolic Panel    Collection Time: 03/08/23 12:00 AM    Specimen: Blood   Result Value Ref Range    Glucose 143 (H) 65 - 99 mg/dL    BUN 53 (H) 8 - 23 mg/dL    Creatinine 2.21 (H) 0.76 - 1.27 mg/dL    Sodium 137 136 - 145 mmol/L    Potassium 5.1 3.5 - 5.2 mmol/L    Chloride 103 98 - 107 mmol/L    CO2 22.0 22.0 - 29.0 mmol/L    Calcium 8.7 8.6 - 10.5 mg/dL    Total Protein 7.0 6.0 - 8.5 g/dL    Albumin 3.2 (L) 3.5 - 5.2 g/dL    ALT (SGPT) 6 1 - 41 U/L    AST (SGOT) 19 1 - 40 U/L    Alkaline Phosphatase 84 39 - 117 U/L    Total Bilirubin <0.2 0.0 - 1.2 mg/dL    Globulin 3.8 gm/dL    A/G Ratio 0.8 g/dL    BUN/Creatinine Ratio 24.0 7.0 - 25.0    Anion Gap 12.0 5.0 - 15.0 mmol/L    eGFR 32.3 (L) >60.0 mL/min/1.73   CBC Auto Differential    Collection Time: 03/08/23 12:00 AM    Specimen: Blood   Result Value Ref Range    WBC 9.30 3.40 - 10.80 10*3/mm3    RBC 2.69 (L) 4.14 - 5.80 10*6/mm3    Hemoglobin 7.7 (L) 13.0 - 17.7 g/dL    Hematocrit 23.8 (L) 37.5 - 51.0 %    MCV 88.4 79.0 - 97.0 fL    MCH 28.5 26.6 - 33.0 pg    MCHC 32.3 31.5 - 35.7 g/dL    RDW 16.3 (H) 12.3 - 15.4 %    RDW-SD 54.3 (H) 37.0 - 54.0 fl    MPV 7.9 6.0 - 12.0 fL    Platelets 211 140 - 450 10*3/mm3    Neutrophil % 60.6 42.7 - 76.0 %    Lymphocyte % 22.6 19.6 - 45.3 %    Monocyte % 10.4 5.0 - 12.0 %    Eosinophil % 5.2 0.3 - 6.2 %    Basophil % 1.2 0.0 - 1.5 %    Neutrophils, Absolute 5.60 1.70 - 7.00 10*3/mm3    Lymphocytes, Absolute 2.10 0.70 - 3.10 10*3/mm3    Monocytes, Absolute 1.00 (H) 0.10 - 0.90 10*3/mm3    Eosinophils, Absolute 0.50 (H) 0.00 - 0.40 10*3/mm3    Basophils, Absolute 0.10 0.00 - 0.20 10*3/mm3    nRBC 0.0 0.0 - 0.2 /100 WBC   Hemoglobin & Hematocrit, Blood    Collection Time: 03/08/23  6:23 AM    Specimen: Blood   Result Value Ref Range    Hemoglobin 7.6 (L) 13.0 - 17.7 g/dL    Hematocrit 23.1 (L) 37.5 - 51.0 %   Hemoglobin & Hematocrit, Blood    Collection Time: 03/08/23 12:33 PM    Specimen: Blood   Result Value  Ref Range    Hemoglobin 8.1 (L) 13.0 - 17.7 g/dL    Hematocrit 24.9 (L) 37.5 - 51.0 %      Imaging Results (Last 24 Hours)     ** No results found for the last 24 hours. **           Assessment:    Acute blood loss anemia    Acute renal failure (ARF) (HCC)    Acquired scoliosis    ADHD    Depression    Mixed hyperlipidemia    Primary hypertension    Bilateral pneumonia    Marijuana use    UTI (urinary tract infection)    Gross hematuria    BPH (benign prostatic hyperplasia)    History of alcohol use disorder    Neurogenic bladder with suprapubic tube  Severe bladder wall thickening causing bilateral hydronephrosis  Cystitis  Hematuria and anemia     Plan:      Status post bilateral nephrostomy tubes with improving creatinine  Wean CBI to off  Patient has been scheduled for March 28 for radical cystectomy with ileal conduit  Once his urine is cleared he may be discharged in preparation for surgery he will need to maintain bilateral nephrostomy tubes through surgery      Xu Montero MD  [facility]  [address], [address]  [place] [address]  Office: [phone]  Cell: [phone]  Also available via Epic Secure Chat  03/08/23  14:23 EST

## 2023-03-09 LAB
ALBUMIN SERPL-MCNC: 3.2 G/DL (ref 3.5–5.2)
ALBUMIN/GLOB SERPL: 0.9 G/DL
ALP SERPL-CCNC: 83 U/L (ref 39–117)
ALT SERPL W P-5'-P-CCNC: 8 U/L (ref 1–41)
ANION GAP SERPL CALCULATED.3IONS-SCNC: 12 MMOL/L (ref 5–15)
AST SERPL-CCNC: 17 U/L (ref 1–40)
BASOPHILS # BLD AUTO: 0.2 10*3/MM3 (ref 0–0.2)
BASOPHILS NFR BLD AUTO: 2.3 % (ref 0–1.5)
BILIRUB SERPL-MCNC: 0.2 MG/DL (ref 0–1.2)
BUN SERPL-MCNC: 55 MG/DL (ref 8–23)
BUN/CREAT SERPL: 23.9 (ref 7–25)
CALCIUM SPEC-SCNC: 8.5 MG/DL (ref 8.6–10.5)
CHLORIDE SERPL-SCNC: 103 MMOL/L (ref 98–107)
CO2 SERPL-SCNC: 22 MMOL/L (ref 22–29)
CREAT SERPL-MCNC: 2.3 MG/DL (ref 0.76–1.27)
DEPRECATED RDW RBC AUTO: 51.6 FL (ref 37–54)
EGFRCR SERPLBLD CKD-EPI 2021: 30.7 ML/MIN/1.73
EOSINOPHIL # BLD AUTO: 0.6 10*3/MM3 (ref 0–0.4)
EOSINOPHIL NFR BLD AUTO: 6.1 % (ref 0.3–6.2)
ERYTHROCYTE [DISTWIDTH] IN BLOOD BY AUTOMATED COUNT: 16.4 % (ref 12.3–15.4)
GLOBULIN UR ELPH-MCNC: 3.7 GM/DL
GLUCOSE SERPL-MCNC: 129 MG/DL (ref 65–99)
HCT VFR BLD AUTO: 23.4 % (ref 37.5–51)
HCT VFR BLD AUTO: 23.8 % (ref 37.5–51)
HCT VFR BLD AUTO: 23.9 % (ref 37.5–51)
HCT VFR BLD AUTO: 23.9 % (ref 37.5–51)
HGB BLD-MCNC: 7.6 G/DL (ref 13–17.7)
HGB BLD-MCNC: 7.6 G/DL (ref 13–17.7)
HGB BLD-MCNC: 7.7 G/DL (ref 13–17.7)
HGB BLD-MCNC: 7.8 G/DL (ref 13–17.7)
LYMPHOCYTES # BLD AUTO: 2.4 10*3/MM3 (ref 0.7–3.1)
LYMPHOCYTES NFR BLD AUTO: 25.7 % (ref 19.6–45.3)
MAGNESIUM SERPL-MCNC: 1.7 MG/DL (ref 1.6–2.4)
MCH RBC QN AUTO: 28.7 PG (ref 26.6–33)
MCHC RBC AUTO-ENTMCNC: 32.2 G/DL (ref 31.5–35.7)
MCV RBC AUTO: 89.2 FL (ref 79–97)
MONOCYTES # BLD AUTO: 1 10*3/MM3 (ref 0.1–0.9)
MONOCYTES NFR BLD AUTO: 11.3 % (ref 5–12)
NEUTROPHILS NFR BLD AUTO: 5 10*3/MM3 (ref 1.7–7)
NEUTROPHILS NFR BLD AUTO: 54.6 % (ref 42.7–76)
NRBC BLD AUTO-RTO: 0 /100 WBC (ref 0–0.2)
PHOSPHATE SERPL-MCNC: 4.1 MG/DL (ref 2.5–4.5)
PLATELET # BLD AUTO: 231 10*3/MM3 (ref 140–450)
PMV BLD AUTO: 7.7 FL (ref 6–12)
POTASSIUM SERPL-SCNC: 5.2 MMOL/L (ref 3.5–5.2)
PROT SERPL-MCNC: 6.9 G/DL (ref 6–8.5)
RBC # BLD AUTO: 2.68 10*6/MM3 (ref 4.14–5.8)
SODIUM SERPL-SCNC: 137 MMOL/L (ref 136–145)
WBC NRBC COR # BLD: 9.2 10*3/MM3 (ref 3.4–10.8)

## 2023-03-09 PROCEDURE — 85018 HEMOGLOBIN: CPT | Performed by: UROLOGY

## 2023-03-09 PROCEDURE — 83735 ASSAY OF MAGNESIUM: CPT | Performed by: UROLOGY

## 2023-03-09 PROCEDURE — 36415 COLL VENOUS BLD VENIPUNCTURE: CPT | Performed by: UROLOGY

## 2023-03-09 PROCEDURE — 85025 COMPLETE CBC W/AUTO DIFF WBC: CPT | Performed by: UROLOGY

## 2023-03-09 PROCEDURE — 84100 ASSAY OF PHOSPHORUS: CPT | Performed by: UROLOGY

## 2023-03-09 PROCEDURE — 85014 HEMATOCRIT: CPT | Performed by: UROLOGY

## 2023-03-09 PROCEDURE — 80053 COMPREHEN METABOLIC PANEL: CPT | Performed by: UROLOGY

## 2023-03-09 RX ADMIN — SODIUM BICARBONATE 650 MG: 650 TABLET ORAL at 21:20

## 2023-03-09 RX ADMIN — Medication 10 ML: at 08:54

## 2023-03-09 RX ADMIN — SODIUM BICARBONATE 650 MG: 650 TABLET ORAL at 08:54

## 2023-03-09 RX ADMIN — Medication 10 ML: at 21:20

## 2023-03-09 RX ADMIN — SODIUM BICARBONATE 650 MG: 650 TABLET ORAL at 15:34

## 2023-03-09 NOTE — CASE MANAGEMENT/SOCIAL WORK
Social Work Assessment  [facility]     Patient Name: Anthony Moss  MRN: [id number]  Today's Date: 3/9/2023    Admit Date: 2/28/2023     Discharge Needs Assessment     Row Name 03/09/23 1402       Discharge Needs Assessment    Concerns to be Addressed care coordination/care conferences    Concerns Comments SW was informed that pt has difficulty caring for his home and may benefit from services. SW met with pt at bedside and informed him of [facility] - he is agreeable with referral. SW completed referral today and requested a full eval for Waiver services. No further needs identified.              Met with patient in room wearing PPE: mask.      Maintained distance greater than six feet and spent less than 15 minutes in the room.      JOCY Hyde, W  Medical Social Worker  Ph [phone]  Fax [phone]  [email or web address]
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
## 2023-03-09 NOTE — PLAN OF CARE
Goal Outcome Evaluation:              Outcome Evaluation: Pt's VSS. CBI continues with light pink urine. Nephrostomy drains have good output. Pt had complaints of low back pain, managed by repositioning. Pt resting comfortably in between care. Able to make needs known. Call light within reach. Plan of care ongoing. Will continue to monitor.

## 2023-03-09 NOTE — PROGRESS NOTES
"     [facility] Medicine Services Daily Progress Note     Patient Name: Anthony Moss  : 1955  MRN: [id number]  Primary Care Physician:  Reji Topete MD  Date of admission: 2023        Subjective       Chief Complaint: Hematuria and weakness.        Patient Reports:           2023.  Patient was seen and examined.  Patient reported slight improvement in his symptoms.        2023.  Patient was seen and examined.  Patient reported moderate improvement in his symptoms.        2023.  Patient was seen and examined.  Patient complained of constant constipation.  Patient reported last bowel movement to be 2023.  Lactulose was ordered.        2023.  Patient was seen and examined.  No overnight events noted.  Neurology and nephrology are following.       3/6/2023; first time seeing the patient, extensive chart notes reviewed,, Roberts catheter came out, both suprapubic and Roberts catheter were replaced by urology for CBI, otherwise no new symptoms hemodynamically stable  3/7/2023; patient still continues with hematuria/CBI, getting lighter, otherwise, hemodynamically stable, eating lunch in the bed,  3/8/2023; patient still continues with hematuria, continue CBI,   height is 188 cm (74.02\") and weight is 97.3 kg (214 lb 8.1 oz). His axillary temperature is 97.8 °F (36.6 °C). His blood pressure is 126/80 and his pulse is 99. His respiration is 13 and oxygen saturation is 98%.     3/9/2023; afebrile vital signs are stable, overnight events noted patient had a lot of clots passing through Roberts catheter, currently urine appearing pinkish, CBI on hold as per urology, hemoglobin stable, serum creatinine stable around 2.39, continue to monitor.  Review of Systems   Constitutional: Negative for malaise/fatigue.   HENT: Negative.    Eyes: Negative.    Cardiovascular: Negative.    Respiratory: Negative.    Endocrine: Negative.    Hematologic/Lymphatic: Negative.  "   Skin: Negative.    Musculoskeletal: Negative.    Gastrointestinal: Negative for constipation.   Genitourinary: Negative.    Neurological: Positive for weakness.   Psychiatric/Behavioral: Negative.    Allergic/Immunologic: Negative.                Objective       Vitals:   Temp:  [97.9 °F (36.6 °C)-98.4 °F (36.9 °C)] 98.4 °F (36.9 °C)  Heart Rate:  [83-94] 94  Resp:  [16-22] 16  BP: (120-126)/(72-73) 120/73     Physical Exam  Vitals reviewed.   Constitutional:       General: He is not in acute distress.  HENT:      Head: Normocephalic.      Nose: Nose normal.      Mouth/Throat:      Mouth: Mucous membranes are dry.      Pharynx: Oropharynx is clear.   Eyes:      Extraocular Movements: Extraocular movements intact.      Conjunctiva/sclera: Conjunctivae normal.      Pupils: Pupils are equal, round, and reactive to light.   Cardiovascular:      Pulses: Normal pulses.      Heart sounds: No murmur heard.    No friction rub. No gallop.      Comments: S1 and S2 present.  No tachycardia.  Pulmonary:      Effort: Pulmonary effort is normal.      Breath sounds: No stridor. No wheezing or rales.   Chest:      Chest wall: No tenderness.   Abdominal:      General: Bowel sounds are normal. There is no distension.      Palpations: Abdomen is soft.      Tenderness: There is no abdominal tenderness. There is no right CVA tenderness or guarding.  Bilateral nephrostomy tubes in place, suprapubic catheter and Roberts catheter in place, Roberts bag with pinkish urine  Musculoskeletal:         General: No swelling, tenderness, deformity or signs of injury.      Cervical back: Normal range of motion. No rigidity.      Right lower leg: No edema.      Left lower leg: No edema.   Skin:     General: Skin is warm and dry.      Capillary Refill: Capillary refill takes less than 2 seconds.      Coloration: Skin is not jaundiced.      Findings: No bruising, erythema, lesion or rash.   Neurological:      Comments: No facial asymmetry noted.  Gait and  station not tested.   Psychiatric:      Comments: No agitation.                             Result Review    Result Review:  I have personally reviewed the results from the time of this admission to 3/5/2023 16:48 EST and agree with these findings:  []?  Laboratory  []?  Microbiology  []?  Radiology  []?  EKG/Telemetry   []?  Cardiology/Vascular   []?  Pathology  []?  Old records  []?  Other:  Most notable findings include:        Lab Results   Component Value Date    GLUCOSE 129 (H) 03/09/2023    CALCIUM 8.5 (L) 03/09/2023     03/09/2023    K 5.2 03/09/2023    CO2 22.0 03/09/2023     03/09/2023    BUN 55 (H) 03/09/2023    CREATININE 2.30 (H) 03/09/2023    EGFR 30.7 (L) 03/09/2023    BCR 23.9 03/09/2023    ANIONGAP 12.0 03/09/2023   CT Abdomen Pelvis Without Contrast    Result Date: 3/2/2023  Impression: 1. Persistent severe bilateral hydroureteronephrosis. 2. Irregular wall thickening of bladder may relate to intraluminal bladder mass versus changes related to cystitis or chronic neurogenic bladder, recommend cystoscopy. Suprapubic catheter in stable position. Prostatomegaly. 3. Nonspecific scattered ground glass opacities at lung bases suggesting infectious or inflammatory process, unchanged with small bilateral pleural effusions. 4. Additional chronic findings above. Electronically Signed: Kuldip Mouser  3/2/2023 10:30 AM EST  Workstation ID: [id number]    IR Nephrostomy Tube Placement    Result Date: 3/3/2023  Impression: 1. Technically successful right-sided percutaneous nephrostomy using in the left intercostal interspace approach with both sonographic and fluoroscopic guidance, with placement of a 10 Vincentian nephrostomy catheter. 2. Technically successful left-sided percutaneous nephrostomy using a subcostal approach, also both sonographic and fluoroscopic guidance, also with a 10 Vincentian external drainage catheter placed. 3. Severe bilateral malignant obstructive uropathy. 4. Additional findings as  described above. Electronically Signed: Anahi Nidia  3/3/2023 6:17 PM EST  Workstation ID: [id number]    IR Nephrostomy Tube Placement    Result Date: 3/3/2023  Impression: 1. Technically successful right-sided percutaneous nephrostomy using in the left intercostal interspace approach with both sonographic and fluoroscopic guidance, with placement of a 10 Austrian nephrostomy catheter. 2. Technically successful left-sided percutaneous nephrostomy using a subcostal approach, also both sonographic and fluoroscopic guidance, also with a 10 Austrian external drainage catheter placed. 3. Severe bilateral malignant obstructive uropathy. 4. Additional findings as described above. Electronically Signed: Anahi Jacob  3/3/2023 6:17 PM EST  Workstation ID: [id number]       Assessment & Plan    Previous notes and with minor updates.        Brief Patient Summary:     Patient is a 67-year-old male with history of osteoarthritis, BPH, thrombocytopenia, alcohol use disorder, chronic respiratory failure on home oxygen who presented to the emergency room because of hematuria and weakness.  Patient was admitted to ICU because of his severe anemia.  Patient was diagnosed with acute blood anemia with hemoglobin of 4.  Patient was also diagnosed with gross hematuria and urology consult was completed.  Patient was also diagnosed with acute kidney injury and a nephrology consult was completed.           sodium bicarbonate, 650 mg, Oral, TID  sodium chloride, 500 mL, Intravenous, Once  sodium chloride, 10 mL, Intravenous, Q12H               Active Hospital Problems:       Active Hospital Problems     Diagnosis     • **Acute blood loss anemia     • Gross hematuria     • UTI (urinary tract infection)     • Acute renal failure (ARF) (HCC)     • Bilateral pneumonia     • BPH (benign prostatic hyperplasia)     • History of alcohol use disorder     • Marijuana use     • ADHD     • Depression     • Mixed hyperlipidemia     • Primary hypertension     •  Acquired scoliosis              Plan: Bladder cancer, bilateral ureteral obstruction; s/p bilateral nephrostomy tubes by IR, with improving creatinine  Both suprapubic and urethral catheters balloons were deflated and removed 24 Mongolian three-way Simplastic replaced suprapubic tube 20 Mongolian urethral Roberts replaced continue irrigation through irrigation port in the suprapubic tube and out both the suprapubic tube under the referral to maximum drainage and irrigation as per urology.  Currently with pinkish urine in the Roberts bag, CBI is on hold as per urology.  Patient has been tentatively scheduled for March 20 teeth for radical cystectomy with ileal conduit.    Acute kidney injury; nephrology following creatinine improving down from 3.66-2.29--> 2.21--> 2.39    Electrolyte abnormalities being replaced.    Acute blood loss anemia hemoglobin 7.8 transfuse to keep over 7 g/dL currently hemoglobin 8.7--> 8.1--> 7.6               DVT prophylaxis:  Mechanical DVT prophylaxis orders are present.     CODE STATUS:    Code Status (Patient has no pulse and is not breathing): CPR (Attempt to Resuscitate)  Medical Interventions (Patient has pulse or is breathing): Full Support        Disposition: Pending clinical improvement, continues with hematuria and continuous bladder irrigation likely discharge in 3 to 4 days.     This patient has been examined wearing appropriate Personal Protective Equipment and discussed with hospital infection control department, [facility] department, infectious disease specialist and pulmonologist.         Electronically signed by Shira Meadows MD, 03/09/23, 12:37 PM EST.

## 2023-03-09 NOTE — PLAN OF CARE
Goal Outcome Evaluation:      Mild pain in lower back. CBI decreased. Very light pink urine from ileostomy and catheter. Has received 16 3000mL irrigation bags. VSS. Room air. Tolerating diet. Able to ambulate with standby assist to bathroom. Having bowel movements. Call light and personal items within reach. Care plan ongoing.     Progress: no change

## 2023-03-09 NOTE — PROGRESS NOTES
PROGRESS NOTE      Patient Name: Anthony Moss  : 1955  MRN: [id number]  Primary Care Physician: Reji Toepte MD  Date of admission: 2023    Patient Care Team:  Reji Topete MD as PCP - General (Family Medicine)        Subjective   Subjective:     Seen and examined  Resting in bed, no distress or new complaints  Kidney function improving    Review of systems:  All other review of system unremarkable      Allergies:  No Known Allergies    Objective   Exam:     Vital Signs  Temp:  [97.8 °F (36.6 °C)-98.4 °F (36.9 °C)] 98.4 °F (36.9 °C)  Heart Rate:  [83-99] 94  Resp:  [13-22] 16  BP: (120-126)/(72-80) 120/73  SpO2:  [97 %-98 %] 97 %  on   ;   Device (Oxygen Therapy): room air  Body mass index is 27.53 kg/m².    General: Middle-age  male in no acute distress.    Head:      Normocephalic and atraumatic.    Eyes:      PERRL/EOM intact, conjunctiva and sclera clear with out nystagmus.    Neck:      No masses, thyromegaly,  trachea central with normal respiratory effort   Lungs:    Clear bilaterally to auscultation.    Heart:      Regular rate and rhythm, no murmur no gallop  Abd:        Soft, nontender, not distended, bowel sounds positive, no shifting dullness, suprapubic catheter with good urine  Pulses:   Pulses palpable  Extr:        No cyanosis or clubbing--no significant edema.    Neuro:    No focal deficits.   alert oriented x3  Skin:       Intact without lesions or rashes.    Psych:    Alert and cooperative; normal mood and affect; .      Results Review:  I have personally reviewed most recent Data :  CBC    Results from last 7 days   Lab Units 23  0722 23  0039 23  1850 23  1233 23  0623 23  0000 23  2039 23  0707 23  0122 23  0653 23  2322 23  0758 23  0114 23  0832 23  0101 23  0703 23  0035   WBC 10*3/mm3  --  9.20  --   --   --  9.30  --   --  9.20  --  8.50  --   9.80  --  7.10  --  5.90   HEMOGLOBIN g/dL 7.6* 7.7* 7.7* 8.1* 7.6* 7.7* 7.9*   < > 7.6*   < > 7.4*   < > 7.3*   < > 7.3*   < > 7.0*   PLATELETS 10*3/mm3  --  231  --   --   --  211  --   --  208  --  222  --  209  --  187  --  173    < > = values in this interval not displayed.     CMP   Results from last 7 days   Lab Units 03/09/23  0039 03/08/23  0000 03/07/23  0122 03/05/23  2322 03/05/23  0114 03/04/23  0101 03/03/23  0825 03/03/23  0035   SODIUM mmol/L 137 137 136 135* 140 139 140 138   POTASSIUM mmol/L 5.2 5.1 4.8 4.9 4.8 5.0 4.9  4.8 5.5*   CHLORIDE mmol/L 103 103 101 101 105 107 106 109*   CO2 mmol/L 22.0 22.0 22.0 23.0 23.0 21.0* 20.0* 21.0*   BUN mg/dL 55* 53* 51* 51* 51* 56* 55* 56*   CREATININE mg/dL 2.30* 2.21* 2.29* 2.49* 2.75* 3.37* 3.53* 3.66*   GLUCOSE mg/dL 129* 143* 115* 127* 114* 198* 99 123*   ALBUMIN g/dL 3.2* 3.2* 2.9* 3.0* 2.9* 2.9*  --  3.0*   BILIRUBIN mg/dL 0.2 <0.2 <0.2 <0.2 <0.2 <0.2  --  0.2   ALK PHOS U/L 83 84 82 71 74 71  --  73   AST (SGOT) U/L 17 19 15 13 14 13  --  8   ALT (SGPT) U/L 8 6 7 7 6 5  --  <5     ABG      No radiology results for the last day    Results for orders placed during the hospital encounter of 01/05/22    Adult Transthoracic Echo Complete w/ Color, Spectral and Contrast if Necessary Per Protocol    Interpretation Summary  · Left ventricular ejection fraction appears to be 56 - 60%.  · The right ventricular cavity is moderately dilated.  · No pericardial effusion noted    Scheduled Meds:sodium bicarbonate, 650 mg, Oral, TID  sodium chloride, 500 mL, Intravenous, Once  sodium chloride, 10 mL, Intravenous, Q12H      Continuous Infusions:   PRN Meds:•  acetaminophen **OR** acetaminophen  •  influenza vaccine  •  ondansetron **OR** ondansetron  •  oxyCODONE  •  [COMPLETED] Insert Peripheral IV **AND** sodium chloride  •  sodium chloride  •  sodium chloride    Assessment & Plan   Assessment and Plan:         Acute blood loss anemia    Acute renal failure (ARF)  (HCC)    Acquired scoliosis    ADHD    Depression    Mixed hyperlipidemia    Primary hypertension    Bilateral pneumonia    Marijuana use    UTI (urinary tract infection)    Gross hematuria    BPH (benign prostatic hyperplasia)    History of alcohol use disorder    ASSESSMENT:  • Acute kidney injury, stage III, oligoanuric, metabolic differential at this this time, including sepsis, with bilateral pneumonia, now with obstructive uropathy had been taking Aleve, for the back pain,  • Bilateral nephrostomy tube placed 3/5/2023  • Hyperkalemia, secondary to CARLOS ALBERTO  • [place]ick acidosis secondary to CARLOS ALBERTO  • Weakness and tiredness, with a BUN of 59, very concerning for uremia, but patient is improving since he has  • Metabolic acidosis,   • History of alcohol use,  • Chronic back pain,using NSAID in the past  • B/l pneumonia.  Surprisingly, WBC stable,           Plan:      • CARLOS ALBERTO 2/2 obstructive uropathy   • Bilateral nephrostomy tube are draining well  • Kidney function improving slowly sCr 2.3 slightly higher than yesterday  • Acid/base and electrolytes are acceptable..   • Potassium is still borderline we will give a dose of Lokelma today continue Lokelma 10 g daily.  Potassium level is still borderline high  • No more gross hematuria still pinkish urine  • hemoGlobin stable at 7.6  • Follow-up with renal function I still think there will be a gradual improvement with good urinary flow from the bilateral nephrostomy  • Echocardiogram done last time showed ejection fraction 56 to 60%  • Repeat labs tomorrow morning  • We will follow closely            Electronically signed by Jamie Houston MD,   [facility] kidney consultant  [phone]  3/9/2023  12:04 EST

## 2023-03-10 LAB
ALBUMIN SERPL-MCNC: 3.2 G/DL (ref 3.5–5.2)
ALBUMIN SERPL-MCNC: 3.4 G/DL (ref 3.5–5.2)
ALBUMIN/GLOB SERPL: 0.9 G/DL
ALBUMIN/GLOB SERPL: 1 G/DL
ALP SERPL-CCNC: 107 U/L (ref 39–117)
ALP SERPL-CCNC: 92 U/L (ref 39–117)
ALT SERPL W P-5'-P-CCNC: 10 U/L (ref 1–41)
ALT SERPL W P-5'-P-CCNC: 9 U/L (ref 1–41)
ANION GAP SERPL CALCULATED.3IONS-SCNC: 11 MMOL/L (ref 5–15)
ANION GAP SERPL CALCULATED.3IONS-SCNC: 8 MMOL/L (ref 5–15)
AST SERPL-CCNC: 15 U/L (ref 1–40)
AST SERPL-CCNC: 16 U/L (ref 1–40)
BASOPHILS # BLD AUTO: 0.2 10*3/MM3 (ref 0–0.2)
BASOPHILS # BLD AUTO: 0.2 10*3/MM3 (ref 0–0.2)
BASOPHILS NFR BLD AUTO: 1.9 % (ref 0–1.5)
BASOPHILS NFR BLD AUTO: 2.5 % (ref 0–1.5)
BILIRUB SERPL-MCNC: <0.2 MG/DL (ref 0–1.2)
BILIRUB SERPL-MCNC: <0.2 MG/DL (ref 0–1.2)
BUN SERPL-MCNC: 52 MG/DL (ref 8–23)
BUN SERPL-MCNC: 53 MG/DL (ref 8–23)
BUN/CREAT SERPL: 22.4 (ref 7–25)
BUN/CREAT SERPL: 24.8 (ref 7–25)
CALCIUM SPEC-SCNC: 8.5 MG/DL (ref 8.6–10.5)
CALCIUM SPEC-SCNC: 8.5 MG/DL (ref 8.6–10.5)
CHLORIDE SERPL-SCNC: 104 MMOL/L (ref 98–107)
CHLORIDE SERPL-SCNC: 104 MMOL/L (ref 98–107)
CO2 SERPL-SCNC: 22 MMOL/L (ref 22–29)
CO2 SERPL-SCNC: 25 MMOL/L (ref 22–29)
CREAT SERPL-MCNC: 2.14 MG/DL (ref 0.76–1.27)
CREAT SERPL-MCNC: 2.32 MG/DL (ref 0.76–1.27)
DEPRECATED RDW RBC AUTO: 51.2 FL (ref 37–54)
DEPRECATED RDW RBC AUTO: 52.5 FL (ref 37–54)
EGFRCR SERPLBLD CKD-EPI 2021: 30.4 ML/MIN/1.73
EGFRCR SERPLBLD CKD-EPI 2021: 33.5 ML/MIN/1.73
EOSINOPHIL # BLD AUTO: 0.5 10*3/MM3 (ref 0–0.4)
EOSINOPHIL # BLD AUTO: 0.6 10*3/MM3 (ref 0–0.4)
EOSINOPHIL NFR BLD AUTO: 5.6 % (ref 0.3–6.2)
EOSINOPHIL NFR BLD AUTO: 6.1 % (ref 0.3–6.2)
ERYTHROCYTE [DISTWIDTH] IN BLOOD BY AUTOMATED COUNT: 16 % (ref 12.3–15.4)
ERYTHROCYTE [DISTWIDTH] IN BLOOD BY AUTOMATED COUNT: 16.2 % (ref 12.3–15.4)
GLOBULIN UR ELPH-MCNC: 3.5 GM/DL
GLOBULIN UR ELPH-MCNC: 3.5 GM/DL
GLUCOSE SERPL-MCNC: 121 MG/DL (ref 65–99)
GLUCOSE SERPL-MCNC: 123 MG/DL (ref 65–99)
HCT VFR BLD AUTO: 22.5 % (ref 37.5–51)
HCT VFR BLD AUTO: 22.5 % (ref 37.5–51)
HCT VFR BLD AUTO: 22.6 % (ref 37.5–51)
HCT VFR BLD AUTO: 22.6 % (ref 37.5–51)
HCT VFR BLD AUTO: 23 % (ref 37.5–51)
HCT VFR BLD AUTO: 23 % (ref 37.5–51)
HGB BLD-MCNC: 7.2 G/DL (ref 13–17.7)
HGB BLD-MCNC: 7.2 G/DL (ref 13–17.7)
HGB BLD-MCNC: 7.3 G/DL (ref 13–17.7)
HGB BLD-MCNC: 7.5 G/DL (ref 13–17.7)
LAB AP CASE REPORT: NORMAL
LAB AP DIAGNOSIS COMMENT: NORMAL
LYMPHOCYTES # BLD AUTO: 1.9 10*3/MM3 (ref 0.7–3.1)
LYMPHOCYTES # BLD AUTO: 2.3 10*3/MM3 (ref 0.7–3.1)
LYMPHOCYTES NFR BLD AUTO: 23.2 % (ref 19.6–45.3)
LYMPHOCYTES NFR BLD AUTO: 25.2 % (ref 19.6–45.3)
MAGNESIUM SERPL-MCNC: 1.7 MG/DL (ref 1.6–2.4)
MAGNESIUM SERPL-MCNC: 1.7 MG/DL (ref 1.6–2.4)
MCH RBC QN AUTO: 28.6 PG (ref 26.6–33)
MCH RBC QN AUTO: 28.7 PG (ref 26.6–33)
MCHC RBC AUTO-ENTMCNC: 31.8 G/DL (ref 31.5–35.7)
MCHC RBC AUTO-ENTMCNC: 32.5 G/DL (ref 31.5–35.7)
MCV RBC AUTO: 88.3 FL (ref 79–97)
MCV RBC AUTO: 89.8 FL (ref 79–97)
MONOCYTES # BLD AUTO: 0.9 10*3/MM3 (ref 0.1–0.9)
MONOCYTES # BLD AUTO: 1 10*3/MM3 (ref 0.1–0.9)
MONOCYTES NFR BLD AUTO: 10.9 % (ref 5–12)
MONOCYTES NFR BLD AUTO: 11 % (ref 5–12)
NEUTROPHILS NFR BLD AUTO: 4.8 10*3/MM3 (ref 1.7–7)
NEUTROPHILS NFR BLD AUTO: 5 10*3/MM3 (ref 1.7–7)
NEUTROPHILS NFR BLD AUTO: 55.2 % (ref 42.7–76)
NEUTROPHILS NFR BLD AUTO: 58.4 % (ref 42.7–76)
NRBC BLD AUTO-RTO: 0 /100 WBC (ref 0–0.2)
NRBC BLD AUTO-RTO: 0 /100 WBC (ref 0–0.2)
PATH REPORT.FINAL DX SPEC: NORMAL
PATH REPORT.GROSS SPEC: NORMAL
PHOSPHATE SERPL-MCNC: 4.2 MG/DL (ref 2.5–4.5)
PHOSPHATE SERPL-MCNC: 4.7 MG/DL (ref 2.5–4.5)
PLATELET # BLD AUTO: 217 10*3/MM3 (ref 140–450)
PLATELET # BLD AUTO: 219 10*3/MM3 (ref 140–450)
PMV BLD AUTO: 7.8 FL (ref 6–12)
PMV BLD AUTO: 8.2 FL (ref 6–12)
POTASSIUM SERPL-SCNC: 4.8 MMOL/L (ref 3.5–5.2)
POTASSIUM SERPL-SCNC: 5.1 MMOL/L (ref 3.5–5.2)
PROT SERPL-MCNC: 6.7 G/DL (ref 6–8.5)
PROT SERPL-MCNC: 6.9 G/DL (ref 6–8.5)
RBC # BLD AUTO: 2.52 10*6/MM3 (ref 4.14–5.8)
RBC # BLD AUTO: 2.54 10*6/MM3 (ref 4.14–5.8)
SODIUM SERPL-SCNC: 137 MMOL/L (ref 136–145)
SODIUM SERPL-SCNC: 137 MMOL/L (ref 136–145)
WBC NRBC COR # BLD: 8.2 10*3/MM3 (ref 3.4–10.8)
WBC NRBC COR # BLD: 9.1 10*3/MM3 (ref 3.4–10.8)

## 2023-03-10 PROCEDURE — 80053 COMPREHEN METABOLIC PANEL: CPT | Performed by: UROLOGY

## 2023-03-10 PROCEDURE — 84100 ASSAY OF PHOSPHORUS: CPT | Performed by: UROLOGY

## 2023-03-10 PROCEDURE — 85014 HEMATOCRIT: CPT | Performed by: UROLOGY

## 2023-03-10 PROCEDURE — 85025 COMPLETE CBC W/AUTO DIFF WBC: CPT | Performed by: UROLOGY

## 2023-03-10 PROCEDURE — 36415 COLL VENOUS BLD VENIPUNCTURE: CPT | Performed by: UROLOGY

## 2023-03-10 PROCEDURE — 85018 HEMOGLOBIN: CPT | Performed by: UROLOGY

## 2023-03-10 PROCEDURE — 83735 ASSAY OF MAGNESIUM: CPT | Performed by: UROLOGY

## 2023-03-10 RX ADMIN — Medication 10 ML: at 08:47

## 2023-03-10 RX ADMIN — Medication 10 ML: at 20:47

## 2023-03-10 RX ADMIN — SODIUM BICARBONATE 650 MG: 650 TABLET ORAL at 16:26

## 2023-03-10 RX ADMIN — SODIUM BICARBONATE 650 MG: 650 TABLET ORAL at 08:47

## 2023-03-10 RX ADMIN — SODIUM BICARBONATE 650 MG: 650 TABLET ORAL at 20:47

## 2023-03-10 RX ADMIN — SODIUM ZIRCONIUM CYCLOSILICATE 10 G: 10 POWDER, FOR SUSPENSION ORAL at 16:26
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
## 2023-03-10 NOTE — PROGRESS NOTES
PROGRESS NOTE      Patient Name: Anthony Moss  : 1955  MRN: [id number]  Primary Care Physician: Reji Topete MD  Date of admission: 2023    Patient Care Team:  Reji Topete MD as PCP - General (Family Medicine)        Subjective   Subjective:     Seen and examined  Resting in bed, no distress or new complaints  Kidney function improving    Review of systems:  All other review of system unremarkable      Allergies:  No Known Allergies    Objective   Exam:     Vital Signs  Temp:  [97.7 °F (36.5 °C)-98.1 °F (36.7 °C)] 97.7 °F (36.5 °C)  Heart Rate:  [91-94] 94  Resp:  [13-17] 16  BP: (116-126)/(70-73) 124/73  SpO2:  [97 %-98 %] 98 %  on   ;   Device (Oxygen Therapy): room air  Body mass index is 25.46 kg/m².    General: Middle-age  male in no acute distress.    Head:      Normocephalic and atraumatic.    Eyes:      PERRL/EOM intact, conjunctiva and sclera clear with out nystagmus.    Neck:      No masses, thyromegaly,  trachea central with normal respiratory effort   Lungs:    Clear bilaterally to auscultation.    Heart:      Regular rate and rhythm, no murmur no gallop  Abd:        Soft, nontender, not distended, bowel sounds positive, no shifting dullness, suprapubic catheter with good urine  Pulses:   Pulses palpable  Extr:        No cyanosis or clubbing--no significant edema.    Neuro:    No focal deficits.   alert oriented x3  Skin:       Intact without lesions or rashes.    Psych:    Alert and cooperative; normal mood and affect; .      Results Review:  I have personally reviewed most recent Data :  CBC    Results from last 7 days   Lab Units 03/10/23  0611 03/10/23  0220 23  2109 23  1425 23  0722 23  0039 23  1850 23  0623 23  0000 23  0707 23  0122 23  0653 23  2322 23  0758 23  0114 23  0832 23  0101   WBC 10*3/mm3  --  9.10  --   --   --  9.20  --   --  9.30  --  9.20  --   8.50  --  9.80  --  7.10   HEMOGLOBIN g/dL 7.5* 7.3*  7.3* 7.6* 7.8* 7.6* 7.7* 7.7*   < > 7.7*   < > 7.6*   < > 7.4*   < > 7.3*   < > 7.3*   PLATELETS 10*3/mm3  --  219  --   --   --  231  --   --  211  --  208  --  222  --  209  --  187    < > = values in this interval not displayed.     CMP   Results from last 7 days   Lab Units 03/10/23  0220 03/09/23  0039 03/08/23  0000 03/07/23  0122 03/05/23  2322 03/05/23  0114 03/04/23  0101   SODIUM mmol/L 137 137 137 136 135* 140 139   POTASSIUM mmol/L 5.1 5.2 5.1 4.8 4.9 4.8 5.0   CHLORIDE mmol/L 104 103 103 101 101 105 107   CO2 mmol/L 22.0 22.0 22.0 22.0 23.0 23.0 21.0*   BUN mg/dL 52* 55* 53* 51* 51* 51* 56*   CREATININE mg/dL 2.32* 2.30* 2.21* 2.29* 2.49* 2.75* 3.37*   GLUCOSE mg/dL 123* 129* 143* 115* 127* 114* 198*   ALBUMIN g/dL 3.2* 3.2* 3.2* 2.9* 3.0* 2.9* 2.9*   BILIRUBIN mg/dL <0.2 0.2 <0.2 <0.2 <0.2 <0.2 <0.2   ALK PHOS U/L 92 83 84 82 71 74 71   AST (SGOT) U/L 15 17 19 15 13 14 13   ALT (SGPT) U/L 9 8 6 7 7 6 5     ABG      No radiology results for the last day    Results for orders placed during the hospital encounter of 01/05/22    Adult Transthoracic Echo Complete w/ Color, Spectral and Contrast if Necessary Per Protocol    Interpretation Summary  · Left ventricular ejection fraction appears to be 56 - 60%.  · The right ventricular cavity is moderately dilated.  · No pericardial effusion noted    Scheduled Meds:sodium bicarbonate, 650 mg, Oral, TID  sodium chloride, 500 mL, Intravenous, Once  sodium chloride, 10 mL, Intravenous, Q12H      Continuous Infusions:   PRN Meds:•  acetaminophen **OR** acetaminophen  •  influenza vaccine  •  ondansetron **OR** ondansetron  •  oxyCODONE  •  [COMPLETED] Insert Peripheral IV **AND** sodium chloride  •  sodium chloride  •  sodium chloride    Assessment & Plan   Assessment and Plan:         Acute blood loss anemia    Acute renal failure (ARF) (HCC)    Acquired scoliosis    ADHD    Depression    Mixed hyperlipidemia     Primary hypertension    Bilateral pneumonia    Marijuana use    UTI (urinary tract infection)    Gross hematuria    BPH (benign prostatic hyperplasia)    History of alcohol use disorder    ASSESSMENT:  • Acute kidney injury, stage III, oligoanuric, metabolic differential at this this time, including sepsis, with bilateral pneumonia, now with obstructive uropathy had been taking Aleve, for the back pain,  • Bilateral nephrostomy tube placed 3/5/2023  • Hyperkalemia, secondary to CARLOS ALBERTO  • [place]ick acidosis secondary to CARLOS ALBERTO  • Weakness and tiredness, with a BUN of 59, very concerning for uremia, but patient is improving since he has  • Metabolic acidosis,   • History of alcohol use,  • Chronic back pain,using NSAID in the past  • B/l pneumonia.  Surprisingly, WBC stable,           Plan:      • CARLOS ALBERTO 2/2 obstructive uropathy   • Bilateral nephrostomy tube are draining well  • Kidney function improving slowly sCr 2.3 slightly higher than yesterday  • Acid/base and electrolytes are acceptable..   • Potassium is still borderline we will give a dose of Lokelma today continue Lokelma 10 g daily.  Potassium level is still borderline high  • Patient is a good urine output from bilateral nephrostomy tubes we will continue to follow-up with a urine output closely  • No more gross hematuria still pinkish urine  • hemoGlobin stable at 7.6  • Follow-up with renal function I still think there will be a gradual improvement with good urinary flow from the bilateral nephrostomy  • Echocardiogram done last time showed ejection fraction 56 to 60%  • Repeat labs tomorrow morning  • We will follow closely            Electronically signed by Jamie Houston MD,   [facility] kidney consultant  [phone]  3/10/2023  14:37 EST

## 2023-03-10 NOTE — PROGRESS NOTES
"     [facility] Medicine Services Daily Progress Note     Patient Name: Anthony Moss  : 1955  MRN: [id number]  Primary Care Physician:  Reji Topete MD  Date of admission: 2023        Subjective       Chief Complaint: Hematuria and weakness.        Patient Reports:           2023.  Patient was seen and examined.  Patient reported slight improvement in his symptoms.        2023.  Patient was seen and examined.  Patient reported moderate improvement in his symptoms.        2023.  Patient was seen and examined.  Patient complained of constant constipation.  Patient reported last bowel movement to be 2023.  Lactulose was ordered.        2023.  Patient was seen and examined.  No overnight events noted.  Neurology and nephrology are following.       3/6/2023; first time seeing the patient, extensive chart notes reviewed,, Roberts catheter came out, both suprapubic and Roberts catheter were replaced by urology for CBI, otherwise no new symptoms hemodynamically stable  3/7/2023; patient still continues with hematuria/CBI, getting lighter, otherwise, hemodynamically stable, eating lunch in the bed,  3/8/2023; patient still continues with hematuria, continue CBI,   height is 188 cm (74.02\") and weight is 97.3 kg (214 lb 8.1 oz). His axillary temperature is 97.8 °F (36.6 °C). His blood pressure is 126/80 and his pulse is 99. His respiration is 13 and oxygen saturation is 98%.     3/9/2023; afebrile vital signs are stable, overnight events noted patient had a lot of clots passing through Roberts catheter, currently urine appearing pinkish, CBI on hold as per urology, hemoglobin stable, serum creatinine stable around 2.30, continue to monitor.    3/10/2023; afebrile vital signs are stable, continues to be on room air, CBI was resumed yesterday, continues to have pinkish urine, serum creatinine 2.32, no other symptoms continue to monitor.    Review of Systems "   Constitutional: Negative for malaise/fatigue.   HENT: Negative.    Eyes: Negative.    Cardiovascular: Negative.    Respiratory: Negative.    Endocrine: Negative.    Hematologic/Lymphatic: Negative.    Skin: Negative.    Musculoskeletal: Negative.    Gastrointestinal: Negative for constipation.   Genitourinary: Negative.    Neurological: Positive for weakness.   Psychiatric/Behavioral: Negative.    Allergic/Immunologic: Negative.                Objective       Vitals:   Temp:  [97.7 °F (36.5 °C)-98.1 °F (36.7 °C)] 97.7 °F (36.5 °C)  Heart Rate:  [91-94] 94  Resp:  [13-17] 16  BP: (116-126)/(70-73) 124/73     Physical Exam  Vitals reviewed.   Constitutional:       General: He is not in acute distress.  HENT:      Head: Normocephalic.      Nose: Nose normal.      Mouth/Throat:      Mouth: Mucous membranes are dry.      Pharynx: Oropharynx is clear.   Eyes:      Extraocular Movements: Extraocular movements intact.      Conjunctiva/sclera: Conjunctivae normal.      Pupils: Pupils are equal, round, and reactive to light.   Cardiovascular:      Pulses: Normal pulses.      Heart sounds: No murmur heard.    No friction rub. No gallop.      Comments: S1 and S2 present.  No tachycardia.  Pulmonary:      Effort: Pulmonary effort is normal.      Breath sounds: No stridor. No wheezing or rales.   Chest:      Chest wall: No tenderness.   Abdominal:      General: Bowel sounds are normal. There is no distension.      Palpations: Abdomen is soft.      Tenderness: There is no abdominal tenderness. There is no right CVA tenderness or guarding.  Bilateral nephrostomy tubes in place, suprapubic catheter and Roberts catheter in place, Roberts bag with pinkish urine  Musculoskeletal:         General: No swelling, tenderness, deformity or signs of injury.      Cervical back: Normal range of motion. No rigidity.      Right lower leg: No edema.      Left lower leg: No edema.   Skin:     General: Skin is warm and dry.      Capillary Refill:  Capillary refill takes less than 2 seconds.      Coloration: Skin is not jaundiced.      Findings: No bruising, erythema, lesion or rash.   Neurological:      Comments: No facial asymmetry noted.  Gait and station not tested.   Psychiatric:      Comments: No agitation.                             Result Review    Result Review:  I have personally reviewed the results from the time of this admission to 3/5/2023 16:48 EST and agree with these findings:  []?  Laboratory  []?  Microbiology  []?  Radiology  []?  EKG/Telemetry   []?  Cardiology/Vascular   []?  Pathology  []?  Old records  []?  Other:  Most notable findings include:        Lab Results   Component Value Date    GLUCOSE 123 (H) 03/10/2023    CALCIUM 8.5 (L) 03/10/2023     03/10/2023    K 5.1 03/10/2023    CO2 22.0 03/10/2023     03/10/2023    BUN 52 (H) 03/10/2023    CREATININE 2.32 (H) 03/10/2023    EGFR 30.4 (L) 03/10/2023    BCR 22.4 03/10/2023    ANIONGAP 11.0 03/10/2023   IR Nephrostomy Tube Placement    Result Date: 3/3/2023  Impression: 1. Technically successful right-sided percutaneous nephrostomy using in the left intercostal interspace approach with both sonographic and fluoroscopic guidance, with placement of a 10 Maltese nephrostomy catheter. 2. Technically successful left-sided percutaneous nephrostomy using a subcostal approach, also both sonographic and fluoroscopic guidance, also with a 10 Maltese external drainage catheter placed. 3. Severe bilateral malignant obstructive uropathy. 4. Additional findings as described above. Electronically Signed: Anahi Jacob  3/3/2023 6:17 PM EST  Workstation ID: [id number]    IR Nephrostomy Tube Placement    Result Date: 3/3/2023  Impression: 1. Technically successful right-sided percutaneous nephrostomy using in the left intercostal interspace approach with both sonographic and fluoroscopic guidance, with placement of a 10 Maltese nephrostomy catheter. 2. Technically successful left-sided  percutaneous nephrostomy using a subcostal approach, also both sonographic and fluoroscopic guidance, also with a 10 Nicaraguan external drainage catheter placed. 3. Severe bilateral malignant obstructive uropathy. 4. Additional findings as described above. Electronically Signed: Anahi Nidia  3/3/2023 6:17 PM EST  Workstation ID: [id number]       Assessment & Plan    Previous notes and with minor updates.        Brief Patient Summary:     Patient is a 67-year-old male with history of osteoarthritis, BPH, thrombocytopenia, alcohol use disorder, chronic respiratory failure on home oxygen who presented to the emergency room because of hematuria and weakness.  Patient was admitted to ICU because of his severe anemia.  Patient was diagnosed with acute blood anemia with hemoglobin of 4.  Patient was also diagnosed with gross hematuria and urology consult was completed.  Patient was also diagnosed with acute kidney injury and a nephrology consult was completed.           sodium bicarbonate, 650 mg, Oral, TID  sodium chloride, 500 mL, Intravenous, Once  sodium chloride, 10 mL, Intravenous, Q12H               Active Hospital Problems:       Active Hospital Problems     Diagnosis     • **Acute blood loss anemia     • Gross hematuria     • UTI (urinary tract infection)     • Acute renal failure (ARF) (HCC)     • Bilateral pneumonia     • BPH (benign prostatic hyperplasia)     • History of alcohol use disorder     • Marijuana use     • ADHD     • Depression     • Mixed hyperlipidemia     • Primary hypertension     • Acquired scoliosis              Plan: Bladder cancer, bilateral ureteral obstruction/gross hematuria; s/p bilateral nephrostomy tubes by IR, with improving creatinine  Both suprapubic and urethral catheters balloons were deflated and removed 24 Nicaraguan three-way Simplastic replaced suprapubic tube 20 Nicaraguan urethral Roberts replaced continue irrigation through irrigation port in the suprapubic tube and out both the  suprapubic tube under the referral to maximum drainage and irrigation as per urology.  Currently with pinkish urine in the Roberts bag, CBI is on hold as per urology.  Patient has been tentatively scheduled for March 20 teeth for radical cystectomy with ileal conduit.    Acute kidney injury; nephrology following creatinine improving down from 3.66-2.29--> 2.21--> 2.30--> 2.32    Electrolyte abnormalities being replaced.    Acute blood loss anemia/gross hematuria; hemoglobin 7.8 transfuse to keep over 7 g/dL currently hemoglobin 8.7--> 8.1--> 7.6--> 7.5               DVT prophylaxis:  Mechanical DVT prophylaxis orders are present.     CODE STATUS:    Code Status (Patient has no pulse and is not breathing): CPR (Attempt to Resuscitate)  Medical Interventions (Patient has pulse or is breathing): Full Support        Disposition: Pending clinical improvement, continues with hematuria and continuous bladder irrigation likely discharge in 3 to 4 days.     This patient has been examined wearing appropriate Personal Protective Equipment and discussed with hospital infection control department, [facility], infectious disease specialist and pulmonologist.           Electronically signed by Shira Meadows MD, 03/10/23, 1:53 PM EST.

## 2023-03-10 NOTE — PLAN OF CARE
Goal Outcome Evaluation:              Outcome Evaluation: Pt's VSS. CBI continues with light pink urine. Nephrostomy drains having good output, with the left draining more than the right. Pt resting comfortably in between care. Able to make needs known. Call light within reach. Plan of care ongoing. Will continue to monitor.

## 2023-03-10 NOTE — PLAN OF CARE
Goal Outcome Evaluation:  Plan of Care Reviewed With: patient        Progress: no change  Outcome Evaluation: stable and no complaints of pain. CBI continues with light pink output. Left nephrostomy tube having large output. Safety measures in place. Call light within reach. Patient able to make needs known. Will continue to monitor.

## 2023-03-11 LAB
HCT VFR BLD AUTO: 23.6 % (ref 37.5–51)
HCT VFR BLD AUTO: 23.7 % (ref 37.5–51)
HCT VFR BLD AUTO: 24 % (ref 37.5–51)
HGB BLD-MCNC: 7.5 G/DL (ref 13–17.7)
HGB BLD-MCNC: 7.6 G/DL (ref 13–17.7)
HGB BLD-MCNC: 7.7 G/DL (ref 13–17.7)

## 2023-03-11 PROCEDURE — 85014 HEMATOCRIT: CPT | Performed by: UROLOGY

## 2023-03-11 PROCEDURE — 85018 HEMOGLOBIN: CPT | Performed by: UROLOGY

## 2023-03-11 RX ORDER — OXYCODONE HYDROCHLORIDE 5 MG/1
5 TABLET ORAL EVERY 4 HOURS PRN
Status: DISPENSED | OUTPATIENT
Start: 2023-03-11 | End: 2023-03-18

## 2023-03-11 RX ADMIN — Medication 10 ML: at 20:25

## 2023-03-11 RX ADMIN — SODIUM BICARBONATE 650 MG: 650 TABLET ORAL at 20:10

## 2023-03-11 RX ADMIN — OXYCODONE HYDROCHLORIDE 5 MG: 5 TABLET ORAL at 22:05

## 2023-03-11 RX ADMIN — SODIUM BICARBONATE 650 MG: 650 TABLET ORAL at 10:10

## 2023-03-11 RX ADMIN — SODIUM ZIRCONIUM CYCLOSILICATE 10 G: 10 POWDER, FOR SUSPENSION ORAL at 10:10

## 2023-03-11 RX ADMIN — ACETAMINOPHEN 650 MG: 325 TABLET, FILM COATED ORAL at 20:10

## 2023-03-11 RX ADMIN — Medication 10 ML: at 10:10

## 2023-03-11 RX ADMIN — SODIUM BICARBONATE 650 MG: 650 TABLET ORAL at 17:07

## 2023-03-11 NOTE — PROGRESS NOTES
"     [facility] Medicine Services Daily Progress Note     Patient Name: Anthony Moss  : 1955  MRN: [id number]  Primary Care Physician:  Reji Topete MD  Date of admission: 2023        Subjective       Chief Complaint: Hematuria and weakness.        Patient Reports:           2023.  Patient was seen and examined.  Patient reported slight improvement in his symptoms.        2023.  Patient was seen and examined.  Patient reported moderate improvement in his symptoms.        2023.  Patient was seen and examined.  Patient complained of constant constipation.  Patient reported last bowel movement to be 2023.  Lactulose was ordered.        2023.  Patient was seen and examined.  No overnight events noted.  Neurology and nephrology are following.       3/6/2023; first time seeing the patient, extensive chart notes reviewed,, Roberts catheter came out, both suprapubic and Roberts catheter were replaced by urology for CBI, otherwise no new symptoms hemodynamically stable  3/7/2023; patient still continues with hematuria/CBI, getting lighter, otherwise, hemodynamically stable, eating lunch in the bed,  3/8/2023; patient still continues with hematuria, continue CBI,   height is 188 cm (74.02\") and weight is 97.3 kg (214 lb 8.1 oz). His axillary temperature is 97.8 °F (36.6 °C). His blood pressure is 126/80 and his pulse is 99. His respiration is 13 and oxygen saturation is 98%.     3/9/2023; afebrile vital signs are stable, overnight events noted patient had a lot of clots passing through Roberts catheter, currently urine appearing pinkish, CBI on hold as per urology, hemoglobin stable, serum creatinine stable around 2.30, continue to monitor.    3/10/2023; afebrile vital signs are stable, continues to be on room air, CBI was resumed yesterday, continues to have pinkish urine, serum creatinine 2.32, no other symptoms continue to " monitor.    3/11/2023:  Patient S&E.  No new complaints.  Tolerating CBI well.  Nurse reports no acute overnight events.  Urine remains pink-tinged.  Creatinine slightly improved, hemoglobin stable.    Review of Systems   Constitutional: Negative for malaise/fatigue.   HENT: Negative.    Eyes: Negative.    Cardiovascular: Negative.    Respiratory: Negative.    Endocrine: Negative.    Hematologic/Lymphatic: Negative.    Skin: Negative.    Musculoskeletal: Negative.    Gastrointestinal: Negative for constipation.   Genitourinary: Negative.    Neurological: Positive for weakness.   Psychiatric/Behavioral: Negative.    Allergic/Immunologic: Negative.                Objective       Vitals:   Temp:  [98.1 °F (36.7 °C)-98.5 °F (36.9 °C)] 98.2 °F (36.8 °C)  Heart Rate:  [81-93] 93  Resp:  [17-20] 18  BP: (117-131)/(68-75) 117/68     Physical Exam  Vitals reviewed.   Constitutional:       General: He is not in acute distress.  HENT:      Head: Normocephalic.      Nose: Nose normal.      Mouth/Throat:      Mouth: Mucous membranes are dry.      Pharynx: Oropharynx is clear.   Eyes:      Extraocular Movements: Extraocular movements intact.      Conjunctiva/sclera: Conjunctivae normal.      Pupils: Pupils are equal, round, and reactive to light.   Cardiovascular:      Pulses: Normal pulses.      Heart sounds: No murmur heard.    No friction rub. No gallop.      Comments: S1 and S2 present.  No tachycardia.  Pulmonary:      Effort: Pulmonary effort is normal.      Breath sounds: No stridor. No wheezing or rales.   Chest:      Chest wall: No tenderness.   Abdominal:      General: Bowel sounds are normal. There is no distension.      Palpations: Abdomen is soft.      Tenderness: There is no abdominal tenderness. There is no right CVA tenderness or guarding.  Bilateral nephrostomy tubes in place, suprapubic catheter and Roberts catheter in place, Roberts bag with pinkish urine  Musculoskeletal:         General: No swelling, tenderness,  deformity or signs of injury.      Cervical back: Normal range of motion. No rigidity.      Right lower leg: No edema.      Left lower leg: No edema.   Skin:     General: Skin is warm and dry.      Capillary Refill: Capillary refill takes less than 2 seconds.      Coloration: Skin is not jaundiced.      Findings: No bruising, erythema, lesion or rash.   Neurological:      Comments: No facial asymmetry noted.  Gait and station not tested.   Psychiatric:      Comments: No agitation.                             Result Review    Result Review:  I have personally reviewed the results from the time of this admission to 3/5/2023 16:48 EST and agree with these findings:  []?  Laboratory  []?  Microbiology  []?  Radiology  []?  EKG/Telemetry   []?  Cardiology/Vascular   []?  Pathology  []?  Old records  []?  Other:  Most notable findings include:        Lab Results   Component Value Date    GLUCOSE 121 (H) 03/10/2023    CALCIUM 8.5 (L) 03/10/2023     03/10/2023    K 4.8 03/10/2023    CO2 25.0 03/10/2023     03/10/2023    BUN 53 (H) 03/10/2023    CREATININE 2.14 (H) 03/10/2023    EGFR 33.5 (L) 03/10/2023    BCR 24.8 03/10/2023    ANIONGAP 8.0 03/10/2023   IR Nephrostomy Tube Placement    Result Date: 3/3/2023  Impression: 1. Technically successful right-sided percutaneous nephrostomy using in the left intercostal interspace approach with both sonographic and fluoroscopic guidance, with placement of a 10 Somali nephrostomy catheter. 2. Technically successful left-sided percutaneous nephrostomy using a subcostal approach, also both sonographic and fluoroscopic guidance, also with a 10 Somali external drainage catheter placed. 3. Severe bilateral malignant obstructive uropathy. 4. Additional findings as described above. Electronically Signed: Anahi Jacob  3/3/2023 6:17 PM EST  Workstation ID: [id number]    IR Nephrostomy Tube Placement    Result Date: 3/3/2023  Impression: 1. Technically successful right-sided  percutaneous nephrostomy using in the left intercostal interspace approach with both sonographic and fluoroscopic guidance, with placement of a 10 Liberian nephrostomy catheter. 2. Technically successful left-sided percutaneous nephrostomy using a subcostal approach, also both sonographic and fluoroscopic guidance, also with a 10 Liberian external drainage catheter placed. 3. Severe bilateral malignant obstructive uropathy. 4. Additional findings as described above. Electronically Signed: Anahi Jacob  3/3/2023 6:17 PM EST  Workstation ID: [id number]       Assessment & Plan    Previous notes and with minor updates.        Brief Patient Summary:     Patient is a 67-year-old male with history of osteoarthritis, BPH, thrombocytopenia, alcohol use disorder, chronic respiratory failure on home oxygen who presented to the emergency room because of hematuria and weakness.  Patient was admitted to ICU because of his severe anemia.  Patient was diagnosed with acute blood anemia with hemoglobin of 4.  Patient was also diagnosed with gross hematuria and urology consult was completed.  Patient was also diagnosed with acute kidney injury and a nephrology consult was completed.           sodium bicarbonate, 650 mg, Oral, TID  sodium chloride, 500 mL, Intravenous, Once  sodium chloride, 10 mL, Intravenous, Q12H               Active Hospital Problems:       Active Hospital Problems     Diagnosis     • **Acute blood loss anemia     • Gross hematuria     • UTI (urinary tract infection)     • Acute renal failure (ARF) (HCC)     • Bilateral pneumonia     • BPH (benign prostatic hyperplasia)     • History of alcohol use disorder     • Marijuana use     • ADHD     • Depression     • Mixed hyperlipidemia     • Primary hypertension     • Acquired scoliosis              Plan: Bladder cancer, bilateral ureteral obstruction/gross hematuria; s/p bilateral nephrostomy tubes by IR, with improving creatinine  Both suprapubic and urethral catheters  balloons were deflated and removed 24 Bulgarian three-way Simplastic replaced suprapubic tube 20 Bulgarian urethral Roberts replaced continue irrigation through irrigation port in the suprapubic tube and out both the suprapubic tube under the referral to maximum drainage and irrigation as per urology.  Currently with pinkish urine in the Roberts bag, CBI is on hold as per urology.  Patient has been tentatively scheduled for March 20 teeth for radical cystectomy with ileal conduit.    Acute kidney injury; nephrology following creatinine improving down from 3.66-2.29--> 2.21--> 2.30--> 2.32 -- >2.14    Electrolyte abnormalities being replaced.    Acute blood loss anemia/gross hematuria; hemoglobin 7.8 transfuse to keep over 7 g/dL currently hemoglobin 8.7--> 8.1--> 7.6--> 7.5 -- >7.5               DVT prophylaxis:  Mechanical DVT prophylaxis orders are present.     CODE STATUS:    Code Status (Patient has no pulse and is not breathing): CPR (Attempt to Resuscitate)  Medical Interventions (Patient has pulse or is breathing): Full Support        Disposition: Pending clinical improvement, continues with hematuria and continuous bladder irrigation likely discharge in 3 to 4 days.     This patient has been examined wearing appropriate Personal Protective Equipment and discussed with hospital infection control department, [facility] department, infectious disease specialist and pulmonologist.           Electronically signed by Saima Roberto DO 03/10/23, 1:53 PM EST.

## 2023-03-11 NOTE — PROGRESS NOTES
PROGRESS NOTE      Patient Name: Anthony Moss  : 1955  MRN: [id number]  Primary Care Physician: Reji Topete MD  Date of admission: 2023    Patient Care Team:  Reji Topete MD as PCP - General (Family Medicine)        Subjective   Subjective:     Seen and examined  Resting in bed, no distress or new complaints  Kidney function improving patient undergoing CBI,  Blood tinged urine.    Review of systems:  All other review of system unremarkable      Allergies:  No Known Allergies    Objective   Exam:     Vital Signs  Temp:  [98.1 °F (36.7 °C)-98.5 °F (36.9 °C)] 98.2 °F (36.8 °C)  Heart Rate:  [81-93] 93  Resp:  [17-20] 18  BP: (117-131)/(68-75) 117/68  SpO2:  [96 %-99 %] 98 %  on   ;   Device (Oxygen Therapy): room air  Body mass index is 25.55 kg/m².    General: Middle-age  male in no acute distress.    Head:      Normocephalic and atraumatic.    Eyes:      PERRL/EOM intact, conjunctiva and sclera clear with out nystagmus.    Neck:      No masses, thyromegaly,  trachea central with normal respiratory effort   Lungs:    Clear bilaterally to auscultation.    Heart:      Regular rate and rhythm, no murmur no gallop  Abd:        Soft, nontender, not distended, bowel sounds positive, no shifting dullness, suprapubic catheter with good urine  Pulses:   Pulses palpable  Extr:        No cyanosis or clubbing--no significant edema.    Neuro:    No focal deficits.   alert oriented x3  Skin:       Intact without lesions or rashes.    Psych:    Alert and cooperative; normal mood and affect; .      Results Review:  I have personally reviewed most recent Data :  CBC    Results from last 7 days   Lab Units 23  0818 03/10/23  2356 03/10/23  2225 03/10/23  1545 03/10/23  0611 03/10/23  0220 23  2109 23  0722 23  0039 23  0623 23  0000 23  0707 23  0122 23  0653 23  2322 23  0758 23  0114   WBC 10*3/mm3  --   --  8.20   --   --  9.10  --   --  9.20  --  9.30  --  9.20  --  8.50  --  9.80   HEMOGLOBIN g/dL 7.7* 7.6* 7.2*  7.2* 7.3* 7.5* 7.3*  7.3* 7.6*   < > 7.7*   < > 7.7*   < > 7.6*   < > 7.4*   < > 7.3*   PLATELETS 10*3/mm3  --   --  217  --   --  219  --   --  231  --  211  --  208  --  222  --  209    < > = values in this interval not displayed.     CMP   Results from last 7 days   Lab Units 03/10/23  2225 03/10/23  0220 03/09/23  0039 03/08/23  0000 03/07/23  0122 03/05/23  2322 03/05/23  0114   SODIUM mmol/L 137 137 137 137 136 135* 140   POTASSIUM mmol/L 4.8 5.1 5.2 5.1 4.8 4.9 4.8   CHLORIDE mmol/L 104 104 103 103 101 101 105   CO2 mmol/L 25.0 22.0 22.0 22.0 22.0 23.0 23.0   BUN mg/dL 53* 52* 55* 53* 51* 51* 51*   CREATININE mg/dL 2.14* 2.32* 2.30* 2.21* 2.29* 2.49* 2.75*   GLUCOSE mg/dL 121* 123* 129* 143* 115* 127* 114*   ALBUMIN g/dL 3.4* 3.2* 3.2* 3.2* 2.9* 3.0* 2.9*   BILIRUBIN mg/dL <0.2 <0.2 0.2 <0.2 <0.2 <0.2 <0.2   ALK PHOS U/L 107 92 83 84 82 71 74   AST (SGOT) U/L 16 15 17 19 15 13 14   ALT (SGPT) U/L 10 9 8 6 7 7 6     ABG      No radiology results for the last day    Results for orders placed during the hospital encounter of 01/05/22    Adult Transthoracic Echo Complete w/ Color, Spectral and Contrast if Necessary Per Protocol    Interpretation Summary  · Left ventricular ejection fraction appears to be 56 - 60%.  · The right ventricular cavity is moderately dilated.  · No pericardial effusion noted    Scheduled Meds:sodium bicarbonate, 650 mg, Oral, TID  sodium chloride, 500 mL, Intravenous, Once  sodium chloride, 10 mL, Intravenous, Q12H  sodium zirconium cyclosilicate, 10 g, Oral, Daily      Continuous Infusions:   PRN Meds:•  acetaminophen **OR** acetaminophen  •  influenza vaccine  •  ondansetron **OR** ondansetron  •  [COMPLETED] Insert Peripheral IV **AND** sodium chloride  •  sodium chloride  •  sodium chloride    Assessment & Plan   Assessment and Plan:         Acute blood loss anemia    Acute renal  failure (ARF) (HCC)    Acquired scoliosis    ADHD    Depression    Mixed hyperlipidemia    Primary hypertension    Bilateral pneumonia    Marijuana use    UTI (urinary tract infection)    Gross hematuria    BPH (benign prostatic hyperplasia)    History of alcohol use disorder    ASSESSMENT:  • Acute kidney injury, stage III, oligoanuric, metabolic differential at this this time, including sepsis, with bilateral pneumonia, now with obstructive uropathy had been taking Aleve, for the back pain,  • Bilateral nephrostomy tube placed 3/5/2023  • Hyperkalemia, secondary to CARLOS ALBERTO  • Bolick acidosis secondary to CARLOS ALBERTO  • Weakness and tiredness, with a BUN of 59, very concerning for uremia, but patient is improving since he has  • Metabolic acidosis,   • History of alcohol use,  • Chronic back pain,using NSAID in the past  • B/l pneumonia.  Surprisingly, WBC stable,           Plan:      • CARLOS ALBERTO 2/2 obstructive uropathy   • Bilateral nephrostomy tube are draining well  • Kidney function improving slowly sCr 2.14 stable  • Hyperkalemia resolved.  • Volume is under control.  • Patient is a good urine output from bilateral nephrostomy tubes we will continue to follow-up with a urine output closely  • No more gross hematuria still pinkish urine  • hemoGlobin stable at 7.7  • Follow-up with renal function I still think there will be a gradual improvement with good urinary flow from the bilateral nephrostomy  • Echocardiogram done last time showed ejection fraction 56 to 60%  • Repeat labs tomorrow morning  • We will follow closely            Electronically signed by Abelardo Zamora MD,   [facility] kidney consultant  [phone]  3/11/2023  12:51 EST

## 2023-03-11 NOTE — PLAN OF CARE
Goal Outcome Evaluation:    CBI at a slow rate, pink tinged output. Left nephrostomy is having more output than the right side. Patient is able to make needs known.

## 2023-03-11 NOTE — PROGRESS NOTES
Anthony Moss   [id number]    Urology progress note      Urine clear.   On minimal irrigation    Gross hematuria.   Hydronephrosis with bilateral NTs.   Bladder ca    Keep SP tube and urethral trujillo  Keep NTs  Scheduled for cystectomy 3/28  OK to wean CBI if remains clear.   Will see monday       Ankit Garcia MD   [facility]  [phone]

## 2023-03-11 NOTE — PLAN OF CARE
Goal Outcome Evaluation:  Plan of Care Reviewed With: patient        Progress: no change  Outcome Evaluation: Patient without complaints of pain.  CBI continues with light pink/clear output noted.  Roberts cath noted in place, patent to BSD bag with bloody output noted.  Bilateral nephrostomy tubes noted in place, left side noted with more output than right. Requires assist x1 with walker for transfer/ambulation. Anticipates discharge home with home health when able. Vital signs stable. Call light within reach. Care plan on going.

## 2023-03-12 LAB
ALBUMIN SERPL-MCNC: 3.4 G/DL (ref 3.5–5.2)
ALBUMIN/GLOB SERPL: 0.9 G/DL
ALP SERPL-CCNC: 104 U/L (ref 39–117)
ALT SERPL W P-5'-P-CCNC: 9 U/L (ref 1–41)
ANION GAP SERPL CALCULATED.3IONS-SCNC: 11 MMOL/L (ref 5–15)
AST SERPL-CCNC: 18 U/L (ref 1–40)
BASOPHILS # BLD MANUAL: 0.09 10*3/MM3 (ref 0–0.2)
BASOPHILS NFR BLD MANUAL: 1 % (ref 0–1.5)
BILIRUB SERPL-MCNC: <0.2 MG/DL (ref 0–1.2)
BUN SERPL-MCNC: 51 MG/DL (ref 8–23)
BUN/CREAT SERPL: 28.3 (ref 7–25)
CALCIUM SPEC-SCNC: 8.5 MG/DL (ref 8.6–10.5)
CHLORIDE SERPL-SCNC: 105 MMOL/L (ref 98–107)
CO2 SERPL-SCNC: 22 MMOL/L (ref 22–29)
CREAT SERPL-MCNC: 1.8 MG/DL (ref 0.76–1.27)
DEPRECATED RDW RBC AUTO: 51.6 FL (ref 37–54)
EGFRCR SERPLBLD CKD-EPI 2021: 41.3 ML/MIN/1.73
EOSINOPHIL # BLD MANUAL: 0.26 10*3/MM3 (ref 0–0.4)
EOSINOPHIL NFR BLD MANUAL: 3 % (ref 0.3–6.2)
ERYTHROCYTE [DISTWIDTH] IN BLOOD BY AUTOMATED COUNT: 16.4 % (ref 12.3–15.4)
GLOBULIN UR ELPH-MCNC: 3.6 GM/DL
GLUCOSE SERPL-MCNC: 132 MG/DL (ref 65–99)
HCT VFR BLD AUTO: 23 % (ref 37.5–51)
HCT VFR BLD AUTO: 23.7 % (ref 37.5–51)
HCT VFR BLD AUTO: 25.5 % (ref 37.5–51)
HGB BLD-MCNC: 7.4 G/DL (ref 13–17.7)
HGB BLD-MCNC: 7.5 G/DL (ref 13–17.7)
HGB BLD-MCNC: 8.2 G/DL (ref 13–17.7)
LYMPHOCYTES # BLD MANUAL: 3.08 10*3/MM3 (ref 0.7–3.1)
LYMPHOCYTES NFR BLD MANUAL: 6 % (ref 5–12)
MAGNESIUM SERPL-MCNC: 1.7 MG/DL (ref 1.6–2.4)
MCH RBC QN AUTO: 28.4 PG (ref 26.6–33)
MCHC RBC AUTO-ENTMCNC: 31.7 G/DL (ref 31.5–35.7)
MCV RBC AUTO: 89.4 FL (ref 79–97)
MONOCYTES # BLD: 0.53 10*3/MM3 (ref 0.1–0.9)
MYELOCYTES NFR BLD MANUAL: 2 % (ref 0–0)
NEUTROPHILS # BLD AUTO: 4.66 10*3/MM3 (ref 1.7–7)
NEUTROPHILS NFR BLD MANUAL: 53 % (ref 42.7–76)
PHOSPHATE SERPL-MCNC: 4.4 MG/DL (ref 2.5–4.5)
PLATELET # BLD AUTO: 235 10*3/MM3 (ref 140–450)
PMV BLD AUTO: 8.2 FL (ref 6–12)
POTASSIUM SERPL-SCNC: 4.6 MMOL/L (ref 3.5–5.2)
PROT SERPL-MCNC: 7 G/DL (ref 6–8.5)
RBC # BLD AUTO: 2.65 10*6/MM3 (ref 4.14–5.8)
RBC MORPH BLD: NORMAL
SCAN SLIDE: NORMAL
SMALL PLATELETS BLD QL SMEAR: ADEQUATE
SODIUM SERPL-SCNC: 138 MMOL/L (ref 136–145)
VARIANT LYMPHS NFR BLD MANUAL: 35 % (ref 19.6–45.3)
WBC MORPH BLD: NORMAL
WBC NRBC COR # BLD: 8.8 10*3/MM3 (ref 3.4–10.8)

## 2023-03-12 PROCEDURE — 85007 BL SMEAR W/DIFF WBC COUNT: CPT | Performed by: UROLOGY

## 2023-03-12 PROCEDURE — 85025 COMPLETE CBC W/AUTO DIFF WBC: CPT | Performed by: UROLOGY

## 2023-03-12 PROCEDURE — 36415 COLL VENOUS BLD VENIPUNCTURE: CPT | Performed by: UROLOGY

## 2023-03-12 PROCEDURE — 83735 ASSAY OF MAGNESIUM: CPT | Performed by: UROLOGY

## 2023-03-12 PROCEDURE — 80053 COMPREHEN METABOLIC PANEL: CPT | Performed by: UROLOGY

## 2023-03-12 PROCEDURE — 85018 HEMOGLOBIN: CPT | Performed by: INTERNAL MEDICINE

## 2023-03-12 PROCEDURE — 84100 ASSAY OF PHOSPHORUS: CPT | Performed by: UROLOGY

## 2023-03-12 PROCEDURE — 85014 HEMATOCRIT: CPT | Performed by: INTERNAL MEDICINE

## 2023-03-12 RX ADMIN — SODIUM BICARBONATE 650 MG: 650 TABLET ORAL at 20:08

## 2023-03-12 RX ADMIN — OXYCODONE HYDROCHLORIDE 5 MG: 5 TABLET ORAL at 10:09

## 2023-03-12 RX ADMIN — SODIUM ZIRCONIUM CYCLOSILICATE 10 G: 10 POWDER, FOR SUSPENSION ORAL at 08:36

## 2023-03-12 RX ADMIN — Medication 10 ML: at 08:37

## 2023-03-12 RX ADMIN — SODIUM BICARBONATE 650 MG: 650 TABLET ORAL at 08:36

## 2023-03-12 RX ADMIN — Medication 10 ML: at 20:08

## 2023-03-12 RX ADMIN — SODIUM BICARBONATE 650 MG: 650 TABLET ORAL at 16:42

## 2023-03-12 NOTE — PLAN OF CARE
Goal Outcome Evaluation:               Patient doing well this shift. No c/o pain, tolerating diet well, SBA with mobility. Catheters and Nephrostomy bags draining without issue, all output and consistency recorded.

## 2023-03-12 NOTE — PROGRESS NOTES
"     [facility] Medicine Services Daily Progress Note     Patient Name: Anthony Moss  : 1955  MRN: [id number]  Primary Care Physician:  Reji Topete MD  Date of admission: 2023        Subjective       Chief Complaint: Hematuria and weakness.        Patient Reports:           2023.  Patient was seen and examined.  Patient reported slight improvement in his symptoms.        2023.  Patient was seen and examined.  Patient reported moderate improvement in his symptoms.        2023.  Patient was seen and examined.  Patient complained of constant constipation.  Patient reported last bowel movement to be 2023.  Lactulose was ordered.        2023.  Patient was seen and examined.  No overnight events noted.  Neurology and nephrology are following.       3/6/2023; first time seeing the patient, extensive chart notes reviewed,, Roberts catheter came out, both suprapubic and Roberts catheter were replaced by urology for CBI, otherwise no new symptoms hemodynamically stable  3/7/2023; patient still continues with hematuria/CBI, getting lighter, otherwise, hemodynamically stable, eating lunch in the bed,  3/8/2023; patient still continues with hematuria, continue CBI,   height is 188 cm (74.02\") and weight is 97.3 kg (214 lb 8.1 oz). His axillary temperature is 97.8 °F (36.6 °C). His blood pressure is 126/80 and his pulse is 99. His respiration is 13 and oxygen saturation is 98%.     3/9/2023; afebrile vital signs are stable, overnight events noted patient had a lot of clots passing through Roberts catheter, currently urine appearing pinkish, CBI on hold as per urology, hemoglobin stable, serum creatinine stable around 2.30, continue to monitor.    3/10/2023; afebrile vital signs are stable, continues to be on room air, CBI was resumed yesterday, continues to have pinkish urine, serum creatinine 2.32, no other symptoms continue to " monitor.    3/11/2023:  Patient S&E.  No new complaints.  Tolerating CBI well.  Nurse reports no acute overnight events.  Urine remains pink-tinged.  Creatinine slightly improved, hemoglobin stable.    3/12/2023:  Patient S&E.  Tolerating CBI well.  Still having some pinkish output.  Urology and nephrology recommendations appreciated.  We will continue current management.    Review of Systems   Constitutional: Negative for malaise/fatigue.   HENT: Negative.    Eyes: Negative.    Cardiovascular: Negative.    Respiratory: Negative.    Endocrine: Negative.    Hematologic/Lymphatic: Negative.    Skin: Negative.    Musculoskeletal: Negative.    Gastrointestinal: Negative for constipation.   Genitourinary: Negative.    Neurological: Positive for weakness.   Psychiatric/Behavioral: Negative.    Allergic/Immunologic: Negative.                Objective       Vitals:   Temp:  [98.1 °F (36.7 °C)-98.2 °F (36.8 °C)] 98.1 °F (36.7 °C)  Heart Rate:  [] 92  Resp:  [16-18] 18  BP: (112-120)/(68-74) 112/74     Physical Exam  Vitals reviewed.   Constitutional:       General: He is not in acute distress.  HENT:      Head: Normocephalic.      Nose: Nose normal.      Mouth/Throat:      Mouth: Mucous membranes are dry.      Pharynx: Oropharynx is clear.   Eyes:      Extraocular Movements: Extraocular movements intact.      Conjunctiva/sclera: Conjunctivae normal.      Pupils: Pupils are equal, round, and reactive to light.   Cardiovascular:      Pulses: Normal pulses.      Heart sounds: No murmur heard.    No friction rub. No gallop.      Comments: S1 and S2 present.  No tachycardia.  Pulmonary:      Effort: Pulmonary effort is normal.      Breath sounds: No stridor. No wheezing or rales.   Chest:      Chest wall: No tenderness.   Abdominal:      General: Bowel sounds are normal. There is no distension.      Palpations: Abdomen is soft.      Tenderness: There is no abdominal tenderness. There is no right CVA tenderness or guarding.   Bilateral nephrostomy tubes in place, suprapubic catheter and Roberts catheter in place, Roberts bag with pinkish urine  Musculoskeletal:         General: No swelling, tenderness, deformity or signs of injury.      Cervical back: Normal range of motion. No rigidity.      Right lower leg: No edema.      Left lower leg: No edema.   Skin:     General: Skin is warm and dry.      Capillary Refill: Capillary refill takes less than 2 seconds.      Coloration: Skin is not jaundiced.      Findings: No bruising, erythema, lesion or rash.   Neurological:      Comments: No facial asymmetry noted.  Gait and station not tested.   Psychiatric:      Comments: No agitation.                             Result Review    Result Review:  I have personally reviewed the results from the time of this admission to 3/5/2023 16:48 EST and agree with these findings:  [x]?  Laboratory  [x]?  Microbiology  [x]?  Radiology  []?  EKG/Telemetry   []?  Cardiology/Vascular   []?  Pathology  []?  Old records  []?  Other:  Most notable findings include:        Lab Results   Component Value Date    GLUCOSE 132 (H) 03/12/2023    CALCIUM 8.5 (L) 03/12/2023     03/12/2023    K 4.6 03/12/2023    CO2 22.0 03/12/2023     03/12/2023    BUN 51 (H) 03/12/2023    CREATININE 1.80 (H) 03/12/2023    EGFR 41.3 (L) 03/12/2023    BCR 28.3 (H) 03/12/2023    ANIONGAP 11.0 03/12/2023   No radiology results for the last 7 days     Assessment & Plan    Previous notes and with minor updates.        Brief Patient Summary:     Patient is a 67-year-old male with history of osteoarthritis, BPH, thrombocytopenia, alcohol use disorder, chronic respiratory failure on home oxygen who presented to the emergency room because of hematuria and weakness.  Patient was admitted to ICU because of his severe anemia.  Patient was diagnosed with acute blood anemia with hemoglobin of 4.  Patient was also diagnosed with gross hematuria and urology consult was completed.  Patient was also  diagnosed with acute kidney injury and a nephrology consult was completed.           sodium bicarbonate, 650 mg, Oral, TID  sodium chloride, 500 mL, Intravenous, Once  sodium chloride, 10 mL, Intravenous, Q12H               Active Hospital Problems:       Active Hospital Problems     Diagnosis     • **Acute blood loss anemia     • Gross hematuria     • UTI (urinary tract infection)     • Acute renal failure (ARF) (HCC)     • Bilateral pneumonia     • BPH (benign prostatic hyperplasia)     • History of alcohol use disorder     • Marijuana use     • ADHD     • Depression     • Mixed hyperlipidemia     • Primary hypertension     • Acquired scoliosis              Plan: Bladder cancer, bilateral ureteral obstruction/gross hematuria; s/p bilateral nephrostomy tubes by IR, with improving creatinine  Both suprapubic and urethral catheters balloons were deflated and removed 24 Haitian three-way Simplastic replaced suprapubic tube 20 Haitian urethral Roberts replaced continue irrigation through irrigation port in the suprapubic tube and out both the suprapubic tube under the referral to maximum drainage and irrigation as per urology.  Currently with pinkish urine in the Roberts bag, CBI is on hold as per urology.  Patient has been tentatively scheduled for March 20 teeth for radical cystectomy with ileal conduit.    Acute kidney injury; nephrology following creatinine improving down from 3.66-2.29--> 2.21--> 2.30--> 2.32 -- >2.14 -- >1.80    Electrolyte abnormalities being replaced.    Acute blood loss anemia/gross hematuria; hemoglobin 7.8 transfuse to keep over 7 g/dL currently hemoglobin 8.7--> 8.1--> 7.6--> 7.5 -- >7.5 -- >8.2               DVT prophylaxis:  Mechanical DVT prophylaxis orders are present.     CODE STATUS:    Code Status (Patient has no pulse and is not breathing): CPR (Attempt to Resuscitate)  Medical Interventions (Patient has pulse or is breathing): Full Support        Disposition: Pending clinical  improvement, continues with hematuria and continuous bladder irrigation likely discharge in 3 to 4 days.     This patient has been examined wearing appropriate Personal Protective Equipment and discussed with hospital infection control department, [facility], infectious disease specialist and pulmonologist.           Electronically signed by Saima Roberto DO , 03/10/23, 1:53 PM EST.

## 2023-03-12 NOTE — PLAN OF CARE
Goal Outcome Evaluation:   Pt c/o pain, prn meds given per MAR. Pt continues with ibs, output was light pink tinged to red tinge. F/c and supracath remains intact. Pt in bed with call light within reach and bed alarm is set. Ongoing care continues

## 2023-03-12 NOTE — PROGRESS NOTES
PROGRESS NOTE      Patient Name: Anthony Moss  : 1955  MRN: [id number]  Primary Care Physician: Reji Topete MD  Date of admission: 2023    Patient Care Team:  Reji Topete MD as PCP - General (Family Medicine)        Subjective   Subjective:     Seen and examined  Resting in bed, no distress or new complaints  Kidney function improving patient undergoing CBI,  Pinkish urine    Review of systems:  All other review of system unremarkable      Allergies:  No Known Allergies    Objective   Exam:     Vital Signs  Temp:  [98.1 °F (36.7 °C)-98.2 °F (36.8 °C)] 98.1 °F (36.7 °C)  Heart Rate:  [] 92  Resp:  [16-18] 18  BP: (112-120)/(68-74) 112/74  SpO2:  [96 %-100 %] 96 %  on   ;   Device (Oxygen Therapy): room air  Body mass index is 26.74 kg/m².    General: Middle-age  male in no acute distress.    Head:      Normocephalic and atraumatic.    Eyes:      PERRL/EOM intact, conjunctiva and sclera clear with out nystagmus.    Neck:      No masses, thyromegaly,  trachea central with normal respiratory effort   Lungs:    Clear bilaterally to auscultation.    Heart:      Regular rate and rhythm, no murmur no gallop  Abd:        Soft, nontender, not distended, bowel sounds positive, no shifting dullness, suprapubic catheter with good urine  Pulses:   Pulses palpable  Extr:        No cyanosis or clubbing--no significant edema.    Neuro:    No focal deficits.   alert oriented x3  Skin:       Intact without lesions or rashes.    Psych:    Alert and cooperative; normal mood and affect; .      Results Review:  I have personally reviewed most recent Data :  CBC    Results from last 7 days   Lab Units 23  0759 23  0059 23  1426 23  0818 03/10/23  2356 03/10/23  2225 03/10/23  1545 03/10/23  0611 03/10/23  0220 23  0722 23  0039 23  0623 23  0000 23  0707 23  0122 23  0653 23  2322   WBC 10*3/mm3  --  8.80  --   --    --  8.20  --   --  9.10  --  9.20  --  9.30  --  9.20  --  8.50   HEMOGLOBIN g/dL 8.2* 7.5* 7.5* 7.7* 7.6* 7.2*  7.2* 7.3*   < > 7.3*  7.3*   < > 7.7*   < > 7.7*   < > 7.6*   < > 7.4*   PLATELETS 10*3/mm3  --  235  --   --   --  217  --   --  219  --  231  --  211  --  208  --  222    < > = values in this interval not displayed.     CMP   Results from last 7 days   Lab Units 03/12/23  0059 03/10/23  2225 03/10/23  0220 03/09/23  0039 03/08/23  0000 03/07/23  0122 03/05/23  2322   SODIUM mmol/L 138 137 137 137 137 136 135*   POTASSIUM mmol/L 4.6 4.8 5.1 5.2 5.1 4.8 4.9   CHLORIDE mmol/L 105 104 104 103 103 101 101   CO2 mmol/L 22.0 25.0 22.0 22.0 22.0 22.0 23.0   BUN mg/dL 51* 53* 52* 55* 53* 51* 51*   CREATININE mg/dL 1.80* 2.14* 2.32* 2.30* 2.21* 2.29* 2.49*   GLUCOSE mg/dL 132* 121* 123* 129* 143* 115* 127*   ALBUMIN g/dL 3.4* 3.4* 3.2* 3.2* 3.2* 2.9* 3.0*   BILIRUBIN mg/dL <0.2 <0.2 <0.2 0.2 <0.2 <0.2 <0.2   ALK PHOS U/L 104 107 92 83 84 82 71   AST (SGOT) U/L 18 16 15 17 19 15 13   ALT (SGPT) U/L 9 10 9 8 6 7 7     ABG      No radiology results for the last day    Results for orders placed during the hospital encounter of 01/05/22    Adult Transthoracic Echo Complete w/ Color, Spectral and Contrast if Necessary Per Protocol    Interpretation Summary  · Left ventricular ejection fraction appears to be 56 - 60%.  · The right ventricular cavity is moderately dilated.  · No pericardial effusion noted    Scheduled Meds:sodium bicarbonate, 650 mg, Oral, TID  sodium chloride, 500 mL, Intravenous, Once  sodium chloride, 10 mL, Intravenous, Q12H  sodium zirconium cyclosilicate, 10 g, Oral, Daily      Continuous Infusions:   PRN Meds:•  acetaminophen **OR** acetaminophen  •  influenza vaccine  •  ondansetron **OR** ondansetron  •  oxyCODONE  •  [COMPLETED] Insert Peripheral IV **AND** sodium chloride  •  sodium chloride  •  sodium chloride    Assessment & Plan   Assessment and Plan:         Acute blood loss anemia     Acute renal failure (ARF) (HCC)    Acquired scoliosis    ADHD    Depression    Mixed hyperlipidemia    Primary hypertension    Bilateral pneumonia    Marijuana use    UTI (urinary tract infection)    Gross hematuria    BPH (benign prostatic hyperplasia)    History of alcohol use disorder    ASSESSMENT:  • Acute kidney injury, stage III, oligoanuric, metabolic differential at this this time, including sepsis, with bilateral pneumonia, now with obstructive uropathy had been taking Aleve, for the back pain,  • Bilateral nephrostomy tube placed 3/5/2023  • Hyperkalemia, secondary to CARLOSA LBERTO  • Bolick acidosis secondary to CARLOS ALBERTO  • Weakness and tiredness, with a BUN of 59, very concerning for uremia, but patient is improving since he has  • Metabolic acidosis,   • History of alcohol use,  • Chronic back pain,using NSAID in the past  • B/l pneumonia.  Surprisingly, WBC stable,           Plan:      • CARLOS ALBERTO 2/2 obstructive uropathy   • Bilateral nephrostomy tube are draining well  • Kidney function improving slowly sCr, at 1.8  •   • Hyperkalemia resolved.  • Volume is under control.  • Patient is a good urine output from bilateral nephrostomy tubes undergoing CBI, pinkish urine  •   • hemoGlobin stable at 8.2  • Follow-up with renal function I still think there will be a gradual improvement with good urinary flow from the bilateral nephrostomy  • Echocardiogram done last time showed ejection fraction 56 to 60%  • Repeat labs tomorrow morning  • We will follow closely            Electronically signed by Abelardo Zamora MD,   [facility] kidney consultant  [phone]  3/12/2023  10:07 EDT

## 2023-03-13 LAB
ALBUMIN SERPL-MCNC: 3.2 G/DL (ref 3.5–5.2)
ALBUMIN/GLOB SERPL: 0.9 G/DL
ALP SERPL-CCNC: 101 U/L (ref 39–117)
ALT SERPL W P-5'-P-CCNC: 10 U/L (ref 1–41)
ANION GAP SERPL CALCULATED.3IONS-SCNC: 11 MMOL/L (ref 5–15)
AST SERPL-CCNC: 18 U/L (ref 1–40)
BASOPHILS # BLD AUTO: 0.2 10*3/MM3 (ref 0–0.2)
BASOPHILS NFR BLD AUTO: 1.8 % (ref 0–1.5)
BILIRUB SERPL-MCNC: <0.2 MG/DL (ref 0–1.2)
BUN SERPL-MCNC: 44 MG/DL (ref 8–23)
BUN/CREAT SERPL: 25.1 (ref 7–25)
CALCIUM SPEC-SCNC: 8.8 MG/DL (ref 8.6–10.5)
CHLORIDE SERPL-SCNC: 105 MMOL/L (ref 98–107)
CO2 SERPL-SCNC: 23 MMOL/L (ref 22–29)
CREAT SERPL-MCNC: 1.75 MG/DL (ref 0.76–1.27)
DEPRECATED RDW RBC AUTO: 52.5 FL (ref 37–54)
EGFRCR SERPLBLD CKD-EPI 2021: 42.7 ML/MIN/1.73
EOSINOPHIL # BLD AUTO: 0.5 10*3/MM3 (ref 0–0.4)
EOSINOPHIL NFR BLD AUTO: 5.7 % (ref 0.3–6.2)
ERYTHROCYTE [DISTWIDTH] IN BLOOD BY AUTOMATED COUNT: 16.1 % (ref 12.3–15.4)
GLOBULIN UR ELPH-MCNC: 3.7 GM/DL
GLUCOSE SERPL-MCNC: 102 MG/DL (ref 65–99)
HCT VFR BLD AUTO: 22.2 % (ref 37.5–51)
HCT VFR BLD AUTO: 22.4 % (ref 37.5–51)
HCT VFR BLD AUTO: 23.2 % (ref 37.5–51)
HGB BLD-MCNC: 7.1 G/DL (ref 13–17.7)
HGB BLD-MCNC: 7.2 G/DL (ref 13–17.7)
HGB BLD-MCNC: 7.6 G/DL (ref 13–17.7)
LYMPHOCYTES # BLD AUTO: 2.5 10*3/MM3 (ref 0.7–3.1)
LYMPHOCYTES NFR BLD AUTO: 27.3 % (ref 19.6–45.3)
MAGNESIUM SERPL-MCNC: 1.8 MG/DL (ref 1.6–2.4)
MCH RBC QN AUTO: 28.6 PG (ref 26.6–33)
MCHC RBC AUTO-ENTMCNC: 32.6 G/DL (ref 31.5–35.7)
MCV RBC AUTO: 87.8 FL (ref 79–97)
MONOCYTES # BLD AUTO: 1 10*3/MM3 (ref 0.1–0.9)
MONOCYTES NFR BLD AUTO: 10.5 % (ref 5–12)
NEUTROPHILS NFR BLD AUTO: 5 10*3/MM3 (ref 1.7–7)
NEUTROPHILS NFR BLD AUTO: 54.7 % (ref 42.7–76)
NRBC BLD AUTO-RTO: 0 /100 WBC (ref 0–0.2)
PHOSPHATE SERPL-MCNC: 4.8 MG/DL (ref 2.5–4.5)
PLATELET # BLD AUTO: 236 10*3/MM3 (ref 140–450)
PMV BLD AUTO: 8.2 FL (ref 6–12)
POTASSIUM SERPL-SCNC: 4.7 MMOL/L (ref 3.5–5.2)
PROT SERPL-MCNC: 6.9 G/DL (ref 6–8.5)
RBC # BLD AUTO: 2.53 10*6/MM3 (ref 4.14–5.8)
SODIUM SERPL-SCNC: 139 MMOL/L (ref 136–145)
WBC NRBC COR # BLD: 9.2 10*3/MM3 (ref 3.4–10.8)

## 2023-03-13 PROCEDURE — 83735 ASSAY OF MAGNESIUM: CPT | Performed by: UROLOGY

## 2023-03-13 PROCEDURE — 85025 COMPLETE CBC W/AUTO DIFF WBC: CPT | Performed by: UROLOGY

## 2023-03-13 PROCEDURE — 80053 COMPREHEN METABOLIC PANEL: CPT | Performed by: UROLOGY

## 2023-03-13 PROCEDURE — 85014 HEMATOCRIT: CPT | Performed by: INTERNAL MEDICINE

## 2023-03-13 PROCEDURE — 85018 HEMOGLOBIN: CPT | Performed by: INTERNAL MEDICINE

## 2023-03-13 PROCEDURE — 36415 COLL VENOUS BLD VENIPUNCTURE: CPT | Performed by: UROLOGY

## 2023-03-13 PROCEDURE — 84100 ASSAY OF PHOSPHORUS: CPT | Performed by: UROLOGY

## 2023-03-13 RX ORDER — BUMETANIDE 1 MG/1
0.5 TABLET ORAL DAILY
Status: DISCONTINUED | OUTPATIENT
Start: 2023-03-13 | End: 2023-03-19

## 2023-03-13 RX ADMIN — Medication 10 ML: at 20:31

## 2023-03-13 RX ADMIN — OXYCODONE HYDROCHLORIDE 5 MG: 5 TABLET ORAL at 02:55

## 2023-03-13 RX ADMIN — SODIUM BICARBONATE 650 MG: 650 TABLET ORAL at 08:32

## 2023-03-13 RX ADMIN — Medication 10 ML: at 08:35

## 2023-03-13 RX ADMIN — SODIUM BICARBONATE 650 MG: 650 TABLET ORAL at 15:45

## 2023-03-13 RX ADMIN — SODIUM ZIRCONIUM CYCLOSILICATE 10 G: 10 POWDER, FOR SUSPENSION ORAL at 08:32

## 2023-03-13 RX ADMIN — SODIUM BICARBONATE 650 MG: 650 TABLET ORAL at 20:30

## 2023-03-13 RX ADMIN — BUMETANIDE 0.5 MG: 1 TABLET ORAL at 12:01

## 2023-03-13 NOTE — PROGRESS NOTES
PROGRESS NOTE      Patient Name: Anthony Moss  : 1955  MRN: [id number]  Primary Care Physician: Reji Topete MD  Date of admission: 2023    Patient Care Team:  Reji Topete MD as PCP - General (Family Medicine)        Subjective   Subjective:     Seen and examined  Resting in bed, no distress or new complaints  Kidney function improving patient undergoing CBI,  Pinkish urine    Review of systems:  All other review of system unremarkable      Allergies:  No Known Allergies    Objective   Exam:     Vital Signs  Temp:  [97.4 °F (36.3 °C)-98.3 °F (36.8 °C)] 97.4 °F (36.3 °C)  Heart Rate:  [] 78  Resp:  [13-20] 20  BP: (110-124)/(76-83) 122/76  SpO2:  [97 %-98 %] 97 %  on   ;   Device (Oxygen Therapy): room air  Body mass index is 26.77 kg/m².    General: Middle-age  male in no acute distress.    Head:      Normocephalic and atraumatic.    Eyes:      PERRL/EOM intact, conjunctiva and sclera clear with out nystagmus.    Neck:      No masses, thyromegaly,  trachea central with normal respiratory effort   Lungs:    Clear bilaterally to auscultation.    Heart:      Regular rate and rhythm, no murmur no gallop  Abd:        Soft, nontender, not distended, bowel sounds positive, no shifting dullness, suprapubic catheter with good urine  Pulses:   Pulses palpable  Extr:        No cyanosis or clubbing--no significant edema.    Neuro:    No focal deficits.   alert oriented x3  Skin:       Intact without lesions or rashes.    Psych:    Alert and cooperative; normal mood and affect; .      Results Review:  I have personally reviewed most recent Data :  CBC    Results from last 7 days   Lab Units 23  0759 23  0309 23  1910 23  0759 23  0059 23  1426 23  0818 03/10/23  2356 03/10/23  2225 03/10/23  0611 03/10/23  0220 23  0722 23  0039 23  0623 23  0000 23  0707 23  0122   WBC 10*3/mm3  --  9.20  --   --   8.80  --   --   --  8.20  --  9.10  --  9.20  --  9.30  --  9.20   HEMOGLOBIN g/dL 7.1* 7.2* 7.4* 8.2* 7.5* 7.5* 7.7*   < > 7.2*  7.2*   < > 7.3*  7.3*   < > 7.7*   < > 7.7*   < > 7.6*   PLATELETS 10*3/mm3  --  236  --   --  235  --   --   --  217  --  219  --  231  --  211  --  208    < > = values in this interval not displayed.     CMP   Results from last 7 days   Lab Units 03/13/23  0309 03/12/23  0059 03/10/23  2225 03/10/23  0220 03/09/23  0039 03/08/23  0000 03/07/23  0122   SODIUM mmol/L 139 138 137 137 137 137 136   POTASSIUM mmol/L 4.7 4.6 4.8 5.1 5.2 5.1 4.8   CHLORIDE mmol/L 105 105 104 104 103 103 101   CO2 mmol/L 23.0 22.0 25.0 22.0 22.0 22.0 22.0   BUN mg/dL 44* 51* 53* 52* 55* 53* 51*   CREATININE mg/dL 1.75* 1.80* 2.14* 2.32* 2.30* 2.21* 2.29*   GLUCOSE mg/dL 102* 132* 121* 123* 129* 143* 115*   ALBUMIN g/dL 3.2* 3.4* 3.4* 3.2* 3.2* 3.2* 2.9*   BILIRUBIN mg/dL <0.2 <0.2 <0.2 <0.2 0.2 <0.2 <0.2   ALK PHOS U/L 101 104 107 92 83 84 82   AST (SGOT) U/L 18 18 16 15 17 19 15   ALT (SGPT) U/L 10 9 10 9 8 6 7     ABG      No radiology results for the last day    Results for orders placed during the hospital encounter of 01/05/22    Adult Transthoracic Echo Complete w/ Color, Spectral and Contrast if Necessary Per Protocol    Interpretation Summary  · Left ventricular ejection fraction appears to be 56 - 60%.  · The right ventricular cavity is moderately dilated.  · No pericardial effusion noted    Scheduled Meds:sodium bicarbonate, 650 mg, Oral, TID  sodium chloride, 500 mL, Intravenous, Once  sodium chloride, 10 mL, Intravenous, Q12H  sodium zirconium cyclosilicate, 10 g, Oral, Daily      Continuous Infusions:   PRN Meds:•  acetaminophen **OR** acetaminophen  •  influenza vaccine  •  ondansetron **OR** ondansetron  •  oxyCODONE  •  [COMPLETED] Insert Peripheral IV **AND** sodium chloride  •  sodium chloride  •  sodium chloride    Assessment & Plan   Assessment and Plan:         Acute blood loss anemia     Acute renal failure (ARF) (HCC)    Acquired scoliosis    ADHD    Depression    Mixed hyperlipidemia    Primary hypertension    Bilateral pneumonia    Marijuana use    UTI (urinary tract infection)    Gross hematuria    BPH (benign prostatic hyperplasia)    History of alcohol use disorder    ASSESSMENT:  • Acute kidney injury, stage III, oligoanuric, metabolic differential at this this time, including sepsis, with bilateral pneumonia, now with obstructive uropathy had been taking Aleve, for the back pain,  • Bilateral nephrostomy tube placed 3/5/2023  • Hyperkalemia, secondary to CARLOS ALBERTO  • Bolick acidosis secondary to CARLOS ALBERTO  • Weakness and tiredness, with a BUN of 59, very concerning for uremia, but patient is improving since he has  • Metabolic acidosis,   • History of alcohol use,  • Chronic back pain,using NSAID in the past  • B/l pneumonia.  Surprisingly, WBC stable,           Plan:      • CARLOS ALBERTO 2/2 obstructive uropathy   • Bilateral nephrostomy tube are draining well  • Kidney function improving slowly sCr, at 1.7  • Hyperkalemia resolved.  • Volume is under control.  • Patient is a good urine output from bilateral nephrostomy tubes undergoing CBI, pinkish urine  • hemoGlobin stable at 8.2  • Follow-up with renal function I still think there will be a gradual improvement with good urinary flow from the bilateral nephrostomy  • Echocardiogram done last time showed ejection fraction 56 to 60%  • Repeat labs tomorrow morning  • We will follow closely            Electronically signed by Jamie Houston MD,   [facility] kidney consultant  [phone]  3/13/2023  11:28 EDT

## 2023-03-13 NOTE — PLAN OF CARE
Goal Outcome Evaluation:  Plan of Care Reviewed With: patient         Patient still on CBI. Patients CBI became blocked and had to flush to get the blood clots out and CBI flow continue. Patient was in a lot of pain after this. Pain was treated per the MAR.  Patient was sitting up in the chair at the start of the shift.  The patient did have a bowel movement on this shift. Patient is able to make needs known and the call light is with in reach. Will continue with patients care.

## 2023-03-13 NOTE — PROGRESS NOTES
"  [facility] DAILY PROGRESS NOTE    Patient Identification  Name: Anthony Moss  Age: 67 y.o.  Sex: male  :  1955  MRN: [id number]    Date: 3/13/2023             Subjective:  Interval History: Urine clear on CBI, but nurses reporting intermittent clots and hematuria    Objective:    Scheduled Meds:bumetanide, 0.5 mg, Oral, Daily  sodium bicarbonate, 650 mg, Oral, TID  sodium chloride, 500 mL, Intravenous, Once  sodium chloride, 10 mL, Intravenous, Q12H  sodium zirconium cyclosilicate, 10 g, Oral, Daily      Continuous Infusions:   PRN Meds:•  acetaminophen **OR** acetaminophen  •  influenza vaccine  •  ondansetron **OR** ondansetron  •  oxyCODONE  •  [COMPLETED] Insert Peripheral IV **AND** sodium chloride  •  sodium chloride  •  sodium chloride    Vital signs in last 24 hours:  Temp:  [97.4 °F (36.3 °C)-97.9 °F (36.6 °C)] 97.5 °F (36.4 °C)  Heart Rate:  [] 97  Resp:  [17-20] 17  BP: (113-124)/(64-83) 113/64    Intake/Output:    Intake/Output Summary (Last 24 hours) at 3/13/2023 1754  Last data filed at 3/13/2023 1700  Gross per 24 hour   Intake 12800 ml   Output 34395 ml   Net -230 ml       Exam:  /64 (BP Location: Right arm, Patient Position: Lying)   Pulse 97   Temp 97.5 °F (36.4 °C) (Oral)   Resp 17   Ht 188 cm (74.02\")   Wt 94.6 kg (208 lb 8.9 oz)   SpO2 97%   BMI 26.77 kg/m²     General Appearance:    Alert, cooperative, no distress, appears stated age               Abdomen:     Soft, ND   :   Suprapubic tube 24 three-way Simplastic, 20 Armenian coudé per urethra urine clear on CBI                Data Review:  All labs (24hrs):   Recent Results (from the past 24 hour(s))   Hemoglobin & Hematocrit, Blood    Collection Time: 23  7:10 PM    Specimen: Blood   Result Value Ref Range    Hemoglobin 7.4 (L) 13.0 - 17.7 g/dL    Hematocrit 23.0 (L) 37.5 - 51.0 %   Magnesium    Collection Time: 23  3:09 AM    Specimen: Blood   Result Value Ref Range    Magnesium 1.8 1.6 - 2.4 " mg/dL   Phosphorus    Collection Time: 03/13/23  3:09 AM    Specimen: Blood   Result Value Ref Range    Phosphorus 4.8 (H) 2.5 - 4.5 mg/dL   Comprehensive Metabolic Panel    Collection Time: 03/13/23  3:09 AM    Specimen: Blood   Result Value Ref Range    Glucose 102 (H) 65 - 99 mg/dL    BUN 44 (H) 8 - 23 mg/dL    Creatinine 1.75 (H) 0.76 - 1.27 mg/dL    Sodium 139 136 - 145 mmol/L    Potassium 4.7 3.5 - 5.2 mmol/L    Chloride 105 98 - 107 mmol/L    CO2 23.0 22.0 - 29.0 mmol/L    Calcium 8.8 8.6 - 10.5 mg/dL    Total Protein 6.9 6.0 - 8.5 g/dL    Albumin 3.2 (L) 3.5 - 5.2 g/dL    ALT (SGPT) 10 1 - 41 U/L    AST (SGOT) 18 1 - 40 U/L    Alkaline Phosphatase 101 39 - 117 U/L    Total Bilirubin <0.2 0.0 - 1.2 mg/dL    Globulin 3.7 gm/dL    A/G Ratio 0.9 g/dL    BUN/Creatinine Ratio 25.1 (H) 7.0 - 25.0    Anion Gap 11.0 5.0 - 15.0 mmol/L    eGFR 42.7 (L) >60.0 mL/min/1.73   CBC Auto Differential    Collection Time: 03/13/23  3:09 AM    Specimen: Blood   Result Value Ref Range    WBC 9.20 3.40 - 10.80 10*3/mm3    RBC 2.53 (L) 4.14 - 5.80 10*6/mm3    Hemoglobin 7.2 (L) 13.0 - 17.7 g/dL    Hematocrit 22.2 (L) 37.5 - 51.0 %    MCV 87.8 79.0 - 97.0 fL    MCH 28.6 26.6 - 33.0 pg    MCHC 32.6 31.5 - 35.7 g/dL    RDW 16.1 (H) 12.3 - 15.4 %    RDW-SD 52.5 37.0 - 54.0 fl    MPV 8.2 6.0 - 12.0 fL    Platelets 236 140 - 450 10*3/mm3    Neutrophil % 54.7 42.7 - 76.0 %    Lymphocyte % 27.3 19.6 - 45.3 %    Monocyte % 10.5 5.0 - 12.0 %    Eosinophil % 5.7 0.3 - 6.2 %    Basophil % 1.8 (H) 0.0 - 1.5 %    Neutrophils, Absolute 5.00 1.70 - 7.00 10*3/mm3    Lymphocytes, Absolute 2.50 0.70 - 3.10 10*3/mm3    Monocytes, Absolute 1.00 (H) 0.10 - 0.90 10*3/mm3    Eosinophils, Absolute 0.50 (H) 0.00 - 0.40 10*3/mm3    Basophils, Absolute 0.20 0.00 - 0.20 10*3/mm3    nRBC 0.0 0.0 - 0.2 /100 WBC   Hemoglobin & Hematocrit, Blood    Collection Time: 03/13/23  7:59 AM    Specimen: Blood   Result Value Ref Range    Hemoglobin 7.1 (L) 13.0 - 17.7 g/dL     Hematocrit 22.4 (L) 37.5 - 51.0 %   Hemoglobin & Hematocrit, Blood    Collection Time: 03/13/23  4:32 PM    Specimen: Blood   Result Value Ref Range    Hemoglobin 7.6 (L) 13.0 - 17.7 g/dL    Hematocrit 23.2 (L) 37.5 - 51.0 %      Imaging Results (Last 24 Hours)     ** No results found for the last 24 hours. **           Assessment:    Acute blood loss anemia    Acute renal failure (ARF) (HCC)    Acquired scoliosis    ADHD    Depression    Mixed hyperlipidemia    Primary hypertension    Bilateral pneumonia    Marijuana use    UTI (urinary tract infection)    Gross hematuria    BPH (benign prostatic hyperplasia)    History of alcohol use disorder    Neurogenic bladder with suprapubic tube  Severe bladder wall thickening causing bilateral hydronephrosis  Cystitis  Hematuria and anemia     Plan:      Cont try to wean CBI to off and discharge  Path noted for prostate cancer, will check PSA level, still planning for March 28 for radical cystectomy with ileal conduit  Once his urine is cleared he may be discharged in preparation for surgery he will need to maintain bilateral nephrostomy tubes through surgery      Xu Montero MD  [facility]  [facility] [facility], [address]  [place] [address]  Office: [phone]  Cell: [phone]  Also available via Epic Secure Chat  03/13/23  17:54 EDT

## 2023-03-13 NOTE — PROGRESS NOTES
"     [facility] Medicine Services Daily Progress Note     Patient Name: Anthony Moss  : 1955  MRN: [id number]  Primary Care Physician:  Reji Topete MD  Date of admission: 2023        Subjective       Chief Complaint: Hematuria and weakness.        Patient Reports:           2023.  Patient was seen and examined.  Patient reported slight improvement in his symptoms.        2023.  Patient was seen and examined.  Patient reported moderate improvement in his symptoms.        2023.  Patient was seen and examined.  Patient complained of constant constipation.  Patient reported last bowel movement to be 2023.  Lactulose was ordered.        2023.  Patient was seen and examined.  No overnight events noted.  Neurology and nephrology are following.       3/6/2023; first time seeing the patient, extensive chart notes reviewed,, Roberts catheter came out, both suprapubic and Roberts catheter were replaced by urology for CBI, otherwise no new symptoms hemodynamically stable  3/7/2023; patient still continues with hematuria/CBI, getting lighter, otherwise, hemodynamically stable, eating lunch in the bed,  3/8/2023; patient still continues with hematuria, continue CBI,   height is 188 cm (74.02\") and weight is 97.3 kg (214 lb 8.1 oz). His axillary temperature is 97.8 °F (36.6 °C). His blood pressure is 126/80 and his pulse is 99. His respiration is 13 and oxygen saturation is 98%.     3/9/2023; afebrile vital signs are stable, overnight events noted patient had a lot of clots passing through Roberts catheter, currently urine appearing pinkish, CBI on hold as per urology, hemoglobin stable, serum creatinine stable around 2.30, continue to monitor.    3/10/2023; afebrile vital signs are stable, continues to be on room air, CBI was resumed yesterday, continues to have pinkish urine, serum creatinine 2.32, no other symptoms continue to " monitor.    3/11/2023:  Patient S&E.  No new complaints.  Tolerating CBI well.  Nurse reports no acute overnight events.  Urine remains pink-tinged.  Creatinine slightly improved, hemoglobin stable.    3/12/2023:  Patient S&E.  Tolerating CBI well.  Still having some pinkish output.  Urology and nephrology recommendations appreciated.  We will continue current management.    3/13/23:  Awaiting Urology recs for DC. Still on CBI.     Review of Systems   Constitutional: Negative for malaise/fatigue.   HENT: Negative.    Eyes: Negative.    Cardiovascular: Negative.    Respiratory: Negative.    Endocrine: Negative.    Hematologic/Lymphatic: Negative.    Skin: Negative.    Musculoskeletal: Negative.    Gastrointestinal: Negative for constipation.   Genitourinary: Negative.    Neurological: Positive for weakness.   Psychiatric/Behavioral: Negative.    Allergic/Immunologic: Negative.                Objective       Vitals:   Temp:  [97.4 °F (36.3 °C)-97.9 °F (36.6 °C)] 97.5 °F (36.4 °C)  Heart Rate:  [] 97  Resp:  [17-20] 17  BP: (113-124)/(64-83) 113/64     Physical Exam  Vitals reviewed.   Constitutional:       General: He is not in acute distress.  HENT:      Head: Normocephalic.      Nose: Nose normal.      Mouth/Throat:      Mouth: Mucous membranes are dry.      Pharynx: Oropharynx is clear.   Eyes:      Extraocular Movements: Extraocular movements intact.      Conjunctiva/sclera: Conjunctivae normal.      Pupils: Pupils are equal, round, and reactive to light.   Cardiovascular:      Pulses: Normal pulses.      Heart sounds: No murmur heard.    No friction rub. No gallop.      Comments: S1 and S2 present.  No tachycardia.  Pulmonary:      Effort: Pulmonary effort is normal.      Breath sounds: No stridor. No wheezing or rales.   Chest:      Chest wall: No tenderness.   Abdominal:      General: Bowel sounds are normal. There is no distension.      Palpations: Abdomen is soft.      Tenderness: There is no abdominal  tenderness. There is no right CVA tenderness or guarding.  Bilateral nephrostomy tubes in place, suprapubic catheter and Roberts catheter in place, Roberts bag with pinkish urine  Musculoskeletal:         General: No swelling, tenderness, deformity or signs of injury.      Cervical back: Normal range of motion. No rigidity.      Right lower leg: No edema.      Left lower leg: No edema.   Skin:     General: Skin is warm and dry.      Capillary Refill: Capillary refill takes less than 2 seconds.      Coloration: Skin is not jaundiced.      Findings: No bruising, erythema, lesion or rash.   Neurological:      Comments: No facial asymmetry noted.  Gait and station not tested.   Psychiatric:      Comments: No agitation.                             Result Review    Result Review:  I have personally reviewed the results from the time of this admission to 3/5/2023 16:48 EST and agree with these findings:  [x]?  Laboratory  [x]?  Microbiology  [x]?  Radiology  []?  EKG/Telemetry   []?  Cardiology/Vascular   []?  Pathology  []?  Old records  []?  Other:  Most notable findings include:        Lab Results   Component Value Date    GLUCOSE 102 (H) 03/13/2023    CALCIUM 8.8 03/13/2023     03/13/2023    K 4.7 03/13/2023    CO2 23.0 03/13/2023     03/13/2023    BUN 44 (H) 03/13/2023    CREATININE 1.75 (H) 03/13/2023    EGFR 42.7 (L) 03/13/2023    BCR 25.1 (H) 03/13/2023    ANIONGAP 11.0 03/13/2023   No radiology results for the last 7 days     Assessment & Plan    Previous notes and with minor updates.        Brief Patient Summary:     Patient is a 67-year-old male with history of osteoarthritis, BPH, thrombocytopenia, alcohol use disorder, chronic respiratory failure on home oxygen who presented to the emergency room because of hematuria and weakness.  Patient was admitted to ICU because of his severe anemia.  Patient was diagnosed with acute blood anemia with hemoglobin of 4.  Patient was also diagnosed with gross  hematuria and urology consult was completed.  Patient was also diagnosed with acute kidney injury and a nephrology consult was completed.           sodium bicarbonate, 650 mg, Oral, TID  sodium chloride, 500 mL, Intravenous, Once  sodium chloride, 10 mL, Intravenous, Q12H               Active Hospital Problems:       Active Hospital Problems     Diagnosis     • **Acute blood loss anemia     • Gross hematuria     • UTI (urinary tract infection)     • Acute renal failure (ARF) (HCC)     • Bilateral pneumonia     • BPH (benign prostatic hyperplasia)     • History of alcohol use disorder     • Marijuana use     • ADHD     • Depression     • Mixed hyperlipidemia     • Primary hypertension     • Acquired scoliosis              Plan: Bladder cancer, bilateral ureteral obstruction/gross hematuria; s/p bilateral nephrostomy tubes by IR, with improving creatinine  Both suprapubic and urethral catheters balloons were deflated and removed 24 Czech three-way Simplastic replaced suprapubic tube 20 Czech urethral Roberts replaced continue irrigation through irrigation port in the suprapubic tube and out both the suprapubic tube under the referral to maximum drainage and irrigation as per urology.  Currently with pinkish urine in the Roberts bag, CBI is on hold as per urology.  Patient has been tentatively scheduled for March 20 teeth for radical cystectomy with ileal conduit.    Acute kidney injury; nephrology following creatinine improving down from 3.66-2.29--> 2.21--> 2.30--> 2.32 -- >2.14 -- >1.80    Electrolyte abnormalities being replaced.    Acute blood loss anemia/gross hematuria; hemoglobin 7.8 transfuse to keep over 7 g/dL currently hemoglobin 8.7--> 8.1--> 7.6--> 7.5 -- >7.5 -- >8.2               DVT prophylaxis:  Mechanical DVT prophylaxis orders are present.     CODE STATUS:    Code Status (Patient has no pulse and is not breathing): CPR (Attempt to Resuscitate)  Medical Interventions (Patient has pulse or is  breathing): Full Support        Disposition: Pending clinical improvement, continues with hematuria and continuous bladder irrigation likely discharge in 3 to 4 days.     This patient has been examined wearing appropriate Personal Protective Equipment and discussed with hospital infection control department, [facility] department, infectious disease specialist and pulmonologist.           Electronically signed by Saima Roberto DO  03/10/23, 1:53 PM EST.

## 2023-03-13 NOTE — CASE MANAGEMENT/SOCIAL WORK
Continued Stay Note  RADHA Ruffin     Patient Name: Anthony Moss  MRN: [id number]  Today's Date: 3/13/2023    Admit Date: 2/28/2023    Plan: Home with family. Patient will be a planned readmission for surgery   Discharge Plan     Row Name 03/13/23 1426       Plan    Plan Home with family. Patient will be a planned readmission for surgery    Plan Comments Barrier to dc: CBI                            Expected Discharge Date and Time     Expected Discharge Date Expected Discharge Time    Mar 15, 2023             Katlyn Rutledge RN

## 2023-03-14 LAB
ALBUMIN SERPL-MCNC: 3.5 G/DL (ref 3.5–5.2)
ALBUMIN/GLOB SERPL: 1.1 G/DL
ALP SERPL-CCNC: 100 U/L (ref 39–117)
ALT SERPL W P-5'-P-CCNC: 10 U/L (ref 1–41)
ANION GAP SERPL CALCULATED.3IONS-SCNC: 11 MMOL/L (ref 5–15)
AST SERPL-CCNC: 15 U/L (ref 1–40)
BASOPHILS # BLD AUTO: 0.2 10*3/MM3 (ref 0–0.2)
BASOPHILS NFR BLD AUTO: 2.1 % (ref 0–1.5)
BILIRUB SERPL-MCNC: <0.2 MG/DL (ref 0–1.2)
BUN SERPL-MCNC: 47 MG/DL (ref 8–23)
BUN/CREAT SERPL: 25.3 (ref 7–25)
CALCIUM SPEC-SCNC: 8.5 MG/DL (ref 8.6–10.5)
CHLORIDE SERPL-SCNC: 106 MMOL/L (ref 98–107)
CO2 SERPL-SCNC: 23 MMOL/L (ref 22–29)
CREAT SERPL-MCNC: 1.86 MG/DL (ref 0.76–1.27)
DEPRECATED RDW RBC AUTO: 51.6 FL (ref 37–54)
EGFRCR SERPLBLD CKD-EPI 2021: 39.7 ML/MIN/1.73
EOSINOPHIL # BLD AUTO: 0.4 10*3/MM3 (ref 0–0.4)
EOSINOPHIL NFR BLD AUTO: 4.9 % (ref 0.3–6.2)
ERYTHROCYTE [DISTWIDTH] IN BLOOD BY AUTOMATED COUNT: 16.4 % (ref 12.3–15.4)
GLOBULIN UR ELPH-MCNC: 3.3 GM/DL
GLUCOSE SERPL-MCNC: 103 MG/DL (ref 65–99)
HCT VFR BLD AUTO: 22.6 % (ref 37.5–51)
HCT VFR BLD AUTO: 23 % (ref 37.5–51)
HGB BLD-MCNC: 7.2 G/DL (ref 13–17.7)
HGB BLD-MCNC: 7.4 G/DL (ref 13–17.7)
HOLD SPECIMEN: NORMAL
LYMPHOCYTES # BLD AUTO: 2.1 10*3/MM3 (ref 0.7–3.1)
LYMPHOCYTES NFR BLD AUTO: 25.8 % (ref 19.6–45.3)
MAGNESIUM SERPL-MCNC: 1.8 MG/DL (ref 1.6–2.4)
MCH RBC QN AUTO: 28 PG (ref 26.6–33)
MCHC RBC AUTO-ENTMCNC: 31.4 G/DL (ref 31.5–35.7)
MCV RBC AUTO: 89.1 FL (ref 79–97)
MONOCYTES # BLD AUTO: 0.9 10*3/MM3 (ref 0.1–0.9)
MONOCYTES NFR BLD AUTO: 10.6 % (ref 5–12)
NEUTROPHILS NFR BLD AUTO: 4.6 10*3/MM3 (ref 1.7–7)
NEUTROPHILS NFR BLD AUTO: 56.6 % (ref 42.7–76)
NRBC BLD AUTO-RTO: 0 /100 WBC (ref 0–0.2)
PHOSPHATE SERPL-MCNC: 5.1 MG/DL (ref 2.5–4.5)
PLATELET # BLD AUTO: 239 10*3/MM3 (ref 140–450)
PMV BLD AUTO: 8.1 FL (ref 6–12)
POTASSIUM SERPL-SCNC: 4.7 MMOL/L (ref 3.5–5.2)
PROT SERPL-MCNC: 6.8 G/DL (ref 6–8.5)
PSA SERPL-MCNC: 3.21 NG/ML (ref 0–4)
RBC # BLD AUTO: 2.58 10*6/MM3 (ref 4.14–5.8)
SODIUM SERPL-SCNC: 140 MMOL/L (ref 136–145)
WBC NRBC COR # BLD: 8.2 10*3/MM3 (ref 3.4–10.8)

## 2023-03-14 PROCEDURE — 36415 COLL VENOUS BLD VENIPUNCTURE: CPT | Performed by: UROLOGY

## 2023-03-14 PROCEDURE — 84100 ASSAY OF PHOSPHORUS: CPT | Performed by: UROLOGY

## 2023-03-14 PROCEDURE — 85018 HEMOGLOBIN: CPT | Performed by: INTERNAL MEDICINE

## 2023-03-14 PROCEDURE — 85014 HEMATOCRIT: CPT | Performed by: INTERNAL MEDICINE

## 2023-03-14 PROCEDURE — 83735 ASSAY OF MAGNESIUM: CPT | Performed by: UROLOGY

## 2023-03-14 PROCEDURE — 85025 COMPLETE CBC W/AUTO DIFF WBC: CPT | Performed by: UROLOGY

## 2023-03-14 PROCEDURE — 84153 ASSAY OF PSA TOTAL: CPT | Performed by: UROLOGY

## 2023-03-14 PROCEDURE — 80053 COMPREHEN METABOLIC PANEL: CPT | Performed by: UROLOGY

## 2023-03-14 RX ADMIN — Medication 10 ML: at 20:23

## 2023-03-14 RX ADMIN — BUMETANIDE 0.5 MG: 1 TABLET ORAL at 08:14

## 2023-03-14 RX ADMIN — SODIUM BICARBONATE 650 MG: 650 TABLET ORAL at 08:14

## 2023-03-14 RX ADMIN — SODIUM BICARBONATE 650 MG: 650 TABLET ORAL at 16:04

## 2023-03-14 RX ADMIN — SODIUM ZIRCONIUM CYCLOSILICATE 10 G: 10 POWDER, FOR SUSPENSION ORAL at 08:14

## 2023-03-14 RX ADMIN — SODIUM BICARBONATE 650 MG: 650 TABLET ORAL at 20:23

## 2023-03-14 RX ADMIN — OXYCODONE HYDROCHLORIDE 5 MG: 5 TABLET ORAL at 20:25

## 2023-03-14 RX ADMIN — Medication 10 ML: at 08:14

## 2023-03-14 NOTE — PLAN OF CARE
Goal Outcome Evaluation:  Plan of Care Reviewed With: patient        Progress: no change  Outcome Evaluation: Patient is stable and no complaints of pain. Patient continues with CBI. bilateral nephrostomy tubes still intact. CM working with patient about possible rehab before cystectomy. Safety measures in place. Call light within reach. Patient able to make needs known. Will continue to monitor.

## 2023-03-14 NOTE — PROGRESS NOTES
"  [facility] DAILY PROGRESS NOTE    Patient Identification  Name: Anthony Moss  Age: 67 y.o.  Sex: male  :  1955  MRN: [id number]    Date: 3/14/2023             Subjective:  Interval History: Urine clear    Objective:    Scheduled Meds:bumetanide, 0.5 mg, Oral, Daily  sodium bicarbonate, 650 mg, Oral, TID  sodium chloride, 500 mL, Intravenous, Once  sodium chloride, 10 mL, Intravenous, Q12H  sodium zirconium cyclosilicate, 10 g, Oral, Daily      Continuous Infusions:   PRN Meds:•  acetaminophen **OR** acetaminophen  •  influenza vaccine  •  ondansetron **OR** ondansetron  •  oxyCODONE  •  [COMPLETED] Insert Peripheral IV **AND** sodium chloride  •  sodium chloride  •  sodium chloride    Vital signs in last 24 hours:  Temp:  [97.5 °F (36.4 °C)-99.2 °F (37.3 °C)] 97.9 °F (36.6 °C)  Heart Rate:  [91-99] 91  Resp:  [13-17] 13  BP: (113-126)/(64-84) 126/84    Intake/Output:    Intake/Output Summary (Last 24 hours) at 3/14/2023 0730  Last data filed at 3/14/2023 0632  Gross per 24 hour   Intake 10211 ml   Output 41389 ml   Net -00147 ml       Exam:  /84 (BP Location: Right arm, Patient Position: Lying)   Pulse 91   Temp 97.9 °F (36.6 °C) (Oral)   Resp 13   Ht 188 cm (74.02\")   Wt 94.7 kg (208 lb 12.4 oz)   SpO2 97%   BMI 26.79 kg/m²     General Appearance:    Alert, cooperative, no distress, appears stated age               Abdomen:     Soft, ND   :   Suprapubic tube 24 three-way Simplastic, 20 Irish coudé per urethra urine clear                Data Review:  All labs (24hrs):   Recent Results (from the past 24 hour(s))   Hemoglobin & Hematocrit, Blood    Collection Time: 23  7:59 AM    Specimen: Blood   Result Value Ref Range    Hemoglobin 7.1 (L) 13.0 - 17.7 g/dL    Hematocrit 22.4 (L) 37.5 - 51.0 %   Hemoglobin & Hematocrit, Blood    Collection Time: 23  4:32 PM    Specimen: Blood   Result Value Ref Range    Hemoglobin 7.6 (L) 13.0 - 17.7 g/dL    Hematocrit 23.2 (L) 37.5 - 51.0 " %   Magnesium    Collection Time: 03/14/23 12:40 AM    Specimen: Blood   Result Value Ref Range    Magnesium 1.8 1.6 - 2.4 mg/dL   Phosphorus    Collection Time: 03/14/23 12:40 AM    Specimen: Blood   Result Value Ref Range    Phosphorus 5.1 (H) 2.5 - 4.5 mg/dL   Comprehensive Metabolic Panel    Collection Time: 03/14/23 12:40 AM    Specimen: Blood   Result Value Ref Range    Glucose 103 (H) 65 - 99 mg/dL    BUN 47 (H) 8 - 23 mg/dL    Creatinine 1.86 (H) 0.76 - 1.27 mg/dL    Sodium 140 136 - 145 mmol/L    Potassium 4.7 3.5 - 5.2 mmol/L    Chloride 106 98 - 107 mmol/L    CO2 23.0 22.0 - 29.0 mmol/L    Calcium 8.5 (L) 8.6 - 10.5 mg/dL    Total Protein 6.8 6.0 - 8.5 g/dL    Albumin 3.5 3.5 - 5.2 g/dL    ALT (SGPT) 10 1 - 41 U/L    AST (SGOT) 15 1 - 40 U/L    Alkaline Phosphatase 100 39 - 117 U/L    Total Bilirubin <0.2 0.0 - 1.2 mg/dL    Globulin 3.3 gm/dL    A/G Ratio 1.1 g/dL    BUN/Creatinine Ratio 25.3 (H) 7.0 - 25.0    Anion Gap 11.0 5.0 - 15.0 mmol/L    eGFR 39.7 (L) >60.0 mL/min/1.73   CBC Auto Differential    Collection Time: 03/14/23 12:40 AM    Specimen: Blood   Result Value Ref Range    WBC 8.20 3.40 - 10.80 10*3/mm3    RBC 2.58 (L) 4.14 - 5.80 10*6/mm3    Hemoglobin 7.2 (L) 13.0 - 17.7 g/dL    Hematocrit 23.0 (L) 37.5 - 51.0 %    MCV 89.1 79.0 - 97.0 fL    MCH 28.0 26.6 - 33.0 pg    MCHC 31.4 (L) 31.5 - 35.7 g/dL    RDW 16.4 (H) 12.3 - 15.4 %    RDW-SD 51.6 37.0 - 54.0 fl    MPV 8.1 6.0 - 12.0 fL    Platelets 239 140 - 450 10*3/mm3    Neutrophil % 56.6 42.7 - 76.0 %    Lymphocyte % 25.8 19.6 - 45.3 %    Monocyte % 10.6 5.0 - 12.0 %    Eosinophil % 4.9 0.3 - 6.2 %    Basophil % 2.1 (H) 0.0 - 1.5 %    Neutrophils, Absolute 4.60 1.70 - 7.00 10*3/mm3    Lymphocytes, Absolute 2.10 0.70 - 3.10 10*3/mm3    Monocytes, Absolute 0.90 0.10 - 0.90 10*3/mm3    Eosinophils, Absolute 0.40 0.00 - 0.40 10*3/mm3    Basophils, Absolute 0.20 0.00 - 0.20 10*3/mm3    nRBC 0.0 0.0 - 0.2 /100 WBC      Imaging Results (Last 24  Hours)     ** No results found for the last 24 hours. **           Assessment:    Acute blood loss anemia    Acute renal failure (ARF) (HCC)    Acquired scoliosis    ADHD    Depression    Mixed hyperlipidemia    Primary hypertension    Bilateral pneumonia    Marijuana use    UTI (urinary tract infection)    Gross hematuria    BPH (benign prostatic hyperplasia)    History of alcohol use disorder    Neurogenic bladder with suprapubic tube  Severe bladder wall thickening causing bilateral hydronephrosis  Cystitis  Hematuria and anemia     Plan:      Cont try to wean CBI to off and discharge  Path noted for prostate cancer, will check PSA level, still planning for March 28 for radical cystectomy with ileal conduit  Once his urine is cleared he may be discharged in preparation for surgery he will need to maintain bilateral nephrostomy tubes through surgery      Xu Montero MD  [facility]  [facility] [facility], [address]  [place] [address]  Office: [phone]  Cell: [phone]  Also available via Epic Secure Chat  03/14/23  07:30 EDT

## 2023-03-14 NOTE — PLAN OF CARE
Goal Outcome Evaluation:  Plan of Care Reviewed With: patient      Patient has had no complaints of pain on this shift. The patient is still on CBI.  Had to clear blood clots a few times on this shift.  Patient is  currently running pinkish red from the CBI.  Patient stated he is not comfortable going to rehab or another hospital until after his bladder surgery. Patient was advised to talked to his Dr's and Case Management.  Patient is able to make his needs known and the call light with in reach. Will continue with patients care.

## 2023-03-14 NOTE — PROGRESS NOTES
"     [facility] Medicine Services Daily Progress Note     Patient Name: Anthony Moss  : 1955  MRN: [id number]  Primary Care Physician:  Reji Topete MD  Date of admission: 2023        Subjective       Chief Complaint: Hematuria and weakness.        Patient Reports:           2023.  Patient was seen and examined.  Patient reported slight improvement in his symptoms.        2023.  Patient was seen and examined.  Patient reported moderate improvement in his symptoms.        2023.  Patient was seen and examined.  Patient complained of constant constipation.  Patient reported last bowel movement to be 2023.  Lactulose was ordered.        2023.  Patient was seen and examined.  No overnight events noted.  Neurology and nephrology are following.       3/6/2023; first time seeing the patient, extensive chart notes reviewed,, Roberts catheter came out, both suprapubic and Roberts catheter were replaced by urology for CBI, otherwise no new symptoms hemodynamically stable  3/7/2023; patient still continues with hematuria/CBI, getting lighter, otherwise, hemodynamically stable, eating lunch in the bed,  3/8/2023; patient still continues with hematuria, continue CBI,   height is 188 cm (74.02\") and weight is 97.3 kg (214 lb 8.1 oz). His axillary temperature is 97.8 °F (36.6 °C). His blood pressure is 126/80 and his pulse is 99. His respiration is 13 and oxygen saturation is 98%.     3/9/2023; afebrile vital signs are stable, overnight events noted patient had a lot of clots passing through Roberts catheter, currently urine appearing pinkish, CBI on hold as per urology, hemoglobin stable, serum creatinine stable around 2.30, continue to monitor.    3/10/2023; afebrile vital signs are stable, continues to be on room air, CBI was resumed yesterday, continues to have pinkish urine, serum creatinine 2.32, no other symptoms continue to " monitor.    3/11/2023:  Patient S&E.  No new complaints.  Tolerating CBI well.  Nurse reports no acute overnight events.  Urine remains pink-tinged.  Creatinine slightly improved, hemoglobin stable.    3/12/2023:  Patient S&E.  Tolerating CBI well.  Still having some pinkish output.  Urology and nephrology recommendations appreciated.  We will continue current management.    3/13/23:  Awaiting Urology recs for DC. Still on CBI.     3/14/2023:  Patient's urine has not cleared up very nicely.  We will wean away from CBI to adjust Roberts catheter.  Urology recommending SNF placement in case of Roberts complications.  Patient is agreeable with this, pre-CERT is required according to case management.  No acute complaints at this time, nurse reports no acute overnight events.    Review of Systems   Constitutional: Negative for malaise/fatigue.   HENT: Negative.    Eyes: Negative.    Cardiovascular: Negative.    Respiratory: Negative.    Endocrine: Negative.    Hematologic/Lymphatic: Negative.    Skin: Negative.    Musculoskeletal: Negative.    Gastrointestinal: Negative for constipation.   Genitourinary: Negative.    Neurological: Positive for weakness.   Psychiatric/Behavioral: Negative.    Allergic/Immunologic: Negative.                Objective       Vitals:   Temp:  [97.9 °F (36.6 °C)-99.2 °F (37.3 °C)] 98.3 °F (36.8 °C)  Heart Rate:  [] 108  Resp:  [13-17] 17  BP: (105-126)/(70-84) 105/70     Physical Exam  Vitals reviewed.   Constitutional:       General: He is not in acute distress.  HENT:      Head: Normocephalic.      Nose: Nose normal.      Mouth/Throat:      Mouth: Mucous membranes are dry.      Pharynx: Oropharynx is clear.   Eyes:      Extraocular Movements: Extraocular movements intact.      Conjunctiva/sclera: Conjunctivae normal.      Pupils: Pupils are equal, round, and reactive to light.   Cardiovascular:      Pulses: Normal pulses.      Heart sounds: No murmur heard.    No friction rub. No gallop.       Comments: S1 and S2 present.  No tachycardia.  Pulmonary:      Effort: Pulmonary effort is normal.      Breath sounds: No stridor. No wheezing or rales.   Chest:      Chest wall: No tenderness.   Abdominal:      General: Bowel sounds are normal. There is no distension.      Palpations: Abdomen is soft.      Tenderness: There is no abdominal tenderness. There is no right CVA tenderness or guarding.  Bilateral nephrostomy tubes in place, suprapubic catheter and Roberts catheter in place, Roberts bag with pinkish urine  Musculoskeletal:         General: No swelling, tenderness, deformity or signs of injury.      Cervical back: Normal range of motion. No rigidity.      Right lower leg: No edema.      Left lower leg: No edema.   Skin:     General: Skin is warm and dry.      Capillary Refill: Capillary refill takes less than 2 seconds.      Coloration: Skin is not jaundiced.      Findings: No bruising, erythema, lesion or rash.   Neurological:      Comments: No facial asymmetry noted.  Gait and station not tested.   Psychiatric:      Comments: No agitation.                             Result Review    Result Review:  I have personally reviewed the results from the time of this admission to 3/5/2023 16:48 EST and agree with these findings:  [x]?  Laboratory  [x]?  Microbiology  [x]?  Radiology  []?  EKG/Telemetry   []?  Cardiology/Vascular   []?  Pathology  []?  Old records  []?  Other:  Most notable findings include:        Lab Results   Component Value Date    GLUCOSE 103 (H) 03/14/2023    CALCIUM 8.5 (L) 03/14/2023     03/14/2023    K 4.7 03/14/2023    CO2 23.0 03/14/2023     03/14/2023    BUN 47 (H) 03/14/2023    CREATININE 1.86 (H) 03/14/2023    EGFR 39.7 (L) 03/14/2023    BCR 25.3 (H) 03/14/2023    ANIONGAP 11.0 03/14/2023   No radiology results for the last 7 days     Assessment & Plan    Previous notes and with minor updates.        Brief Patient Summary:     Patient is a 67-year-old male with history of  osteoarthritis, BPH, thrombocytopenia, alcohol use disorder, chronic respiratory failure on home oxygen who presented to the emergency room because of hematuria and weakness.  Patient was admitted to ICU because of his severe anemia.  Patient was diagnosed with acute blood anemia with hemoglobin of 4.  Patient was also diagnosed with gross hematuria and urology consult was completed.  Patient was also diagnosed with acute kidney injury and a nephrology consult was completed.           sodium bicarbonate, 650 mg, Oral, TID  sodium chloride, 500 mL, Intravenous, Once  sodium chloride, 10 mL, Intravenous, Q12H               Active Hospital Problems:       Active Hospital Problems     Diagnosis     • **Acute blood loss anemia     • Gross hematuria     • UTI (urinary tract infection)     • Acute renal failure (ARF) (HCC)     • Bilateral pneumonia     • BPH (benign prostatic hyperplasia)     • History of alcohol use disorder     • Marijuana use     • ADHD     • Depression     • Mixed hyperlipidemia     • Primary hypertension     • Acquired scoliosis              Plan:     bilateral ureteral obstruction/gross hematuria; s/p bilateral nephrostomy tubes by IR, with improving creatinine  -- Maintain suprapubic catheter, draining well  -- Maintain Roberts catheter to prevent urinary obstruction from bladder outlet  -- Currently no signs of hematuria at all, patient has very clear CBI    Bladder cancer  -- Patient has been tentatively scheduled for March 20 teeth for radical cystectomy with ileal conduit.    Acute kidney injury, resolved  Nephrology following, recommendation appreciated  -- Creatinine improved, now stabilized.    Electrolyte abnormalities being replaced.    Acute blood loss anemia/gross hematuria; hemoglobin 7.8 transfuse to keep over 7 g/dL currently hemoglobin 8.7--> 8.1--> 7.6--> 7.5 -- >7.5 -- >8.2               DVT prophylaxis:  Mechanical DVT prophylaxis orders are present.     CODE STATUS:    Code Status  (Patient has no pulse and is not breathing): CPR (Attempt to Resuscitate)  Medical Interventions (Patient has pulse or is breathing): Full Support        Disposition:  Patient DC to SNF in the next 1 to 2 days.  Stable at this time.     This patient has been examined wearing appropriate Personal Protective Equipment and discussed with hospital infection control department, [facility] department, infectious disease specialist and pulmonologist.           Electronically signed by Saima Roberto DO  03/10/23, 1:53 PM EST.

## 2023-03-14 NOTE — PROGRESS NOTES
PROGRESS NOTE      Patient Name: Anthony Moss  : 1955  MRN: [id number]  Primary Care Physician: Reji Topete MD  Date of admission: 2023    Patient Care Team:  Reji Topete MD as PCP - General (Family Medicine)        Subjective   Subjective:     Seen and examined  Resting in bed, no distress or new complaints  Kidney function improving patient undergoing CBI,  Pinkish urine    Review of systems:  All other review of system unremarkable      Allergies:  No Known Allergies    Objective   Exam:     Vital Signs  Temp:  [97.5 °F (36.4 °C)-99.2 °F (37.3 °C)] 97.9 °F (36.6 °C)  Heart Rate:  [91-99] 91  Resp:  [13-17] 13  BP: (113-126)/(64-84) 126/84  SpO2:  [95 %-97 %] 97 %  on   ;   Device (Oxygen Therapy): room air  Body mass index is 26.79 kg/m².    General: Middle-age  male in no acute distress.    Head:      Normocephalic and atraumatic.    Eyes:      PERRL/EOM intact, conjunctiva and sclera clear with out nystagmus.    Neck:      No masses, thyromegaly,  trachea central with normal respiratory effort   Lungs:    Clear bilaterally to auscultation.    Heart:      Regular rate and rhythm, no murmur no gallop  Abd:        Soft, nontender, not distended, bowel sounds positive, no shifting dullness, suprapubic catheter with good urine  Pulses:   Pulses palpable  Extr:        No cyanosis or clubbing--no significant edema.    Neuro:    No focal deficits.   alert oriented x3  Skin:       Intact without lesions or rashes.    Psych:    Alert and cooperative; normal mood and affect; .      Results Review:  I have personally reviewed most recent Data :  CBC    Results from last 7 days   Lab Units 23  0943 23  0040 23  1632 23  0759 23  0309 23  1910 23  0759 23  0059 03/10/23  2356 03/10/23  2225 03/10/23  0611 03/10/23  0220 23  0722 23  0039 23  0623 23  0000   WBC 10*3/mm3  --  8.20  --   --  9.20  --   --   8.80  --  8.20  --  9.10  --  9.20  --  9.30   HEMOGLOBIN g/dL 7.4* 7.2* 7.6* 7.1* 7.2* 7.4* 8.2* 7.5*   < > 7.2*  7.2*   < > 7.3*  7.3*   < > 7.7*   < > 7.7*   PLATELETS 10*3/mm3  --  239  --   --  236  --   --  235  --  217  --  219  --  231  --  211    < > = values in this interval not displayed.     CMP   Results from last 7 days   Lab Units 03/14/23  0040 03/13/23  0309 03/12/23  0059 03/10/23  2225 03/10/23  0220 03/09/23  0039 03/08/23  0000   SODIUM mmol/L 140 139 138 137 137 137 137   POTASSIUM mmol/L 4.7 4.7 4.6 4.8 5.1 5.2 5.1   CHLORIDE mmol/L 106 105 105 104 104 103 103   CO2 mmol/L 23.0 23.0 22.0 25.0 22.0 22.0 22.0   BUN mg/dL 47* 44* 51* 53* 52* 55* 53*   CREATININE mg/dL 1.86* 1.75* 1.80* 2.14* 2.32* 2.30* 2.21*   GLUCOSE mg/dL 103* 102* 132* 121* 123* 129* 143*   ALBUMIN g/dL 3.5 3.2* 3.4* 3.4* 3.2* 3.2* 3.2*   BILIRUBIN mg/dL <0.2 <0.2 <0.2 <0.2 <0.2 0.2 <0.2   ALK PHOS U/L 100 101 104 107 92 83 84   AST (SGOT) U/L 15 18 18 16 15 17 19   ALT (SGPT) U/L 10 10 9 10 9 8 6     ABG      No radiology results for the last day    Results for orders placed during the hospital encounter of 01/05/22    Adult Transthoracic Echo Complete w/ Color, Spectral and Contrast if Necessary Per Protocol    Interpretation Summary  · Left ventricular ejection fraction appears to be 56 - 60%.  · The right ventricular cavity is moderately dilated.  · No pericardial effusion noted    Scheduled Meds:bumetanide, 0.5 mg, Oral, Daily  sodium bicarbonate, 650 mg, Oral, TID  sodium chloride, 500 mL, Intravenous, Once  sodium chloride, 10 mL, Intravenous, Q12H  sodium zirconium cyclosilicate, 10 g, Oral, Daily      Continuous Infusions:   PRN Meds:•  acetaminophen **OR** acetaminophen  •  influenza vaccine  •  ondansetron **OR** ondansetron  •  oxyCODONE  •  [COMPLETED] Insert Peripheral IV **AND** sodium chloride  •  sodium chloride  •  sodium chloride    Assessment & Plan   Assessment and Plan:         Acute blood loss  anemia    Acute renal failure (ARF) (HCC)    Acquired scoliosis    ADHD    Depression    Mixed hyperlipidemia    Primary hypertension    Bilateral pneumonia    Marijuana use    UTI (urinary tract infection)    Gross hematuria    BPH (benign prostatic hyperplasia)    History of alcohol use disorder    ASSESSMENT:  • Acute kidney injury, stage III, oligoanuric, metabolic differential at this this time, including sepsis, with bilateral pneumonia, now with obstructive uropathy had been taking Aleve, for the back pain,  • Bilateral nephrostomy tube placed 3/5/2023  • Hyperkalemia, secondary to CARLOS ALBERTO  • [place] acidosis secondary to CARLOS ALBERTO  • Weakness and tiredness, with a BUN of 59, very concerning for uremia, but patient is improving since he has  • Metabolic acidosis,   • History of alcohol use,  • Chronic back pain,using NSAID in the past  • B/l pneumonia.  Surprisingly, WBC stable,           Plan:      • CARLOS ALBERTO 2/2 obstructive uropathy   • Bilateral nephrostomy tube are draining well  • Creatinine that was improving now worsening again  • Likely some obstructive uropathy because of the presence of bilateral nephrostomy and positioning  • Follow-up with renal functions tomorrow morning  • Volume is under control.  • Patient is a good urine output from bilateral nephrostomy tubes undergoing CBI, pinkish urine  • hemoGlobin stable at 8.2  • Follow-up with renal function I still think there will be a gradual improvement with good urinary flow from the bilateral nephrostomy  • Echocardiogram done last time showed ejection fraction 56 to 60%  • Repeat labs tomorrow morning  • We will follow closely            Electronically signed by Jamie Houston MD,   [facility] kidney consultant  [phone]  3/14/2023  11:03 EDT

## 2023-03-15 LAB
ALBUMIN SERPL-MCNC: 3.5 G/DL (ref 3.5–5.2)
ALBUMIN/GLOB SERPL: 1 G/DL
ALP SERPL-CCNC: 105 U/L (ref 39–117)
ALT SERPL W P-5'-P-CCNC: 9 U/L (ref 1–41)
ANION GAP SERPL CALCULATED.3IONS-SCNC: 12 MMOL/L (ref 5–15)
AST SERPL-CCNC: 14 U/L (ref 1–40)
BASOPHILS # BLD AUTO: 0.2 10*3/MM3 (ref 0–0.2)
BASOPHILS # BLD AUTO: 0.2 10*3/MM3 (ref 0–0.2)
BASOPHILS NFR BLD AUTO: 1.8 % (ref 0–1.5)
BASOPHILS NFR BLD AUTO: 2.1 % (ref 0–1.5)
BILIRUB SERPL-MCNC: <0.2 MG/DL (ref 0–1.2)
BUN SERPL-MCNC: 45 MG/DL (ref 8–23)
BUN/CREAT SERPL: 23.6 (ref 7–25)
CALCIUM SPEC-SCNC: 8.7 MG/DL (ref 8.6–10.5)
CHLORIDE SERPL-SCNC: 104 MMOL/L (ref 98–107)
CO2 SERPL-SCNC: 24 MMOL/L (ref 22–29)
CREAT SERPL-MCNC: 1.91 MG/DL (ref 0.76–1.27)
DEPRECATED RDW RBC AUTO: 51.2 FL (ref 37–54)
DEPRECATED RDW RBC AUTO: 52.5 FL (ref 37–54)
EGFRCR SERPLBLD CKD-EPI 2021: 38.4 ML/MIN/1.73
EOSINOPHIL # BLD AUTO: 0.4 10*3/MM3 (ref 0–0.4)
EOSINOPHIL # BLD AUTO: 0.5 10*3/MM3 (ref 0–0.4)
EOSINOPHIL NFR BLD AUTO: 3.9 % (ref 0.3–6.2)
EOSINOPHIL NFR BLD AUTO: 5 % (ref 0.3–6.2)
ERYTHROCYTE [DISTWIDTH] IN BLOOD BY AUTOMATED COUNT: 16.1 % (ref 12.3–15.4)
ERYTHROCYTE [DISTWIDTH] IN BLOOD BY AUTOMATED COUNT: 16.2 % (ref 12.3–15.4)
GLOBULIN UR ELPH-MCNC: 3.4 GM/DL
GLUCOSE SERPL-MCNC: 119 MG/DL (ref 65–99)
HCT VFR BLD AUTO: 22 % (ref 37.5–51)
HCT VFR BLD AUTO: 23.6 % (ref 37.5–51)
HGB BLD-MCNC: 7.4 G/DL (ref 13–17.7)
HGB BLD-MCNC: 7.5 G/DL (ref 13–17.7)
LYMPHOCYTES # BLD AUTO: 2.4 10*3/MM3 (ref 0.7–3.1)
LYMPHOCYTES # BLD AUTO: 2.5 10*3/MM3 (ref 0.7–3.1)
LYMPHOCYTES NFR BLD AUTO: 24.5 % (ref 19.6–45.3)
LYMPHOCYTES NFR BLD AUTO: 24.9 % (ref 19.6–45.3)
MAGNESIUM SERPL-MCNC: 1.8 MG/DL (ref 1.6–2.4)
MCH RBC QN AUTO: 28.5 PG (ref 26.6–33)
MCH RBC QN AUTO: 29.2 PG (ref 26.6–33)
MCHC RBC AUTO-ENTMCNC: 32 G/DL (ref 31.5–35.7)
MCHC RBC AUTO-ENTMCNC: 33.5 G/DL (ref 31.5–35.7)
MCV RBC AUTO: 87.2 FL (ref 79–97)
MCV RBC AUTO: 89.1 FL (ref 79–97)
MONOCYTES # BLD AUTO: 0.9 10*3/MM3 (ref 0.1–0.9)
MONOCYTES # BLD AUTO: 1.2 10*3/MM3 (ref 0.1–0.9)
MONOCYTES NFR BLD AUTO: 11.6 % (ref 5–12)
MONOCYTES NFR BLD AUTO: 9.2 % (ref 5–12)
NEUTROPHILS NFR BLD AUTO: 5.7 10*3/MM3 (ref 1.7–7)
NEUTROPHILS NFR BLD AUTO: 5.9 10*3/MM3 (ref 1.7–7)
NEUTROPHILS NFR BLD AUTO: 58.2 % (ref 42.7–76)
NEUTROPHILS NFR BLD AUTO: 58.8 % (ref 42.7–76)
NRBC BLD AUTO-RTO: 0 /100 WBC (ref 0–0.2)
NRBC BLD AUTO-RTO: 0 /100 WBC (ref 0–0.2)
PHOSPHATE SERPL-MCNC: 4.2 MG/DL (ref 2.5–4.5)
PLATELET # BLD AUTO: 242 10*3/MM3 (ref 140–450)
PLATELET # BLD AUTO: 246 10*3/MM3 (ref 140–450)
PMV BLD AUTO: 8.2 FL (ref 6–12)
PMV BLD AUTO: 8.7 FL (ref 6–12)
POTASSIUM SERPL-SCNC: 4.6 MMOL/L (ref 3.5–5.2)
PROT SERPL-MCNC: 6.9 G/DL (ref 6–8.5)
RBC # BLD AUTO: 2.52 10*6/MM3 (ref 4.14–5.8)
RBC # BLD AUTO: 2.65 10*6/MM3 (ref 4.14–5.8)
SODIUM SERPL-SCNC: 140 MMOL/L (ref 136–145)
WBC NRBC COR # BLD: 10.2 10*3/MM3 (ref 3.4–10.8)
WBC NRBC COR # BLD: 9.7 10*3/MM3 (ref 3.4–10.8)

## 2023-03-15 PROCEDURE — 97164 PT RE-EVAL EST PLAN CARE: CPT

## 2023-03-15 PROCEDURE — 84100 ASSAY OF PHOSPHORUS: CPT | Performed by: UROLOGY

## 2023-03-15 PROCEDURE — 97166 OT EVAL MOD COMPLEX 45 MIN: CPT

## 2023-03-15 PROCEDURE — 36415 COLL VENOUS BLD VENIPUNCTURE: CPT | Performed by: UROLOGY

## 2023-03-15 PROCEDURE — 85025 COMPLETE CBC W/AUTO DIFF WBC: CPT | Performed by: UROLOGY

## 2023-03-15 PROCEDURE — 80053 COMPREHEN METABOLIC PANEL: CPT | Performed by: UROLOGY

## 2023-03-15 PROCEDURE — 97116 GAIT TRAINING THERAPY: CPT

## 2023-03-15 PROCEDURE — 83735 ASSAY OF MAGNESIUM: CPT | Performed by: UROLOGY

## 2023-03-15 RX ADMIN — SODIUM BICARBONATE 650 MG: 650 TABLET ORAL at 07:53

## 2023-03-15 RX ADMIN — Medication 10 ML: at 20:42

## 2023-03-15 RX ADMIN — SODIUM ZIRCONIUM CYCLOSILICATE 10 G: 10 POWDER, FOR SUSPENSION ORAL at 07:52

## 2023-03-15 RX ADMIN — SODIUM BICARBONATE 650 MG: 650 TABLET ORAL at 20:42

## 2023-03-15 RX ADMIN — SODIUM BICARBONATE 650 MG: 650 TABLET ORAL at 16:26

## 2023-03-15 RX ADMIN — BUMETANIDE 0.5 MG: 1 TABLET ORAL at 07:52

## 2023-03-15 RX ADMIN — Medication 10 ML: at 07:53

## 2023-03-15 NOTE — PROGRESS NOTES
"     [facility] Medicine Services Daily Progress Note     Patient Name: Anthony Moss  : 1955  MRN: [id number]  Primary Care Physician:  Reji Topete MD  Date of admission: 2023        Subjective       Chief Complaint: Hematuria and weakness.        Patient Reports:           2023.  Patient was seen and examined.  Patient reported slight improvement in his symptoms.        2023.  Patient was seen and examined.  Patient reported moderate improvement in his symptoms.        2023.  Patient was seen and examined.  Patient complained of constant constipation.  Patient reported last bowel movement to be 2023.  Lactulose was ordered.        2023.  Patient was seen and examined.  No overnight events noted.  Neurology and nephrology are following.       3/6/2023; first time seeing the patient, extensive chart notes reviewed,, Roberts catheter came out, both suprapubic and Roberts catheter were replaced by urology for CBI, otherwise no new symptoms hemodynamically stable  3/7/2023; patient still continues with hematuria/CBI, getting lighter, otherwise, hemodynamically stable, eating lunch in the bed,  3/8/2023; patient still continues with hematuria, continue CBI,   height is 188 cm (74.02\") and weight is 97.3 kg (214 lb 8.1 oz). His axillary temperature is 97.8 °F (36.6 °C). His blood pressure is 126/80 and his pulse is 99. His respiration is 13 and oxygen saturation is 98%.     3/9/2023; afebrile vital signs are stable, overnight events noted patient had a lot of clots passing through Roberts catheter, currently urine appearing pinkish, CBI on hold as per urology, hemoglobin stable, serum creatinine stable around 2.30, continue to monitor.    3/10/2023; afebrile vital signs are stable, continues to be on room air, CBI was resumed yesterday, continues to have pinkish urine, serum creatinine 2.32, no other symptoms continue to " monitor.    3/11/2023:  Patient S&E.  No new complaints.  Tolerating CBI well.  Nurse reports no acute overnight events.  Urine remains pink-tinged.  Creatinine slightly improved, hemoglobin stable.    3/12/2023:  Patient S&E.  Tolerating CBI well.  Still having some pinkish output.  Urology and nephrology recommendations appreciated.  We will continue current management.    3/13/23:  Awaiting Urology recs for DC. Still on CBI.     3/14/2023:  Patient's urine has not cleared up very nicely.  We will wean away from CBI to adjust Roberts catheter.  Urology recommending SNF placement in case of Roberts complications.  Patient is agreeable with this, pre-CERT is required according to case management.  No acute complaints at this time, nurse reports no acute overnight events.    3/14/2023:  Having some more hematuria today. Awaiting precert approval for placement, hopefully tomorrow. No new symptoms or complaints.     Review of Systems   Constitutional: Negative for malaise/fatigue.   HENT: Negative.    Eyes: Negative.    Cardiovascular: Negative.    Respiratory: Negative.    Endocrine: Negative.    Hematologic/Lymphatic: Negative.    Skin: Negative.    Musculoskeletal: Negative.    Gastrointestinal: Negative for constipation.   Genitourinary: Negative.    Neurological: Positive for weakness.   Psychiatric/Behavioral: Negative.    Allergic/Immunologic: Negative.                Objective       Vitals:   Temp:  [97.4 °F (36.3 °C)-98.2 °F (36.8 °C)] 97.4 °F (36.3 °C)  Heart Rate:  [] 94  Resp:  [14-19] 19  BP: (118-133)/(72-78) 118/74     Physical Exam  Vitals reviewed.   Constitutional:       General: He is not in acute distress.  HENT:      Head: Normocephalic.      Nose: Nose normal.      Mouth/Throat:      Mouth: Mucous membranes are dry.      Pharynx: Oropharynx is clear.   Eyes:      Extraocular Movements: Extraocular movements intact.      Conjunctiva/sclera: Conjunctivae normal.      Pupils: Pupils are equal,  round, and reactive to light.   Cardiovascular:      Pulses: Normal pulses.      Heart sounds: No murmur heard.    No friction rub. No gallop.      Comments: S1 and S2 present.  No tachycardia.  Pulmonary:      Effort: Pulmonary effort is normal.      Breath sounds: No stridor. No wheezing or rales.   Chest:      Chest wall: No tenderness.   Abdominal:      General: Bowel sounds are normal. There is no distension.      Palpations: Abdomen is soft.      Tenderness: There is no abdominal tenderness. There is no right CVA tenderness or guarding.  Bilateral nephrostomy tubes in place, suprapubic catheter and Roberts catheter in place, Roberts bag with pinkish urine  Musculoskeletal:         General: No swelling, tenderness, deformity or signs of injury.      Cervical back: Normal range of motion. No rigidity.      Right lower leg: No edema.      Left lower leg: No edema.   Skin:     General: Skin is warm and dry.      Capillary Refill: Capillary refill takes less than 2 seconds.      Coloration: Skin is not jaundiced.      Findings: No bruising, erythema, lesion or rash.   Neurological:      Comments: No facial asymmetry noted.  Gait and station not tested.   Psychiatric:      Comments: No agitation.                             Result Review    Result Review:  I have personally reviewed the results from the time of this admission to 3/5/2023 16:48 EST and agree with these findings:  [x]?  Laboratory  [x]?  Microbiology  [x]?  Radiology  []?  EKG/Telemetry   []?  Cardiology/Vascular   []?  Pathology  []?  Old records  []?  Other:  Most notable findings include:        Lab Results   Component Value Date    GLUCOSE 119 (H) 03/15/2023    CALCIUM 8.7 03/15/2023     03/15/2023    K 4.6 03/15/2023    CO2 24.0 03/15/2023     03/15/2023    BUN 45 (H) 03/15/2023    CREATININE 1.91 (H) 03/15/2023    EGFR 38.4 (L) 03/15/2023    BCR 23.6 03/15/2023    ANIONGAP 12.0 03/15/2023   No radiology results for the last 7 days      Assessment & Plan    Previous notes and with minor updates.        Brief Patient Summary:     Patient is a 67-year-old male with history of osteoarthritis, BPH, thrombocytopenia, alcohol use disorder, chronic respiratory failure on home oxygen who presented to the emergency room because of hematuria and weakness.  Patient was admitted to ICU because of his severe anemia.  Patient was diagnosed with acute blood anemia with hemoglobin of 4.  Patient was also diagnosed with gross hematuria and urology consult was completed.  Patient was also diagnosed with acute kidney injury and a nephrology consult was completed.           sodium bicarbonate, 650 mg, Oral, TID  sodium chloride, 500 mL, Intravenous, Once  sodium chloride, 10 mL, Intravenous, Q12H               Active Hospital Problems:       Active Hospital Problems     Diagnosis     • **Acute blood loss anemia     • Gross hematuria     • UTI (urinary tract infection)     • Acute renal failure (ARF) (HCC)     • Bilateral pneumonia     • BPH (benign prostatic hyperplasia)     • History of alcohol use disorder     • Marijuana use     • ADHD     • Depression     • Mixed hyperlipidemia     • Primary hypertension     • Acquired scoliosis              Plan:     bilateral ureteral obstruction/gross hematuria;  - s/p bilateral nephrostomy tubes by IR, with improving creatinine  -- Maintain suprapubic catheter, draining well  -- Maintain Roberts catheter to prevent urinary obstruction from bladder outlet  -- Currently no signs of hematuria at all, patient has very clear CBI    Bladder cancer  -- Patient has been tentatively scheduled for March 20 teeth for radical cystectomy with ileal conduit.    Acute kidney injury, resolved  Nephrology following, recommendation appreciated  -- Creatinine improved, now stabilized.    Electrolyte abnormalities  - being replaced. Resolved    Acute blood loss anemia/gross hematuria;  - hemoglobin 7.8 transfuse to keep over 7 g/dL currently  hemoglobin stable in Mis 7 range               DVT prophylaxis:  Mechanical DVT prophylaxis orders are present.     CODE STATUS:    Code Status (Patient has no pulse and is not breathing): CPR (Attempt to Resuscitate)  Medical Interventions (Patient has pulse or is breathing): Full Support        Disposition:  Patient DC to SNF in the next 1 to 2 days.  Stable at this time.     This patient has been examined wearing appropriate Personal Protective Equipment and discussed with hospital infection control department, [facility], infectious disease specialist and pulmonologist.           Electronically signed by Saima Roberto DO  03/10/23, 1:53 PM EST.

## 2023-03-15 NOTE — PLAN OF CARE
Goal Outcome Evaluation:               Pt with bilat nephrostomy tubes in place & patent. Also CBI with pinkish output throughout most of shift, once with small clot, once with clear output. Minimal c/o pain, treated effectively per MAR. Pt denies any other complaints/concerns. Plan of care ongoing.

## 2023-03-15 NOTE — CASE MANAGEMENT/SOCIAL WORK
Continued Stay Note  RADHA Ruffin     Patient Name: Anthony Moss  MRN: [id number]  Today's Date: 3/15/2023    Admit Date: 2/28/2023    Plan: Accepted at [facility] with precert started 3/15. Patient will be a planned readmit for surgery 3/28   Discharge Plan     Row Name 03/15/23 1306       Plan    Plan Accepted at [facility] with precert started 3/15. Patient will be a planned readmit for surgery 3/28    Row Name 03/15/23 1140       Plan    Plan Referral to [facility] pending acceptance and will need precert. patient will be a planned readmit for surgery 3/28    Plan Comments Referral sent to [facility] and is pending acceptance. Barriers to dc: Pt with bilat nephrostomy tubes in place & patent. Also CBI                   Expected Discharge Date and Time     Expected Discharge Date Expected Discharge Time    Mar 17, 2023             Katlyn Rutledge RN

## 2023-03-15 NOTE — PROGRESS NOTES
"  [facility] DAILY PROGRESS NOTE    Patient Identification  Name: Anthony Moss  Age: 67 y.o.  Sex: male  :  1955  MRN: [id number]    Date: 3/15/2023             Subjective:  Interval History: Urine pink in bag    Objective:    Scheduled Meds:bumetanide, 0.5 mg, Oral, Daily  sodium bicarbonate, 650 mg, Oral, TID  sodium chloride, 500 mL, Intravenous, Once  sodium chloride, 10 mL, Intravenous, Q12H  sodium zirconium cyclosilicate, 10 g, Oral, Daily      Continuous Infusions:   PRN Meds:•  acetaminophen **OR** acetaminophen  •  influenza vaccine  •  ondansetron **OR** ondansetron  •  oxyCODONE  •  [COMPLETED] Insert Peripheral IV **AND** sodium chloride  •  sodium chloride  •  sodium chloride    Vital signs in last 24 hours:  Temp:  [97.4 °F (36.3 °C)-98.2 °F (36.8 °C)] 97.4 °F (36.3 °C)  Heart Rate:  [] 94  Resp:  [14-19] 19  BP: (118-133)/(72-78) 118/74    Intake/Output:    Intake/Output Summary (Last 24 hours) at 3/15/2023 1546  Last data filed at 3/15/2023 1434  Gross per 24 hour   Intake 96179 ml   Output 01916 ml   Net -73200 ml       Exam:  /74 (BP Location: Left arm, Patient Position: Sitting)   Pulse 94   Temp 97.4 °F (36.3 °C) (Oral)   Resp 19   Ht 188 cm (74.02\")   Wt 95.1 kg (209 lb 10.5 oz)   SpO2 95%   BMI 26.91 kg/m²     General Appearance:    Alert, cooperative, no distress, appears stated age               Abdomen:     Soft, ND   :   Suprapubic tube 24 three-way Simplastic, 20 Tamazight coudé per urethra urine clear in tubing                Data Review:  All labs (24hrs):   Recent Results (from the past 24 hour(s))   Magnesium    Collection Time: 03/15/23  5:46 AM    Specimen: Blood   Result Value Ref Range    Magnesium 1.8 1.6 - 2.4 mg/dL   Phosphorus    Collection Time: 03/15/23  5:46 AM    Specimen: Blood   Result Value Ref Range    Phosphorus 4.2 2.5 - 4.5 mg/dL   Comprehensive Metabolic Panel    Collection Time: 03/15/23  5:46 AM    Specimen: Blood   Result Value " Ref Range    Glucose 119 (H) 65 - 99 mg/dL    BUN 45 (H) 8 - 23 mg/dL    Creatinine 1.91 (H) 0.76 - 1.27 mg/dL    Sodium 140 136 - 145 mmol/L    Potassium 4.6 3.5 - 5.2 mmol/L    Chloride 104 98 - 107 mmol/L    CO2 24.0 22.0 - 29.0 mmol/L    Calcium 8.7 8.6 - 10.5 mg/dL    Total Protein 6.9 6.0 - 8.5 g/dL    Albumin 3.5 3.5 - 5.2 g/dL    ALT (SGPT) 9 1 - 41 U/L    AST (SGOT) 14 1 - 40 U/L    Alkaline Phosphatase 105 39 - 117 U/L    Total Bilirubin <0.2 0.0 - 1.2 mg/dL    Globulin 3.4 gm/dL    A/G Ratio 1.0 g/dL    BUN/Creatinine Ratio 23.6 7.0 - 25.0    Anion Gap 12.0 5.0 - 15.0 mmol/L    eGFR 38.4 (L) >60.0 mL/min/1.73   CBC Auto Differential    Collection Time: 03/15/23  5:46 AM    Specimen: Blood   Result Value Ref Range    WBC 9.70 3.40 - 10.80 10*3/mm3    RBC 2.65 (L) 4.14 - 5.80 10*6/mm3    Hemoglobin 7.5 (L) 13.0 - 17.7 g/dL    Hematocrit 23.6 (L) 37.5 - 51.0 %    MCV 89.1 79.0 - 97.0 fL    MCH 28.5 26.6 - 33.0 pg    MCHC 32.0 31.5 - 35.7 g/dL    RDW 16.2 (H) 12.3 - 15.4 %    RDW-SD 51.2 37.0 - 54.0 fl    MPV 8.2 6.0 - 12.0 fL    Platelets 246 140 - 450 10*3/mm3    Neutrophil % 58.8 42.7 - 76.0 %    Lymphocyte % 24.9 19.6 - 45.3 %    Monocyte % 9.2 5.0 - 12.0 %    Eosinophil % 5.0 0.3 - 6.2 %    Basophil % 2.1 (H) 0.0 - 1.5 %    Neutrophils, Absolute 5.70 1.70 - 7.00 10*3/mm3    Lymphocytes, Absolute 2.40 0.70 - 3.10 10*3/mm3    Monocytes, Absolute 0.90 0.10 - 0.90 10*3/mm3    Eosinophils, Absolute 0.50 (H) 0.00 - 0.40 10*3/mm3    Basophils, Absolute 0.20 0.00 - 0.20 10*3/mm3    nRBC 0.0 0.0 - 0.2 /100 WBC      Imaging Results (Last 24 Hours)     ** No results found for the last 24 hours. **           Assessment:    Acute blood loss anemia    Acute renal failure (ARF) (HCC)    Acquired scoliosis    ADHD    Depression    Mixed hyperlipidemia    Primary hypertension    Bilateral pneumonia    Marijuana use    UTI (urinary tract infection)    Gross hematuria    BPH (benign prostatic hyperplasia)    History of  alcohol use disorder    Neurogenic bladder with suprapubic tube  Severe bladder wall thickening causing bilateral hydronephrosis  Cystitis  Hematuria and anemia     Plan:      Cont try to wean CBI to off and discharge  Path noted for prostate cancer, PSA level normal suspicious for poorly differentiated prostate cancer, still planning for March 28 for radical cystectomy with ileal conduit  Once his urine is cleared he may be discharged in preparation for surgery he will need to maintain bilateral nephrostomy tubes through surgery      Xu Montero MD  [facility]  [address], [address]  [place] [address]  Office: [phone]  Cell: [phone]  Also available via Epic Secure Chat  03/15/23  15:46 EDT

## 2023-03-15 NOTE — PLAN OF CARE
Goal Outcome Evaluation:  Plan of Care Reviewed With: patient             67-year-old male with history of prostate cancer, osteoarthritis, BPH, thrombocytopenia, alcohol use disorder, chronic respiratory failure on home oxygen who presented to the emergency room because of hematuria and weakness. Pt now has trujillo and suprapubic catheters, bilateral nephrostomy tubes, and is on continuous bladder irrigation.  Pt states he lives home alone, typically is independent with all ADLs ambulation with RWx and has not had any recent falls. Pt states he has few steps to enter home, and a basement that he does not need to travel down to. This date, he is oriented x4 and in chair upon arrival. He is able to stand and ambulate with RW, but requires assistance with management of extensive tubes, drains, and CBI. He has poor activity tolerance, fatiguing rapidly with very little exertion. He needs assistance with lower body self-care due to lines and tubes. OT feels he would not be safe to return home at d/c with no one in the home with him to assist nor provide supervision. SNF is recommended at d/c, and OT will follow.

## 2023-03-15 NOTE — DISCHARGE PLACEMENT REQUEST
"Anthony Moss (67 y.o. Male)     Date of Birth   1955    Social Security Number       Address   [address] [place] [address]    Home Phone   [phone]    MRN   [id number]       Shinto   None    Marital Status                               Admission Date   2/28/23    Admission Type   Emergency    Admitting Provider   Saima Roberto DO    Attending Provider   Saima Roberto DO    Department, Room/Bed   [facility] SURGICAL INPATIENT, 4127/1       Discharge Date       Discharge Disposition       Discharge Destination                               Attending Provider: Saima Roberto DO    Allergies: No Known Allergies    Isolation: None   Infection: MRSA No Isolation this Admit (03/01/23)   Code Status: CPR    Ht: 188 cm (74.02\")   Wt: 95.1 kg (209 lb 10.5 oz)    Admission Cmt: None   Principal Problem: Acute blood loss anemia [D62]                 Active Insurance as of 2/28/2023     Primary Coverage     Payor Plan Insurance Group Employer/Plan Group    ANTHEM MEDICARE REPLACEMENT ANTHEM MEDICARE ADVANTAGE [id number]     Payor Plan Address Payor Plan Phone Number Payor Plan Fax Number Effective Dates    [address] [phone]  2/1/2023 - None Entered    [facility] [phone]       Subscriber Name Subscriber Birth Date Member ID       ANTHONY MOSS 1955 [id number]           Secondary Coverage     Payor Plan Insurance Group Employer/Plan Group    [place]      Payor Plan Address Payor Plan Phone Number Payor Plan Fax Number Effective Dates    [address]   2/28/2023 - None Entered    [place] [address]       Subscriber Name Subscriber Birth Date Member ID       ANTHONY MOSS 1955 [id number]                 Emergency Contacts      (Rel.) Home Phone Work Phone Mobile Phone    DURAN ERWIN (Friend) [phone] -- [phone]    YANETH PEREZ (Friend) [phone] -- [phone]            "
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
## 2023-03-15 NOTE — CASE MANAGEMENT/SOCIAL WORK
Continued Stay Note  RADHA Ruffin     Patient Name: Anthony Moss  MRN: [id number]  Today's Date: 3/15/2023    Admit Date: 2/28/2023    Plan: Referral to [facility] pending acceptance and will need precert. patient will be a planned readmit for surgery 3/28   Discharge Plan     Row Name 03/15/23 1140       Plan    Plan Referral to [facility] pending acceptance and will need precert. patient will be a planned readmit for surgery 3/28    Plan Comments Referral sent to [facility] and is pending acceptance. Barriers to dc: Pt with bilat nephrostomy tubes in place & patent. Also CBI                   Expected Discharge Date and Time     Expected Discharge Date Expected Discharge Time    Mar 17, 2023             Katlyn Rutledge RN

## 2023-03-15 NOTE — PROGRESS NOTES
PROGRESS NOTE      Patient Name: Anthony Moss  : 1955  MRN: [id number]  Primary Care Physician: Reji Topete MD  Date of admission: 2023    Patient Care Team:  Reji Topete MD as PCP - General (Family Medicine)        Subjective   Subjective:     Seen and examined  Resting in bed, no distress or new complaints  Kidney function improving patient undergoing CBI,  Pinkish urine    Review of systems:  All other review of system unremarkable      Allergies:  No Known Allergies    Objective   Exam:     Vital Signs  Temp:  [97.4 °F (36.3 °C)-98.2 °F (36.8 °C)] 97.4 °F (36.3 °C)  Heart Rate:  [] 94  Resp:  [14-19] 19  BP: (118-133)/(72-78) 118/74  SpO2:  [95 %-97 %] 95 %  on   ;   Device (Oxygen Therapy): room air  Body mass index is 26.91 kg/m².    General: Middle-age  male in no acute distress.    Head:      Normocephalic and atraumatic.    Eyes:      PERRL/EOM intact, conjunctiva and sclera clear with out nystagmus.    Neck:      No masses, thyromegaly,  trachea central with normal respiratory effort   Lungs:    Clear bilaterally to auscultation.    Heart:      Regular rate and rhythm, no murmur no gallop  Abd:        Soft, nontender, not distended, bowel sounds positive, no shifting dullness, suprapubic catheter with good urine  Pulses:   Pulses palpable  Extr:        No cyanosis or clubbing--no significant edema.    Neuro:    No focal deficits.   alert oriented x3  Skin:       Intact without lesions or rashes.    Psych:    Alert and cooperative; normal mood and affect; .      Results Review:  I have personally reviewed most recent Data :  CBC    Results from last 7 days   Lab Units 03/15/23  0546 23  0943 23  0040 23  1632 23  0759 23  0309 23  1910 23  0759 23  0059 03/10/23  2356 03/10/23  2225 03/10/23  0611 03/10/23  0220 23  0722 23  0039   WBC 10*3/mm3 9.70  --  8.20  --   --  9.20  --   --  8.80  --   8.20  --  9.10  --  9.20   HEMOGLOBIN g/dL 7.5* 7.4* 7.2* 7.6* 7.1* 7.2* 7.4*   < > 7.5*   < > 7.2*  7.2*   < > 7.3*  7.3*   < > 7.7*   PLATELETS 10*3/mm3 246  --  239  --   --  236  --   --  235  --  217  --  219  --  231    < > = values in this interval not displayed.     CMP   Results from last 7 days   Lab Units 03/15/23  0546 03/14/23  0040 03/13/23  0309 03/12/23  0059 03/10/23  2225 03/10/23  0220 03/09/23  0039   SODIUM mmol/L 140 140 139 138 137 137 137   POTASSIUM mmol/L 4.6 4.7 4.7 4.6 4.8 5.1 5.2   CHLORIDE mmol/L 104 106 105 105 104 104 103   CO2 mmol/L 24.0 23.0 23.0 22.0 25.0 22.0 22.0   BUN mg/dL 45* 47* 44* 51* 53* 52* 55*   CREATININE mg/dL 1.91* 1.86* 1.75* 1.80* 2.14* 2.32* 2.30*   GLUCOSE mg/dL 119* 103* 102* 132* 121* 123* 129*   ALBUMIN g/dL 3.5 3.5 3.2* 3.4* 3.4* 3.2* 3.2*   BILIRUBIN mg/dL <0.2 <0.2 <0.2 <0.2 <0.2 <0.2 0.2   ALK PHOS U/L 105 100 101 104 107 92 83   AST (SGOT) U/L 14 15 18 18 16 15 17   ALT (SGPT) U/L 9 10 10 9 10 9 8     ABG      No radiology results for the last day    Results for orders placed during the hospital encounter of 01/05/22    Adult Transthoracic Echo Complete w/ Color, Spectral and Contrast if Necessary Per Protocol    Interpretation Summary  · Left ventricular ejection fraction appears to be 56 - 60%.  · The right ventricular cavity is moderately dilated.  · No pericardial effusion noted    Scheduled Meds:bumetanide, 0.5 mg, Oral, Daily  sodium bicarbonate, 650 mg, Oral, TID  sodium chloride, 500 mL, Intravenous, Once  sodium chloride, 10 mL, Intravenous, Q12H  sodium zirconium cyclosilicate, 10 g, Oral, Daily      Continuous Infusions:   PRN Meds:•  acetaminophen **OR** acetaminophen  •  influenza vaccine  •  ondansetron **OR** ondansetron  •  oxyCODONE  •  [COMPLETED] Insert Peripheral IV **AND** sodium chloride  •  sodium chloride  •  sodium chloride    Assessment & Plan   Assessment and Plan:         Acute blood loss anemia    Acute renal failure (ARF)  (HCC)    Acquired scoliosis    ADHD    Depression    Mixed hyperlipidemia    Primary hypertension    Bilateral pneumonia    Marijuana use    UTI (urinary tract infection)    Gross hematuria    BPH (benign prostatic hyperplasia)    History of alcohol use disorder    ASSESSMENT:  • Acute kidney injury, stage III, oligoanuric, metabolic differential at this this time, including sepsis, with bilateral pneumonia, now with obstructive uropathy had been taking Aleve, for the back pain,  • Bilateral nephrostomy tube placed 3/5/2023  • Hyperkalemia, secondary to CARLOS ALBERTO  • Bolick acidosis secondary to CARLOS ALBERTO  • Weakness and tiredness, with a BUN of 59, very concerning for uremia, but patient is improving since he has  • Metabolic acidosis,   • History of alcohol use,  • Chronic back pain,using NSAID in the past  • B/l pneumonia.  Surprisingly, WBC stable,           Plan:      • CARLOS ALBERTO 2/2 obstructive uropathy   • Bilateral nephrostomy tube are draining well  • Creatinine that was improving now worsening again etiology uncertain might be related to nephrostomy tube placement  • Patient has bilateral nephrostomy tubes  • Follow-up with renal functions tomorrow morning  • Volume is under control.  • Patient is a good urine output from bilateral nephrostomy tubes undergoing CBI, pinkish urine  • hemoGlobin stable at 8.2  • Follow-up with renal function I still think there will be a gradual improvement with good urinary flow from the bilateral nephrostomy  • Echocardiogram done last time showed ejection fraction 56 to 60%  • Repeat labs tomorrow morning  • We will follow closely            Electronically signed by Jamie Houston MD,   [facility] kidney consultant  [phone]  3/15/2023  17:59 EDT

## 2023-03-15 NOTE — PLAN OF CARE
Goal Outcome Evaluation:  Plan of Care Reviewed With: patient           Outcome Evaluation: Completed re-eval this date due to length of stay and overall decline in pt function since initial therapy eval/discharge. 67-year-old male with history of prostate cancer, osteoarthritis, BPH, thrombocytopenia, alcohol use disorder, chronic respiratory failure on home oxygen who presented to the emergency room because of hematuria and weakness. Pt now has trujillo and suprapubic catheters, bilateral nephrostomy tubes, and is on continuous bladder irrigation.  Pt lives alone, normally independent without assistive device.  Pt has been in hospital since 2/28 with limited activity and presents with limited endurance and generalized LE weaeknss.  Pt has difficulty managing lines/tubes and required RW to ambulate ~ 50' with excessive fatigue.  WIll follow 3x/week and recommend SNF for rehab at d/c.
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
## 2023-03-15 NOTE — PLAN OF CARE
Goal Outcome Evaluation:  Plan of Care Reviewed With: patient        Progress: no change  Outcome Evaluation: stable and no complaints of pain. discontinued CBI per primary team for discharge tomorrow.  Pending precert to tonio roche per TIFFANIE note. Patient is agreeable to this. Safety measures in place. Call light within reach, patient able to make needs known. see flowsheets for output. Will continue to monitor.

## 2023-03-16 LAB
ALBUMIN SERPL-MCNC: 3.3 G/DL (ref 3.5–5.2)
ALBUMIN/GLOB SERPL: 0.9 G/DL
ALP SERPL-CCNC: 100 U/L (ref 39–117)
ALT SERPL W P-5'-P-CCNC: 10 U/L (ref 1–41)
ANION GAP SERPL CALCULATED.3IONS-SCNC: 9 MMOL/L (ref 5–15)
AST SERPL-CCNC: 14 U/L (ref 1–40)
BILIRUB SERPL-MCNC: 0.2 MG/DL (ref 0–1.2)
BUN SERPL-MCNC: 50 MG/DL (ref 8–23)
BUN/CREAT SERPL: 27.5 (ref 7–25)
CALCIUM SPEC-SCNC: 8.8 MG/DL (ref 8.6–10.5)
CHLORIDE SERPL-SCNC: 101 MMOL/L (ref 98–107)
CO2 SERPL-SCNC: 26 MMOL/L (ref 22–29)
CREAT SERPL-MCNC: 1.82 MG/DL (ref 0.76–1.27)
EGFRCR SERPLBLD CKD-EPI 2021: 40.7 ML/MIN/1.73
GLOBULIN UR ELPH-MCNC: 3.6 GM/DL
GLUCOSE SERPL-MCNC: 112 MG/DL (ref 65–99)
MAGNESIUM SERPL-MCNC: 1.7 MG/DL (ref 1.6–2.4)
PHOSPHATE SERPL-MCNC: 4 MG/DL (ref 2.5–4.5)
POTASSIUM SERPL-SCNC: 4.8 MMOL/L (ref 3.5–5.2)
PROT SERPL-MCNC: 6.9 G/DL (ref 6–8.5)
SODIUM SERPL-SCNC: 136 MMOL/L (ref 136–145)

## 2023-03-16 RX ADMIN — Medication 10 ML: at 08:54

## 2023-03-16 RX ADMIN — SODIUM BICARBONATE 650 MG: 650 TABLET ORAL at 20:19

## 2023-03-16 RX ADMIN — SODIUM ZIRCONIUM CYCLOSILICATE 10 G: 10 POWDER, FOR SUSPENSION ORAL at 08:54

## 2023-03-16 RX ADMIN — Medication 10 ML: at 20:19

## 2023-03-16 RX ADMIN — SODIUM BICARBONATE 650 MG: 650 TABLET ORAL at 08:54

## 2023-03-16 RX ADMIN — BUMETANIDE 0.5 MG: 1 TABLET ORAL at 08:54

## 2023-03-16 RX ADMIN — SODIUM BICARBONATE 650 MG: 650 TABLET ORAL at 16:52

## 2023-03-16 NOTE — PLAN OF CARE
Goal Outcome Evaluation:               Pt stable, no concerns or complaints verbalized by pt. Nephrostomy tubes in place and patent, suprapubic & trujillo caths patent with bloody output. Can d/c per team once pre-cert goes through at [facility]. Will continue to monitor.

## 2023-03-16 NOTE — PROGRESS NOTES
PROGRESS NOTE      Patient Name: Anthony Moss  : 1955  MRN: [id number]  Primary Care Physician: Reji Topete MD  Date of admission: 2023    Patient Care Team:  Reji Topete MD as PCP - General (Family Medicine)        Subjective   Subjective:     Seen and examined  Resting in bed, no distress or new complaints  Kidney function improving patient undergoing CBI,  Pinkish urine    Review of systems:  All other review of system unremarkable      Allergies:  No Known Allergies    Objective   Exam:     Vital Signs  Temp:  [97.4 °F (36.3 °C)-98.1 °F (36.7 °C)] 98.1 °F (36.7 °C)  Heart Rate:  [] 100  Resp:  [17-22] 22  BP: (103-134)/(62-83) 103/62  SpO2:  [95 %-100 %] 95 %  on   ;   Device (Oxygen Therapy): room air  Body mass index is 26.91 kg/m².    General: Middle-age  male in no acute distress.    Head:      Normocephalic and atraumatic.    Eyes:      PERRL/EOM intact, conjunctiva and sclera clear with out nystagmus.    Neck:      No masses, thyromegaly,  trachea central with normal respiratory effort   Lungs:    Clear bilaterally to auscultation.    Heart:      Regular rate and rhythm, no murmur no gallop  Abd:        Soft, nontender, not distended, bowel sounds positive, no shifting dullness, suprapubic catheter with good urine  Pulses:   Pulses palpable  Extr:        No cyanosis or clubbing--no significant edema.    Neuro:    No focal deficits.   alert oriented x3  Skin:       Intact without lesions or rashes.    Psych:    Alert and cooperative; normal mood and affect; .      Results Review:  I have personally reviewed most recent Data :  CBC    Results from last 7 days   Lab Units 03/15/23  2255 03/15/23  0546 23  0943 23  0040 23  1632 23  0759 23  0309 23  0759 23  0059 03/10/23  2356 03/10/23  2225 03/10/23  0611 03/10/23  0220   WBC 10*3/mm3 10.20 9.70  --  8.20  --   --  9.20  --  8.80  --  8.20  --  9.10   HEMOGLOBIN  g/dL 7.4* 7.5* 7.4* 7.2* 7.6* 7.1* 7.2*   < > 7.5*   < > 7.2*  7.2*   < > 7.3*  7.3*   PLATELETS 10*3/mm3 242 246  --  239  --   --  236  --  235  --  217  --  219    < > = values in this interval not displayed.     CMP   Results from last 7 days   Lab Units 03/15/23  2255 03/15/23  0546 03/14/23  0040 03/13/23  0309 03/12/23  0059 03/10/23  2225 03/10/23  0220   SODIUM mmol/L 136 140 140 139 138 137 137   POTASSIUM mmol/L 4.8 4.6 4.7 4.7 4.6 4.8 5.1   CHLORIDE mmol/L 101 104 106 105 105 104 104   CO2 mmol/L 26.0 24.0 23.0 23.0 22.0 25.0 22.0   BUN mg/dL 50* 45* 47* 44* 51* 53* 52*   CREATININE mg/dL 1.82* 1.91* 1.86* 1.75* 1.80* 2.14* 2.32*   GLUCOSE mg/dL 112* 119* 103* 102* 132* 121* 123*   ALBUMIN g/dL 3.3* 3.5 3.5 3.2* 3.4* 3.4* 3.2*   BILIRUBIN mg/dL 0.2 <0.2 <0.2 <0.2 <0.2 <0.2 <0.2   ALK PHOS U/L 100 105 100 101 104 107 92   AST (SGOT) U/L 14 14 15 18 18 16 15   ALT (SGPT) U/L 10 9 10 10 9 10 9     ABG      No radiology results for the last day    Results for orders placed during the hospital encounter of 01/05/22    Adult Transthoracic Echo Complete w/ Color, Spectral and Contrast if Necessary Per Protocol    Interpretation Summary  · Left ventricular ejection fraction appears to be 56 - 60%.  · The right ventricular cavity is moderately dilated.  · No pericardial effusion noted    Scheduled Meds:bumetanide, 0.5 mg, Oral, Daily  sodium bicarbonate, 650 mg, Oral, TID  sodium chloride, 500 mL, Intravenous, Once  sodium chloride, 10 mL, Intravenous, Q12H  sodium zirconium cyclosilicate, 10 g, Oral, Daily      Continuous Infusions:   PRN Meds:•  acetaminophen **OR** acetaminophen  •  influenza vaccine  •  ondansetron **OR** ondansetron  •  oxyCODONE  •  [COMPLETED] Insert Peripheral IV **AND** sodium chloride  •  sodium chloride  •  sodium chloride    Assessment & Plan   Assessment and Plan:         Acute blood loss anemia    Acute renal failure (ARF) (HCC)    Acquired scoliosis    ADHD    Depression    Mixed  hyperlipidemia    Primary hypertension    Bilateral pneumonia    Marijuana use    UTI (urinary tract infection)    Gross hematuria    BPH (benign prostatic hyperplasia)    History of alcohol use disorder    ASSESSMENT:  • Acute kidney injury, stage III, oligoanuric, metabolic differential at this this time, including sepsis, with bilateral pneumonia, now with obstructive uropathy had been taking Aleve, for the back pain,  • Bilateral nephrostomy tube placed 3/5/2023  • Hyperkalemia, secondary to CARLOS ALBERTO  • [place] acidosis secondary to CARLOS ALBERTO  • Weakness and tiredness, with a BUN of 59, very concerning for uremia, but patient is improving since he has  • Metabolic acidosis,   • History of alcohol use,  • Chronic back pain,using NSAID in the past  • B/l pneumonia.  Surprisingly, WBC stable,           Plan:      • Some fluctuation in creatinine may be some partial obstruction because of the presence of bilateral nephrostomy tubes  • Creatinine slightly better than yesterday  • Follow-up with renal functions tomorrow morning  • Volume is under control.  • Patient is a good urine output from bilateral nephrostomy tubes undergoing CBI, pinkish urine  • hemoGlobin stable at 8.2  • Patient still have significant hematuria  • Follow-up with renal function I still think there will be a gradual improvement with good urinary flow from the bilateral nephrostomy  • Echocardiogram done last time showed ejection fraction 56 to 60%  • Repeat labs tomorrow morning  • We will follow closely            Electronically signed by Jamie Houston MD,   [facility] kidney consultant  [phone]  3/16/2023  10:20 EDT

## 2023-03-16 NOTE — PROGRESS NOTES
"     [facility] Medicine Services Daily Progress Note     Patient Name: Anthony Moss  : 1955  MRN: [id number]  Primary Care Physician:  Reji Topete MD  Date of admission: 2023        Subjective       Chief Complaint: Hematuria and weakness.        Patient Reports:           2023.  Patient was seen and examined.  Patient reported slight improvement in his symptoms.        2023.  Patient was seen and examined.  Patient reported moderate improvement in his symptoms.        2023.  Patient was seen and examined.  Patient complained of constant constipation.  Patient reported last bowel movement to be 2023.  Lactulose was ordered.        2023.  Patient was seen and examined.  No overnight events noted.  Neurology and nephrology are following.       3/6/2023; first time seeing the patient, extensive chart notes reviewed,, Roberts catheter came out, both suprapubic and Roberts catheter were replaced by urology for CBI, otherwise no new symptoms hemodynamically stable  3/7/2023; patient still continues with hematuria/CBI, getting lighter, otherwise, hemodynamically stable, eating lunch in the bed,  3/8/2023; patient still continues with hematuria, continue CBI,   height is 188 cm (74.02\") and weight is 97.3 kg (214 lb 8.1 oz). His axillary temperature is 97.8 °F (36.6 °C). His blood pressure is 126/80 and his pulse is 99. His respiration is 13 and oxygen saturation is 98%.     3/9/2023; afebrile vital signs are stable, overnight events noted patient had a lot of clots passing through Roberts catheter, currently urine appearing pinkish, CBI on hold as per urology, hemoglobin stable, serum creatinine stable around 2.30, continue to monitor.    3/10/2023; afebrile vital signs are stable, continues to be on room air, CBI was resumed yesterday, continues to have pinkish urine, serum creatinine 2.32, no other symptoms continue to " monitor.    3/11/2023:  Patient S&E.  No new complaints.  Tolerating CBI well.  Nurse reports no acute overnight events.  Urine remains pink-tinged.  Creatinine slightly improved, hemoglobin stable.    3/12/2023:  Patient S&E.  Tolerating CBI well.  Still having some pinkish output.  Urology and nephrology recommendations appreciated.  We will continue current management.    3/13/23:  Awaiting Urology recs for DC. Still on CBI.     3/14/2023:  Patient's urine has not cleared up very nicely.  We will wean away from CBI to adjust Roberts catheter.  Urology recommending SNF placement in case of Roberts complications.  Patient is agreeable with this, pre-CERT is required according to case management.  No acute complaints at this time, nurse reports no acute overnight events.    3/15/2023:  Having some more hematuria today. Awaiting precert approval for placement, hopefully tomorrow. No new symptoms or complaints.     3/16/2023:  Patient having some slightly red-tinged urine output today.  Stable otherwise.  No drop in hemoglobin.  Urology is wanting to send patient with Roberts to SNF with radical cystectomy scheduled on 3/28/2023.    Review of Systems   Constitutional: Negative for malaise/fatigue.   HENT: Negative.    Eyes: Negative.    Cardiovascular: Negative.    Respiratory: Negative.    Endocrine: Negative.    Hematologic/Lymphatic: Negative.    Skin: Negative.    Musculoskeletal: Negative.    Gastrointestinal: Negative for constipation.   Genitourinary: Negative.    Neurological: Positive for weakness.   Psychiatric/Behavioral: Negative.    Allergic/Immunologic: Negative.                Objective       Vitals:   Temp:  [97.3 °F (36.3 °C)-98.1 °F (36.7 °C)] 97.3 °F (36.3 °C)  Heart Rate:  [] 95  Resp:  [17-22] 19  BP: (103-134)/(62-83) 110/70     Physical Exam  Vitals reviewed.   Constitutional:       General: He is not in acute distress.  HENT:      Head: Normocephalic.      Nose: Nose normal.      Mouth/Throat:       Mouth: Mucous membranes are dry.      Pharynx: Oropharynx is clear.   Eyes:      Extraocular Movements: Extraocular movements intact.      Conjunctiva/sclera: Conjunctivae normal.      Pupils: Pupils are equal, round, and reactive to light.   Cardiovascular:      Pulses: Normal pulses.      Heart sounds: No murmur heard.    No friction rub. No gallop.      Comments: S1 and S2 present.  No tachycardia.  Pulmonary:      Effort: Pulmonary effort is normal.      Breath sounds: No stridor. No wheezing or rales.   Chest:      Chest wall: No tenderness.   Abdominal:      General: Bowel sounds are normal. There is no distension.      Palpations: Abdomen is soft.      Tenderness: There is no abdominal tenderness. There is no right CVA tenderness or guarding.  Bilateral nephrostomy tubes in place, suprapubic catheter and Roberts catheter in place, Roberts bag with pinkish urine  Musculoskeletal:         General: No swelling, tenderness, deformity or signs of injury.      Cervical back: Normal range of motion. No rigidity.      Right lower leg: No edema.      Left lower leg: No edema.   Skin:     General: Skin is warm and dry.      Capillary Refill: Capillary refill takes less than 2 seconds.      Coloration: Skin is not jaundiced.      Findings: No bruising, erythema, lesion or rash.   Neurological:      Comments: No facial asymmetry noted.  Gait and station not tested.   Psychiatric:      Comments: No agitation.                             Result Review    Result Review:  I have personally reviewed the results from the time of this admission to 3/5/2023 16:48 EST and agree with these findings:  [x]?  Laboratory  [x]?  Microbiology  [x]?  Radiology  []?  EKG/Telemetry   []?  Cardiology/Vascular   []?  Pathology  []?  Old records  []?  Other:  Most notable findings include:        Lab Results   Component Value Date    GLUCOSE 112 (H) 03/15/2023    CALCIUM 8.8 03/15/2023     03/15/2023    K 4.8 03/15/2023    CO2 26.0  03/15/2023     03/15/2023    BUN 50 (H) 03/15/2023    CREATININE 1.82 (H) 03/15/2023    EGFR 40.7 (L) 03/15/2023    BCR 27.5 (H) 03/15/2023    ANIONGAP 9.0 03/15/2023   No radiology results for the last 7 days     Assessment & Plan    Previous notes and with minor updates.        Brief Patient Summary:     Patient is a 67-year-old male with history of osteoarthritis, BPH, thrombocytopenia, alcohol use disorder, chronic respiratory failure on home oxygen who presented to the emergency room because of hematuria and weakness.  Patient was admitted to ICU because of his severe anemia.  Patient was diagnosed with acute blood anemia with hemoglobin of 4.  Patient was also diagnosed with gross hematuria and urology consult was completed.  Patient was also diagnosed with acute kidney injury and a nephrology consult was completed.           sodium bicarbonate, 650 mg, Oral, TID  sodium chloride, 500 mL, Intravenous, Once  sodium chloride, 10 mL, Intravenous, Q12H               Active Hospital Problems:       Active Hospital Problems     Diagnosis     • **Acute blood loss anemia     • Gross hematuria     • UTI (urinary tract infection)     • Acute renal failure (ARF) (HCC)     • Bilateral pneumonia     • BPH (benign prostatic hyperplasia)     • History of alcohol use disorder     • Marijuana use     • ADHD     • Depression     • Mixed hyperlipidemia     • Primary hypertension     • Acquired scoliosis              Plan:     bilateral ureteral obstruction/gross hematuria;  - s/p bilateral nephrostomy tubes by IR, with improving creatinine  -- Maintain suprapubic catheter, draining well  -- Maintain Roberts catheter to prevent urinary obstruction from bladder outlet  -- Currently no signs of hematuria at all, patient has very clear CBI    Bladder cancer  -- Patient has been tentatively scheduled for March 20 teeth for radical cystectomy with ileal conduit.    Acute kidney injury, resolved  Nephrology following,  recommendation appreciated  -- Creatinine improved, now stabilized.    Electrolyte abnormalities  - being replaced. Resolved    Acute blood loss anemia/gross hematuria;  - hemoglobin 7.8 transfuse to keep over 7 g/dL currently hemoglobin stable in Mis 7 range               DVT prophylaxis:  Mechanical DVT prophylaxis orders are present.     CODE STATUS:    Code Status (Patient has no pulse and is not breathing): CPR (Attempt to Resuscitate)  Medical Interventions (Patient has pulse or is breathing): Full Support        Disposition:  Patient DC to SNF in the next 1 to 2 days.  Stable at this time.     This patient has been examined wearing appropriate Personal Protective Equipment and discussed with hospital infection control department, [facility], infectious disease specialist and pulmonologist.           Electronically signed by Saima Roberto DO  , 03/10/23, 1:53 PM EST.

## 2023-03-16 NOTE — PLAN OF CARE
Goal Outcome Evaluation:            Patient doing well, no complaints of pain this shift. Patient ambulates with minimal assistance. Patient has had output from nephrostomy tubes, and bloody output from trujillo and suprapubic cath but no clots. Patient has call light within reach and is able to make needs known.

## 2023-03-17 LAB
ALBUMIN SERPL-MCNC: 3.1 G/DL (ref 3.5–5.2)
ALBUMIN SERPL-MCNC: 3.5 G/DL (ref 3.5–5.2)
ALBUMIN/GLOB SERPL: 0.9 G/DL
ALBUMIN/GLOB SERPL: 1.1 G/DL
ALP SERPL-CCNC: 103 U/L (ref 39–117)
ALP SERPL-CCNC: 113 U/L (ref 39–117)
ALT SERPL W P-5'-P-CCNC: 8 U/L (ref 1–41)
ALT SERPL W P-5'-P-CCNC: 9 U/L (ref 1–41)
ANION GAP SERPL CALCULATED.3IONS-SCNC: 11 MMOL/L (ref 5–15)
ANION GAP SERPL CALCULATED.3IONS-SCNC: 12 MMOL/L (ref 5–15)
AST SERPL-CCNC: 14 U/L (ref 1–40)
AST SERPL-CCNC: 16 U/L (ref 1–40)
BASOPHILS # BLD AUTO: 0.1 10*3/MM3 (ref 0–0.2)
BASOPHILS # BLD AUTO: 0.2 10*3/MM3 (ref 0–0.2)
BASOPHILS NFR BLD AUTO: 1.5 % (ref 0–1.5)
BASOPHILS NFR BLD AUTO: 1.8 % (ref 0–1.5)
BILIRUB SERPL-MCNC: <0.2 MG/DL (ref 0–1.2)
BILIRUB SERPL-MCNC: <0.2 MG/DL (ref 0–1.2)
BUN SERPL-MCNC: 46 MG/DL (ref 8–23)
BUN SERPL-MCNC: 53 MG/DL (ref 8–23)
BUN/CREAT SERPL: 26.3 (ref 7–25)
BUN/CREAT SERPL: 31.5 (ref 7–25)
CALCIUM SPEC-SCNC: 8.4 MG/DL (ref 8.6–10.5)
CALCIUM SPEC-SCNC: 8.4 MG/DL (ref 8.6–10.5)
CHLORIDE SERPL-SCNC: 101 MMOL/L (ref 98–107)
CHLORIDE SERPL-SCNC: 105 MMOL/L (ref 98–107)
CO2 SERPL-SCNC: 25 MMOL/L (ref 22–29)
CO2 SERPL-SCNC: 26 MMOL/L (ref 22–29)
CREAT SERPL-MCNC: 1.68 MG/DL (ref 0.76–1.27)
CREAT SERPL-MCNC: 1.75 MG/DL (ref 0.76–1.27)
DEPRECATED RDW RBC AUTO: 49.9 FL (ref 37–54)
DEPRECATED RDW RBC AUTO: 52.1 FL (ref 37–54)
EGFRCR SERPLBLD CKD-EPI 2021: 42.7 ML/MIN/1.73
EGFRCR SERPLBLD CKD-EPI 2021: 44.8 ML/MIN/1.73
EOSINOPHIL # BLD AUTO: 0.4 10*3/MM3 (ref 0–0.4)
EOSINOPHIL # BLD AUTO: 0.5 10*3/MM3 (ref 0–0.4)
EOSINOPHIL NFR BLD AUTO: 4.2 % (ref 0.3–6.2)
EOSINOPHIL NFR BLD AUTO: 5.1 % (ref 0.3–6.2)
ERYTHROCYTE [DISTWIDTH] IN BLOOD BY AUTOMATED COUNT: 16 % (ref 12.3–15.4)
ERYTHROCYTE [DISTWIDTH] IN BLOOD BY AUTOMATED COUNT: 16 % (ref 12.3–15.4)
GLOBULIN UR ELPH-MCNC: 3.3 GM/DL
GLOBULIN UR ELPH-MCNC: 3.5 GM/DL
GLUCOSE SERPL-MCNC: 117 MG/DL (ref 65–99)
GLUCOSE SERPL-MCNC: 146 MG/DL (ref 65–99)
HCT VFR BLD AUTO: 21.2 % (ref 37.5–51)
HCT VFR BLD AUTO: 21.5 % (ref 37.5–51)
HGB BLD-MCNC: 6.9 G/DL (ref 13–17.7)
HGB BLD-MCNC: 7.2 G/DL (ref 13–17.7)
LYMPHOCYTES # BLD AUTO: 1.9 10*3/MM3 (ref 0.7–3.1)
LYMPHOCYTES # BLD AUTO: 2.5 10*3/MM3 (ref 0.7–3.1)
LYMPHOCYTES NFR BLD AUTO: 20.4 % (ref 19.6–45.3)
LYMPHOCYTES NFR BLD AUTO: 28 % (ref 19.6–45.3)
MAGNESIUM SERPL-MCNC: 1.8 MG/DL (ref 1.6–2.4)
MAGNESIUM SERPL-MCNC: 1.8 MG/DL (ref 1.6–2.4)
MCH RBC QN AUTO: 28.2 PG (ref 26.6–33)
MCH RBC QN AUTO: 29.4 PG (ref 26.6–33)
MCHC RBC AUTO-ENTMCNC: 32.2 G/DL (ref 31.5–35.7)
MCHC RBC AUTO-ENTMCNC: 33.9 G/DL (ref 31.5–35.7)
MCV RBC AUTO: 86.8 FL (ref 79–97)
MCV RBC AUTO: 87.6 FL (ref 79–97)
MONOCYTES # BLD AUTO: 1 10*3/MM3 (ref 0.1–0.9)
MONOCYTES # BLD AUTO: 1.1 10*3/MM3 (ref 0.1–0.9)
MONOCYTES NFR BLD AUTO: 11.1 % (ref 5–12)
MONOCYTES NFR BLD AUTO: 11.8 % (ref 5–12)
NEUTROPHILS NFR BLD AUTO: 4.9 10*3/MM3 (ref 1.7–7)
NEUTROPHILS NFR BLD AUTO: 5.8 10*3/MM3 (ref 1.7–7)
NEUTROPHILS NFR BLD AUTO: 54 % (ref 42.7–76)
NEUTROPHILS NFR BLD AUTO: 62.1 % (ref 42.7–76)
NRBC BLD AUTO-RTO: 0 /100 WBC (ref 0–0.2)
NRBC BLD AUTO-RTO: 0 /100 WBC (ref 0–0.2)
PHOSPHATE SERPL-MCNC: 3.7 MG/DL (ref 2.5–4.5)
PHOSPHATE SERPL-MCNC: 4.3 MG/DL (ref 2.5–4.5)
PLATELET # BLD AUTO: 233 10*3/MM3 (ref 140–450)
PLATELET # BLD AUTO: 247 10*3/MM3 (ref 140–450)
PMV BLD AUTO: 8.5 FL (ref 6–12)
PMV BLD AUTO: 8.5 FL (ref 6–12)
POTASSIUM SERPL-SCNC: 4.3 MMOL/L (ref 3.5–5.2)
POTASSIUM SERPL-SCNC: 4.3 MMOL/L (ref 3.5–5.2)
PROT SERPL-MCNC: 6.6 G/DL (ref 6–8.5)
PROT SERPL-MCNC: 6.8 G/DL (ref 6–8.5)
RBC # BLD AUTO: 2.44 10*6/MM3 (ref 4.14–5.8)
RBC # BLD AUTO: 2.46 10*6/MM3 (ref 4.14–5.8)
SODIUM SERPL-SCNC: 139 MMOL/L (ref 136–145)
SODIUM SERPL-SCNC: 141 MMOL/L (ref 136–145)
WBC NRBC COR # BLD: 9 10*3/MM3 (ref 3.4–10.8)
WBC NRBC COR # BLD: 9.3 10*3/MM3 (ref 3.4–10.8)

## 2023-03-17 PROCEDURE — 83735 ASSAY OF MAGNESIUM: CPT | Performed by: UROLOGY

## 2023-03-17 PROCEDURE — 97110 THERAPEUTIC EXERCISES: CPT

## 2023-03-17 PROCEDURE — 80053 COMPREHEN METABOLIC PANEL: CPT | Performed by: UROLOGY

## 2023-03-17 PROCEDURE — 85025 COMPLETE CBC W/AUTO DIFF WBC: CPT | Performed by: UROLOGY

## 2023-03-17 PROCEDURE — 84100 ASSAY OF PHOSPHORUS: CPT | Performed by: UROLOGY

## 2023-03-17 PROCEDURE — 97535 SELF CARE MNGMENT TRAINING: CPT

## 2023-03-17 PROCEDURE — 36415 COLL VENOUS BLD VENIPUNCTURE: CPT | Performed by: UROLOGY

## 2023-03-17 RX ADMIN — BUMETANIDE 0.5 MG: 1 TABLET ORAL at 09:35

## 2023-03-17 RX ADMIN — SODIUM BICARBONATE 650 MG: 650 TABLET ORAL at 20:52

## 2023-03-17 RX ADMIN — SODIUM ZIRCONIUM CYCLOSILICATE 10 G: 10 POWDER, FOR SUSPENSION ORAL at 09:35

## 2023-03-17 RX ADMIN — SODIUM BICARBONATE 650 MG: 650 TABLET ORAL at 16:49

## 2023-03-17 RX ADMIN — Medication 10 ML: at 20:53

## 2023-03-17 RX ADMIN — Medication 10 ML: at 09:35

## 2023-03-17 RX ADMIN — SODIUM BICARBONATE 650 MG: 650 TABLET ORAL at 09:35

## 2023-03-17 RX ADMIN — ACETAMINOPHEN 650 MG: 325 TABLET, FILM COATED ORAL at 19:19

## 2023-03-17 NOTE — PROGRESS NOTES
"     [facility] Medicine Services Daily Progress Note     Patient Name: Anthony Moss  : 1955  MRN: [id number]  Primary Care Physician:  Reji Topete MD  Date of admission: 2023        Subjective       Chief Complaint: Hematuria and weakness.        Patient Reports:           2023.  Patient was seen and examined.  Patient reported slight improvement in his symptoms.        2023.  Patient was seen and examined.  Patient reported moderate improvement in his symptoms.        2023.  Patient was seen and examined.  Patient complained of constant constipation.  Patient reported last bowel movement to be 2023.  Lactulose was ordered.        2023.  Patient was seen and examined.  No overnight events noted.  Neurology and nephrology are following.       3/6/2023; first time seeing the patient, extensive chart notes reviewed,, Roberts catheter came out, both suprapubic and Roberts catheter were replaced by urology for CBI, otherwise no new symptoms hemodynamically stable  3/7/2023; patient still continues with hematuria/CBI, getting lighter, otherwise, hemodynamically stable, eating lunch in the bed,  3/8/2023; patient still continues with hematuria, continue CBI,   height is 188 cm (74.02\") and weight is 97.3 kg (214 lb 8.1 oz). His axillary temperature is 97.8 °F (36.6 °C). His blood pressure is 126/80 and his pulse is 99. His respiration is 13 and oxygen saturation is 98%.     3/9/2023; afebrile vital signs are stable, overnight events noted patient had a lot of clots passing through Roberts catheter, currently urine appearing pinkish, CBI on hold as per urology, hemoglobin stable, serum creatinine stable around 2.30, continue to monitor.    3/10/2023; afebrile vital signs are stable, continues to be on room air, CBI was resumed yesterday, continues to have pinkish urine, serum creatinine 2.32, no other symptoms continue to " monitor.    3/11/2023:  Patient S&E.  No new complaints.  Tolerating CBI well.  Nurse reports no acute overnight events.  Urine remains pink-tinged.  Creatinine slightly improved, hemoglobin stable.    3/12/2023:  Patient S&E.  Tolerating CBI well.  Still having some pinkish output.  Urology and nephrology recommendations appreciated.  We will continue current management.    3/13/23:  Awaiting Urology recs for DC. Still on CBI.     3/14/2023:  Patient's urine has not cleared up very nicely.  We will wean away from CBI to adjust Roberts catheter.  Urology recommending SNF placement in case of Roberts complications.  Patient is agreeable with this, pre-CERT is required according to case management.  No acute complaints at this time, nurse reports no acute overnight events.    3/15/2023:  Having some more hematuria today. Awaiting precert approval for placement, hopefully tomorrow. No new symptoms or complaints.     3/16/2023:  Patient having some slightly red-tinged urine output today.  Stable otherwise.  No drop in hemoglobin.  Urology is wanting to send patient with Roberts to SNF with radical cystectomy scheduled on 3/28/2023.    3/17/2023:  Patient stable for DC at this time.  Awaiting bed availability at accepting SNF.  No new complaints today.  Urology, nephrology, PT, OT recs appreciated.    Review of Systems   Constitutional: Negative for malaise/fatigue.   HENT: Negative.    Eyes: Negative.    Cardiovascular: Negative.    Respiratory: Negative.    Endocrine: Negative.    Hematologic/Lymphatic: Negative.    Skin: Negative.    Musculoskeletal: Negative.    Gastrointestinal: Negative for constipation.   Genitourinary: Negative.    Neurological: Positive for weakness.   Psychiatric/Behavioral: Negative.    Allergic/Immunologic: Negative.                Objective       Vitals:   Temp:  [97.5 °F (36.4 °C)-98 °F (36.7 °C)] 97.5 °F (36.4 °C)  Heart Rate:  [] 100  Resp:  [16-19] 16  BP: (108-116)/(67-74) 116/74      Physical Exam  Vitals reviewed.   Constitutional:       General: He is not in acute distress.  HENT:      Head: Normocephalic.      Nose: Nose normal.      Mouth/Throat:      Mouth: Mucous membranes are dry.      Pharynx: Oropharynx is clear.   Eyes:      Extraocular Movements: Extraocular movements intact.      Conjunctiva/sclera: Conjunctivae normal.      Pupils: Pupils are equal, round, and reactive to light.   Cardiovascular:      Pulses: Normal pulses.      Heart sounds: No murmur heard.    No friction rub. No gallop.      Comments: S1 and S2 present.  No tachycardia.  Pulmonary:      Effort: Pulmonary effort is normal.      Breath sounds: No stridor. No wheezing or rales.   Chest:      Chest wall: No tenderness.   Abdominal:      General: Bowel sounds are normal. There is no distension.      Palpations: Abdomen is soft.      Tenderness: There is no abdominal tenderness. There is no right CVA tenderness or guarding.  Bilateral nephrostomy tubes in place, suprapubic catheter and Roberts catheter in place, Roberts bag with pinkish urine  Musculoskeletal:         General: No swelling, tenderness, deformity or signs of injury.      Cervical back: Normal range of motion. No rigidity.      Right lower leg: No edema.      Left lower leg: No edema.   Skin:     General: Skin is warm and dry.      Capillary Refill: Capillary refill takes less than 2 seconds.      Coloration: Skin is not jaundiced.      Findings: No bruising, erythema, lesion or rash.   Neurological:      Comments: No facial asymmetry noted.  Gait and station not tested.   Psychiatric:      Comments: No agitation.                             Result Review    Result Review:  I have personally reviewed the results from the time of this admission to 3/5/2023 16:48 EST and agree with these findings:  [x]?  Laboratory  [x]?  Microbiology  [x]?  Radiology  []?  EKG/Telemetry   []?  Cardiology/Vascular   []?  Pathology  []?  Old records  []?  Other:  Most notable  findings include:        Lab Results   Component Value Date    GLUCOSE 117 (H) 03/17/2023    CALCIUM 8.4 (L) 03/17/2023     03/17/2023    K 4.3 03/17/2023    CO2 26.0 03/17/2023     03/17/2023    BUN 53 (H) 03/17/2023    CREATININE 1.68 (H) 03/17/2023    EGFR 44.8 (L) 03/17/2023    BCR 31.5 (H) 03/17/2023    ANIONGAP 12.0 03/17/2023   No radiology results for the last 7 days     Assessment & Plan    Previous notes and with minor updates.        Brief Patient Summary:     Patient is a 67-year-old male with history of osteoarthritis, BPH, thrombocytopenia, alcohol use disorder, chronic respiratory failure on home oxygen who presented to the emergency room because of hematuria and weakness.  Patient was admitted to ICU because of his severe anemia.  Patient was diagnosed with acute blood anemia with hemoglobin of 4.  Patient was also diagnosed with gross hematuria and urology consult was completed.  Patient was also diagnosed with acute kidney injury and a nephrology consult was completed.           sodium bicarbonate, 650 mg, Oral, TID  sodium chloride, 500 mL, Intravenous, Once  sodium chloride, 10 mL, Intravenous, Q12H               Active Hospital Problems:       Active Hospital Problems     Diagnosis     • **Acute blood loss anemia     • Gross hematuria     • UTI (urinary tract infection)     • Acute renal failure (ARF) (HCC)     • Bilateral pneumonia     • BPH (benign prostatic hyperplasia)     • History of alcohol use disorder     • Marijuana use     • ADHD     • Depression     • Mixed hyperlipidemia     • Primary hypertension     • Acquired scoliosis              Plan:     bilateral ureteral obstruction/gross hematuria;  - s/p bilateral nephrostomy tubes by IR, with improving creatinine  -- Maintain suprapubic catheter, draining well  -- Maintain Roberts catheter to prevent urinary obstruction from bladder outlet  -- Currently no signs of hematuria at all, patient has very clear CBI    Bladder  cancer  -- Patient has been tentatively scheduled for March 20 teeth for radical cystectomy with ileal conduit.    Acute kidney injury, resolved  Nephrology following, recommendation appreciated  -- Creatinine improved, now stabilized.    Electrolyte abnormalities  - being replaced. Resolved    Acute blood loss anemia/gross hematuria;  - hemoglobin 7.8 transfuse to keep over 7 g/dL currently hemoglobin stable in Mis 7 range               DVT prophylaxis:  Mechanical DVT prophylaxis orders are present.     CODE STATUS:    Code Status (Patient has no pulse and is not breathing): CPR (Attempt to Resuscitate)  Medical Interventions (Patient has pulse or is breathing): Full Support        Disposition:  Patient DC to SNF in the next 1 to 2 days WBA.  Stable at this time.     This patient has been examined wearing appropriate Personal Protective Equipment and discussed with hospital infection control department, [facility] department, infectious disease specialist and pulmonologist.           Electronically signed by Saima Roberto DO  03/10/23, 1:53 PM EST.

## 2023-03-17 NOTE — THERAPY TREATMENT NOTE
"Subjective: Pt agreeable to therapeutic plan of care.  Cognition: oriented to Person, Place, Time and Situation    Objective:     Bed Mobility: Modified-Independent supine to sit EOB  Functional Transfers: SBA sit to stand, bed to chair  Functional Ambulation: CGA x5 feet, pt declines to ambulate longer distance.    Grooming: Supervision/setup  ADL Position: unsupported standing  ADL Comments: Patient stood and completed grooming and hygiene tasks with setup only.      Vitals: 111 at rest, no increase with activity    Pain: 4 VAS  Location: Tubes in back  Interventions for pain: Repositioned  Education: Provided education on the importance of mobility in the acute care setting, Verbal/Tactile Cues, ADL training and Transfer Training      Assessment: Anthony Moss presents with ADL impairments affecting function including endurance / activity tolerance. Demonstrated functioning below baseline abilities indicate the need for continued skilled intervention while inpatient. Patient has good strength and performs well with activity, however is quite limited by low activity tolerance. Setup for grooming in standing, which patient reports caused fatigue. Roberts catheter had come disconnected, nursing made aware. Tolerating session today without incident. Will continue to follow and progress as tolerated.     Plan/Recommendations:   Moderate Intensity Therapy recommended post-acute care. This is recommended as therapy feels the patient would require 3-4 days per week and wouldn't tolerate \"3 hour daily\" rehab intensity. SNF would be the preferred choice. If the patient does not agree to SNF, arrange HH or OP depending on home bound status. If patient is medically complex, consider LTACH.. Pt requires no DME at discharge.     Pt desires Skilled Rehab placement at discharge. Pt cooperative; agreeable to therapeutic recommendations and plan of care.     Modified Two Dot: N/A = No pre-op stroke/TIA    Post-Tx Position: Up in " Chair and Call light and personal items within reach  PPE: gloves and surgical mask

## 2023-03-17 NOTE — PROGRESS NOTES
PROGRESS NOTE      Patient Name: Anthony Moss  : 1955  MRN: [id number]  Primary Care Physician: Reji Topete MD  Date of admission: 2023    Patient Care Team:  Reji Topete MD as PCP - General (Family Medicine)        Subjective   Subjective:     Seen and examined  Resting in bed, no distress or new complaints  Kidney function improving patient undergoing CBI,  Pinkish urine    Review of systems:  All other review of system unremarkable      Allergies:  No Known Allergies    Objective   Exam:     Vital Signs  Temp:  [98 °F (36.7 °C)] 98 °F (36.7 °C)  Heart Rate:  [96] 96  Resp:  [19] 19  BP: (108)/(67) 108/67  SpO2:  [95 %] 95 %  on   ;   Device (Oxygen Therapy): room air  Body mass index is 26.91 kg/m².    General: Middle-age  male in no acute distress.    Head:      Normocephalic and atraumatic.    Eyes:      PERRL/EOM intact, conjunctiva and sclera clear with out nystagmus.    Neck:      No masses, thyromegaly,  trachea central with normal respiratory effort   Lungs:    Clear bilaterally to auscultation.    Heart:      Regular rate and rhythm, no murmur no gallop  Abd:        Soft, nontender, not distended, bowel sounds positive, no shifting dullness, suprapubic catheter with good urine  Pulses:   Pulses palpable  Extr:        No cyanosis or clubbing--no significant edema.    Neuro:    No focal deficits.   alert oriented x3  Skin:       Intact without lesions or rashes.    Psych:    Alert and cooperative; normal mood and affect; .      Results Review:  I have personally reviewed most recent Data :  CBC    Results from last 7 days   Lab Units 23  0001 03/15/23  2255 03/15/23  0546 23  0943 23  0040 23  1632 23  0759 23  0309 23  0759 23  0059 03/10/23  2356 03/10/23  2225   WBC 10*3/mm3 9.00 10.20 9.70  --  8.20  --   --  9.20  --  8.80  --  8.20   HEMOGLOBIN g/dL 7.2* 7.4* 7.5* 7.4* 7.2* 7.6* 7.1* 7.2*   < > 7.5*   < >  7.2*  7.2*   PLATELETS 10*3/mm3 247 242 246  --  239  --   --  236  --  235  --  217    < > = values in this interval not displayed.     CMP   Results from last 7 days   Lab Units 03/17/23  0001 03/15/23  2255 03/15/23  0546 03/14/23  0040 03/13/23  0309 03/12/23  0059 03/10/23  2225   SODIUM mmol/L 139 136 140 140 139 138 137   POTASSIUM mmol/L 4.3 4.8 4.6 4.7 4.7 4.6 4.8   CHLORIDE mmol/L 101 101 104 106 105 105 104   CO2 mmol/L 26.0 26.0 24.0 23.0 23.0 22.0 25.0   BUN mg/dL 53* 50* 45* 47* 44* 51* 53*   CREATININE mg/dL 1.68* 1.82* 1.91* 1.86* 1.75* 1.80* 2.14*   GLUCOSE mg/dL 117* 112* 119* 103* 102* 132* 121*   ALBUMIN g/dL 3.5 3.3* 3.5 3.5 3.2* 3.4* 3.4*   BILIRUBIN mg/dL <0.2 0.2 <0.2 <0.2 <0.2 <0.2 <0.2   ALK PHOS U/L 113 100 105 100 101 104 107   AST (SGOT) U/L 16 14 14 15 18 18 16   ALT (SGPT) U/L 8 10 9 10 10 9 10     ABG      No radiology results for the last day    Results for orders placed during the hospital encounter of 01/05/22    Adult Transthoracic Echo Complete w/ Color, Spectral and Contrast if Necessary Per Protocol    Interpretation Summary  · Left ventricular ejection fraction appears to be 56 - 60%.  · The right ventricular cavity is moderately dilated.  · No pericardial effusion noted    Scheduled Meds:bumetanide, 0.5 mg, Oral, Daily  sodium bicarbonate, 650 mg, Oral, TID  sodium chloride, 500 mL, Intravenous, Once  sodium chloride, 10 mL, Intravenous, Q12H  sodium zirconium cyclosilicate, 10 g, Oral, Daily      Continuous Infusions:   PRN Meds:•  acetaminophen **OR** acetaminophen  •  influenza vaccine  •  ondansetron **OR** ondansetron  •  oxyCODONE  •  [COMPLETED] Insert Peripheral IV **AND** sodium chloride  •  sodium chloride  •  sodium chloride    Assessment & Plan   Assessment and Plan:         Acute blood loss anemia    Acute renal failure (ARF) (HCC)    Acquired scoliosis    ADHD    Depression    Mixed hyperlipidemia    Primary hypertension    Bilateral pneumonia    Marijuana  use    UTI (urinary tract infection)    Gross hematuria    BPH (benign prostatic hyperplasia)    History of alcohol use disorder    ASSESSMENT:  • Acute kidney injury, stage III, oligoanuric, metabolic differential at this this time, including sepsis, with bilateral pneumonia, now with obstructive uropathy had been taking Aleve, for the back pain,  • Bilateral nephrostomy tube placed 3/5/2023  • Hyperkalemia, secondary to CARLOS ALBERTO  • Bolick acidosis secondary to CARLOS ALBERTO  • Weakness and tiredness, with a BUN of 59, very concerning for uremia, but patient is improving since he has  • Metabolic acidosis,   • History of alcohol use,  • Chronic back pain,using NSAID in the past  • B/l pneumonia.  Surprisingly, WBC stable,           Plan:      • Some fluctuation in creatinine may be some partial obstruction because of the presence of bilateral nephrostomy tubes  • Creatinine slightly better than yesterday continue to improve  • Follow-up with renal functions tomorrow morning  • Volume is under control.  • Patient is a good urine output from bilateral nephrostomy tubes undergoing CBI, pinkish urine  • Hemoglobin continued to slight downward from 7.6-7.2 now  • Patient still have blood coming from the bladder   • follow-up with renal function I still think there will be a gradual improvement with good urinary flow from the bilateral nephrostomy  • Echocardiogram done last time showed ejection fraction 56 to 60%  • Repeat labs tomorrow morning  • We will follow closely            Electronically signed by Jamie Houston MD,   [facility] kidney consultant  [phone]  3/17/2023  11:50 EDT

## 2023-03-17 NOTE — PLAN OF CARE
Goal Outcome Evaluation:  Pt. VSS, no complaints of pain this shift. Pt. ambulates with minimal assistance. Pt. has had output from nephrostomy tubes, and bloody output from trujillo and suprapubic cath but no clots. Pt. has call light within reach and is able to make needs known.

## 2023-03-17 NOTE — PLAN OF CARE
"Assessment: Anthony Moss presents with ADL impairments affecting function including endurance / activity tolerance. Demonstrated functioning below baseline abilities indicate the need for continued skilled intervention while inpatient. Patient has good strength and performs well with activity, however is quite limited by low activity tolerance. Setup for grooming in standing, which patient reports caused fatigue. Roberts catheter had come disconnected, nursing made aware. Tolerating session today without incident. Will continue to follow and progress as tolerated.      Plan/Recommendations:   Moderate Intensity Therapy recommended post-acute care. This is recommended as therapy feels the patient would require 3-4 days per week and wouldn't tolerate \"3 hour daily\" rehab intensity. SNF would be the preferred choice. If the patient does not agree to SNF, arrange HH or OP depending on home bound status. If patient is medically complex, consider LTACH.. Pt requires no DME at discharge.   "

## 2023-03-17 NOTE — PLAN OF CARE
"Goal Outcome Evaluation:      Assessment: Anthony Moss presents with functional mobility impairments which indicate the need for skilled intervention. Tolerating session today without incident but limited due to fatigue. Will continue to follow and progress as tolerated.      Plan/Recommendations:   Moderate Intensity Therapy recommended post-acute care. This is recommended as therapy feels the patient would require 3-4 days per week and wouldn't tolerate \"3 hour daily\" rehab intensity. SNF would be the preferred choice. If the patient does not agree to SNF, arrange HH or OP depending on home bound status. If patient is medically complex, consider LTACH.. Pt requires no DME at discharge.      Pt desires Skilled Rehab placement at discharge. Pt cooperative; agreeable to therapeutic recommendations and plan of care.            "

## 2023-03-17 NOTE — CONSULTS
Nutrition Services    Patient Name: Anthony Moss  YOB: 1955  MRN: [id number]  Admission date: 2/28/2023    PPE Documentation        PPE Worn By Provider Did not enter room for encounter    PPE Worn By Patient  N/a      NUTRITION SCREENING      Encounter Information: Next LOS assessment. Pt continues to eat well.       PO Diet: Diet: Cardiac Diets; Healthy Heart (2-3 Na+); Texture: Regular Texture (IDDSI 7); Fluid Consistency: Thin (IDDSI 0)   PO Supplements: None    PO Intake:  %       Labs (reviewed below): Reviewed        GI Function:  Last documented BM on 3/15         Skin: No pressure injuries       Weight Hx Review: Wt Readings from Last 10 Encounters:   03/15/23 95.1 kg (209 lb 10.5 oz)   05/25/22 99.4 kg (219 lb 3.2 oz)   01/12/22 111 kg (245 lb 9.5 oz)   01/23/21 102 kg (225 lb 5 oz)   02/27/19 98.9 kg (218 lb)   01/16/19 98 kg (216 lb)     4% wt loss from admission wt; 5% wt gain from wt taken 3/1; expect some fluid shifts with diuretic use       Nutrition Intervention: Continue to encourage PO intake      Results from last 7 days   Lab Units 03/17/23  0001 03/15/23  2255 03/15/23  0546   SODIUM mmol/L 139 136 140   POTASSIUM mmol/L 4.3 4.8 4.6   CHLORIDE mmol/L 101 101 104   CO2 mmol/L 26.0 26.0 24.0   BUN mg/dL 53* 50* 45*   CREATININE mg/dL 1.68* 1.82* 1.91*   CALCIUM mg/dL 8.4* 8.8 8.7   BILIRUBIN mg/dL <0.2 0.2 <0.2   ALK PHOS U/L 113 100 105   ALT (SGPT) U/L 8 10 9   AST (SGOT) U/L 16 14 14   GLUCOSE mg/dL 117* 112* 119*     Results from last 7 days   Lab Units 03/17/23  0001 03/15/23  2255 03/15/23  0546   MAGNESIUM mg/dL 1.8 1.7 1.8   PHOSPHORUS mg/dL 4.3 4.0 4.2   HEMOGLOBIN g/dL 7.2* 7.4* 7.5*   HEMATOCRIT % 21.2* 22.0* 23.6*     COVID19   Date Value Ref Range Status   03/01/2023 Not Detected Not Detected - Ref. Range Final     Lab Results   Component Value Date    HGBA1C 4.8 03/01/2023       RD to follow up per protocol.    Electronically signed by:  Deneen Pantoja,  RD  03/17/23 07:40 EDT

## 2023-03-17 NOTE — CASE MANAGEMENT/SOCIAL WORK
Continued Stay Note  RADHA Ruffin     Patient Name: Anthony Moss  MRN: [id number]  Today's Date: 3/17/2023    Admit Date: 2/28/2023    Plan: tonio palencia, precert started 3/15. will be a planned readmit for surgery on 3/28   Discharge Plan     Row Name 03/17/23 1232       Plan    Plan tonio roche acepted, precert started 3/15. will be a planned readmit for surgery on 3/28    Plan Comments verfied with tonio roche liasion that precert remains pending.  provided phone number of cm on weekend to call if approved.                Expected Discharge Date and Time     Expected Discharge Date Expected Discharge Time    Mar 17, 2023       Phone communication or documentation only - no physical contact with patient or family.        Marisa Marti RN

## 2023-03-17 NOTE — PLAN OF CARE
Goal Outcome Evaluation:         Patient up in chair in room today. Awaiting precert for rehab placement. Pain controlled

## 2023-03-17 NOTE — PROGRESS NOTES
"  [facility] DAILY PROGRESS NOTE    Patient Identification  Name: Anthony Moss  Age: 67 y.o.  Sex: male  :  1955  MRN: [id number]    Date: 3/17/2023             Subjective:  Interval History: Urine pink in bag    Objective:    Scheduled Meds:bumetanide, 0.5 mg, Oral, Daily  sodium bicarbonate, 650 mg, Oral, TID  sodium chloride, 500 mL, Intravenous, Once  sodium chloride, 10 mL, Intravenous, Q12H  sodium zirconium cyclosilicate, 10 g, Oral, Daily      Continuous Infusions:   PRN Meds:•  acetaminophen **OR** acetaminophen  •  influenza vaccine  •  ondansetron **OR** ondansetron  •  oxyCODONE  •  [COMPLETED] Insert Peripheral IV **AND** sodium chloride  •  sodium chloride  •  sodium chloride    Vital signs in last 24 hours:  Temp:  [97.3 °F (36.3 °C)-98 °F (36.7 °C)] 98 °F (36.7 °C)  Heart Rate:  [95-96] 96  Resp:  [19] 19  BP: (108-110)/(67-70) 108/67    Intake/Output:    Intake/Output Summary (Last 24 hours) at 3/17/2023 0808  Last data filed at 3/17/2023 0300  Gross per 24 hour   Intake 1680 ml   Output 3925 ml   Net -2245 ml       Exam:  /67 (BP Location: Right arm, Patient Position: Lying)   Pulse 96   Temp 98 °F (36.7 °C) (Oral)   Resp 19   Ht 188 cm (74.02\")   Wt 95.1 kg (209 lb 10.5 oz)   SpO2 95%   BMI 26.91 kg/m²     General Appearance:    Alert, cooperative, no distress, appears stated age               Abdomen:     Soft, ND   :   Suprapubic tube 24 three-way Simplastic, 20 Azeri coudé per urethra urine clear in tubing                Data Review:  All labs (24hrs):   Recent Results (from the past 24 hour(s))   Magnesium    Collection Time: 23 12:01 AM    Specimen: Blood   Result Value Ref Range    Magnesium 1.8 1.6 - 2.4 mg/dL   Phosphorus    Collection Time: 23 12:01 AM    Specimen: Blood   Result Value Ref Range    Phosphorus 4.3 2.5 - 4.5 mg/dL   Comprehensive Metabolic Panel    Collection Time: 23 12:01 AM    Specimen: Blood   Result Value Ref Range    " Glucose 117 (H) 65 - 99 mg/dL    BUN 53 (H) 8 - 23 mg/dL    Creatinine 1.68 (H) 0.76 - 1.27 mg/dL    Sodium 139 136 - 145 mmol/L    Potassium 4.3 3.5 - 5.2 mmol/L    Chloride 101 98 - 107 mmol/L    CO2 26.0 22.0 - 29.0 mmol/L    Calcium 8.4 (L) 8.6 - 10.5 mg/dL    Total Protein 6.8 6.0 - 8.5 g/dL    Albumin 3.5 3.5 - 5.2 g/dL    ALT (SGPT) 8 1 - 41 U/L    AST (SGOT) 16 1 - 40 U/L    Alkaline Phosphatase 113 39 - 117 U/L    Total Bilirubin <0.2 0.0 - 1.2 mg/dL    Globulin 3.3 gm/dL    A/G Ratio 1.1 g/dL    BUN/Creatinine Ratio 31.5 (H) 7.0 - 25.0    Anion Gap 12.0 5.0 - 15.0 mmol/L    eGFR 44.8 (L) >60.0 mL/min/1.73   CBC Auto Differential    Collection Time: 03/17/23 12:01 AM    Specimen: Blood   Result Value Ref Range    WBC 9.00 3.40 - 10.80 10*3/mm3    RBC 2.44 (L) 4.14 - 5.80 10*6/mm3    Hemoglobin 7.2 (L) 13.0 - 17.7 g/dL    Hematocrit 21.2 (L) 37.5 - 51.0 %    MCV 86.8 79.0 - 97.0 fL    MCH 29.4 26.6 - 33.0 pg    MCHC 33.9 31.5 - 35.7 g/dL    RDW 16.0 (H) 12.3 - 15.4 %    RDW-SD 52.1 37.0 - 54.0 fl    MPV 8.5 6.0 - 12.0 fL    Platelets 247 140 - 450 10*3/mm3    Neutrophil % 54.0 42.7 - 76.0 %    Lymphocyte % 28.0 19.6 - 45.3 %    Monocyte % 11.1 5.0 - 12.0 %    Eosinophil % 5.1 0.3 - 6.2 %    Basophil % 1.8 (H) 0.0 - 1.5 %    Neutrophils, Absolute 4.90 1.70 - 7.00 10*3/mm3    Lymphocytes, Absolute 2.50 0.70 - 3.10 10*3/mm3    Monocytes, Absolute 1.00 (H) 0.10 - 0.90 10*3/mm3    Eosinophils, Absolute 0.50 (H) 0.00 - 0.40 10*3/mm3    Basophils, Absolute 0.20 0.00 - 0.20 10*3/mm3    nRBC 0.0 0.0 - 0.2 /100 WBC      Imaging Results (Last 24 Hours)     ** No results found for the last 24 hours. **           Assessment:    Acute blood loss anemia    Acute renal failure (ARF) (HCC)    Acquired scoliosis    ADHD    Depression    Mixed hyperlipidemia    Primary hypertension    Bilateral pneumonia    Marijuana use    UTI (urinary tract infection)    Gross hematuria    BPH (benign prostatic hyperplasia)    History of  alcohol use disorder    Neurogenic bladder with suprapubic tube  Severe bladder wall thickening causing bilateral hydronephrosis  Cystitis  Hematuria and anemia     Plan:      Hopefully to SNF soon  Path noted for prostate cancer, PSA level normal suspicious for poorly differentiated prostate cancer, still planning for March 28 for radical cystectomy with ileal conduit  Once his urine is cleared he may be discharged in preparation for surgery he will need to maintain bilateral nephrostomy tubes through surgery      Xu Montero MD  [facility]  [address], [address]  [place] [id number]  Office: [phone]  Cell: [phone]  Also available via Epic Secure Chat  03/17/23  08:08 EDT

## 2023-03-17 NOTE — THERAPY TREATMENT NOTE
"Subjective: Pt agreeable to therapeutic plan of care. Pt c/o fatigue but is agreeable to seated ex.     Objective:     Bed mobility - N/A or Not attempted.Seated  Transfers - N/A or Not attempted. Declined to attempt  Ambulation -  N/A or Not attempted. Declined to attempt    Therapeutic Exercise - 10 Reps Bilateraly Lower Extremities  AROM supported sitting / chair including: QS, heel slides, hip ABD/ADD, Hip IR/ER, LAQ    Vitals:  at rest; no increase with BLE ex.    Pain: 5 VAS   Location: LBP  Intervention for pain: Increased Activity    Education: Provided education on the importance of mobility in the acute care setting    Assessment: Anthony Moss presents with functional mobility impairments which indicate the need for skilled intervention. Tolerating session today without incident but limited due to fatigue. Will continue to follow and progress as tolerated.     Plan/Recommendations:   Moderate Intensity Therapy recommended post-acute care. This is recommended as therapy feels the patient would require 3-4 days per week and wouldn't tolerate \"3 hour daily\" rehab intensity. SNF would be the preferred choice. If the patient does not agree to SNF, arrange HH or OP depending on home bound status. If patient is medically complex, consider LTACH.. Pt requires no DME at discharge.     Pt desires Skilled Rehab placement at discharge. Pt cooperative; agreeable to therapeutic recommendations and plan of care.         Basic Mobility 6-click:  Rollin = Total, A lot = 2, A little = 3; 4 = None  Supine>Sit:   1 = Total, A lot = 2, A little = 3; 4 = None   Sit>Stand with arms:  1 = Total, A lot = 2, A little = 3; 4 = None  Bed>Chair:   1 = Total, A lot = 2, A little = 3; 4 = None  Ambulate in room:  1 = Total, A lot = 2, A little = 3; 4 = None  3-5 Steps with railin = Total, A lot = 2, A little = 3; 4 = None  Score: 19    Modified Thea: N/A = No pre-op stroke/TIA    Post-Tx Position: Up in " Chair, Alarms activated and Call light and personal items within reach  PPE: gloves and surgical mask

## 2023-03-18 LAB
ABO GROUP BLD: NORMAL
BLD GP AB SCN SERPL QL: NEGATIVE
HCT VFR BLD AUTO: 24.4 % (ref 37.5–51)
HGB BLD-MCNC: 8.1 G/DL (ref 13–17.7)
HOLD SPECIMEN: NORMAL
RH BLD: POSITIVE
T&S EXPIRATION DATE: NORMAL

## 2023-03-18 PROCEDURE — 85014 HEMATOCRIT: CPT | Performed by: INTERNAL MEDICINE

## 2023-03-18 PROCEDURE — 86900 BLOOD TYPING SEROLOGIC ABO: CPT | Performed by: NURSE PRACTITIONER

## 2023-03-18 PROCEDURE — 86900 BLOOD TYPING SEROLOGIC ABO: CPT

## 2023-03-18 PROCEDURE — 86850 RBC ANTIBODY SCREEN: CPT | Performed by: NURSE PRACTITIONER

## 2023-03-18 PROCEDURE — 85018 HEMOGLOBIN: CPT | Performed by: INTERNAL MEDICINE

## 2023-03-18 PROCEDURE — 36430 TRANSFUSION BLD/BLD COMPNT: CPT

## 2023-03-18 PROCEDURE — P9016 RBC LEUKOCYTES REDUCED: HCPCS

## 2023-03-18 PROCEDURE — 86923 COMPATIBILITY TEST ELECTRIC: CPT

## 2023-03-18 PROCEDURE — 86901 BLOOD TYPING SEROLOGIC RH(D): CPT | Performed by: NURSE PRACTITIONER

## 2023-03-18 RX ADMIN — SODIUM ZIRCONIUM CYCLOSILICATE 10 G: 10 POWDER, FOR SUSPENSION ORAL at 08:30

## 2023-03-18 RX ADMIN — BUMETANIDE 0.5 MG: 1 TABLET ORAL at 08:30

## 2023-03-18 RX ADMIN — SODIUM BICARBONATE 650 MG: 650 TABLET ORAL at 20:06

## 2023-03-18 RX ADMIN — Medication 10 ML: at 20:06

## 2023-03-18 RX ADMIN — Medication 10 ML: at 08:30

## 2023-03-18 RX ADMIN — SODIUM BICARBONATE 650 MG: 650 TABLET ORAL at 16:01

## 2023-03-18 RX ADMIN — OXYCODONE HYDROCHLORIDE 5 MG: 5 TABLET ORAL at 11:56

## 2023-03-18 RX ADMIN — ACETAMINOPHEN 650 MG: 325 TABLET, FILM COATED ORAL at 20:06

## 2023-03-18 RX ADMIN — SODIUM BICARBONATE 650 MG: 650 TABLET ORAL at 08:30

## 2023-03-18 NOTE — PROGRESS NOTES
"     [facility] Medicine Services Daily Progress Note     Patient Name: Anthony Moss  : 1955  MRN: [id number]  Primary Care Physician:  Reji Topete MD  Date of admission: 2023        Subjective       Chief Complaint: Hematuria and weakness.        Patient Reports:           2023.  Patient was seen and examined.  Patient reported slight improvement in his symptoms.        2023.  Patient was seen and examined.  Patient reported moderate improvement in his symptoms.        2023.  Patient was seen and examined.  Patient complained of constant constipation.  Patient reported last bowel movement to be 2023.  Lactulose was ordered.        2023.  Patient was seen and examined.  No overnight events noted.  Neurology and nephrology are following.       3/6/2023; first time seeing the patient, extensive chart notes reviewed,, Roberts catheter came out, both suprapubic and Roberts catheter were replaced by urology for CBI, otherwise no new symptoms hemodynamically stable  3/7/2023; patient still continues with hematuria/CBI, getting lighter, otherwise, hemodynamically stable, eating lunch in the bed,  3/8/2023; patient still continues with hematuria, continue CBI,   height is 188 cm (74.02\") and weight is 97.3 kg (214 lb 8.1 oz). His axillary temperature is 97.8 °F (36.6 °C). His blood pressure is 126/80 and his pulse is 99. His respiration is 13 and oxygen saturation is 98%.     3/9/2023; afebrile vital signs are stable, overnight events noted patient had a lot of clots passing through Roberts catheter, currently urine appearing pinkish, CBI on hold as per urology, hemoglobin stable, serum creatinine stable around 2.30, continue to monitor.    3/10/2023; afebrile vital signs are stable, continues to be on room air, CBI was resumed yesterday, continues to have pinkish urine, serum creatinine 2.32, no other symptoms continue to " monitor.    3/11/2023:  Patient S&E.  No new complaints.  Tolerating CBI well.  Nurse reports no acute overnight events.  Urine remains pink-tinged.  Creatinine slightly improved, hemoglobin stable.    3/12/2023:  Patient S&E.  Tolerating CBI well.  Still having some pinkish output.  Urology and nephrology recommendations appreciated.  We will continue current management.    3/13/23:  Awaiting Urology recs for DC. Still on CBI.     3/14/2023:  Patient's urine has not cleared up very nicely.  We will wean away from CBI to adjust Roberts catheter.  Urology recommending SNF placement in case of Roberts complications.  Patient is agreeable with this, pre-CERT is required according to case management.  No acute complaints at this time, nurse reports no acute overnight events.    3/15/2023:  Having some more hematuria today. Awaiting precert approval for placement, hopefully tomorrow. No new symptoms or complaints.     3/16/2023:  Patient having some slightly red-tinged urine output today.  Stable otherwise.  No drop in hemoglobin.  Urology is wanting to send patient with Roberts to SNF with radical cystectomy scheduled on 3/28/2023.    3/17/2023:  Patient stable for DC at this time.  Awaiting bed availability at accepting SNF.  No new complaints today.  Urology, nephrology, PT, OT recs appreciated.    3/18/2023:  Patient S&E.  Had a hemoglobin drop last night of 6.9.  Received 1 unit PRBC.  A.m. hemoglobin 8.1.  Patient has no signs of large-volume blood loss, but still having hematuria.  Still pending SNF acceptance.    Review of Systems   Constitutional: Negative for malaise/fatigue.   HENT: Negative.    Eyes: Negative.    Cardiovascular: Negative.    Respiratory: Negative.    Endocrine: Negative.    Hematologic/Lymphatic: Negative.    Skin: Negative.    Musculoskeletal: Negative.    Gastrointestinal: Negative for constipation.   Genitourinary: Negative.    Neurological: Positive for weakness.   Psychiatric/Behavioral:  Negative.    Allergic/Immunologic: Negative.                Objective       Vitals:   Temp:  [97.7 °F (36.5 °C)-98.9 °F (37.2 °C)] 97.7 °F (36.5 °C)  Heart Rate:  [] 104  Resp:  [16-20] 16  BP: (117-134)/(74-87) 132/80     Physical Exam  Vitals reviewed.   Constitutional:       General: He is not in acute distress.  HENT:      Head: Normocephalic.      Nose: Nose normal.      Mouth/Throat:      Mouth: Mucous membranes are dry.      Pharynx: Oropharynx is clear.   Eyes:      Extraocular Movements: Extraocular movements intact.      Conjunctiva/sclera: Conjunctivae normal.      Pupils: Pupils are equal, round, and reactive to light.   Cardiovascular:      Pulses: Normal pulses.      Heart sounds: No murmur heard.    No friction rub. No gallop.      Comments: S1 and S2 present.  No tachycardia.  Pulmonary:      Effort: Pulmonary effort is normal.      Breath sounds: No stridor. No wheezing or rales.   Chest:      Chest wall: No tenderness.   Abdominal:      General: Bowel sounds are normal. There is no distension.      Palpations: Abdomen is soft.      Tenderness: There is no abdominal tenderness. There is no right CVA tenderness or guarding.  Bilateral nephrostomy tubes in place, suprapubic catheter and Roberts catheter in place, Roberts bag with pinkish urine  Musculoskeletal:         General: No swelling, tenderness, deformity or signs of injury.      Cervical back: Normal range of motion. No rigidity.      Right lower leg: No edema.      Left lower leg: No edema.   Skin:     General: Skin is warm and dry.      Capillary Refill: Capillary refill takes less than 2 seconds.      Coloration: Skin is not jaundiced.      Findings: No bruising, erythema, lesion or rash.   Neurological:      Comments: No facial asymmetry noted.  Gait and station not tested.   Psychiatric:      Comments: No agitation.                             Result Review    Result Review:  I have personally reviewed the results from the time of this  admission to 3/5/2023 16:48 EST and agree with these findings:  [x]  Laboratory  [x]  Microbiology  [x]  Radiology  []  EKG/Telemetry   []  Cardiology/Vascular   []  Pathology  []  Old records  []  Other:  Most notable findings include:        Lab Results   Component Value Date    GLUCOSE 146 (H) 03/17/2023    CALCIUM 8.4 (L) 03/17/2023     03/17/2023    K 4.3 03/17/2023    CO2 25.0 03/17/2023     03/17/2023    BUN 46 (H) 03/17/2023    CREATININE 1.75 (H) 03/17/2023    EGFR 42.7 (L) 03/17/2023    BCR 26.3 (H) 03/17/2023    ANIONGAP 11.0 03/17/2023   No radiology results for the last 7 days     Assessment & Plan    Previous notes and with minor updates.        Brief Patient Summary:     Patient is a 67-year-old male with history of osteoarthritis, BPH, thrombocytopenia, alcohol use disorder, chronic respiratory failure on home oxygen who presented to the emergency room because of hematuria and weakness.  Patient was admitted to ICU because of his severe anemia.  Patient was diagnosed with acute blood anemia with hemoglobin of 4.  Patient was also diagnosed with gross hematuria and urology consult was completed.  Patient was also diagnosed with acute kidney injury and a nephrology consult was completed.           sodium bicarbonate, 650 mg, Oral, TID  sodium chloride, 500 mL, Intravenous, Once  sodium chloride, 10 mL, Intravenous, Q12H               Active Hospital Problems:       Active Hospital Problems     Diagnosis      **Acute blood loss anemia      Gross hematuria      UTI (urinary tract infection)      Acute renal failure (ARF) (HCC)      Bilateral pneumonia      BPH (benign prostatic hyperplasia)      History of alcohol use disorder      Marijuana use      ADHD      Depression      Mixed hyperlipidemia      Primary hypertension      Acquired scoliosis              Plan:     bilateral ureteral obstruction/gross hematuria;  - s/p bilateral nephrostomy tubes by IR, with improving creatinine  --  Maintain suprapubic catheter, draining well  -- Maintain Roberts catheter to prevent urinary obstruction from bladder outlet  -- Currently no signs of hematuria at all, patient has very clear CBI    - Sepsis due to suprapubic catheter associated UTI with CARLOS ALBERTO, present on admission    Bladder cancer  -- Patient has been tentatively scheduled for March 20 teeth for radical cystectomy with ileal conduit.    Acute kidney injury, resolved  Nephrology following, recommendation appreciated  -- Creatinine improved, now stabilized.    Electrolyte abnormalities  - being replaced. Resolved    Acute blood loss anemia/gross hematuria;  - hemoglobin 7.8 transfuse to keep over 7 g/dL currently hemoglobin stable in Mis 7 range  -- Patient had 1 unit PRBC overnight on 3/18/2023.  Repeat Hb is 8.1.           DVT prophylaxis:  Mechanical DVT prophylaxis orders are present.     CODE STATUS:    Code Status (Patient has no pulse and is not breathing): CPR (Attempt to Resuscitate)  Medical Interventions (Patient has pulse or is breathing): Full Support        Disposition:  Patient DC to SNF in the next 1 to 2 days WBA.  Stable at this time.     This patient has been examined wearing appropriate Personal Protective Equipment and discussed with hospital infection control department, [facility], infectious disease specialist and pulmonologist.           Electronically signed by Saima Roberto DO  03/10/23, 1:53 PM EST.

## 2023-03-18 NOTE — SIGNIFICANT NOTE
03/18/23 1023   OTHER   Discipline physical therapy assistant   Rehab Time/Intention   Session Not Performed patient/family declined, not feeling well  (Pt got 1 unit of blood and Hgb went from 6.0>8.1. Was worn out from not getting much sleep and was trying to digest breakfast.)   Recommendation   PT - Next Appointment 03/20/23

## 2023-03-18 NOTE — PLAN OF CARE
Goal Outcome Evaluation:      C/o mild soreness on his back where the nephrostomy tubes are, dressing has been changed on both sites. Suprapubic and trujillo catheter both have blood tinged urine output. Patient is currently getting a blood transfusion. No other complaints. Patient is alert and oriented x 4 and is able to make his needs known. Call light is within reach.

## 2023-03-18 NOTE — CASE MANAGEMENT/SOCIAL WORK
Continued Stay Note  RADHA Ruffin     Patient Name: Anthony Moss  MRN: [id number]  Today's Date: 3/18/2023    Admit Date: 2/28/2023    Plan: tonio trimble acepted, precert started 3/15. will be a planned readmit for surgery on 3/28   Discharge Plan     Row Name 03/18/23 1123       Plan    Plan Comments CM verified with Tonio Trimble that precert has not been approved. yet.                Expected Discharge Date and Time     Expected Discharge Date Expected Discharge Time    Mar 17, 2023         Phone communication or documentation only - no physical contact with patient or family.      Marisa Marti RN

## 2023-03-18 NOTE — PLAN OF CARE
Goal Outcome Evaluation:  Plan of Care Reviewed With: patient           Outcome Evaluation: Pt. able to make needs known, minimal c/o pain voiced. Pt. up in chair this shift, transfers with standby assist. Personal items and call light within reach. Plan of care ongoing. Discharge pending bed availability at [facility].

## 2023-03-18 NOTE — PROGRESS NOTES
PROGRESS NOTE      Patient Name: Anthony Moss  : 1955  MRN: [id number]  Primary Care Physician: Reji Topete MD  Date of admission: 2023    Patient Care Team:  Reji Topete MD as PCP - General (Family Medicine)        Subjective   Subjective:     Seen and examined  Resting in bed, no distress or new complaints  Kidney function improving patient undergoing CBI,  Pinkish urine    Review of systems:  All other review of system unremarkable      Allergies:  No Known Allergies    Objective   Exam:     Vital Signs  Temp:  [97.5 °F (36.4 °C)-98.9 °F (37.2 °C)] 98.5 °F (36.9 °C)  Heart Rate:  [] 89  Resp:  [16-20] 17  BP: (116-134)/(74-87) 134/84  SpO2:  [96 %-98 %] 96 %  on   ;   Device (Oxygen Therapy): room air  Body mass index is 26.91 kg/m².    General: Middle-age  male in no acute distress.    Head:      Normocephalic and atraumatic.    Eyes:      PERRL/EOM intact, conjunctiva and sclera clear with out nystagmus.    Neck:      No masses, thyromegaly,  trachea central with normal respiratory effort   Lungs:    Clear bilaterally to auscultation.    Heart:      Regular rate and rhythm, no murmur no gallop  Abd:        Soft, nontender, not distended, bowel sounds positive, no shifting dullness, suprapubic catheter with good urine  Pulses:   Pulses palpable  Extr:        No cyanosis or clubbing--no significant edema.    Neuro:    No focal deficits.   alert oriented x3  Skin:       Intact without lesions or rashes.    Psych:    Alert and cooperative; normal mood and affect; .      Results Review:  I have personally reviewed most recent Data :  CBC    Results from last 7 days   Lab Units 23  2227 23  0001 03/15/23  2255 03/15/23  0546 23  0943 23  0040 23  1632 23  0759 23  0309 23  0759 23  0059   WBC 10*3/mm3 9.30 9.00 10.20 9.70  --  8.20  --   --  9.20  --  8.80   HEMOGLOBIN g/dL 6.9* 7.2* 7.4* 7.5* 7.4* 7.2* 7.6*   <  > 7.2*   < > 7.5*   PLATELETS 10*3/mm3 233 247 242 246  --  239  --   --  236  --  235    < > = values in this interval not displayed.     CMP   Results from last 7 days   Lab Units 03/17/23  2227 03/17/23  0001 03/15/23  2255 03/15/23  0546 03/14/23  0040 03/13/23  0309 03/12/23  0059   SODIUM mmol/L 141 139 136 140 140 139 138   POTASSIUM mmol/L 4.3 4.3 4.8 4.6 4.7 4.7 4.6   CHLORIDE mmol/L 105 101 101 104 106 105 105   CO2 mmol/L 25.0 26.0 26.0 24.0 23.0 23.0 22.0   BUN mg/dL 46* 53* 50* 45* 47* 44* 51*   CREATININE mg/dL 1.75* 1.68* 1.82* 1.91* 1.86* 1.75* 1.80*   GLUCOSE mg/dL 146* 117* 112* 119* 103* 102* 132*   ALBUMIN g/dL 3.1* 3.5 3.3* 3.5 3.5 3.2* 3.4*   BILIRUBIN mg/dL <0.2 <0.2 0.2 <0.2 <0.2 <0.2 <0.2   ALK PHOS U/L 103 113 100 105 100 101 104   AST (SGOT) U/L 14 16 14 14 15 18 18   ALT (SGPT) U/L 9 8 10 9 10 10 9     ABG      No radiology results for the last day    Results for orders placed during the hospital encounter of 01/05/22    Adult Transthoracic Echo Complete w/ Color, Spectral and Contrast if Necessary Per Protocol    Interpretation Summary  · Left ventricular ejection fraction appears to be 56 - 60%.  · The right ventricular cavity is moderately dilated.  · No pericardial effusion noted    Scheduled Meds:bumetanide, 0.5 mg, Oral, Daily  sodium bicarbonate, 650 mg, Oral, TID  sodium chloride, 500 mL, Intravenous, Once  sodium chloride, 10 mL, Intravenous, Q12H  sodium zirconium cyclosilicate, 10 g, Oral, Daily      Continuous Infusions:   PRN Meds:•  acetaminophen **OR** acetaminophen  •  influenza vaccine  •  ondansetron **OR** ondansetron  •  oxyCODONE  •  [COMPLETED] Insert Peripheral IV **AND** sodium chloride  •  sodium chloride  •  sodium chloride    Assessment & Plan   Assessment and Plan:         Acute blood loss anemia    Acute renal failure (ARF) (HCC)    Acquired scoliosis    ADHD    Depression    Mixed hyperlipidemia    Primary hypertension    Bilateral pneumonia    Marijuana  use    UTI (urinary tract infection)    Gross hematuria    BPH (benign prostatic hyperplasia)    History of alcohol use disorder    ASSESSMENT:  • Acute kidney injury, stage III, oligoanuric, metabolic differential at this this time, including sepsis, with bilateral pneumonia, now with obstructive uropathy had been taking Aleve, for the back pain,  • Bilateral nephrostomy tube placed 3/5/2023  • Hyperkalemia, secondary to CARLOS ALBERTO  • [place]ick acidosis secondary to CARLOS ALBERTO  • Weakness and tiredness, with a BUN of 59, very concerning for uremia, but patient is improving since he has  • Metabolic acidosis,   • History of alcohol use,  • Chronic back pain,using NSAID in the past  • B/l pneumonia.  Surprisingly, WBC stable,           Plan:      • Still significant fluctuation in creatinine might be related to underlying partial obstructive uropathy which can be present because of the nephrostomy tube  • Possibility of significant CKD because of the chronic obstructive uropathy is another possibility  • Volume is under control electrolytes are acceptable potassium level is normal  • Patient is a good urine output from bilateral nephrostomy tubes  pinkish urine still present  • Hemoglobin continued to slight downward from 7.6-7.2 now  • Patient still have blood coming from the bladder   • follow-up with renal function I still think there will be a gradual improvement with good urinary flow from the bilateral nephrostomy  • Echocardiogram done last time showed ejection fraction 56 to 60%  • Repeat labs tomorrow morning  • We will follow closely            Electronically signed by Jamie Houston MD,   [facility] kidney consultant  [phone]  3/18/2023  08:52 EDT

## 2023-03-19 LAB
ALBUMIN SERPL-MCNC: 3.4 G/DL (ref 3.5–5.2)
ALBUMIN/GLOB SERPL: 1 G/DL
ALP SERPL-CCNC: 125 U/L (ref 39–117)
ALT SERPL W P-5'-P-CCNC: 11 U/L (ref 1–41)
ANION GAP SERPL CALCULATED.3IONS-SCNC: 11 MMOL/L (ref 5–15)
AST SERPL-CCNC: 15 U/L (ref 1–40)
BASOPHILS # BLD AUTO: 0.2 10*3/MM3 (ref 0–0.2)
BASOPHILS NFR BLD AUTO: 1.7 % (ref 0–1.5)
BH BB BLOOD EXPIRATION DATE: NORMAL
BH BB BLOOD TYPE BARCODE: 5100
BH BB DISPENSE STATUS: NORMAL
BH BB PRODUCT CODE: NORMAL
BH BB UNIT NUMBER: NORMAL
BILIRUB SERPL-MCNC: 0.2 MG/DL (ref 0–1.2)
BUN SERPL-MCNC: 52 MG/DL (ref 8–23)
BUN/CREAT SERPL: 26.4 (ref 7–25)
CALCIUM SPEC-SCNC: 8.8 MG/DL (ref 8.6–10.5)
CHLORIDE SERPL-SCNC: 100 MMOL/L (ref 98–107)
CO2 SERPL-SCNC: 26 MMOL/L (ref 22–29)
CREAT SERPL-MCNC: 1.97 MG/DL (ref 0.76–1.27)
CROSSMATCH INTERPRETATION: NORMAL
DEPRECATED RDW RBC AUTO: 49.9 FL (ref 37–54)
EGFRCR SERPLBLD CKD-EPI 2021: 37 ML/MIN/1.73
EOSINOPHIL # BLD AUTO: 0.5 10*3/MM3 (ref 0–0.4)
EOSINOPHIL NFR BLD AUTO: 4.8 % (ref 0.3–6.2)
ERYTHROCYTE [DISTWIDTH] IN BLOOD BY AUTOMATED COUNT: 16.1 % (ref 12.3–15.4)
GLOBULIN UR ELPH-MCNC: 3.5 GM/DL
GLUCOSE SERPL-MCNC: 121 MG/DL (ref 65–99)
HCT VFR BLD AUTO: 24.8 % (ref 37.5–51)
HGB BLD-MCNC: 8.1 G/DL (ref 13–17.7)
LYMPHOCYTES # BLD AUTO: 2.7 10*3/MM3 (ref 0.7–3.1)
LYMPHOCYTES NFR BLD AUTO: 27.8 % (ref 19.6–45.3)
MAGNESIUM SERPL-MCNC: 1.9 MG/DL (ref 1.6–2.4)
MCH RBC QN AUTO: 28.7 PG (ref 26.6–33)
MCHC RBC AUTO-ENTMCNC: 32.5 G/DL (ref 31.5–35.7)
MCV RBC AUTO: 88.4 FL (ref 79–97)
MONOCYTES # BLD AUTO: 1.1 10*3/MM3 (ref 0.1–0.9)
MONOCYTES NFR BLD AUTO: 11.6 % (ref 5–12)
NEUTROPHILS NFR BLD AUTO: 5.2 10*3/MM3 (ref 1.7–7)
NEUTROPHILS NFR BLD AUTO: 54.1 % (ref 42.7–76)
NRBC BLD AUTO-RTO: 0 /100 WBC (ref 0–0.2)
PHOSPHATE SERPL-MCNC: 5.2 MG/DL (ref 2.5–4.5)
PLATELET # BLD AUTO: 256 10*3/MM3 (ref 140–450)
PMV BLD AUTO: 8.5 FL (ref 6–12)
POTASSIUM SERPL-SCNC: 4.7 MMOL/L (ref 3.5–5.2)
PROT SERPL-MCNC: 6.9 G/DL (ref 6–8.5)
RBC # BLD AUTO: 2.81 10*6/MM3 (ref 4.14–5.8)
SODIUM SERPL-SCNC: 137 MMOL/L (ref 136–145)
UNIT  ABO: NORMAL
UNIT  RH: NORMAL
WBC NRBC COR # BLD: 9.7 10*3/MM3 (ref 3.4–10.8)

## 2023-03-19 PROCEDURE — 36415 COLL VENOUS BLD VENIPUNCTURE: CPT | Performed by: UROLOGY

## 2023-03-19 PROCEDURE — 83735 ASSAY OF MAGNESIUM: CPT | Performed by: UROLOGY

## 2023-03-19 PROCEDURE — 85025 COMPLETE CBC W/AUTO DIFF WBC: CPT | Performed by: UROLOGY

## 2023-03-19 PROCEDURE — 80053 COMPREHEN METABOLIC PANEL: CPT | Performed by: UROLOGY

## 2023-03-19 PROCEDURE — 84100 ASSAY OF PHOSPHORUS: CPT | Performed by: UROLOGY

## 2023-03-19 RX ADMIN — SODIUM BICARBONATE 650 MG: 650 TABLET ORAL at 15:00

## 2023-03-19 RX ADMIN — SODIUM ZIRCONIUM CYCLOSILICATE 5 G: 10 POWDER, FOR SUSPENSION ORAL at 08:39

## 2023-03-19 RX ADMIN — Medication 10 ML: at 21:07

## 2023-03-19 RX ADMIN — Medication 10 ML: at 08:39

## 2023-03-19 RX ADMIN — SODIUM BICARBONATE 650 MG: 650 TABLET ORAL at 21:06

## 2023-03-19 RX ADMIN — SODIUM BICARBONATE 650 MG: 650 TABLET ORAL at 08:39

## 2023-03-19 NOTE — PLAN OF CARE
Goal Outcome Evaluation:     C/o generalized soreness especially in his back area. Both nephrostomy tubes have been draining well. Both suprapubic and trujillo catheter remained bloody, no clots noted in the bag. Call light is within reach and is able to make his needs known.

## 2023-03-19 NOTE — SIGNIFICANT NOTE
03/19/23 1710   Rehab Time/Intention   Session Not Performed   (attempted to see pt in am and was sound asleep, could not arouse)   Recommendation   PT - Next Appointment 03/20/23

## 2023-03-19 NOTE — PROGRESS NOTES
"     [facility] Medicine Services Daily Progress Note     Patient Name: Anthony Moss  : 1955  MRN: [id number]  Primary Care Physician:  Reji Topete MD  Date of admission: 2023        Subjective       Chief Complaint: Hematuria and weakness.        Patient Reports:           2023.  Patient was seen and examined.  Patient reported slight improvement in his symptoms.        2023.  Patient was seen and examined.  Patient reported moderate improvement in his symptoms.        2023.  Patient was seen and examined.  Patient complained of constant constipation.  Patient reported last bowel movement to be 2023.  Lactulose was ordered.        2023.  Patient was seen and examined.  No overnight events noted.  Neurology and nephrology are following.       3/6/2023; first time seeing the patient, extensive chart notes reviewed,, Roberts catheter came out, both suprapubic and Roberts catheter were replaced by urology for CBI, otherwise no new symptoms hemodynamically stable  3/7/2023; patient still continues with hematuria/CBI, getting lighter, otherwise, hemodynamically stable, eating lunch in the bed,  3/8/2023; patient still continues with hematuria, continue CBI,   height is 188 cm (74.02\") and weight is 97.3 kg (214 lb 8.1 oz). His axillary temperature is 97.8 °F (36.6 °C). His blood pressure is 126/80 and his pulse is 99. His respiration is 13 and oxygen saturation is 98%.     3/9/2023; afebrile vital signs are stable, overnight events noted patient had a lot of clots passing through Roberts catheter, currently urine appearing pinkish, CBI on hold as per urology, hemoglobin stable, serum creatinine stable around 2.30, continue to monitor.    3/10/2023; afebrile vital signs are stable, continues to be on room air, CBI was resumed yesterday, continues to have pinkish urine, serum creatinine 2.32, no other symptoms continue to " monitor.    3/11/2023:  Patient S&E.  No new complaints.  Tolerating CBI well.  Nurse reports no acute overnight events.  Urine remains pink-tinged.  Creatinine slightly improved, hemoglobin stable.    3/12/2023:  Patient S&E.  Tolerating CBI well.  Still having some pinkish output.  Urology and nephrology recommendations appreciated.  We will continue current management.    3/13/23:  Awaiting Urology recs for DC. Still on CBI.     3/14/2023:  Patient's urine has not cleared up very nicely.  We will wean away from CBI to adjust Roberts catheter.  Urology recommending SNF placement in case of Roberts complications.  Patient is agreeable with this, pre-CERT is required according to case management.  No acute complaints at this time, nurse reports no acute overnight events.    3/15/2023:  Having some more hematuria today. Awaiting precert approval for placement, hopefully tomorrow. No new symptoms or complaints.     3/16/2023:  Patient having some slightly red-tinged urine output today.  Stable otherwise.  No drop in hemoglobin.  Urology is wanting to send patient with Roberts to SNF with radical cystectomy scheduled on 3/28/2023.    3/17/2023:  Patient stable for DC at this time.  Awaiting bed availability at accepting SNF.  No new complaints today.  Urology, nephrology, PT, OT recs appreciated.    3/18/2023:  Patient S&E.  Had a hemoglobin drop last night of 6.9.  Received 1 unit PRBC.  A.m. hemoglobin 8.1.  Patient has no signs of large-volume blood loss, but still having hematuria.  Still pending SNF acceptance.    3/19/2023  Denies for any new complaint, no nausea or vomiting, no chest pain.    Review of Systems   Constitutional: Negative for malaise/fatigue.   HENT: Negative.    Eyes: Negative.    Cardiovascular: Negative.    Respiratory: Negative.    Endocrine: Negative.    Hematologic/Lymphatic: Negative.    Skin: Negative.    Musculoskeletal: Negative.    Gastrointestinal: Negative for constipation.   Genitourinary:  Negative.    Neurological: Positive for weakness.   Psychiatric/Behavioral: Negative.    Allergic/Immunologic: Negative.                Objective       Vitals:   Temp:  [98.2 °F (36.8 °C)-98.5 °F (36.9 °C)] 98.5 °F (36.9 °C)  Heart Rate:  [] 106  Resp:  [15-20] 18  BP: (111-121)/(72-76) 121/76     Physical Exam  Vitals reviewed.   Constitutional:       General: He is not in acute distress.  HENT:      Head: Normocephalic.      Nose: Nose normal.      Mouth/Throat:      Mouth: Mucous membranes are dry.      Pharynx: Oropharynx is clear.   Eyes:      Extraocular Movements: Extraocular movements intact.      Conjunctiva/sclera: Conjunctivae normal.      Pupils: Pupils are equal, round, and reactive to light.   Cardiovascular:      Pulses: Normal pulses.      Heart sounds: No murmur heard.    No friction rub. No gallop.      Comments: S1 and S2 present.  No tachycardia.  Pulmonary:      Effort: Pulmonary effort is normal.      Breath sounds: No stridor. No wheezing or rales.   Chest:      Chest wall: No tenderness.   Abdominal:      General: Bowel sounds are normal. There is no distension.      Palpations: Abdomen is soft.      Tenderness: There is no abdominal tenderness. There is no right CVA tenderness or guarding.  Bilateral nephrostomy tubes in place, suprapubic catheter and Roberts catheter in place, Roberts bag with pinkish urine  Musculoskeletal:         General: No swelling, tenderness, deformity or signs of injury.      Cervical back: Normal range of motion. No rigidity.      Right lower leg: No edema.      Left lower leg: No edema.   Skin:     General: Skin is warm and dry.      Capillary Refill: Capillary refill takes less than 2 seconds.      Coloration: Skin is not jaundiced.      Findings: No bruising, erythema, lesion or rash.   Neurological:      Comments: No facial asymmetry noted.  Gait and station not tested.   Psychiatric:      Comments: No agitation.                             Result Review     Result Review:  I have personally reviewed the results from the time of this admission to 3/5/2023 16:48 EST and agree with these findings:  [x]?  Laboratory  [x]?  Microbiology  [x]?  Radiology  []?  EKG/Telemetry   []?  Cardiology/Vascular   []?  Pathology  []?  Old records  []?  Other:  Most notable findings include:        Lab Results   Component Value Date    GLUCOSE 121 (H) 03/19/2023    CALCIUM 8.8 03/19/2023     03/19/2023    K 4.7 03/19/2023    CO2 26.0 03/19/2023     03/19/2023    BUN 52 (H) 03/19/2023    CREATININE 1.97 (H) 03/19/2023    EGFR 37.0 (L) 03/19/2023    BCR 26.4 (H) 03/19/2023    ANIONGAP 11.0 03/19/2023   No radiology results for the last 7 days     Assessment & Plan    Previous notes and with minor updates.        Brief Patient Summary:     Patient is a 67-year-old male with history of osteoarthritis, BPH, thrombocytopenia, alcohol use disorder, chronic respiratory failure on home oxygen who presented to the emergency room because of hematuria and weakness.  Patient was admitted to ICU because of his severe anemia.  Patient was diagnosed with acute blood anemia with hemoglobin of 4.  Patient was also diagnosed with gross hematuria and urology consult was completed.  Patient was also diagnosed with acute kidney injury and a nephrology consult was completed.           sodium bicarbonate, 650 mg, Oral, TID  sodium chloride, 500 mL, Intravenous, Once  sodium chloride, 10 mL, Intravenous, Q12H               Active Hospital Problems:       Active Hospital Problems     Diagnosis     • **Acute blood loss anemia     • Gross hematuria     • UTI (urinary tract infection)     • Acute renal failure (ARF) (HCC)     • Bilateral pneumonia     • BPH (benign prostatic hyperplasia)     • History of alcohol use disorder     • Marijuana use     • ADHD     • Depression     • Mixed hyperlipidemia     • Primary hypertension     • Acquired scoliosis              Plan:     bilateral ureteral  obstruction/gross hematuria;  - s/p bilateral nephrostomy tubes by IR, with improving creatinine  -- Maintain suprapubic catheter, draining well  -- Maintain Roberts catheter to prevent urinary obstruction from bladder outlet  -- Currently no signs of hematuria at all, patient has very clear CBI    Bladder cancer  -- Patient has been tentatively scheduled for March 20 teeth for radical cystectomy with ileal conduit.    Acute kidney injury, resolved  Nephrology following, recommendation appreciated  -- Creatinine improved, now stabilized.    Electrolyte abnormalities  - being replaced. Resolved    Acute blood loss anemia/gross hematuria;  - hemoglobin 7.8 transfuse to keep over 7 g/dL currently hemoglobin stable in Mis 7 range  -- Patient had 1 unit PRBC overnight on 3/18/2023.  Repeat Hb is 8.1.    Disposition to rehab once arranged.           DVT prophylaxis:  Mechanical DVT prophylaxis orders are present.     CODE STATUS:    Code Status (Patient has no pulse and is not breathing): CPR (Attempt to Resuscitate)  Medical Interventions (Patient has pulse or is breathing): Full Support        Disposition:  Patient DC to SNF in the next 1 to 2 days WBA.  Stable at this time.     This patient has been examined wearing appropriate Personal Protective Equipment and discussed with hospital infection control department, [facility], infectious disease specialist and pulmonologist.           Electronically signed by Andrey Valdez MD  03/10/23, 1:53 PM EST.

## 2023-03-19 NOTE — PROGRESS NOTES
PROGRESS NOTE      Patient Name: Anthony Moss  : 1955  MRN: [id number]  Primary Care Physician: Reji Topete MD  Date of admission: 2023    Patient Care Team:  Reji Topete MD as PCP - General (Family Medicine)        Subjective   Subjective:     Seen and examined  Resting in bed, no distress or new complaints  Kidney function improving patient undergoing CBI,  Pinkish urine    Review of systems:  All other review of system unremarkable      Allergies:  No Known Allergies    Objective   Exam:     Vital Signs  Temp:  [97.7 °F (36.5 °C)-98.2 °F (36.8 °C)] 98.2 °F (36.8 °C)  Heart Rate:  [] 91  Resp:  [15-20] 20  BP: (111-132)/(72-80) 120/76  SpO2:  [95 %-97 %] 96 %  on   ;   Device (Oxygen Therapy): room air  Body mass index is 26.91 kg/m².    General: Middle-age  male in no acute distress.    Head:      Normocephalic and atraumatic.    Eyes:      PERRL/EOM intact, conjunctiva and sclera clear with out nystagmus.    Neck:      No masses, thyromegaly,  trachea central with normal respiratory effort   Lungs:    Clear bilaterally to auscultation.    Heart:      Regular rate and rhythm, no murmur no gallop  Abd:        Soft, nontender, not distended, bowel sounds positive, no shifting dullness, suprapubic catheter with good urine  Pulses:   Pulses palpable  Extr:        No cyanosis or clubbing--no significant edema.    Neuro:    No focal deficits.   alert oriented x3  Skin:       Intact without lesions or rashes.    Psych:    Alert and cooperative; normal mood and affect; .      Results Review:  I have personally reviewed most recent Data :  CBC    Results from last 7 days   Lab Units 23  0016 23  0952 23  2227 23  0001 03/15/23  2255 03/15/23  0546 23  0943 23  0040 23  0759 23  0309   WBC 10*3/mm3 9.70  --  9.30 9.00 10.20 9.70  --  8.20  --  9.20   HEMOGLOBIN g/dL 8.1* 8.1* 6.9* 7.2* 7.4* 7.5* 7.4* 7.2*   < > 7.2*    PLATELETS 10*3/mm3 256  --  233 247 242 246  --  239  --  236    < > = values in this interval not displayed.     CMP   Results from last 7 days   Lab Units 03/19/23  0016 03/17/23  2227 03/17/23  0001 03/15/23  2255 03/15/23  0546 03/14/23  0040 03/13/23  0309   SODIUM mmol/L 137 141 139 136 140 140 139   POTASSIUM mmol/L 4.7 4.3 4.3 4.8 4.6 4.7 4.7   CHLORIDE mmol/L 100 105 101 101 104 106 105   CO2 mmol/L 26.0 25.0 26.0 26.0 24.0 23.0 23.0   BUN mg/dL 52* 46* 53* 50* 45* 47* 44*   CREATININE mg/dL 1.97* 1.75* 1.68* 1.82* 1.91* 1.86* 1.75*   GLUCOSE mg/dL 121* 146* 117* 112* 119* 103* 102*   ALBUMIN g/dL 3.4* 3.1* 3.5 3.3* 3.5 3.5 3.2*   BILIRUBIN mg/dL 0.2 <0.2 <0.2 0.2 <0.2 <0.2 <0.2   ALK PHOS U/L 125* 103 113 100 105 100 101   AST (SGOT) U/L 15 14 16 14 14 15 18   ALT (SGPT) U/L 11 9 8 10 9 10 10     ABG      No radiology results for the last day    Results for orders placed during the hospital encounter of 01/05/22    Adult Transthoracic Echo Complete w/ Color, Spectral and Contrast if Necessary Per Protocol    Interpretation Summary  · Left ventricular ejection fraction appears to be 56 - 60%.  · The right ventricular cavity is moderately dilated.  · No pericardial effusion noted    Scheduled Meds:sodium bicarbonate, 650 mg, Oral, TID  sodium chloride, 500 mL, Intravenous, Once  sodium chloride, 10 mL, Intravenous, Q12H  sodium zirconium cyclosilicate, 5 g, Oral, Daily      Continuous Infusions:   PRN Meds:•  acetaminophen **OR** acetaminophen  •  influenza vaccine  •  ondansetron **OR** ondansetron  •  [COMPLETED] Insert Peripheral IV **AND** sodium chloride  •  sodium chloride  •  sodium chloride    Assessment & Plan   Assessment and Plan:         Acute blood loss anemia    Acute renal failure (ARF) (HCC)    Acquired scoliosis    ADHD    Depression    Mixed hyperlipidemia    Primary hypertension    Bilateral pneumonia    Marijuana use    UTI (urinary tract infection)    Gross hematuria    BPH (benign  prostatic hyperplasia)    History of alcohol use disorder    ASSESSMENT:  • Acute kidney injury, stage III, oligoanuric, metabolic differential at this this time, including sepsis, with bilateral pneumonia, now with obstructive uropathy had been taking Aleve, for the back pain,  • Bilateral nephrostomy tube placed 3/5/2023  • Hyperkalemia, secondary to CARLOS ALBERTO  • Bolick acidosis secondary to CARLOS ALBERTO  • Weakness and tiredness, with a BUN of 59, very concerning for uremia, but patient is improving since he has  • Metabolic acidosis,   • History of alcohol use,  • Chronic back pain,using NSAID in the past  • B/l pneumonia.  Surprisingly, WBC stable,           Plan:      • Still significant fluctuation in creatinine might be related to underlying partial obstructive uropathy which can be present because of the nephrostomy tube  • Possibility of significant CKD because of the chronic obstructive uropathy is another possibility  • Holding Bumex today if needed will start thiazide diuretics tomorrow morning  • Volume is under control electrolytes are acceptable potassium level is normal  • Patient is a good urine output from bilateral nephrostomy tubes  pinkish urine still present  • Hemoglobin continued to slight downward from 7.6-7.2 now  • Patient still have blood coming from the bladder   • follow-up with renal function I still think there will be a gradual improvement with good urinary flow from the bilateral nephrostomy  • Echocardiogram done last time showed ejection fraction 56 to 60%  • Repeat labs tomorrow morning  • We will follow closely            Electronically signed by Jamie Houston MD,   [facility] kidney consultant  [phone]  3/19/2023  08:12 EDT

## 2023-03-20 VITALS
WEIGHT: 209.66 LBS | OXYGEN SATURATION: 97 % | HEART RATE: 100 BPM | RESPIRATION RATE: 19 BRPM | TEMPERATURE: 97.4 F | DIASTOLIC BLOOD PRESSURE: 76 MMHG | BODY MASS INDEX: 26.91 KG/M2 | SYSTOLIC BLOOD PRESSURE: 122 MMHG | HEIGHT: 74 IN

## 2023-03-20 PROBLEM — C61 PROSTATE CANCER (HCC): Chronic | Status: ACTIVE | Noted: 2023-03-20

## 2023-03-20 LAB
ALBUMIN SERPL-MCNC: 3.6 G/DL (ref 3.5–5.2)
ALBUMIN/GLOB SERPL: 1 G/DL
ALP SERPL-CCNC: 124 U/L (ref 39–117)
ALT SERPL W P-5'-P-CCNC: 7 U/L (ref 1–41)
ANION GAP SERPL CALCULATED.3IONS-SCNC: 12 MMOL/L (ref 5–15)
AST SERPL-CCNC: 17 U/L (ref 1–40)
BASOPHILS # BLD AUTO: 0.2 10*3/MM3 (ref 0–0.2)
BASOPHILS NFR BLD AUTO: 2.4 % (ref 0–1.5)
BILIRUB SERPL-MCNC: <0.2 MG/DL (ref 0–1.2)
BUN SERPL-MCNC: 43 MG/DL (ref 8–23)
BUN/CREAT SERPL: 24.6 (ref 7–25)
CALCIUM SPEC-SCNC: 8.7 MG/DL (ref 8.6–10.5)
CHLORIDE SERPL-SCNC: 105 MMOL/L (ref 98–107)
CO2 SERPL-SCNC: 23 MMOL/L (ref 22–29)
CREAT SERPL-MCNC: 1.75 MG/DL (ref 0.76–1.27)
DEPRECATED RDW RBC AUTO: 52.9 FL (ref 37–54)
EGFRCR SERPLBLD CKD-EPI 2021: 42.7 ML/MIN/1.73
EOSINOPHIL # BLD AUTO: 0.5 10*3/MM3 (ref 0–0.4)
EOSINOPHIL NFR BLD AUTO: 5.1 % (ref 0.3–6.2)
ERYTHROCYTE [DISTWIDTH] IN BLOOD BY AUTOMATED COUNT: 16.2 % (ref 12.3–15.4)
GLOBULIN UR ELPH-MCNC: 3.6 GM/DL
GLUCOSE SERPL-MCNC: 132 MG/DL (ref 65–99)
HCT VFR BLD AUTO: 25.2 % (ref 37.5–51)
HGB BLD-MCNC: 8.2 G/DL (ref 13–17.7)
LYMPHOCYTES # BLD AUTO: 2.4 10*3/MM3 (ref 0.7–3.1)
LYMPHOCYTES NFR BLD AUTO: 25.3 % (ref 19.6–45.3)
MAGNESIUM SERPL-MCNC: 1.9 MG/DL (ref 1.6–2.4)
MCH RBC QN AUTO: 28.5 PG (ref 26.6–33)
MCHC RBC AUTO-ENTMCNC: 32.6 G/DL (ref 31.5–35.7)
MCV RBC AUTO: 87.4 FL (ref 79–97)
MONOCYTES # BLD AUTO: 1.1 10*3/MM3 (ref 0.1–0.9)
MONOCYTES NFR BLD AUTO: 11.3 % (ref 5–12)
NEUTROPHILS NFR BLD AUTO: 5.4 10*3/MM3 (ref 1.7–7)
NEUTROPHILS NFR BLD AUTO: 55.9 % (ref 42.7–76)
NRBC BLD AUTO-RTO: 0.1 /100 WBC (ref 0–0.2)
PHOSPHATE SERPL-MCNC: 5.3 MG/DL (ref 2.5–4.5)
PLATELET # BLD AUTO: 276 10*3/MM3 (ref 140–450)
PMV BLD AUTO: 8.4 FL (ref 6–12)
POTASSIUM SERPL-SCNC: 4.3 MMOL/L (ref 3.5–5.2)
PROT SERPL-MCNC: 7.2 G/DL (ref 6–8.5)
RBC # BLD AUTO: 2.88 10*6/MM3 (ref 4.14–5.8)
SODIUM SERPL-SCNC: 140 MMOL/L (ref 136–145)
WBC NRBC COR # BLD: 9.7 10*3/MM3 (ref 3.4–10.8)

## 2023-03-20 PROCEDURE — 93010 ELECTROCARDIOGRAM REPORT: CPT | Performed by: INTERNAL MEDICINE

## 2023-03-20 PROCEDURE — 93005 ELECTROCARDIOGRAM TRACING: CPT | Performed by: UROLOGY

## 2023-03-20 PROCEDURE — 80053 COMPREHEN METABOLIC PANEL: CPT | Performed by: UROLOGY

## 2023-03-20 PROCEDURE — 36415 COLL VENOUS BLD VENIPUNCTURE: CPT | Performed by: UROLOGY

## 2023-03-20 PROCEDURE — 83735 ASSAY OF MAGNESIUM: CPT | Performed by: UROLOGY

## 2023-03-20 PROCEDURE — 85025 COMPLETE CBC W/AUTO DIFF WBC: CPT | Performed by: UROLOGY

## 2023-03-20 PROCEDURE — 84100 ASSAY OF PHOSPHORUS: CPT | Performed by: UROLOGY

## 2023-03-20 RX ORDER — SODIUM BICARBONATE 650 MG/1
650 TABLET ORAL 3 TIMES DAILY
Qty: 90 TABLET | Refills: 0 | Status: SHIPPED | OUTPATIENT
Start: 2023-03-20 | End: 2023-04-20 | Stop reason: HOSPADM

## 2023-03-20 RX ADMIN — SODIUM ZIRCONIUM CYCLOSILICATE 5 G: 10 POWDER, FOR SUSPENSION ORAL at 08:38

## 2023-03-20 RX ADMIN — SODIUM BICARBONATE 650 MG: 650 TABLET ORAL at 08:38

## 2023-03-20 NOTE — PROGRESS NOTES
"  [facility] DAILY PROGRESS NOTE    Patient Identification  Name: Anthony Moss  Age: 67 y.o.  Sex: male  :  1955  MRN: [id number]    Date: 3/20/2023             Subjective:  Interval History: Hemoglobin stable off CBI    Objective:    Scheduled Meds:sodium bicarbonate, 650 mg, Oral, TID  sodium chloride, 500 mL, Intravenous, Once  sodium chloride, 10 mL, Intravenous, Q12H  sodium zirconium cyclosilicate, 5 g, Oral, Daily      Continuous Infusions:   PRN Meds:•  acetaminophen **OR** acetaminophen  •  influenza vaccine  •  ondansetron **OR** ondansetron  •  [COMPLETED] Insert Peripheral IV **AND** sodium chloride  •  sodium chloride  •  sodium chloride    Vital signs in last 24 hours:  Temp:  [98.1 °F (36.7 °C)-98.5 °F (36.9 °C)] 98.2 °F (36.8 °C)  Heart Rate:  [] 89  Resp:  [18-20] 20  BP: (121-138)/(76-82) 138/82    Intake/Output:    Intake/Output Summary (Last 24 hours) at 3/20/2023 1024  Last data filed at 3/20/2023 0748  Gross per 24 hour   Intake 1560 ml   Output 3575 ml   Net -2015 ml       Exam:  /82 (BP Location: Right arm, Patient Position: Lying)   Pulse 89   Temp 98.2 °F (36.8 °C) (Oral)   Resp 20   Ht 188 cm (74.02\")   Wt 95.1 kg (209 lb 10.5 oz)   SpO2 95%   BMI 26.91 kg/m²     General Appearance:    Alert, cooperative, no distress, appears stated age               Abdomen:     Soft, ND   :   Suprapubic tube 24 three-way Simplastic, 20 Kiswahili coudé per urethra                 Data Review:  All labs (24hrs):   Recent Results (from the past 24 hour(s))   Magnesium    Collection Time: 23  4:22 AM    Specimen: Blood   Result Value Ref Range    Magnesium 1.9 1.6 - 2.4 mg/dL   Phosphorus    Collection Time: 23  4:22 AM    Specimen: Blood   Result Value Ref Range    Phosphorus 5.3 (H) 2.5 - 4.5 mg/dL   Comprehensive Metabolic Panel    Collection Time: 23  4:22 AM    Specimen: Blood   Result Value Ref Range    Glucose 132 (H) 65 - 99 mg/dL    BUN 43 (H) 8 - " 23 mg/dL    Creatinine 1.75 (H) 0.76 - 1.27 mg/dL    Sodium 140 136 - 145 mmol/L    Potassium 4.3 3.5 - 5.2 mmol/L    Chloride 105 98 - 107 mmol/L    CO2 23.0 22.0 - 29.0 mmol/L    Calcium 8.7 8.6 - 10.5 mg/dL    Total Protein 7.2 6.0 - 8.5 g/dL    Albumin 3.6 3.5 - 5.2 g/dL    ALT (SGPT) 7 1 - 41 U/L    AST (SGOT) 17 1 - 40 U/L    Alkaline Phosphatase 124 (H) 39 - 117 U/L    Total Bilirubin <0.2 0.0 - 1.2 mg/dL    Globulin 3.6 gm/dL    A/G Ratio 1.0 g/dL    BUN/Creatinine Ratio 24.6 7.0 - 25.0    Anion Gap 12.0 5.0 - 15.0 mmol/L    eGFR 42.7 (L) >60.0 mL/min/1.73   CBC Auto Differential    Collection Time: 03/20/23  4:22 AM    Specimen: Blood   Result Value Ref Range    WBC 9.70 3.40 - 10.80 10*3/mm3    RBC 2.88 (L) 4.14 - 5.80 10*6/mm3    Hemoglobin 8.2 (L) 13.0 - 17.7 g/dL    Hematocrit 25.2 (L) 37.5 - 51.0 %    MCV 87.4 79.0 - 97.0 fL    MCH 28.5 26.6 - 33.0 pg    MCHC 32.6 31.5 - 35.7 g/dL    RDW 16.2 (H) 12.3 - 15.4 %    RDW-SD 52.9 37.0 - 54.0 fl    MPV 8.4 6.0 - 12.0 fL    Platelets 276 140 - 450 10*3/mm3    Neutrophil % 55.9 42.7 - 76.0 %    Lymphocyte % 25.3 19.6 - 45.3 %    Monocyte % 11.3 5.0 - 12.0 %    Eosinophil % 5.1 0.3 - 6.2 %    Basophil % 2.4 (H) 0.0 - 1.5 %    Neutrophils, Absolute 5.40 1.70 - 7.00 10*3/mm3    Lymphocytes, Absolute 2.40 0.70 - 3.10 10*3/mm3    Monocytes, Absolute 1.10 (H) 0.10 - 0.90 10*3/mm3    Eosinophils, Absolute 0.50 (H) 0.00 - 0.40 10*3/mm3    Basophils, Absolute 0.20 0.00 - 0.20 10*3/mm3    nRBC 0.1 0.0 - 0.2 /100 WBC      Imaging Results (Last 24 Hours)     ** No results found for the last 24 hours. **           Assessment:    Acute blood loss anemia    Acute renal failure (ARF) (HCC)    Acquired scoliosis    ADHD    Depression    Mixed hyperlipidemia    Primary hypertension    Bilateral pneumonia    Marijuana use    UTI (urinary tract infection)    Gross hematuria    BPH (benign prostatic hyperplasia)    History of alcohol use disorder    Neurogenic bladder with  suprapubic tube  Severe bladder wall thickening causing bilateral hydronephrosis  Cystitis  Hematuria and anemia     Plan:      Further discussed planned surgery in about 1 week, patient understanding and agreeable to proceed  Discharge to SNF  Path noted for prostate cancer, PSA level normal suspicious for poorly differentiated prostate cancer, still planning for March 28 for radical cystectomy with ileal conduit  He will need to maintain bilateral nephrostomy tubes through surgery      Xu Montero MD  [facility]  [address], [address]  [place] [address]  Office: [phone]  Cell: [phone]  Also available via Epic Secure Chat  03/20/23  10:24 EDT

## 2023-03-20 NOTE — CASE MANAGEMENT/SOCIAL WORK
Case Management Discharge Note      Final Note: Leander Trimble    Provided Post Acute Provider List?: Yes  Post Acute Provider List: Nursing Home, Inpatient Rehab  Provided Post Acute Provider Quality & Resource List?: Yes  Post Acute Provider Quality and Resource List: Home Health, Inpatient Rehab, Nursing Home    Selected Continued Care - Discharged on 3/20/2023 Admission date: 2/28/2023 - Discharge disposition: Skilled Nursing Facility (DC - External)            Transportation Services  W/C Van: Other (Leander oreilly)    Final Discharge Disposition Code: 83 - SNF (DC-External) w/planned readmission

## 2023-03-20 NOTE — PLAN OF CARE
Goal Outcome Evaluation:  Plan of Care Reviewed With: patient        Progress: improving     Patient with 1 asst, denies need for PRN pain medication, ready for DC, aware of transportation, drains and trujillo in place

## 2023-03-20 NOTE — DISCHARGE INSTRUCTIONS
See instructions for surgery March 28,2023 at 12:30   87 y/o F pt w/ PMHx of A-fib, DM, HTN and HLD was BIB family to the ED c/o cloudy urine onset today. Pt also notes fatigue. Pt's son states that the pt had a D&C; pt had a Menezes placed after D&C and now pt's doctors will not take out catheter. Pt's family denies fever, change in MS, or any other complaints. NKDA. PMD: Dr. Chilango Soares. Greenish, cloudy urine in the Menezes bag. Focused evaluation, orders entered, placed in main for further evaluation by another provider. Focused evaluation performed at intake to enter orders appropriate for patient's complaint.  Patient placed in the main ED to complete full evaluation by another provider. In summary, this is a 85 y/o F pt w/ PMHx of A-fib, DM, HTN and HLD was BIB family to the ED c/o cloudy urine onset today. Pt also notes fatigue. Pt's son states that the pt had a D&C; pt had a Menezes placed after D&C and now pt's doctors will not take out catheter. Pt's family denies fever, change in MS, or any other complaints. NKDA. PMD: Dr. Chilango Soares. Greenish, cloudy urine in the Menezes bag.

## 2023-03-20 NOTE — PROGRESS NOTES
PROGRESS NOTE      Patient Name: Anthony Moss  : 1955  MRN: [id number]  Primary Care Physician: Reji Topete MD  Date of admission: 2023    Patient Care Team:  Reji Topete MD as PCP - General (Family Medicine)        Subjective   Subjective:     Seen and examined  Resting in bed, no distress or new complaints  Kidney function improving patient undergoing CBI,  Pinkish urine    Review of systems:  All other review of system unremarkable      Allergies:  No Known Allergies    Objective   Exam:     Vital Signs  Temp:  [97.4 °F (36.3 °C)-98.2 °F (36.8 °C)] 97.4 °F (36.3 °C)  Heart Rate:  [] 100  Resp:  [19-20] 19  BP: (122-138)/(76-82) 122/76  SpO2:  [95 %-97 %] 97 %  on   ;   Device (Oxygen Therapy): room air  Body mass index is 26.91 kg/m².    General: Middle-age  male in no acute distress.    Head:      Normocephalic and atraumatic.    Eyes:      PERRL/EOM intact, conjunctiva and sclera clear with out nystagmus.    Neck:      No masses, thyromegaly,  trachea central with normal respiratory effort   Lungs:    Clear bilaterally to auscultation.    Heart:      Regular rate and rhythm, no murmur no gallop  Abd:        Soft, nontender, not distended, bowel sounds positive, no shifting dullness, suprapubic catheter with good urine  Pulses:   Pulses palpable  Extr:        No cyanosis or clubbing--no significant edema.    Neuro:    No focal deficits.   alert oriented x3  Skin:       Intact without lesions or rashes.    Psych:    Alert and cooperative; normal mood and affect; .      Results Review:  I have personally reviewed most recent Data :  CBC    Results from last 7 days   Lab Units 23  0422 23  0016 23  0952 23  2227 23  0001 03/15/23  2255 03/15/23  0546 23  0943 23  0040   WBC 10*3/mm3 9.70 9.70  --  9.30 9.00 10.20 9.70  --  8.20   HEMOGLOBIN g/dL 8.2* 8.1* 8.1* 6.9* 7.2* 7.4* 7.5*   < > 7.2*   PLATELETS 10*3/mm3 716 368   --  233 247 242 246  --  239    < > = values in this interval not displayed.     CMP   Results from last 7 days   Lab Units 03/20/23  0422 03/19/23  0016 03/17/23  2227 03/17/23  0001 03/15/23  2255 03/15/23  0546 03/14/23  0040   SODIUM mmol/L 140 137 141 139 136 140 140   POTASSIUM mmol/L 4.3 4.7 4.3 4.3 4.8 4.6 4.7   CHLORIDE mmol/L 105 100 105 101 101 104 106   CO2 mmol/L 23.0 26.0 25.0 26.0 26.0 24.0 23.0   BUN mg/dL 43* 52* 46* 53* 50* 45* 47*   CREATININE mg/dL 1.75* 1.97* 1.75* 1.68* 1.82* 1.91* 1.86*   GLUCOSE mg/dL 132* 121* 146* 117* 112* 119* 103*   ALBUMIN g/dL 3.6 3.4* 3.1* 3.5 3.3* 3.5 3.5   BILIRUBIN mg/dL <0.2 0.2 <0.2 <0.2 0.2 <0.2 <0.2   ALK PHOS U/L 124* 125* 103 113 100 105 100   AST (SGOT) U/L 17 15 14 16 14 14 15   ALT (SGPT) U/L 7 11 9 8 10 9 10     ABG      No radiology results for the last day    Results for orders placed during the hospital encounter of 01/05/22    Adult Transthoracic Echo Complete w/ Color, Spectral and Contrast if Necessary Per Protocol    Interpretation Summary  · Left ventricular ejection fraction appears to be 56 - 60%.  · The right ventricular cavity is moderately dilated.  · No pericardial effusion noted    Scheduled Meds:sodium bicarbonate, 650 mg, Oral, TID  sodium chloride, 500 mL, Intravenous, Once  sodium chloride, 10 mL, Intravenous, Q12H  sodium zirconium cyclosilicate, 5 g, Oral, Daily      Continuous Infusions:   PRN Meds:•  acetaminophen **OR** acetaminophen  •  influenza vaccine  •  ondansetron **OR** ondansetron  •  [COMPLETED] Insert Peripheral IV **AND** sodium chloride  •  sodium chloride  •  sodium chloride    Assessment & Plan   Assessment and Plan:         Acute blood loss anemia    Acute renal failure (ARF) (HCC)    Acquired scoliosis    ADHD    Depression    Mixed hyperlipidemia    Primary hypertension    Bilateral pneumonia    Marijuana use    UTI (urinary tract infection)    Gross hematuria    BPH (benign prostatic hyperplasia)    History of  alcohol use disorder    Prostate cancer (HCC)    ASSESSMENT:  • Acute kidney injury, stage III, oligoanuric, metabolic differential at this this time, including sepsis, with bilateral pneumonia, now with obstructive uropathy had been taking Aleve, for the back pain,  • Bilateral nephrostomy tube placed 3/5/2023  • Hyperkalemia, secondary to CARLOS ALBERTO  • Bolick acidosis secondary to CARLOS ALBERTO  • Weakness and tiredness, with a BUN of 59, very concerning for uremia, but patient is improving since he has  • Metabolic acidosis,   • History of alcohol use,  • Chronic back pain,using NSAID in the past  • B/l pneumonia.  Surprisingly, WBC stable,           Plan:      • Still significant fluctuation in creatinine might be related to underlying partial obstructive uropathy which can be present because of the nephrostomy tube  • Possibility of significant CKD because of the chronic obstructive uropathy is another possibility  • Potassium level has improved  • Volume is under control electrolytes are acceptable potassium level is normal  • Patient is a good urine output from bilateral nephrostomy tubes  pinkish urine still present  • Hemoglobin stable at 8.2  • Patient still have blood coming from the bladder   • follow-up with renal function I still think there will be a gradual improvement with good urinary flow from the bilateral nephrostomy  • Echocardiogram done last time showed ejection fraction 56 to 60%  • Repeat labs tomorrow morning  • We will follow closely            Electronically signed by Jamie Houston MD,   [facility] kidney consultant  [phone]  3/20/2023  13:21 EDT    By

## 2023-03-20 NOTE — CASE MANAGEMENT/SOCIAL WORK
Continued Stay Note  RADHA Ruffin     Patient Name: Anthony Moss  MRN: [id number]  Today's Date: 3/20/2023    Admit Date: 2/28/2023    Plan: Precert approved for [facility] with [facility] transport van set up for 230 pm today.   Discharge Plan     Row Name 03/20/23 1238       Plan    Plan Precert approved for [facility] with [facility] transport van set up for 230 pm today.    Plan Comments Md and bedside nurse notfied [facility] chat                            Expected Discharge Date and Time     Expected Discharge Date Expected Discharge Time    Mar 20, 2023             Katlyn Rutledge RN

## 2023-03-20 NOTE — DISCHARGE SUMMARY
[facility] Medicine Services  Discharge Summary    Date of Service: 3/20/2023  Patient Name: Anthony Moss  : 1955  MRN: [id number]    Date of Admission: 2023  Discharge Diagnosis: see below  Date of Discharge:  3/20/2023  Primary Care Physician: Reji Topete MD    Presenting Problem:   At risk of UTI [Z91.89]  Acute kidney injury (HCC) [N17.9]  Hematuria, unspecified type [R31.9]  Sepsis, due to unspecified organism, unspecified whether acute organ dysfunction present (HCC) [A41.9]    Active and Resolved Hospital Problems:  Active Hospital Problems    Diagnosis POA   • **Acute blood loss anemia [D62] Yes   • Prostate cancer (HCC) [C61] Yes   • BPH (benign prostatic hyperplasia) [N40.0] Yes   • History of alcohol use disorder [Z87.898] Yes   • Gross hematuria [R31.0] Yes   • UTI (urinary tract infection) [N39.0] Yes   • Marijuana use [F12.90] Yes   • Acute renal failure (ARF) (HCC) [N17.9] Yes   • ADHD [F90.9] Yes   • Depression [F32.A] Yes   • Mixed hyperlipidemia [E78.2] Yes   • Primary hypertension [I10] Yes   • Bilateral pneumonia [J18.9] Yes   • Acquired scoliosis [M41.9] Yes      Resolved Hospital Problems    Diagnosis POA   • Sepsis, due to unspecified organism, unspecified whether acute organ dysfunction present (HCC) [A41.9] Yes   • Tobacco abuse [Z72.0] Yes     Hospital Course     HPI:  67 y.o. old male patient with PMH of high blood pressure, hyperlipidemia, scoliosis, and depression presents to the hospital with complaints of hematuria and weakness.  Patient states he has been increasingly weak over the last few days.  He states he has a suprapubic catheter placed in July of last year for urinary retention.  Patient states he has the catheter changed every 3 weeks by Dr. Montero.  He says that he has always had blood in his urine but it has becoming more over the last few weeks.  Patient states he tells neurology at every appointment about the blood.  He  denies chest pain, shortness of breath, abdominal pain, and fevers.  Incidentally, patient CT scan showed bilateral lower lobe pneumonia, COVID-negative, flu A&B negative.  Patient has no sick contacts.  He was brought to the intensive care unit for further evaluation and treatment.     ACP: Patient wishes to be a full code with full intervention; patient states he has 1 brother that he has not talked to in years, no children and is legally  he lists a friend as his primary decision-maker if he is unable.    Hospital course:  He was admitted in consultation with nephrology and urology for further evaluation and treatment.  He underwent cystoscopy, urethral dilation, clot evacuation, bladder biopsy and fulguration of lesions, suprapubic tube upsizing and change on 3/3. Pathology showed prostate cancer, and PSA levels were normal, which is thought to be suspicious for poorly differentiated prostate cancer. He underwent CBI and had Roberts, suprapubic catheters, and bilateral nephrostomy tubes in place.  He improved over the course of several days.  His renal function slowly improved.  Pain was reasonably managed.  Hematuria has slowly improved over time.  He did receive some blood transfusion during this process.  The plan is for him to undergo radical cystectomy on 3/28/2023.  He will discharge to rehab and follow-up in a week for surgery.    Day of Discharge     Vital Signs:  Temp:  [97.4 °F (36.3 °C)-98.2 °F (36.8 °C)] 97.4 °F (36.3 °C)  Heart Rate:  [] 100  Resp:  [19-20] 19  BP: (122-138)/(76-82) 122/76    Physical Exam:  GEN: WDWN, no acute distress  HEENT: NCAT, PERRLA, moist mucous membranes  NECK: Supple, midline trachea  CARD: RRR, no appreciable M/R/G, no peripheral edema  PULM: CTAB, non-distressed  ABD: soft, NTND, normoactive bowel sounds throughout  SKIN: Warm, dry  : Bilateral nephrostomy tubes, suprapubic tube 24 three-way Simplastic, 20 Liberian coudé per urethra   NEURO: Grossly intact,  non-focal exam  PSYCH: Pleasant    Pertinent  and/or Most Recent Results     LAB RESULTS:      Lab 03/20/23  0422 03/19/23  0016 03/18/23  0952 03/17/23 2227 03/17/23  0001 03/15/23  2255   WBC 9.70 9.70  --  9.30 9.00 10.20   HEMOGLOBIN 8.2* 8.1* 8.1* 6.9* 7.2* 7.4*   HEMATOCRIT 25.2* 24.8* 24.4* 21.5* 21.2* 22.0*   PLATELETS 276 256  --  233 247 242   NEUTROS ABS 5.40 5.20  --  5.80 4.90 5.90   LYMPHS ABS 2.40 2.70  --  1.90 2.50 2.50   MONOS ABS 1.10* 1.10*  --  1.10* 1.00* 1.20*   EOS ABS 0.50* 0.50*  --  0.40 0.50* 0.40   MCV 87.4 88.4  --  87.6 86.8 87.2         Lab 03/20/23 0422 03/19/23  0016 03/17/23 2227 03/17/23  0001 03/15/23  2255   SODIUM 140 137 141 139 136   POTASSIUM 4.3 4.7 4.3 4.3 4.8   CHLORIDE 105 100 105 101 101   CO2 23.0 26.0 25.0 26.0 26.0   ANION GAP 12.0 11.0 11.0 12.0 9.0   BUN 43* 52* 46* 53* 50*   CREATININE 1.75* 1.97* 1.75* 1.68* 1.82*   EGFR 42.7* 37.0* 42.7* 44.8* 40.7*   GLUCOSE 132* 121* 146* 117* 112*   CALCIUM 8.7 8.8 8.4* 8.4* 8.8   MAGNESIUM 1.9 1.9 1.8 1.8 1.7   PHOSPHORUS 5.3* 5.2* 3.7 4.3 4.0         Lab 03/20/23  0422 03/19/23  0016 03/17/23 2227 03/17/23  0001 03/15/23  2255   TOTAL PROTEIN 7.2 6.9 6.6 6.8 6.9   ALBUMIN 3.6 3.4* 3.1* 3.5 3.3*   GLOBULIN 3.6 3.5 3.5 3.3 3.6   ALT (SGPT) 7 11 9 8 10   AST (SGOT) 17 15 14 16 14   BILIRUBIN <0.2 0.2 <0.2 <0.2 0.2   ALK PHOS 124* 125* 103 113 100                 Lab 03/18/23  0109   ABO TYPING O   RH TYPING Positive   ANTIBODY SCREEN Negative         Brief Urine Lab Results  (Last result in the past 365 days)      Color   Clarity   Blood   Leuk Est   Nitrite   Protein   CREAT   Urine HCG        02/28/23 2130 Red  Comment: Any Substance that causes an abnormal urine color can alter the accuracy of the chemical reactions.   Slightly Cloudy  Comment: Result checked     Large (3+)   Small (1+)   Positive   >=300 mg/dL (3+)               Microbiology Results (last 10 days)     ** No results found for the last 240 hours. **         CT Abdomen Pelvis Without Contrast    Result Date: 3/2/2023  Impression: Impression: 1. Persistent severe bilateral hydroureteronephrosis. 2. Irregular wall thickening of bladder may relate to intraluminal bladder mass versus changes related to cystitis or chronic neurogenic bladder, recommend cystoscopy. Suprapubic catheter in stable position. Prostatomegaly. 3. Nonspecific scattered ground glass opacities at lung bases suggesting infectious or inflammatory process, unchanged with small bilateral pleural effusions. 4. Additional chronic findings above. Electronically Signed: Kuldip Frances  3/2/2023 10:30 AM EST  Workstation ID: [id number]    CT Abdomen Pelvis Without Contrast    Result Date: 2/28/2023  Impression: 1.Extensive multifocal groundglass patchy infiltrate/pneumonia throughout visualized lower lungs. Severe bilateral hydroureteronephrosis may represent a passed stone, reflux, or obstruction at bilateral trigone. 2.Severe circumferential urinary bladder wall thickening may represent cystitis or other etiologies. Please correlate with urinalysis. Suprapubic catheter is in place. Further workup and follow-up recommended to exclude urothelial malignancy.. 3.Small hiatal hernia. 4.Diverticulosis without diverticulitis. Electronically Signed: Pravin Tolbert  2/28/2023 10:30 PM EST  Workstation ID: [id number]    XR Chest 1 View    Result Date: 3/1/2023  Impression: Impression: Vague multifocal patchy areas of airspace disease concerning for pneumonia with groundglass airspace disease better demonstrated on the prior abdominal CT. Electronically Signed: Kuldip Frances  3/1/2023 7:03 AM EST  Workstation ID: [id number]    IR Nephrostomy Tube Placement    Result Date: 3/3/2023  Impression: Impression: 1. Technically successful right-sided percutaneous nephrostomy using in the left intercostal interspace approach with both sonographic and fluoroscopic guidance, with placement of a 10 Wallisian nephrostomy catheter. 2.  Technically successful left-sided percutaneous nephrostomy using a subcostal approach, also both sonographic and fluoroscopic guidance, also with a 10 French external drainage catheter placed. 3. Severe bilateral malignant obstructive uropathy. 4. Additional findings as described above. Electronically Signed: Collinmaria isabel Nidia  3/3/2023 6:17 PM EST  Workstation ID: [id number]    IR Nephrostomy Tube Placement    Result Date: 3/3/2023  Impression: Impression: 1. Technically successful right-sided percutaneous nephrostomy using in the left intercostal interspace approach with both sonographic and fluoroscopic guidance, with placement of a 10 French nephrostomy catheter. 2. Technically successful left-sided percutaneous nephrostomy using a subcostal approach, also both sonographic and fluoroscopic guidance, also with a 10 French external drainage catheter placed. 3. Severe bilateral malignant obstructive uropathy. 4. Additional findings as described above. Electronically Signed: owenmaria isabel Nidia  3/3/2023 6:17 PM EST  Workstation ID: [id number]    Results for orders placed during the hospital encounter of 01/05/22    Adult Transthoracic Echo Complete w/ Color, Spectral and Contrast if Necessary Per Protocol    Interpretation Summary  · Left ventricular ejection fraction appears to be 56 - 60%.  · The right ventricular cavity is moderately dilated.  · No pericardial effusion noted    Procedures Performed  Procedure(s):  CYSTOSCOPY URETHERAL DILATION, BLADDER BIOPSY  SUPRAPUBIC CATHETER EXCHANGE        Consults:   Consults     Date and Time Order Name Status Description    3/2/2023 10:00 AM Inpatient Nephrology Consult      3/1/2023  9:48 AM Inpatient Hospitalist Consult      3/1/2023  5:31 AM Inpatient Urology Consult Completed     2/28/2023 11:18 PM Intensivist (on-call MD unless specified)          Discharge Details        Discharge Medications      New Medications      Instructions Start Date   sodium bicarbonate 650 MG tablet    650 mg, Oral, 3 Times Daily         Stop These Medications    folic acid 1 MG tablet  Commonly known as: FOLVITE     nitrofurantoin 50 MG capsule  Commonly known as: MACRODANTIN     tamsulosin 0.4 MG capsule 24 hr capsule  Commonly known as: FLOMAX          No Known Allergies    Discharge Disposition:   Skilled Nursing Facility (DC - External)    Diet:  Hospital:  Diet Order   Procedures   • Diet: Cardiac Diets; Healthy Heart (2-3 Na+); Texture: Regular Texture (IDDSI 7); Fluid Consistency: Thin (IDDSI 0)     Discharge Activity:   Activity Instructions     Activity as Tolerated          CODE STATUS:  Code Status and Medical Interventions:   Ordered at: 02/28/23 1263     Code Status (Patient has no pulse and is not breathing):    CPR (Attempt to Resuscitate)     Medical Interventions (Patient has pulse or is breathing):    Full Support     No future appointments.    Additional Instructions for the Follow-ups that You Need to Schedule     Call MD With Problems / Concerns   As directed      Instructions: Appropriate specialist    Order Comments: Instructions: Appropriate specialist          Discharge Follow-up with PCP   As directed       Currently Documented PCP:    Reji Topete MD    PCP Phone Number:    [phone]     Follow Up Details: within one week of discharge         Discharge Follow-up with Specialty: Urology/Nephrology follow up as per their respective services   As directed      Specialty: Urology/Nephrology follow up as per their respective services             Time spent on Discharge including face to face service: 35 minutes    Signature: Electronically signed by Reji Abbasi MD, 03/20/23, 13:01 EDT.  [facility] Hospitalist Team

## 2023-03-20 NOTE — DISCHARGE INSTR - ACTIVITY
Keep f/c ,S/P  and bilateral nephrostomy tubes until after surgery 3/28/23, Shower instructions and Soap sent to [facility]

## 2023-03-20 NOTE — PLAN OF CARE
Goal Outcome Evaluation:  Plan of Care Reviewed With: patient           Outcome Evaluation: Patient ambulating in room, No complaints of pain at this time. Will continue to monitor.

## 2023-03-22 LAB — QT INTERVAL: 333 MS

## 2023-03-22 NOTE — PROGRESS NOTES
Enter Query Response Below      Query Response: No pneumonia present on admission.             If applicable, please update the problem list.       Patient: Anthony Moss        : 1955  Account: [id number]           Admit Date: 2023        How to Respond to this query:       a. Click New Note     b. Answer query within the yellow box.                c. Update the Problem List, if applicable.      Dr. Zazueta,    67 year old male with BPH, HTN and has a Suprapubic urinary catheter admitted  for Pneumonia, CARLOS ALBERTO, ABLA a UTI and found to have Prostate cancer.  Patient was treated with Vancomycin ( ), Cefepime (), Rocephin (3/1-3/5).     Please further clarify the Pneumonia as:     - Gram negative pneumonia (excluding Haemophilus influenzae)  - Bacterial Pneumonia, unknown organism  - Other_______( please specify)  - Unable to determine       By submitting this query, we are merely seeking further clarification of documentation to accurately reflect all conditions that you are monitoring, evaluating, treating or that extend the hospitalization or utilize additional resources of care. Please utilize your independent clinical judgment when addressing the question(s) above.     This query and your response, once completed, will be entered into the legal medical record.    Sincerely,  Lew Drummond RN CDI  Clinical Documentation Integrity Program   [email or web address]

## 2023-03-22 NOTE — PAT
Called to request info again from [facility].  S/w unit manager, states will look for requested sheet and gather info

## 2023-03-24 ENCOUNTER — HOSPITAL ENCOUNTER (OUTPATIENT)
Dept: WOUND CARE | Facility: HOSPITAL | Age: 68
Discharge: HOME | End: 2023-03-24
Payer: MEDICARE

## 2023-03-24 NOTE — PROGRESS NOTES
67-year-old male who was recently in the hospital presents to the clinic for an ostomy marking with a planned radical cystectomy with formation of an ileal conduit on March 28.  Patient is seen today for stoma marking however I did see this patient while he was inpatient approximately 3 weeks ago for marking and some education.  Patient's marking is in place to the right lower quadrant.  I rechecked this with the patient sitting upright I did remove the dressing and remarked him and applied a thin film transparent dressing over this.  Comprehensive education was performed with the patient.  Patient already has written materials.  We will follow patient while in the hospital

## 2023-03-27 NOTE — PROGRESS NOTES
Enter Query Response Below      Query Response:  Yes, patient was noted to be intermittently hypoxic and requiring up to 4 L of oxygen via nasal cannula.           If applicable, please update the problem list.       Patient: Anthony Moss        : 1955  Account: [id number]           Admit Date: 2023        How to Respond to this query:       a. Click New Note     b. Answer query within the yellow box.                c. Update the Problem List, if applicable.      ,    67 year old male with BPH, HTN and has a Suprapubic catheter admitted  for Pneumonia, CARLOS ALBERTO, ABLA a UTI and found to have Prostate cancer. Chronic respiratory failure is not listed as a PMH. Chronic resp failure is stated from 3/2-3/19. No indication of how much O2 patient is on at home or when he wears O2. Patient was admitted on RA with O2 saturation of 100%. Occasional O2 desats while on RA as low as 86%. Patient wore O2 NC 4L for approx 4 hrs on 3/3. No ABGs drawn.     After study, was Chronic respiratory failure clinically supported during this admission?     - Yes, please include additional clinical indicators:____________  - No  - Other- specify________  - Unable to determine       By submitting this query, we are merely seeking further clarification of documentation to accurately reflect all conditions that you are monitoring, evaluating, treating or that extend the hospitalization or utilize additional resources of care. Please utilize your independent clinical judgment when addressing the question(s) above.     This query and your response, once completed, will be entered into the legal medical record.    Sincerely,  Lew Drummond RN CDI  Clinical Documentation Integrity Program   [email or web address]

## 2023-03-28 ENCOUNTER — ANESTHESIA (OUTPATIENT)
Dept: PERIOP | Facility: HOSPITAL | Age: 68
End: 2023-03-28
Payer: MEDICARE

## 2023-03-28 ENCOUNTER — ANESTHESIA EVENT (OUTPATIENT)
Dept: PERIOP | Facility: HOSPITAL | Age: 68
End: 2023-03-28
Payer: MEDICARE

## 2023-03-28 ENCOUNTER — HOSPITAL ENCOUNTER (INPATIENT)
Facility: HOSPITAL | Age: 68
LOS: 23 days | Discharge: NURSING, REHAB OR LONG-TERM CARE | End: 2023-04-20
Attending: UROLOGY | Admitting: UROLOGY
Payer: MEDICARE

## 2023-03-28 DIAGNOSIS — R31.0 GROSS HEMATURIA: ICD-10-CM

## 2023-03-28 DIAGNOSIS — C67.9 BLADDER CANCER: ICD-10-CM

## 2023-03-28 DIAGNOSIS — E43 SEVERE MALNUTRITION: ICD-10-CM

## 2023-03-28 PROBLEM — R79.89 ELEVATED SERUM CREATININE: Status: ACTIVE | Noted: 2023-03-28

## 2023-03-28 LAB
ABO GROUP BLD: NORMAL
BASE DEFICIT: ABNORMAL
BASE EXCESS BLDV CALC-SCNC: ABNORMAL MMOL/L
BLD GP AB SCN SERPL QL: NEGATIVE
CA-I BLDA-SCNC: 1.12 MMOL/L (ref 1.12–1.32)
CO2 CONTENT VENOUS: ABNORMAL
GLUCOSE BLDC GLUCOMTR-MCNC: 216 MG/DL (ref 70–105)
HCO3 BLDV-SCNC: 18.8 MMOL/L (ref 23–28)
HCT VFR BLD AUTO: 23.6 % (ref 37.5–51)
HCT VFR BLDA CALC: 28 % (ref 38–51)
HGB BLD-MCNC: 7.7 G/DL (ref 13–17.7)
HGB BLDA-MCNC: 9.5 G/DL (ref 12–17)
PCO2 BLDV: 43.6 MM HG (ref 41–51)
PH BLDV: 7.24 PH UNITS (ref 7.31–7.41)
PO2 BLDV: 111 MM HG (ref 35–42)
POTASSIUM BLDA-SCNC: 4.7 MMOL/L (ref 3.5–4.9)
RH BLD: POSITIVE
SAO2 % BLDCOV: 20 % (ref 45–75)
SODIUM BLD-SCNC: 136 MMOL/L (ref 138–146)
T&S EXPIRATION DATE: NORMAL

## 2023-03-28 PROCEDURE — 82803 BLOOD GASES ANY COMBINATION: CPT

## 2023-03-28 PROCEDURE — 86901 BLOOD TYPING SEROLOGIC RH(D): CPT | Performed by: UROLOGY

## 2023-03-28 PROCEDURE — 85018 HEMOGLOBIN: CPT | Performed by: ANESTHESIOLOGY

## 2023-03-28 PROCEDURE — P9016 RBC LEUKOCYTES REDUCED: HCPCS

## 2023-03-28 PROCEDURE — P9041 ALBUMIN (HUMAN),5%, 50ML: HCPCS | Performed by: NURSE ANESTHETIST, CERTIFIED REGISTERED

## 2023-03-28 PROCEDURE — 0TTB0ZZ RESECTION OF BLADDER, OPEN APPROACH: ICD-10-PCS | Performed by: UROLOGY

## 2023-03-28 PROCEDURE — C2617 STENT, NON-COR, TEM W/O DEL: HCPCS | Performed by: UROLOGY

## 2023-03-28 PROCEDURE — 25010000002 CEFAZOLIN PER 500 MG: Performed by: UROLOGY

## 2023-03-28 PROCEDURE — 25010000002 FENTANYL CITRATE (PF) 100 MCG/2ML SOLUTION: Performed by: NURSE ANESTHETIST, CERTIFIED REGISTERED

## 2023-03-28 PROCEDURE — 82947 ASSAY GLUCOSE BLOOD QUANT: CPT

## 2023-03-28 PROCEDURE — 25010000002 ALBUMIN HUMAN 5% PER 50 ML: Performed by: NURSE ANESTHETIST, CERTIFIED REGISTERED

## 2023-03-28 PROCEDURE — 86850 RBC ANTIBODY SCREEN: CPT | Performed by: UROLOGY

## 2023-03-28 PROCEDURE — 25010000002 HYDROMORPHONE 1 MG/ML SOLUTION: Performed by: UROLOGY

## 2023-03-28 PROCEDURE — 25010000002 HYDROMORPHONE 1 MG/ML SOLUTION: Performed by: NURSE ANESTHETIST, CERTIFIED REGISTERED

## 2023-03-28 PROCEDURE — 0T180ZC BYPASS BILATERAL URETERS TO ILEOCUTANEOUS, OPEN APPROACH: ICD-10-PCS | Performed by: UROLOGY

## 2023-03-28 PROCEDURE — 85014 HEMATOCRIT: CPT

## 2023-03-28 PROCEDURE — 85025 COMPLETE CBC W/AUTO DIFF WBC: CPT | Performed by: UROLOGY

## 2023-03-28 PROCEDURE — 86923 COMPATIBILITY TEST ELECTRIC: CPT

## 2023-03-28 PROCEDURE — 82330 ASSAY OF CALCIUM: CPT

## 2023-03-28 PROCEDURE — 85014 HEMATOCRIT: CPT | Performed by: ANESTHESIOLOGY

## 2023-03-28 PROCEDURE — 87102 FUNGUS ISOLATION CULTURE: CPT | Performed by: UROLOGY

## 2023-03-28 PROCEDURE — 36430 TRANSFUSION BLD/BLD COMPNT: CPT

## 2023-03-28 PROCEDURE — 84132 ASSAY OF SERUM POTASSIUM: CPT

## 2023-03-28 PROCEDURE — 88309 TISSUE EXAM BY PATHOLOGIST: CPT | Performed by: UROLOGY

## 2023-03-28 PROCEDURE — 25010000002 FENTANYL CITRATE (PF) 50 MCG/ML SOLUTION: Performed by: NURSE ANESTHETIST, CERTIFIED REGISTERED

## 2023-03-28 PROCEDURE — 86900 BLOOD TYPING SEROLOGIC ABO: CPT

## 2023-03-28 PROCEDURE — 84295 ASSAY OF SERUM SODIUM: CPT

## 2023-03-28 PROCEDURE — 0VT00ZZ RESECTION OF PROSTATE, OPEN APPROACH: ICD-10-PCS | Performed by: UROLOGY

## 2023-03-28 PROCEDURE — 0 LIDOCAINE 1 % SOLUTION: Performed by: ANESTHESIOLOGY

## 2023-03-28 PROCEDURE — 25010000002 ONDANSETRON PER 1 MG: Performed by: NURSE ANESTHETIST, CERTIFIED REGISTERED

## 2023-03-28 PROCEDURE — 86900 BLOOD TYPING SEROLOGIC ABO: CPT | Performed by: UROLOGY

## 2023-03-28 PROCEDURE — 80048 BASIC METABOLIC PNL TOTAL CA: CPT | Performed by: UROLOGY

## 2023-03-28 PROCEDURE — 25010000002 PROPOFOL 200 MG/20ML EMULSION: Performed by: NURSE ANESTHETIST, CERTIFIED REGISTERED

## 2023-03-28 DEVICE — URINARY DIVERSION STENT SET
Type: IMPLANTABLE DEVICE | Site: URETER | Status: FUNCTIONAL
Brand: PERCUFLEX™ URINARY DIVERSION STENT SET

## 2023-03-28 DEVICE — LIGACLIP MCA MULTIPLE CLIP APPLIERS, 20 LARGE CLIPS
Type: IMPLANTABLE DEVICE | Site: ABDOMEN | Status: FUNCTIONAL
Brand: LIGACLIP

## 2023-03-28 DEVICE — ABSORBABLE HEMOSTAT (OXIDIZED REGENERATED CELLULOSE)
Type: IMPLANTABLE DEVICE | Site: ABDOMEN | Status: FUNCTIONAL
Brand: SURGICEL NU-KNIT

## 2023-03-28 DEVICE — PROXIMATE LINEAR CUTTER RELOAD, BLUE, 75MM
Type: IMPLANTABLE DEVICE | Site: ABDOMEN | Status: FUNCTIONAL
Brand: PROXIMATE

## 2023-03-28 DEVICE — PROXIMATE RELOADABLE LINEAR STAPLER
Type: IMPLANTABLE DEVICE | Site: ABDOMEN | Status: FUNCTIONAL
Brand: PROXIMATE

## 2023-03-28 DEVICE — LIGACLIP EXTRA LIGATING CLIP CARTRIDGES: 6 TITANIUM CLIPS/ CARTRIDGE (LARGE)
Type: IMPLANTABLE DEVICE | Site: ABDOMEN | Status: FUNCTIONAL
Brand: LIGACLIP

## 2023-03-28 DEVICE — PROXIMATE RELOADABLE LINEAR CUTTER WITH SAFETY LOCK-OUT, 75MM
Type: IMPLANTABLE DEVICE | Site: ABDOMEN | Status: FUNCTIONAL
Brand: PROXIMATE

## 2023-03-28 DEVICE — CLIP LIGAT VASC HORIZON TI MD/LG GRN 6CT: Type: IMPLANTABLE DEVICE | Site: ABDOMEN | Status: FUNCTIONAL

## 2023-03-28 RX ORDER — LABETALOL HYDROCHLORIDE 5 MG/ML
5 INJECTION, SOLUTION INTRAVENOUS
Status: DISCONTINUED | OUTPATIENT
Start: 2023-03-28 | End: 2023-03-28 | Stop reason: HOSPADM

## 2023-03-28 RX ORDER — PROMETHAZINE HYDROCHLORIDE 25 MG/1
25 TABLET ORAL ONCE AS NEEDED
Status: DISCONTINUED | OUTPATIENT
Start: 2023-03-28 | End: 2023-03-28 | Stop reason: HOSPADM

## 2023-03-28 RX ORDER — ONDANSETRON 2 MG/ML
4 INJECTION INTRAMUSCULAR; INTRAVENOUS EVERY 6 HOURS PRN
Status: DISCONTINUED | OUTPATIENT
Start: 2023-03-28 | End: 2023-04-20 | Stop reason: HOSPADM

## 2023-03-28 RX ORDER — LIDOCAINE HYDROCHLORIDE 10 MG/ML
INJECTION, SOLUTION EPIDURAL; INFILTRATION; INTRACAUDAL; PERINEURAL AS NEEDED
Status: DISCONTINUED | OUTPATIENT
Start: 2023-03-28 | End: 2023-03-28 | Stop reason: SURG

## 2023-03-28 RX ORDER — PROMETHAZINE HYDROCHLORIDE 25 MG/1
25 SUPPOSITORY RECTAL ONCE AS NEEDED
Status: DISCONTINUED | OUTPATIENT
Start: 2023-03-28 | End: 2023-03-28 | Stop reason: HOSPADM

## 2023-03-28 RX ORDER — HYDRALAZINE HYDROCHLORIDE 20 MG/ML
5 INJECTION INTRAMUSCULAR; INTRAVENOUS
Status: DISCONTINUED | OUTPATIENT
Start: 2023-03-28 | End: 2023-03-28 | Stop reason: HOSPADM

## 2023-03-28 RX ORDER — ONDANSETRON 4 MG/1
4 TABLET, FILM COATED ORAL EVERY 6 HOURS PRN
Status: DISCONTINUED | OUTPATIENT
Start: 2023-03-28 | End: 2023-04-20 | Stop reason: HOSPADM

## 2023-03-28 RX ORDER — HYDROCODONE BITARTRATE AND ACETAMINOPHEN 5; 325 MG/1; MG/1
1 TABLET ORAL EVERY 4 HOURS PRN
Status: DISCONTINUED | OUTPATIENT
Start: 2023-03-28 | End: 2023-03-29

## 2023-03-28 RX ORDER — ONDANSETRON 2 MG/ML
INJECTION INTRAMUSCULAR; INTRAVENOUS AS NEEDED
Status: DISCONTINUED | OUTPATIENT
Start: 2023-03-28 | End: 2023-03-28 | Stop reason: SURG

## 2023-03-28 RX ORDER — SODIUM CHLORIDE 0.9 % (FLUSH) 0.9 %
10 SYRINGE (ML) INJECTION AS NEEDED
Status: DISCONTINUED | OUTPATIENT
Start: 2023-03-28 | End: 2023-03-28 | Stop reason: HOSPADM

## 2023-03-28 RX ORDER — HYDROCODONE BITARTRATE AND ACETAMINOPHEN 5; 325 MG/1; MG/1
1 TABLET ORAL ONCE AS NEEDED
Status: DISCONTINUED | OUTPATIENT
Start: 2023-03-28 | End: 2023-03-28 | Stop reason: HOSPADM

## 2023-03-28 RX ORDER — LIDOCAINE HYDROCHLORIDE 10 MG/ML
0.5 INJECTION, SOLUTION INFILTRATION; PERINEURAL ONCE AS NEEDED
Status: COMPLETED | OUTPATIENT
Start: 2023-03-28 | End: 2023-03-28

## 2023-03-28 RX ORDER — NALOXONE HCL 0.4 MG/ML
0.1 VIAL (ML) INJECTION
Status: DISCONTINUED | OUTPATIENT
Start: 2023-03-28 | End: 2023-03-29

## 2023-03-28 RX ORDER — ALBUMIN, HUMAN INJ 5% 5 %
SOLUTION INTRAVENOUS CONTINUOUS PRN
Status: DISCONTINUED | OUTPATIENT
Start: 2023-03-28 | End: 2023-03-28 | Stop reason: SURG

## 2023-03-28 RX ORDER — BUPIVACAINE HYDROCHLORIDE 2.5 MG/ML
INJECTION, SOLUTION INFILTRATION; PERINEURAL AS NEEDED
Status: DISCONTINUED | OUTPATIENT
Start: 2023-03-28 | End: 2023-03-28 | Stop reason: HOSPADM

## 2023-03-28 RX ORDER — FENTANYL CITRATE 50 UG/ML
50 INJECTION, SOLUTION INTRAMUSCULAR; INTRAVENOUS
Status: DISCONTINUED | OUTPATIENT
Start: 2023-03-28 | End: 2023-03-28 | Stop reason: HOSPADM

## 2023-03-28 RX ORDER — PHENYLEPHRINE HCL IN 0.9% NACL 1 MG/10 ML
SYRINGE (ML) INTRAVENOUS AS NEEDED
Status: DISCONTINUED | OUTPATIENT
Start: 2023-03-28 | End: 2023-03-28 | Stop reason: SURG

## 2023-03-28 RX ORDER — SODIUM CHLORIDE 9 MG/ML
20 INJECTION, SOLUTION INTRAVENOUS ONCE
Status: COMPLETED | OUTPATIENT
Start: 2023-03-28 | End: 2023-03-28

## 2023-03-28 RX ORDER — DOCUSATE SODIUM 100 MG/1
100 CAPSULE, LIQUID FILLED ORAL 2 TIMES DAILY PRN
Status: DISCONTINUED | OUTPATIENT
Start: 2023-03-28 | End: 2023-04-20 | Stop reason: HOSPADM

## 2023-03-28 RX ORDER — FENTANYL CITRATE 50 UG/ML
INJECTION, SOLUTION INTRAMUSCULAR; INTRAVENOUS AS NEEDED
Status: DISCONTINUED | OUTPATIENT
Start: 2023-03-28 | End: 2023-03-28 | Stop reason: SURG

## 2023-03-28 RX ORDER — HYDROCODONE BITARTRATE AND ACETAMINOPHEN 10; 325 MG/1; MG/1
1 TABLET ORAL EVERY 4 HOURS PRN
Status: DISCONTINUED | OUTPATIENT
Start: 2023-03-28 | End: 2023-03-28 | Stop reason: HOSPADM

## 2023-03-28 RX ORDER — SODIUM CHLORIDE 9 MG/ML
INJECTION, SOLUTION INTRAVENOUS CONTINUOUS PRN
Status: DISCONTINUED | OUTPATIENT
Start: 2023-03-28 | End: 2023-03-28 | Stop reason: SURG

## 2023-03-28 RX ORDER — SODIUM CHLORIDE 9 MG/ML
100 INJECTION, SOLUTION INTRAVENOUS CONTINUOUS
Status: DISCONTINUED | OUTPATIENT
Start: 2023-03-28 | End: 2023-03-31

## 2023-03-28 RX ORDER — SODIUM BICARBONATE 650 MG/1
650 TABLET ORAL 3 TIMES DAILY
Status: DISCONTINUED | OUTPATIENT
Start: 2023-03-28 | End: 2023-03-30

## 2023-03-28 RX ORDER — SODIUM CHLORIDE 0.9 % (FLUSH) 0.9 %
10 SYRINGE (ML) INJECTION AS NEEDED
Status: DISCONTINUED | OUTPATIENT
Start: 2023-03-28 | End: 2023-04-20 | Stop reason: HOSPADM

## 2023-03-28 RX ORDER — SODIUM CHLORIDE 9 MG/ML
40 INJECTION, SOLUTION INTRAVENOUS AS NEEDED
Status: DISCONTINUED | OUTPATIENT
Start: 2023-03-28 | End: 2023-04-20 | Stop reason: HOSPADM

## 2023-03-28 RX ORDER — ACETAMINOPHEN 325 MG/1
650 TABLET ORAL EVERY 4 HOURS PRN
Status: DISCONTINUED | OUTPATIENT
Start: 2023-03-28 | End: 2023-04-20 | Stop reason: HOSPADM

## 2023-03-28 RX ORDER — PROPOFOL 10 MG/ML
INJECTION, EMULSION INTRAVENOUS AS NEEDED
Status: DISCONTINUED | OUTPATIENT
Start: 2023-03-28 | End: 2023-03-28 | Stop reason: SURG

## 2023-03-28 RX ORDER — ONDANSETRON 2 MG/ML
4 INJECTION INTRAMUSCULAR; INTRAVENOUS ONCE AS NEEDED
Status: DISCONTINUED | OUTPATIENT
Start: 2023-03-28 | End: 2023-03-28 | Stop reason: HOSPADM

## 2023-03-28 RX ORDER — ENOXAPARIN SODIUM 100 MG/ML
40 INJECTION SUBCUTANEOUS DAILY
Status: DISCONTINUED | OUTPATIENT
Start: 2023-03-29 | End: 2023-04-08

## 2023-03-28 RX ORDER — ACETAMINOPHEN 650 MG/1
650 SUPPOSITORY RECTAL EVERY 4 HOURS PRN
Status: DISCONTINUED | OUTPATIENT
Start: 2023-03-28 | End: 2023-04-20 | Stop reason: HOSPADM

## 2023-03-28 RX ORDER — SODIUM CHLORIDE 0.9 % (FLUSH) 0.9 %
10 SYRINGE (ML) INJECTION EVERY 12 HOURS SCHEDULED
Status: DISCONTINUED | OUTPATIENT
Start: 2023-03-28 | End: 2023-04-20 | Stop reason: HOSPADM

## 2023-03-28 RX ORDER — ROCURONIUM BROMIDE 10 MG/ML
INJECTION, SOLUTION INTRAVENOUS AS NEEDED
Status: DISCONTINUED | OUTPATIENT
Start: 2023-03-28 | End: 2023-03-28 | Stop reason: SURG

## 2023-03-28 RX ADMIN — SODIUM CHLORIDE: 0.9 INJECTION, SOLUTION INTRAVENOUS at 12:43

## 2023-03-28 RX ADMIN — HYDROMORPHONE HYDROCHLORIDE 0.5 MG: 1 INJECTION, SOLUTION INTRAMUSCULAR; INTRAVENOUS; SUBCUTANEOUS at 18:24

## 2023-03-28 RX ADMIN — ROCURONIUM BROMIDE 50 MG: 10 INJECTION, SOLUTION INTRAVENOUS at 12:49

## 2023-03-28 RX ADMIN — SUGAMMADEX 200 MG: 100 INJECTION, SOLUTION INTRAVENOUS at 16:17

## 2023-03-28 RX ADMIN — HYDROMORPHONE HYDROCHLORIDE 0.5 MG: 1 INJECTION, SOLUTION INTRAMUSCULAR; INTRAVENOUS; SUBCUTANEOUS at 17:56

## 2023-03-28 RX ADMIN — ALBUMIN HUMAN: 0.05 INJECTION, SOLUTION INTRAVENOUS at 13:12

## 2023-03-28 RX ADMIN — HYDROMORPHONE HYDROCHLORIDE 0.4 MG: 1 INJECTION, SOLUTION INTRAMUSCULAR; INTRAVENOUS; SUBCUTANEOUS at 15:59

## 2023-03-28 RX ADMIN — Medication 100 MCG: at 14:14

## 2023-03-28 RX ADMIN — Medication 100 MCG: at 14:15

## 2023-03-28 RX ADMIN — SODIUM CHLORIDE: 0.9 INJECTION, SOLUTION INTRAVENOUS at 14:00

## 2023-03-28 RX ADMIN — HYDROMORPHONE HYDROCHLORIDE 0.5 MG: 1 INJECTION, SOLUTION INTRAMUSCULAR; INTRAVENOUS; SUBCUTANEOUS at 16:35

## 2023-03-28 RX ADMIN — HYDROMORPHONE HYDROCHLORIDE 0.5 MG: 1 INJECTION, SOLUTION INTRAMUSCULAR; INTRAVENOUS; SUBCUTANEOUS at 23:31

## 2023-03-28 RX ADMIN — FENTANYL CITRATE 50 MCG: 50 INJECTION, SOLUTION INTRAMUSCULAR; INTRAVENOUS at 17:44

## 2023-03-28 RX ADMIN — SODIUM CHLORIDE 20 ML/HR: 9 INJECTION, SOLUTION INTRAVENOUS at 12:03

## 2023-03-28 RX ADMIN — LIDOCAINE HYDROCHLORIDE 50 MG: 10 INJECTION, SOLUTION EPIDURAL; INFILTRATION; INTRACAUDAL; PERINEURAL at 12:49

## 2023-03-28 RX ADMIN — SODIUM BICARBONATE 650 MG: 650 TABLET ORAL at 21:14

## 2023-03-28 RX ADMIN — HYDROCODONE BITARTRATE AND ACETAMINOPHEN 1 TABLET: 5; 325 TABLET ORAL at 21:14

## 2023-03-28 RX ADMIN — ROCURONIUM BROMIDE 50 MG: 10 INJECTION, SOLUTION INTRAVENOUS at 13:15

## 2023-03-28 RX ADMIN — ONDANSETRON 4 MG: 2 INJECTION INTRAMUSCULAR; INTRAVENOUS at 14:59

## 2023-03-28 RX ADMIN — SODIUM CHLORIDE 100 ML/HR: 9 INJECTION, SOLUTION INTRAVENOUS at 21:15

## 2023-03-28 RX ADMIN — HYDROMORPHONE HYDROCHLORIDE 0.5 MG: 1 INJECTION, SOLUTION INTRAMUSCULAR; INTRAVENOUS; SUBCUTANEOUS at 16:37

## 2023-03-28 RX ADMIN — FENTANYL CITRATE 50 MCG: 50 INJECTION, SOLUTION INTRAMUSCULAR; INTRAVENOUS at 13:05

## 2023-03-28 RX ADMIN — FENTANYL CITRATE 150 MCG: 50 INJECTION, SOLUTION INTRAMUSCULAR; INTRAVENOUS at 12:46

## 2023-03-28 RX ADMIN — ALBUMIN HUMAN: 0.05 INJECTION, SOLUTION INTRAVENOUS at 14:15

## 2023-03-28 RX ADMIN — PROPOFOL 150 MG: 10 INJECTION, EMULSION INTRAVENOUS at 12:49

## 2023-03-28 RX ADMIN — LIDOCAINE HYDROCHLORIDE 0.5 ML: 10 INJECTION, SOLUTION INFILTRATION; PERINEURAL at 11:58

## 2023-03-28 RX ADMIN — CEFAZOLIN 2 G: 2 INJECTION, POWDER, FOR SOLUTION INTRAMUSCULAR; INTRAVENOUS at 12:52

## 2023-03-28 RX ADMIN — SODIUM BICARBONATE 50 MEQ: 84 INJECTION INTRAVENOUS at 14:55

## 2023-03-28 NOTE — ANESTHESIA PROCEDURE NOTES
Airway  Urgency: elective    Date/Time: 3/28/2023 12:52 PM    General Information and Staff    Patient location during procedure: OR  Anesthesiologist: Vinny Porter MD  CRNA/CAA: Vincent Mcfarlane CRNA    Indications and Patient Condition  Indications for airway management: airway protection    Preoxygenated: yes  MILS maintained throughout  Mask difficulty assessment: 2 - vent by mask + OA or adjuvant +/- NMBA    Final Airway Details  Final airway type: endotracheal airway      Successful airway: ETT  Cuffed: yes   Successful intubation technique: video laryngoscopy  Endotracheal tube insertion site: oral  Blade: [place]  Blade size: 3  ETT size (mm): 7.5  Cuff volume (mL): 8

## 2023-03-28 NOTE — ANESTHESIA POSTPROCEDURE EVALUATION
Patient: Anthony Moss    Procedure Summary     Date: 03/28/23 Room / Location: [facility] OR 09 / [facility] MAIN OR    Anesthesia Start: 1241 Anesthesia Stop: 1638    Procedure: CYSTECTOMY RADICAL WITH CONDUIT (Abdomen) Diagnosis:       Bladder cancer (HCC)      Gross hematuria      (Bladder cancer (HCC) [C67.9])      (Gross hematuria [R31.0])    Surgeons: Xu Montero MD Provider: Imelda Simpson CRNA    Anesthesia Type: general ASA Status: 3          Anesthesia Type: general    Vitals  Vitals Value Taken Time   /76 03/28/23 1800   Temp 99.4 °F (37.4 °C) 03/28/23 1630   Pulse 99 03/28/23 1804   Resp 13 03/28/23 1800   SpO2 95 % 03/28/23 1804   Vitals shown include unvalidated device data.        Post Anesthesia Care and Evaluation    Patient location during evaluation: PACU  Patient participation: complete - patient participated  Level of consciousness: awake  Pain scale: See nurse's notes for pain score.  Pain management: adequate    Airway patency: patent  Anesthetic complications: No anesthetic complications  PONV Status: none  Cardiovascular status: acceptable  Respiratory status: acceptable and spontaneous ventilation  Hydration status: acceptable    Comments: Patient seen and examined postoperatively; vital signs stable; SpO2 greater than or equal to 90%; cardiopulmonary status stable; nausea/vomiting adequately controlled; pain adequately controlled; no apparent anesthesia complications; patient discharged from anesthesia care when discharge criteria were met

## 2023-03-28 NOTE — ANESTHESIA PREPROCEDURE EVALUATION
Anesthesia Evaluation     Patient summary reviewed and Nursing notes reviewed   NPO Solid Status: > 8 hours  NPO Liquid Status: > 8 hours           Airway   Mallampati: II  TM distance: >3 FB  Neck ROM: full  No difficulty expected  Dental    (+) poor dentition    Pulmonary    Cardiovascular     (+) hypertension, hyperlipidemia,       Neuro/Psych  (+) numbness, psychiatric history Depression and ADHD,    GI/Hepatic/Renal/Endo    (+)   renal disease ARF,     Musculoskeletal     Abdominal    Substance History   (+) alcohol use,      OB/GYN          Other   arthritis, blood dyscrasia anemia,   history of cancer    ROS/Med Hx Other: ? Left ventricular ejection fraction appears to be 56 - 60%.  ? The right ventricular cavity is moderately dilated.  ? No pericardial effusion noted                  Anesthesia Plan    ASA 3     general     intravenous induction     Anesthetic plan, risks, benefits, and alternatives have been provided, discussed and informed consent has been obtained with: patient.    Use of blood products discussed with patient  Consented to blood products.   Plan discussed with CRNA.        CODE STATUS:
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
## 2023-03-28 NOTE — OP NOTE
Urology Operative Note    3/28/2023    Anthony Moss  67 y.o.  1955  male  [id number]      Surgeon(s) and Role:  Dr Kylah MD - Primary, Dr Rogelio MD - Assist    Pre-operative Diagnosis: Locally advanced prostate cancer, unresectable bladder mass, bilateral hydronephrosis, retention    Post-operative Diagnosis: Same    Complications: None    Procedures:    Radical cystectomy and ileal conduit urinary diversion    Indications   Anthony Moss is a 67 y.o. male presenting with recurrent hematuria previously and suprapubic tube in place.  Imaging demonstrated possible bladder mass.  He underwent bladder biopsy for unresectable bladder mass which came back prostate cancer.  He was then described options elected for palliative radical cystectomy and ileal conduit diversion to stop his bleeding and debulk his tumor    During the informed consent process, the procedure was discussed in detail including the risks of bleeding, infection, and damage to surrounding structures, as well as the significant morbidity and mortality rates associated with radical cystectomy and ileal conduit formation and locally advanced cancer.    Description of procedure:  The patient was properly identified in the preoperative holding area and taken to the operating room where general anesthesia was induced. The patient was prepped and draped in a sterile fashion. The patient was given antibiotics intravenously before the start of the surgery. After ensuring that all of the required equipment was ready and available a surgical timeout was performed.     Midline incision was made below the umbilicus down to the pubic bone.  Bovie cautery and blunt dissection used to carry down to the fascia and this was connected with the suprapubic tube site.  Fascia was then sharply opened and the peritoneum was entered.  There were no significant bowel adhesions.  There was a very large tumor protruding clearly into the bladder and taking up most of  the pelvis.  Bookwalter was then placed and careful dissection was made laterally on either side around the bladder.  Enseal was then used to control the pedicle.  Laterally the bladder and prostate were able to be dissected down and free from the sidewall.  Posteriorly a plane was made between the rectum and the prostate and fortunately was quite free in this area.  The ureters on either side were then identified and held up with a vessel loop.  Right ankle was then used to secure the distal and which was then sharply cut.  The open end was clipped and chromic stitch applied.  0 silk was used to tie the distal end.  Anteriorly there was bulky tumor and this was difficult to dissect free from the anterior wall.  Once it was connected with the lateral gutters further dissection was performed and the DVC was then controlled with a figure-of-eight Vicryl sutures.  Sharp dissection then carried out down the pubic bone catheter was felt.  Urethra was incised and catheter exposed.  Once we came across the urethra we could get better traction laterally and were able to transect the remaining posterior and lateral attachments with the Enseal.  The bladder and prostate were freed and sent to pathology.  Vicryl sutures figure-of-eight were used to control further bleeding around the urethra.  Rectum was inspected there was a clear plane without rectal injury.  The pelvis was then irrigated out and clips applied to additional bleeding vessels along the pedicles.  Hemostasis was excellent and the pelvis was packed.  No lymph node dissection was performed. Attention was then turned to building the conduit.  Approximately 15 cm from the terminal ileum silk suture was used to tayolr the end of the conduit.  Approximately 15 cm of ileum was then  using the Enseal down to the mesentery and staple load across each end.  To reanastomose the bowel single 60 mm load was used along the antimesenteric side. TA stapler used to close  the end and was reinforced with silk pop sutures.  Mesenteric window was closed with silk as well.  Conduit was then hanging below and the left ureter was passed under the sigmoid.  It had plenty of length incision was made into the butt end of the conduit the ureter on the left was spatulated and two 5-0 PDS sutures were used to anchor the crotch.  5-0 PDS was then run along either side to complete the anastomosis, prior to completing  stent was placed to approximately 20 cm and then brought through the end of the conduit.  This anastomosis was then repeated with the right ureter including placement of  stent.  The conduit was then flushed and there was no leak.  Stents were trimmed.  Pelvis was then explored again hemostasis was excellent Surgicel was placed.  Previously marked stoma site was then sharply incised with the Bovie dissected down to the fascia.  Vicryl fascial stitches were placed in 3-4 quadrants.  The conduit was brought through and the fascial stitches secured and tied down.  [place] sutures were then placed and then simple interrupted stitches around to complete the maturing of the stoma.  Drain was placed on the left lower quadrant.  Midline fascia was then closed with 2 looped PDS sutures.  Subcutaneously 2-0 Vicryl was placed to better approximate the skin.  Staples applied and dressing applied to the incision, drain and conduit.    There were no apparent complications. The patient woke up in the operating room and was taken to the recovery room in stable condition.       Specimens: Bladder and prostate    Estimated Blood Loss: 1000 mL  Fluids: 3U PRBCs             Xu Montero MD  [facility]  [facility], [address]  [place] [address] [phone]

## 2023-03-29 LAB
ANION GAP SERPL CALCULATED.3IONS-SCNC: 10 MMOL/L (ref 5–15)
ANION GAP SERPL CALCULATED.3IONS-SCNC: 11 MMOL/L (ref 5–15)
BASOPHILS # BLD AUTO: 0 10*3/MM3 (ref 0–0.2)
BASOPHILS # BLD AUTO: 0 10*3/MM3 (ref 0–0.2)
BASOPHILS NFR BLD AUTO: 0.2 % (ref 0–1.5)
BASOPHILS NFR BLD AUTO: 0.2 % (ref 0–1.5)
BH BB BLOOD EXPIRATION DATE: NORMAL
BH BB BLOOD TYPE BARCODE: 5100
BH BB DISPENSE STATUS: NORMAL
BH BB PRODUCT CODE: NORMAL
BH BB UNIT NUMBER: NORMAL
BUN SERPL-MCNC: 25 MG/DL (ref 8–23)
BUN SERPL-MCNC: 28 MG/DL (ref 8–23)
BUN/CREAT SERPL: 13.7 (ref 7–25)
BUN/CREAT SERPL: 16.5 (ref 7–25)
CALCIUM SPEC-SCNC: 7.8 MG/DL (ref 8.6–10.5)
CALCIUM SPEC-SCNC: 8.1 MG/DL (ref 8.6–10.5)
CHLORIDE SERPL-SCNC: 100 MMOL/L (ref 98–107)
CHLORIDE SERPL-SCNC: 101 MMOL/L (ref 98–107)
CO2 SERPL-SCNC: 21 MMOL/L (ref 22–29)
CO2 SERPL-SCNC: 23 MMOL/L (ref 22–29)
CREAT SERPL-MCNC: 1.7 MG/DL (ref 0.76–1.27)
CREAT SERPL-MCNC: 1.82 MG/DL (ref 0.76–1.27)
CROSSMATCH INTERPRETATION: NORMAL
DEPRECATED RDW RBC AUTO: 53.4 FL (ref 37–54)
DEPRECATED RDW RBC AUTO: 53.4 FL (ref 37–54)
EGFRCR SERPLBLD CKD-EPI 2021: 40.7 ML/MIN/1.73
EGFRCR SERPLBLD CKD-EPI 2021: 44.2 ML/MIN/1.73
EOSINOPHIL # BLD AUTO: 0 10*3/MM3 (ref 0–0.4)
EOSINOPHIL # BLD AUTO: 0 10*3/MM3 (ref 0–0.4)
EOSINOPHIL NFR BLD AUTO: 0 % (ref 0.3–6.2)
EOSINOPHIL NFR BLD AUTO: 0 % (ref 0.3–6.2)
ERYTHROCYTE [DISTWIDTH] IN BLOOD BY AUTOMATED COUNT: 16.8 % (ref 12.3–15.4)
ERYTHROCYTE [DISTWIDTH] IN BLOOD BY AUTOMATED COUNT: 16.9 % (ref 12.3–15.4)
GLUCOSE SERPL-MCNC: 127 MG/DL (ref 65–99)
GLUCOSE SERPL-MCNC: 151 MG/DL (ref 65–99)
HCT VFR BLD AUTO: 23 % (ref 37.5–51)
HCT VFR BLD AUTO: 24.4 % (ref 37.5–51)
HCT VFR BLD AUTO: 24.4 % (ref 37.5–51)
HCT VFR BLD AUTO: 24.6 % (ref 37.5–51)
HCT VFR BLD AUTO: 29 % (ref 37.5–51)
HGB BLD-MCNC: 7.7 G/DL (ref 13–17.7)
HGB BLD-MCNC: 8 G/DL (ref 13–17.7)
HGB BLD-MCNC: 8.1 G/DL (ref 13–17.7)
HGB BLD-MCNC: 8.2 G/DL (ref 13–17.7)
HGB BLD-MCNC: 8.9 G/DL (ref 13–17.7)
HOLD SPECIMEN: NORMAL
LYMPHOCYTES # BLD AUTO: 0.9 10*3/MM3 (ref 0.7–3.1)
LYMPHOCYTES # BLD AUTO: 1.3 10*3/MM3 (ref 0.7–3.1)
LYMPHOCYTES NFR BLD AUTO: 6.2 % (ref 19.6–45.3)
LYMPHOCYTES NFR BLD AUTO: 8.8 % (ref 19.6–45.3)
MCH RBC QN AUTO: 28.2 PG (ref 26.6–33)
MCH RBC QN AUTO: 28.9 PG (ref 26.6–33)
MCHC RBC AUTO-ENTMCNC: 33.3 G/DL (ref 31.5–35.7)
MCHC RBC AUTO-ENTMCNC: 33.7 G/DL (ref 31.5–35.7)
MCV RBC AUTO: 84.7 FL (ref 79–97)
MCV RBC AUTO: 85.8 FL (ref 79–97)
MONOCYTES # BLD AUTO: 1.4 10*3/MM3 (ref 0.1–0.9)
MONOCYTES # BLD AUTO: 1.7 10*3/MM3 (ref 0.1–0.9)
MONOCYTES NFR BLD AUTO: 11.7 % (ref 5–12)
MONOCYTES NFR BLD AUTO: 9.9 % (ref 5–12)
NEUTROPHILS NFR BLD AUTO: 11.3 10*3/MM3 (ref 1.7–7)
NEUTROPHILS NFR BLD AUTO: 11.8 10*3/MM3 (ref 1.7–7)
NEUTROPHILS NFR BLD AUTO: 79.3 % (ref 42.7–76)
NEUTROPHILS NFR BLD AUTO: 83.7 % (ref 42.7–76)
NRBC BLD AUTO-RTO: 0 /100 WBC (ref 0–0.2)
NRBC BLD AUTO-RTO: 0.1 /100 WBC (ref 0–0.2)
PLATELET # BLD AUTO: 162 10*3/MM3 (ref 140–450)
PLATELET # BLD AUTO: 163 10*3/MM3 (ref 140–450)
PMV BLD AUTO: 8.6 FL (ref 6–12)
PMV BLD AUTO: 8.7 FL (ref 6–12)
POTASSIUM SERPL-SCNC: 3.8 MMOL/L (ref 3.5–5.2)
POTASSIUM SERPL-SCNC: 4.6 MMOL/L (ref 3.5–5.2)
RBC # BLD AUTO: 2.84 10*6/MM3 (ref 4.14–5.8)
RBC # BLD AUTO: 2.88 10*6/MM3 (ref 4.14–5.8)
SODIUM SERPL-SCNC: 133 MMOL/L (ref 136–145)
SODIUM SERPL-SCNC: 133 MMOL/L (ref 136–145)
UNIT  ABO: NORMAL
UNIT  RH: NORMAL
WBC NRBC COR # BLD: 14.1 10*3/MM3 (ref 3.4–10.8)
WBC NRBC COR # BLD: 14.3 10*3/MM3 (ref 3.4–10.8)

## 2023-03-29 PROCEDURE — 85018 HEMOGLOBIN: CPT | Performed by: UROLOGY

## 2023-03-29 PROCEDURE — 80048 BASIC METABOLIC PNL TOTAL CA: CPT | Performed by: UROLOGY

## 2023-03-29 PROCEDURE — 25010000002 HYDROMORPHONE 1 MG/ML SOLUTION: Performed by: INTERNAL MEDICINE

## 2023-03-29 PROCEDURE — 85014 HEMATOCRIT: CPT | Performed by: UROLOGY

## 2023-03-29 PROCEDURE — 97530 THERAPEUTIC ACTIVITIES: CPT

## 2023-03-29 PROCEDURE — 97162 PT EVAL MOD COMPLEX 30 MIN: CPT

## 2023-03-29 PROCEDURE — 25010000002 HYDROMORPHONE 1 MG/ML SOLUTION: Performed by: UROLOGY

## 2023-03-29 PROCEDURE — 85025 COMPLETE CBC W/AUTO DIFF WBC: CPT | Performed by: UROLOGY

## 2023-03-29 PROCEDURE — 25010000002 ENOXAPARIN PER 10 MG: Performed by: UROLOGY

## 2023-03-29 RX ORDER — NALOXONE HCL 0.4 MG/ML
0.1 VIAL (ML) INJECTION
Status: DISCONTINUED | OUTPATIENT
Start: 2023-03-29 | End: 2023-04-20 | Stop reason: HOSPADM

## 2023-03-29 RX ORDER — HYDROCODONE BITARTRATE AND ACETAMINOPHEN 5; 325 MG/1; MG/1
2 TABLET ORAL EVERY 4 HOURS PRN
Status: DISPENSED | OUTPATIENT
Start: 2023-03-29 | End: 2023-04-04

## 2023-03-29 RX ADMIN — ENOXAPARIN SODIUM 40 MG: 100 INJECTION SUBCUTANEOUS at 09:12

## 2023-03-29 RX ADMIN — HYDROCODONE BITARTRATE AND ACETAMINOPHEN 2 TABLET: 5; 325 TABLET ORAL at 18:16

## 2023-03-29 RX ADMIN — Medication 10 ML: at 20:00

## 2023-03-29 RX ADMIN — SODIUM CHLORIDE 100 ML/HR: 9 INJECTION, SOLUTION INTRAVENOUS at 16:14

## 2023-03-29 RX ADMIN — HYDROMORPHONE HYDROCHLORIDE 1 MG: 1 INJECTION, SOLUTION INTRAMUSCULAR; INTRAVENOUS; SUBCUTANEOUS at 11:55

## 2023-03-29 RX ADMIN — HYDROCODONE BITARTRATE AND ACETAMINOPHEN 2 TABLET: 5; 325 TABLET ORAL at 11:55

## 2023-03-29 RX ADMIN — HYDROMORPHONE HYDROCHLORIDE 1 MG: 1 INJECTION, SOLUTION INTRAMUSCULAR; INTRAVENOUS; SUBCUTANEOUS at 18:17

## 2023-03-29 RX ADMIN — SODIUM BICARBONATE 650 MG: 650 TABLET ORAL at 19:58

## 2023-03-29 RX ADMIN — SODIUM BICARBONATE 650 MG: 650 TABLET ORAL at 09:12

## 2023-03-29 RX ADMIN — Medication 5000 UNITS: at 09:12

## 2023-03-29 RX ADMIN — HYDROCODONE BITARTRATE AND ACETAMINOPHEN 1 TABLET: 5; 325 TABLET ORAL at 06:15

## 2023-03-29 RX ADMIN — SODIUM BICARBONATE 650 MG: 650 TABLET ORAL at 15:11

## 2023-03-29 RX ADMIN — HYDROMORPHONE HYDROCHLORIDE 0.5 MG: 1 INJECTION, SOLUTION INTRAMUSCULAR; INTRAVENOUS; SUBCUTANEOUS at 07:33

## 2023-03-29 RX ADMIN — SODIUM CHLORIDE 100 ML/HR: 9 INJECTION, SOLUTION INTRAVENOUS at 06:15

## 2023-03-29 RX ADMIN — HYDROMORPHONE HYDROCHLORIDE 1 MG: 1 INJECTION, SOLUTION INTRAMUSCULAR; INTRAVENOUS; SUBCUTANEOUS at 09:11
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
## 2023-03-29 NOTE — PROGRESS NOTES
67-year-old male postop day 1 from radical cystectomy with formation of an ileal conduit.  The right lower quadrant ostomy appliance is intact it is draining yellow urine with mucus.  The stoma is red, moist stents are in place.    Patient was seen preoperatively and has had several sessions of education.  He remembers some of the information that has been presented to him but I did review basic information regarding ostomy care.  We will plan first pouch change tomorrow.  I did leave supplies to the bedside.  Patient has been given to me folder at the time of marking however he is not quite sure where can place that he went to rehab from discharge.  I will provide him additional written information.  All questions answered
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
## 2023-03-29 NOTE — PLAN OF CARE
Problem: Adult Inpatient Plan of Care  Goal: Plan of Care Review  Recent Flowsheet Documentation  Taken 3/29/2023 1346 by Renee Tello, PT  Outcome Evaluation: Pt is 66 yo male s/p radical cystectomy and ileal conduit urinary diversion on 3/28/23 secondary to recurrent hematuria.  PMH: prostate cancer, bladder cancer with placement of suprapubic tube.  Pt reports indep with mobility at baseline, residing home alone with neighbors who offer social support.  On this date, pt repots pain but with verbal encouragement is motivated to participate in therapy.  Pt educated on logroll technique to assist with pain and able to complete with Katy-modA.  Pt able to complete sit <-> stand transfer with Katy and pivot to chair using RW with Katy.  Pt reports fatigue after transfer and decline attempt at ambulation.  Pt exhibits global weakness with noted BLE muscle atrophy and decreased endurance.  Pt far from functional mobility baseline and not safe to return home alone due to deficits.  PT will follow 5x/week while at [facility] and continue to assess d/c needs pending progress.

## 2023-03-29 NOTE — PLAN OF CARE
Goal Outcome Evaluation:              Outcome Evaluation: pt doing well with pain control has been up to the chair. Pt is very weak at this time but would like to go home if possible.

## 2023-03-29 NOTE — CASE MANAGEMENT/SOCIAL WORK
"Discharge Planning Assessment  [facility]     Patient Name: Anthony Moss  MRN: [id number]  Today's Date: 3/29/2023    Admit Date: 3/28/2023    Plan: Pending progress with PT/OT. Will at least need HH due to new ostomy. Possible snf needs   Discharge Needs Assessment     Row Name 03/29/23 1157       Living Environment    People in Home alone    Current Living Arrangements home    Primary Care Provided by self    Provides Primary Care For no one    Family Caregiver if Needed none    Quality of Family Relationships helpful       Resource/Environmental Concerns    Transportation Concerns none       Transition Planning    Patient/Family Anticipates Transition to home    Patient/Family Anticipated Services at Transition none    Transportation Anticipated family or friend will provide       Discharge Needs Assessment    Readmission Within the Last 30 Days planned readmission    Current Outpatient/Agency/Support Group other (see comments)  [facility]    Concerns to be Addressed discharge planning    Anticipated Changes Related to Illness inability to care for self    Equipment Needed After Discharge other (see comments)  to be determined    Discharge Facility/Level of Care Needs other (see comments)  to be determined    Provided Post Acute Provider List? Yes    Post Acute Provider List Nursing Home;DME Supplier;Home Health;Inpatient Rehab    Provided Post Acute Provider Quality & Resource List? Yes    Post Acute Provider Quality and Resource List Home Health;Inpatient Rehab;Nursing Home    Delivered To Patient    Method of Delivery In person    Current Discharge Risk lives alone               Discharge Plan     Row Name 03/29/23 1159       Plan    Plan Pending progress with PT/OT. Will at least need HH due to new ostomy. Possible snf needs    Patient/Family in Agreement with Plan yes    Plan Comments Spoke with patient at bedside. States, \" I will never go " "back to [facility]. It was awful. I signed myself out\". Patient hopeful he will progress and be able to go home with home health. Choice lists as above. PCP and pharmacy verified. POD 1              Continued Care and Services - Admitted Since 3/28/2023    Coordination has not been started for this encounter.       Expected Discharge Date and Time     Expected Discharge Date Expected Discharge Time    Mar 31, 2023          Demographic Summary     Row Name 03/29/23 1152       General Information    Admission Type inpatient    Arrived From PACU/recovery room    Required Notices Provided Important Message from Medicare    Referral Source admission list    Reason for Consult discharge planning    Preferred Language English               Functional Status     Row Name 03/29/23 1157       Functional Status    Usual Activity Tolerance moderate    Current Activity Tolerance moderate       Functional Status, IADL    Medications independent    Meal Preparation independent    Housekeeping independent    Laundry independent    Shopping independent       Mental Status    General Appearance WDL WDL       Mental Status Summary    Recent Changes in Mental Status/Cognitive Functioning no changes                         Katlyn Rutledge RN    "

## 2023-03-29 NOTE — PROGRESS NOTES
"    [facility] Medicine Services   Daily Progress Note    Patient Name: Anthony Moss  : 1955  MRN: [id number]  Primary Care Physician:  Reji Topete MD  Date of admission: 3/28/2023  Date and Time of Service: 3/29/2003 at 0 930      Subjective      Chief Complaint: Hematuria      Brief Patient Summary:  Anthony Moss is a 67 y.o. male admitted with recurrent hematuria with suprapubic tube, biopsy of a bladder mass demonstrated prostate cancer.  Patient states he had several weeks of obstructive uropathy, would irrigate his suprapubic tube at home intermittently.  He underwent radical cystectomy and ileal conduit urinary diversion on 3/28/2023.  His creatinine was elevated last month ranging from 1.6-3.9, currently stable at 1.7.    Patient Reports pain is better controlled this morning    Subjective:  Symptoms:  No shortness of breath or chest pain.    Diet:  No nausea.    Pain:  Pain is requiring pain medication and well controlled.          Objective      Vitals:   Temp:  [98.2 °F (36.8 °C)-99.9 °F (37.7 °C)] 99.9 °F (37.7 °C)  Heart Rate:  [] 92  Resp:  [12-21] 18  BP: (109-149)/(65-80) 109/65  Flow (L/min):  [6] 6  Objective:  General Appearance:  Comfortable.    Vital signs: (most recent): Blood pressure 118/77, pulse 109, temperature 98.7 °F (37.1 °C), temperature source Oral, resp. rate 16, height 188 cm (74\"), weight 94 kg (207 lb 3.7 oz), SpO2 96 %.    Output: Producing urine.    Lungs:  Normal effort and normal respiratory rate.  Breath sounds clear to auscultation.    Heart: Normal rate.  Regular rhythm.  S1 normal and S2 normal.    Abdomen: Abdomen is soft and non-distended.    Extremities: There is no dependent edema.    Neurological: Patient is alert and oriented to person, place and time.    Skin:  Warm and dry.  (Abdominal incision well approximated, staples intact, no erythema)                  Result Review    Result Review:  I have personally reviewed the " results from the time of this admission to 3/29/2023 09:06 EDT and agree with these findings:  [x]  Laboratory  []  Microbiology  []  Radiology  []  EKG/Telemetry   []  Cardiology/Vascular   []  Pathology  []  Old records  []  Other:  Most notable findings include: As noted in summary and plan    Wounds (last 24 hours)     LDA Wound     Row Name 03/29/23 0659 03/29/23 0413 03/29/23 0218       Wound 03/28/23 1305 abdomen Incision    Wound - Properties Group Placement Date: 03/28/23  -AM Placement Time: 1305  -AM Location: abdomen  -AM Primary Wound Type: Incision  -AM    Closure STEFF  -SR STEFF  -SR STEFF  -SR    Base dressing in place, unable to visualize  -SR dressing in place, unable to visualize  -SR dressing in place, unable to visualize  -SR    Retired Wound - Properties Group Placement Date: 03/28/23  -AM Placement Time: 1305  -AM Location: abdomen  -AM Primary Wound Type: Incision  -AM    Retired Wound - Properties Group Date first assessed: 03/28/23  -AM Time first assessed: 1305  -AM Location: abdomen  -AM Primary Wound Type: Incision  -AM    Row Name 03/29/23 0021 03/28/23 2228 03/28/23 2000       Wound 03/28/23 1305 abdomen Incision    Wound - Properties Group Placement Date: 03/28/23  -AM Placement Time: 1305  -AM Location: abdomen  -AM Primary Wound Type: Incision  -AM    Dressing Appearance -- intact;moist drainage;area marked  -SR intact;area marked;moist drainage  -RD    Closure STEFF  -SR STEFF  -SR STEFF  -RD    Base dressing in place, unable to visualize  -SR dressing in place, unable to visualize  -SR --    Drainage Characteristics/Odor -- -- other (see comments)  shadow  -RD    Drainage Amount -- -- scant  -RD    Retired Wound - Properties Group Placement Date: 03/28/23  -AM Placement Time: 1305  -AM Location: abdomen  -AM Primary Wound Type: Incision  -AM    Retired Wound - Properties Group Date first assessed: 03/28/23  -AM Time first assessed: 1305  -AM Location: abdomen  -AM Primary Wound Type:  Incision  -AM    Row Name 03/28/23 1859 03/28/23 1830 03/28/23 1815       Wound 03/28/23 1305 abdomen Incision    Wound - Properties Group Placement Date: 03/28/23 -AM Placement Time: 1305  -AM Location: abdomen  -AM Primary Wound Type: Incision  -AM    Dressing Appearance intact;moist drainage;area marked  -DM -- intact;no drainage;area marked  -DM    Closure STEFF  -DM STEFF  -DM STEFF  -DM    Drainage Characteristics/Odor --  shadow drainage  -DM --  shadow drainage  -DM --  shadow drainage  -DM    Drainage Amount scant  -DM scant  -DM scant  -DM    Retired Wound - Properties Group Placement Date: 03/28/23  -AM Placement Time: 1305  -AM Location: abdomen  -AM Primary Wound Type: Incision  -AM    Retired Wound - Properties Group Date first assessed: 03/28/23  -AM Time first assessed: 1305  -AM Location: abdomen  -AM Primary Wound Type: Incision  -AM    Row Name 03/28/23 1800 03/28/23 1745 03/28/23 1730       Wound 03/28/23 1305 abdomen Incision    Wound - Properties Group Placement Date: 03/28/23  -AM Placement Time: 1305  -AM Location: abdomen  -AM Primary Wound Type: Incision  -AM    Dressing Appearance area marked;intact;moist drainage  -DM area marked;intact;moist drainage  -DM intact;area marked;moist drainage  -DM    Closure STEFF  -DM STEFF  -DM STEFF  -DM    Drainage Characteristics/Odor --  shadow drainage  -DM --  shadow drainage  -DM --  shadow drainage  -DM    Drainage Amount scant  -DM scant  -DM scant  -DM    Retired Wound - Properties Group Placement Date: 03/28/23  -AM Placement Time: 1305  -AM Location: abdomen  -AM Primary Wound Type: Incision  -AM    Retired Wound - Properties Group Date first assessed: 03/28/23  -AM Time first assessed: 1305  -AM Location: abdomen  -AM Primary Wound Type: Incision  -AM    Row Name 03/28/23 1715 03/28/23 1700 03/28/23 1645       Wound 03/28/23 1305 abdomen Incision    Wound - Properties Group Placement Date: 03/28/23  -AM Placement Time: 1305  -AM Location: abdomen  -AM  Primary Wound Type: Incision  -AM    Dressing Appearance -- intact;moist drainage;area marked  -DM moist drainage;intact;area marked  -DM    Closure STEFF  -DM STEFF  -DM STEFF  -DM    Drainage Characteristics/Odor --  shadow drainage  -DM --  shadow drainage  -DM --  shadow drainage  -DM    Drainage Amount scant  -DM scant  -DM scant  -DM    Retired Wound - Properties Group Placement Date: 03/28/23  -AM Placement Time: 1305  -AM Location: abdomen  -AM Primary Wound Type: Incision  -AM    Retired Wound - Properties Group Date first assessed: 03/28/23  -AM Time first assessed: 1305  -AM Location: abdomen  -AM Primary Wound Type: Incision  -AM    Row Name 03/28/23 1630 03/28/23 1612          Wound 03/28/23 1305 abdomen Incision    Wound - Properties Group Placement Date: 03/28/23 -AM Placement Time: 1305  -AM Location: abdomen  -AM Primary Wound Type: Incision  -AM    Dressing Appearance dry;intact;no drainage  -DM --     Closure STEFF  -DM --     Dressing Care -- dressing applied  STAPLES AND COVADERM-INCISION, DRAIN-COVADERM, ILEOSTOMY DRESSING-OSTOMY WAFER AND BAG  -AM     Retired Wound - Properties Group Placement Date: 03/28/23 -AM Placement Time: 1305  -AM Location: abdomen  -AM Primary Wound Type: Incision  -AM    Retired Wound - Properties Group Date first assessed: 03/28/23 -AM Time first assessed: 1305  -AM Location: abdomen  -AM Primary Wound Type: Incision  -AM          User Key  (r) = Recorded By, (t) = Taken By, (c) = Cosigned By    Initials Name Provider Type    AM Keli Mcdaniels, RN Registered Nurse    Ilene Cordova, RN Registered Nurse    Tiny Terry RN Registered Nurse    Crys Auguste RN Registered Nurse                  Assessment & Plan        enoxaparin, 40 mg, Subcutaneous, Daily  sodium bicarbonate, 650 mg, Oral, TID  sodium chloride, 10 mL, Intravenous, Q12H  vitamin D3, 5,000 Units, Oral, Daily       sodium chloride, 100 mL/hr, Last Rate: 100 mL/hr (03/29/23 0615)         Active  Hospital Problems:  Active Hospital Problems    Diagnosis    • **Bladder cancer (HCC)    • Elevated serum creatinine    • Prostate cancer (HCC)    • History of alcohol use disorder      Plan:   Prostate Cancer  Bladder Cancer  -Underwent planned radical cystectomy and ileal conduit urinary diversion procedure by Dr. Montero on 3/28/2023  -Pain control as needed  -Postoperative expected acute blood loss anemia  -Postoperative care per Dr. Montero     Elevated Creatinine from obstructive uropathy, creatinine 1.6-3.9 last admission  -Stable creatinine at 1.7, continue to trend  -CMP in a.m.  -Home sodium bicarbonate continued    HTN  -controlled, monitor     History of Alcohol Abuse  -Reports that his last drink was over 1 week ago.   -Monitor for signs of withdrawal    DVT prophylaxis:  Medical and mechanical DVT prophylaxis orders are present.    CODE STATUS:    Code Status (Patient has no pulse and is not breathing): CPR (Attempt to Resuscitate)  Medical Interventions (Patient has pulse or is breathing): Full Support      Disposition:  I expect patient to be discharged 2 to 3 days.    Plan of care discussed with patient and bedside nurse    This patient has been examined wearing appropriate Personal Protective Equipment   Electronically signed by DANIS Rees, 03/29/23, 09:06 EDT.  [facility] Hospitalist Team

## 2023-03-29 NOTE — CASE MANAGEMENT/SOCIAL WORK
Case Management Readmission Assessment Note    Case Management Readmission Assessment (all recorded)     Readmission Interview     [facility] Name 03/29/23 1103             Readmission Indications    Is this hospitalization related to the prior hospital diagnosis? Yes      What was the reason you were admitted? Planned readmission for surgery      [facility] Name 03/29/23 1103             Recommendation for rehospitalization    Did you speak with your physician prior to coming to the hospital No      Who recommended you return to the hospital? --  Planned readmit      Row Name 03/29/23 1103             Follow up appointment    Do you have a PCP? Yes      Did you have an appointment with PCP/specialist after hospitalization within 7 days? No  seen by facility provider      Row Name 03/29/23 1103             Medications    Did you understand the reasons for your medications at discharge and how to take them? Yes      Did you understand the side effects of your medications? Yes      Are you taking all of you prescribed medications? Yes      Row Name 03/29/23 1103             Discharge Instructions    Did you understand your discharge instructions? Yes      Did your family/caregiver hear your instructions? No      Were you told to eat a special diet? No      Were you given a number of someone to call if you had questions or concerns? Yes      Row Name 03/29/23 1103             Index discharge location/services    Where did you go upon discharge? Skilled Nursing Facility  [facility] Name 03/29/23 1103             Discharge Readiness    On a scale of 1-5 (5 being well prepared), how ready were you for discharge 3      Recommendation based on interview Education on diagnosis/self management;Goals of care discussion/advanced care planning

## 2023-03-29 NOTE — PLAN OF CARE
Goal Outcome Evaluation:  Plan of Care Reviewed With: patient           Outcome Evaluation: Patient resting with some complaints of pain, drains intact, Patient has slept on and off most of the shift. Pain treated per MAR.

## 2023-03-30 LAB
BH BB BLOOD EXPIRATION DATE: NORMAL
BH BB BLOOD TYPE BARCODE: 5100
BH BB DISPENSE STATUS: NORMAL
BH BB PRODUCT CODE: NORMAL
BH BB UNIT NUMBER: NORMAL
CROSSMATCH INTERPRETATION: NORMAL
GLUCOSE BLDC GLUCOMTR-MCNC: 125 MG/DL (ref 70–105)
UNIT  ABO: NORMAL
UNIT  RH: NORMAL

## 2023-03-30 PROCEDURE — 97166 OT EVAL MOD COMPLEX 45 MIN: CPT

## 2023-03-30 PROCEDURE — 82962 GLUCOSE BLOOD TEST: CPT

## 2023-03-30 PROCEDURE — 97116 GAIT TRAINING THERAPY: CPT

## 2023-03-30 PROCEDURE — 25010000002 HYDROMORPHONE 1 MG/ML SOLUTION: Performed by: INTERNAL MEDICINE

## 2023-03-30 PROCEDURE — 97530 THERAPEUTIC ACTIVITIES: CPT

## 2023-03-30 PROCEDURE — 25010000002 ENOXAPARIN PER 10 MG: Performed by: UROLOGY

## 2023-03-30 RX ORDER — SODIUM BICARBONATE 650 MG/1
650 TABLET ORAL 3 TIMES DAILY
Status: DISCONTINUED | OUTPATIENT
Start: 2023-03-30 | End: 2023-04-03

## 2023-03-30 RX ADMIN — Medication 10 ML: at 09:24

## 2023-03-30 RX ADMIN — HYDROCODONE BITARTRATE AND ACETAMINOPHEN 2 TABLET: 5; 325 TABLET ORAL at 14:27

## 2023-03-30 RX ADMIN — SODIUM BICARBONATE 650 MG: 650 TABLET ORAL at 16:30

## 2023-03-30 RX ADMIN — HYDROCODONE BITARTRATE AND ACETAMINOPHEN 2 TABLET: 5; 325 TABLET ORAL at 21:50

## 2023-03-30 RX ADMIN — ENOXAPARIN SODIUM 40 MG: 100 INJECTION SUBCUTANEOUS at 09:23

## 2023-03-30 RX ADMIN — SODIUM BICARBONATE 650 MG: 650 TABLET ORAL at 21:50

## 2023-03-30 RX ADMIN — Medication 10 ML: at 21:50

## 2023-03-30 RX ADMIN — Medication 5000 UNITS: at 09:24

## 2023-03-30 RX ADMIN — HYDROCODONE BITARTRATE AND ACETAMINOPHEN 2 TABLET: 5; 325 TABLET ORAL at 01:55

## 2023-03-30 RX ADMIN — HYDROCODONE BITARTRATE AND ACETAMINOPHEN 2 TABLET: 5; 325 TABLET ORAL at 06:01

## 2023-03-30 RX ADMIN — HYDROMORPHONE HYDROCHLORIDE 1 MG: 1 INJECTION, SOLUTION INTRAMUSCULAR; INTRAVENOUS; SUBCUTANEOUS at 09:25

## 2023-03-30 RX ADMIN — SODIUM BICARBONATE 650 MG: 650 TABLET ORAL at 09:24

## 2023-03-30 RX ADMIN — ACETAMINOPHEN 650 MG: 325 TABLET, FILM COATED ORAL at 01:56

## 2023-03-30 RX ADMIN — HYDROMORPHONE HYDROCHLORIDE 1 MG: 1 INJECTION, SOLUTION INTRAMUSCULAR; INTRAVENOUS; SUBCUTANEOUS at 01:55
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
## 2023-03-30 NOTE — NURSING NOTE
WOCN note:    WOCN follow up for pouch change on POD#2 from radical cystectomy with creation of ileal conduit.   Patient used a large mirror to observe the pouch change and asked appropriate questions. He received pain medication and was dozing during portions of the procedure.     The pouch was removed and the eva-stomal skin was cleansed with water and dried. The skin and mucocutaneous junction are intact. The stoma is pink, moist and budded with 2 stents draining clear yellow urine.     The stoma is located within a fold of the RLQ with creases at 9:00 and 3:00. Skin prep was used to the eva-stomal skin and an [employer] barrier ring was cut and placed in the creases and along the inferior border of the stoma. A [place] one piece cut to fit urostomy pouch was applied and attached to bedside drainage bag.   Patient was instructed on pouch closure system and frequency of emptying the pouch. He may benefit from a convexity system given the location of the stoma.     Patient will require rehab or [facility] upon discharge for ongoing education and assistance with ostomy management and will need to follow up with outpatient ostomy clinic. We will continue to follow.

## 2023-03-30 NOTE — PLAN OF CARE
"Anthony Moss presents with functional mobility impairments which indicate the need for skilled intervention. Tolerating session today without incident. Pt able to progress toward short distance ambulation this date using RW, however exhibits forward flexed tripod position on RW due to pain.  Pt does not appear fully aware of medical complexity or current functional deficits.  Pt not safe for home alone and will need SNF rehab to address deficits.  Will continue to follow and progress as tolerated.     Plan/Recommendations:   Moderate Intensity Therapy recommended post-acute care. This is recommended as therapy feels the patient would require 3-4 days per week and wouldn't tolerate \"3 hour daily\" rehab intensity. SNF would be the preferred choice. If the patient does not agree to SNF, arrange HH or OP depending on home bound status. If patient is medically complex, consider LTACH.. Pt requires no DME at discharge.     Pt desires Home with Home Health at discharge. Pt cooperative; agreeable to therapeutic recommendations and plan of care.   "

## 2023-03-30 NOTE — PLAN OF CARE
Goal Outcome Evaluation:  Plan of Care Reviewed With: patient           Outcome Evaluation: Pt is 68 yo male s/p radical cystectomy and ileal conduit urinary diversion on 3/28/23 secondary to recurrent hematuria.  PMH: prostate cancer, bladder cancer with placement of suprapubic tube.  Pt reports indep with mobility at baseline, residing home alone with neighbors who offer social support.  On this date, pt repots pain is significant, nursing notified. Despite pain he is able to come to EOB with Min A, educated on logrolling technique but disregarding. He requires Min A to come to standing with RW, then ambulates also with Min A and RW displaying forward flexed posture reporting too much pain to stand tall. Pt ambulated 25 feet to restroom, sitting on commode attempting BM unscuccessfully. Mod A required for eva-hygiene d/t very little liquid leakage. OT feels pt is unsafe to return home alone, with increased need for assistance with all ADLs at this time. Recommending SNF at d/c.

## 2023-03-30 NOTE — CASE MANAGEMENT/SOCIAL WORK
"Continued Stay Note  [facility]     Patient Name: Anthony Moss  MRN: [id number]  Today's Date: 3/30/2023    Admit Date: 3/28/2023    Plan: HH vs SNF. Referral to [facility] pending acceptance and will need orders. Patient given snf list and states he will give choices 3/31. Precert to be started by  department 3/30. Patient is new ostomy   Discharge Plan     Row Name 03/30/23 1512       Plan    Plan HH vs SNF. Referral to [facility] pending acceptance and will need orders. Patient given snf list and states he will give choices 3/31. Precert to be started by  department 3/30. Patient is new ostomy    Row Name 03/30/23 1239       Plan    Plan HH vs SNF Referral to [facility] pending acceptance and will need orders.  Patient given list and states he will give choices 3/31. Precert started by  department on 3/30. Patient is new ostomy    Plan Comments Spoke with patient at bedside. he states, \" I have a call out to some friends to help me choose a facility, but Id rather go home with home health.\" Epic chat sent to Bijan LAST from  department to start precert in case snf is needed. Referral sent to [facility] pending acceptance and will need orders              Expected Discharge Date and Time     Expected Discharge Date Expected Discharge Time    Apr 3, 2023             Katlyn Rutledge RN    "
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
## 2023-03-30 NOTE — CASE MANAGEMENT/SOCIAL WORK
Continued Stay Note   [place]     Patient Name: Anthony Moss  MRN: [id number]  Today's Date: 3/30/2023    Admit Date: 3/28/2023    Plan: HH vs snf. [facility] accepted and will need HH orders VS snf referral. Patient to give choice 3/31. Precert started by  dept 3/30   Discharge Plan     Row Name 03/30/23 1538       Plan    Plan HH vs snf. [facility] accepted and will need HH orders VS snf referral. Patient to give choice 3/31. Precert started by  dept 3/30    Row Name 03/30/23 1512       Plan    Plan [facility]. Patient given snf list and states he will give choices 3/31. Precert to be started by  department 3/30. Patient is new ostomy                   Expected Discharge Date and Time     Expected Discharge Date Expected Discharge Time    Apr 3, 2023             Katlyn Rutledge RN

## 2023-03-30 NOTE — THERAPY TREATMENT NOTE
"Subjective: Pt agreeable to therapeutic plan of care.    Objective:     Bed mobility - Mod-A  Transfers - Min-A and with rolling walker  Ambulation - 2x10 ft Min-A and with rolling walker     Vitals: WNL    Pain: 5 VAS   Location: abdominal region, RN notified of pt's request for pain medication  Intervention for pain: Repositioned, RN notified and Increased Activity    Education: Provided education on the importance of mobility in the acute care setting, Transfer Training and Gait Training    Assessment: Anthony Moss presents with functional mobility impairments which indicate the need for skilled intervention. Tolerating session today without incident. Pt able to progress toward short distance ambulation this date using RW, however exhibits forward flexed tripod position on RW due to pain.  Pt does not appear fully aware of medical complexity or current functional deficits.  Pt not safe for home alone and will need SNF rehab to address deficits.  Will continue to follow and progress as tolerated.     Plan/Recommendations:   Moderate Intensity Therapy recommended post-acute care. This is recommended as therapy feels the patient would require 3-4 days per week and wouldn't tolerate \"3 hour daily\" rehab intensity. SNF would be the preferred choice. If the patient does not agree to SNF, arrange HH or OP depending on home bound status. If patient is medically complex, consider LTACH.. Pt requires no DME at discharge.     Pt desires Home with Home Health at discharge. Pt cooperative; agreeable to therapeutic recommendations and plan of care.     Basic Mobility 6-click:  Rollin = Total, A lot = 2, A little = 3; 4 = None  Supine>Sit:   1 = Total, A lot = 2, A little = 3; 4 = None   Sit>Stand with arms:  1 = Total, A lot = 2, A little = 3; 4 = None  Bed>Chair:   1 = Total, A lot = 2, A little = 3; 4 = None  Ambulate in room:  1 = Total, A lot = 2, A little = 3; 4 = None  3-5 Steps with railin = Total, " A lot = 2, A little = 3; 4 = None  Score: 14    Modified [place]: N/A = No pre-op stroke/TIA    Post-Tx Position: Up in Chair, Alarms activated and Call light and personal items within reach  PPE: gloves, surgical mask and gown

## 2023-03-30 NOTE — DISCHARGE PLACEMENT REQUEST
"ShaunAnthony sanders (67 y.o. Male)     Date of Birth   1955    Social Security Number       Address   [address] [place] [address]    Home Phone   [phone]    MRN   [id number]       Congregation   None    Marital Status                               Admission Date   3/28/23    Admission Type   Elective    Admitting Provider   Xu Montero MD    Attending Provider   Xu Montero MD    Department, Room/Bed   [facility] SURGICAL INPATIENT, 4129/1       Discharge Date       Discharge Disposition       Discharge Destination                               Attending Provider: Xu Montero MD    Allergies: No Known Allergies    Isolation: Contact   Infection: MRSA No Isolation this Admit (03/01/23), Terri Auris (rule out) (03/28/23)   Code Status: CPR    Ht: 188 cm (74\")   Wt: 94 kg (207 lb 3.7 oz)    Admission Cmt: None   Principal Problem: Bladder cancer (HCC) [C67.9]                 Active Insurance as of 3/28/2023     Primary Coverage     Payor Plan Insurance Group Employer/Plan Group    [facility] MEDICARE REPLACEMENT [facility] MEDICARE REPLACEMENT [id number]     Payor Plan Address Payor Plan Phone Number Payor Plan Fax Number Effective Dates    [address] [address]   3/1/2023 - None Entered    [facility] [id number]       Subscriber Name Subscriber Birth Date Member ID       ANTHONY ACUÑA 1955 [id number]           Secondary Coverage     Payor Plan Insurance Group Employer/Plan Group    [place]      Payor Plan Address Payor Plan Phone Number Payor Plan Fax Number Effective Dates    [address]   2/28/2023 - None Entered    [place] [address]       Subscriber Name Subscriber Birth Date Member ID       ANTHONY ACUÑA 1955 [id number]                 Emergency Contacts      (Rel.) Home Phone Work Phone Mobile Phone    DURAN ERWIN (Friend) [phone] -- [phone]    YANETH PEREZ (Friend) [phone] -- " [phone]

## 2023-03-30 NOTE — PLAN OF CARE
Patient independently ambulates around unit in hough with a steady gait. Fall protocols in place for safety. Bed/ chair alarm on.  Patient observed with eyes open in sitting position in chair. Rise and fall of chest observed. Dressings clean dry and intact. Goal Outcome Evaluation:

## 2023-03-30 NOTE — CASE MANAGEMENT/SOCIAL WORK
"Continued Stay Note  [facility]     Patient Name: Anthony Moss  MRN: [id number]  Today's Date: 3/30/2023    Admit Date: 3/28/2023    Plan: HH vs SNF Referral to [facility] pending acceptance and will need orders.  Patient given list and states he will give choices 3/31. Precert started by  department on 3/30. Patient is new ostomy   Discharge Plan     Row Name 03/30/23 1239       Plan    Plan HH vs SNF Referral to [facility] pending acceptance and will need orders.  Patient given list and states he will give choices 3/31. Precert started by  department on 3/30. Patient is new ostomy    Plan Comments Spoke with patient at bedside. he states, \" I have a call out to some friends to help me choose a facility, but Id rather go home with home health.\" Epic chat sent to Bijan LAST from  department to start precert in case snf is needed. Referral sent to [facility] pending acceptance and will need orders                Katlyn Rutledge RN    "

## 2023-03-30 NOTE — PROGRESS NOTES
"    [facility]   Daily Progress Note    Patient Name: Anthony Moss  : 1955  MRN: [id number]  Primary Care Physician:  Reji Topete MD  Date of admission: 3/28/2023  Date and Time of Service: 3/30/2023 at 0 900      Subjective      Chief Complaint: Hematuria      Brief Patient Summary:  Anthony Moss is a 67 y.o. male admitted with recurrent hematuria with suprapubic tube, biopsy of a bladder mass demonstrated prostate cancer.  Patient states he had several weeks of obstructive uropathy, would irrigate his suprapubic tube at home intermittently.  He underwent radical cystectomy and ileal conduit urinary diversion on 3/28/2023.  His creatinine was elevated last month ranging from 1.6-3.9, mild increase to 1.8 today, nephrology consult pending.    Patient Reports pain is better controlled    Subjective:  Symptoms:  No shortness of breath or chest pain.    Diet:  NPO.  No nausea.    Activity level: Returning to normal.    Pain:  He reports no pain.          Objective      Vitals:   Temp:  [98.1 °F (36.7 °C)-99.1 °F (37.3 °C)] 98.6 °F (37 °C)  Heart Rate:  [] 91  Resp:  [16-18] 16  BP: (117-145)/(76-84) 122/76  Objective:  General Appearance:  Comfortable.    Vital signs: (most recent): Blood pressure 110/74, pulse 90, temperature 97.9 °F (36.6 °C), temperature source Oral, resp. rate 14, height 188 cm (74\"), weight 94 kg (207 lb 3.7 oz), SpO2 92 %.    Lungs:  Normal effort and normal respiratory rate.  Breath sounds clear to auscultation.    Heart: Normal rate.  Regular rhythm.  S1 normal and S2 normal.    Abdomen: Abdomen is soft.    Extremities: There is no dependent edema.    Neurological: Patient is alert and oriented to person, place and time.    Skin:  Warm and dry.  (Abdominal dressing intact)                  Result Review    Result Review:  I have personally reviewed the results from the time of this admission to 3/30/2023 12:07 EDT and agree with " these findings:  [x]  Laboratory  []  Microbiology  []  Radiology  []  EKG/Telemetry   []  Cardiology/Vascular   []  Pathology  []  Old records  []  Other:  Most notable findings include: See summary and plan    Wounds (last 24 hours)     LDA Wound     Row Name 03/30/23 0810 03/30/23 0255 03/30/23 0054       Wound 03/28/23 1305 abdomen Incision    Wound - Properties Group Placement Date: 03/28/23  -AM Placement Time: 1305  -AM Location: abdomen  -AM Primary Wound Type: Incision  -AM    Closure STEFF  -TL STEFF  -SR STEFF  -SR    Base -- dressing in place, unable to visualize  -SR dressing in place, unable to visualize  -SR    Retired Wound - Properties Group Placement Date: 03/28/23  -AM Placement Time: 1305  -AM Location: abdomen  -AM Primary Wound Type: Incision  -AM    Retired Wound - Properties Group Date first assessed: 03/28/23  -AM Time first assessed: 1305  -AM Location: abdomen  -AM Primary Wound Type: Incision  -AM    Row Name 03/29/23 2222 03/29/23 1932          Wound 03/28/23 1305 abdomen Incision    Wound - Properties Group Placement Date: 03/28/23  -AM Placement Time: 1305  -AM Location: abdomen  -AM Primary Wound Type: Incision  -AM    Dressing Appearance -- dry;intact;no drainage  -SR     Closure STEFF  -SR STEFF  -SR     Base dressing in place, unable to visualize  -SR --     Drainage Characteristics/Odor -- --  -SR     Drainage Amount -- --  -SR     Retired Wound - Properties Group Placement Date: 03/28/23  -AM Placement Time: 1305  -AM Location: abdomen  -AM Primary Wound Type: Incision  -AM    Retired Wound - Properties Group Date first assessed: 03/28/23  -AM Time first assessed: 1305  -AM Location: abdomen  -AM Primary Wound Type: Incision  -AM          User Key  (r) = Recorded By, (t) = Taken By, (c) = Cosigned By    Initials Name Provider Type    AM Keli Mcdaniels RN Registered Nurse    Alfonzo Mojica RN Registered Nurse    Crys Auguste RN Registered Nurse                  Assessment &  Plan        enoxaparin, 40 mg, Subcutaneous, Daily  sodium bicarbonate, 650 mg, Oral, TID  sodium chloride, 10 mL, Intravenous, Q12H  vitamin D3, 5,000 Units, Oral, Daily       sodium chloride, 100 mL/hr, Last Rate: 100 mL/hr (03/29/23 1614)         Active Hospital Problems:  Active Hospital Problems    Diagnosis    • **Bladder cancer (HCC)    • Elevated serum creatinine    • Prostate cancer (HCC)    • History of alcohol use disorder      Plan:   Prostate Cancer  Bladder Cancer  -Underwent planned radical cystectomy and ileal conduit urinary diversion procedure by Dr. Montero on 3/28/2023  -Pain control as needed  -Postoperative expected acute blood loss anemia  -Postoperative care per Dr. Montero  -PT/OT evaluation for home with home health versus rehab     AKA versus CKD from obstructive uropathy, creatinine 1.6-3.9 last admission  -Mild increases in creatine today to 1.8, continue to trend  -CMP in a.m.  -Home sodium bicarbonate continued  -Nephrology consulted    Leukocytosis  -Probably reactive, afebrile  -Monitor vital signs     HTN  -controlled, monitor     History of Alcohol Abuse  -Reports that his last drink was over 1 week ago.   -No signs of withdrawal    DVT prophylaxis:  Medical and mechanical DVT prophylaxis orders are present.    CODE STATUS:    Code Status (Patient has no pulse and is not breathing): CPR (Attempt to Resuscitate)  Medical Interventions (Patient has pulse or is breathing): Full Support      Disposition:  I expect patient to be discharged 2 to 3 days.    Plan of care discussed with patient, bedside nurse, wound care nurse    This patient has been examined wearing appropriate Personal Protective Equipment   Electronically signed by DANIS Rees, 03/30/23, 12:07 EDT.  [facility]ist [facility]

## 2023-03-31 ENCOUNTER — APPOINTMENT (OUTPATIENT)
Dept: GENERAL RADIOLOGY | Facility: HOSPITAL | Age: 68
End: 2023-03-31
Payer: MEDICARE

## 2023-03-31 LAB
ALBUMIN SERPL-MCNC: 3 G/DL (ref 3.5–5.2)
ALBUMIN/GLOB SERPL: 0.9 G/DL
ALP SERPL-CCNC: 126 U/L (ref 39–117)
ALT SERPL W P-5'-P-CCNC: 7 U/L (ref 1–41)
ANION GAP SERPL CALCULATED.3IONS-SCNC: 10 MMOL/L (ref 5–15)
ANION GAP SERPL CALCULATED.3IONS-SCNC: 12 MMOL/L (ref 5–15)
AST SERPL-CCNC: 18 U/L (ref 1–40)
BASOPHILS # BLD AUTO: 0 10*3/MM3 (ref 0–0.2)
BASOPHILS # BLD AUTO: 0 10*3/MM3 (ref 0–0.2)
BASOPHILS NFR BLD AUTO: 0.1 % (ref 0–1.5)
BASOPHILS NFR BLD AUTO: 0.2 % (ref 0–1.5)
BILIRUB SERPL-MCNC: 0.3 MG/DL (ref 0–1.2)
BUN SERPL-MCNC: 24 MG/DL (ref 8–23)
BUN SERPL-MCNC: 24 MG/DL (ref 8–23)
BUN/CREAT SERPL: 14.1 (ref 7–25)
BUN/CREAT SERPL: 15.7 (ref 7–25)
CALCIUM SPEC-SCNC: 8.5 MG/DL (ref 8.6–10.5)
CALCIUM SPEC-SCNC: 8.9 MG/DL (ref 8.6–10.5)
CHLORIDE SERPL-SCNC: 100 MMOL/L (ref 98–107)
CHLORIDE SERPL-SCNC: 99 MMOL/L (ref 98–107)
CO2 SERPL-SCNC: 21 MMOL/L (ref 22–29)
CO2 SERPL-SCNC: 27 MMOL/L (ref 22–29)
CREAT FLD-MCNC: 1.65 MG/DL
CREAT SERPL-MCNC: 1.53 MG/DL (ref 0.76–1.27)
CREAT SERPL-MCNC: 1.7 MG/DL (ref 0.76–1.27)
DEPRECATED RDW RBC AUTO: 50.8 FL (ref 37–54)
DEPRECATED RDW RBC AUTO: 53.8 FL (ref 37–54)
EGFRCR SERPLBLD CKD-EPI 2021: 44.2 ML/MIN/1.73
EGFRCR SERPLBLD CKD-EPI 2021: 50.1 ML/MIN/1.73
EOSINOPHIL # BLD AUTO: 0 10*3/MM3 (ref 0–0.4)
EOSINOPHIL # BLD AUTO: 0 10*3/MM3 (ref 0–0.4)
EOSINOPHIL NFR BLD AUTO: 0.1 % (ref 0.3–6.2)
EOSINOPHIL NFR BLD AUTO: 0.2 % (ref 0.3–6.2)
ERYTHROCYTE [DISTWIDTH] IN BLOOD BY AUTOMATED COUNT: 16.8 % (ref 12.3–15.4)
ERYTHROCYTE [DISTWIDTH] IN BLOOD BY AUTOMATED COUNT: 16.9 % (ref 12.3–15.4)
GLOBULIN UR ELPH-MCNC: 3.2 GM/DL
GLUCOSE SERPL-MCNC: 147 MG/DL (ref 65–99)
GLUCOSE SERPL-MCNC: 149 MG/DL (ref 65–99)
HCT VFR BLD AUTO: 24.7 % (ref 37.5–51)
HCT VFR BLD AUTO: 28.4 % (ref 37.5–51)
HGB BLD-MCNC: 8.3 G/DL (ref 13–17.7)
HGB BLD-MCNC: 9.1 G/DL (ref 13–17.7)
LAB AP CASE REPORT: NORMAL
LAB AP SYNOPTIC CHECKLIST: NORMAL
LYMPHOCYTES # BLD AUTO: 0.7 10*3/MM3 (ref 0.7–3.1)
LYMPHOCYTES # BLD AUTO: 0.8 10*3/MM3 (ref 0.7–3.1)
LYMPHOCYTES NFR BLD AUTO: 6.5 % (ref 19.6–45.3)
LYMPHOCYTES NFR BLD AUTO: 6.8 % (ref 19.6–45.3)
MAGNESIUM SERPL-MCNC: 1.7 MG/DL (ref 1.6–2.4)
MCH RBC QN AUTO: 27.8 PG (ref 26.6–33)
MCH RBC QN AUTO: 28.6 PG (ref 26.6–33)
MCHC RBC AUTO-ENTMCNC: 32.1 G/DL (ref 31.5–35.7)
MCHC RBC AUTO-ENTMCNC: 33.4 G/DL (ref 31.5–35.7)
MCV RBC AUTO: 85.4 FL (ref 79–97)
MCV RBC AUTO: 86.5 FL (ref 79–97)
MONOCYTES # BLD AUTO: 0.9 10*3/MM3 (ref 0.1–0.9)
MONOCYTES # BLD AUTO: 1 10*3/MM3 (ref 0.1–0.9)
MONOCYTES NFR BLD AUTO: 8.4 % (ref 5–12)
MONOCYTES NFR BLD AUTO: 9.6 % (ref 5–12)
NEUTROPHILS NFR BLD AUTO: 8.7 10*3/MM3 (ref 1.7–7)
NEUTROPHILS NFR BLD AUTO: 83.7 % (ref 42.7–76)
NEUTROPHILS NFR BLD AUTO: 84.4 % (ref 42.7–76)
NEUTROPHILS NFR BLD AUTO: 9.4 10*3/MM3 (ref 1.7–7)
NRBC BLD AUTO-RTO: 0 /100 WBC (ref 0–0.2)
NRBC BLD AUTO-RTO: 0.3 /100 WBC (ref 0–0.2)
PATH REPORT.FINAL DX SPEC: NORMAL
PATH REPORT.GROSS SPEC: NORMAL
PLATELET # BLD AUTO: 173 10*3/MM3 (ref 140–450)
PLATELET # BLD AUTO: 227 10*3/MM3 (ref 140–450)
PMV BLD AUTO: 8.2 FL (ref 6–12)
PMV BLD AUTO: 8.7 FL (ref 6–12)
POTASSIUM SERPL-SCNC: 4.2 MMOL/L (ref 3.5–5.2)
POTASSIUM SERPL-SCNC: 4.3 MMOL/L (ref 3.5–5.2)
PROT SERPL-MCNC: 6.2 G/DL (ref 6–8.5)
RBC # BLD AUTO: 2.89 10*6/MM3 (ref 4.14–5.8)
RBC # BLD AUTO: 3.28 10*6/MM3 (ref 4.14–5.8)
SODIUM SERPL-SCNC: 133 MMOL/L (ref 136–145)
SODIUM SERPL-SCNC: 136 MMOL/L (ref 136–145)
WBC NRBC COR # BLD: 10.3 10*3/MM3 (ref 3.4–10.8)
WBC NRBC COR # BLD: 11.2 10*3/MM3 (ref 3.4–10.8)

## 2023-03-31 PROCEDURE — 25010000002 ENOXAPARIN PER 10 MG: Performed by: UROLOGY

## 2023-03-31 PROCEDURE — 74018 RADEX ABDOMEN 1 VIEW: CPT

## 2023-03-31 PROCEDURE — 85025 COMPLETE CBC W/AUTO DIFF WBC: CPT | Performed by: UROLOGY

## 2023-03-31 PROCEDURE — 63710000001 ONDANSETRON PER 8 MG: Performed by: UROLOGY

## 2023-03-31 PROCEDURE — 80053 COMPREHEN METABOLIC PANEL: CPT | Performed by: INTERNAL MEDICINE

## 2023-03-31 PROCEDURE — 83735 ASSAY OF MAGNESIUM: CPT | Performed by: INTERNAL MEDICINE

## 2023-03-31 PROCEDURE — 97116 GAIT TRAINING THERAPY: CPT

## 2023-03-31 PROCEDURE — 25010000002 HYDROMORPHONE 1 MG/ML SOLUTION: Performed by: INTERNAL MEDICINE

## 2023-03-31 PROCEDURE — 82570 ASSAY OF URINE CREATININE: CPT | Performed by: UROLOGY

## 2023-03-31 PROCEDURE — 25010000002 ONDANSETRON PER 1 MG: Performed by: UROLOGY

## 2023-03-31 RX ORDER — SIMETHICONE 80 MG
80 TABLET,CHEWABLE ORAL ONCE
Status: COMPLETED | OUTPATIENT
Start: 2023-03-31 | End: 2023-03-31

## 2023-03-31 RX ORDER — AMOXICILLIN 250 MG
2 CAPSULE ORAL 2 TIMES DAILY PRN
Status: DISCONTINUED | OUTPATIENT
Start: 2023-03-31 | End: 2023-04-20 | Stop reason: HOSPADM

## 2023-03-31 RX ORDER — POLYETHYLENE GLYCOL 3350 17 G/17G
17 POWDER, FOR SOLUTION ORAL DAILY
Status: DISCONTINUED | OUTPATIENT
Start: 2023-03-31 | End: 2023-03-31

## 2023-03-31 RX ORDER — POLYETHYLENE GLYCOL 3350 17 G/17G
17 POWDER, FOR SOLUTION ORAL 2 TIMES DAILY
Status: DISCONTINUED | OUTPATIENT
Start: 2023-03-31 | End: 2023-04-20 | Stop reason: HOSPADM

## 2023-03-31 RX ADMIN — SODIUM BICARBONATE 650 MG: 650 TABLET ORAL at 16:01

## 2023-03-31 RX ADMIN — DOCUSATE SODIUM 100 MG: 100 CAPSULE, LIQUID FILLED ORAL at 07:15

## 2023-03-31 RX ADMIN — Medication 5000 UNITS: at 08:53

## 2023-03-31 RX ADMIN — POLYETHYLENE GLYCOL 3350 17 G: 17 POWDER, FOR SOLUTION ORAL at 09:40

## 2023-03-31 RX ADMIN — HYDROMORPHONE HYDROCHLORIDE 1 MG: 1 INJECTION, SOLUTION INTRAMUSCULAR; INTRAVENOUS; SUBCUTANEOUS at 20:37

## 2023-03-31 RX ADMIN — Medication 10 ML: at 16:01

## 2023-03-31 RX ADMIN — ONDANSETRON 4 MG: 2 INJECTION INTRAMUSCULAR; INTRAVENOUS at 22:58

## 2023-03-31 RX ADMIN — Medication 10 ML: at 21:58

## 2023-03-31 RX ADMIN — SODIUM BICARBONATE 650 MG: 650 TABLET ORAL at 20:37

## 2023-03-31 RX ADMIN — HYDROCODONE BITARTRATE AND ACETAMINOPHEN 2 TABLET: 5; 325 TABLET ORAL at 07:15

## 2023-03-31 RX ADMIN — SENNOSIDES AND DOCUSATE SODIUM 2 TABLET: 50; 8.6 TABLET ORAL at 16:01

## 2023-03-31 RX ADMIN — POLYETHYLENE GLYCOL 3350 17 G: 17 POWDER, FOR SOLUTION ORAL at 20:37

## 2023-03-31 RX ADMIN — ONDANSETRON HYDROCHLORIDE 4 MG: 4 TABLET, FILM COATED ORAL at 07:15

## 2023-03-31 RX ADMIN — SIMETHICONE 80 MG: 80 TABLET, CHEWABLE ORAL at 21:58

## 2023-03-31 RX ADMIN — SODIUM BICARBONATE 650 MG: 650 TABLET ORAL at 08:53

## 2023-03-31 RX ADMIN — ENOXAPARIN SODIUM 40 MG: 100 INJECTION SUBCUTANEOUS at 08:53

## 2023-03-31 RX ADMIN — HYDROCODONE BITARTRATE AND ACETAMINOPHEN 2 TABLET: 5; 325 TABLET ORAL at 16:01

## 2023-03-31 NOTE — DISCHARGE PLACEMENT REQUEST
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 assistance with all ADLs at this time. Recommending SNF at d/c.  -     Row Name 03/30/23 1527          Therapy Assessment/Plan (OT)    Rehab Potential (OT) good, to achieve stated therapy goals  -     Criteria for Skilled Therapeutic Interventions Met (OT) yes;skilled treatment is necessary  -     Therapy Frequency (OT) 3 times/wk  -     Predicted Duration of Therapy Intervention (OT) until dc  -     Row Name 03/30/23 1527          Therapy Plan Review/Discharge Plan (OT)    Anticipated Discharge Disposition (OT) skilled nursing facility  -     Row Name 03/30/23 1527          Vital Signs    Pre Patient Position Supine  -LS     Intra Patient Position Standing  -LS     Post Patient Position  Sitting  -LS     Row Name 03/30/23 1527          Positioning and Restraints    Pre-Treatment Position in bed  -LS     Post Treatment Position chair  -LS     In Chair notified nsg;reclined;call light within reach;encouraged to call for assist;exit alarm on  -LS           User Key  (r) = Recorded By, (t) = Taken By, (c) = Cosigned By    Initials Name Provider Type    Juve Fischer OT Occupational Therapist               Outcome Measures     Row Name 03/30/23 1533          How much help from another is currently needed...    Putting on and taking off regular lower body clothing? 2  -LS     Bathing (including washing, rinsing, and drying) 2  -LS     Toileting (which includes using toilet bed pan or urinal) 2  -LS     Putting on and taking off regular upper body clothing 3  -LS     Taking care of personal grooming (such as brushing teeth) 3  -LS     Eating meals 4  -LS     AM-PAC 6 Clicks Score (OT) 16  -LS     Row Name 03/30/23 0810          How much help from another person do you currently need...    Turning from your back to your side while in flat bed without using bedrails? 2  -TL     Moving from lying on back to sitting on the side of a flat bed without bedrails? 2  -TL     Moving to and from a bed to a chair (including a wheelchair)? 2  -TL     Standing up from a chair using your arms (e.g., wheelchair, bedside chair)? 2  -TL     Climbing 3-5 steps with a railing? 2  -TL     To walk in hospital room? 2  -TL     AM-PAC 6 Clicks Score (PT) 12  -TL     Highest level of mobility 4 --> Transferred to chair/commode  -TL     Row Name 03/30/23 1533          Functional Assessment    Outcome Measure Options AM-PAC 6 Clicks Daily Activity (OT)  -LS           User Key  (r) = Recorded By, (t) = Taken By, (c) = Cosigned By    Initials Name Provider Type    Alfonzo Mojica RN Registered Nurse    Juve Fischer OT Occupational Therapist                Occupational Therapy Education     Title: PT OT SLP Therapies  (Done)     Topic: Occupational Therapy (Done)     Point: ADL training (Done)     Description:   Instruct learner(s) on proper safety adaptation and remediation techniques during self care or transfers.   Instruct in proper use of assistive devices.              Learning Progress Summary           Patient Acceptance, E,TB, VU by MEHRDAD at 3/30/2023 1533                               User Key     Initials Effective Dates Name Provider Type Discipline    MEHRDAD 09/22/22 -  Juve Valentine OT Occupational Therapist OT              OT Recommendation and Plan  Planned Therapy Interventions (OT): activity tolerance training, BADL retraining, functional balance retraining, IADL retraining, occupation/activity based interventions, ROM/therapeutic exercise, transfer/mobility retraining, strengthening exercise  Therapy Frequency (OT): 3 times/wk  Plan of Care Review  Plan of Care Reviewed With: patient  Outcome Evaluation: Pt is 68 yo male s/p radical cystectomy and ileal conduit urinary diversion on 3/28/23 secondary to recurrent hematuria.  PMH: prostate cancer, bladder cancer with placement of suprapubic tube.  Pt reports indep with mobility at baseline, residing home alone with neighbors who offer social support.  On this date, pt repots pain is significant, nursing notified. Despite pain he is able to come to EOB with Min A, educated on logrolling technique but disregarding. He requires Min A to come to standing with RW, then ambulates also with Min A and RW displaying forward flexed posture reporting too much pain to stand tall. Pt ambulated 25 feet to restroom, sitting on commode attempting BM unscuccessfully. Mod A required for eva-hygiene d/t very little liquid leakage. OT feels pt is unsafe to return home alone, with increased need for assistance with all ADLs at this time. Recommending SNF at d/c.     Time Calculation:    Time Calculation- OT     Row Name 03/30/23 1539             Time Calculation- OT    OT Start Time 1400   -LS      OT Stop Time 1426  -LS      OT Time Calculation (min) 26 min  -LS      OT Received On 03/30/23  -LS      OT - Next Appointment 04/03/23  -LS      OT Goal Re-Cert Due Date 04/13/23  -LS         Untimed Charges    OT Eval/Re-eval Minutes 26  -LS         Total Minutes    Untimed Charges Total Minutes 26  -LS       Total Minutes 26  -LS            User Key  (r) = Recorded By, (t) = Taken By, (c) = Cosigned By    Initials Name Provider Type    LS Juve Valentine OT Occupational Therapist              Therapy Charges for Today     Code Description Service Date Service Provider Modifiers Qty    20286784256 HC OT EVAL MOD COMPLEXITY 4 3/30/2023 Juve Valentine OT GO 1               Juve Valentine OT  3/30/2023    Electronically signed by Juve Valentine OT at 03/30/23 4951     Juve Valentine OT at 03/30/23 1533        Goal Outcome Evaluation:  Plan of Care Reviewed With: patient           Outcome Evaluation: Pt is 68 yo male s/p radical cystectomy and ileal conduit urinary diversion on 3/28/23 secondary to recurrent hematuria.  PMH: prostate cancer, bladder cancer with placement of suprapubic tube.  Pt reports indep with mobility at baseline, residing home alone with neighbors who offer social support.  On this date, pt repots pain is significant, nursing notified. Despite pain he is able to come to EOB with Min A, educated on logrolling technique but disregarding. He requires Min A to come to standing with RW, then ambulates also with Min A and RW displaying forward flexed posture reporting too much pain to stand tall. Pt ambulated 25 feet to restroom, sitting on commode attempting BM unscuccessfully. Mod A required for eva-hygiene d/t very little liquid leakage. OT feels pt is unsafe to return home alone, with increased need for assistance with all ADLs at this time. Recommending SNF at d/c.    Electronically signed by Juve Valentine OT at 03/30/23 6418

## 2023-03-31 NOTE — PLAN OF CARE
Goal Outcome Evaluation:  Plan of Care Reviewed With: patient        Progress: improving  Outcome Evaluation: Patient up in chair for a while today, he has not asked for much pain meds this evening. patient has call light , Phone, and belongings near.

## 2023-03-31 NOTE — NURSING NOTE
WOCN note:    WOCN follow up for ongoing ostomy education s/p radical cystectomy with creation of ileal conduit on 3/28/23.   Patient awake and alert this morning. Reports he has still not had a bowel movement. Instructed on constipating effects of pain medications and alternatives for pain management.     The RLQ urostomy pouch is intact to bedside drainage bag. Urine is slightly pink tinged with mucus threads. Instructed patient regarding normal appearance of urine and mucus from the stoma. Instructed on cleansing of bedside drainage bag and frequent emptying of the pouch when not connected.  Patient instructed on initial low residue diet and foods that will affect color and odor of urine. He was instructed on the written materials and given the list of supply ordering companies and how to order. All questions were answered and we will continue to follow.

## 2023-03-31 NOTE — CASE MANAGEMENT/SOCIAL WORK
Continued Stay Note  RADHA Ruffin     Patient Name: Anthony Moss  MRN: [id number]  Today's Date: 3/31/2023    Admit Date: 3/28/2023    Plan: HH vs snf. [facility] accepted and will need HH orders VS snf referral. Patient to give choice 3/31. Precert started by cm dept 3/30   Discharge Plan     Row Name 03/31/23 1618       Plan    Plan Comments [facility]- unable to accomodate insurance and isolation.  [facility] doesnt accept insurance.  updated pt at bedside;  agreeable to mass referral.  [facility] sent to [facility].               Discharge Codes    No documentation.               Expected Discharge Date and Time     Expected Discharge Date Expected Discharge Time    Apr 3, 2023             Marisa Marti RN

## 2023-03-31 NOTE — THERAPY TREATMENT NOTE
"Subjective: Pt agreeable to therapeutic plan of care.    Objective:     Bed mobility - Supervision  Transfers - CGA  Ambulation - 30 feet CGA with RW flexed posture. Assist with RW positioning for turns due to decreased safety      Vitals: WNL    Pain: 2 VAS   Location: Abdominal  Intervention for pain: Increased Activity    Education: Transfer Training and Gait Training    Assessment: Anthony Moss presents with functional mobility impairments which indicate the need for skilled intervention. Tolerating session today without incident. Will continue to follow and progress as tolerated.     Plan/Recommendations:   Moderate Intensity Therapy recommended post-acute care. This is recommended as therapy feels the patient would require 3-4 days per week and wouldn't tolerate \"3 hour daily\" rehab intensity. SNF would be the preferred choice. If the patient does not agree to SNF, arrange HH or OP depending on home bound status. If patient is medically complex, consider LTACH.. Pt requires no DME at discharge.     Pt desires Skilled Rehab placement at discharge. Pt cooperative; agreeable to therapeutic recommendations and plan of care.         Basic Mobility 6-click:  Rollin = Total, A lot = 2, A little = 3; 4 = None  Supine>Sit:   1 = Total, A lot = 2, A little = 3; 4 = None   Sit>Stand with arms:  1 = Total, A lot = 2, A little = 3; 4 = None  Bed>Chair:   1 = Total, A lot = 2, A little = 3; 4 = None  Ambulate in room:  1 = Total, A lot = 2, A little = 3; 4 = None  3-5 Steps with railin = Total, A lot = 2, A little = 3; 4 = None  Score: 16    Modified [place]: N/A = No pre-op stroke/TIA    Post-Tx Position: Supine with HOB Elevated, Alarms activated and Call light and personal items within reach  PPE: gloves and surgical mask      "

## 2023-03-31 NOTE — PROGRESS NOTES
[facility] Medicine Services Daily Progress Note    Patient Name: Anthony Moss  : 1955  MRN: [id number]  Primary Care Physician:  Reji Topete MD  Date of admission: 3/28/2023      Subjective      Chief Complaint: Constipation, weakness      Patient Reports     3/31/23: OOB to chair.  PT consulted.  Constipated.  Undecided whether SNF versus HH.  Lives alone.  DC IVF.  Nephrology and urology following. + ARAVIND drain with serosanguineous fluid.    Review of Systems   All other systems reviewed and are negative.         Objective      Vitals:   Temp:  [97.9 °F (36.6 °C)-98.3 °F (36.8 °C)] 98 °F (36.7 °C)  Heart Rate:  [86-98] 86  Resp:  [13-15] 15  BP: (148-157)/(82-91) 148/83    Physical Exam  HENT:      Head: Normocephalic.      Nose: Nose normal.   Eyes:      Extraocular Movements: Extraocular movements intact.      Pupils: Pupils are equal, round, and reactive to light.   Cardiovascular:      Rate and Rhythm: Normal rate.   Pulmonary:      Effort: Pulmonary effort is normal.      Breath sounds: Normal breath sounds.   Abdominal:      General: Bowel sounds are normal.      Comments: Ileal conduit with urine.  Left lower quadrant ARAVIND drain with serosanguineous fluid.   Musculoskeletal:         General: Normal range of motion.      Cervical back: Normal range of motion.   Skin:     General: Skin is warm.   Neurological:      Mental Status: He is alert. Mental status is at baseline.   Psychiatric:         Mood and Affect: Mood normal.             Result Review    Result Review:  I have personally reviewed the results from the time of this admission to 3/31/2023 15:05 EDT and agree with these findings:  [x]  Laboratory  []  Microbiology  [x]  Radiology  []  EKG/Telemetry   []  Cardiology/Vascular   []  Pathology  [x]  Old records  []  Other:      Wounds (last 24 hours)     LDA Wound     Row Name 23 0715 23 0404 23 0222       Wound 23 1305 abdomen  Incision    Wound - Properties Group Placement Date: 03/28/23  -AM Placement Time: 1305  -AM Location: abdomen  -AM Primary Wound Type: Incision  -AM    Dressing Appearance dry;intact  -AMA -- --    Closure STEFF  -AMA STEFF  -SR STEFF  -SR    Base dressing in place, unable to visualize  -AMA dressing in place, unable to visualize  -SR dressing in place, unable to visualize  -SR    Retired Wound - Properties Group Placement Date: 03/28/23  -AM Placement Time: 1305  -AM Location: abdomen  -AM Primary Wound Type: Incision  -AM    Retired Wound - Properties Group Date first assessed: 03/28/23  -AM Time first assessed: 1305  -AM Location: abdomen  -AM Primary Wound Type: Incision  -AM    Row Name 03/31/23 0008 03/31/23 0007 03/30/23 2241       Wound 03/28/23 1305 abdomen Incision    Wound - Properties Group Placement Date: 03/28/23  -AM Placement Time: 1305  -AM Location: abdomen  -AM Primary Wound Type: Incision  -AM    Closure STEFF  -SR STEFF  -SR STEFF  -SR    Base dressing in place, unable to visualize  -SR dressing in place, unable to visualize  -SR dressing in place, unable to visualize  -SR    Retired Wound - Properties Group Placement Date: 03/28/23  -AM Placement Time: 1305  -AM Location: abdomen  -AM Primary Wound Type: Incision  -AM    Retired Wound - Properties Group Date first assessed: 03/28/23  -AM Time first assessed: 1305  -AM Location: abdomen  -AM Primary Wound Type: Incision  -AM    Row Name 03/30/23 2056 03/30/23 1941          Wound 03/28/23 1305 abdomen Incision    Wound - Properties Group Placement Date: 03/28/23  -AM Placement Time: 1305  -AM Location: abdomen  -AM Primary Wound Type: Incision  -AM    Closure STEFF  -SR STEFF  -SR     Base dressing in place, unable to visualize  -SR --     Retired Wound - Properties Group Placement Date: 03/28/23  -AM Placement Time: 1305  -AM Location: abdomen  -AM Primary Wound Type: Incision  -AM    Retired Wound - Properties Group Date first assessed: 03/28/23  -AM Time  first assessed: 1305  -AM Location: abdomen  -AM Primary Wound Type: Incision  -AM          User Key  (r) = Recorded By, (t) = Taken By, (c) = Cosigned By    Initials Name Provider Type    AM Keli Mcdaniels, RN Registered Nurse    Susie Mosley LPN Licensed Nurse    Crys Auguste RN Registered Nurse                  Assessment & Plan      Brief Patient Summary:        enoxaparin, 40 mg, Subcutaneous, Daily  [START ON 4/1/2023] Naloxegol Oxalate, 25 mg, Oral, QAM  polyethylene glycol, 17 g, Oral, BID  sodium bicarbonate, 650 mg, Oral, TID  sodium chloride, 10 mL, Intravenous, Q12H  vitamin D3, 5,000 Units, Oral, Daily             Active Hospital Problems:  Active Hospital Problems    Diagnosis    • **Bladder cancer (HCC)    • Elevated serum creatinine    • Prostate cancer (HCC)    • History of alcohol use disorder      Prostate cancer  Bladder cancer  S/p cystectomy with ilial conduit urinary diversion 3/20/2023  CARLOS ALBERTO  Constipation  Hypertension  History of alcohol abuse    Plan:  -ARAVIND drain  -Renal function improved.  DC IV fluids.  -Appreciate nephrology input.  -Encourage OOB to chair.  -Patient undecided whether SNF versus HH  -Continue bowel regimen and will give Movantik      DVT prophylaxis:  Medical and mechanical DVT prophylaxis orders are present.    CODE STATUS:    Code Status (Patient has no pulse and is not breathing): CPR (Attempt to Resuscitate)  Medical Interventions (Patient has pulse or is breathing): Full Support      Disposition:  I expect patient to be discharged defer.    This patient has been examined wearing appropriate Personal Protective Equipment and discussed with nursing. 03/31/23      Electronically signed by James Ojeda DO, 03/31/23, 15:05 EDT.  [facility] Hospitalist Team

## 2023-03-31 NOTE — PROGRESS NOTES
"  [facility] DAILY PROGRESS NOTE    Patient Identification  Name: Anthony Moss  Age: 67 y.o.  Sex: male  :  1955  MRN: [id number]    Date: 3/31/2023             Subjective:  Interval History: Recovering well, no bowel function yet, tolerating clears    Objective:    Scheduled Meds:enoxaparin, 40 mg, Subcutaneous, Daily  sodium bicarbonate, 650 mg, Oral, TID  sodium chloride, 10 mL, Intravenous, Q12H  vitamin D3, 5,000 Units, Oral, Daily      Continuous Infusions:sodium chloride, 100 mL/hr, Last Rate: 100 mL/hr (23 1614)      PRN Meds:•  acetaminophen **OR** acetaminophen  •  docusate sodium  •  HYDROcodone-acetaminophen  •  HYDROmorphone **AND** naloxone  •  ondansetron **OR** ondansetron  •  sodium chloride  •  sodium chloride    Vital signs in last 24 hours:  Temp:  [97.9 °F (36.6 °C)-98.6 °F (37 °C)] 98.3 °F (36.8 °C)  Heart Rate:  [86-98] 86  Resp:  [13-16] 15  BP: (110-157)/(74-91) 154/91    Intake/Output:    Intake/Output Summary (Last 24 hours) at 3/31/2023 0755  Last data filed at 3/31/2023 0500  Gross per 24 hour   Intake --   Output 1210 ml   Net -1210 ml       Exam:  /91 (BP Location: Right arm, Patient Position: Lying)   Pulse 86   Temp 98.3 °F (36.8 °C) (Oral)   Resp 15   Ht 188 cm (74\")   Wt 94 kg (207 lb 3.7 oz)   SpO2 94%   BMI 26.61 kg/m²     General Appearance:    Alert, cooperative, no distress, appears stated age               Abdomen:     Soft, ND   :   Conduit with clear urine, ARAVIND sero>sang                Data Review:  All labs (24hrs):   Recent Results (from the past 24 hour(s))   POC Glucose Once    Collection Time: 23  8:47 PM    Specimen: Blood   Result Value Ref Range    Glucose 125 (H) 70 - 105 mg/dL   CBC Auto Differential    Collection Time: 23  5:29 AM    Specimen: Blood   Result Value Ref Range    WBC 11.20 (H) 3.40 - 10.80 10*3/mm3    RBC 2.89 (L) 4.14 - 5.80 10*6/mm3    Hemoglobin 8.3 (L) 13.0 - 17.7 g/dL    Hematocrit 24.7 (L) 37.5 - " 51.0 %    MCV 85.4 79.0 - 97.0 fL    MCH 28.6 26.6 - 33.0 pg    MCHC 33.4 31.5 - 35.7 g/dL    RDW 16.9 (H) 12.3 - 15.4 %    RDW-SD 53.8 37.0 - 54.0 fl    MPV 8.7 6.0 - 12.0 fL    Platelets 173 140 - 450 10*3/mm3    Neutrophil % 84.4 (H) 42.7 - 76.0 %    Lymphocyte % 6.8 (L) 19.6 - 45.3 %    Monocyte % 8.4 5.0 - 12.0 %    Eosinophil % 0.2 (L) 0.3 - 6.2 %    Basophil % 0.2 0.0 - 1.5 %    Neutrophils, Absolute 9.40 (H) 1.70 - 7.00 10*3/mm3    Lymphocytes, Absolute 0.80 0.70 - 3.10 10*3/mm3    Monocytes, Absolute 0.90 0.10 - 0.90 10*3/mm3    Eosinophils, Absolute 0.00 0.00 - 0.40 10*3/mm3    Basophils, Absolute 0.00 0.00 - 0.20 10*3/mm3    nRBC 0.3 (H) 0.0 - 0.2 /100 WBC   Comprehensive Metabolic Panel    Collection Time: 03/31/23  5:30 AM    Specimen: Blood   Result Value Ref Range    Glucose 147 (H) 65 - 99 mg/dL    BUN 24 (H) 8 - 23 mg/dL    Creatinine 1.53 (H) 0.76 - 1.27 mg/dL    Sodium 133 (L) 136 - 145 mmol/L    Potassium 4.2 3.5 - 5.2 mmol/L    Chloride 100 98 - 107 mmol/L    CO2 21.0 (L) 22.0 - 29.0 mmol/L    Calcium 8.5 (L) 8.6 - 10.5 mg/dL    Total Protein 6.2 6.0 - 8.5 g/dL    Albumin 3.0 (L) 3.5 - 5.2 g/dL    ALT (SGPT) 7 1 - 41 U/L    AST (SGOT) 18 1 - 40 U/L    Alkaline Phosphatase 126 (H) 39 - 117 U/L    Total Bilirubin 0.3 0.0 - 1.2 mg/dL    Globulin 3.2 gm/dL    A/G Ratio 0.9 g/dL    BUN/Creatinine Ratio 15.7 7.0 - 25.0    Anion Gap 12.0 5.0 - 15.0 mmol/L    eGFR 50.1 (L) >60.0 mL/min/1.73   Magnesium    Collection Time: 03/31/23  5:30 AM    Specimen: Blood   Result Value Ref Range    Magnesium 1.7 1.6 - 2.4 mg/dL      Imaging Results (Last 24 Hours)     ** No results found for the last 24 hours. **           Assessment:    Bladder cancer (HCC)    History of alcohol use disorder    Prostate cancer (HCC)    Elevated serum creatinine      Cystectomy and ileal conduit 3/28/2023    Plan:    Recovering well, continue clears until bowel function improves  Continue Lovenox and I-S, pain control and encourage  ambulation  Labs stable  ARAVIND drain with increased output we will send for creatinine level  Leave nephrostomy tubes capped in place for now      Xu Montero MD  [facility]  [address] [facility], [address]  [place] [id number]  Office: [phone]  Cell: [phone]  Also available via Epic Secure Chat  03/31/23  07:55 EDT

## 2023-03-31 NOTE — PROGRESS NOTES
PROGRESS NOTE      Patient Name: Anthony Moss  : 1955  MRN: [id number]  Primary Care Physician: Reji Topete MD  Date of admission: 3/28/2023    Patient Care Team:  Reji Topete MD as PCP - General (Family Medicine)        Subjective   Subjective:     Resting in bed.  Review of systems:  Review of Systems -   Neg.except weakness.    Allergies:  No Known Allergies    Objective   Exam:     Vital Signs  Temp:  [97.9 °F (36.6 °C)-98.3 °F (36.8 °C)] 98 °F (36.7 °C)  Heart Rate:  [86-98] 86  Resp:  [13-15] 15  BP: (110-157)/(74-91) 148/83  SpO2:  [92 %-98 %] 96 %  on   ;   Device (Oxygen Therapy): room air  Body mass index is 26.61 kg/m².    General:     male in no acute distress.    Head:      Normocephalic and atraumatic.    Eyes:      PERRL/EOM intact, conjunctiva and sclera clear with out nystagmus.    Neck:      No masses, thyromegaly,  trachea central with normal respiratory effort   Lungs:    Clear bilaterally to auscultation.    Heart:      Regular rate and rhythm, no murmur no gallop  Abd:        Soft, nontender, not distended, bowel sounds positive, no shifting dullness   Pulses:   Pulses palpable  Extr:        No cyanosis or clubbing--+ edema.    Neuro:    No focal deficits.   alert oriented x3  Skin:       Intact without lesions or rashes.    Psych:    Alert and cooperative; normal mood and affect; .      Results Review:  I have personally reviewed most recent Data :  CBC    Results from last 7 days   Lab Units 23  0529 23  2252 23  2052 23  1555 23  0658 23  2353 23  1449   WBC 10*3/mm3 11.20* 14.30*  --   --   --  14.10*  --    HEMOGLOBIN g/dL 8.3* 8.2* 8.0* 8.9* 7.7* 8.1*  --    HEMOGLOBIN, POC g/dL  --   --   --   --   --   --  9.5*   PLATELETS 10*3/mm3 173 162  --   --   --  163  --      CMP   Results from last 7 days   Lab Units 23  0530 23  1062 23  2353   SODIUM mmol/L 133* 133* 133*   POTASSIUM mmol/L  4.2 4.6 3.8   CHLORIDE mmol/L 100 101 100   CO2 mmol/L 21.0* 21.0* 23.0   BUN mg/dL 24* 25* 28*   CREATININE mg/dL 1.53* 1.82* 1.70*   GLUCOSE mg/dL 147* 151* 127*   ALBUMIN g/dL 3.0*  --   --    BILIRUBIN mg/dL 0.3  --   --    ALK PHOS U/L 126*  --   --    AST (SGOT) U/L 18  --   --    ALT (SGPT) U/L 7  --   --      ABG      No radiology results for the last day    Results for orders placed during the hospital encounter of 01/05/22    Adult Transthoracic Echo Complete w/ Color, Spectral and Contrast if Necessary Per Protocol    Interpretation Summary  · Left ventricular ejection fraction appears to be 56 - 60%.  · The right ventricular cavity is moderately dilated.  · No pericardial effusion noted    Scheduled Meds:enoxaparin, 40 mg, Subcutaneous, Daily  polyethylene glycol, 17 g, Oral, Daily  sodium bicarbonate, 650 mg, Oral, TID  sodium chloride, 10 mL, Intravenous, Q12H  vitamin D3, 5,000 Units, Oral, Daily      Continuous Infusions:   PRN Meds:•  acetaminophen **OR** acetaminophen  •  docusate sodium  •  HYDROcodone-acetaminophen  •  HYDROmorphone **AND** naloxone  •  ondansetron **OR** ondansetron  •  sodium chloride  •  sodium chloride    Assessment & Plan   Assessment and Plan:         Bladder cancer (HCC)    History of alcohol use disorder    Prostate cancer (HCC)    Elevated serum creatinine    ASSESSMENT:  • Acute kidney injury, stage III, status post radical cystectomy getting better hyperkalemia, secondary to CARLOS ALBERTO  • Metabolic acidosis secondary to CKD start low-dose sodium bicarbonate  • History of bladder cancer,   • History of alcohol use,  • Chronic back pain,using NSAID in the past  • B/l pneumonia.  Surprisingly, WBC stable,           Plan:      • At this time patient with history of bladder cancer status post cystectomy urine output is adequate  • Slight hyponatremia noted start patient on sodium bicarbonate that will help with the metabolic acidosis and hyponatremia  • Renal function  satisfactory,somewhat improved.  • Okay to be discharged when okay with urology  • Potassium level is acceptable  • Echocardiogram done last time showed ejection fraction 56 to 60%  • Repeat labs tomorrow morning          Electronically signed by Tom Tolbert MD,   [facility] kidney consultant  [phone]  3/31/2023  12:22 EDT

## 2023-03-31 NOTE — PLAN OF CARE
"Goal Outcome Evaluation:         Assessment: Anthony Moss presents with functional mobility impairments which indicate the need for skilled intervention. Tolerating session today without incident. Will continue to follow and progress as tolerated.      Plan/Recommendations:   Moderate Intensity Therapy recommended post-acute care. This is recommended as therapy feels the patient would require 3-4 days per week and wouldn't tolerate \"3 hour daily\" rehab intensity. SNF would be the preferred choice. If the patient does not agree to SNF, arrange HH or OP depending on home bound status. If patient is medically complex, consider LTACH.. Pt requires no DME at discharge.      Pt desires Skilled Rehab placement at discharge. Pt cooperative; agreeable to therapeutic recommendations and plan of care.         "

## 2023-04-01 ENCOUNTER — APPOINTMENT (OUTPATIENT)
Dept: GENERAL RADIOLOGY | Facility: HOSPITAL | Age: 68
End: 2023-04-01
Payer: MEDICARE

## 2023-04-01 LAB — PHOSPHATE SERPL-MCNC: 3.7 MG/DL (ref 2.5–4.5)

## 2023-04-01 PROCEDURE — 84100 ASSAY OF PHOSPHORUS: CPT | Performed by: HOSPITALIST

## 2023-04-01 PROCEDURE — 74018 RADEX ABDOMEN 1 VIEW: CPT

## 2023-04-01 PROCEDURE — 25010000002 ENOXAPARIN PER 10 MG: Performed by: UROLOGY

## 2023-04-01 PROCEDURE — 97110 THERAPEUTIC EXERCISES: CPT

## 2023-04-01 RX ADMIN — POLYETHYLENE GLYCOL 3350 17 G: 17 POWDER, FOR SOLUTION ORAL at 20:53

## 2023-04-01 RX ADMIN — NALOXEGOL OXALATE 25 MG: 12.5 TABLET, FILM COATED ORAL at 09:10

## 2023-04-01 RX ADMIN — Medication 10 ML: at 20:53

## 2023-04-01 RX ADMIN — Medication 5000 UNITS: at 09:10

## 2023-04-01 RX ADMIN — SODIUM BICARBONATE 650 MG: 650 TABLET ORAL at 09:10

## 2023-04-01 RX ADMIN — SODIUM BICARBONATE 650 MG: 650 TABLET ORAL at 16:30

## 2023-04-01 RX ADMIN — SODIUM BICARBONATE 650 MG: 650 TABLET ORAL at 20:53

## 2023-04-01 RX ADMIN — POLYETHYLENE GLYCOL 3350 17 G: 17 POWDER, FOR SOLUTION ORAL at 09:10

## 2023-04-01 RX ADMIN — ENOXAPARIN SODIUM 40 MG: 100 INJECTION SUBCUTANEOUS at 09:10

## 2023-04-01 NOTE — THERAPY TREATMENT NOTE
"Subjective: Pt agreeable to therapeutic plan of care. RN oked bed exs due to pt on ng to wall suction.    Objective:     Bed mobility - N/A or Not attempted.  Transfers - N/A or Not attempted.  Ambulation -  feet N/A or Not attempted.    Therapeutic Exercise - 2x 10 Reps Bilaterally AROM lying supine    Vitals: WNL    Pain: abdominal pain but did not rate  Intervention for pain: Repositioned, RN notified and Therapeutic Presence    Education: Provided education on the importance of mobility in the acute care setting and Verbal/Tactile Cues    Assessment: Anthony Moss presents with functional mobility impairments which indicate the need for skilled intervention. Tolerating session today without incident. Encouraged pt to continue with ap, as, gs and heel slides throughout the day.Has urostomy , ng to wall suction and joe. Plans on rehab at [place].Will continue to follow and progress as tolerated.     Plan/Recommendations:   Moderate Intensity Therapy recommended post-acute care. This is recommended as therapy feels the patient would require 3-4 days per week and wouldn't tolerate \"3 hour daily\" rehab intensity. SNF would be the preferred choice. If the patient does not agree to SNF, arrange HH or OP depending on home bound status. If patient is medically complex, consider LTACH.. Pt requires no DME at discharge.     Pt desires Skilled Rehab placement at discharge. Pt cooperative; agreeable to therapeutic recommendations and plan of care.     Post-Tx Position: Supine with HOB Elevated, Alarms activated and Call light and personal items within reach  PPE: gloves, surgical mask and gown  "

## 2023-04-01 NOTE — PROGRESS NOTES
[facility] Emergency Room Medicine Services Daily Progress Note    Patient Name: Anthony Moss  : 1955  MRN: [id number]  Primary Care Physician:  Reji Topete MD  Date of admission: 3/28/2023      Subjective      Chief Complaint: Constipation, weakness      Patient Reports     3/31/23: OOB to chair.  PT consulted.  Constipated.  Undecided whether SNF versus HH.  Lives alone.  DC IVF.  Nephrology and urology following. + ARAVIND drain with serosanguineous fluid.    23: Patient with multiple vomiting overnight NG tube placed with bilious discharge.  Repeat KUB.  Gaiting Movantik.    Review of Systems   All other systems reviewed and are negative.         Objective      Vitals:   Temp:  [96.5 °F (35.8 °C)-98.2 °F (36.8 °C)] 98.2 °F (36.8 °C)  Heart Rate:  [] 113  Resp:  [17-18] 17  BP: (130-155)/(88-99) 130/88    Physical Exam  HENT:      Head: Normocephalic.      Nose: Nose normal.   Eyes:      Extraocular Movements: Extraocular movements intact.      Pupils: Pupils are equal, round, and reactive to light.   Cardiovascular:      Rate and Rhythm: Normal rate.   Pulmonary:      Effort: Pulmonary effort is normal.      Breath sounds: Normal breath sounds.   Abdominal:      General: Bowel sounds are normal.      Comments: Ileal conduit with urine.  Left lower quadrant ARAVIND drain with serosanguineous fluid.   Musculoskeletal:         General: Normal range of motion.      Cervical back: Normal range of motion.   Skin:     General: Skin is warm.   Neurological:      Mental Status: He is alert. Mental status is at baseline.   Psychiatric:         Mood and Affect: Mood normal.             Result Review    Result Review:  I have personally reviewed the results from the time of this admission to 2023 14:45 EDT and agree with these findings:  [x]  Laboratory  []  Microbiology  [x]  Radiology  []  EKG/Telemetry   []  Cardiology/Vascular   []  Pathology  [x]  Old records  []  Other:      Wounds  (last 24 hours)     LDA Wound     Row Name 04/01/23 0815 03/31/23 1930          Wound 03/28/23 1305 abdomen Incision    Wound - Properties Group Placement Date: 03/28/23  -AM Placement Time: 1305  -AM Location: abdomen  -AM Primary Wound Type: Incision  -AM    Dressing Appearance dry;intact  -XC intact;dry  -LR     Closure STEFF  -XC STEFF  -LR     Base dressing in place, unable to visualize  -XC dressing in place, unable to visualize  -LR     Retired Wound - Properties Group Placement Date: 03/28/23  -AM Placement Time: 1305  -AM Location: abdomen  -AM Primary Wound Type: Incision  -AM    Retired Wound - Properties Group Date first assessed: 03/28/23  -AM Time first assessed: 1305  -AM Location: abdomen  -AM Primary Wound Type: Incision  -AM          User Key  (r) = Recorded By, (t) = Taken By, (c) = Cosigned By    Initials Name Provider Type    AM Keli Mcdaniels, RN Registered Nurse    LR Zahida Caldwell RN Registered Nurse    Avinash Boston RN Registered Nurse                  Assessment & Plan      Brief Patient Summary:        enoxaparin, 40 mg, Subcutaneous, Daily  Naloxegol Oxalate, 25 mg, Oral, QAM  polyethylene glycol, 17 g, Oral, BID  sodium bicarbonate, 650 mg, Oral, TID  sodium chloride, 10 mL, Intravenous, Q12H  vitamin D3, 5,000 Units, Oral, Daily             Active Hospital Problems:  Active Hospital Problems    Diagnosis    • **Bladder cancer    • Elevated serum creatinine    • Prostate cancer    • History of alcohol use disorder      Prostate cancer  Bladder cancer  S/p cystectomy with ilial conduit urinary diversion 3/20/2023  CARLOS ALBERTO  Ileus 3/31/2023  Constipation  Hypertension  History of alcohol abuse    Plan:  -NG tube placed for ileus 3/31/23  -ARAVIND drain  -Renal function improved.  DC IV fluids.  -Appreciate nephrology input.  -Encourage OOB to chair.  -Patient undecided whether SNF versus HH  -Continue bowel regimen and will give Movantik      DVT prophylaxis:  Medical and mechanical DVT  prophylaxis orders are present.    CODE STATUS:    Code Status (Patient has no pulse and is not breathing): CPR (Attempt to Resuscitate)  Medical Interventions (Patient has pulse or is breathing): Full Support      Disposition:  I expect patient to be discharged defer.    This patient has been examined wearing appropriate Personal Protective Equipment and discussed with nursing. 04/01/23      Electronically signed by James Ojeda DO, 04/01/23, 14:45 EDT.  [facility] Hospitalist Team

## 2023-04-01 NOTE — PLAN OF CARE
Goal Outcome Evaluation:  Plan of Care Reviewed With: patient        Progress: no change  Outcome Evaluation: Pt is stable and abed. Accidentally removed NGT while in Xray but had BM so per hospitalist may stay out as long as there is no further vomiting. Pain controlled. Will continue to monitor.

## 2023-04-01 NOTE — PLAN OF CARE
Goal Outcome Evaluation:      C/o minimal incisional pain, medicated per MAR. Patient also had vomited this shift. NP notified, placed patient on NPO with ice chips/sips of water with meds.  Urostomy with good urine output. Douglas tucker drain noted to moderate serousanguinous output. Will continue to monitor. Call light is within reach and is able to make his needs known.

## 2023-04-01 NOTE — NURSING NOTE
Had episodes of vomiting. Kept patient NPO with sips of meds and ice chips. Will continue to monitor.

## 2023-04-01 NOTE — CASE MANAGEMENT/SOCIAL WORK
Continued Stay Note  RADHA Ruffin     Patient Name: Anthony Moss  MRN: [id number]  Today's Date: 4/1/2023    Admit Date: 3/28/2023    Plan: [place] referral to ASC- precert to be started 4/2;  Accepted by [facility] if needed. (Would need orders)   Discharge Plan     Row Name 04/01/23 1738       Plan    Plan [place] referral to ASC- precert to be started 4/2;  Accepted by [facility] if needed. (Would need orders)    Plan Comments CM updated by Maia that patient's [facility] termed on 3/31.  CM spoke to pt and he stated that he now has [place] [facility] and provided new insurance card.  CM uploaded copy of card to media tab and left message for registration.  Also notified [facility] in [place].  Maia stated that her Team could start precert could be started 4/2.                Expected Discharge Date and Time     Expected Discharge Date Expected Discharge Time    Apr 3, 2023         Phone communication or documentation only - no physical contact with patient or family.      Marisa Marti, RN

## 2023-04-01 NOTE — PLAN OF CARE
Assessment: Anthony Moss presents with functional mobility impairments which indicate the need for skilled intervention. Tolerating session today without incident. Encouraged pt to continue with ap, as, gs and heel slides throughout the day.Has urostomy , ng to wall suction and joe. Plans on rehab at [place].Will continue to follow and progress as tolerated.

## 2023-04-01 NOTE — PROGRESS NOTES
POD 4  S/p cystectomy and ileal conduit 3/28/23    NG placed overnight,green fluid  KUB notes ileus  ARAVIND serosang  Cr ARAVIND 1.65  Incision staples intact, serosang drainage with standing per nursing, no erythema  Stoma pink, stents intact +UO  neph tubes capped    AVSS  WBC 10.3  Hgb 9.1  Cr 1.7    Plan:  Continue Lovenox  NPO until BS  Continue I/S  Keep ARAVIND, Cr 1.65  NG tube    Will follow

## 2023-04-01 NOTE — PROGRESS NOTES
PROGRESS NOTE      Patient Name: Anthony Moss  : 1955  MRN: [id number]  Primary Care Physician: Reji Topete MD  Date of admission: 3/28/2023    Patient Care Team:  Reji Topete MD as PCP - General (Family Medicine)        Subjective   Subjective:        Allergies:  No Known Allergies    Objective   Exam:     Vital Signs  Temp:  [96.5 °F (35.8 °C)-98.2 °F (36.8 °C)] 98.2 °F (36.8 °C)  Heart Rate:  [] 113  Resp:  [17-18] 17  BP: (130-155)/(88-99) 130/88  SpO2:  [90 %-95 %] 90 %  on   ;   Device (Oxygen Therapy): room air  Body mass index is 26.61 kg/m².     .      Results Review:  I have personally reviewed most recent Data :  CBC    Results from last 7 days   Lab Units 23  0529 23  1555 23  0658 23  2353   WBC 10*3/mm3 10.30 11.20* 14.30*  --   --   --  14.10*   HEMOGLOBIN g/dL 9.1* 8.3* 8.2* 8.0* 8.9* 7.7* 8.1*   PLATELETS 10*3/mm3 227 173 162  --   --   --  163     CMP   Results from last 7 days   Lab Units 23  0523  2353   SODIUM mmol/L 136 133* 133* 133*   POTASSIUM mmol/L 4.3 4.2 4.6 3.8   CHLORIDE mmol/L 99 100 101 100   CO2 mmol/L 27.0 21.0* 21.0* 23.0   BUN mg/dL 24* 24* 25* 28*   CREATININE mg/dL 1.70* 1.53* 1.82* 1.70*   GLUCOSE mg/dL 149* 147* 151* 127*   ALBUMIN g/dL  --  3.0*  --   --    BILIRUBIN mg/dL  --  0.3  --   --    ALK PHOS U/L  --  126*  --   --    AST (SGOT) U/L  --  18  --   --    ALT (SGPT) U/L  --  7  --   --      ABG      XR Abdomen KUB    Result Date: 2023  Impression: Dilated small and large bowel loops similar previous study likely due to ileus or less likely obstruction. Postsurgical changes. No significant change from previous x-ray. Electronically Signed: Neetu Braden  2023 2:03 PM EDT  Workstation ID: [id number]    XR Abdomen KUB    Result Date: 3/31/2023  Impression: 1. Gaseous small and large bowel distention  suggesting postoperative ileus. 2. Status post cystectomy with postoperative findings above. Electronically Signed: Kuldip Daniels  3/31/2023 8:35 PM EDT  Workstation ID: [id number]      Results for orders placed during the hospital encounter of 01/05/22    Adult Transthoracic Echo Complete w/ Color, Spectral and Contrast if Necessary Per Protocol    Interpretation Summary  · Left ventricular ejection fraction appears to be 56 - 60%.  · The right ventricular cavity is moderately dilated.  · No pericardial effusion noted    Scheduled Meds:enoxaparin, 40 mg, Subcutaneous, Daily  Naloxegol Oxalate, 25 mg, Oral, QAM  polyethylene glycol, 17 g, Oral, BID  sodium bicarbonate, 650 mg, Oral, TID  sodium chloride, 10 mL, Intravenous, Q12H  vitamin D3, 5,000 Units, Oral, Daily      Continuous Infusions:   PRN Meds:•  acetaminophen **OR** acetaminophen  •  docusate sodium  •  HYDROcodone-acetaminophen  •  HYDROmorphone **AND** naloxone  •  ondansetron **OR** ondansetron  •  senna-docusate sodium  •  sodium chloride  •  sodium chloride    Assessment & Plan   Assessment and Plan:         Bladder cancer    History of alcohol use disorder    Prostate cancer    Elevated serum creatinine    ASSESSMENT:  • Acute kidney injury, stage III, status post radical cystectomy getting better hyperkalemia, secondary to CARLOS ALBERTO  • Metabolic acidosis secondary to CKD start low-dose sodium bicarbonate  • History of bladder cancer,   • History of alcohol use,  • Chronic back pain,using NSAID in the past  • B/l pneumonia.  Surprisingly, WBC stable,           Plan:      •   bladder cancer status post cystectomy urine output is adequate  • Slight hyponatremia improved,cont. patient on sodium bicarbonate that will help with the metabolic acidosis and hyponatremia  • Renal function satisfactory,somewhat improved.  • Okay to be discharged when okay with urology  • Potassium level is acceptable  • Echocardiogram done last time showed ejection fraction 56 to  60%  • Repeat labs tomorrow morning          Electronically signed by Tom Tolbert MD,   [facility] kidney consultant  [phone]  4/1/2023  15:30 EDT

## 2023-04-02 ENCOUNTER — APPOINTMENT (OUTPATIENT)
Dept: GENERAL RADIOLOGY | Facility: HOSPITAL | Age: 68
End: 2023-04-02
Payer: MEDICARE

## 2023-04-02 LAB
ANION GAP SERPL CALCULATED.3IONS-SCNC: 13 MMOL/L (ref 5–15)
BACTERIA ISLT: NORMAL
BASOPHILS # BLD AUTO: 0 10*3/MM3 (ref 0–0.2)
BASOPHILS NFR BLD AUTO: 0.2 % (ref 0–1.5)
BUN SERPL-MCNC: 34 MG/DL (ref 8–23)
BUN/CREAT SERPL: 18.8 (ref 7–25)
CALCIUM SPEC-SCNC: 8.6 MG/DL (ref 8.6–10.5)
CHLORIDE SERPL-SCNC: 94 MMOL/L (ref 98–107)
CO2 SERPL-SCNC: 29 MMOL/L (ref 22–29)
CREAT SERPL-MCNC: 1.81 MG/DL (ref 0.76–1.27)
DEPRECATED RDW RBC AUTO: 53.8 FL (ref 37–54)
EGFRCR SERPLBLD CKD-EPI 2021: 41 ML/MIN/1.73
EOSINOPHIL # BLD AUTO: 0 10*3/MM3 (ref 0–0.4)
EOSINOPHIL NFR BLD AUTO: 0.5 % (ref 0.3–6.2)
ERYTHROCYTE [DISTWIDTH] IN BLOOD BY AUTOMATED COUNT: 17.3 % (ref 12.3–15.4)
GLUCOSE SERPL-MCNC: 132 MG/DL (ref 65–99)
HCT VFR BLD AUTO: 26 % (ref 37.5–51)
HGB BLD-MCNC: 9.1 G/DL (ref 13–17.7)
LYMPHOCYTES # BLD AUTO: 1.4 10*3/MM3 (ref 0.7–3.1)
LYMPHOCYTES NFR BLD AUTO: 15 % (ref 19.6–45.3)
MAGNESIUM SERPL-MCNC: 1.7 MG/DL (ref 1.6–2.4)
MCH RBC QN AUTO: 29.3 PG (ref 26.6–33)
MCHC RBC AUTO-ENTMCNC: 34.9 G/DL (ref 31.5–35.7)
MCV RBC AUTO: 83.8 FL (ref 79–97)
MONOCYTES # BLD AUTO: 1.2 10*3/MM3 (ref 0.1–0.9)
MONOCYTES NFR BLD AUTO: 12.5 % (ref 5–12)
NEUTROPHILS NFR BLD AUTO: 6.7 10*3/MM3 (ref 1.7–7)
NEUTROPHILS NFR BLD AUTO: 71.8 % (ref 42.7–76)
NRBC BLD AUTO-RTO: 0.1 /100 WBC (ref 0–0.2)
PHOSPHATE SERPL-MCNC: 4 MG/DL (ref 2.5–4.5)
PLATELET # BLD AUTO: 247 10*3/MM3 (ref 140–450)
PMV BLD AUTO: 8.1 FL (ref 6–12)
POTASSIUM SERPL-SCNC: 4 MMOL/L (ref 3.5–5.2)
RBC # BLD AUTO: 3.1 10*6/MM3 (ref 4.14–5.8)
SODIUM SERPL-SCNC: 136 MMOL/L (ref 136–145)
WBC NRBC COR # BLD: 9.4 10*3/MM3 (ref 3.4–10.8)

## 2023-04-02 PROCEDURE — 84100 ASSAY OF PHOSPHORUS: CPT | Performed by: HOSPITALIST

## 2023-04-02 PROCEDURE — 85025 COMPLETE CBC W/AUTO DIFF WBC: CPT | Performed by: HOSPITALIST

## 2023-04-02 PROCEDURE — 74018 RADEX ABDOMEN 1 VIEW: CPT

## 2023-04-02 PROCEDURE — 83735 ASSAY OF MAGNESIUM: CPT | Performed by: HOSPITALIST

## 2023-04-02 PROCEDURE — 80048 BASIC METABOLIC PNL TOTAL CA: CPT | Performed by: HOSPITALIST

## 2023-04-02 PROCEDURE — 25010000002 ENOXAPARIN PER 10 MG: Performed by: UROLOGY

## 2023-04-02 RX ORDER — SODIUM CHLORIDE, SODIUM LACTATE, POTASSIUM CHLORIDE, CALCIUM CHLORIDE 600; 310; 30; 20 MG/100ML; MG/100ML; MG/100ML; MG/100ML
75 INJECTION, SOLUTION INTRAVENOUS CONTINUOUS
Status: DISCONTINUED | OUTPATIENT
Start: 2023-04-02 | End: 2023-04-04

## 2023-04-02 RX ADMIN — HYDROCODONE BITARTRATE AND ACETAMINOPHEN 2 TABLET: 5; 325 TABLET ORAL at 10:32

## 2023-04-02 RX ADMIN — Medication 10 ML: at 08:29

## 2023-04-02 RX ADMIN — Medication 10 ML: at 20:50

## 2023-04-02 RX ADMIN — SODIUM CHLORIDE, POTASSIUM CHLORIDE, SODIUM LACTATE AND CALCIUM CHLORIDE 75 ML/HR: 600; 310; 30; 20 INJECTION, SOLUTION INTRAVENOUS at 21:52

## 2023-04-02 RX ADMIN — ENOXAPARIN SODIUM 40 MG: 100 INJECTION SUBCUTANEOUS at 08:29

## 2023-04-02 RX ADMIN — POLYETHYLENE GLYCOL 3350 17 G: 17 POWDER, FOR SOLUTION ORAL at 08:29

## 2023-04-02 RX ADMIN — Medication 5000 UNITS: at 08:29

## 2023-04-02 RX ADMIN — NALOXEGOL OXALATE 25 MG: 12.5 TABLET, FILM COATED ORAL at 08:29

## 2023-04-02 RX ADMIN — SODIUM BICARBONATE 650 MG: 650 TABLET ORAL at 08:29

## 2023-04-02 NOTE — PLAN OF CARE
Goal Outcome Evaluation:  Plan of Care Reviewed With: patient        Progress: no change  Outcome Evaluation: NGT replaced. Will continue to monitor.

## 2023-04-02 NOTE — PROGRESS NOTES
POD 5  S/p cystectomy and ileal conduit 3/28/23    Patient resting in bed, NAD  ARAVIND serosang output  Stoma with +UO, bilateral stents  Midline incision with dressing, staples c/d/i    NG capped  Per nursing water BM    AVSS  Cr 1.8  WBC 9.4  Hgb 9.1    KUB 4/1 - dilated small bowel loops similar to previous likely due to ileus or less likely obstruction  Post surgical changes. No significant change from previous XRAY.    Plan:   Diet per GI- ng still intact  I/S  Ambulate with assitance, OOB  Keep ARAVIND  Will follow

## 2023-04-02 NOTE — PROGRESS NOTES
[facility] Medicine Services Daily Progress Note    Patient Name: Anthony Moss  : 1955  MRN: [id number]  Primary Care Physician:  Reji Topete MD  Date of admission: 3/28/2023      Subjective      Chief Complaint: Constipation, weakness      Patient Reports     3/31/23: OOB to chair.  PT consulted.  Constipated.  Undecided whether SNF versus HH.  Lives alone.  DC IVF.  Nephrology and urology following. + ARAVIND drain with serosanguineous fluid.    23: Patient with multiple vomiting overnight NG tube placed with bilious discharge.  Repeat KUB.  Gaiting Movantik.    23: Patient having NGT removed.  Still feeling pretty rough at this time improved abdominal pain.  Somnolent as well.  Awaiting SNF placement.    Review of Systems   All other systems reviewed and are negative.         Objective      Vitals:   Temp:  [97.8 °F (36.6 °C)-98.2 °F (36.8 °C)] 97.8 °F (36.6 °C)  Heart Rate:  [103-114] 103  Resp:  [14-18] 14  BP: (107-130)/(73-88) 129/88    Physical Exam  HENT:      Head: Normocephalic.      Nose: Nose normal.   Eyes:      Extraocular Movements: Extraocular movements intact.      Pupils: Pupils are equal, round, and reactive to light.   Cardiovascular:      Rate and Rhythm: Normal rate.   Pulmonary:      Effort: Pulmonary effort is normal.      Breath sounds: Normal breath sounds.   Abdominal:      General: Bowel sounds are normal.      Comments: Ileal conduit with urine.  Left lower quadrant ARAVIND drain with serosanguineous fluid.   Musculoskeletal:         General: Normal range of motion.      Cervical back: Normal range of motion.   Skin:     General: Skin is warm.   Neurological:      Mental Status: He is alert. Mental status is at baseline.   Psychiatric:         Mood and Affect: Mood normal.             Result Review    Result Review:  I have personally reviewed the results from the time of this admission to 2023 12:29 EDT and agree with these  findings:  [x]  Laboratory  []  Microbiology  [x]  Radiology  []  EKG/Telemetry   []  Cardiology/Vascular   []  Pathology  [x]  Old records  []  Other:      Wounds (last 24 hours)     LDA Wound     Row Name 04/02/23 0805 04/02/23 0350 04/01/23 1950       Wound 03/28/23 1305 abdomen Incision    Wound - Properties Group Placement Date: 03/28/23  -AM Placement Time: 1305  -AM Location: abdomen  -AM Primary Wound Type: Incision  -AM    Dressing Appearance dry;intact  -XC -- intact;dry  -LR    Closure STEFF  -XC -- STEFF  -LR    Base dressing in place, unable to visualize  -XC dressing in place, unable to visualize  -LR --    Retired Wound - Properties Group Placement Date: 03/28/23  -AM Placement Time: 1305  -AM Location: abdomen  -AM Primary Wound Type: Incision  -AM    Retired Wound - Properties Group Date first assessed: 03/28/23  -AM Time first assessed: 1305  -AM Location: abdomen  -AM Primary Wound Type: Incision  -AM    Row Name 04/01/23 1600             Wound 03/28/23 1305 abdomen Incision    Wound - Properties Group Placement Date: 03/28/23  -AM Placement Time: 1305  -AM Location: abdomen  -AM Primary Wound Type: Incision  -AM    Dressing Appearance dry;intact  -XC      Closure STEFF  -XC      Base dressing in place, unable to visualize  -XC      Retired Wound - Properties Group Placement Date: 03/28/23  -AM Placement Time: 1305  -AM Location: abdomen  -AM Primary Wound Type: Incision  -AM    Retired Wound - Properties Group Date first assessed: 03/28/23  -AM Time first assessed: 1305  -AM Location: abdomen  -AM Primary Wound Type: Incision  -AM          User Key  (r) = Recorded By, (t) = Taken By, (c) = Cosigned By    Initials Name Provider Type    AM Keli Mcdaniels, RN Registered Nurse    Zahida Yarbrough, RN Registered Nurse    Avinash Boston RN Registered Nurse                  Assessment & Plan      Brief Patient Summary:        enoxaparin, 40 mg, Subcutaneous, Daily  Naloxegol Oxalate, 25 mg, Oral,  QAM  polyethylene glycol, 17 g, Oral, BID  sodium bicarbonate, 650 mg, Oral, TID  sodium chloride, 10 mL, Intravenous, Q12H  vitamin D3, 5,000 Units, Oral, Daily             Active Hospital Problems:  Active Hospital Problems    Diagnosis    • **Bladder cancer    • Elevated serum creatinine    • Prostate cancer    • History of alcohol use disorder      Prostate cancer  Bladder cancer  S/p cystectomy with ilial conduit urinary diversion 3/20/2023  CARLOS ALBERTO  Ileus 3/31/2023  Constipation  Hypertension  History of alcohol abuse    Plan:  -NG tube placed for ileus 3/31/23, now removed  -ARAVIND drain  -Renal function improved.  DC IV fluids.  -Appreciate nephrology input.  -Encourage OOB to chair.  -Patient undecided whether SNF versus HH  -Continue bowel regimen and will give Movantik      DVT prophylaxis:  Medical and mechanical DVT prophylaxis orders are present.    CODE STATUS:    Code Status (Patient has no pulse and is not breathing): CPR (Attempt to Resuscitate)  Medical Interventions (Patient has pulse or is breathing): Full Support      Disposition:  I expect patient to be discharged defer.    This patient has been examined wearing appropriate Personal Protective Equipment and discussed with nursing. 04/02/23      Electronically signed by Saima Roberto DO, 04/02/23, 12:29 EDT.  [facility] Hospitalist Team

## 2023-04-02 NOTE — PROGRESS NOTES
PROGRESS NOTE      Patient Name: Anthony Moss  : 1955  MRN: [id number]  Primary Care Physician: Reji Topete MD  Date of admission: 3/28/2023    Patient Care Team:  Reji Topete MD as PCP - General (Family Medicine)        Subjective   Subjective:     Resting in bed.  Review of systems:  Review of Systems -   Neg.except weakness.    Allergies:  No Known Allergies    Objective   Exam:     Vital Signs  Temp:  [97.8 °F (36.6 °C)-98.2 °F (36.8 °C)] 97.8 °F (36.6 °C)  Heart Rate:  [103-114] 103  Resp:  [14-18] 14  BP: (107-130)/(73-88) 129/88  SpO2:  [90 %-96 %] 94 %  on   ;   Device (Oxygen Therapy): room air  Body mass index is 26.61 kg/m².    General:     male in no acute distress.    Head:      Normocephalic and atraumatic.    Eyes:      PERRL/EOM intact, conjunctiva and sclera clear with out nystagmus.    Neck:      No masses, thyromegaly,  trachea central with normal respiratory effort   Lungs:    Clear bilaterally to auscultation.    Heart:      Regular rate and rhythm, no murmur no gallop  Abd:        Soft, nontender, not distended, bowel sounds positive, no shifting dullness   Pulses:   Pulses palpable  Extr:        No cyanosis or clubbing--+ edema.    Neuro:    No focal deficits.   alert oriented x3  Skin:       Intact without lesions or rashes.    Psych:    Alert and cooperative; normal mood and affect; .      Results Review:  I have personally reviewed most recent Data :  CBC    Results from last 7 days   Lab Units 23  0135 23  2240 23  0529 23  2252 23  2052 23  1555 23  0658 23  2353   WBC 10*3/mm3 9.40 10.30 11.20* 14.30*  --   --   --  14.10*   HEMOGLOBIN g/dL 9.1* 9.1* 8.3* 8.2* 8.0* 8.9* 7.7* 8.1*   PLATELETS 10*3/mm3 247 227 173 162  --   --   --  163     CMP   Results from last 7 days   Lab Units 23  0135 23  2240 23  0530 23  2252 23  2353   SODIUM mmol/L 136 136 133* 133* 133*    POTASSIUM mmol/L 4.0 4.3 4.2 4.6 3.8   CHLORIDE mmol/L 94* 99 100 101 100   CO2 mmol/L 29.0 27.0 21.0* 21.0* 23.0   BUN mg/dL 34* 24* 24* 25* 28*   CREATININE mg/dL 1.81* 1.70* 1.53* 1.82* 1.70*   GLUCOSE mg/dL 132* 149* 147* 151* 127*   ALBUMIN g/dL  --   --  3.0*  --   --    BILIRUBIN mg/dL  --   --  0.3  --   --    ALK PHOS U/L  --   --  126*  --   --    AST (SGOT) U/L  --   --  18  --   --    ALT (SGPT) U/L  --   --  7  --   --      ABG      XR Abdomen KUB    Result Date: 4/1/2023  Impression: Dilated small and large bowel loops similar previous study likely due to ileus or less likely obstruction. Postsurgical changes. No significant change from previous x-ray. Electronically Signed: Neetu Braden  4/1/2023 2:03 PM EDT  Workstation ID: [id number]    XR Abdomen KUB    Result Date: 3/31/2023  Impression: 1. Gaseous small and large bowel distention suggesting postoperative ileus. 2. Status post cystectomy with postoperative findings above. Electronically Signed: Kuldip Daniels  3/31/2023 8:35 PM EDT  Workstation ID: [id number]      Results for orders placed during the hospital encounter of 01/05/22    Adult Transthoracic Echo Complete w/ Color, Spectral and Contrast if Necessary Per Protocol    Interpretation Summary  · Left ventricular ejection fraction appears to be 56 - 60%.  · The right ventricular cavity is moderately dilated.  · No pericardial effusion noted    Scheduled Meds:enoxaparin, 40 mg, Subcutaneous, Daily  Naloxegol Oxalate, 25 mg, Oral, QAM  polyethylene glycol, 17 g, Oral, BID  sodium bicarbonate, 650 mg, Oral, TID  sodium chloride, 10 mL, Intravenous, Q12H  vitamin D3, 5,000 Units, Oral, Daily      Continuous Infusions:   PRN Meds:•  acetaminophen **OR** acetaminophen  •  docusate sodium  •  HYDROcodone-acetaminophen  •  HYDROmorphone **AND** naloxone  •  ondansetron **OR** ondansetron  •  senna-docusate sodium  •  sodium chloride  •  sodium chloride    Assessment & Plan   Assessment and Plan:          Bladder cancer    History of alcohol use disorder    Prostate cancer    Elevated serum creatinine    ASSESSMENT:  • Acute kidney injury, stage III, status post radical cystectomy getting better hyperkalemia, secondary to CARLOS ALBERTO  • Metabolic acidosis secondary to CKD start low-dose sodium bicarbonate  • History of bladder cancer,   • History of alcohol use,  • Chronic back pain,using NSAID in the past  • B/l pneumonia.  Surprisingly, WBC stable,           Plan:      • At this time patient with history of bladder cancer status post cystectomy urine output is adequate  • Hyponatremia improved.  • Discontinue oral bicarb..  • Renal function satisfactory,somewhat improved.  • Okay to be discharged when okay with urology  • Potassium level is acceptable  • Echocardiogram done last time showed ejection fraction 56 to 60%  • Repeat labs tomorrow morning          Electronically signed by Tom Tolbert MD,   [facility] kidney consultant  [phone]  4/2/2023  11:00 EDT

## 2023-04-02 NOTE — PLAN OF CARE
Goal Outcome Evaluation:         Had multiple watery stool throughout the shift. Denied any nausea or vomiting. No other complaints. Call light is within reach.

## 2023-04-03 ENCOUNTER — TELEPHONE (OUTPATIENT)
Dept: FAMILY MEDICINE CLINIC | Facility: CLINIC | Age: 68
End: 2023-04-03
Payer: MEDICARE

## 2023-04-03 PROBLEM — Z43.6 ATTENTION TO UROSTOMY: Status: ACTIVE | Noted: 2023-04-03

## 2023-04-03 LAB
ANION GAP SERPL CALCULATED.3IONS-SCNC: 15 MMOL/L (ref 5–15)
BASOPHILS # BLD AUTO: 0 10*3/MM3 (ref 0–0.2)
BASOPHILS NFR BLD AUTO: 0.3 % (ref 0–1.5)
BUN SERPL-MCNC: 33 MG/DL (ref 8–23)
BUN/CREAT SERPL: 20 (ref 7–25)
CALCIUM SPEC-SCNC: 8.5 MG/DL (ref 8.6–10.5)
CHLORIDE SERPL-SCNC: 96 MMOL/L (ref 98–107)
CO2 SERPL-SCNC: 28 MMOL/L (ref 22–29)
CREAT SERPL-MCNC: 1.65 MG/DL (ref 0.76–1.27)
DEPRECATED RDW RBC AUTO: 53.8 FL (ref 37–54)
EGFRCR SERPLBLD CKD-EPI 2021: 45.8 ML/MIN/1.73
EOSINOPHIL # BLD AUTO: 0.1 10*3/MM3 (ref 0–0.4)
EOSINOPHIL NFR BLD AUTO: 1.3 % (ref 0.3–6.2)
ERYTHROCYTE [DISTWIDTH] IN BLOOD BY AUTOMATED COUNT: 17 % (ref 12.3–15.4)
GLUCOSE SERPL-MCNC: 105 MG/DL (ref 65–99)
HCT VFR BLD AUTO: 25.9 % (ref 37.5–51)
HGB BLD-MCNC: 8.6 G/DL (ref 13–17.7)
LYMPHOCYTES # BLD AUTO: 2 10*3/MM3 (ref 0.7–3.1)
LYMPHOCYTES NFR BLD AUTO: 17.2 % (ref 19.6–45.3)
MAGNESIUM SERPL-MCNC: 1.9 MG/DL (ref 1.6–2.4)
MCH RBC QN AUTO: 28 PG (ref 26.6–33)
MCHC RBC AUTO-ENTMCNC: 33.1 G/DL (ref 31.5–35.7)
MCV RBC AUTO: 84.7 FL (ref 79–97)
MONOCYTES # BLD AUTO: 1.2 10*3/MM3 (ref 0.1–0.9)
MONOCYTES NFR BLD AUTO: 10.1 % (ref 5–12)
NEUTROPHILS NFR BLD AUTO: 71.1 % (ref 42.7–76)
NEUTROPHILS NFR BLD AUTO: 8.1 10*3/MM3 (ref 1.7–7)
NRBC BLD AUTO-RTO: 0.1 /100 WBC (ref 0–0.2)
PLATELET # BLD AUTO: 249 10*3/MM3 (ref 140–450)
PMV BLD AUTO: 8.5 FL (ref 6–12)
POTASSIUM SERPL-SCNC: 3.8 MMOL/L (ref 3.5–5.2)
RBC # BLD AUTO: 3.07 10*6/MM3 (ref 4.14–5.8)
SODIUM SERPL-SCNC: 139 MMOL/L (ref 136–145)
WBC NRBC COR # BLD: 11.4 10*3/MM3 (ref 3.4–10.8)

## 2023-04-03 PROCEDURE — 99221 1ST HOSP IP/OBS SF/LOW 40: CPT | Performed by: NURSE PRACTITIONER

## 2023-04-03 PROCEDURE — 25010000002 METOCLOPRAMIDE PER 10 MG: Performed by: SURGERY

## 2023-04-03 PROCEDURE — 97116 GAIT TRAINING THERAPY: CPT

## 2023-04-03 PROCEDURE — 25010000002 ENOXAPARIN PER 10 MG: Performed by: UROLOGY

## 2023-04-03 PROCEDURE — 99222 1ST HOSP IP/OBS MODERATE 55: CPT | Performed by: SURGERY

## 2023-04-03 PROCEDURE — 97535 SELF CARE MNGMENT TRAINING: CPT

## 2023-04-03 PROCEDURE — 83735 ASSAY OF MAGNESIUM: CPT | Performed by: INTERNAL MEDICINE

## 2023-04-03 PROCEDURE — 85025 COMPLETE CBC W/AUTO DIFF WBC: CPT | Performed by: HOSPITALIST

## 2023-04-03 PROCEDURE — 97110 THERAPEUTIC EXERCISES: CPT

## 2023-04-03 PROCEDURE — 80048 BASIC METABOLIC PNL TOTAL CA: CPT | Performed by: HOSPITALIST

## 2023-04-03 RX ORDER — METOCLOPRAMIDE HYDROCHLORIDE 5 MG/ML
10 INJECTION INTRAMUSCULAR; INTRAVENOUS EVERY 6 HOURS
Status: DISCONTINUED | OUTPATIENT
Start: 2023-04-03 | End: 2023-04-20 | Stop reason: HOSPADM

## 2023-04-03 RX ADMIN — METOCLOPRAMIDE HYDROCHLORIDE 10 MG: 5 INJECTION INTRAMUSCULAR; INTRAVENOUS at 16:58

## 2023-04-03 RX ADMIN — Medication 10 ML: at 20:57

## 2023-04-03 RX ADMIN — METOCLOPRAMIDE HYDROCHLORIDE 10 MG: 5 INJECTION INTRAMUSCULAR; INTRAVENOUS at 23:26

## 2023-04-03 RX ADMIN — POLYETHYLENE GLYCOL 3350 17 G: 17 POWDER, FOR SOLUTION ORAL at 20:56

## 2023-04-03 RX ADMIN — Medication 10 ML: at 09:01

## 2023-04-03 RX ADMIN — ENOXAPARIN SODIUM 40 MG: 100 INJECTION SUBCUTANEOUS at 08:58

## 2023-04-03 RX ADMIN — Medication 5000 UNITS: at 08:58

## 2023-04-03 NOTE — TELEPHONE ENCOUNTER
[facility] is calling to get orders from us for skilled nursing, and OT. He has a new ostomy.     I looked in the system and he was [facility] [place]. You have never seen him but [facility] as of 02/28/23 per the system change. We have never seen him at all.
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
## 2023-04-03 NOTE — PROGRESS NOTES
[facility] Medicine Services Daily Progress Note    Patient Name: Anthony Moss  : 1955  MRN: [id number]  Primary Care Physician:  Reji Topete MD  Date of admission: 3/28/2023      Subjective      Chief Complaint: Constipation, weakness      Patient Reports     3/31/23: OOB to chair.  PT consulted.  Constipated.  Undecided whether SNF versus HH.  Lives alone.  DC IVF.  Nephrology and urology following. + ARAVIND drain with serosanguineous fluid.    23: Patient with multiple vomiting overnight NG tube placed with bilious discharge.  Repeat KUB.  Gaiting Movantik.    23: Patient having NGT removed.  Still feeling pretty rough at this time improved abdominal pain.  Somnolent as well.  Awaiting SNF placement.    4/3/2023: Patient had NGT replaced due to likely ileus.  Having some surgical site TTP and soreness.  Still feeling pretty good overall, forward to improving his p.o. intake and getting started with the rest of his care plan.    Review of Systems   All other systems reviewed and are negative.         Objective      Vitals:   Temp:  [97 °F (36.1 °C)-98.2 °F (36.8 °C)] 97 °F (36.1 °C)  Heart Rate:  [] 96  Resp:  [12-24] 19  BP: (114-131)/(70-78) 124/70  Flow (L/min):  [2] 2    Physical Exam  HENT:      Head: Normocephalic.      Nose: Nose normal.   Eyes:      Extraocular Movements: Extraocular movements intact.      Pupils: Pupils are equal, round, and reactive to light.   Cardiovascular:      Rate and Rhythm: Normal rate.   Pulmonary:      Effort: Pulmonary effort is normal.      Breath sounds: Normal breath sounds.   Abdominal:      General: Bowel sounds are normal.      Comments: Ileal conduit with urine.  Left lower quadrant ARAVIND drain with serosanguineous fluid.   Musculoskeletal:         General: Normal range of motion.      Cervical back: Normal range of motion.   Skin:     General: Skin is warm.   Neurological:      Mental Status: He is alert. Mental  status is at baseline.   Psychiatric:         Mood and Affect: Mood normal.             Result Review    Result Review:  I have personally reviewed the results from the time of this admission to 4/3/2023 10:24 EDT and agree with these findings:  [x]  Laboratory  []  Microbiology  [x]  Radiology  []  EKG/Telemetry   []  Cardiology/Vascular   []  Pathology  [x]  Old records  []  Other:      Wounds (last 24 hours)     LDA Wound     Row Name 04/03/23 0820 04/02/23 1936          Wound 03/28/23 1305 abdomen Incision    Wound - Properties Group Placement Date: 03/28/23  -AM Placement Time: 1305  -AM Location: abdomen  -AM Primary Wound Type: Incision  -AM    Dressing Appearance -- intact;dry  -LR     Closure STEFF  -AS --     Base dressing in place, unable to visualize  -AS dressing in place, unable to visualize  -LR     Retired Wound - Properties Group Placement Date: 03/28/23  -AM Placement Time: 1305  -AM Location: abdomen  -AM Primary Wound Type: Incision  -AM    Retired Wound - Properties Group Date first assessed: 03/28/23  -AM Time first assessed: 1305  -AM Location: abdomen  -AM Primary Wound Type: Incision  -AM          User Key  (r) = Recorded By, (t) = Taken By, (c) = Cosigned By    Initials Name Provider Type    AM Keli Mcdaniels, RN Registered Nurse    LR Zahida Caldwell RN Registered Nurse    AS Katlyn Maguire RN Registered Nurse                  Assessment & Plan      Brief Patient Summary:        enoxaparin, 40 mg, Subcutaneous, Daily  Naloxegol Oxalate, 25 mg, Oral, QAM  polyethylene glycol, 17 g, Oral, BID  sodium chloride, 10 mL, Intravenous, Q12H  vitamin D3, 5,000 Units, Oral, Daily       lactated ringers, 75 mL/hr, Last Rate: 75 mL/hr (04/02/23 2152)         Active Hospital Problems:  Active Hospital Problems    Diagnosis    • **Bladder cancer    • Elevated serum creatinine    • Prostate cancer    • History of alcohol use disorder      Prostate cancer  Bladder cancer  S/p cystectomy with ilial  conduit urinary diversion 3/20/2023  CARLOS ALBERTO  Ileus 3/31/2023  Constipation  Hypertension  History of alcohol abuse    Plan:  -NG tube placed for ileus 3/31/23, removed on 4/1, replaced on 4/2  -ARAVIND drain  -Renal function improved.  Continue LR at 75 mL/h  -Appreciate nephrology input.  -Encourage OOB to chair.  -Patient undecided whether SNF versus HH  -Continue bowel regimen and will give Movantik      DVT prophylaxis:  Medical and mechanical DVT prophylaxis orders are present.    CODE STATUS:    Code Status (Patient has no pulse and is not breathing): CPR (Attempt to Resuscitate)  Medical Interventions (Patient has pulse or is breathing): Full Support      Disposition:  I expect patient to be discharged defer.    This patient has been examined wearing appropriate Personal Protective Equipment and discussed with nursing. 04/03/23      Electronically signed by Saima Roberto DO, 04/03/23, 10:24 EDT.  [facility] Hospitalist Team
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
## 2023-04-03 NOTE — PLAN OF CARE
"Assessment: Anthony Moss presents with functional mobility impairments which indicate the need for skilled intervention. Pt's gait distance limited by NG suction tubing. Pt shows good motivation to improve his mobility.  Pt making steady progress with gait and transfers.   Tolerating session today without incident. Will continue to follow and progress as tolerated.     Plan/Recommendations:   Moderate Intensity Therapy recommended post-acute care. This is recommended as therapy feels the patient would require 3-4 days per week and wouldn't tolerate \"3 hour daily\" rehab intensity. SNF would be the preferred choice. If the patient does not agree to SNF, arrange HH or OP depending on home bound status. If patient is medically complex, consider LTACH.. Pt requires no DME at discharge.     Pt desires Skilled Rehab placement at discharge. Pt cooperative; agreeable to therapeutic recommendations and plan of care.                      "

## 2023-04-03 NOTE — PROGRESS NOTES
PROGRESS NOTE      Patient Name: Anthony Moss  : 1955  MRN: [id number]  Primary Care Physician: Reji Topete MD  Date of admission: 3/28/2023    Patient Care Team:  Reji Topete MD as PCP - General (Family Medicine)        Subjective   Subjective:     Resting in bed.  Review of systems:  Review of Systems -   Neg.except weakness.    Allergies:  No Known Allergies    Objective   Exam:     Vital Signs  Temp:  [97 °F (36.1 °C)-98.2 °F (36.8 °C)] 97.6 °F (36.4 °C)  Heart Rate:  [] 103  Resp:  [17-24] 18  BP: (114-131)/(70-78) 130/76  SpO2:  [90 %-97 %] 97 %  on  Flow (L/min):  [2] 2;   Device (Oxygen Therapy): room air  Body mass index is 25.56 kg/m².    General:     male in no acute distress.    Head:      Normocephalic and atraumatic.    Eyes:      PERRL/EOM intact, conjunctiva and sclera clear with out nystagmus.    Neck:      No masses, thyromegaly,  trachea central with normal respiratory effort   Lungs:    Clear bilaterally to auscultation.    Heart:      Regular rate and rhythm, no murmur no gallop  Abd:        Soft, nontender, not distended, bowel sounds positive, no shifting dullness   Pulses:   Pulses palpable  Extr:        No cyanosis or clubbing--+ edema.    Neuro:    No focal deficits.   alert oriented x3  Skin:       Intact without lesions or rashes.    Psych:    Alert and cooperative; normal mood and affect; .      Results Review:  I have personally reviewed most recent Data :  CBC    Results from last 7 days   Lab Units 23  0118 23  0135 23  2240 23  0529 23  2252 23  2052 23  1555 23  0658 23  2353   WBC 10*3/mm3 11.40* 9.40 10.30 11.20* 14.30*  --   --   --  14.10*   HEMOGLOBIN g/dL 8.6* 9.1* 9.1* 8.3* 8.2* 8.0* 8.9*   < > 8.1*   PLATELETS 10*3/mm3 249 247 227 173 162  --   --   --  163    < > = values in this interval not displayed.     CMP   Results from last 7 days   Lab Units 23  0118  04/02/23  0135 03/31/23  2240 03/31/23  0530 03/29/23  2252 03/28/23  2353   SODIUM mmol/L 139 136 136 133* 133* 133*   POTASSIUM mmol/L 3.8 4.0 4.3 4.2 4.6 3.8   CHLORIDE mmol/L 96* 94* 99 100 101 100   CO2 mmol/L 28.0 29.0 27.0 21.0* 21.0* 23.0   BUN mg/dL 33* 34* 24* 24* 25* 28*   CREATININE mg/dL 1.65* 1.81* 1.70* 1.53* 1.82* 1.70*   GLUCOSE mg/dL 105* 132* 149* 147* 151* 127*   ALBUMIN g/dL  --   --   --  3.0*  --   --    BILIRUBIN mg/dL  --   --   --  0.3  --   --    ALK PHOS U/L  --   --   --  126*  --   --    AST (SGOT) U/L  --   --   --  18  --   --    ALT (SGPT) U/L  --   --   --  7  --   --      ABG      XR Abdomen KUB    Result Date: 4/2/2023  Impression: 1.Dilated loops of bowel, similar to mildly decreased compared to the prior exam, which could indicate ileus. 2.Enteric tube terminates in the right upper quadrant with tip likely in the distal stomach/proximal duodenum. 3.Postsurgical changes, as before. Electronically Signed: Fabio Holland  4/2/2023 9:26 PM EDT  Workstation ID: [id number]      Results for orders placed during the hospital encounter of 01/05/22    Adult Transthoracic Echo Complete w/ Color, Spectral and Contrast if Necessary Per Protocol    Interpretation Summary  · Left ventricular ejection fraction appears to be 56 - 60%.  · The right ventricular cavity is moderately dilated.  · No pericardial effusion noted    Scheduled Meds:enoxaparin, 40 mg, Subcutaneous, Daily  metoclopramide, 10 mg, Intravenous, Q6H  Naloxegol Oxalate, 25 mg, Oral, QAM  polyethylene glycol, 17 g, Oral, BID  sodium chloride, 10 mL, Intravenous, Q12H  vitamin D3, 5,000 Units, Oral, Daily      Continuous Infusions:lactated ringers, 75 mL/hr, Last Rate: 75 mL/hr (04/02/23 9462)      PRN Meds:•  acetaminophen **OR** acetaminophen  •  docusate sodium  •  HYDROcodone-acetaminophen  •  HYDROmorphone **AND** naloxone  •  ondansetron **OR** ondansetron  •  senna-docusate sodium  •  sodium chloride  •  sodium  chloride    Assessment & Plan   Assessment and Plan:         Bladder cancer    History of alcohol use disorder    Prostate cancer    Elevated serum creatinine    Attention to urostomy    ASSESSMENT:  • Acute kidney injury, stage III, status post radical cystectomy getting better hyperkalemia, secondary to CARLOS ALBERTO  • Metabolic acidosis secondary to CKD start low-dose sodium bicarbonate  • History of bladder cancer,   • History of alcohol use,  • Chronic back pain,using NSAID in the past  • B/l pneumonia.  Surprisingly, WBC stable,           Plan:      • At this time patient with history of bladder cancer status post cystectomy urine output is adequate  • Hyponatremia improved.  • Patient renal functions continue to get better  • Having some GI symptoms NG was inserted  • Okay to be discharged when okay with urology  • Potassium level is acceptable  • Echocardiogram done last time showed ejection fraction 56 to 60%  • Repeat labs tomorrow morning          Electronically signed by Jamie Houston MD,   [facility] kidney consultant  [phone]  4/3/2023  19:04 EDT

## 2023-04-03 NOTE — CASE MANAGEMENT/SOCIAL WORK
Continued Stay Note  RADHA Ruffin     Patient Name: Anthony Moss  MRN: [id number]  Today's Date: 4/3/2023    Admit Date: 3/28/2023    Plan: Accepted at [facility] with precert approved though 4/5   Discharge Plan     Row Name 04/03/23 1515       Plan    Plan Accepted at [facility] with precert approved though 4/5    Plan Comments Barriers to dc: Ileus, NG tube                   Expected Discharge Date and Time     Expected Discharge Date Expected Discharge Time    Apr 3, 2023             Katlyn Rutledge RN     PMD

## 2023-04-03 NOTE — THERAPY TREATMENT NOTE
"Subjective: Pt agreeable to therapeutic plan of care.    Objective:   Pt on wall suction for NG tube.     Bed mobility - CGA  Transfers - CGA and with rolling walker  Ambulation - 6 feet x 4 CGA and with rolling walker with assist from second person to manage equipment    Therapeutic Exercise - 10 Reps Bilaterally AROM supported sitting / chair and standing including sit<>stand x 3     Vitals: WNL    Pain: 3 VAS   Location: generalized  Intervention for pain: Repositioned, Increased Activity and Therapeutic Presence    Education: Provided education on the importance of mobility in the acute care setting, Transfer Training and Post-Op Precautions    Assessment: Anthony Moss presents with functional mobility impairments which indicate the need for skilled intervention. Pt's gait distance limited by NG suction tubing. Pt shows good motivation to improve his mobility.  Pt making steady progress with gait and transfers.   Tolerating session today without incident. Will continue to follow and progress as tolerated.     Plan/Recommendations:   Moderate Intensity Therapy recommended post-acute care. This is recommended as therapy feels the patient would require 3-4 days per week and wouldn't tolerate \"3 hour daily\" rehab intensity. SNF would be the preferred choice. If the patient does not agree to SNF, arrange HH or OP depending on home bound status. If patient is medically complex, consider LTACH.. Pt requires no DME at discharge.     Pt desires Skilled Rehab placement at discharge. Pt cooperative; agreeable to therapeutic recommendations and plan of care.         Basic Mobility 6-click:  Rollin = Total, A lot = 2, A little = 3; 4 = None  Supine>Sit:   1 = Total, A lot = 2, A little = 3; 4 = None   Sit>Stand with arms:  1 = Total, A lot = 2, A little = 3; 4 = None  Bed>Chair:   1 = Total, A lot = 2, A little = 3; 4 = None  Ambulate in room:  1 = Total, A lot = 2, A little = 3; 4 = None  3-5 Steps with " railin = Total, A lot = 2, A little = 3; 4 = None  Score: 16    Modified Thea: N/A = No pre-op stroke/TIA    Post-Tx Position: Up in Chair, Alarms activated and Call light and personal items within reach  PPE: gloves and surgical mask

## 2023-04-03 NOTE — PLAN OF CARE
Anthony Moss presents with ADL impairments affecting function including coordination, endurance / activity tolerance and strength.  Pt requires CGA for mobility tasks, but significantly limited with ADLs due to multiple medical lines. Demonstrated functioning below baseline abilities indicate the need for continued skilled intervention while inpatient. Tolerating session today without incident. Will continue to follow and progress as tolerated.

## 2023-04-03 NOTE — PLAN OF CARE
Goal Outcome Evaluation:  Plan of Care Reviewed With: patient        Progress: no change  Outcome Evaluation: Patient alert and oriented, VSS, NG tube in place.  Waiting on placement

## 2023-04-03 NOTE — THERAPY TREATMENT NOTE
"Subjective: Pt agreeable to therapeutic plan of care.  Cognition: oriented to Person, Place, Time and Situation    Objective:     Bed Mobility: CGA   Functional Transfers: CGA  Functional Ambulation: CGA    Lower Body Dressing: Max-A  ADL Position: supported sitting and supported standing  ADL Comments: limited by suprapubic catheter and multiple posterior drains.    Upper Body Dressing: Min-A  ADL Position: supported sitting  ADL Comments: limited by multiple medical lines    Therapeutic Exercise - 10 Reps Bilaterally UE AROM supported sitting / chair    Vitals: WNL    Pain: 0 VAS  Location: NA  Interventions for pain: Repositioned  Education: Provided education on the importance of mobility in the acute care setting, ADL training and Transfer Training      Assessment: Anthony Moss presents with ADL impairments affecting function including coordination, endurance / activity tolerance and strength.  Pt requires CGA for mobility tasks, but significantly limited with ADLs due to multiple medical lines. Demonstrated functioning below baseline abilities indicate the need for continued skilled intervention while inpatient. Tolerating session today without incident. Will continue to follow and progress as tolerated.     Plan/Recommendations:   Moderate Intensity Therapy recommended post-acute care. This is recommended as therapy feels the patient would require 3-4 days per week and wouldn't tolerate \"3 hour daily\" rehab intensity. SNF would be the preferred choice. If the patient does not agree to SNF, arrange HH or OP depending on home bound status. If patient is medically complex, consider LTACH.. Pt requires no DME at discharge.     Pt desires Skilled Rehab placement at discharge. Pt cooperative; agreeable to therapeutic recommendations and plan of care.     Post-Tx Position: Up in Chair, Alarms activated and Call light and personal items within reach  PPE: gloves and surgical mask    "

## 2023-04-03 NOTE — PLAN OF CARE
Goal Outcome Evaluation:     C/o pain on his lower abdomen. Nasogastric tube in place and had about 2100cc of brownish/greenish output for the last 24hrs. Had a loose, dark brown/green bowel movement this morning. ARAVIND drain with serous sanguinous output.
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
## 2023-04-04 LAB
ANION GAP SERPL CALCULATED.3IONS-SCNC: 12 MMOL/L (ref 5–15)
BUN SERPL-MCNC: 28 MG/DL (ref 8–23)
BUN/CREAT SERPL: 20.9 (ref 7–25)
CALCIUM SPEC-SCNC: 8.2 MG/DL (ref 8.6–10.5)
CHLORIDE SERPL-SCNC: 97 MMOL/L (ref 98–107)
CO2 SERPL-SCNC: 30 MMOL/L (ref 22–29)
CREAT SERPL-MCNC: 1.34 MG/DL (ref 0.76–1.27)
DEPRECATED RDW RBC AUTO: 50.3 FL (ref 37–54)
EGFRCR SERPLBLD CKD-EPI 2021: 58.8 ML/MIN/1.73
ERYTHROCYTE [DISTWIDTH] IN BLOOD BY AUTOMATED COUNT: 16.6 % (ref 12.3–15.4)
GLUCOSE SERPL-MCNC: 104 MG/DL (ref 65–99)
HCT VFR BLD AUTO: 26 % (ref 37.5–51)
HGB BLD-MCNC: 8.3 G/DL (ref 13–17.7)
MAGNESIUM SERPL-MCNC: 1.9 MG/DL (ref 1.6–2.4)
MCH RBC QN AUTO: 27.7 PG (ref 26.6–33)
MCHC RBC AUTO-ENTMCNC: 32.1 G/DL (ref 31.5–35.7)
MCV RBC AUTO: 86.2 FL (ref 79–97)
PHOSPHATE SERPL-MCNC: 3.7 MG/DL (ref 2.5–4.5)
PLATELET # BLD AUTO: 267 10*3/MM3 (ref 140–450)
PMV BLD AUTO: 8.1 FL (ref 6–12)
POTASSIUM SERPL-SCNC: 3.3 MMOL/L (ref 3.5–5.2)
POTASSIUM SERPL-SCNC: 3.8 MMOL/L (ref 3.5–5.2)
RBC # BLD AUTO: 3.02 10*6/MM3 (ref 4.14–5.8)
SODIUM SERPL-SCNC: 139 MMOL/L (ref 136–145)
WBC NRBC COR # BLD: 13.2 10*3/MM3 (ref 3.4–10.8)

## 2023-04-04 PROCEDURE — 85027 COMPLETE CBC AUTOMATED: CPT | Performed by: INTERNAL MEDICINE

## 2023-04-04 PROCEDURE — 80048 BASIC METABOLIC PNL TOTAL CA: CPT | Performed by: HOSPITALIST

## 2023-04-04 PROCEDURE — 97116 GAIT TRAINING THERAPY: CPT

## 2023-04-04 PROCEDURE — 84100 ASSAY OF PHOSPHORUS: CPT | Performed by: INTERNAL MEDICINE

## 2023-04-04 PROCEDURE — 97110 THERAPEUTIC EXERCISES: CPT

## 2023-04-04 PROCEDURE — 25010000002 METOCLOPRAMIDE PER 10 MG: Performed by: SURGERY

## 2023-04-04 PROCEDURE — 83735 ASSAY OF MAGNESIUM: CPT | Performed by: INTERNAL MEDICINE

## 2023-04-04 PROCEDURE — 25010000002 ENOXAPARIN PER 10 MG: Performed by: UROLOGY

## 2023-04-04 PROCEDURE — 84132 ASSAY OF SERUM POTASSIUM: CPT | Performed by: INTERNAL MEDICINE

## 2023-04-04 PROCEDURE — 25010000002 HYDROMORPHONE 1 MG/ML SOLUTION: Performed by: INTERNAL MEDICINE

## 2023-04-04 PROCEDURE — 99232 SBSQ HOSP IP/OBS MODERATE 35: CPT | Performed by: SURGERY

## 2023-04-04 PROCEDURE — 0 POTASSIUM CHLORIDE 10 MEQ/100ML SOLUTION: Performed by: INTERNAL MEDICINE

## 2023-04-04 RX ORDER — LACTULOSE 10 G/15ML
300 SOLUTION ORAL DAILY PRN
Status: DISCONTINUED | OUTPATIENT
Start: 2023-04-04 | End: 2023-04-20 | Stop reason: HOSPADM

## 2023-04-04 RX ORDER — SODIUM CHLORIDE 9 MG/ML
100 INJECTION, SOLUTION INTRAVENOUS CONTINUOUS
Status: DISCONTINUED | OUTPATIENT
Start: 2023-04-04 | End: 2023-04-07

## 2023-04-04 RX ORDER — POTASSIUM CHLORIDE 7.45 MG/ML
10 INJECTION INTRAVENOUS
Status: DISCONTINUED | OUTPATIENT
Start: 2023-04-04 | End: 2023-04-08

## 2023-04-04 RX ORDER — POTASSIUM CHLORIDE 20 MEQ/1
40 TABLET, EXTENDED RELEASE ORAL AS NEEDED
Status: DISCONTINUED | OUTPATIENT
Start: 2023-04-04 | End: 2023-04-08

## 2023-04-04 RX ORDER — POTASSIUM CHLORIDE 1.5 G/1.77G
40 POWDER, FOR SOLUTION ORAL AS NEEDED
Status: DISCONTINUED | OUTPATIENT
Start: 2023-04-04 | End: 2023-04-08

## 2023-04-04 RX ADMIN — ENOXAPARIN SODIUM 40 MG: 100 INJECTION SUBCUTANEOUS at 08:39

## 2023-04-04 RX ADMIN — Medication 10 ML: at 20:31

## 2023-04-04 RX ADMIN — METOCLOPRAMIDE HYDROCHLORIDE 10 MG: 5 INJECTION INTRAMUSCULAR; INTRAVENOUS at 11:04

## 2023-04-04 RX ADMIN — METOCLOPRAMIDE HYDROCHLORIDE 10 MG: 5 INJECTION INTRAMUSCULAR; INTRAVENOUS at 05:22

## 2023-04-04 RX ADMIN — POLYETHYLENE GLYCOL 3350 17 G: 17 POWDER, FOR SOLUTION ORAL at 08:39

## 2023-04-04 RX ADMIN — METOCLOPRAMIDE HYDROCHLORIDE 10 MG: 5 INJECTION INTRAMUSCULAR; INTRAVENOUS at 22:32

## 2023-04-04 RX ADMIN — METOCLOPRAMIDE HYDROCHLORIDE 10 MG: 5 INJECTION INTRAMUSCULAR; INTRAVENOUS at 17:07

## 2023-04-04 RX ADMIN — POTASSIUM CHLORIDE 10 MEQ: 7.46 INJECTION, SOLUTION INTRAVENOUS at 11:06

## 2023-04-04 RX ADMIN — POTASSIUM CHLORIDE 10 MEQ: 7.46 INJECTION, SOLUTION INTRAVENOUS at 13:56

## 2023-04-04 RX ADMIN — Medication 5000 UNITS: at 08:40

## 2023-04-04 RX ADMIN — HYDROMORPHONE HYDROCHLORIDE 1 MG: 1 INJECTION, SOLUTION INTRAMUSCULAR; INTRAVENOUS; SUBCUTANEOUS at 11:05

## 2023-04-04 RX ADMIN — NALOXEGOL OXALATE 25 MG: 12.5 TABLET, FILM COATED ORAL at 10:09

## 2023-04-04 RX ADMIN — POTASSIUM CHLORIDE 10 MEQ: 7.46 INJECTION, SOLUTION INTRAVENOUS at 10:08

## 2023-04-04 RX ADMIN — POTASSIUM CHLORIDE 10 MEQ: 7.46 INJECTION, SOLUTION INTRAVENOUS at 08:48

## 2023-04-04 RX ADMIN — POLYETHYLENE GLYCOL 3350 17 G: 17 POWDER, FOR SOLUTION ORAL at 20:31

## 2023-04-04 RX ADMIN — Medication 10 ML: at 08:49
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
## 2023-04-04 NOTE — PROGRESS NOTES
[facility] Medicine Services Daily Progress Note    Patient Name: Anthony Moss  : 1955  MRN: [id number]  Primary Care Physician:  Reji Topete MD  Date of admission: 3/28/2023      Subjective      Chief Complaint: Constipation, weakness      Patient Reports     3/31/23: OOB to chair.  PT consulted.  Constipated.  Undecided whether SNF versus HH.  Lives alone.  DC IVF.  Nephrology and urology following. + ARAVIND drain with serosanguineous fluid.    23: Patient with multiple vomiting overnight NG tube placed with bilious discharge.  Repeat KUB.  Gaiting Movantik.    23: Patient having NGT removed.  Still feeling pretty rough at this time improved abdominal pain.  Somnolent as well.  Awaiting SNF placement.    4/3/2023: Patient had NGT replaced due to likely ileus.  Having some surgical site TTP and soreness.  Still feeling pretty good overall, forward to improving his p.o. intake and getting started with the rest of his care plan.    23: Still has NGT in place.  Having some more feelings of hunger, agreeable to current management.  Also reporting constipation.    Review of Systems   All other systems reviewed and are negative.         Objective      Vitals:   Temp:  [97.2 °F (36.2 °C)-98 °F (36.7 °C)] 97.2 °F (36.2 °C)  Heart Rate:  [] 95  Resp:  [16-23] 20  BP: (117-128)/(65-73) 128/73    Physical Exam  HENT:      Head: Normocephalic.      Nose: Nose normal.   Eyes:      Extraocular Movements: Extraocular movements intact.      Pupils: Pupils are equal, round, and reactive to light.   Cardiovascular:      Rate and Rhythm: Normal rate.   Pulmonary:      Effort: Pulmonary effort is normal.      Breath sounds: Normal breath sounds.   Abdominal:      General: Bowel sounds are normal.      Comments: Ileal conduit with urine.  Left lower quadrant ARAVIND drain with serosanguineous fluid.   Musculoskeletal:         General: Normal range of motion.      Cervical back:  Normal range of motion.   Skin:     General: Skin is warm.   Neurological:      Mental Status: He is alert. Mental status is at baseline.   Psychiatric:         Mood and Affect: Mood normal.             Result Review    Result Review:  I have personally reviewed the results from the time of this admission to 4/4/2023 12:55 EDT and agree with these findings:  [x]  Laboratory  []  Microbiology  [x]  Radiology  []  EKG/Telemetry   []  Cardiology/Vascular   []  Pathology  [x]  Old records  []  Other:      Wounds (last 24 hours)     LDA Wound     Row Name 04/04/23 0826 04/03/23 2042          Wound 03/28/23 1305 abdomen Incision    Wound - Properties Group Placement Date: 03/28/23  -AM Placement Time: 1305  -AM Location: abdomen  -AM Primary Wound Type: Incision  -AM    Dressing Appearance dry;intact  -AS dry;intact;no drainage  -     Closure STEFF  -AS Staples;Open to air  -     Base dressing in place, unable to visualize  -AS pink  -JM     Retired Wound - Properties Group Placement Date: 03/28/23  -AM Placement Time: 1305  -AM Location: abdomen  -AM Primary Wound Type: Incision  -AM    Retired Wound - Properties Group Date first assessed: 03/28/23  -AM Time first assessed: 1305  -AM Location: abdomen  -AM Primary Wound Type: Incision  -AM          User Key  (r) = Recorded By, (t) = Taken By, (c) = Cosigned By    Initials Name Provider Type    Keli Cheek RN Registered Nurse    AS Katlyn Maguire RN Registered Nurse    Krista Garza LPN Licensed Nurse                  Assessment & Plan      Brief Patient Summary:        enoxaparin, 40 mg, Subcutaneous, Daily  metoclopramide, 10 mg, Intravenous, Q6H  Naloxegol Oxalate, 25 mg, Oral, QAM  polyethylene glycol, 17 g, Oral, BID  sodium chloride, 10 mL, Intravenous, Q12H  vitamin D3, 5,000 Units, Oral, Daily       lactated ringers, 75 mL/hr, Last Rate: 75 mL/hr (04/02/23 2152)         Active Hospital Problems:  Active Hospital Problems    Diagnosis     • **Bladder cancer    • Attention to urostomy    • Elevated serum creatinine    • Prostate cancer    • History of alcohol use disorder      Prostate cancer  Bladder cancer  S/p cystectomy with ilial conduit urinary diversion 3/20/2023  CARLOS ALBERTO  Ileus 3/31/2023  Constipation  Hypertension  History of alcohol abuse    Plan:  -NG tube placed for ileus 3/31/23, removed on 4/1, replaced on 4/2, still having output.  Continue to monitor.  -ARAVIND drain  -Renal function improved.  Continue LR at 75 mL/h  -Appreciate nephrology input.  -Encourage OOB to chair.  -Patient undecided whether SNF versus   -Continue bowel regimen and will give Movantik  -- Given Chloraseptic spray, lactulose enema      DVT prophylaxis:  Medical and mechanical DVT prophylaxis orders are present.    CODE STATUS:    Code Status (Patient has no pulse and is not breathing): CPR (Attempt to Resuscitate)  Medical Interventions (Patient has pulse or is breathing): Full Support      Disposition:  I expect patient to be discharged defer.    This patient has been examined wearing appropriate Personal Protective Equipment and discussed with nursing. 04/04/23      Electronically signed by Saima Roberto DO, 04/04/23, 12:55 EDT.  [facility] Hospitalist Team

## 2023-04-04 NOTE — PLAN OF CARE
Goal Outcome Evaluation:  Plan of Care Reviewed With: patient        Progress: no change  Outcome Evaluation: Patient without complaints pain.  NG noted to right nare secured at 71.  Continues with NG to LIWS.  Tolerating ice chips and being off suction for medication administration without issue.  Ileostomy continous becomes disconnect from bag, secured connection with tape.  ARAVIND drain noted in place, patent to bulb suction. Midline incision noted with staples, SKIP.  Vital signs stable. Call light within reach. Care plan on going.

## 2023-04-04 NOTE — PLAN OF CARE
"Assessment: Anthony Moss presents with functional mobility impairments which indicate the need for skilled intervention. Tolerating session today without incident. Will continue to follow and progress as tolerated.      Plan/Recommendations:   Moderate Intensity Therapy recommended post-acute care. This is recommended as therapy feels the patient would require 3-4 days per week and wouldn't tolerate \"3 hour daily\" rehab intensity. SNF would be the preferred choice. If the patient does not agree to SNF, arrange HH or OP depending on home bound status. If patient is medically complex, consider LTACH.. Pt requires no DME at discharge.      Pt desires Skilled Rehab placement at discharge. Pt cooperative; agreeable to therapeutic recommendations and plan of care.                        "

## 2023-04-04 NOTE — NURSING NOTE
Bag of candy/gum noted at pts bedside.  Education provided in regards to being NPO for exception of ice chips and sips of water with medication.  Additional education provided in regards to consuming fruit chew candy while being NPO with NG in place.  Bag removed from pts bedside. Will pass on to oncoming shift that pt would like to speak with MD in regards to diet status and NG status.  Care Coordinator aware of situation/conversation.

## 2023-04-04 NOTE — NURSING NOTE
67-year-old male who underwent a radical cystectomy with ileal conduit formation on March 28.  Patient seen today for follow-up ileal conduit.  The pouch that was applied yesterday is maintaining staff did have some difficulty with keeping the tubing attached to the bedside bag but the pouch itself is intact.  Patient is dozing off at intervals.  He is really not able to engage in any type of education today.  He states that he is having a difficult day and that he does not feel well.  We will continue to follow

## 2023-04-04 NOTE — THERAPY TREATMENT NOTE
"Subjective: Pt agreeable to therapeutic plan of care.    Objective:     Bed mobility - CGA with use of pillow for abdominal sparing  Transfers - Min-A and with rolling walker  Ambulation - 8 feet Min-A and with rolling walker plus second person to assist with managing lines    Therapeutic Exercise - 10 Reps Bilaterally AROM supported sitting / chair    Vitals: WNL    Pain: 5 VAS   Location:abdomen  Intervention for pain: Repositioned, RN provided medication and Increased Activity    Education: Provided education on the importance of mobility in the acute care setting and Transfer Training    Assessment: Anthony Moss presents with functional mobility impairments which indicate the need for skilled intervention. Tolerating session today without incident. Will continue to follow and progress as tolerated.     Plan/Recommendations:   Moderate Intensity Therapy recommended post-acute care. This is recommended as therapy feels the patient would require 3-4 days per week and wouldn't tolerate \"3 hour daily\" rehab intensity. SNF would be the preferred choice. If the patient does not agree to SNF, arrange HH or OP depending on home bound status. If patient is medically complex, consider LTACH.. Pt requires no DME at discharge.     Pt desires Skilled Rehab placement at discharge. Pt cooperative; agreeable to therapeutic recommendations and plan of care.         Basic Mobility 6-click:  Rollin = Total, A lot = 2, A little = 3; 4 = None  Supine>Sit:   1 = Total, A lot = 2, A little = 3; 4 = None   Sit>Stand with arms:  1 = Total, A lot = 2, A little = 3; 4 = None  Bed>Chair:   1 = Total, A lot = 2, A little = 3; 4 = None  Ambulate in room:  1 = Total, A lot = 2, A little = 3; 4 = None  3-5 Steps with railin = Total, A lot = 2, A little = 3; 4 = None  Score: 15    Modified Thea: N/A = No pre-op stroke/TIA    Post-Tx Position: Up in Chair, Alarms activated and Call light and personal items within " reach  PPE: gloves, surgical mask and gown

## 2023-04-04 NOTE — PROGRESS NOTES
GENERAL SURGERY PROGRESS NOTE    4/4/2023   LOS: 7 days   Patient Care Team:  Reji Topete MD as PCP - General (Family Medicine)    CYSTECTOMY RADICAL  3/28/2023  Chief Complaint: Postoperative ileus    Subjective   HPI: Patient is a 67 y.o. male presents with locally advanced bladder cancer and subsequently underwent a radical cystectomy and prostatectomy with ileal conduit placement on 3/28/2023.  Postoperatively, the patient has done okay, but reports that he has been having multiple loose bowel movements and high NG tube output with persistent nausea.  General surgery was consulted to evaluate and help with treatment with postoperative ileus versus bowel obstruction.     Interval History:   Continued large amount of nasogastric tube output overnight.  Character of output appears to be clearing and becoming less bilious however.  Reports passing flatus, and denies bowel movements    Objective     Vital Signs  Temp:  [97.9 °F (36.6 °C)-98 °F (36.7 °C)] 98 °F (36.7 °C)  Heart Rate:  [] 105  Resp:  [16-23] 23  BP: (117-126)/(65-68) 117/68    Physical Exam  Vitals reviewed.   Constitutional:       General: He is not in acute distress.     Appearance: He is not ill-appearing.   HENT:      Nose:      Comments: NG tube in place with clearing bilious output  Pulmonary:      Effort: Pulmonary effort is normal. No respiratory distress.   Abdominal:      General: There is no distension.      Palpations: Abdomen is soft.      Comments: Appropriately tender, staples in place.   Musculoskeletal:         General: No swelling. Normal range of motion.   Skin:     General: Skin is warm and dry.      Coloration: Skin is not jaundiced.   Neurological:      General: No focal deficit present.      Mental Status: He is alert and oriented to person, place, and time.          Results Review:    Lab Results (last 24 hours)     Procedure Component Value Units Date/Time    Magnesium [[id number]]  (Normal) Collected:  04/04/23 0309    Specimen: Blood Updated: 04/04/23 0518     Magnesium 1.9 mg/dL     Basic Metabolic Panel [[id number]]  (Abnormal) Collected: 04/04/23 0309    Specimen: Blood Updated: 04/04/23 0518     Glucose 104 mg/dL      BUN 28 mg/dL      Creatinine 1.34 mg/dL      Sodium 139 mmol/L      Potassium 3.3 mmol/L      Chloride 97 mmol/L      CO2 30.0 mmol/L      Calcium 8.2 mg/dL      BUN/Creatinine Ratio 20.9     Anion Gap 12.0 mmol/L      eGFR 58.8 mL/min/1.73     Narrative:      GFR Normal >60  Chronic Kidney Disease <60  Kidney Failure <15      Phosphorus [[id number]]  (Normal) Collected: 04/04/23 0309    Specimen: Blood Updated: 04/04/23 0518     Phosphorus 3.7 mg/dL     CBC (No Diff) [[id number]]  (Abnormal) Collected: 04/04/23 0309    Specimen: Blood Updated: 04/04/23 0426     WBC 13.20 10*3/mm3      RBC 3.02 10*6/mm3      Hemoglobin 8.3 g/dL      Hematocrit 26.0 %      MCV 86.2 fL      MCH 27.7 pg      MCHC 32.1 g/dL      RDW 16.6 %      RDW-SD 50.3 fl      MPV 8.1 fL      Platelets 267 10*3/mm3         Imaging Results (Last 24 Hours)     ** No results found for the last 24 hours. **           I have reviewed the above results and noted them below    Medication Review:    Current Facility-Administered Medications:   •  acetaminophen (TYLENOL) tablet 650 mg, 650 mg, Oral, Q4H PRN, 650 mg at 03/30/23 0156 **OR** acetaminophen (TYLENOL) suppository 650 mg, 650 mg, Rectal, Q4H PRN, Xu Montero MD  •  docusate sodium (COLACE) capsule 100 mg, 100 mg, Oral, BID PRN, Xu Montero MD, 100 mg at 03/31/23 0715  •  Enoxaparin Sodium (LOVENOX) syringe 40 mg, 40 mg, Subcutaneous, Daily, Xu Montero MD, 40 mg at 04/04/23 0839  •  HYDROcodone-acetaminophen (NORCO) 5-325 MG per tablet 2 tablet, 2 tablet, Oral, Q4H PRN, Aditya Ennis MD, 2 tablet at 04/02/23 1032  •  HYDROmorphone (DILAUDID) injection 1 mg, 1 mg, Intravenous, Q2H PRN, 1 mg at 04/04/23 1105 **AND** naloxone (NARCAN) injection 0.1 mg, 0.1 mg,  Intravenous, Q5 Min PRN, Siama Roberto A, DO  •  lactated ringers infusion, 75 mL/hr, Intravenous, Continuous, Saima Roberto A, DO, Last Rate: 75 mL/hr at 04/02/23 2152, 75 mL/hr at 04/02/23 2152  •  metoclopramide (REGLAN) injection 10 mg, 10 mg, Intravenous, Q6H, Michael Witt MD, 10 mg at 04/04/23 1104  •  Naloxegol Oxalate (MOVANTIK) tablet 25 mg, 25 mg, Oral, QAM, Cyndy-Egziabher, James I, DO, 25 mg at 04/04/23 1009  •  ondansetron (ZOFRAN) tablet 4 mg, 4 mg, Oral, Q6H PRN, 4 mg at 03/31/23 0715 **OR** ondansetron (ZOFRAN) injection 4 mg, 4 mg, Intravenous, Q6H PRN, Xu Montero MD, 4 mg at 03/31/23 2258  •  polyethylene glycol (MIRALAX) packet 17 g, 17 g, Oral, BID, Cyndy-Egziabher, James I, DO, 17 g at 04/04/23 0839  •  potassium chloride (K-DUR,KLOR-CON) CR tablet 40 mEq, 40 mEq, Oral, PRN **OR** potassium chloride (KLOR-CON) packet 40 mEq, 40 mEq, Oral, PRN **OR** potassium chloride 10 mEq in 100 mL IVPB, 10 mEq, Intravenous, Q1H PRN, Saima Roberto, DO, Last Rate: 100 mL/hr at 04/04/23 1106, 10 mEq at 04/04/23 1106  •  sennosides-docusate (PERICOLACE) 8.6-50 MG per tablet 2 tablet, 2 tablet, Oral, BID PRN, Cyndy-Egziabher, James I, DO, 2 tablet at 03/31/23 1601  •  sodium chloride 0.9 % flush 10 mL, 10 mL, Intravenous, Q12H, Xu Montero MD, 10 mL at 04/04/23 0849  •  sodium chloride 0.9 % flush 10 mL, 10 mL, Intravenous, PRN, Kmetz, Xu, MD  •  sodium chloride 0.9 % infusion 40 mL, 40 mL, Intravenous, PRN, Xu Montero MD  •  vitamin D3 capsule 5,000 Units, 5,000 Units, Oral, Daily, Xu Montero MD, 5,000 Units at 04/04/23 0840    Assessment & Plan     Principal Problem:    Bladder cancer  Active Problems:    History of alcohol use disorder    Prostate cancer    Elevated serum creatinine    Attention to urostomy    Continue Movantik and Reglan  Replace electrolytes  Mobilize patient is much as possible  Continue n.p.o., NG tube to low intermittent wall suction until output  decreases    Plan for disposition: Awaiting return of bowel function and decreasing NG tube output prior to considering removal of NG tube and starting clear liquid diet    Michael Witt MD  04/04/23  11:26 EDT

## 2023-04-04 NOTE — PROGRESS NOTES
PROGRESS NOTE      Patient Name: Anthony Moss  : 1955  MRN: [id number]  Primary Care Physician: Reji Topete MD  Date of admission: 3/28/2023    Patient Care Team:  Reji Topete MD as PCP - General (Family Medicine)        Subjective   Subjective:     Resting in bed.  Review of systems:  Review of Systems -   Neg.except weakness.    Allergies:  No Known Allergies    Objective   Exam:     Vital Signs  Temp:  [97.6 °F (36.4 °C)-98 °F (36.7 °C)] 98 °F (36.7 °C)  Heart Rate:  [] 105  Resp:  [16-23] 23  BP: (117-130)/(65-76) 117/68  SpO2:  [96 %-97 %] 96 %  on  Flow (L/min):  [2] 2;   Device (Oxygen Therapy): room air  Body mass index is 25.56 kg/m².    General:     male in no acute distress.    Head:      Normocephalic and atraumatic.    Eyes:      PERRL/EOM intact, conjunctiva and sclera clear with out nystagmus.    Neck:      No masses, thyromegaly,  trachea central with normal respiratory effort   Lungs:    Clear bilaterally to auscultation.    Heart:      Regular rate and rhythm, no murmur no gallop  Abd:        Soft, nontender, not distended, bowel sounds positive, no shifting dullness   Pulses:   Pulses palpable  Extr:        No cyanosis or clubbing--+ edema.    Neuro:    No focal deficits.   alert oriented x3  Skin:       Intact without lesions or rashes.    Psych:    Alert and cooperative; normal mood and affect; .      Results Review:  I have personally reviewed most recent Data :  CBC    Results from last 7 days   Lab Units 23  0309 23  0118 23  0135 23  2240 23  0529 23  2252 23  2052 23  0658 23  2353   WBC 10*3/mm3 13.20* 11.40* 9.40 10.30 11.20* 14.30*  --   --  14.10*   HEMOGLOBIN g/dL 8.3* 8.6* 9.1* 9.1* 8.3* 8.2* 8.0*   < > 8.1*   PLATELETS 10*3/mm3 267 249 247 227 173 162  --   --  163    < > = values in this interval not displayed.     CMP   Results from last 7 days   Lab Units 23  0660  04/03/23  0118 04/02/23  0135 03/31/23  2240 03/31/23  0530 03/29/23  2252 03/28/23  2353   SODIUM mmol/L 139 139 136 136 133* 133* 133*   POTASSIUM mmol/L 3.3* 3.8 4.0 4.3 4.2 4.6 3.8   CHLORIDE mmol/L 97* 96* 94* 99 100 101 100   CO2 mmol/L 30.0* 28.0 29.0 27.0 21.0* 21.0* 23.0   BUN mg/dL 28* 33* 34* 24* 24* 25* 28*   CREATININE mg/dL 1.34* 1.65* 1.81* 1.70* 1.53* 1.82* 1.70*   GLUCOSE mg/dL 104* 105* 132* 149* 147* 151* 127*   ALBUMIN g/dL  --   --   --   --  3.0*  --   --    BILIRUBIN mg/dL  --   --   --   --  0.3  --   --    ALK PHOS U/L  --   --   --   --  126*  --   --    AST (SGOT) U/L  --   --   --   --  18  --   --    ALT (SGPT) U/L  --   --   --   --  7  --   --      ABG      XR Abdomen KUB    Result Date: 4/2/2023  Impression: 1.Dilated loops of bowel, similar to mildly decreased compared to the prior exam, which could indicate ileus. 2.Enteric tube terminates in the right upper quadrant with tip likely in the distal stomach/proximal duodenum. 3.Postsurgical changes, as before. Electronically Signed: Fabio Holland  4/2/2023 9:26 PM EDT  Workstation ID: [id number]      Results for orders placed during the hospital encounter of 01/05/22    Adult Transthoracic Echo Complete w/ Color, Spectral and Contrast if Necessary Per Protocol    Interpretation Summary  · Left ventricular ejection fraction appears to be 56 - 60%.  · The right ventricular cavity is moderately dilated.  · No pericardial effusion noted    Scheduled Meds:enoxaparin, 40 mg, Subcutaneous, Daily  metoclopramide, 10 mg, Intravenous, Q6H  Naloxegol Oxalate, 25 mg, Oral, QAM  polyethylene glycol, 17 g, Oral, BID  sodium chloride, 10 mL, Intravenous, Q12H  vitamin D3, 5,000 Units, Oral, Daily      Continuous Infusions:lactated ringers, 75 mL/hr, Last Rate: 75 mL/hr (04/02/23 2152)      PRN Meds:•  acetaminophen **OR** acetaminophen  •  docusate sodium  •  HYDROcodone-acetaminophen  •  HYDROmorphone **AND** naloxone  •  ondansetron **OR**  ondansetron  •  potassium chloride **OR** potassium chloride **OR** potassium chloride  •  senna-docusate sodium  •  sodium chloride  •  sodium chloride    Assessment & Plan   Assessment and Plan:         Bladder cancer    History of alcohol use disorder    Prostate cancer    Elevated serum creatinine    Attention to urostomy    ASSESSMENT:  • Acute kidney injury, stage III, status post radical cystectomy getting better hyperkalemia, secondary to CARLOS ALBERTO  • Metabolic acidosis secondary to CKD start low-dose sodium bicarbonate  • History of bladder cancer,   • History of alcohol use,  • Chronic back pain,using NSAID in the past  • B/l pneumonia.  Surprisingly, WBC stable,           Plan:      • At this time patient with history of bladder cancer status post cystectomy urine output is adequate  • Hyponatremia improved.  • Patient renal functions continue to get better  • Having some GI symptoms NG was inserted still has been draining about 800 cc every shift  • As patient is getting alkalotic we will start patient on normal saline instead of Ringer lactate because that might be contributing to hypokalemia and alkalosis  • Okay to be discharged when okay with urology  • Potassium level is acceptable  • Echocardiogram done last time showed ejection fraction 56 to 60%  • Repeat labs tomorrow morning          Electronically signed by Jamie Houston MD,   [facility] kidney consultant  [phone]  4/4/2023  10:48 EDT
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
## 2023-04-04 NOTE — PLAN OF CARE
Goal Outcome Evaluation:  Plan of Care Reviewed With: patient        Progress: no change  Outcome Evaluation: Alert and oriented, still NPO d/t NG tube.  Patient tired and painful today

## 2023-04-05 LAB
ANION GAP SERPL CALCULATED.3IONS-SCNC: 13 MMOL/L (ref 5–15)
BUN SERPL-MCNC: 22 MG/DL (ref 8–23)
BUN/CREAT SERPL: 17.5 (ref 7–25)
CALCIUM SPEC-SCNC: 8 MG/DL (ref 8.6–10.5)
CHLORIDE SERPL-SCNC: 101 MMOL/L (ref 98–107)
CO2 SERPL-SCNC: 25 MMOL/L (ref 22–29)
CREAT SERPL-MCNC: 1.26 MG/DL (ref 0.76–1.27)
DEPRECATED RDW RBC AUTO: 52.1 FL (ref 37–54)
EGFRCR SERPLBLD CKD-EPI 2021: 63.3 ML/MIN/1.73
ERYTHROCYTE [DISTWIDTH] IN BLOOD BY AUTOMATED COUNT: 17 % (ref 12.3–15.4)
GLUCOSE SERPL-MCNC: 90 MG/DL (ref 65–99)
HCT VFR BLD AUTO: 27.5 % (ref 37.5–51)
HGB BLD-MCNC: 8.4 G/DL (ref 13–17.7)
MCH RBC QN AUTO: 26.7 PG (ref 26.6–33)
MCHC RBC AUTO-ENTMCNC: 30.8 G/DL (ref 31.5–35.7)
MCV RBC AUTO: 86.8 FL (ref 79–97)
PHOSPHATE SERPL-MCNC: 3.3 MG/DL (ref 2.5–4.5)
PLATELET # BLD AUTO: 296 10*3/MM3 (ref 140–450)
PMV BLD AUTO: 7.9 FL (ref 6–12)
POTASSIUM SERPL-SCNC: 4 MMOL/L (ref 3.5–5.2)
RBC # BLD AUTO: 3.16 10*6/MM3 (ref 4.14–5.8)
SODIUM SERPL-SCNC: 139 MMOL/L (ref 136–145)
WBC NRBC COR # BLD: 13.2 10*3/MM3 (ref 3.4–10.8)

## 2023-04-05 PROCEDURE — 99232 SBSQ HOSP IP/OBS MODERATE 35: CPT | Performed by: SURGERY

## 2023-04-05 PROCEDURE — 25010000002 ENOXAPARIN PER 10 MG: Performed by: UROLOGY

## 2023-04-05 PROCEDURE — 25010000002 METOCLOPRAMIDE PER 10 MG: Performed by: SURGERY

## 2023-04-05 RX ADMIN — ENOXAPARIN SODIUM 40 MG: 100 INJECTION SUBCUTANEOUS at 07:22

## 2023-04-05 RX ADMIN — Medication 10 ML: at 21:08

## 2023-04-05 RX ADMIN — POLYETHYLENE GLYCOL 3350 17 G: 17 POWDER, FOR SOLUTION ORAL at 07:22

## 2023-04-05 RX ADMIN — METOCLOPRAMIDE HYDROCHLORIDE 10 MG: 5 INJECTION INTRAMUSCULAR; INTRAVENOUS at 22:42

## 2023-04-05 RX ADMIN — METOCLOPRAMIDE HYDROCHLORIDE 10 MG: 5 INJECTION INTRAMUSCULAR; INTRAVENOUS at 16:46

## 2023-04-05 RX ADMIN — NALOXEGOL OXALATE 25 MG: 12.5 TABLET, FILM COATED ORAL at 07:20

## 2023-04-05 RX ADMIN — PHENOL 1 SPRAY: 1.4 SPRAY ORAL at 07:19

## 2023-04-05 RX ADMIN — Medication 5000 UNITS: at 07:22

## 2023-04-05 RX ADMIN — METOCLOPRAMIDE HYDROCHLORIDE 10 MG: 5 INJECTION INTRAMUSCULAR; INTRAVENOUS at 04:58

## 2023-04-05 RX ADMIN — METOCLOPRAMIDE HYDROCHLORIDE 10 MG: 5 INJECTION INTRAMUSCULAR; INTRAVENOUS at 12:05

## 2023-04-05 NOTE — PROGRESS NOTES
[facility] Medicine Services Daily Progress Note    Patient Name: Anthony Moss  : 1955  MRN: [id number]  Primary Care Physician:  Reji Topete MD  Date of admission: 3/28/2023      Subjective      Chief Complaint: Constipation, weakness      Patient Reports     3/31/23: OOB to chair.  PT consulted.  Constipated.  Undecided whether SNF versus HH.  Lives alone.  DC IVF.  Nephrology and urology following. + ARAVIND drain with serosanguineous fluid.    23: Patient with multiple vomiting overnight NG tube placed with bilious discharge.  Repeat KUB.  Gaiting Movantik.    23: Patient having NGT removed.  Still feeling pretty rough at this time improved abdominal pain.  Somnolent as well.  Awaiting SNF placement.    4/3/2023: Patient had NGT replaced due to likely ileus.  Having some surgical site TTP and soreness.  Still feeling pretty good overall, forward to improving his p.o. intake and getting started with the rest of his care plan.    23: Still has NGT in place.  Having some more feelings of hunger, agreeable to current management.  Also reporting constipation.    23: NGT was removed today, had large volume bowel movements as well.  Patient feeling a lot better today.  Advance to clear liquid diet.    Review of Systems   All other systems reviewed and are negative.         Objective      Vitals:   Temp:  [98 °F (36.7 °C)-98.2 °F (36.8 °C)] 98.2 °F (36.8 °C)  Heart Rate:  [] 110  Resp:  [22-25] 25  BP: (105-128)/(62-77) 128/77    Physical Exam  HENT:      Head: Normocephalic.      Nose: Nose normal.   Eyes:      Extraocular Movements: Extraocular movements intact.      Pupils: Pupils are equal, round, and reactive to light.   Cardiovascular:      Rate and Rhythm: Normal rate.   Pulmonary:      Effort: Pulmonary effort is normal.      Breath sounds: Normal breath sounds.   Abdominal:      General: Bowel sounds are normal.      Comments: Ileal conduit with  urine.  Left lower quadrant ARAVIND drain with serosanguineous fluid.   Musculoskeletal:         General: Normal range of motion.      Cervical back: Normal range of motion.   Skin:     General: Skin is warm.   Neurological:      Mental Status: He is alert. Mental status is at baseline.   Psychiatric:         Mood and Affect: Mood normal.             Result Review    Result Review:  I have personally reviewed the results from the time of this admission to 4/5/2023 14:53 EDT and agree with these findings:  [x]  Laboratory  []  Microbiology  [x]  Radiology  []  EKG/Telemetry   []  Cardiology/Vascular   []  Pathology  [x]  Old records  []  Other:      Wounds (last 24 hours)     LDA Wound     Row Name 04/05/23 0830 04/04/23 1955          Wound 03/28/23 1305 abdomen Incision    Wound - Properties Group Placement Date: 03/28/23  -AM Placement Time: 1305  -AM Location: abdomen  -AM Primary Wound Type: Incision  -AM    Dressing Appearance -- dry;intact  -     Closure STEFF  -AS Staples;Open to air  -     Base dressing in place, unable to visualize  -AS pink  -JM     Retired Wound - Properties Group Placement Date: 03/28/23  -AM Placement Time: 1305  -AM Location: abdomen  -AM Primary Wound Type: Incision  -AM    Retired Wound - Properties Group Date first assessed: 03/28/23  -AM Time first assessed: 1305  -AM Location: abdomen  -AM Primary Wound Type: Incision  -AM          User Key  (r) = Recorded By, (t) = Taken By, (c) = Cosigned By    Initials Name Provider Type    AM Keli Mcdaniels RN Registered Nurse    AS Katlyn Maguire RN Registered Nurse    Krista Garza LPN Licensed Nurse                  Assessment & Plan      Brief Patient Summary:        enoxaparin, 40 mg, Subcutaneous, Daily  metoclopramide, 10 mg, Intravenous, Q6H  Naloxegol Oxalate, 25 mg, Oral, QAM  polyethylene glycol, 17 g, Oral, BID  sodium chloride, 10 mL, Intravenous, Q12H  vitamin D3, 5,000 Units, Oral, Daily       sodium chloride,  80 mL/hr, Last Rate: 80 mL/hr (04/04/23 1751)         Active Hospital Problems:  Active Hospital Problems    Diagnosis    • **Bladder cancer    • Attention to urostomy    • Elevated serum creatinine    • Prostate cancer    • History of alcohol use disorder      Prostate cancer  Bladder cancer  S/p cystectomy with ilial conduit urinary diversion 3/20/2023  CARLOS ALBERTO  Ileus 3/31/2023  Constipation  Hypertension  History of alcohol abuse    Plan:  -NG tube placed for ileus 3/31/23, removed on 4/1, replaced on 4/2, taken out today.  -ARAVIND drain  -Renal function improved.  Continue LR at 75 mL/h  -Appreciate nephrology input.  -Encourage OOB to chair.  -Patient undecided whether SNF versus   -- Large bowel movements already had, continue to monitor.  -- Given Chloraseptic spray      DVT prophylaxis:  Medical and mechanical DVT prophylaxis orders are present.    CODE STATUS:    Code Status (Patient has no pulse and is not breathing): CPR (Attempt to Resuscitate)  Medical Interventions (Patient has pulse or is breathing): Full Support      Disposition:  I expect patient to be discharged defer.    This patient has been examined wearing appropriate Personal Protective Equipment and discussed with nursing. 04/05/23      Electronically signed by Saima Roberto DO, 04/05/23, 14:53 EDT.  [facility] Hospitalist Team

## 2023-04-05 NOTE — PROGRESS NOTES
GENERAL SURGERY PROGRESS NOTE    4/5/2023   LOS: 8 days   Patient Care Team:  Reji Topete MD as PCP - General (Family Medicine)    CYSTECTOMY RADICAL  3/28/2023  Chief Complaint: Postoperative ileus    Subjective   HPI: Patient is a 67 y.o. male presents with locally advanced bladder cancer and subsequently underwent a radical cystectomy and prostatectomy with ileal conduit placement on 3/28/2023.  Postoperatively, the patient has done okay, but reports that he has been having multiple loose bowel movements and high NG tube output with persistent nausea.  General surgery was consulted to evaluate and help with treatment with postoperative ileus versus bowel obstruction.     Interval History:   NG tube output decreased markedly overnight.  Patient has had 2-3 large liquid bowel movements today.  We had clamped his nasogastric tube and start him on clear liquid diets before his NG tube was discontinued inadvertently    Objective     Vital Signs  Temp:  [98 °F (36.7 °C)-98.2 °F (36.8 °C)] 98.2 °F (36.8 °C)  Heart Rate:  [] 110  Resp:  [22-25] 25  BP: (105-128)/(62-77) 128/77    Physical Exam  Vitals reviewed.   Constitutional:       General: He is not in acute distress.     Appearance: He is not ill-appearing.   HENT:      Nose:      Comments: NG tube has been removed  Pulmonary:      Effort: Pulmonary effort is normal. No respiratory distress.   Abdominal:      General: There is no distension.      Palpations: Abdomen is soft.      Comments: Appropriately tender, staples in place.   Musculoskeletal:         General: No swelling. Normal range of motion.   Skin:     General: Skin is warm and dry.      Coloration: Skin is not jaundiced.   Neurological:      General: No focal deficit present.      Mental Status: He is alert and oriented to person, place, and time.          Results Review:    Lab Results (last 24 hours)     Procedure Component Value Units Date/Time    Basic Metabolic Panel [[id number]]   (Abnormal) Collected: 04/04/23 2339    Specimen: Blood Updated: 04/05/23 0043     Glucose 90 mg/dL      BUN 22 mg/dL      Creatinine 1.26 mg/dL      Sodium 139 mmol/L      Potassium 4.0 mmol/L      Comment: Slight hemolysis detected by analyzer. Results may be affected.        Chloride 101 mmol/L      CO2 25.0 mmol/L      Calcium 8.0 mg/dL      BUN/Creatinine Ratio 17.5     Anion Gap 13.0 mmol/L      eGFR 63.3 mL/min/1.73     Narrative:      GFR Normal >60  Chronic Kidney Disease <60  Kidney Failure <15      Phosphorus [[id number]]  (Normal) Collected: 04/04/23 2339    Specimen: Blood Updated: 04/05/23 0043     Phosphorus 3.3 mg/dL     CBC (No Diff) [[id number]]  (Abnormal) Collected: 04/04/23 2339    Specimen: Blood Updated: 04/05/23 0016     WBC 13.20 10*3/mm3      RBC 3.16 10*6/mm3      Hemoglobin 8.4 g/dL      Hematocrit 27.5 %      MCV 86.8 fL      MCH 26.7 pg      MCHC 30.8 g/dL      RDW 17.0 %      RDW-SD 52.1 fl      MPV 7.9 fL      Platelets 296 10*3/mm3     Potassium [[id number]]  (Normal) Collected: 04/04/23 1756    Specimen: Blood Updated: 04/04/23 1814     Potassium 3.8 mmol/L         Imaging Results (Last 24 Hours)     ** No results found for the last 24 hours. **           I have reviewed the above results and noted them below    Medication Review:    Current Facility-Administered Medications:   •  acetaminophen (TYLENOL) tablet 650 mg, 650 mg, Oral, Q4H PRN, 650 mg at 03/30/23 0156 **OR** acetaminophen (TYLENOL) suppository 650 mg, 650 mg, Rectal, Q4H PRN, Xu Montero MD  •  docusate sodium (COLACE) capsule 100 mg, 100 mg, Oral, BID PRN, Xu Montero MD, 100 mg at 03/31/23 0715  •  Enoxaparin Sodium (LOVENOX) syringe 40 mg, 40 mg, Subcutaneous, Daily, Xu Montero MD, 40 mg at 04/05/23 0722  •  lactulose (CHRONULAC) solution for enema 300 mL, 300 mL, Rectal, Daily PRN, Saima Roberto,   •  metoclopramide (REGLAN) injection 10 mg, 10 mg, Intravenous, Q6H, Michael Witt MD, 10 mg at  23 1205  •  Naloxegol Oxalate (MOVANTIK) tablet 25 mg, 25 mg, Oral, QAM, Cyndy-Egziabher, James I, DO, 25 mg at 23 0720  •  [] HYDROmorphone (DILAUDID) injection 1 mg, 1 mg, Intravenous, Q2H PRN, 1 mg at 23 1105 **AND** naloxone (NARCAN) injection 0.1 mg, 0.1 mg, Intravenous, Q5 Min PRN, Saima Roberto A, DO  •  ondansetron (ZOFRAN) tablet 4 mg, 4 mg, Oral, Q6H PRN, 4 mg at 23 0715 **OR** ondansetron (ZOFRAN) injection 4 mg, 4 mg, Intravenous, Q6H PRN, Xu Montero MD, 4 mg at 23 2258  •  phenol (CHLORASEPTIC) 1.4 % liquid 1 spray, 1 spray, Mouth/Throat, Q2H PRN, Saima Roberto A, DO, 1 spray at 23 0719  •  polyethylene glycol (MIRALAX) packet 17 g, 17 g, Oral, BID, Cyndy-Karmaab, James I, DO, 17 g at 23 0722  •  potassium chloride (K-DUR,KLOR-CON) CR tablet 40 mEq, 40 mEq, Oral, PRN **OR** potassium chloride (KLOR-CON) packet 40 mEq, 40 mEq, Oral, PRN **OR** potassium chloride 10 mEq in 100 mL IVPB, 10 mEq, Intravenous, Q1H PRN, Saima Roberto A, DO, Last Rate: 100 mL/hr at 23 1356, 10 mEq at 23 1356  •  sennosides-docusate (PERICOLACE) 8.6-50 MG per tablet 2 tablet, 2 tablet, Oral, BID PRN, Cyndy-Egziab, James I, DO, 2 tablet at 23 1601  •  sodium chloride 0.9 % flush 10 mL, 10 mL, Intravenous, Q12H, Xu Montero MD, 10 mL at 23  •  sodium chloride 0.9 % flush 10 mL, 10 mL, Intravenous, PRN, Xu Montero MD  •  sodium chloride 0.9 % infusion 40 mL, 40 mL, Intravenous, PRN, Xu Montero MD  •  sodium chloride 0.9 % infusion, 80 mL/hr, Intravenous, Continuous, Jamie Houston MD, Last Rate: 80 mL/hr at 23, 80 mL/hr at 23  •  vitamin D3 capsule 5,000 Units, 5,000 Units, Oral, Daily, Xu Montero MD, 5,000 Units at 23 0722    Assessment & Plan     Principal Problem:    Bladder cancer  Active Problems:    History of alcohol use disorder    Prostate cancer    Elevated serum creatinine    Attention to  urostomy    Continue Movantik and Reglan  Replace electrolytes  Mobilize patient is much as possible  Continue clear liquid diet today.  If tolerates throughout today and evening, would advance to full liquid diet tomorrow morning and possibly low residue diet by tomorrow afternoon    Plan for disposition: Ileus seems to be resolving, clear liquid diet today, advance to regular diet starting tomorrow    Michael Witt MD  04/05/23  13:52 EDT

## 2023-04-05 NOTE — PROGRESS NOTES
PROGRESS NOTE      Patient Name: Anthony Moss  : 1955  MRN: [id number]  Primary Care Physician: Reji Topete MD  Date of admission: 3/28/2023    Patient Care Team:  Reji Topete MD as PCP - General (Family Medicine)        Subjective   Subjective:     Resting in bed.  Review of systems:  Review of Systems -   Neg.except weakness.    Allergies:  No Known Allergies    Objective   Exam:     Vital Signs  Temp:  [98 °F (36.7 °C)-98.2 °F (36.8 °C)] 98.2 °F (36.8 °C)  Heart Rate:  [] 110  Resp:  [22-25] 25  BP: (105-128)/(62-77) 128/77  SpO2:  [95 %-97 %] 96 %  on   ;   Device (Oxygen Therapy): room air  Body mass index is 25.56 kg/m².    General:     male in no acute distress.    Head:      Normocephalic and atraumatic.    Eyes:      PERRL/EOM intact, conjunctiva and sclera clear with out nystagmus.    Neck:      No masses, thyromegaly,  trachea central with normal respiratory effort   Lungs:    Clear bilaterally to auscultation.    Heart:      Regular rate and rhythm, no murmur no gallop  Abd:        Soft, nontender, not distended, bowel sounds positive, no shifting dullness   Pulses:   Pulses palpable  Extr:        No cyanosis or clubbing--+ edema.    Neuro:    No focal deficits.   alert oriented x3  Skin:       Intact without lesions or rashes.    Psych:    Alert and cooperative; normal mood and affect; .      Results Review:  I have personally reviewed most recent Data :  CBC    Results from last 7 days   Lab Units 23  2339 23  0309 23  0118 23  0135 23  2240 23  0529 23  2252   WBC 10*3/mm3 13.20* 13.20* 11.40* 9.40 10.30 11.20* 14.30*   HEMOGLOBIN g/dL 8.4* 8.3* 8.6* 9.1* 9.1* 8.3* 8.2*   PLATELETS 10*3/mm3 296 267 249 247 227 173 162     CMP   Results from last 7 days   Lab Units 23  2339 23  1756 23  0309 23  0118 23  0135 23  2240 23  0530 23  2252   SODIUM mmol/L 139  --  139  139 136 136 133* 133*   POTASSIUM mmol/L 4.0 3.8 3.3* 3.8 4.0 4.3 4.2 4.6   CHLORIDE mmol/L 101  --  97* 96* 94* 99 100 101   CO2 mmol/L 25.0  --  30.0* 28.0 29.0 27.0 21.0* 21.0*   BUN mg/dL 22  --  28* 33* 34* 24* 24* 25*   CREATININE mg/dL 1.26  --  1.34* 1.65* 1.81* 1.70* 1.53* 1.82*   GLUCOSE mg/dL 90  --  104* 105* 132* 149* 147* 151*   ALBUMIN g/dL  --   --   --   --   --   --  3.0*  --    BILIRUBIN mg/dL  --   --   --   --   --   --  0.3  --    ALK PHOS U/L  --   --   --   --   --   --  126*  --    AST (SGOT) U/L  --   --   --   --   --   --  18  --    ALT (SGPT) U/L  --   --   --   --   --   --  7  --      ABG      No radiology results for the last day    Results for orders placed during the hospital encounter of 22    Adult Transthoracic Echo Complete w/ Color, Spectral and Contrast if Necessary Per Protocol    Interpretation Summary  · Left ventricular ejection fraction appears to be 56 - 60%.  · The right ventricular cavity is moderately dilated.  · No pericardial effusion noted    Scheduled Meds:enoxaparin, 40 mg, Subcutaneous, Daily  metoclopramide, 10 mg, Intravenous, Q6H  Naloxegol Oxalate, 25 mg, Oral, QAM  polyethylene glycol, 17 g, Oral, BID  sodium chloride, 10 mL, Intravenous, Q12H  vitamin D3, 5,000 Units, Oral, Daily      Continuous Infusions:sodium chloride, 80 mL/hr, Last Rate: 80 mL/hr (23 2150)      PRN Meds:•  acetaminophen **OR** acetaminophen  •  docusate sodium  •  lactulose  •  [] HYDROmorphone **AND** naloxone  •  ondansetron **OR** ondansetron  •  phenol  •  potassium chloride **OR** potassium chloride **OR** potassium chloride  •  senna-docusate sodium  •  sodium chloride  •  sodium chloride    Assessment & Plan   Assessment and Plan:         Bladder cancer    History of alcohol use disorder    Prostate cancer    Elevated serum creatinine    Attention to urostomy    ASSESSMENT:  • Acute kidney injury, stage III, status post radical cystectomy getting better  hyperkalemia, secondary to CARLOS ALBERTO  • Metabolic acidosis secondary to CKD start low-dose sodium bicarbonate  • History of bladder cancer,   • History of alcohol use,  • Chronic back pain,using NSAID in the past  • B/l pneumonia.  Surprisingly, WBC stable,           Plan:      • At this time patient with history of bladder cancer status post cystectomy urine output is adequate  • Hyponatremia improved.  • Patient renal functions continue to get better  • GI symptoms better  • As patient is getting alkalotic we will start patient on normal saline instead of Ringer lactate because that might be contributing to hypokalemia and alkalosis  • F/u with labs   • Okay to be discharged when okay with urology  • Potassium level is acceptable  • Echocardiogram done last time showed ejection fraction 56 to 60%  • Repeat labs tomorrow morning          Electronically signed by Jamie Houston MD,   [facility] kidney consultant  [phone]  4/5/2023  17:56 EDT

## 2023-04-05 NOTE — NURSING NOTE
67-year-old male who underwent a radical cystectomy with ileal conduit formation on March 28.  Patient seen today for ostomy care and management.  The Marlan 1 piece soft convex pouch is holding well.  I did instruct patient that samples of a Kamuela 1 piece soft convex system will be coming to his home.  Attempted ostomy education but patient having difficulty engaging.  Plan at this time is for patient to eventually discharged to rehab and then home.  All questions were answered.  We will continue to follow on plan of pouch change tomorrow morning.
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
## 2023-04-05 NOTE — PLAN OF CARE
Goal Outcome Evaluation:  Plan of Care Reviewed With: patient        Progress: improving  Outcome Evaluation: NG tube out, having multiple BMs, VSS.

## 2023-04-05 NOTE — PROGRESS NOTES
Nutrition Services    Patient Name: Anthony Moss  YOB: 1955  MRN: [id number]  Admission date: 3/28/2023    PPE Documentation        PPE Worn By Provider Did not enter room for this encounter   PPE Worn By Patient  N/A     PROGRESS NOTE      Encounter Information: Progress note to check on plan for nutrition. RD messaged MD stating today is day 10 w/o nutrition, recommending TPN of TF if gut can tolerate. MD states diet to advance to clear liquids today. MAGDA recommends TPN, did not receive consult from MD. Per MD note today ileus appears to be improving.        PO Diet: Diet: Liquid Diets; Clear Liquid; Texture: Regular Texture (IDDSI 7); Fluid Consistency: Thin (IDDSI 0)   PO Supplements: None    PO Intake:  None        Current nutrition support:    Nutrition support review:        Labs (reviewed below): Reviewed        GI Function:  Stool Output  Stool (mL): 0 mL (03/31/23 0017)  Stool Unmeasured Occurrence: 1 (04/05/23 1442)  Bowel Incontinence: Yes (04/05/23 1442)  Stool Amount: large (04/05/23 1442)          Nutrition Intervention Updates: Boost Breeze TID (Provides 750 kcals, 42 g protein if consumed)        Results from last 7 days   Lab Units 04/04/23  2339 04/04/23  1756 04/04/23  0309 04/03/23  0118 03/31/23  2240 03/31/23  0530   SODIUM mmol/L 139  --  139 139   < > 133*   POTASSIUM mmol/L 4.0 3.8 3.3* 3.8   < > 4.2   CHLORIDE mmol/L 101  --  97* 96*   < > 100   CO2 mmol/L 25.0  --  30.0* 28.0   < > 21.0*   BUN mg/dL 22  --  28* 33*   < > 24*   CREATININE mg/dL 1.26  --  1.34* 1.65*   < > 1.53*   CALCIUM mg/dL 8.0*  --  8.2* 8.5*   < > 8.5*   BILIRUBIN mg/dL  --   --   --   --   --  0.3   ALK PHOS U/L  --   --   --   --   --  126*   ALT (SGPT) U/L  --   --   --   --   --  7   AST (SGOT) U/L  --   --   --   --   --  18   GLUCOSE mg/dL 90  --  104* 105*   < > 147*    < > = values in this interval not displayed.     Results from last 7 days   Lab Units 04/04/23  6589 04/04/23  030  04/03/23  0118 04/02/23  0135   MAGNESIUM mg/dL  --  1.9 1.9 1.7   PHOSPHORUS mg/dL 3.3 3.7  --  4.0   HEMOGLOBIN g/dL 8.4* 8.3* 8.6* 9.1*   HEMATOCRIT % 27.5* 26.0* 25.9* 26.0*     COVID19   Date Value Ref Range Status   03/01/2023 Not Detected Not Detected - Ref. Range Final     Lab Results   Component Value Date    HGBA1C 4.8 03/01/2023       RD to follow up per protocol.    Electronically signed by:  Gabriela Schmitt, MAGDA  04/05/23 14:46 EDT

## 2023-04-05 NOTE — CASE MANAGEMENT/SOCIAL WORK
Continued Stay Note   [place]     Patient Name: Anthony Moss  MRN: [id number]  Today's Date: 4/5/2023    Admit Date: 3/28/2023    Plan: Accepted at [facility] with precert approved though 4/5   Discharge Plan     Row Name 04/05/23 1144       Plan    Plan Comments Surgery 3/28, ileus remains, increasing output per NG. decreasing per ARAVIND drain.            Chart review only.                  Expected Discharge Date and Time     Expected Discharge Date Expected Discharge Time    Apr 7, 2023             Fariha Wu RN

## 2023-04-06 ENCOUNTER — ANESTHESIA EVENT (OUTPATIENT)
Dept: PERIOP | Facility: HOSPITAL | Age: 68
End: 2023-04-06
Payer: MEDICARE

## 2023-04-06 ENCOUNTER — ANESTHESIA (OUTPATIENT)
Dept: PERIOP | Facility: HOSPITAL | Age: 68
End: 2023-04-06
Payer: MEDICARE

## 2023-04-06 PROBLEM — E43 SEVERE MALNUTRITION: Status: ACTIVE | Noted: 2023-04-06

## 2023-04-06 LAB
ANION GAP SERPL CALCULATED.3IONS-SCNC: 11 MMOL/L (ref 5–15)
BUN SERPL-MCNC: 18 MG/DL (ref 8–23)
BUN/CREAT SERPL: 12.5 (ref 7–25)
CALCIUM SPEC-SCNC: 8 MG/DL (ref 8.6–10.5)
CHLORIDE SERPL-SCNC: 102 MMOL/L (ref 98–107)
CO2 SERPL-SCNC: 23 MMOL/L (ref 22–29)
CREAT SERPL-MCNC: 1.44 MG/DL (ref 0.76–1.27)
DEPRECATED RDW RBC AUTO: 54.7 FL (ref 37–54)
EGFRCR SERPLBLD CKD-EPI 2021: 53.9 ML/MIN/1.73
ERYTHROCYTE [DISTWIDTH] IN BLOOD BY AUTOMATED COUNT: 17.5 % (ref 12.3–15.4)
GLUCOSE SERPL-MCNC: 130 MG/DL (ref 65–99)
HCT VFR BLD AUTO: 26.4 % (ref 37.5–51)
HGB BLD-MCNC: 8.6 G/DL (ref 13–17.7)
MCH RBC QN AUTO: 27.4 PG (ref 26.6–33)
MCHC RBC AUTO-ENTMCNC: 32.5 G/DL (ref 31.5–35.7)
MCV RBC AUTO: 84.4 FL (ref 79–97)
PLATELET # BLD AUTO: 325 10*3/MM3 (ref 140–450)
PMV BLD AUTO: 8.1 FL (ref 6–12)
POTASSIUM SERPL-SCNC: 3.8 MMOL/L (ref 3.5–5.2)
RBC # BLD AUTO: 3.13 10*6/MM3 (ref 4.14–5.8)
SODIUM SERPL-SCNC: 136 MMOL/L (ref 136–145)
WBC NRBC COR # BLD: 21.8 10*3/MM3 (ref 3.4–10.8)

## 2023-04-06 PROCEDURE — 25010000002 PROPOFOL 200 MG/20ML EMULSION: Performed by: ANESTHESIOLOGY

## 2023-04-06 PROCEDURE — 97110 THERAPEUTIC EXERCISES: CPT

## 2023-04-06 PROCEDURE — 97116 GAIT TRAINING THERAPY: CPT

## 2023-04-06 PROCEDURE — 49900 REPAIR OF ABDOMINAL WALL: CPT | Performed by: SURGERY

## 2023-04-06 PROCEDURE — 87205 SMEAR GRAM STAIN: CPT | Performed by: UROLOGY

## 2023-04-06 PROCEDURE — 25010000002 ONDANSETRON PER 1 MG: Performed by: ANESTHESIOLOGY

## 2023-04-06 PROCEDURE — 99231 SBSQ HOSP IP/OBS SF/LOW 25: CPT | Performed by: SURGERY

## 2023-04-06 PROCEDURE — 97535 SELF CARE MNGMENT TRAINING: CPT

## 2023-04-06 PROCEDURE — 25010000002 SUCCINYLCHOLINE PER 20 MG: Performed by: ANESTHESIOLOGY

## 2023-04-06 PROCEDURE — 25010000002 FENTANYL CITRATE (PF) 100 MCG/2ML SOLUTION: Performed by: ANESTHESIOLOGY

## 2023-04-06 PROCEDURE — 80048 BASIC METABOLIC PNL TOTAL CA: CPT | Performed by: INTERNAL MEDICINE

## 2023-04-06 PROCEDURE — 25010000002 ENOXAPARIN PER 10 MG: Performed by: UROLOGY

## 2023-04-06 PROCEDURE — 25010000002 PIPERACILLIN SOD-TAZOBACTAM PER 1 G: Performed by: INTERNAL MEDICINE

## 2023-04-06 PROCEDURE — 97530 THERAPEUTIC ACTIVITIES: CPT

## 2023-04-06 PROCEDURE — 85027 COMPLETE CBC AUTOMATED: CPT | Performed by: INTERNAL MEDICINE

## 2023-04-06 PROCEDURE — 87070 CULTURE OTHR SPECIMN AEROBIC: CPT | Performed by: UROLOGY

## 2023-04-06 PROCEDURE — 25010000002 MORPHINE PER 10 MG: Performed by: INTERNAL MEDICINE

## 2023-04-06 PROCEDURE — 25010000002 HYDROMORPHONE 1 MG/ML SOLUTION: Performed by: ANESTHESIOLOGY

## 2023-04-06 PROCEDURE — 87186 SC STD MICRODIL/AGAR DIL: CPT | Performed by: UROLOGY

## 2023-04-06 PROCEDURE — 0WQF0ZZ REPAIR ABDOMINAL WALL, OPEN APPROACH: ICD-10-PCS | Performed by: SURGERY

## 2023-04-06 PROCEDURE — 25010000002 METOCLOPRAMIDE PER 10 MG: Performed by: SURGERY

## 2023-04-06 PROCEDURE — 87077 CULTURE AEROBIC IDENTIFY: CPT | Performed by: UROLOGY

## 2023-04-06 PROCEDURE — 87076 CULTURE ANAEROBE IDENT EACH: CPT | Performed by: UROLOGY

## 2023-04-06 RX ORDER — SUCCINYLCHOLINE CHLORIDE 20 MG/ML
INJECTION INTRAMUSCULAR; INTRAVENOUS AS NEEDED
Status: DISCONTINUED | OUTPATIENT
Start: 2023-04-06 | End: 2023-04-06 | Stop reason: SURG

## 2023-04-06 RX ORDER — MAGNESIUM HYDROXIDE 1200 MG/15ML
LIQUID ORAL AS NEEDED
Status: DISCONTINUED | OUTPATIENT
Start: 2023-04-06 | End: 2023-04-06 | Stop reason: HOSPADM

## 2023-04-06 RX ORDER — PROPOFOL 10 MG/ML
INJECTION, EMULSION INTRAVENOUS AS NEEDED
Status: DISCONTINUED | OUTPATIENT
Start: 2023-04-06 | End: 2023-04-06 | Stop reason: SURG

## 2023-04-06 RX ORDER — DIPHENHYDRAMINE HYDROCHLORIDE 50 MG/ML
12.5 INJECTION INTRAMUSCULAR; INTRAVENOUS
Status: DISCONTINUED | OUTPATIENT
Start: 2023-04-06 | End: 2023-04-07 | Stop reason: HOSPADM

## 2023-04-06 RX ORDER — ALBUTEROL SULFATE 2.5 MG/3ML
2.5 SOLUTION RESPIRATORY (INHALATION) ONCE AS NEEDED
Status: DISCONTINUED | OUTPATIENT
Start: 2023-04-06 | End: 2023-04-07 | Stop reason: HOSPADM

## 2023-04-06 RX ORDER — MORPHINE SULFATE 2 MG/ML
1 INJECTION, SOLUTION INTRAMUSCULAR; INTRAVENOUS EVERY 4 HOURS PRN
Status: DISCONTINUED | OUTPATIENT
Start: 2023-04-06 | End: 2023-04-07

## 2023-04-06 RX ORDER — ONDANSETRON 2 MG/ML
INJECTION INTRAMUSCULAR; INTRAVENOUS AS NEEDED
Status: DISCONTINUED | OUTPATIENT
Start: 2023-04-06 | End: 2023-04-06 | Stop reason: SURG

## 2023-04-06 RX ORDER — NALOXONE HCL 0.4 MG/ML
0.4 VIAL (ML) INJECTION AS NEEDED
Status: DISCONTINUED | OUTPATIENT
Start: 2023-04-06 | End: 2023-04-07 | Stop reason: HOSPADM

## 2023-04-06 RX ORDER — LORAZEPAM 2 MG/ML
1 INJECTION INTRAMUSCULAR
Status: DISCONTINUED | OUTPATIENT
Start: 2023-04-06 | End: 2023-04-07 | Stop reason: HOSPADM

## 2023-04-06 RX ORDER — ROCURONIUM BROMIDE 10 MG/ML
INJECTION, SOLUTION INTRAVENOUS AS NEEDED
Status: DISCONTINUED | OUTPATIENT
Start: 2023-04-06 | End: 2023-04-06 | Stop reason: SURG

## 2023-04-06 RX ORDER — NALBUPHINE HCL 10 MG/ML
10 AMPUL (ML) INJECTION EVERY 4 HOURS PRN
Status: DISCONTINUED | OUTPATIENT
Start: 2023-04-06 | End: 2023-04-07 | Stop reason: HOSPADM

## 2023-04-06 RX ORDER — NALBUPHINE HCL 10 MG/ML
2 AMPUL (ML) INJECTION EVERY 4 HOURS PRN
Status: DISCONTINUED | OUTPATIENT
Start: 2023-04-06 | End: 2023-04-07 | Stop reason: HOSPADM

## 2023-04-06 RX ORDER — MIDAZOLAM HYDROCHLORIDE 1 MG/ML
1 INJECTION INTRAMUSCULAR; INTRAVENOUS
Status: DISCONTINUED | OUTPATIENT
Start: 2023-04-06 | End: 2023-04-07 | Stop reason: HOSPADM

## 2023-04-06 RX ORDER — FLUMAZENIL 0.1 MG/ML
0.2 INJECTION INTRAVENOUS AS NEEDED
Status: DISCONTINUED | OUTPATIENT
Start: 2023-04-06 | End: 2023-04-07 | Stop reason: HOSPADM

## 2023-04-06 RX ORDER — ONDANSETRON 2 MG/ML
4 INJECTION INTRAMUSCULAR; INTRAVENOUS ONCE AS NEEDED
Status: DISCONTINUED | OUTPATIENT
Start: 2023-04-06 | End: 2023-04-07 | Stop reason: HOSPADM

## 2023-04-06 RX ORDER — MEPERIDINE HYDROCHLORIDE 25 MG/ML
12.5 INJECTION INTRAMUSCULAR; INTRAVENOUS; SUBCUTANEOUS
Status: DISCONTINUED | OUTPATIENT
Start: 2023-04-06 | End: 2023-04-07 | Stop reason: HOSPADM

## 2023-04-06 RX ORDER — FENTANYL CITRATE 50 UG/ML
INJECTION, SOLUTION INTRAMUSCULAR; INTRAVENOUS AS NEEDED
Status: DISCONTINUED | OUTPATIENT
Start: 2023-04-06 | End: 2023-04-06 | Stop reason: SURG

## 2023-04-06 RX ADMIN — HYDROMORPHONE HYDROCHLORIDE 0.5 MG: 1 INJECTION, SOLUTION INTRAMUSCULAR; INTRAVENOUS; SUBCUTANEOUS at 23:55

## 2023-04-06 RX ADMIN — SUGAMMADEX 200 MG: 100 INJECTION, SOLUTION INTRAVENOUS at 23:16

## 2023-04-06 RX ADMIN — ACETAMINOPHEN 650 MG: 325 TABLET, FILM COATED ORAL at 12:25

## 2023-04-06 RX ADMIN — ROCURONIUM BROMIDE 5 MG: 10 INJECTION, SOLUTION INTRAVENOUS at 22:26

## 2023-04-06 RX ADMIN — METOCLOPRAMIDE HYDROCHLORIDE 10 MG: 5 INJECTION INTRAMUSCULAR; INTRAVENOUS at 12:04

## 2023-04-06 RX ADMIN — MORPHINE SULFATE 1 MG: 2 INJECTION, SOLUTION INTRAMUSCULAR; INTRAVENOUS at 21:40

## 2023-04-06 RX ADMIN — Medication 10 ML: at 23:00

## 2023-04-06 RX ADMIN — FENTANYL CITRATE 100 MCG: 50 INJECTION, SOLUTION INTRAMUSCULAR; INTRAVENOUS at 22:27

## 2023-04-06 RX ADMIN — ROCURONIUM BROMIDE 10 MG: 10 INJECTION, SOLUTION INTRAVENOUS at 23:04

## 2023-04-06 RX ADMIN — METOCLOPRAMIDE HYDROCHLORIDE 10 MG: 5 INJECTION INTRAMUSCULAR; INTRAVENOUS at 18:05

## 2023-04-06 RX ADMIN — ONDANSETRON 4 MG: 2 INJECTION INTRAMUSCULAR; INTRAVENOUS at 22:27

## 2023-04-06 RX ADMIN — ENOXAPARIN SODIUM 40 MG: 100 INJECTION SUBCUTANEOUS at 09:23

## 2023-04-06 RX ADMIN — HYDROMORPHONE HYDROCHLORIDE 1 MG: 1 INJECTION, SOLUTION INTRAMUSCULAR; INTRAVENOUS; SUBCUTANEOUS at 23:17

## 2023-04-06 RX ADMIN — NALOXEGOL OXALATE 25 MG: 12.5 TABLET, FILM COATED ORAL at 04:57

## 2023-04-06 RX ADMIN — Medication 10 ML: at 09:24

## 2023-04-06 RX ADMIN — Medication 5000 UNITS: at 09:23

## 2023-04-06 RX ADMIN — METOCLOPRAMIDE HYDROCHLORIDE 10 MG: 5 INJECTION INTRAMUSCULAR; INTRAVENOUS at 04:57

## 2023-04-06 RX ADMIN — MORPHINE SULFATE 1 MG: 2 INJECTION, SOLUTION INTRAMUSCULAR; INTRAVENOUS at 15:39

## 2023-04-06 RX ADMIN — ROCURONIUM BROMIDE 15 MG: 10 INJECTION, SOLUTION INTRAVENOUS at 22:41

## 2023-04-06 RX ADMIN — SODIUM CHLORIDE 500 ML: 0.9 INJECTION, SOLUTION INTRAVENOUS at 22:21

## 2023-04-06 RX ADMIN — PROPOFOL 100 MG: 10 INJECTION, EMULSION INTRAVENOUS at 22:27

## 2023-04-06 RX ADMIN — PIPERACILLIN SODIUM AND TAZOBACTAM SODIUM 3.38 G: 3; .375 INJECTION, POWDER, LYOPHILIZED, FOR SOLUTION INTRAVENOUS at 21:23

## 2023-04-06 RX ADMIN — HYDROMORPHONE HYDROCHLORIDE 0.5 MG: 1 INJECTION, SOLUTION INTRAMUSCULAR; INTRAVENOUS; SUBCUTANEOUS at 23:43

## 2023-04-06 RX ADMIN — SUCCINYLCHOLINE CHLORIDE 140 MG: 20 INJECTION, SOLUTION INTRAMUSCULAR; INTRAVENOUS at 22:26

## 2023-04-06 RX ADMIN — SODIUM CHLORIDE 125 ML/HR: 9 INJECTION, SOLUTION INTRAVENOUS at 21:37

## 2023-04-06 NOTE — NURSING NOTE
Called placed to Dr. Ordoñez, regarding worsening of abdominal wound dehiscence now approximately 3 cm with protrusion, sterile saline gauze applied and covered with ABD pad. Orders given to consult general surgery and keep wound moist.

## 2023-04-06 NOTE — NURSING NOTE
Ileostomy noted to be leaking around wafer.  Patient refused to have wafer/bag replaced at this time.  Reinforced the parameter of wafer.  WOCN to be in later today to provide education/wafer an bag change.

## 2023-04-06 NOTE — THERAPY TREATMENT NOTE
Subjective: Pt agreeable to therapeutic plan of care.  Cognition: oriented to Person, Place, Time and Situation, memory: Normal, arousal/alertness: Alert and Attentive, safety/judgement: good and awareness of deficits: good awareness of safety precautions and good awareness of deficits    Objective: NG drain is removed & pt started on regular diet. ARAVIND drain X1 plus the pigtails for drains across his lower back are present but drains have been removed. Suprapubic catheter present & functioning.    Bed Mobility: Modified-Independent with setup   Functional Transfers: Min-A and with rolling walker. Cues to straighten up but pt remained minimally flexed at the waist in protective posture.  Functional Ambulation: N/A or Not attempted. made ambulatory transfer to chair with very minimal assist using RW but pt could not tolerate standing for long enough to truly mobilize in the room 2* pain.    Lower Body Dressing and Toileting: Dependent  ADL Position: supine  ADL Comments: incontinent of bowel & suprapubic catheter now to manage urination.    Feeding: Modified-Independent  ADL Position: sitting up in bed  ADL Comments: tolerated full diet well this afternoon. Had bowel incontinence earlier.    Vitals: WNL    Pain: 9 VAS  Location: ABD  Interventions for pain: Repositioned, Increased Activity and Therapeutic Presence  Education: Provided education on the importance of mobility in the acute care setting, Verbal/Tactile Cues, ADL training, Transfer Training and Post-Op Precautions    Assessment: Anthony Moss presents with ADL impairments affecting function including balance, endurance / activity tolerance, pain, postural / trunk control, range of motion (ROM) and strength. Demonstrated functioning below baseline abilities indicate the need for continued skilled intervention while inpatient. Tolerating session today without incident. Is progressing slowly but now able to tolerate self feeding regular foods and is  "mobilizing in the bed with setup & supervision and requiring min (A) for transfers. He will need encouragement to increase distance of mobility using RW. Will continue to follow and progress as tolerated.     Plan/Recommendations:   Moderate Intensity Therapy recommended post-acute care. This is recommended as therapy feels the patient would require 3-4 days per week and wouldn't tolerate \"3 hour daily\" rehab intensity. SNF would be the preferred choice. If the patient does not agree to SNF, arrange HH or OP depending on home bound status. If patient is medically complex, consider LTACH.. Pt requires no DME at discharge.     Pt desires Skilled Rehab placement at discharge. Pt cooperative; agreeable to therapeutic recommendations and plan of care.     Modified Thea: 4 = Moderately severe disability (Unable to attend to own bodily needs without assistance, and unable to walk unassisted)     Post-Tx Position: Up in Chair, Alarms activated and Call light and personal items within reach  PPE: gloves, surgical mask and gown    "

## 2023-04-06 NOTE — PLAN OF CARE
Goal Outcome Evaluation:  Plan of Care Reviewed With: patient        Progress: no change  Outcome Evaluation: Pt up to chair with assist x 1, pt with drainage from all sites, midline abdominal surgical site dehisence, Dr. Cabrera notifed. Pt with complaints of 8/10 abdominal pain, new orders, see MAR. VSS, will continue to monitor.

## 2023-04-06 NOTE — PLAN OF CARE
Goal Outcome Evaluation:  Plan of Care Reviewed With: patient        Progress: no change  Outcome Evaluation: Patient without complaints of pain.  Tolerating clear liquid diet well.  Education provided on diet again tonight.  Informed pt that Starburst fruit chews and chewing gum were not a part of his clear liquid diet order. Patient not receptive to education. Encourage to ambulate with assistance. Ileostomy and ARAVIND drain in place, patent to appropriate containers. Bowel fuction returned during day shift.  Vital signs stable. Call light within reach. Care plan on going.

## 2023-04-06 NOTE — THERAPY TREATMENT NOTE
"Subjective: Pt agreeable to therapeutic plan of care. Pt reports he is start an oral diet today.    Objective:     Bed mobility - CGA and Min-A for supine,. sit  Transfers - Min-A and with rolling walker  Ambulation - Pt took pivoting steps with rolling walker and min assist of 1 from bed to recliner. Pt had fecal incontinence upon standing, urostomy leakage, and drain site leaking upon sitting in recliner. [place] [facility] assisted nursing with perianal clean up in standing with r. wx and gown and linen change. Pt was returned to bed and positioned in semi- right side-lying position to ease c/o back pain    Therapeutic Exercise - 10 Reps Bilaterally AROM lying supine    Vitals: WNL    Pain: 9 VAS   Location: \"all over\"  Intervention for pain: Repositioned, Increased Activity and Therapeutic Presence    Education: Provided education on the importance of mobility in the acute care setting    Assessment: Anthony Moss presents with functional mobility impairments which indicate the need for skilled intervention.  Pt no longer has NG tube. Pt fatigues very easily with minimal exertion and is  cooperative with all care.  Tolerating session today without incident. Will continue to follow and progress as tolerated.     Plan/Recommendations:   Moderate Intensity Therapy recommended post-acute care. This is recommended as therapy feels the patient would require 3-4 days per week and wouldn't tolerate \"3 hour daily\" rehab intensity. SNF would be the preferred choice. If the patient does not agree to SNF, arrange HH or OP depending on home bound status. If patient is medically complex, consider LTACH.. Pt requires no DME at discharge.     Pt desires Skilled Rehab placement at discharge. Pt cooperative; agreeable to therapeutic recommendations and plan of care.         Basic Mobility 6-click:  Rollin = Total, A lot = 2, A little = 3; 4 = None  Supine>Sit:   1 = Total, A lot = 2, A little = 3; 4 = " None   Sit>Stand with arms:  1 = Total, A lot = 2, A little = 3; 4 = None  Bed>Chair:   1 = Total, A lot = 2, A little = 3; 4 = None  Ambulate in room:  1 = Total, A lot = 2, A little = 3; 4 = None  3-5 Steps with railin = Total, A lot = 2, A little = 3; 4 = None  Score: 14    Modified [place]: N/A = No pre-op stroke/TIA    Post-Tx Position: Supine with HOB Elevated, Staff Present, Alarms activated and Call light and personal items within reach  PPE: gloves, surgical mask and gown

## 2023-04-06 NOTE — PLAN OF CARE
"   Assessment: Anthony Moss presents with functional mobility impairments which indicate the need for skilled intervention.  Pt no longer has NG tube. Pt fatigues very easily with minimal exertion and is  cooperative with all care.  Tolerating session today without incident. Will continue to follow and progress as tolerated.     Plan/Recommendations:   Moderate Intensity Therapy recommended post-acute care. This is recommended as therapy feels the patient would require 3-4 days per week and wouldn't tolerate \"3 hour daily\" rehab intensity. SNF would be the preferred choice. If the patient does not agree to SNF, arrange HH or OP depending on home bound status. If patient is medically complex, consider LTACH.. Pt requires no DME at discharge.     Pt desires Skilled Rehab placement at discharge. Pt cooperative; agreeable to therapeutic recommendations and plan of care.                 "
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
## 2023-04-06 NOTE — PROGRESS NOTES
PROGRESS NOTE      Patient Name: Anthony Moss  : 1955  MRN: [id number]  Primary Care Physician: Reji Topete MD  Date of admission: 3/28/2023    Patient Care Team:  Reji Topete MD as PCP - General (Family Medicine)        Subjective   Subjective:     Resting in bed.  Review of systems:  Review of Systems -   Neg.except weakness.    Allergies:  No Known Allergies    Objective   Exam:     Vital Signs  Temp:  [98 °F (36.7 °C)-99 °F (37.2 °C)] 98 °F (36.7 °C)  Heart Rate:  [] 100  Resp:  [16-18] 18  BP: (104-129)/(60-77) 104/68  SpO2:  [96 %] 96 %  on   ;   Device (Oxygen Therapy): room air  Body mass index is 25.56 kg/m².    General:     male in no acute distress.    Head:      Normocephalic and atraumatic.    Eyes:      PERRL/EOM intact, conjunctiva and sclera clear with out nystagmus.    Neck:      No masses, thyromegaly,  trachea central with normal respiratory effort   Lungs:    Clear bilaterally to auscultation.    Heart:      Regular rate and rhythm, no murmur no gallop  Abd:        Soft, nontender, not distended, bowel sounds positive, no shifting dullness   Pulses:   Pulses palpable  Extr:        No cyanosis or clubbing--+ edema.    Neuro:    No focal deficits.   alert oriented x3  Skin:       Intact without lesions or rashes.    Psych:    Alert and cooperative; normal mood and affect; .      Results Review:  I have personally reviewed most recent Data :  CBC    Results from last 7 days   Lab Units 23  0309 23  0118 23  0135 23  2240 23  0529   WBC 10*3/mm3 13.20* 13.20* 11.40* 9.40 10.30 11.20*   HEMOGLOBIN g/dL 8.4* 8.3* 8.6* 9.1* 9.1* 8.3*   PLATELETS 10*3/mm3 296 267 249 247 227 173     CMP   Results from last 7 days   Lab Units 23  23323  1756 23  0309 23  0118 23  0135 23  2240 23  0530   SODIUM mmol/L 139  --  139 139 136 136 133*   POTASSIUM mmol/L 4.0 3.8 3.3* 3.8 4.0  4.3 4.2   CHLORIDE mmol/L 101  --  97* 96* 94* 99 100   CO2 mmol/L 25.0  --  30.0* 28.0 29.0 27.0 21.0*   BUN mg/dL 22  --  28* 33* 34* 24* 24*   CREATININE mg/dL 1.26  --  1.34* 1.65* 1.81* 1.70* 1.53*   GLUCOSE mg/dL 90  --  104* 105* 132* 149* 147*   ALBUMIN g/dL  --   --   --   --   --   --  3.0*   BILIRUBIN mg/dL  --   --   --   --   --   --  0.3   ALK PHOS U/L  --   --   --   --   --   --  126*   AST (SGOT) U/L  --   --   --   --   --   --  18   ALT (SGPT) U/L  --   --   --   --   --   --  7     ABG      No radiology results for the last day    Results for orders placed during the hospital encounter of 22    Adult Transthoracic Echo Complete w/ Color, Spectral and Contrast if Necessary Per Protocol    Interpretation Summary  · Left ventricular ejection fraction appears to be 56 - 60%.  · The right ventricular cavity is moderately dilated.  · No pericardial effusion noted    Scheduled Meds:enoxaparin, 40 mg, Subcutaneous, Daily  metoclopramide, 10 mg, Intravenous, Q6H  Naloxegol Oxalate, 25 mg, Oral, QAM  polyethylene glycol, 17 g, Oral, BID  sodium chloride, 10 mL, Intravenous, Q12H  vitamin D3, 5,000 Units, Oral, Daily      Continuous Infusions:sodium chloride, 80 mL/hr, Last Rate: 80 mL/hr (23 4124)      PRN Meds:•  acetaminophen **OR** acetaminophen  •  docusate sodium  •  lactulose  •  [] HYDROmorphone **AND** naloxone  •  ondansetron **OR** ondansetron  •  phenol  •  potassium chloride **OR** potassium chloride **OR** potassium chloride  •  senna-docusate sodium  •  sodium chloride  •  sodium chloride    Assessment & Plan   Assessment and Plan:         Bladder cancer    History of alcohol use disorder    Prostate cancer    Elevated serum creatinine    Attention to urostomy    ASSESSMENT:  • Acute kidney injury, stage III, status post radical cystectomy getting better hyperkalemia, secondary to CARLOS ALBERTO  • Metabolic acidosis secondary to CKD start low-dose sodium bicarbonate  • History of  bladder cancer,   • History of alcohol use,  • Chronic back pain,using NSAID in the past  • B/l pneumonia.  Surprisingly, WBC stable,           Plan:      • At this time patient with history of bladder cancer status post cystectomy urine output is adequate  • Hyponatremia stable now  • Patient renal functions continue to get better now back to baseline  • GI symptoms better  • No need for extra IV fluid  • F/u with labs   • Okay to be discharged when okay with urology  • Potassium level is acceptable  • Echocardiogram done last time showed ejection fraction 56 to 60%  • Repeat labs tomorrow morning          Electronically signed by Jamie Houston MD,   [facility] kidney consultant  [phone]  4/6/2023  12:33 EDT

## 2023-04-06 NOTE — CASE MANAGEMENT/SOCIAL WORK
Continued Stay Note  RADHA Ruffin     Patient Name: Anthony Moss  MRN: [id number]  Today's Date: 4/6/2023    Admit Date: 3/28/2023    Plan: Accepted at [facility] with New precert to be begun by facility 4/6 after PT/OT updates are in   Discharge Plan     Row Name 04/06/23 1316       Plan    Plan Accepted at [facility] with New precert to be begun by facility 4/6 after PT/OT updates are in    Plan Comments Call to [employer] rep to ask for precert to be restarted today. States she will beging recert when updated notes in system. Barrier to dc: diet toleration                   Expected Discharge Date and Time     Expected Discharge Date Expected Discharge Time    Apr 7, 2023             Katlyn Rutledge RN

## 2023-04-06 NOTE — PROGRESS NOTES
GENERAL SURGERY PROGRESS NOTE    4/6/2023   LOS: 9 days   Patient Care Team:  Reji Topete MD as PCP - General (Family Medicine)    CYSTECTOMY RADICAL  3/28/2023  Chief Complaint: Postoperative ileus    Subjective   HPI: Patient is a 67 y.o. male presents with locally advanced bladder cancer and subsequently underwent a radical cystectomy and prostatectomy with ileal conduit placement on 3/28/2023.  Postoperatively, the patient has done okay, but reports that he has been having multiple loose bowel movements and high NG tube output with persistent nausea.  General surgery was consulted to evaluate and help with treatment with postoperative ileus versus bowel obstruction.     Interval History:   Continued BMs. No N/V.    Objective     Vital Signs  Temp:  [98 °F (36.7 °C)-99 °F (37.2 °C)] 98 °F (36.7 °C)  Heart Rate:  [] 100  Resp:  [16-18] 18  BP: (104-129)/(60-77) 104/68    Physical Exam  Vitals reviewed.   Constitutional:       General: He is not in acute distress.     Appearance: He is not ill-appearing.   HENT:      Nose:      Comments: NG tube has been removed  Pulmonary:      Effort: Pulmonary effort is normal. No respiratory distress.   Abdominal:      General: There is no distension.      Palpations: Abdomen is soft.      Comments: Appropriately tender, staples in place.   Musculoskeletal:         General: No swelling. Normal range of motion.   Skin:     General: Skin is warm and dry.      Coloration: Skin is not jaundiced.   Neurological:      General: No focal deficit present.      Mental Status: He is alert and oriented to person, place, and time.          Results Review:    Lab Results (last 24 hours)     Procedure Component Value Units Date/Time    CBC (No Diff) [[id number]]  (Abnormal) Collected: 04/06/23 1245    Specimen: Blood Updated: 04/06/23 1303     WBC 21.80 10*3/mm3      RBC 3.13 10*6/mm3      Hemoglobin 8.6 g/dL      Hematocrit 26.4 %      MCV 84.4 fL      MCH 27.4 pg      MCHC  32.5 g/dL      RDW 17.5 %      RDW-SD 54.7 fl      MPV 8.1 fL      Platelets 325 10*3/mm3     Basic Metabolic Panel [[id number]] Collected: 23 1245    Specimen: Blood Updated: 23 1255        Imaging Results (Last 24 Hours)     ** No results found for the last 24 hours. **           I have reviewed the above results and noted them below    Medication Review:    Current Facility-Administered Medications:   •  acetaminophen (TYLENOL) tablet 650 mg, 650 mg, Oral, Q4H PRN, 650 mg at 23 1225 **OR** acetaminophen (TYLENOL) suppository 650 mg, 650 mg, Rectal, Q4H PRN, Xu Montero MD  •  docusate sodium (COLACE) capsule 100 mg, 100 mg, Oral, BID PRN, Xu Montero MD, 100 mg at 23 0715  •  Enoxaparin Sodium (LOVENOX) syringe 40 mg, 40 mg, Subcutaneous, Daily, Xu Montero MD, 40 mg at 23 0923  •  lactulose (CHRONULAC) solution for enema 300 mL, 300 mL, Rectal, Daily PRN, Saima Roberto, DO  •  metoclopramide (REGLAN) injection 10 mg, 10 mg, Intravenous, Q6H, Michael Witt MD, 10 mg at 23 1204  •  Naloxegol Oxalate (MOVANTIK) tablet 25 mg, 25 mg, Oral, Lynette JARAMILLO Aman I DO, 25 mg at 23 0457  •  [] HYDROmorphone (DILAUDID) injection 1 mg, 1 mg, Intravenous, Q2H PRN, 1 mg at 23 1105 **AND** naloxone (NARCAN) injection 0.1 mg, 0.1 mg, Intravenous, Q5 Min PRN, Saima Roberto DO  •  ondansetron (ZOFRAN) tablet 4 mg, 4 mg, Oral, Q6H PRN, 4 mg at 23 0715 **OR** ondansetron (ZOFRAN) injection 4 mg, 4 mg, Intravenous, Q6H PRN, Xu Montero MD, 4 mg at 23 0240  •  phenol (CHLORASEPTIC) 1.4 % liquid 1 spray, 1 spray, Mouth/Throat, Q2H PRN, Saima Roberto DO, 1 spray at 23 0719  •  polyethylene glycol (MIRALAX) packet 17 g, 17 g, Oral, BID, James Ojeda DO, 17 g at 23 07  •  potassium chloride (K-DUR,KLOR-CON) CR tablet 40 mEq, 40 mEq, Oral, PRN **OR** potassium chloride (KLOR-CON) packet 40 mEq, 40 mEq, Oral, PRN  **OR** potassium chloride 10 mEq in 100 mL IVPB, 10 mEq, Intravenous, Q1H PRN, Saima Roberto DO, Last Rate: 100 mL/hr at 04/04/23 1356, 10 mEq at 04/04/23 1356  •  sennosides-docusate (PERICOLACE) 8.6-50 MG per tablet 2 tablet, 2 tablet, Oral, BID PRN, James Ojeda DO, 2 tablet at 03/31/23 1601  •  sodium chloride 0.9 % flush 10 mL, 10 mL, Intravenous, Q12H, Xu Montero MD, 10 mL at 04/06/23 0924  •  sodium chloride 0.9 % flush 10 mL, 10 mL, Intravenous, PRN, Xu Montero MD  •  sodium chloride 0.9 % infusion 40 mL, 40 mL, Intravenous, PRN, Xu Montero MD  •  sodium chloride 0.9 % infusion, 80 mL/hr, Intravenous, Continuous, Jamie Houston MD, Last Rate: 80 mL/hr at 04/04/23 1751, 80 mL/hr at 04/04/23 1751  •  vitamin D3 capsule 5,000 Units, 5,000 Units, Oral, Daily, Xu Montero MD, 5,000 Units at 04/06/23 0923    Assessment & Plan     Principal Problem:    Bladder cancer  Active Problems:    History of alcohol use disorder    Prostate cancer    Elevated serum creatinine    Attention to urostomy    Continue Movantik and Reglan  Replace electrolytes  Mobilize patient is much as possible  ADAT. If tolerates, can d/c from general surgical perspective    Plan for disposition: Low fiber diet today    Michael Witt MD  04/06/23  13:11 EDT

## 2023-04-06 NOTE — NURSING NOTE
WOCN note:    WOCN follow up for pouch change and ongoing ostomy education following a radical cystectomy with ileal conduit formation on 3/28/23.   Patient reported the pouch leaked early this AM and was changed. Attempted to engage the patient during the pouch change but he kept falling asleep.     The eva-stomal skin is intact. The sutures of the mucocutaneous junction have pulled and the area remains open. The stoma is located in a deep fold and is covered when patient sits upright. There is a crease at 3:00 where a small bead of Stomahesive paste was applied. A Domonique soft convex pouch was cut to fit and applied after the skin was cleansed and dried and prepped with barrier spray. The pouch was window-paned with PINQ tape and a belt was attached. The pouch was reattached to bedside drainage bag. Patient slept through the procedure. We will continue to follow.

## 2023-04-06 NOTE — PLAN OF CARE
"Goal Outcome Evaluation:      Assessment: Anthony Moss presents with ADL impairments affecting function including balance, endurance / activity tolerance, pain, postural / trunk control, range of motion (ROM) and strength. Demonstrated functioning below baseline abilities indicate the need for continued skilled intervention while inpatient. Tolerating session today without incident. Is progressing slowly but now able to tolerate self feeding regular foods and is mobilizing in the bed with setup & supervision and requiring min (A) for transfers. He will need encouragement to increase distance of mobility using RW. Will continue to follow and progress as tolerated.     Plan/Recommendations:   Moderate Intensity Therapy recommended post-acute care. This is recommended as therapy feels the patient would require 3-4 days per week and wouldn't tolerate \"3 hour daily\" rehab intensity. SNF would be the preferred choice. If the patient does not agree to SNF, arrange HH or OP depending on home bound status. If patient is medically complex, consider LTACH.. Pt requires no DME at discharge.     Pt desires Skilled Rehab placement at discharge. Pt cooperative; agreeable to therapeutic recommendations and plan of care.            "

## 2023-04-07 ENCOUNTER — APPOINTMENT (OUTPATIENT)
Dept: GENERAL RADIOLOGY | Facility: HOSPITAL | Age: 68
End: 2023-04-07
Payer: MEDICARE

## 2023-04-07 LAB
ALBUMIN SERPL-MCNC: 2.4 G/DL (ref 3.5–5.2)
ALBUMIN/GLOB SERPL: 0.7 G/DL
ALP SERPL-CCNC: 166 U/L (ref 39–117)
ALT SERPL W P-5'-P-CCNC: 9 U/L (ref 1–41)
ANION GAP SERPL CALCULATED.3IONS-SCNC: 12 MMOL/L (ref 5–15)
ANION GAP SERPL CALCULATED.3IONS-SCNC: 9 MMOL/L (ref 5–15)
ANISOCYTOSIS BLD QL: ABNORMAL
ARTERIAL PATENCY WRIST A: POSITIVE
AST SERPL-CCNC: 16 U/L (ref 1–40)
ATMOSPHERIC PRESS: ABNORMAL MM[HG]
BASE EXCESS BLDA CALC-SCNC: -2.8 MMOL/L (ref 0–3)
BDY SITE: ABNORMAL
BILIRUB SERPL-MCNC: 0.6 MG/DL (ref 0–1.2)
BUN SERPL-MCNC: 25 MG/DL (ref 8–23)
BUN SERPL-MCNC: 25 MG/DL (ref 8–23)
BUN/CREAT SERPL: 12.2 (ref 7–25)
BUN/CREAT SERPL: 12.4 (ref 7–25)
CALCIUM SPEC-SCNC: 7.2 MG/DL (ref 8.6–10.5)
CALCIUM SPEC-SCNC: 7.6 MG/DL (ref 8.6–10.5)
CHLORIDE SERPL-SCNC: 103 MMOL/L (ref 98–107)
CHLORIDE SERPL-SCNC: 106 MMOL/L (ref 98–107)
CO2 BLDA-SCNC: 22.8 MMOL/L (ref 22–29)
CO2 SERPL-SCNC: 20 MMOL/L (ref 22–29)
CO2 SERPL-SCNC: 21 MMOL/L (ref 22–29)
CREAT SERPL-MCNC: 2.01 MG/DL (ref 0.76–1.27)
CREAT SERPL-MCNC: 2.05 MG/DL (ref 0.76–1.27)
CRP SERPL-MCNC: 25.84 MG/DL (ref 0–0.5)
D-LACTATE SERPL-SCNC: 1.3 MMOL/L (ref 0.5–2)
DEPRECATED RDW RBC AUTO: 55.6 FL (ref 37–54)
EGFRCR SERPLBLD CKD-EPI 2021: 35.3 ML/MIN/1.73
EGFRCR SERPLBLD CKD-EPI 2021: 36.1 ML/MIN/1.73
ERYTHROCYTE [DISTWIDTH] IN BLOOD BY AUTOMATED COUNT: 17.5 % (ref 12.3–15.4)
GLOBULIN UR ELPH-MCNC: 3.3 GM/DL
GLUCOSE BLDC GLUCOMTR-MCNC: 157 MG/DL (ref 70–105)
GLUCOSE SERPL-MCNC: 170 MG/DL (ref 65–99)
GLUCOSE SERPL-MCNC: 380 MG/DL (ref 65–99)
HCO3 BLDA-SCNC: 21.7 MMOL/L (ref 21–28)
HCT VFR BLD AUTO: 26.2 % (ref 37.5–51)
HEMODILUTION: NO
HGB BLD-MCNC: 8.4 G/DL (ref 13–17.7)
INHALED O2 CONCENTRATION: 40 %
LYMPHOCYTES # BLD MANUAL: 1.22 10*3/MM3 (ref 0.7–3.1)
LYMPHOCYTES NFR BLD MANUAL: 4 % (ref 5–12)
MAGNESIUM SERPL-MCNC: 1.5 MG/DL (ref 1.6–2.4)
MCH RBC QN AUTO: 27 PG (ref 26.6–33)
MCHC RBC AUTO-ENTMCNC: 31.9 G/DL (ref 31.5–35.7)
MCV RBC AUTO: 84.6 FL (ref 79–97)
METAMYELOCYTES NFR BLD MANUAL: 2 % (ref 0–0)
MODALITY: ABNORMAL
MONOCYTES # BLD: 1.63 10*3/MM3 (ref 0.1–0.9)
MRSA DNA SPEC QL NAA+PROBE: NORMAL
NEUTROPHILS # BLD AUTO: 37.13 10*3/MM3 (ref 1.7–7)
NEUTROPHILS NFR BLD MANUAL: 79 % (ref 42.7–76)
NEUTS BAND NFR BLD MANUAL: 12 % (ref 0–5)
PCO2 BLDA: 35.6 MM HG (ref 35–48)
PH BLDA: 7.39 PH UNITS (ref 7.35–7.45)
PHOSPHATE SERPL-MCNC: 4.5 MG/DL (ref 2.5–4.5)
PLAT MORPH BLD: NORMAL
PLATELET # BLD AUTO: 361 10*3/MM3 (ref 140–450)
PMV BLD AUTO: 8.1 FL (ref 6–12)
PO2 BLDA: 105.3 MM HG (ref 83–108)
POTASSIUM SERPL-SCNC: 4.5 MMOL/L (ref 3.5–5.2)
POTASSIUM SERPL-SCNC: 4.7 MMOL/L (ref 3.5–5.2)
PROT SERPL-MCNC: 5.7 G/DL (ref 6–8.5)
RBC # BLD AUTO: 3.1 10*6/MM3 (ref 4.14–5.8)
SAO2 % BLDCOA: 98.1 % (ref 94–98)
SCAN SLIDE: NORMAL
SODIUM SERPL-SCNC: 133 MMOL/L (ref 136–145)
SODIUM SERPL-SCNC: 138 MMOL/L (ref 136–145)
TOXIC GRANULATION: ABNORMAL
TRIGL SERPL-MCNC: 92 MG/DL (ref 0–150)
VARIANT LYMPHS NFR BLD MANUAL: 3 % (ref 19.6–45.3)
WBC NRBC COR # BLD: 40.8 10*3/MM3 (ref 3.4–10.8)

## 2023-04-07 PROCEDURE — 87040 BLOOD CULTURE FOR BACTERIA: CPT | Performed by: UROLOGY

## 2023-04-07 PROCEDURE — 99024 POSTOP FOLLOW-UP VISIT: CPT | Performed by: SURGERY

## 2023-04-07 PROCEDURE — 82962 GLUCOSE BLOOD TEST: CPT

## 2023-04-07 PROCEDURE — 71045 X-RAY EXAM CHEST 1 VIEW: CPT

## 2023-04-07 PROCEDURE — 25010000002 ENOXAPARIN PER 10 MG: Performed by: UROLOGY

## 2023-04-07 PROCEDURE — 25010000002 METOCLOPRAMIDE PER 10 MG: Performed by: UROLOGY

## 2023-04-07 PROCEDURE — 25010000002 HYDROMORPHONE 1 MG/ML SOLUTION: Performed by: ANESTHESIOLOGY

## 2023-04-07 PROCEDURE — 25010000002 PIPERACILLIN SOD-TAZOBACTAM PER 1 G: Performed by: UROLOGY

## 2023-04-07 PROCEDURE — 83735 ASSAY OF MAGNESIUM: CPT | Performed by: SURGERY

## 2023-04-07 PROCEDURE — 84100 ASSAY OF PHOSPHORUS: CPT | Performed by: SURGERY

## 2023-04-07 PROCEDURE — 85007 BL SMEAR W/DIFF WBC COUNT: CPT | Performed by: INTERNAL MEDICINE

## 2023-04-07 PROCEDURE — 25010000002 ONDANSETRON PER 1 MG: Performed by: UROLOGY

## 2023-04-07 PROCEDURE — 80053 COMPREHEN METABOLIC PANEL: CPT | Performed by: INTERNAL MEDICINE

## 2023-04-07 PROCEDURE — 82803 BLOOD GASES ANY COMBINATION: CPT

## 2023-04-07 PROCEDURE — 36600 WITHDRAWAL OF ARTERIAL BLOOD: CPT

## 2023-04-07 PROCEDURE — 84478 ASSAY OF TRIGLYCERIDES: CPT | Performed by: SURGERY

## 2023-04-07 PROCEDURE — C1751 CATH, INF, PER/CENT/MIDLINE: HCPCS

## 2023-04-07 PROCEDURE — 83605 ASSAY OF LACTIC ACID: CPT | Performed by: INTERNAL MEDICINE

## 2023-04-07 PROCEDURE — 25010000002 VANCOMYCIN 10 G RECONSTITUTED SOLUTION: Performed by: INTERNAL MEDICINE

## 2023-04-07 PROCEDURE — 87641 MR-STAPH DNA AMP PROBE: CPT | Performed by: INTERNAL MEDICINE

## 2023-04-07 PROCEDURE — 3E0436Z INTRODUCTION OF NUTRITIONAL SUBSTANCE INTO CENTRAL VEIN, PERCUTANEOUS APPROACH: ICD-10-PCS

## 2023-04-07 PROCEDURE — 25010000002 MORPHINE PER 10 MG: Performed by: UROLOGY

## 2023-04-07 PROCEDURE — 86140 C-REACTIVE PROTEIN: CPT | Performed by: SURGERY

## 2023-04-07 PROCEDURE — 63710000001 INSULIN LISPRO (HUMAN) PER 5 UNITS: Performed by: SURGERY

## 2023-04-07 PROCEDURE — 87040 BLOOD CULTURE FOR BACTERIA: CPT | Performed by: INTERNAL MEDICINE

## 2023-04-07 PROCEDURE — 02HV33Z INSERTION OF INFUSION DEVICE INTO SUPERIOR VENA CAVA, PERCUTANEOUS APPROACH: ICD-10-PCS

## 2023-04-07 PROCEDURE — 85025 COMPLETE CBC W/AUTO DIFF WBC: CPT | Performed by: INTERNAL MEDICINE

## 2023-04-07 RX ORDER — IBUPROFEN 600 MG/1
1 TABLET ORAL
Status: DISCONTINUED | OUTPATIENT
Start: 2023-04-07 | End: 2023-04-20 | Stop reason: HOSPADM

## 2023-04-07 RX ORDER — DEXTROSE MONOHYDRATE 100 MG/ML
42 INJECTION, SOLUTION INTRAVENOUS CONTINUOUS
Status: DISCONTINUED | OUTPATIENT
Start: 2023-04-07 | End: 2023-04-08

## 2023-04-07 RX ORDER — VANCOMYCIN 1.75 GRAM/500 ML IN 0.9 % SODIUM CHLORIDE INTRAVENOUS
20 ONCE
Status: COMPLETED | OUTPATIENT
Start: 2023-04-07 | End: 2023-04-07

## 2023-04-07 RX ORDER — NICOTINE POLACRILEX 4 MG
15 LOZENGE BUCCAL
Status: DISCONTINUED | OUTPATIENT
Start: 2023-04-07 | End: 2023-04-20 | Stop reason: HOSPADM

## 2023-04-07 RX ORDER — INSULIN LISPRO 100 [IU]/ML
2-7 INJECTION, SOLUTION INTRAVENOUS; SUBCUTANEOUS EVERY 6 HOURS SCHEDULED
Status: DISCONTINUED | OUTPATIENT
Start: 2023-04-07 | End: 2023-04-17 | Stop reason: SDUPTHER

## 2023-04-07 RX ORDER — SODIUM CHLORIDE, SODIUM LACTATE, POTASSIUM CHLORIDE, CALCIUM CHLORIDE 600; 310; 30; 20 MG/100ML; MG/100ML; MG/100ML; MG/100ML
100 INJECTION, SOLUTION INTRAVENOUS CONTINUOUS
Status: DISCONTINUED | OUTPATIENT
Start: 2023-04-07 | End: 2023-04-08

## 2023-04-07 RX ORDER — DEXTROSE MONOHYDRATE 25 G/50ML
25 INJECTION, SOLUTION INTRAVENOUS
Status: DISCONTINUED | OUTPATIENT
Start: 2023-04-07 | End: 2023-04-20 | Stop reason: HOSPADM

## 2023-04-07 RX ADMIN — Medication 5000 UNITS: at 08:12

## 2023-04-07 RX ADMIN — SODIUM CHLORIDE, POTASSIUM CHLORIDE, SODIUM LACTATE AND CALCIUM CHLORIDE 100 ML/HR: 600; 310; 30; 20 INJECTION, SOLUTION INTRAVENOUS at 09:48

## 2023-04-07 RX ADMIN — MORPHINE SULFATE 4 MG: 4 INJECTION, SOLUTION INTRAMUSCULAR; INTRAVENOUS at 13:07

## 2023-04-07 RX ADMIN — POLYETHYLENE GLYCOL 3350 17 G: 17 POWDER, FOR SOLUTION ORAL at 21:22

## 2023-04-07 RX ADMIN — ACETAMINOPHEN 650 MG: 650 SUPPOSITORY RECTAL at 04:54

## 2023-04-07 RX ADMIN — METOCLOPRAMIDE HYDROCHLORIDE 10 MG: 5 INJECTION INTRAMUSCULAR; INTRAVENOUS at 05:33

## 2023-04-07 RX ADMIN — HYDROMORPHONE HYDROCHLORIDE 0.5 MG: 1 INJECTION, SOLUTION INTRAMUSCULAR; INTRAVENOUS; SUBCUTANEOUS at 00:05

## 2023-04-07 RX ADMIN — METOCLOPRAMIDE HYDROCHLORIDE 10 MG: 5 INJECTION INTRAMUSCULAR; INTRAVENOUS at 11:23

## 2023-04-07 RX ADMIN — PIPERACILLIN SODIUM AND TAZOBACTAM SODIUM 3.38 G: 3; .375 INJECTION, POWDER, LYOPHILIZED, FOR SOLUTION INTRAVENOUS at 21:22

## 2023-04-07 RX ADMIN — MORPHINE SULFATE 4 MG: 4 INJECTION, SOLUTION INTRAMUSCULAR; INTRAVENOUS at 21:22

## 2023-04-07 RX ADMIN — HYDROMORPHONE HYDROCHLORIDE 0.5 MG: 1 INJECTION, SOLUTION INTRAMUSCULAR; INTRAVENOUS; SUBCUTANEOUS at 00:17

## 2023-04-07 RX ADMIN — METOCLOPRAMIDE HYDROCHLORIDE 10 MG: 5 INJECTION INTRAMUSCULAR; INTRAVENOUS at 03:02

## 2023-04-07 RX ADMIN — PIPERACILLIN SODIUM AND TAZOBACTAM SODIUM 3.38 G: 3; .375 INJECTION, POWDER, LYOPHILIZED, FOR SOLUTION INTRAVENOUS at 03:00

## 2023-04-07 RX ADMIN — PIPERACILLIN SODIUM AND TAZOBACTAM SODIUM 3.38 G: 3; .375 INJECTION, POWDER, LYOPHILIZED, FOR SOLUTION INTRAVENOUS at 11:22

## 2023-04-07 RX ADMIN — Medication 10 ML: at 21:13

## 2023-04-07 RX ADMIN — SODIUM CHLORIDE, POTASSIUM CHLORIDE, SODIUM LACTATE AND CALCIUM CHLORIDE 100 ML/HR: 600; 310; 30; 20 INJECTION, SOLUTION INTRAVENOUS at 18:19

## 2023-04-07 RX ADMIN — METOCLOPRAMIDE HYDROCHLORIDE 10 MG: 5 INJECTION INTRAMUSCULAR; INTRAVENOUS at 17:32

## 2023-04-07 RX ADMIN — DEXTROSE MONOHYDRATE 42 ML/HR: 100 INJECTION, SOLUTION INTRAVENOUS at 15:48

## 2023-04-07 RX ADMIN — Medication 10 ML: at 08:14

## 2023-04-07 RX ADMIN — INSULIN LISPRO 2 UNITS: 100 INJECTION, SOLUTION INTRAVENOUS; SUBCUTANEOUS at 17:54

## 2023-04-07 RX ADMIN — ENOXAPARIN SODIUM 40 MG: 100 INJECTION SUBCUTANEOUS at 08:13

## 2023-04-07 RX ADMIN — ONDANSETRON 4 MG: 2 INJECTION INTRAMUSCULAR; INTRAVENOUS at 13:07

## 2023-04-07 RX ADMIN — POLYETHYLENE GLYCOL 3350 17 G: 17 POWDER, FOR SOLUTION ORAL at 08:12

## 2023-04-07 RX ADMIN — VANCOMYCIN HYDROCHLORIDE 1750 MG: 10 INJECTION, POWDER, LYOPHILIZED, FOR SOLUTION INTRAVENOUS at 05:34

## 2023-04-07 RX ADMIN — SODIUM CHLORIDE 500 ML: 9 INJECTION, SOLUTION INTRAVENOUS at 09:40

## 2023-04-07 NOTE — PROGRESS NOTES
[facility] Daily Progress Note    Patient Name: Anthony Moss  : 1955  MRN: [id number]  Primary Care Physician:  Reji Topete MD  Date of admission: 3/28/2023      Subjective      Chief Complaint: Constipation, weakness      Patient Reports     3/31/23: OOB to chair.  PT consulted.  Constipated.  Undecided whether SNF versus HH.  Lives alone.  DC IVF.  Nephrology and urology following. + ARAVIND drain with serosanguineous fluid.    23: Patient with multiple vomiting overnight NG tube placed with bilious discharge.  Repeat KUB.  Gaiting [employer].    23: Patient having NGT removed.  Still feeling pretty rough at this time improved abdominal pain.  Somnolent as well.  Awaiting SNF placement.    4/3/2023: Patient had NGT replaced due to likely ileus.  Having some surgical site TTP and soreness.  Still feeling pretty good overall, forward to improving his p.o. intake and getting started with the rest of his care plan.    23: Still has NGT in place.  Having some more feelings of hunger, agreeable to current management.  Also reporting constipation.    23: NGT was removed today, had large volume bowel movements as well.  Patient feeling a lot better today.  Advance to clear liquid diet.    23: Had significant bowel movement as well, still having some more abdominal pain today.  Denies any nausea, vomiting.  Is tolerating p.o. intake as well, but still only liquids and high-fiber.    23: Patient went to the OR last night for wound dehiscence repair.  General surgery and urology recs are appreciated for current management.  Patient to continue IV ABX, pending ID recs.    Review of Systems   All other systems reviewed and are negative.         Objective      Vitals:   Temp:  [98.1 °F (36.7 °C)-100.9 °F (38.3 °C)] 100.1 °F (37.8 °C)  Heart Rate:  [] 91  Resp:  [18-27] 23  BP: ()/(54-86) 106/66  Flow (L/min):  [3-5] 3    Physical  Exam  HENT:      Head: Normocephalic.      Nose: Nose normal.   Eyes:      Extraocular Movements: Extraocular movements intact.      Pupils: Pupils are equal, round, and reactive to light.   Cardiovascular:      Rate and Rhythm: Normal rate.   Pulmonary:      Effort: Pulmonary effort is normal.      Breath sounds: Normal breath sounds.   Abdominal:      General: Bowel sounds are normal.      Comments: Ileal conduit with urine.  Left lower quadrant ARAVIND drain with serosanguineous fluid.   Musculoskeletal:         General: Normal range of motion.      Cervical back: Normal range of motion.   Skin:     General: Skin is warm.   Neurological:      Mental Status: He is alert. Mental status is at baseline.   Psychiatric:         Mood and Affect: Mood normal.             Result Review    Result Review:  I have personally reviewed the results from the time of this admission to 4/7/2023 13:09 EDT and agree with these findings:  [x]  Laboratory  []  Microbiology  [x]  Radiology  []  EKG/Telemetry   []  Cardiology/Vascular   []  Pathology  [x]  Old records  []  Other:      Wounds (last 24 hours)     LDA Wound     Row Name 04/07/23 1200 04/07/23 0800 04/07/23 0608       Wound 04/06/23 2243 abdomen Incision    Wound - Properties Group Placement Date: 04/06/23  -PH Placement Time: 2243  -PH Present on Hospital Admission: Y  -PH Location: abdomen  -PH Primary Wound Type: Incision  -PH    Dressing Appearance -- -- --  abd binder  -PB    Closure -- -- Adhesive bandage;STEFF  -PB    Base dressing in place, unable to visualize  -BM dressing in place, unable to visualize  -BM dressing in place, unable to visualize  -PB    Drainage Amount small  -BM small  -BM none  -PB    Dressing Care abdominal binder utilized  -BM abdominal binder utilized  -BM --    Retired Wound - Properties Group Placement Date: 04/06/23  -PH Placement Time: 2243  -PH Present on Hospital Admission: Y  -PH Location: abdomen  -PH Primary Wound Type: Incision  -PH     Retired Wound - Properties Group Date first assessed: 04/06/23  -PH Time first assessed: 2243  -PH Present on Hospital Admission: Y  -PH Location: abdomen  -PH Primary Wound Type: Incision  -PH    Row Name 04/07/23 0049 04/07/23 0014 04/06/23 2359       Wound 04/06/23 2243 abdomen Incision    Wound - Properties Group Placement Date: 04/06/23  -PH Placement Time: 2243  -PH Present on Hospital Admission: Y  -PH Location: abdomen  -PH Primary Wound Type: Incision  -PH    Dressing Appearance -- dry;intact;no drainage  -SD dry;intact;no drainage  -SD    Closure Adhesive bandage;STEFF  -JM Adhesive bandage;STEFF  -SD Adhesive bandage;STEFF  -SD    Base dressing in place, unable to visualize  -JM dressing in place, unable to visualize  -SD dressing in place, unable to visualize  -SD    Drainage Amount none  -JM none  -SD none  -SD    Retired Wound - Properties Group Placement Date: 04/06/23  -PH Placement Time: 2243  -PH Present on Hospital Admission: Y  -PH Location: abdomen  -PH Primary Wound Type: Incision  -PH    Retired Wound - Properties Group Date first assessed: 04/06/23  -PH Time first assessed: 2243  -PH Present on Hospital Admission: Y  -PH Location: abdomen  -PH Primary Wound Type: Incision  -PH    Row Name 04/06/23 2344 04/06/23 2329 04/06/23 2318       [REMOVED] Wound 03/28/23 1305 abdomen Incision    Wound - Properties Group Placement Date: 03/28/23  -AM Placement Time: 1305  -AM Location: abdomen  -AM Primary Wound Type: Incision  -AM Removal Date: 04/06/23  -PH Removal Time: 2243 -PH Wound Outcome: Other  -PH    Retired Wound - Properties Group Placement Date: 03/28/23  -AM Placement Time: 1305  -AM Location: abdomen  -AM Primary Wound Type: Incision  -AM Removal Date: 04/06/23  -PH Removal Time: 2243 -PH Wound Outcome: Other  -PH    Retired Wound - Properties Group Date first assessed: 03/28/23  -AM Time first assessed: 1305  -AM Location: abdomen  -AM Primary Wound Type: Incision  -AM Resolution Date:  04/06/23  -PH Resolution Time: 2243 -PH Wound Outcome: Other  -PH       Wound 04/06/23 2243 abdomen Incision    Wound - Properties Group Placement Date: 04/06/23  -PH Placement Time: 2243  -PH Present on Hospital Admission: Y  -PH Location: abdomen  -PH Primary Wound Type: Incision  -PH    Dressing Appearance dry;intact;no drainage  -SD dry;intact;no drainage  -SD --    Closure Adhesive bandage;STEFF  -SD Adhesive bandage;STEFF  -SD None  PACKED WITH BETADINE SOAK KERLIX, ABD PAD, ABD BINDER  -PH    Base dressing in place, unable to visualize  -SD dressing in place, unable to visualize  -SD --    Drainage Amount none  -SD none  -SD --    Dressing Care abdominal binder utilized  -SD abdominal binder utilized  -SD --    Retired Wound - Properties Group Placement Date: 04/06/23  -PH Placement Time: 2243 -PH Present on Hospital Admission: Y  -PH Location: abdomen  -PH Primary Wound Type: Incision  -PH    Retired Wound - Properties Group Date first assessed: 04/06/23  -PH Time first assessed: 2243  -PH Present on Hospital Admission: Y  -PH Location: abdomen  -PH Primary Wound Type: Incision  -PH    Row Name 04/06/23 2014 04/06/23 1609          [REMOVED] Wound 03/28/23 1305 abdomen Incision    Wound - Properties Group Placement Date: 03/28/23  -AM Placement Time: 1305  -AM Location: abdomen  -AM Primary Wound Type: Incision  -AM Removal Date: 04/06/23  -PH Removal Time: 2243 -PH Wound Outcome: Other  -PH    Dressing Appearance moist drainage  -JM open to air  -AY     Closure Denia;Other (Comment)  bowel proturing from midline incison  -JM Denia  -AY     Base moist;yellow;pink;red  -JM moist;yellow;pink;red  -AY     Drainage Characteristics/Odor -- purulent;yellow  -AY     Drainage Amount small  -JM small  -AY     Care, Wound irrigated with;sterile normal saline  -JM cleansed with;sterile normal saline  -AY     Dressing Care dressing applied  -JM dressing applied;abdominal pad  -AY     Periwound Care moisturizer applied   -SOFÍA --     Retired Wound - Properties Group Placement Date: 03/28/23  -AM Placement Time: 1305  -AM Location: abdomen  -AM Primary Wound Type: Incision  -AM Removal Date: 04/06/23  -PH Removal Time: 2243  -PH Wound Outcome: Other  -PH    Retired Wound - Properties Group Date first assessed: 03/28/23  -AM Time first assessed: 1305  -AM Location: abdomen  -AM Primary Wound Type: Incision  -AM Resolution Date: 04/06/23  -PH Resolution Time: 2243  -PH Wound Outcome: Other  -PH       Wound 04/06/23 2243 abdomen Incision    Wound - Properties Group Placement Date: 04/06/23  -PH Placement Time: 2243  -PH Present on Hospital Admission: Y  -PH Location: abdomen  -PH Primary Wound Type: Incision  -PH    Retired Wound - Properties Group Placement Date: 04/06/23  -PH Placement Time: 2243  -PH Present on Hospital Admission: Y  -PH Location: abdomen  -PH Primary Wound Type: Incision  -PH    Retired Wound - Properties Group Date first assessed: 04/06/23  -PH Time first assessed: 2243  -PH Present on Hospital Admission: Y  -PH Location: abdomen  -PH Primary Wound Type: Incision  -PH          User Key  (r) = Recorded By, (t) = Taken By, (c) = Cosigned By    Initials Name Provider Type     Carolyn Marie RN Registered Nurse    Keli Cheek, RN Registered Nurse    Ana Gonsalves RN Registered Nurse    Shirley Vázquez RN Registered Nurse    Carolyn Bautista RN Registered Nurse    Olena Lewis RN Registered Nurse    Soco Han LPN Licensed Nurse                  Assessment & Plan      Brief Patient Summary:        enoxaparin, 40 mg, Subcutaneous, Daily  metoclopramide, 10 mg, Intravenous, Q6H  piperacillin-tazobactam, 3.375 g, Intravenous, Q8H  polyethylene glycol, 17 g, Oral, BID  sodium chloride, 10 mL, Intravenous, Q12H  [START ON 4/8/2023] vancomycin, 1,500 mg, Intravenous, Q24H  vitamin D3, 5,000 Units, Oral, Daily       lactated ringers, 100 mL/hr, Last Rate: 100 mL/hr (04/07/23  0948)  Pharmacy to Dose TPN,   Pharmacy to dose vancomycin,          Active Hospital Problems:  Active Hospital Problems    Diagnosis    • **Bladder cancer    • Severe Malnutrition (HCC)    • Attention to urostomy    • Elevated serum creatinine    • Prostate cancer    • History of alcohol use disorder      Surgical wound dehiscence  Prostate cancer  Bladder cancer  S/p cystectomy with ilial conduit urinary diversion 3/20/2023  CARLOS ALBERTO  Ileus 3/31/2023  Constipation  Hypertension  History of alcohol abuse    Plan:  -NG tube placed for ileus 3/31/23, removed on 4/1, replaced on 4/2, taken out yesterday.  -ARAVIND drain  -Renal function improved.  Continue LR at 75 mL/h  -Nephro recs appreciated.  -Encourage OOB to chair.  -Patient undecided whether SNF versus   -- Large bowel movements already had, continue to monitor.  -- Given Chloraseptic spray  -- WBCs in the 40s, likely leukemoid reaction after OR management emergently last night.  -- Continue IV ABX, ID recs pending  -- Will need to placement to SNF after this stay.  Patient is agreeable.    DVT prophylaxis:  Medical and mechanical DVT prophylaxis orders are present.    CODE STATUS:    Code Status (Patient has no pulse and is not breathing): CPR (Attempt to Resuscitate)  Medical Interventions (Patient has pulse or is breathing): Full Support      Disposition:  I expect patient to be discharged to SNF once tolerating p.o. intake, clinically stable, likely not for 4 to 5 days.    This patient has been examined wearing appropriate Personal Protective Equipment and discussed with nursing. 04/07/23      Electronically signed by Saima Roberto DO, 04/07/23, 13:09 EDT.  [facility] Hospitalist Team

## 2023-04-07 NOTE — OP NOTE
LAPAROTOMY EXPLORATORY  Operative Note    Patient Name:  Anthony Moss  YOB: 1955    Date of Surgery:  4/6/2023 - 4/7/2023     Indications: Patient is a 67-year-old gentleman who is status post a radical cystectomy and ileal conduit urinary diversion on 3/28/2023 who sustained a fascial dehiscence with evisceration of small bowel while on the hospital floor today.  The risk, benefits, and alternatives were discussed with the patient who understands and agrees to proceed to the operating room for exploration, washout, and repair of his fascial dehiscence.    Pre-op Diagnosis:   Fascial dehiscence with evisceration of small bowel    Post-op Diagnosis:  Post-Op Diagnosis Codes:  Same    Procedure/CPT® Codes:      Procedure(s):  LAPAROTOMY EXPLORATORY REPAIR OF DEHISCENCE    Staff:  Surgeon(s):  Ankit Freeman MD McCormick, John Patrick, MD    Anesthesia: General    Estimated Blood Loss: minimal    Implants:    Nothing was implanted during the procedure    Specimen:          None    Findings: 3 cm fascial dehiscence with incarcerated loop of small bowel which appeared viable.    Complications: None, immediately    Description of Procedure: After obtaining informed consent in the preop holding area, patient brought the operating room placed in supine position.  SCDs were applied, and patient was already on antibiotics on the floor.  He then underwent uncomplicated induction of general endotracheal anesthesia.  The abdomen was prepped and draped in the usual sterile fashion after removing the skin staples.  After a brief timeout the procedure began. The previous PDS suture which had been in the fascia was removed.  The fascia on the left side of the laparotomy incision was of poor quality.  There was small bowel which was protruding through the fascial opening.  All the bowel appeared viable.  There was no evidence of any damage to the ileal conduit.  We then irrigated the wound with Irrisept.  The  fascia was then closed using #1 looped PDS suture x2 while placing internal retention sutures with #1 Vicryl suture in a figure-of-eight interrupted fashion.  Once the fascia was closed, the wound was packed with Betadine soaked gauze, ABD, and an abdominal binder.  The patient tolerated the procedure well, was awoken, and taken to PACU in satisfactory condition.    Michael Witt MD     Date: 4/6/2023  Time: 23:17 EDT

## 2023-04-07 NOTE — ANESTHESIA PREPROCEDURE EVALUATION
Anesthesia Evaluation     Patient summary reviewed and Nursing notes reviewed   NPO Solid Status: > 8 hours  NPO Liquid Status: > 8 hours           Airway   Mallampati: II  TM distance: >3 FB  Neck ROM: full  No difficulty expected  Dental - normal exam     Pulmonary - normal exam    breath sounds clear to auscultation  (+) pneumonia ,   Cardiovascular - normal exam  Exercise tolerance: unable to assess    ECG reviewed  Rhythm: regular  Rate: normal    (+) hypertension, hyperlipidemia,       Neuro/Psych  (+) numbness, psychiatric history,    GI/Hepatic/Renal/Endo    (+)   renal disease CRI,     Musculoskeletal     Abdominal  - normal exam   Substance History   (+) alcohol use,      OB/GYN negative ob/gyn ROS         Other   arthritis,    history of cancer active                    Anesthesia Plan    ASA 4 - emergent     general     intravenous induction     Anesthetic plan, risks, benefits, and alternatives have been provided, discussed and informed consent has been obtained with: patient.        CODE STATUS:    Code Status (Patient has no pulse and is not breathing): CPR (Attempt to Resuscitate)  Medical Interventions (Patient has pulse or is breathing): Full Support

## 2023-04-07 NOTE — PROGRESS NOTES
[facility] Medicine Services Daily Progress Note    Patient Name: Anthony Moss  : 1955  MRN: [id number]  Primary Care Physician:  Reji Topete MD  Date of admission: 3/28/2023      Subjective      Chief Complaint: Constipation, weakness      Patient Reports     3/31/23: OOB to chair.  PT consulted.  Constipated.  Undecided whether SNF versus HH.  Lives alone.  DC IVF.  Nephrology and urology following. + ARAVIND drain with serosanguineous fluid.    23: Patient with multiple vomiting overnight NG tube placed with bilious discharge.  Repeat KUB.  Gaiting [employer].    23: Patient having NGT removed.  Still feeling pretty rough at this time improved abdominal pain.  Somnolent as well.  Awaiting SNF placement.    4/3/2023: Patient had NGT replaced due to likely ileus.  Having some surgical site TTP and soreness.  Still feeling pretty good overall, forward to improving his p.o. intake and getting started with the rest of his care plan.    23: Still has NGT in place.  Having some more feelings of hunger, agreeable to current management.  Also reporting constipation.    23: NGT was removed today, had large volume bowel movements as well.  Patient feeling a lot better today.  Advance to clear liquid diet.    23: Had significant bowel movement as well, still having some more abdominal pain today.  Denies any nausea, vomiting.  Is tolerating p.o. intake as well, but still only liquids and high-fiber.    Review of Systems   All other systems reviewed and are negative.         Objective      Vitals:   Temp:  [98.1 °F (36.7 °C)-100.9 °F (38.3 °C)] 100.1 °F (37.8 °C)  Heart Rate:  [] 91  Resp:  [18-27] 23  BP: ()/(54-86) 106/66  Flow (L/min):  [3-5] 3    Physical Exam  HENT:      Head: Normocephalic.      Nose: Nose normal.   Eyes:      Extraocular Movements: Extraocular movements intact.      Pupils: Pupils are equal, round, and reactive to light.    Cardiovascular:      Rate and Rhythm: Normal rate.   Pulmonary:      Effort: Pulmonary effort is normal.      Breath sounds: Normal breath sounds.   Abdominal:      General: Bowel sounds are normal.      Comments: Ileal conduit with urine.  Left lower quadrant ARAVIND drain with serosanguineous fluid.   Musculoskeletal:         General: Normal range of motion.      Cervical back: Normal range of motion.   Skin:     General: Skin is warm.   Neurological:      Mental Status: He is alert. Mental status is at baseline.   Psychiatric:         Mood and Affect: Mood normal.             Result Review    Result Review:  I have personally reviewed the results from the time of this admission to 4/7/2023 13:04 EDT and agree with these findings:  [x]  Laboratory  []  Microbiology  [x]  Radiology  []  EKG/Telemetry   []  Cardiology/Vascular   []  Pathology  [x]  Old records  []  Other:      Wounds (last 24 hours)     LDA Wound     Row Name 04/07/23 1200 04/07/23 0800 04/07/23 0608       Wound 04/06/23 2243 abdomen Incision    Wound - Properties Group Placement Date: 04/06/23  -PH Placement Time: 2243  -PH Present on Hospital Admission: Y  -PH Location: abdomen  -PH Primary Wound Type: Incision  -PH    Dressing Appearance -- -- --  abd binder  -PB    Closure -- -- Adhesive bandage;STEFF  -PB    Base dressing in place, unable to visualize  -BM dressing in place, unable to visualize  -BM dressing in place, unable to visualize  -PB    Drainage Amount small  -BM small  -BM none  -PB    Dressing Care abdominal binder utilized  -BM abdominal binder utilized  -BM --    Retired Wound - Properties Group Placement Date: 04/06/23  -PH Placement Time: 2243  -PH Present on Hospital Admission: Y  -PH Location: abdomen  -PH Primary Wound Type: Incision  -PH    Retired Wound - Properties Group Date first assessed: 04/06/23  -PH Time first assessed: 2243  -PH Present on Hospital Admission: Y  -PH Location: abdomen  -PH Primary Wound Type: Incision  -PH     Row Name 04/07/23 0049 04/07/23 0014 04/06/23 2359       Wound 04/06/23 2243 abdomen Incision    Wound - Properties Group Placement Date: 04/06/23  -PH Placement Time: 2243 -PH Present on Hospital Admission: Y  -PH Location: abdomen  -PH Primary Wound Type: Incision  -PH    Dressing Appearance -- dry;intact;no drainage  -SD dry;intact;no drainage  -SD    Closure Adhesive bandage;STEFF  -JM Adhesive bandage;STEFF  -SD Adhesive bandage;STEFF  -SD    Base dressing in place, unable to visualize  -JM dressing in place, unable to visualize  -SD dressing in place, unable to visualize  -SD    Drainage Amount none  -JM none  -SD none  -SD    Retired Wound - Properties Group Placement Date: 04/06/23  -PH Placement Time: 2243 -PH Present on Hospital Admission: Y  -PH Location: abdomen  -PH Primary Wound Type: Incision  -PH    Retired Wound - Properties Group Date first assessed: 04/06/23  -PH Time first assessed: 2243 -PH Present on Hospital Admission: Y  -PH Location: abdomen  -PH Primary Wound Type: Incision  -PH    Row Name 04/06/23 2344 04/06/23 2329 04/06/23 2318       [REMOVED] Wound 03/28/23 1305 abdomen Incision    Wound - Properties Group Placement Date: 03/28/23  -AM Placement Time: 1305  -AM Location: abdomen  -AM Primary Wound Type: Incision  -AM Removal Date: 04/06/23  -PH Removal Time: 2243  -PH Wound Outcome: Other  -PH    Retired Wound - Properties Group Placement Date: 03/28/23  -AM Placement Time: 1305  -AM Location: abdomen  -AM Primary Wound Type: Incision  -AM Removal Date: 04/06/23  -PH Removal Time: 2243  -PH Wound Outcome: Other  -PH    Retired Wound - Properties Group Date first assessed: 03/28/23  -AM Time first assessed: 1305  -AM Location: abdomen  -AM Primary Wound Type: Incision  -AM Resolution Date: 04/06/23  -PH Resolution Time: 2243  -PH Wound Outcome: Other  -PH       Wound 04/06/23 2243 abdomen Incision    Wound - Properties Group Placement Date: 04/06/23  -PH Placement Time: 2243  -PH  Present on Hospital Admission: Y  -PH Location: abdomen  -PH Primary Wound Type: Incision  -PH    Dressing Appearance dry;intact;no drainage  -SD dry;intact;no drainage  -SD --    Closure Adhesive bandage;STEFF  -SD Adhesive bandage;STEFF  -SD None  PACKED WITH BETADINE SOAK KERLIX, ABD PAD, ABD BINDER  -PH    Base dressing in place, unable to visualize  -SD dressing in place, unable to visualize  -SD --    Drainage Amount none  -SD none  -SD --    Dressing Care abdominal binder utilized  -SD abdominal binder utilized  -SD --    Retired Wound - Properties Group Placement Date: 04/06/23  -PH Placement Time: 2243 -PH Present on Hospital Admission: Y  -PH Location: abdomen  -PH Primary Wound Type: Incision  -PH    Retired Wound - Properties Group Date first assessed: 04/06/23  -PH Time first assessed: 2243  -PH Present on Hospital Admission: Y  -PH Location: abdomen  -PH Primary Wound Type: Incision  -PH    Row Name 04/06/23 2014 04/06/23 1609          [REMOVED] Wound 03/28/23 1305 abdomen Incision    Wound - Properties Group Placement Date: 03/28/23  -AM Placement Time: 1305  -AM Location: abdomen  -AM Primary Wound Type: Incision  -AM Removal Date: 04/06/23  -PH Removal Time: 2243 -PH Wound Outcome: Other  -PH    Dressing Appearance moist drainage  -JM open to air  -AY     Closure [place];Other (Comment)  bowel proturing from midline incison  -JM [place]  -AY     Base moist;yellow;pink;red  -JM moist;yellow;pink;red  -AY     Drainage Characteristics/Odor -- purulent;yellow  -AY     Drainage Amount small  -JM small  -AY     Care, Wound irrigated with;sterile normal saline  -JM cleansed with;sterile normal saline  -AY     Dressing Care dressing applied  -JM dressing applied;abdominal pad  -AY     Periwound Care moisturizer applied  -JM --     Retired Wound - Properties Group Placement Date: 03/28/23  -AM Placement Time: 1305  -AM Location: abdomen  -AM Primary Wound Type: Incision  -AM Removal Date: 04/06/23  -PH Removal  Time: 2243  -PH Wound Outcome: Other  -PH    Retired Wound - Properties Group Date first assessed: 03/28/23  -AM Time first assessed: 1305  -AM Location: abdomen  -AM Primary Wound Type: Incision  -AM Resolution Date: 04/06/23  -PH Resolution Time: 2243  -PH Wound Outcome: Other  -PH       Wound 04/06/23 2243 abdomen Incision    Wound - Properties Group Placement Date: 04/06/23  -PH Placement Time: 2243  -PH Present on Hospital Admission: Y  -PH Location: abdomen  -PH Primary Wound Type: Incision  -PH    Retired Wound - Properties Group Placement Date: 04/06/23  -PH Placement Time: 2243  -PH Present on Hospital Admission: Y  -PH Location: abdomen  -PH Primary Wound Type: Incision  -PH    Retired Wound - Properties Group Date first assessed: 04/06/23  -PH Time first assessed: 2243  -PH Present on Hospital Admission: Y  -PH Location: abdomen  -PH Primary Wound Type: Incision  -PH          User Key  (r) = Recorded By, (t) = Taken By, (c) = Cosigned By    Initials Name Provider Type     Carolyn Marie RN Registered Nurse    Keli Cheek, RN Registered Nurse    Ana Gonsalves RN Registered Nurse    Shirley Vázquez RN Registered Nurse    Carolyn Bautista, RN Registered Nurse    Olena Lewis RN Registered Nurse    Soco Han LPN Licensed Nurse                  Assessment & Plan      Brief Patient Summary:        enoxaparin, 40 mg, Subcutaneous, Daily  metoclopramide, 10 mg, Intravenous, Q6H  piperacillin-tazobactam, 3.375 g, Intravenous, Q8H  polyethylene glycol, 17 g, Oral, BID  sodium chloride, 10 mL, Intravenous, Q12H  [START ON 4/8/2023] vancomycin, 1,500 mg, Intravenous, Q24H  vitamin D3, 5,000 Units, Oral, Daily       lactated ringers, 100 mL/hr, Last Rate: 100 mL/hr (04/07/23 0948)  Pharmacy to Dose TPN,   Pharmacy to dose vancomycin,          Active Hospital Problems:  Active Hospital Problems    Diagnosis    • **Bladder cancer    • Severe Malnutrition (HCC)    •  Attention to urostomy    • Elevated serum creatinine    • Prostate cancer    • History of alcohol use disorder      Prostate cancer  Bladder cancer  S/p cystectomy with ilial conduit urinary diversion 3/20/2023  CARLOS ALBERTO  Ileus 3/31/2023  Constipation  Hypertension  History of alcohol abuse    Plan:  -NG tube placed for ileus 3/31/23, removed on 4/1, replaced on 4/2, taken out yesterday.  -ARAVIND drain  -Renal function improved.  Continue LR at 75 mL/h  -Appreciate nephrology input.  -Encourage OOB to chair.  -Patient undecided whether SNF versus   -- Large bowel movements already had, continue to monitor.  -- Given Chloraseptic spray  -- Increased WBCs, no acute fevers, rhonchi, dysuria.  Continue to monitor. Consult ID as patient did trigger sepsis criteria.     DVT prophylaxis:  Medical and mechanical DVT prophylaxis orders are present.    CODE STATUS:    Code Status (Patient has no pulse and is not breathing): CPR (Attempt to Resuscitate)  Medical Interventions (Patient has pulse or is breathing): Full Support      Disposition:  I expect patient to be discharged defer.    This patient has been examined wearing appropriate Personal Protective Equipment and discussed with nursing. 04/07/23      Electronically signed by Saima Roberto DO, 04/07/23, 13:04 EDT.  [facility] Hospitalist Team
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
## 2023-04-07 NOTE — PROGRESS NOTES
Coco Cross cover:  Received call from nursing staff of some confusion, tachycardia, worsening leukocytosis in this postoperative patient that triggered sepsis notification earlier this afternoon. Chart reviewed.  See RN note from 1528 today. Requested RN to call rapid response and recontact surgical team.     Arrived at bedside and clinically evaluated. Pt AOX3, SBP 119s, mildly tachycardic 110s. Mildly diaphoretic though saturating well on room air, Abd mild diffuse tenderness, + ostomy with purulent drainage at exit site. No rebound/guarding. Incisional site wound dehiscence noted with small protruding bowel.   Denies CP/subjective dyspnea/productive cough.  Start empiric Zosyn. Blood cultures x 2  NS bolus with continued isotonic IVF maintaince fluids. Serial lactate monitoring for sepsis.   Discussed with Dr. Freeman--Urology plans to take back to OR tonight with Dr. Witt.

## 2023-04-07 NOTE — ANESTHESIA PROCEDURE NOTES
Airway  Urgency: elective    Date/Time: 4/6/2023 10:26 PM  Airway not difficult    General Information and Staff    Patient location during procedure: OR  Anesthesiologist: Joe Nguyen MD    Indications and Patient Condition  Indications for airway management: airway protection    Preoxygenated: yes  Mask difficulty assessment: 1 - vent by mask    Final Airway Details  Final airway type: endotracheal airway      Successful airway: ETT  Cuffed: yes   Successful intubation technique: direct laryngoscopy and RSI  Facilitating devices/methods: intubating stylet and cricoid pressure  Endotracheal tube insertion site: oral  Blade: Sonny  Blade size: 3  ETT size (mm): 7.5  Cormack-Lehane Classification: grade I - full view of glottis  Placement verified by: chest auscultation, capnometry and palpation of cuff   Measured from: lips  ETT/EBT  to lips (cm): 24  Number of attempts at approach: 1  Assessment: lips, teeth, and gum same as pre-op and atraumatic intubation    Additional Comments  Ett cuff snagged on his carious teeth. Deflated, exchanged ETT over a bougie

## 2023-04-07 NOTE — PLAN OF CARE
Goal Outcome Evaluation:  Plan of Care Reviewed With: patient      Patient was very lethargic at the start of shift. Patient had a 3 cm dehisce and bowel protruding from his midline incision. Dr was notified and patient was take to surgery at 2100.  Patient had a ex lap to repair protruding bowel. Patient was brought back to the floor around 0030 on 4/7/2023.  Patient has stayed lethargic with a low b/p and a high heart rate and started running a fever. Dr was notified and new orders were put in for a Stat chest x-ray, blood cultures and ABG. Patient will be moving to PCU per the orders. Patient is going to room 2124. Report has been called to the PCU nurse and family was notified.

## 2023-04-07 NOTE — CONSULTS
picc team consult:    Procedure explained to patient and informed consent obtained.  Time out performed.  TL picc line placed RUE basilic vessel utilizing US guidance and modified seldinger technique with easily compressible vessel without difficulty.

## 2023-04-07 NOTE — PROGRESS NOTES
PROGRESS NOTE      Patient Name: Anthony Moss  : 1955  MRN: [id number]  Primary Care Physician: Reji Topete MD  Date of admission: 3/28/2023    Patient Care Team:  Reji Topete MD as PCP - General (Family Medicine)        Subjective   Subjective:     Resting in bed.  Review of systems:  Review of Systems -   Neg.except weakness.    Allergies:  No Known Allergies    Objective   Exam:     Vital Signs  Temp:  [98.1 °F (36.7 °C)-100.9 °F (38.3 °C)] 100.1 °F (37.8 °C)  Heart Rate:  [] 91  Resp:  [18-27] 23  BP: ()/(54-86) 106/66  SpO2:  [90 %-98 %] 98 %  on  Flow (L/min):  [3-5] 3;   Device (Oxygen Therapy): nasal cannula  Body mass index is 25.42 kg/m².    General:     male in no acute distress.    Head:      Normocephalic and atraumatic.    Eyes:      PERRL/EOM intact, conjunctiva and sclera clear with out nystagmus.    Neck:      No masses, thyromegaly,  trachea central with normal respiratory effort   Lungs:    Clear bilaterally to auscultation.    Heart:      Regular rate and rhythm, no murmur no gallop  Abd:        Soft, nontender, not distended, bowel sounds positive, no shifting dullness   Pulses:   Pulses palpable  Extr:        No cyanosis or clubbing--+ edema.    Neuro:    No focal deficits.   alert oriented x3  Skin:       Intact without lesions or rashes.    Psych:    Alert and cooperative; normal mood and affect; .      Results Review:  I have personally reviewed most recent Data :  CBC    Results from last 7 days   Lab Units 23  0811 23  1245 23  2339 23  0309 23  0118 23  0135 23  2240   WBC 10*3/mm3 40.80* 21.80* 13.20* 13.20* 11.40* 9.40 10.30   HEMOGLOBIN g/dL 8.4* 8.6* 8.4* 8.3* 8.6* 9.1* 9.1*   PLATELETS 10*3/mm3 361 325 296 267 249 247 227     CMP   Results from last 7 days   Lab Units 23  0811 23  1245 23  2339 23  1756 23  0309 23  0118 23  0135 23  2240    SODIUM mmol/L 138 136 139  --  139 139 136 136   POTASSIUM mmol/L 4.7 3.8 4.0 3.8 3.3* 3.8 4.0 4.3   CHLORIDE mmol/L 106 102 101  --  97* 96* 94* 99   CO2 mmol/L 20.0* 23.0 25.0  --  30.0* 28.0 29.0 27.0   BUN mg/dL 25* 18 22  --  28* 33* 34* 24*   CREATININE mg/dL 2.05* 1.44* 1.26  --  1.34* 1.65* 1.81* 1.70*   GLUCOSE mg/dL 170* 130* 90  --  104* 105* 132* 149*   ALBUMIN g/dL 2.4*  --   --   --   --   --   --   --    BILIRUBIN mg/dL 0.6  --   --   --   --   --   --   --    ALK PHOS U/L 166*  --   --   --   --   --   --   --    AST (SGOT) U/L 16  --   --   --   --   --   --   --    ALT (SGPT) U/L 9  --   --   --   --   --   --   --      ABG    Results from last 7 days   Lab Units 04/07/23  0502   PH, ARTERIAL pH units 7.394   PCO2, ARTERIAL mm Hg 35.6   PO2 ART mm Hg 105.3   O2 SATURATION ART % 98.1*   BASE EXCESS ART mmol/L -2.8*     XR Chest 1 View    Result Date: 4/7/2023  1.  Low lung volumes, accentuate the heart size. 2.  Basilar opacities (atelectasis, aspiration, infection). Electronically signed by:  Dinora Nguyen M.D.  4/7/2023 3:20 AM Mountain Time      Results for orders placed during the hospital encounter of 01/05/22    Adult Transthoracic Echo Complete w/ Color, Spectral and Contrast if Necessary Per Protocol    Interpretation Summary  · Left ventricular ejection fraction appears to be 56 - 60%.  · The right ventricular cavity is moderately dilated.  · No pericardial effusion noted    Scheduled Meds:enoxaparin, 40 mg, Subcutaneous, Daily  metoclopramide, 10 mg, Intravenous, Q6H  piperacillin-tazobactam, 3.375 g, Intravenous, Q8H  polyethylene glycol, 17 g, Oral, BID  sodium chloride, 10 mL, Intravenous, Q12H  [START ON 4/8/2023] vancomycin, 1,500 mg, Intravenous, Q24H  vitamin D3, 5,000 Units, Oral, Daily      Continuous Infusions:lactated ringers, 100 mL/hr, Last Rate: 100 mL/hr (04/07/23 0948)  Pharmacy to Dose TPN,   Pharmacy to dose vancomycin,       PRN Meds:•  acetaminophen **OR**  acetaminophen  •  docusate sodium  •  lactulose  •  Morphine  •  [] HYDROmorphone **AND** naloxone  •  ondansetron **OR** ondansetron  •  Pharmacy to Dose TPN  •  Pharmacy to dose vancomycin  •  phenol  •  potassium chloride **OR** potassium chloride **OR** potassium chloride  •  senna-docusate sodium  •  sodium chloride  •  sodium chloride    Assessment & Plan   Assessment and Plan:         Bladder cancer    History of alcohol use disorder    Prostate cancer    Elevated serum creatinine    Attention to urostomy    Severe Malnutrition (HCC)    ASSESSMENT:  • Acute kidney injury, stage III, status post radical cystectomy getting better hyperkalemia, secondary to CARLOS ALBERTO  • Metabolic acidosis secondary to CKD start low-dose sodium bicarbonate  • History of bladder cancer,   • History of alcohol use,  • Chronic back pain,using NSAID in the past  • B/l pneumonia.  Surprisingly, WBC stable,           Plan:      • At this time patient with history of bladder cancer status post cystectomy urine output is adequate  • Patient has more wound dehiscence and some infection taken back to the OR  • Now with relative hypotension and significant leukocytosis may be some sepsis receiving fluid  • Changing IV fluid to Ringer lactate as patient is acidotic  • Worsening creatinine again 2023 creatinine increased to 2 now  • So far electrolytes are acceptable  • Repeat labs later today and follow-up with a urine output which is minimal  • Patient still has significant GI complaints  • Follow-up labs later today  • Potassium level is acceptable  • Echocardiogram done last time showed ejection fraction 56 to 60%  • Repeat labs tomorrow morning          Electronically signed by Jamie Houston MD,   [facility] kidney consultant  [phone]  2023  12:58 EDT

## 2023-04-07 NOTE — PROGRESS NOTES
Fascial dehiscence  Small bowel protruding  afvss  Will take pt to OR emergently  D/w Dr. Witt  R/b/o discussed  Infection, bleeding, bowel obstruction, adhesions, MI, PE, stroke and death discussed.

## 2023-04-07 NOTE — CASE MANAGEMENT/SOCIAL WORK
Continued Stay Note  [facility]     Patient Name: Anthony Moss  MRN: [id number]  Today's Date: 4/7/2023    Admit Date: 3/28/2023    Plan: [facility] accepted, precert approved 4/7 (through end of day 4/10). PASRR per facility.   Discharge Plan     Row Name 04/07/23 1222       Plan    Plan [facility] accepted, precert approved 4/7 (through end of day 4/10). PASRR per facility.    Plan Comments CM notified by [facility] liaison that precert is approved 4/7, good through end of day 4/10. Liaison will continue to follow and restart precert if patient not medically ready by 4/10. Barrier to D/C: POD#1 exp lap, IV abx, blood and wound cultures pending, ID consult.                    Expected Discharge Date and Time     Expected Discharge Date Expected Discharge Time    Apr 10, 2023          04/07/23 1222   Post Acute Pre-Cert Documentation   Date Post Acute Pre-Cert Inititated per Facility 04/06/23   Verification from Payer Yes   Date Post Acute Pre-Cert Completed 04/07/23   Response Accepted   Post Acute Pre-Cert Initiated Comment approved 4/7 through end of day 4/10       Phone communication or documentation only - no physical contact with patient or family.    LONI GarayN, RN    [place] [id number]    Office: [phone]  Fax: [phone]

## 2023-04-07 NOTE — PROGRESS NOTES
GENERAL SURGERY PROGRESS NOTE    4/7/2023   LOS: 10 days   Patient Care Team:  Reji Topete MD as PCP - General (Family Medicine)    CYSTECTOMY RADICAL  3/28/2023  Chief Complaint: Postoperative ileus    Subjective   HPI: Patient is a 67 y.o. male presents with locally advanced bladder cancer and subsequently underwent a radical cystectomy and prostatectomy with ileal conduit placement on 3/28/2023.  Postoperatively, the patient has done okay, but reports that he has been having multiple loose bowel movements and high NG tube output with persistent nausea.  General surgery was consulted to evaluate and help with treatment with postoperative ileus versus bowel obstruction.   On 4/6/2023 the patient had a dehiscence of his fascia in his vertical midline incision and had to return to the operating room for washout and closure.    Interval History:   Hypotensive throughout the morning.  Patient occasionally complains of pain.  Low-grade fevers.  White blood cell count markedly elevated this morning.    Objective     Vital Signs  Temp:  [98.1 °F (36.7 °C)-100.9 °F (38.3 °C)] 100.1 °F (37.8 °C)  Heart Rate:  [] 91  Resp:  [18-27] 23  BP: ()/(54-86) 106/66    Physical Exam  Vitals reviewed.   Constitutional:       General: He is not in acute distress.     Appearance: He is not ill-appearing.   HENT:      Nose:      Comments: NG tube has been removed  Pulmonary:      Effort: Pulmonary effort is normal. No respiratory distress.   Abdominal:      General: There is no distension.      Palpations: Abdomen is soft.      Comments: Appropriately tender, vertical midline wound is open at the skin with the fascia intact and exposed below.  Beefy red subcutaneous fat is present within the wound.  Repacked with saline soaked gauze covered with ABD and abdominal binder.   Musculoskeletal:         General: No swelling. Normal range of motion.   Skin:     General: Skin is warm and dry.      Coloration: Skin is not  Phone: Sandra           Fax: [phone]                           Outpatient Physical Therapy                                                                            Daily Note    Patient: Leslie Novak : 1989  CSN #: [id number]   Referring Practitioner:  Dr. 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 She is feeling muscle soreness    Exercises:   HEP    Manual:  Soft Tissue Mobalization: Thermoprobe to gluts/LB/HS and abdomen for tissue tightness and pain, sybil  Other: Performed cupping to lower abdomen, static. IDN was done to R adductor with noted referral of proximal adductor into lower R abdomen. Pt was unable to tolerate IDN to L adductor mm    Modalities:  Moist heat: x15 min for pain and discomfort of PFM  Ultrasound: 1.5 W/cm2, 1 MHz, x8 min to external genitalia to decrease muscular tension  E-stim (parameters): IFC x15 min for pain and discomfort of PFM       Assessment  Assessment: Pt was able to tolerate the cupping without issues. Pt did have some bruising following the procedure. Pt was also able to tolerate digital palpation and muscle release and layer 2 was released. Pain was localized and tolerable but does increase with pressure. Activity Tolerance  Activity Tolerance: Patient Tolerated treatment well    Patient Education  Patient Education: Pt given instructions on diaphragmatic breathing  Pt verbalized/demonstrated good understanding:     [x] Yes         [] No, pt required further clarification.        Post Treatment Pain:  3/10      Plan  Times per week: 1  Plan weeks: 6      Goals  (Total # of Visits to Date: 3)      Short term goals  Time Frame for Short term goals: 3 weeks  Short term goal 1: Initiate HEP and progress as tolerated (MET)  Short term goal 2: Decrease pain with basic ADLs and mobility to better tolerate care for her children and household chores. Short term goal 3: Pt will initiate purchase of dilator set for HEP (MET)    Long term goals  Time Frame for Long term goals : 12 visits  Long term goal 1: Pt will report compliance with her HEP and be independent in progression  Long term goal 2: Pt will report at least a 50% reduction in pain and improved tolerance to sexual intercourse and medical exam.  Long term goal 3: Pt will be able to participate in recreational activites without an increase in baseline pain.     Minutes Tracking:  Time In: 8606  Time Out: 2234  Minutes: 97  Timed Code Treatment Minutes: 97 205 Lukasz Soria PT, DPT        Date: 11/25/2020 jaundiced.   Neurological:      General: No focal deficit present.      Mental Status: He is alert and oriented to person, place, and time.          Results Review:    Lab Results (last 24 hours)     Procedure Component Value Units Date/Time    Triglycerides [[id number]] Collected: 04/07/23 0811    Specimen: Blood Updated: 04/07/23 1327    Phosphorus [[id number]] Collected: 04/07/23 0811    Specimen: Blood Updated: 04/07/23 1327    Magnesium [[id number]] Collected: 04/07/23 0811    Specimen: Blood Updated: 04/07/23 1327    C-reactive Protein [[id number]] Collected: 04/07/23 0811    Specimen: Blood Updated: 04/07/23 1327    Wound Culture - Surgical Site, Abdominal Wall [[id number]]  (Abnormal) Collected: 04/06/23 1543    Specimen: Surgical Site from Abdominal Wall Updated: 04/07/23 0934     Wound Culture Scant growth (1+) Gram Negative Bacilli     Gram Stain Few (2+) WBCs per low power field      Rare (1+) Gram positive cocci    Manual Differential [[id number]]  (Abnormal) Collected: 04/07/23 0811    Specimen: Blood Updated: 04/07/23 0900     Neutrophil % 79.0 %      Lymphocyte % 3.0 %      Monocyte % 4.0 %      Bands %  12.0 %      Metamyelocyte % 2.0 %      Neutrophils Absolute 37.13 10*3/mm3      Lymphocytes Absolute 1.22 10*3/mm3      Monocytes Absolute 1.63 10*3/mm3      Anisocytosis Slight/1+     Toxic Granulation Slight/1+     Platelet Morphology Normal    CBC & Differential [[id number]]  (Abnormal) Collected: 04/07/23 0811    Specimen: Blood Updated: 04/07/23 0900    Narrative:      The following orders were created for panel order CBC & Differential.  Procedure                               Abnormality         Status                     ---------                               -----------         ------                     CBC Auto Differential[[id number]]        Abnormal            Final result               Scan Slide[[id number]]                                       Final result                 Please view results  for these tests on the individual orders.    Scan Slide [[id number]] Collected: 04/07/23 0811    Specimen: Blood Updated: 04/07/23 0900     Scan Slide --     Comment: See Manual Differential Results       CBC Auto Differential [[id number]]  (Abnormal) Collected: 04/07/23 0811    Specimen: Blood Updated: 04/07/23 0900     WBC 40.80 10*3/mm3      RBC 3.10 10*6/mm3      Hemoglobin 8.4 g/dL      Hematocrit 26.2 %      MCV 84.6 fL      MCH 27.0 pg      MCHC 31.9 g/dL      RDW 17.5 %      RDW-SD 55.6 fl      MPV 8.1 fL      Platelets 361 10*3/mm3     Narrative:      The previously reported component NRBC is no longer being reported. Previous result was 0.0 /100 WBC (Reference Range: 0.0-0.2 /100 WBC) on 4/7/2023 at 0833 EDT.    Comprehensive Metabolic Panel [[id number]]  (Abnormal) Collected: 04/07/23 0811    Specimen: Blood Updated: 04/07/23 0843     Glucose 170 mg/dL      BUN 25 mg/dL      Creatinine 2.05 mg/dL      Sodium 138 mmol/L      Potassium 4.7 mmol/L      Chloride 106 mmol/L      CO2 20.0 mmol/L      Calcium 7.6 mg/dL      Total Protein 5.7 g/dL      Albumin 2.4 g/dL      ALT (SGPT) 9 U/L      AST (SGOT) 16 U/L      Alkaline Phosphatase 166 U/L      Total Bilirubin 0.6 mg/dL      Globulin 3.3 gm/dL      A/G Ratio 0.7 g/dL      BUN/Creatinine Ratio 12.2     Anion Gap 12.0 mmol/L      eGFR 35.3 mL/min/1.73     Narrative:      GFR Normal >60  Chronic Kidney Disease <60  Kidney Failure <15      MRSA Screen, PCR (Inpatient) - Swab, Nares [[id number]]  (Normal) Collected: 04/07/23 0456    Specimen: Swab from Nares Updated: 04/07/23 0616     MRSA PCR No MRSA Detected    Narrative:      The negative predictive value of this diagnostic test is high and should only be used to consider de-escalating anti-MRSA therapy. A positive result may indicate colonization with MRSA and must be correlated clinically.    Lactic Acid, Plasma [[id number]]  (Normal) Collected: 04/07/23 0520    Specimen: Blood Updated: 04/07/23 0550      Lactate 1.3 mmol/L     Blood Culture - Blood, Arm, Right [[id number]] Collected: 04/07/23 0441    Specimen: Blood from Arm, Right Updated: 04/07/23 0534    Blood Culture - Blood, Arm, Left [[id number]] Collected: 04/07/23 0520    Specimen: Blood from Arm, Left Updated: 04/07/23 0529    Blood Gas, Arterial - [[id number]]  (Abnormal) Collected: 04/07/23 0502    Specimen: Arterial Blood Updated: 04/07/23 0505     Site Right Radial     Tavares's Test Positive     pH, Arterial 7.394 pH units      pCO2, Arterial 35.6 mm Hg      pO2, Arterial 105.3 mm Hg      HCO3, Arterial 21.7 mmol/L      Base Excess, Arterial -2.8 mmol/L      Comment: Serial Number: [id number]:  [id number]        O2 Saturation, Arterial 98.1 %      CO2 Content 22.8 mmol/L      Barometric Pressure for Blood Gas --     Comment: N/A        Modality Cannula     FIO2 40 %      Hemodilution No        Imaging Results (Last 24 Hours)     Procedure Component Value Units Date/Time    XR Chest 1 View [[id number]] Collected: 04/07/23 0318     Updated: 04/07/23 0521    Narrative:      EXAM:  XR CHEST 1 VW     DATE: 4/7/2023 5:00 AM    HISTORY:   Hypoxia-postoperative, sepsis    COMPARISON:  None.    FINDINGS and     Impression:      1.  Low lung volumes, accentuate the heart size.  2.  Basilar opacities (atelectasis, aspiration, infection).    Electronically signed by:  Dinora Nguyen M.D.    4/7/2023 3:20 AM Mountain Time           I have reviewed the above results and noted them below    Medication Review:    Current Facility-Administered Medications:   •  acetaminophen (TYLENOL) tablet 650 mg, 650 mg, Oral, Q4H PRN, 650 mg at 04/06/23 1225 **OR** acetaminophen (TYLENOL) suppository 650 mg, 650 mg, Rectal, Q4H PRN, Ankit Freeman MD, 650 mg at 04/07/23 0454  •  docusate sodium (COLACE) capsule 100 mg, 100 mg, Oral, BID PRN, Ankit Freeman MD, 100 mg at 03/31/23 0715  •  Enoxaparin Sodium (LOVENOX) syringe 40 mg, 40 mg, Subcutaneous, Daily, Ankit Freeman MD, 40 mg at  23 0813  •  lactated ringers infusion, 100 mL/hr, Intravenous, Continuous, Jamie Houston MD, Last Rate: 100 mL/hr at 23 0948, 100 mL/hr at 23 0948  •  lactulose (CHRONULAC) solution for enema 300 mL, 300 mL, Rectal, Daily PRN, Ankit Freeman MD  •  metoclopramide (REGLAN) injection 10 mg, 10 mg, Intravenous, Q6H, Ankit Freeman MD, 10 mg at 23 1123  •  morphine injection 4 mg, 4 mg, Intravenous, Q1H PRN, Ankit Freeman MD, 4 mg at 23 1307  •  [] HYDROmorphone (DILAUDID) injection 1 mg, 1 mg, Intravenous, Q2H PRN, 1 mg at 23 1105 **AND** naloxone (NARCAN) injection 0.1 mg, 0.1 mg, Intravenous, Q5 Min PRN, Ankit Freeman MD  •  ondansetron (ZOFRAN) tablet 4 mg, 4 mg, Oral, Q6H PRN, 4 mg at 23 0715 **OR** ondansetron (ZOFRAN) injection 4 mg, 4 mg, Intravenous, Q6H PRN, Ankit Freeman MD, 4 mg at 23 1307  •  Pharmacy to Dose TPN, , Does not apply, Continuous PRN, Michael Witt MD  •  Pharmacy to dose vancomycin, , Does not apply, Continuous PRN, Javi De La Cruz MD  •  phenol (CHLORASEPTIC) 1.4 % liquid 1 spray, 1 spray, Mouth/Throat, Q2H PRN, Ankit Freeman MD, 1 spray at 23 0719  •  piperacillin-tazobactam (ZOSYN) IVPB 3.375 g in 100 mL NS (CD), 3.375 g, Intravenous, Q8H, Ankit Freeman MD, 3.375 g at 23 1122  •  polyethylene glycol (MIRALAX) packet 17 g, 17 g, Oral, BID, Ankit Freeman MD, 17 g at 23 0812  •  potassium chloride (K-DUR,KLOR-CON) CR tablet 40 mEq, 40 mEq, Oral, PRN **OR** potassium chloride (KLOR-CON) packet 40 mEq, 40 mEq, Oral, PRN **OR** potassium chloride 10 mEq in 100 mL IVPB, 10 mEq, Intravenous, Q1H PRN, Ankit Freeman MD, Last Rate: 100 mL/hr at 23 1356, 10 mEq at 23 1356  •  sennosides-docusate (PERICOLACE) 8.6-50 MG per tablet 2 tablet, 2 tablet, Oral, BID PRN, Ankit Freeman MD, 2 tablet at 23 1601  •  sodium chloride 0.9 % flush 10 mL, 10 mL, Intravenous, Q12H, Ankit Freeman MD, 10 mL at  04/07/23 0814  •  sodium chloride 0.9 % flush 10 mL, 10 mL, Intravenous, PRN, Ankit Freeman MD  •  sodium chloride 0.9 % infusion 40 mL, 40 mL, Intravenous, PRN, Ankit Freeman MD, 125 mL/hr at 04/06/23 2137  •  [START ON 4/8/2023] vancomycin 1500 mg/500 mL 0.9% NS IVPB ([facility]), 1,500 mg, Intravenous, Q24H, Javi De La Cruz MD  •  vitamin D3 capsule 5,000 Units, 5,000 Units, Oral, Daily, Ankit Freeman MD, 5,000 Units at 04/07/23 0812    Assessment & Plan     Principal Problem:    Bladder cancer  Active Problems:    History of alcohol use disorder    Prostate cancer    Elevated serum creatinine    Attention to urostomy    Severe Malnutrition (HCC)    Okay for clear liquid diet as tolerated today  Will initiate TPN as I believe that the patient's malnutrition likely played a large factor in his dehiscence yesterday.  Okay for mobilization with abdominal binder  Okay for Lovenox  Continue antibiotics, suspect that white blood cell count is markedly elevated due to the fact that the patient had a fascial dehiscence with partial evisceration of his small intestine and then subsequently had to return to the operating room.  Dressings twice daily with normal saline soaked Kerlix to the midline wound throughout the weekend.  Consider wound VAC placement on Monday    Plan for disposition: Continue supportive care, plan for wet-to-dry dressings over the weekend and consider wound VAC placement on Monday.  Dr. Leon will see over the weekend    Michael Witt MD  04/07/23  13:35 EDT

## 2023-04-07 NOTE — CONSULTS
Nutrition Services    Patient Name: Anthony Moss  YOB: 1955  MRN: [id number]  Admission date: 3/28/2023    PPE Documentation        PPE Worn By Provider Did not enter room for this encounter   PPE Worn By Patient  N/A     PROGRESS NOTE      Encounter Information: Progress note to check on plan for nutrition/consult received for TPN. RD collaborated with pharmacist (Olivia). Plan for D10 infusion tonight. No PICC in place.        PO Diet: Diet: Liquid Diets; Clear Liquid; Texture: Regular Texture (IDDSI 7); Fluid Consistency: Thin (IDDSI 0)   PO Supplements: Boost Breeze TID (Provides 750 kcals, 42 g protein if consumed)      PO Intake:  Minimal intake x 10 days with frequent NPO and liquid diets       Current nutrition support: None, consult received for TPN   Nutrition support review:        Labs (reviewed below): Reviewed        GI Function:  Small BM documented 4/6         Nutrition Intervention Updates:   Initial Goal:  *initial goal conservative d/t risk of RFS     Clinimix 5/15 1000 mL volume + hold lipids      End Goal:    Clinimix 5/20 2000 mL volume + 250 mL 20% lipids 5x/week      Amino Acids   400 kcals (100 g, within range)    Dextrose  1360 kcals (400 g)    Lipids  357kcals/day    Total Calories  2117 kcals (93% lower end of range)    Glucose Infusion Rate 3.09 mg/kg/min    Wt used for GIR 89.8 kg            Results from last 7 days   Lab Units 04/07/23  0811 04/06/23 1245 04/04/23  2339   SODIUM mmol/L 138 136 139   POTASSIUM mmol/L 4.7 3.8 4.0   CHLORIDE mmol/L 106 102 101   CO2 mmol/L 20.0* 23.0 25.0   BUN mg/dL 25* 18 22   CREATININE mg/dL 2.05* 1.44* 1.26   CALCIUM mg/dL 7.6* 8.0* 8.0*   BILIRUBIN mg/dL 0.6  --   --    ALK PHOS U/L 166*  --   --    ALT (SGPT) U/L 9  --   --    AST (SGOT) U/L 16  --   --    GLUCOSE mg/dL 170* 130* 90     Results from last 7 days   Lab Units 04/07/23  0811 04/06/23  1245 04/04/23  2339 04/04/23  0309 04/03/23  0118 04/02/23  0135   MAGNESIUM mg/dL   --   --   --  1.9 1.9 1.7   PHOSPHORUS mg/dL  --   --  3.3 3.7  --  4.0   HEMOGLOBIN g/dL 8.4*   < > 8.4* 8.3* 8.6* 9.1*   HEMATOCRIT % 26.2*   < > 27.5* 26.0* 25.9* 26.0*    < > = values in this interval not displayed.     COVID19   Date Value Ref Range Status   03/01/2023 Not Detected Not Detected - Ref. Range Final     Lab Results   Component Value Date    HGBA1C 4.8 03/01/2023       RD to follow up per protocol.    Electronically signed by:  Deneen Pantoja RD  04/07/23 13:08 EDT

## 2023-04-07 NOTE — ANESTHESIA POSTPROCEDURE EVALUATION
Patient: Anthony Moss    Procedure Summary     Date: 04/06/23 Room / Location: [facility] OR 06 / [facility] MAIN OR    Anesthesia Start: 2225 Anesthesia Stop: 2327    Procedure: LAPAROTOMY EXPLORATORY REPAIR OF DEHISCENCE (Abdomen) Diagnosis:     Surgeons: Michael Witt MD Provider: Joe Nguyen MD    Anesthesia Type: general ASA Status: 4 - Emergent          Anesthesia Type: general    Vitals  Vitals Value Taken Time   /66 04/07/23 0020   Temp 99.4 °F (37.4 °C) 04/07/23 0020   Pulse 122 04/07/23 0024   Resp 21 04/07/23 0020   SpO2 95 % 04/07/23 0024   Vitals shown include unvalidated device data.        Post Anesthesia Care and Evaluation    Patient location during evaluation: PACU  Patient participation: complete - patient participated  Level of consciousness: awake  Pain scale: See nurse's notes for pain score.  Pain management: adequate    Airway patency: patent  Anesthetic complications: No anesthetic complications  PONV Status: none  Cardiovascular status: acceptable  Respiratory status: acceptable and spontaneous ventilation  Hydration status: acceptable    Comments: Patient seen and examined postoperatively; vital signs stable; SpO2 greater than or equal to 90%; cardiopulmonary status stable; nausea/vomiting adequately controlled; pain adequately controlled; no apparent anesthesia complications; patient discharged from anesthesia care when discharge criteria were met
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
## 2023-04-07 NOTE — PROGRESS NOTES
"Pharmacy Antimicrobial Dosing Service    Subjective:  Anthony Moss is a 67 y.o.male admitted with bladder cancer. Pharmacy has been consulted to dose Vancomycin for possible ssti, sepsis, and intra-ab infection.        Assessment/Plan    1. Day #1 Vancomycin: Goal -600 mcg*h/mL. Vancomycin 1750mg IV x1 given as load.  Continue with vancomycin 1500mg IV q24.  Obtain peak 4/8 at 1000 and trough 4/9 at 0500.    2. Day #1 Piperacillin/Tazobactam: 3.375gm IV q8h for estCrCl > 30 mL/min.    Will continue to monitor drug levels, renal function, culture and sensitivities, and patient clinical status.       Objective:  Relevant clinical data and objective history reviewed:  188 cm (74\")   90.3 kg (199 lb 1.2 oz)   Ideal body weight: 82.2 kg (181 lb 3.5 oz)  Adjusted ideal body weight: 85.4 kg (188 lb 5.8 oz)  Body mass index is 25.56 kg/m².        Results from last 7 days   Lab Units 04/06/23  1245 04/04/23 2339 04/04/23  0309   CREATININE mg/dL 1.44* 1.26 1.34*     Estimated Creatinine Clearance: 65.3 mL/min (A) (by C-G formula based on SCr of 1.44 mg/dL (H)).  I/O last 3 completed shifts:  In: 1440 [P.O.:1440]  Out: 2195 [Urine:1925; Drains:270]    Results from last 7 days   Lab Units 04/06/23  1245 04/04/23 2339 04/04/23  0309   WBC 10*3/mm3 21.80* 13.20* 13.20*     Temperature    04/07/23 0020 04/07/23 0037 04/07/23 0423   Temp: 99.4 °F (37.4 °C) 98.1 °F (36.7 °C) (!) 100.9 °F (38.3 °C)     Baseline culture/source/susceptibility:  Microbiology Results (last 10 days)       Procedure Component Value - Date/Time    CANDIDA AURIS SCREEN - Swab, Axilla Right, Axilla Left and Groin [[id number]]  (Normal) Collected: 03/28/23 2000    Lab Status: Final result Specimen: Swab from Axilla Right, Axilla Left and Groin Updated: 04/02/23 2016     Candida Auris Screen Culture No Candida auris isolated at 5 days            Anti-Infectives (From admission, onward)      Ordered     Dose/Rate Route Frequency Start Stop    " 04/07/23 0608  vancomycin 1500 mg/500 mL 0.9% NS IVPB ([facility])        Ordering Provider: Javi De La Cruz MD    1,500 mg Intravenous Every 24 Hours 04/08/23 0600 04/14/23 0559    04/07/23 0445  vancomycin IVPB 1750 mg in 0.9% Sodium Chloride (premix) 500 mL        Ordering Provider: Javi De La Cruz MD    20 mg/kg × 90.3 kg Intravenous Once 04/07/23 0545 04/07/23 0534    04/07/23 0443  Pharmacy to dose vancomycin        Ordering Provider: Javi De La Cruz MD     Does not apply Continuous PRN 04/07/23 0443 04/14/23 0442    04/06/23 2043  piperacillin-tazobactam (ZOSYN) IVPB 3.375 g in 100 mL NS (CD)        Ordering Provider: Ankit Freeman MD    3.375 g  over 4 Hours Intravenous Every 8 Hours 04/07/23 0330 04/10/23 0329    04/06/23 2043  piperacillin-tazobactam (ZOSYN) IVPB 3.375 g in 100 mL NS (CD)        Ordering Provider: Javi De La Cruz MD    3.375 g  over 30 Minutes Intravenous Once 04/06/23 2130 04/06/23 2153    03/28/23 1110  ceFAZolin 2 gm IVPB in 100 mL NS ([facility])        Ordering Provider: Xu Montero MD    2 g  over 30 Minutes Intravenous Once 03/28/23 1115 03/28/23 1252            Karthikeyan Ramos PharmD  04/07/23 06:09 EDT

## 2023-04-08 LAB
ABO GROUP BLD: NORMAL
ALBUMIN SERPL-MCNC: 2 G/DL (ref 3.5–5.2)
ALBUMIN/GLOB SERPL: 0.6 G/DL
ALP SERPL-CCNC: 136 U/L (ref 39–117)
ALT SERPL W P-5'-P-CCNC: 8 U/L (ref 1–41)
ANION GAP SERPL CALCULATED.3IONS-SCNC: 10 MMOL/L (ref 5–15)
ANISOCYTOSIS BLD QL: ABNORMAL
AST SERPL-CCNC: 15 U/L (ref 1–40)
BACTERIA SPEC AEROBE CULT: ABNORMAL
BACTERIA SPEC AEROBE CULT: ABNORMAL
BILIRUB SERPL-MCNC: 0.3 MG/DL (ref 0–1.2)
BLD GP AB SCN SERPL QL: NEGATIVE
BUN SERPL-MCNC: 26 MG/DL (ref 8–23)
BUN/CREAT SERPL: 14 (ref 7–25)
CA-I SERPL ISE-MCNC: 1.1 MMOL/L (ref 1.2–1.3)
CALCIUM SPEC-SCNC: 7.3 MG/DL (ref 8.6–10.5)
CHLORIDE SERPL-SCNC: 104 MMOL/L (ref 98–107)
CHOLEST SERPL-MCNC: 85 MG/DL (ref 0–200)
CO2 SERPL-SCNC: 21 MMOL/L (ref 22–29)
CREAT SERPL-MCNC: 1.86 MG/DL (ref 0.76–1.27)
DEPRECATED RDW RBC AUTO: 53.4 FL (ref 37–54)
EGFRCR SERPLBLD CKD-EPI 2021: 39.7 ML/MIN/1.73
EOSINOPHIL # BLD MANUAL: 0.19 10*3/MM3 (ref 0–0.4)
EOSINOPHIL NFR BLD MANUAL: 1 % (ref 0.3–6.2)
ERYTHROCYTE [DISTWIDTH] IN BLOOD BY AUTOMATED COUNT: 17.5 % (ref 12.3–15.4)
GLOBULIN UR ELPH-MCNC: 3.2 GM/DL
GLUCOSE BLDC GLUCOMTR-MCNC: 131 MG/DL (ref 70–105)
GLUCOSE BLDC GLUCOMTR-MCNC: 133 MG/DL (ref 70–105)
GLUCOSE BLDC GLUCOMTR-MCNC: 133 MG/DL (ref 70–105)
GLUCOSE BLDC GLUCOMTR-MCNC: 94 MG/DL (ref 70–105)
GLUCOSE SERPL-MCNC: 136 MG/DL (ref 65–99)
GRAM STN SPEC: ABNORMAL
GRAM STN SPEC: ABNORMAL
HCT VFR BLD AUTO: 21.2 % (ref 37.5–51)
HGB BLD-MCNC: 6.5 G/DL (ref 13–17.7)
LYMPHOCYTES # BLD MANUAL: 2.05 10*3/MM3 (ref 0.7–3.1)
LYMPHOCYTES NFR BLD MANUAL: 9 % (ref 5–12)
MAGNESIUM SERPL-MCNC: 1.7 MG/DL (ref 1.6–2.4)
MCH RBC QN AUTO: 26.6 PG (ref 26.6–33)
MCHC RBC AUTO-ENTMCNC: 30.5 G/DL (ref 31.5–35.7)
MCV RBC AUTO: 87.2 FL (ref 79–97)
MONOCYTES # BLD: 1.67 10*3/MM3 (ref 0.1–0.9)
NEUTROPHILS # BLD AUTO: 14.69 10*3/MM3 (ref 1.7–7)
NEUTROPHILS NFR BLD MANUAL: 79 % (ref 42.7–76)
PHOSPHATE SERPL-MCNC: 4.2 MG/DL (ref 2.5–4.5)
PLATELET # BLD AUTO: 248 10*3/MM3 (ref 140–450)
PMV BLD AUTO: 8.1 FL (ref 6–12)
POIKILOCYTOSIS BLD QL SMEAR: ABNORMAL
POTASSIUM SERPL-SCNC: 4.4 MMOL/L (ref 3.5–5.2)
PREALB SERPL-MCNC: 3.5 MG/DL (ref 20–40)
PROT SERPL-MCNC: 5.2 G/DL (ref 6–8.5)
RBC # BLD AUTO: 2.43 10*6/MM3 (ref 4.14–5.8)
RH BLD: POSITIVE
SCAN SLIDE: NORMAL
SMALL PLATELETS BLD QL SMEAR: ADEQUATE
SODIUM SERPL-SCNC: 135 MMOL/L (ref 136–145)
T&S EXPIRATION DATE: NORMAL
VARIANT LYMPHS NFR BLD MANUAL: 11 % (ref 19.6–45.3)
WBC MORPH BLD: NORMAL
WBC NRBC COR # BLD: 18.6 10*3/MM3 (ref 3.4–10.8)

## 2023-04-08 PROCEDURE — 85007 BL SMEAR W/DIFF WBC COUNT: CPT | Performed by: NURSE PRACTITIONER

## 2023-04-08 PROCEDURE — 25010000002 ENOXAPARIN PER 10 MG: Performed by: UROLOGY

## 2023-04-08 PROCEDURE — 25010000002 PIPERACILLIN SOD-TAZOBACTAM PER 1 G: Performed by: UROLOGY

## 2023-04-08 PROCEDURE — 86900 BLOOD TYPING SEROLOGIC ABO: CPT | Performed by: NURSE PRACTITIONER

## 2023-04-08 PROCEDURE — 84134 ASSAY OF PREALBUMIN: CPT | Performed by: SURGERY

## 2023-04-08 PROCEDURE — 25010000002 MAGNESIUM SULFATE 2 GM/50ML SOLUTION: Performed by: SURGERY

## 2023-04-08 PROCEDURE — 86901 BLOOD TYPING SEROLOGIC RH(D): CPT | Performed by: NURSE PRACTITIONER

## 2023-04-08 PROCEDURE — 83735 ASSAY OF MAGNESIUM: CPT | Performed by: SURGERY

## 2023-04-08 PROCEDURE — 82330 ASSAY OF CALCIUM: CPT | Performed by: SURGERY

## 2023-04-08 PROCEDURE — 25010000002 MORPHINE PER 10 MG: Performed by: UROLOGY

## 2023-04-08 PROCEDURE — 82962 GLUCOSE BLOOD TEST: CPT

## 2023-04-08 PROCEDURE — 84100 ASSAY OF PHOSPHORUS: CPT | Performed by: SURGERY

## 2023-04-08 PROCEDURE — 97110 THERAPEUTIC EXERCISES: CPT

## 2023-04-08 PROCEDURE — P9016 RBC LEUKOCYTES REDUCED: HCPCS

## 2023-04-08 PROCEDURE — 85025 COMPLETE CBC W/AUTO DIFF WBC: CPT | Performed by: NURSE PRACTITIONER

## 2023-04-08 PROCEDURE — 25010000002 CALCIUM GLUCONATE PER 10 ML: Performed by: SURGERY

## 2023-04-08 PROCEDURE — 86850 RBC ANTIBODY SCREEN: CPT | Performed by: NURSE PRACTITIONER

## 2023-04-08 PROCEDURE — 25010000002 MAGNESIUM SULFATE PER 500 MG OF MAGNESIUM: Performed by: SURGERY

## 2023-04-08 PROCEDURE — 25010000002 METOCLOPRAMIDE PER 10 MG: Performed by: UROLOGY

## 2023-04-08 PROCEDURE — 36430 TRANSFUSION BLD/BLD COMPNT: CPT

## 2023-04-08 PROCEDURE — 25010000002 POTASSIUM CHLORIDE PER 2 MEQ OF POTASSIUM: Performed by: SURGERY

## 2023-04-08 PROCEDURE — 80053 COMPREHEN METABOLIC PANEL: CPT | Performed by: SURGERY

## 2023-04-08 PROCEDURE — 86923 COMPATIBILITY TEST ELECTRIC: CPT

## 2023-04-08 PROCEDURE — 86900 BLOOD TYPING SEROLOGIC ABO: CPT

## 2023-04-08 PROCEDURE — 25010000002 CALCIUM GLUCONATE 2-0.675 GM/100ML-% SOLUTION: Performed by: INTERNAL MEDICINE

## 2023-04-08 PROCEDURE — 82465 ASSAY BLD/SERUM CHOLESTEROL: CPT | Performed by: SURGERY

## 2023-04-08 PROCEDURE — 25010000002 PIPERACILLIN SOD-TAZOBACTAM PER 1 G: Performed by: INTERNAL MEDICINE

## 2023-04-08 RX ORDER — MAGNESIUM SULFATE HEPTAHYDRATE 40 MG/ML
2 INJECTION, SOLUTION INTRAVENOUS ONCE
Status: COMPLETED | OUTPATIENT
Start: 2023-04-08 | End: 2023-04-08

## 2023-04-08 RX ORDER — CALCIUM GLUCONATE 20 MG/ML
2 INJECTION, SOLUTION INTRAVENOUS ONCE
Status: COMPLETED | OUTPATIENT
Start: 2023-04-08 | End: 2023-04-08

## 2023-04-08 RX ORDER — SODIUM CHLORIDE, SODIUM LACTATE, POTASSIUM CHLORIDE, CALCIUM CHLORIDE 600; 310; 30; 20 MG/100ML; MG/100ML; MG/100ML; MG/100ML
50 INJECTION, SOLUTION INTRAVENOUS CONTINUOUS
Status: DISCONTINUED | OUTPATIENT
Start: 2023-04-08 | End: 2023-04-08

## 2023-04-08 RX ADMIN — Medication 10 ML: at 09:37

## 2023-04-08 RX ADMIN — POLYETHYLENE GLYCOL, PROPYLENE GLYCOL 1 DROP: .4; .3 LIQUID OPHTHALMIC at 11:38

## 2023-04-08 RX ADMIN — SODIUM CHLORIDE, POTASSIUM CHLORIDE, SODIUM LACTATE AND CALCIUM CHLORIDE 100 ML/HR: 600; 310; 30; 20 INJECTION, SOLUTION INTRAVENOUS at 03:53

## 2023-04-08 RX ADMIN — MORPHINE SULFATE 4 MG: 4 INJECTION, SOLUTION INTRAMUSCULAR; INTRAVENOUS at 05:50

## 2023-04-08 RX ADMIN — MORPHINE SULFATE 4 MG: 4 INJECTION, SOLUTION INTRAMUSCULAR; INTRAVENOUS at 17:41

## 2023-04-08 RX ADMIN — POLYETHYLENE GLYCOL 3350 17 G: 17 POWDER, FOR SOLUTION ORAL at 09:36

## 2023-04-08 RX ADMIN — METOCLOPRAMIDE HYDROCHLORIDE 10 MG: 5 INJECTION INTRAMUSCULAR; INTRAVENOUS at 23:29

## 2023-04-08 RX ADMIN — METOCLOPRAMIDE HYDROCHLORIDE 10 MG: 5 INJECTION INTRAMUSCULAR; INTRAVENOUS at 01:11

## 2023-04-08 RX ADMIN — ENOXAPARIN SODIUM 40 MG: 100 INJECTION SUBCUTANEOUS at 09:36

## 2023-04-08 RX ADMIN — PIPERACILLIN SODIUM AND TAZOBACTAM SODIUM 3.38 G: 3; .375 INJECTION, POWDER, LYOPHILIZED, FOR SOLUTION INTRAVENOUS at 11:35

## 2023-04-08 RX ADMIN — POTASSIUM CHLORIDE: 2 INJECTION, SOLUTION, CONCENTRATE INTRAVENOUS at 17:46

## 2023-04-08 RX ADMIN — MORPHINE SULFATE 4 MG: 4 INJECTION, SOLUTION INTRAMUSCULAR; INTRAVENOUS at 21:38

## 2023-04-08 RX ADMIN — PIPERACILLIN SODIUM AND TAZOBACTAM SODIUM 3.38 G: 3; .375 INJECTION, POWDER, LYOPHILIZED, FOR SOLUTION INTRAVENOUS at 20:00

## 2023-04-08 RX ADMIN — METOCLOPRAMIDE HYDROCHLORIDE 10 MG: 5 INJECTION INTRAMUSCULAR; INTRAVENOUS at 11:35

## 2023-04-08 RX ADMIN — POLYETHYLENE GLYCOL, PROPYLENE GLYCOL 1 DROP: .4; .3 LIQUID OPHTHALMIC at 07:36

## 2023-04-08 RX ADMIN — CALCIUM GLUCONATE 2 G: 20 INJECTION, SOLUTION INTRAVENOUS at 09:35

## 2023-04-08 RX ADMIN — POLYETHYLENE GLYCOL, PROPYLENE GLYCOL 1 DROP: .4; .3 LIQUID OPHTHALMIC at 14:49

## 2023-04-08 RX ADMIN — MAGNESIUM SULFATE HEPTAHYDRATE 2 G: 2 INJECTION, SOLUTION INTRAVENOUS at 10:49

## 2023-04-08 RX ADMIN — Medication 10 ML: at 22:50

## 2023-04-08 RX ADMIN — MORPHINE SULFATE 4 MG: 4 INJECTION, SOLUTION INTRAMUSCULAR; INTRAVENOUS at 10:09

## 2023-04-08 RX ADMIN — DEXTROSE MONOHYDRATE 42 ML/HR: 100 INJECTION, SOLUTION INTRAVENOUS at 14:47

## 2023-04-08 RX ADMIN — PIPERACILLIN SODIUM AND TAZOBACTAM SODIUM 3.38 G: 3; .375 INJECTION, POWDER, LYOPHILIZED, FOR SOLUTION INTRAVENOUS at 03:05

## 2023-04-08 RX ADMIN — METOCLOPRAMIDE HYDROCHLORIDE 10 MG: 5 INJECTION INTRAMUSCULAR; INTRAVENOUS at 17:18

## 2023-04-08 RX ADMIN — METOCLOPRAMIDE HYDROCHLORIDE 10 MG: 5 INJECTION INTRAMUSCULAR; INTRAVENOUS at 05:43

## 2023-04-08 RX ADMIN — MORPHINE SULFATE 4 MG: 4 INJECTION, SOLUTION INTRAMUSCULAR; INTRAVENOUS at 23:09

## 2023-04-08 RX ADMIN — MORPHINE SULFATE 4 MG: 4 INJECTION, SOLUTION INTRAMUSCULAR; INTRAVENOUS at 01:11

## 2023-04-08 RX ADMIN — Medication 5000 UNITS: at 09:36

## 2023-04-08 RX ADMIN — MORPHINE SULFATE 4 MG: 4 INJECTION, SOLUTION INTRAMUSCULAR; INTRAVENOUS at 03:05

## 2023-04-08 NOTE — PROGRESS NOTES
Pharmacy consult - total parental nutrition    Anthony Moss is a 67 y.o.male admitted with bladder cancer. Pharmacy consulted to dose TPN for  ileus associated w/diffuse peritonitis  per Dr. Witt's request. Patient underwent radical cystectomy and prostatectomy with ileal conduit placement 3/28/23. Patient with multiple loose BMs and high NG output with persistent nausea post-op. Patient with fascial dehiscence with evisceration of small bowel on 4/6 and underwent exploration, washout, and repair of fascial dehiscence 4/6. Malnutrition thought to be factor in dehiscence and TPN initiated.       Assessment  Estimated macronutrient goal requirements per RD:    TPN type: Clinimix 5/20 (2000 mL/day)  Line type: Central  Protein: 100 grams/day (1.1 grams/kg/day)  Lipids: 250 mL of 20% lipid infusion 5 days/week  Kcal/day: 2117 kcal (23.5 kcal/kg/day)    Plan    TPN consult placed after cutoff yesterday therefore D10 hung in interim while PICC line placed. Discussed with Kati MAN, today. Will initiate TPN at half goal macros (Clinimix 5/15 1L) hold lipids which will provide the following macros:    Amino acids: 50 grams/day  Dextrose: 150 grams/day  Lipids: holding  Volume: 1089 mL (45 mL/hr over 24 hrs daily)    Na slightly subtherapeutic and CO2 low. Will initiate ~1 mEq/kg NaAcetate in TPN  K WNL but on higher-end goal range and renal function variable. Will initiate small amount (<0.5 mEq/kg) in TPN and monitor for further adjustments.   Mag slightly subtherapeutic. Will replace outside TPN and add to TPN.   Phos WNL and near high-end goal range. Will omit from TPN currently.   Ionized calcium subtherapeutic. Will replace outside TPN and add to TPN.   MVI IV on national shortage - will add thiamine and folic acid to TPN instead on MWF only. Trace on national shortage - will add to TPN MWF only.      **Patient receiving LR at 100 mL/hr currently. Will reduce to 50 mL/hr given TPN rate of 45 mL/hr. When TPN  "advanced, will d/c LR as total rate will be near 100 mL/hr.     Based on labs, will add the following electrolytes/additives to the TPN:     Date: 4/8        TPN Lytes (60mL)         CaGluc (mEq) 10        MgSO4 (mEq) 8        NaCl (mEq)         NaAcetate (mEq) 100        KCl (mEq) 30        KAcetate (mEq)         NaPhos (mmol)         KPhos (mmol)         Famotidine (mg)         MVI (10 mL)         Trace (1 mL)         Humulin R (units)         Folic acid (mg)         Thiamine (mg)             Patient also received the following electrolyte replacement per protocol:    Magnesium sulfate: 2 gm IV x1 and Calcium gluconate: 2 gm IV x1    Pharmacy will continue to follow.          Objective  Relevant clinical data and objective history reviewed:  188 cm (74\")   89.8 kg (197 lb 15.6 oz)   Ideal body weight: 82.2 kg (181 lb 3.5 oz)  Adjusted ideal body weight: 85.2 kg (187 lb 14.7 oz)  Body mass index is 25.42 kg/m².    Results from last 7 days   Lab Units 04/08/23  0400 04/07/23  1717 04/07/23  0811 04/06/23  1245 04/04/23  2339 04/04/23  1756 04/04/23  0309 04/03/23  0118 04/02/23  0135   SODIUM mmol/L 135* 133* 138 136 139  --  139 139 136   POTASSIUM mmol/L 4.4 4.5 4.7 3.8 4.0 3.8 3.3* 3.8 4.0   CHLORIDE mmol/L 104 103 106 102 101  --  97* 96* 94*   CO2 mmol/L 21.0* 21.0* 20.0* 23.0 25.0  --  30.0* 28.0 29.0   GLUCOSE mg/dL 136* 380* 170* 130* 90  --  104* 105* 132*   BUN mg/dL 26* 25* 25* 18 22  --  28* 33* 34*   CREATININE mg/dL 1.86* 2.01* 2.05* 1.44* 1.26  --  1.34* 1.65* 1.81*   CALCIUM mg/dL 7.3* 7.2* 7.6* 8.0* 8.0*  --  8.2* 8.5* 8.6   IONIZED CALCIUM mmol/L 1.10*  --   --   --   --   --   --   --   --    PHOSPHORUS mg/dL 4.2  --  4.5  --  3.3  --  3.7  --  4.0   MAGNESIUM mg/dL 1.7  --  1.5*  --   --   --  1.9 1.9 1.7   TOTAL PROTEIN g/dL 5.2*  --  5.7*  --   --   --   --   --   --    ALBUMIN g/dL 2.0*  --  2.4*  --   --   --   --   --   --    BILIRUBIN mg/dL 0.3  --  0.6  --   --   --   --   --   --    ALK " PHOS U/L 136*  --  166*  --   --   --   --   --   --    AST (SGOT) U/L 15  --  16  --   --   --   --   --   --    ALT (SGPT) U/L 8  --  9  --   --   --   --   --   --    TRIGLYCERIDES mg/dL  --   --  92  --   --   --   --   --   --        I/O last 3 completed shifts:  In: 680 [P.O.:180; I.V.:500]  Out: 955 [Urine:725; Drains:230]  Estimated Creatinine Clearance: 50.3 mL/min (A) (by C-G formula based on SCr of 1.86 mg/dL (H)).    Dietary Orders (From admission, onward)       Start     Ordered    04/07/23 1259  Diet: Liquid Diets; Clear Liquid; Texture: Regular Texture (IDDSI 7); Fluid Consistency: Thin (IDDSI 0)  Diet Effective Now        References:    Diet Order [place]   Question Answer Comment   Diets: Liquid Diets    Liquid Diet: Clear Liquid    Texture: Regular Texture (IDDSI 7)    Fluid Consistency: Thin (IDDSI 0)        04/07/23 1258    04/05/23 1600  Dietary Nutrition Supplements Boost Breeze (Ensure Clear)  3 Times Daily      Question:  Select Supplement:  Answer:  Boost Breeze (Ensure Clear)    04/05/23 1455                    Olivia Anderson, PharmD  09:46 EDT 4/8/2023

## 2023-04-08 NOTE — PROGRESS NOTES
Infectious Diseases Progress Note      LOS: 11 days   Patient Care Team:  Reji Topete MD as PCP - General (Family Medicine)    Chief Complaint: Abdomen wound    Subjective     The patient had no fever during the last 24 hours.  He remained hemodynamically stable.  He denied having any new complaints.    Review of Systems:   Review of Systems   Constitutional: Negative.    HENT: Negative.    Eyes: Negative.    Respiratory: Negative.    Cardiovascular: Negative.    Gastrointestinal: Negative.    Genitourinary: Negative.    Musculoskeletal: Negative.    Skin: Positive for wound.   Neurological: Negative.    Hematological: Negative.    Psychiatric/Behavioral: Negative.         Objective     Vital Signs  Temp:  [97.5 °F (36.4 °C)-98.9 °F (37.2 °C)] 98 °F (36.7 °C)  Heart Rate:  [84-96] 92  Resp:  [11-20] 16  BP: ()/(47-68) 100/47    Physical Exam:  Physical Exam  Vitals and nursing note reviewed.   Constitutional:       Appearance: He is well-developed.   HENT:      Head: Normocephalic and atraumatic.   Eyes:      Pupils: Pupils are equal, round, and reactive to light.   Cardiovascular:      Rate and Rhythm: Normal rate and regular rhythm.      Heart sounds: Normal heart sounds.   Pulmonary:      Effort: Pulmonary effort is normal. No respiratory distress.      Breath sounds: Normal breath sounds. No wheezing or rales.      Comments: Presence of ileal conduit    Mid abdomen incision with large dehiscence and there was packing.  There was no purulent drainage  Abdominal:      General: Bowel sounds are normal. There is no distension.      Palpations: Abdomen is soft. There is no mass.      Tenderness: There is no abdominal tenderness. There is no guarding or rebound.   Musculoskeletal:         General: No deformity. Normal range of motion.      Cervical back: Normal range of motion and neck supple.   Skin:     General: Skin is warm.      Findings: No erythema or rash.   Neurological:      Mental Status:  He is alert and oriented to person, place, and time.      Cranial Nerves: No cranial nerve deficit.          Results Review:    I have reviewed all clinical data, test, lab, and imaging results.     Radiology  No Radiology Exams Resulted Within Past 24 Hours    Cardiology    Laboratory    Results from last 7 days   Lab Units 04/08/23  0400 04/07/23  0811 04/06/23  1245 04/04/23  2339 04/04/23  0309 04/03/23  0118 04/02/23  0135   WBC 10*3/mm3 18.60* 40.80* 21.80* 13.20* 13.20* 11.40* 9.40   HEMOGLOBIN g/dL 6.5* 8.4* 8.6* 8.4* 8.3* 8.6* 9.1*   HEMATOCRIT % 21.2* 26.2* 26.4* 27.5* 26.0* 25.9* 26.0*   PLATELETS 10*3/mm3 248 361 325 296 267 249 247     Results from last 7 days   Lab Units 04/08/23  0400 04/07/23  1717 04/07/23  0811 04/06/23  1245 04/04/23  2339 04/04/23  1756 04/04/23  0309 04/03/23  0118   SODIUM mmol/L 135* 133* 138 136 139  --  139 139   POTASSIUM mmol/L 4.4 4.5 4.7 3.8 4.0 3.8 3.3* 3.8   CHLORIDE mmol/L 104 103 106 102 101  --  97* 96*   CO2 mmol/L 21.0* 21.0* 20.0* 23.0 25.0  --  30.0* 28.0   BUN mg/dL 26* 25* 25* 18 22  --  28* 33*   CREATININE mg/dL 1.86* 2.01* 2.05* 1.44* 1.26  --  1.34* 1.65*   GLUCOSE mg/dL 136* 380* 170* 130* 90  --  104* 105*   ALBUMIN g/dL 2.0*  --  2.4*  --   --   --   --   --    BILIRUBIN mg/dL 0.3  --  0.6  --   --   --   --   --    ALK PHOS U/L 136*  --  166*  --   --   --   --   --    AST (SGOT) U/L 15  --  16  --   --   --   --   --    ALT (SGPT) U/L 8  --  9  --   --   --   --   --    CALCIUM mg/dL 7.3* 7.2* 7.6* 8.0* 8.0*  --  8.2* 8.5*                 Microbiology   Microbiology Results (last 10 days)     Procedure Component Value - Date/Time    Blood Culture - Blood, Arm, Left [[id number]]  (Normal) Collected: 04/07/23 0520    Lab Status: Preliminary result Specimen: Blood from Arm, Left Updated: 04/08/23 0530     Blood Culture No growth at 24 hours    MRSA Screen, PCR (Inpatient) - Swab, Nares [[id number]]  (Normal) Collected: 04/07/23 0456    Lab Status: Final  result Specimen: Swab from Nares Updated: 04/07/23 0616     MRSA PCR No MRSA Detected    Narrative:      The negative predictive value of this diagnostic test is high and should only be used to consider de-escalating anti-MRSA therapy. A positive result may indicate colonization with MRSA and must be correlated clinically.    Blood Culture - Blood, Arm, Right [[id number]]  (Normal) Collected: 04/07/23 0441    Lab Status: Preliminary result Specimen: Blood from Arm, Right Updated: 04/08/23 0545     Blood Culture No growth at 24 hours    Wound Culture - Surgical Site, Abdominal Wall [[id number]]  (Abnormal)  (Susceptibility) Collected: 04/06/23 1543    Lab Status: Final result Specimen: Surgical Site from Abdominal Wall Updated: 04/08/23 0733     Wound Culture Scant growth (1+) Enterobacter cloacae complex      Scant growth (1+) Corynebacterium species     Comment:   No definitive guidelines. All are susceptible to vancomycin. Resistance to penicillins & cephalosporins does occur.        Gram Stain Few (2+) WBCs per low power field      Rare (1+) Gram positive cocci    Susceptibility      Enterobacter cloacae complex      ALEX      Cefepime Susceptible      Ceftazidime Susceptible      Ceftriaxone Susceptible      Gentamicin Susceptible      Levofloxacin Susceptible      Piperacillin + Tazobactam Susceptible      Tetracycline Susceptible      Trimethoprim + Sulfamethoxazole Susceptible                       Susceptibility Comments     Enterobacter cloacae complex    Cefazolin sensitivity will not be reported for Enterobacteriaceae in non-urine isolates. If cefazolin is preferred, please call the microbiology lab to request an E-test.  With the exception of urinary-sourced infections, aminoglycosides should not be used as monotherapy.                   Medication Review:       Schedule Meds  insulin lispro, 2-7 Units, Subcutaneous, Q6H  metoclopramide, 10 mg, Intravenous, Q6H  piperacillin-tazobactam, 3.375 g,  Intravenous, Q8H  polyethylene glycol, 17 g, Oral, BID  sodium chloride, 10 mL, Intravenous, Q12H  vitamin D3, 5,000 Units, Oral, Daily        Infusion Meds  Adult Central Clinimix TPN,   dextrose, 42 mL/hr, Last Rate: 42 mL/hr (23 1548)  lactated ringers, 100 mL/hr, Last Rate: 100 mL/hr (23 0353)  lactated ringers, 50 mL/hr  Pharmacy to Dose TPN,         PRN Meds  •  acetaminophen **OR** acetaminophen  •  dextrose  •  dextrose  •  docusate sodium  •  glucagon (human recombinant)  •  lactulose  •  Morphine  •  [] HYDROmorphone **AND** naloxone  •  ondansetron **OR** ondansetron  •  Pharmacy to Dose TPN  •  phenol  •  polyethyl glycol-propyl glycol  •  senna-docusate sodium  •  sodium chloride  •  sodium chloride        Assessment & Plan       Antimicrobial Therapy   1.  IV Zosyn        2.        3.        4.        5.              Assessment     Midline wound dehiscence and repair on 2023.  Currently has no obvious purulent drainage or necrotic tissue.  Patient had a radical cystectomy with iliac conduit placement on 3/28/2023 for bladder cancer.   -Intraoperative cultures grew 1+ Enterobacter cloacae complex and 1+ Corynebacterium species.  The last organism is probably contamination     Severe leukocytosis.  Likely related to the above wound dehiscence with incarcerated loop of the small bowel which was noted to appear to be viable during surgery.  Patient denies diarrhea  White count improved significant     Patient did have a postoperative ileus from the initial surgery.  Likely will be placed on TPN per general surgery     Acute kidney injury-nephrology following     History of recently diagnosed prostate and bladder cancer.    He was noted to have severe bilateral hydronephrosis and bilateral nephrostomy tubes were placed on 3/6/2023.  Patient denies any current radiation or chemotherapy treatments     History of neurogenic bladder-patient had a suprapubic catheter placed last year and  states that he had it changed out every 3 weeks before he had the cystectomy     Chronic respiratory failure.  Patient is normally on 2 to 3 L of oxygen by nasal cannula at home.  Currently is on 2 L     History of EtOH abuse     Plan       Continue IV Zosyn 3.375 g every 8 hours for a total of 7 days  Waiting on wound culture to finalize    Continue supportive care  Mary Singh MD  04/08/23  14:24 EDT    Note is dictated utilizing voice recognition software/Dragon

## 2023-04-08 NOTE — PROGRESS NOTES
Nutrition Services    Patient Name: Anthony Moss  YOB: 1955  MRN: [id number]  Admission date: 3/28/2023    PPE Documentation        PPE Worn By Provider Did not enter room for this encounter   PPE Worn By Patient  N/A     PROGRESS NOTE      Encounter Information: Progress note to check on TPN. RD collaborated with pharmacist (Olivia). D10 yesterday and plans for initiation of TPN today.  Today will be day #2 of IV nutrition.         PO Diet: Diet: Liquid Diets; Clear Liquid; Texture: Regular Texture (IDDSI 7); Fluid Consistency: Thin (IDDSI 0)   PO Supplements: Boost Breeze TID (Provides 750 kcals, 42 g protein if consumed)   PO Intake:  Minimal intake x 11 days with frequent NPO and liquid diets       Current nutrition support: D10, plans to begin TPN today    Nutrition support review: Documented as above per EMR        Labs (reviewed below): Reviewed, management per attending  Electrolyte management per Pharmacy        GI Function:  Small BM documented 4/6 (x 2 days ago)       Nutrition Intervention Updates: Begin Clinimix 5/15 1000 mL volume + hold lipids        Results from last 7 days   Lab Units 04/08/23  0400 04/07/23  1717 04/07/23  0811   SODIUM mmol/L 135* 133* 138   POTASSIUM mmol/L 4.4 4.5 4.7   CHLORIDE mmol/L 104 103 106   CO2 mmol/L 21.0* 21.0* 20.0*   BUN mg/dL 26* 25* 25*   CREATININE mg/dL 1.86* 2.01* 2.05*   CALCIUM mg/dL 7.3* 7.2* 7.6*   BILIRUBIN mg/dL 0.3  --  0.6   ALK PHOS U/L 136*  --  166*   ALT (SGPT) U/L 8  --  9   AST (SGOT) U/L 15  --  16   GLUCOSE mg/dL 136* 380* 170*     Results from last 7 days   Lab Units 04/08/23  0400 04/07/23  0811 04/04/23  2339 04/04/23  0309   MAGNESIUM mg/dL 1.7 1.5*  --  1.9   PHOSPHORUS mg/dL 4.2 4.5   < > 3.7   HEMOGLOBIN g/dL 6.5* 8.4*   < > 8.3*   HEMATOCRIT % 21.2* 26.2*   < > 26.0*   TRIGLYCERIDES mg/dL  --  92  --   --     < > = values in this interval not displayed.     COVID19   Date Value Ref Range Status   03/01/2023 Not Detected  Not Detected - Ref. Range Final     Lab Results   Component Value Date    HGBA1C 4.8 03/01/2023       RD to follow up per protocol.    Electronically signed by:  Shanna Shoemaker RD  04/08/23 09:12 EDT

## 2023-04-08 NOTE — PROGRESS NOTES
[facility] Daily Progress Note    Patient Name: Anthony Moss  : 1955  MRN: [id number]  Primary Care Physician:  Reji Topete MD  Date of admission: 3/28/2023      Subjective      Chief Complaint: Constipation, weakness      Patient Reports     3/31/23: OOB to chair.  PT consulted.  Constipated.  Undecided whether SNF versus HH.  Lives alone.  DC IVF.  Nephrology and urology following. + ARAVIND drain with serosanguineous fluid.    23: Patient with multiple vomiting overnight NG tube placed with bilious discharge.  Repeat KUB.  Gaiting [employer].    23: Patient having NGT removed.  Still feeling pretty rough at this time improved abdominal pain.  Somnolent as well.  Awaiting SNF placement.    4/3/2023: Patient had NGT replaced due to likely ileus.  Having some surgical site TTP and soreness.  Still feeling pretty good overall, forward to improving his p.o. intake and getting started with the rest of his care plan.    23: Still has NGT in place.  Having some more feelings of hunger, agreeable to current management.  Also reporting constipation.    23: NGT was removed today, had large volume bowel movements as well.  Patient feeling a lot better today.  Advance to clear liquid diet.    23: Had significant bowel movement as well, still having some more abdominal pain today.  Denies any nausea, vomiting.  Is tolerating p.o. intake as well, but still only liquids and high-fiber.    23: Patient went to the OR last night for wound dehiscence repair.  General surgery and urology recs are appreciated for current management.  Patient to continue IV ABX, pending ID recs.     23: Patient had drop in Hb to below 7, receiving PRBC at this time.Still reporting significant abdominal pain.  General surgery recs pending.    Review of Systems   All other systems reviewed and are negative.         Objective      Vitals:   Temp:  [97.5 °F (36.4  °C)-98.9 °F (37.2 °C)] 97.9 °F (36.6 °C)  Heart Rate:  [84-96] 84  Resp:  [11-20] 11  BP: ()/(36-68) 99/62  Flow (L/min):  [2-3] 2    Physical Exam  HENT:      Head: Normocephalic.      Nose: Nose normal.   Eyes:      Extraocular Movements: Extraocular movements intact.      Pupils: Pupils are equal, round, and reactive to light.   Cardiovascular:      Rate and Rhythm: Normal rate.   Pulmonary:      Effort: Pulmonary effort is normal.      Breath sounds: Normal breath sounds.   Abdominal:      General: Bowel sounds are normal.      Comments: Ileal conduit with urine.  Left lower quadrant ARAVIND drain with serosanguineous fluid.   Musculoskeletal:         General: Normal range of motion.      Cervical back: Normal range of motion.   Skin:     General: Skin is warm.   Neurological:      Mental Status: He is alert. Mental status is at baseline.   Psychiatric:         Mood and Affect: Mood normal.             Result Review    Result Review:  I have personally reviewed the results from the time of this admission to 4/8/2023 11:06 EDT and agree with these findings:  [x]  Laboratory  []  Microbiology  [x]  Radiology  []  EKG/Telemetry   []  Cardiology/Vascular   []  Pathology  [x]  Old records  []  Other:      Wounds (last 24 hours)     LDA Wound     Row Name 04/08/23 0502 04/07/23 1600 04/07/23 1200       Wound 04/06/23 2243 abdomen Incision    Wound - Properties Group Placement Date: 04/06/23  -PH Placement Time: 2243  -PH Present on Hospital Admission: Y  -PH Location: abdomen  -PH Primary Wound Type: Incision  -PH    Closure -- Other (Comment)  W-D saline/kerlix packing placed by surgeon, covered with ABD pad and secured with abdomenal binder.  -JM --    Base -- dressing in place, unable to visualize  -JM dressing in place, unable to visualize  -BM    Drainage Amount -- other (see comments)  dressing newly placed at 1305 no drainage visible at this time  -JM small  -BM    Dressing Care -- other (see comments)   Abdominal binder in place  -SOFÍA abdominal binder utilized  -BM    Retired Wound - Properties Group Placement Date: 04/06/23  -PH Placement Time: 2243  -PH Present on Hospital Admission: Y  -PH Location: abdomen  -PH Primary Wound Type: Incision  -PH    Retired Wound - Properties Group Date first assessed: 04/06/23  -PH Time first assessed: 2243  -PH Present on Hospital Admission: Y  -PH Location: abdomen  -PH Primary Wound Type: Incision  -PH       Wound 04/07/23 0800 Left posterior heel Pressure Injury    Wound - Properties Group Placement Date: 04/07/23  -PB Placement Time: 0800  -PB Side: Left  -PB Orientation: posterior  -PB Location: heel  -PB Primary Wound Type: Pressure inj  -PB    Retired Wound - Properties Group Placement Date: 04/07/23  -PB Placement Time: 0800  -PB Side: Left  -PB Orientation: posterior  -PB Location: heel  -PB Primary Wound Type: Pressure inj  -PB    Retired Wound - Properties Group Date first assessed: 04/07/23  -PB Time first assessed: 0800  -PB Side: Left  -PB Location: heel  -PB Primary Wound Type: Pressure inj  -PB       Wound Right posterior heel Pressure Injury    Wound - Properties Group Side: Right  -PB Orientation: posterior  -PB Location: heel  -PB Primary Wound Type: Pressure inj  -PB    Pressure Injury Stage 1  -PB -- --    Red (%), Wound Tissue Color 100  -PB -- --    Periwound Skin Turgor soft  -PB -- --    Retired Wound - Properties Group Side: Right  -PB Orientation: posterior  -PB Location: heel  -PB Primary Wound Type: Pressure inj  -PB    Retired Wound - Properties Group Side: Right  -PB Location: heel  -PB Primary Wound Type: Pressure inj  -PB          User Key  (r) = Recorded By, (t) = Taken By, (c) = Cosigned By    Initials Name Provider Type    PH Carolyn Marie RN Registered Nurse    Shirley Vázquez RN Registered Nurse    Carolyn Bautista RN Registered Nurse    Claude Parekh III RN Registered Nurse                  Assessment & Plan      Brief  Patient Summary:        insulin lispro, 2-7 Units, Subcutaneous, Q6H  metoclopramide, 10 mg, Intravenous, Q6H  piperacillin-tazobactam, 3.375 g, Intravenous, Q8H  polyethylene glycol, 17 g, Oral, BID  sodium chloride, 10 mL, Intravenous, Q12H  vitamin D3, 5,000 Units, Oral, Daily       Adult Central Clinimix TPN,   dextrose, 42 mL/hr, Last Rate: 42 mL/hr (04/07/23 1548)  lactated ringers, 100 mL/hr, Last Rate: 100 mL/hr (04/08/23 0353)  lactated ringers, 50 mL/hr  Pharmacy to Dose TPN,          Active Hospital Problems:  Active Hospital Problems    Diagnosis    • **Bladder cancer    • Severe Malnutrition (HCC)    • Attention to urostomy    • Elevated serum creatinine    • Prostate cancer    • History of alcohol use disorder      Surgical wound dehiscence  Postop anemia  Prostate cancer  Bladder cancer  S/p cystectomy with ilial conduit urinary diversion 3/20/2023  CARLOS ALBERTO  Ileus 3/31/2023  Constipation  Hypertension  History of alcohol abuse  Poor p.o. intake, at risk for starvation    Plan:  -Ileus mostly resolved, but patient still not tolerating full p.o. intake  -ARAVIND drain  -Renal function improved.  Hold LR once TPN started, nephro recs appreciated.  -Encourage OOB to chair.  -Patient undecided whether SNF versus   -- 1 unit PRBC, recheck H&H in 3 hours  -- WBCs in the 40s, likely leukemoid reaction after OR management emergently last night.  -- Recommended start TPN, pharmacy to dose  -- Continue IV ABX, ID recs pending  -- Will need to placement to SNF after this stay.  Patient is agreeable.        DVT prophylaxis:  Mechanical DVT prophylaxis orders are present.    CODE STATUS:    Code Status (Patient has no pulse and is not breathing): CPR (Attempt to Resuscitate)  Medical Interventions (Patient has pulse or is breathing): Full Support      Disposition:  I expect patient to be discharged to SNF once tolerating p.o. intake, clinically stable, likely not for 4 to 5 days.    This patient has been examined wearing  appropriate Personal Protective Equipment and discussed with nursing. 04/08/23      Electronically signed by Saima Roberto DO, 04/08/23, 11:06 EDT.  [facility] Hospitalist Team

## 2023-04-08 NOTE — PROGRESS NOTES
PROGRESS NOTE      Patient Name: Anthony Moss  : 1955  MRN: [id number]  Primary Care Physician: Reji Topete MD  Date of admission: 3/28/2023    Patient Care Team:  Reji Topete MD as PCP - General (Family Medicine)        Subjective   Subjective:     Resting in bed.  Review of systems:  Review of Systems -   Neg.except weakness.    Allergies:  No Known Allergies    Objective   Exam:     Vital Signs  Temp:  [97.4 °F (36.3 °C)-98.9 °F (37.2 °C)] 97.4 °F (36.3 °C)  Heart Rate:  [84-96] 92  Resp:  [11-20] 16  BP: ()/(47-63) 100/47  SpO2:  [93 %-100 %] 98 %  on  Flow (L/min):  [2-3] 2;   Device (Oxygen Therapy): nasal cannula  Body mass index is 25.42 kg/m².    General:     male in no acute distress.    Head:      Normocephalic and atraumatic.    Eyes:      PERRL/EOM intact, conjunctiva and sclera clear with out nystagmus.    Neck:      No masses, thyromegaly,  trachea central with normal respiratory effort   Lungs:    Clear bilaterally to auscultation.    Heart:      Regular rate and rhythm, no murmur no gallop  Abd:        Soft, nontender, not distended, bowel sounds positive, no shifting dullness   Pulses:   Pulses palpable  Extr:        No cyanosis or clubbing--+ edema.    Neuro:    No focal deficits.   alert oriented x3  Skin:       Intact without lesions or rashes.    Psych:    Alert and cooperative; normal mood and affect; .      Results Review:  I have personally reviewed most recent Data :  CBC    Results from last 7 days   Lab Units 23  0400 23  0811 23  1245 23  2339 23  0309 23  0118 23  0135   WBC 10*3/mm3 18.60* 40.80* 21.80* 13.20* 13.20* 11.40* 9.40   HEMOGLOBIN g/dL 6.5* 8.4* 8.6* 8.4* 8.3* 8.6* 9.1*   PLATELETS 10*3/mm3 248 361 325 296 267 249 247     CMP   Results from last 7 days   Lab Units 23  0400 23  1717 23  0811 23  1245 23  2339 23  1756 23  0309 23  0118    SODIUM mmol/L 135* 133* 138 136 139  --  139 139   POTASSIUM mmol/L 4.4 4.5 4.7 3.8 4.0 3.8 3.3* 3.8   CHLORIDE mmol/L 104 103 106 102 101  --  97* 96*   CO2 mmol/L 21.0* 21.0* 20.0* 23.0 25.0  --  30.0* 28.0   BUN mg/dL 26* 25* 25* 18 22  --  28* 33*   CREATININE mg/dL 1.86* 2.01* 2.05* 1.44* 1.26  --  1.34* 1.65*   GLUCOSE mg/dL 136* 380* 170* 130* 90  --  104* 105*   ALBUMIN g/dL 2.0*  --  2.4*  --   --   --   --   --    BILIRUBIN mg/dL 0.3  --  0.6  --   --   --   --   --    ALK PHOS U/L 136*  --  166*  --   --   --   --   --    AST (SGOT) U/L 15  --  16  --   --   --   --   --    ALT (SGPT) U/L 8  --  9  --   --   --   --   --      ABG    Results from last 7 days   Lab Units 04/07/23  0502   PH, ARTERIAL pH units 7.394   PCO2, ARTERIAL mm Hg 35.6   PO2 ART mm Hg 105.3   O2 SATURATION ART % 98.1*   BASE EXCESS ART mmol/L -2.8*     XR Chest 1 View    Result Date: 4/7/2023  1.  Low lung volumes, accentuate the heart size. 2.  Basilar opacities (atelectasis, aspiration, infection). Electronically signed by:  Dinora Nguyen M.D.  4/7/2023 3:20 AM Mountain Time      Results for orders placed during the hospital encounter of 01/05/22    Adult Transthoracic Echo Complete w/ Color, Spectral and Contrast if Necessary Per Protocol    Interpretation Summary  · Left ventricular ejection fraction appears to be 56 - 60%.  · The right ventricular cavity is moderately dilated.  · No pericardial effusion noted    Scheduled Meds:insulin lispro, 2-7 Units, Subcutaneous, Q6H  metoclopramide, 10 mg, Intravenous, Q6H  piperacillin-tazobactam, 3.375 g, Intravenous, Q8H  polyethylene glycol, 17 g, Oral, BID  sodium chloride, 10 mL, Intravenous, Q12H  vitamin D3, 5,000 Units, Oral, Daily      Continuous Infusions:Adult Central Clinimix TPN, , Last Rate: 45 mL/hr at 04/08/23 1746  Pharmacy to Dose TPN,       PRN Meds:•  acetaminophen **OR** acetaminophen  •  dextrose  •  dextrose  •  docusate sodium  •  glucagon (human recombinant)  •   lactulose  •  Morphine  •  [] HYDROmorphone **AND** naloxone  •  ondansetron **OR** ondansetron  •  Pharmacy to Dose TPN  •  phenol  •  polyethyl glycol-propyl glycol  •  senna-docusate sodium  •  sodium chloride  •  sodium chloride    Assessment & Plan   Assessment and Plan:         Bladder cancer    History of alcohol use disorder    Prostate cancer    Elevated serum creatinine    Attention to urostomy    Severe Malnutrition (HCC)    ASSESSMENT:  • Acute kidney injury, stage III, status post radical cystectomy getting better hyperkalemia, secondary to CARLOS ALBERTO  • Metabolic acidosis secondary to CKD start low-dose sodium bicarbonate  • History of bladder cancer,   • History of alcohol use,  • Chronic back pain,using NSAID in the past  • B/l pneumonia.  Surprisingly, WBC stable,           Plan:      • At this time patient with history of bladder cancer status post cystectomy urine output is adequate  • Patient has more wound dehiscence and some infection taken back to the OR for 2023   • Hypotension has improved leukocytosis getting better  • Creatinine slowly getting better again  • Continue Ringer lactate once patient is on TPN DC IV fluid  • So far electrolytes are acceptable  • Repeat labs later today and follow-up with a urine output which is minimal  • Patient receiving blood today that will also help with the renal functions and hemodynamics  • Patient still has significant GI complaints  • Labs tomorrow morning  • Potassium level is acceptable  • Echocardiogram done last time showed ejection fraction 56 to 60%  • Repeat labs tomorrow morning          Electronically signed by Jamie Houston MD,   [facility] kidney consultant  [phone]  2023  18:00 EDT

## 2023-04-08 NOTE — PROGRESS NOTES
General Surgery Inpatient Progress Note      Name: Anthony Moss ADMIT: 3/28/2023   : 1955  PCP: Reji Topete MD    MRN: [id number] LOS: 11 days   AGE/SEX: 67 y.o. male  ROOM:    [facility]      Patient Care Team:  Reji Topete MD as PCP - General (Family Medicine)  No chief complaint on file.      Subjective:  Patient seen and examined.  States pain is adequately controlled.  Denies any nausea or vomiting.  Reports small amount of flatus.  Discussed advancing diet and he states he would prefer to remain on clear liquid diet for today.    Medications:  enoxaparin, 40 mg, Subcutaneous, Daily  insulin lispro, 2-7 Units, Subcutaneous, Q6H  magnesium sulfate, 2 g, Intravenous, Once  metoclopramide, 10 mg, Intravenous, Q6H  piperacillin-tazobactam, 3.375 g, Intravenous, Q8H  polyethylene glycol, 17 g, Oral, BID  sodium chloride, 10 mL, Intravenous, Q12H  vitamin D3, 5,000 Units, Oral, Daily       •  acetaminophen **OR** acetaminophen  •  dextrose  •  dextrose  •  docusate sodium  •  glucagon (human recombinant)  •  lactulose  •  Morphine  •  [] HYDROmorphone **AND** naloxone  •  ondansetron **OR** ondansetron  •  Pharmacy to Dose TPN  •  phenol  •  polyethyl glycol-propyl glycol  •  senna-docusate sodium  •  sodium chloride  •  sodium chloride     Vitals:  Temp:  [97.5 °F (36.4 °C)-98.9 °F (37.2 °C)] 98.9 °F (37.2 °C)  Heart Rate:  [88-96] 89  Resp:  [12-20] 12  BP: ()/(36-68) 98/61     Physical Exam:  No acute distress, alert  Nonlabored respirations  Abdomen soft, minimally distended, appropriately tender to palpation, incision clean dry and intact with fascia closed and skin open, wound base clean  ARAVIND drain in place serosanguineous  Ileal conduit in place    Labs:  Results from last 7 days   Lab Units 23  0400 23  0811 23  1245   WBC 10*3/mm3 18.60* 40.80* 21.80*   HEMOGLOBIN g/dL 6.5* 8.4* 8.6*   HEMATOCRIT % 21.2* 26.2* 26.4*   PLATELETS  10*3/mm3 248 361 325   MONOCYTES % % 9.0 4.0*  --      Results from last 7 days   Lab Units 04/08/23  0400 04/07/23  1717 04/07/23  0811   SODIUM mmol/L 135* 133* 138   POTASSIUM mmol/L 4.4 4.5 4.7   CHLORIDE mmol/L 104 103 106   CO2 mmol/L 21.0* 21.0* 20.0*   BUN mg/dL 26* 25* 25*   CREATININE mg/dL 1.86* 2.01* 2.05*   CALCIUM mg/dL 7.3* 7.2* 7.6*   BILIRUBIN mg/dL 0.3  --  0.6   ALK PHOS U/L 136*  --  166*   ALT (SGPT) U/L 8  --  9   AST (SGOT) U/L 15  --  16   GLUCOSE mg/dL 136* 380* 170*     Assessment and Plan:  67 y.o. male postop day #11 from cystectomy and ileal conduit with subsequent fascial dehiscence now postop day #2 status post closure.  Having some bowel function.    - Continue as needed pain control  - We will continue clears for now if patient would like to advance diet later today can advance to full liquid diet  -TPN recommended from surgical standpoint  - Wet-to-dry dressing twice daily to open wound and continue binder  -ID on consult for leukocytosis; on Zosyn  - Further management per primary and other consultants    This note was created using Dragon Voice Recognition software.    Janet Leon MD  04/08/23  09:52 EDT

## 2023-04-08 NOTE — THERAPY TREATMENT NOTE
"Subjective: Pt agreeable to therapeutic plan of care. Pt only agreed to bed exs, very lethargic and kept eyes closed throughout tx, but could follow simple commands    Objective:     Bed mobility - N/A or Not attempted.  Transfers - N/A or Not attempted.  Ambulation -  feet N/A or Not attempted.    Therapeutic Exercise - 10 Reps Bilaterally AAROM lying supine    Vitals: WNL    Pain: 9 VAS   Location: abdomen without meds  Intervention for pain: Repositioned, RN notified, Increased Activity and Therapeutic Presence    Education: Provided education on the importance of mobility in the acute care setting and Verbal/Tactile Cues    Assessment: Anthony Moss presents with functional mobility impairments which indicate the need for skilled intervention. Tolerating session today without incident. Pt. has urostomy and joe. Very lethargic but could assist with BLE exs while supine, c/o pain in abdomen with heel slides. Plans are rehab at [place]. Will continue to follow and progress as tolerated.     Plan/Recommendations:   Moderate Intensity Therapy recommended post-acute care. This is recommended as therapy feels the patient would require 3-4 days per week and wouldn't tolerate \"3 hour daily\" rehab intensity. SNF would be the preferred choice. If the patient does not agree to SNF, arrange HH or OP depending on home bound status. If patient is medically complex, consider LTACH.. Pt requires no DME at discharge.     Pt desires Skilled Rehab placement at discharge. Pt cooperative; agreeable to therapeutic recommendations and plan of care.         Basic Mobility 6-click:  Rollin = Total, A lot = 2, A little = 3; 4 = None  Supine>Sit:   1 = Total, A lot = 2, A little = 3; 4 = None   Sit>Stand with arms:  1 = Total, A lot = 2, A little = 3; 4 = None  Bed>Chair:   1 = Total, A lot = 2, A little = 3; 4 = None  Ambulate in room:  1 = Total, A lot = 2, A little = 3; 4 = None  3-5 Steps with railin = Total, A lot = 2, A " little = 3; 4 = None  Score: 14    Post-Tx Position: Supine with HOB Elevated, Alarms activated and Call light and personal items within reach  PPE: gloves and gown

## 2023-04-08 NOTE — PLAN OF CARE
Goal Outcome Evaluation:      Pt has been on 2 L nasal canula for the day. Dressing change wet/dry to abdominal area done. Morphine given for pain. TPN to start soon.Will continue to monitor.
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
## 2023-04-08 NOTE — PLAN OF CARE
Assessment: Anthony Moss presents with functional mobility impairments which indicate the need for skilled intervention. Tolerating session today without incident. Pt. has urostomy and joe. Very lethargic but could assist with BLE exs while supine, c/o pain in abdomen with heel slides. Plans are rehab at [place]. Will continue to follow and progress as tolerated.

## 2023-04-09 LAB
ALBUMIN SERPL-MCNC: 2 G/DL (ref 3.5–5.2)
ALBUMIN/GLOB SERPL: 0.6 G/DL
ALP SERPL-CCNC: 204 U/L (ref 39–117)
ALT SERPL W P-5'-P-CCNC: 10 U/L (ref 1–41)
ANION GAP SERPL CALCULATED.3IONS-SCNC: 8 MMOL/L (ref 5–15)
AST SERPL-CCNC: 14 U/L (ref 1–40)
BASOPHILS # BLD AUTO: 0.1 10*3/MM3 (ref 0–0.2)
BASOPHILS NFR BLD AUTO: 0.5 % (ref 0–1.5)
BH BB BLOOD EXPIRATION DATE: NORMAL
BH BB BLOOD TYPE BARCODE: 9500
BH BB DISPENSE STATUS: NORMAL
BH BB PRODUCT CODE: NORMAL
BH BB UNIT NUMBER: NORMAL
BILIRUB SERPL-MCNC: 0.4 MG/DL (ref 0–1.2)
BUN SERPL-MCNC: 28 MG/DL (ref 8–23)
BUN/CREAT SERPL: 14.8 (ref 7–25)
CA-I SERPL ISE-MCNC: 1.15 MMOL/L (ref 1.2–1.3)
CALCIUM SPEC-SCNC: 7.7 MG/DL (ref 8.6–10.5)
CHLORIDE SERPL-SCNC: 105 MMOL/L (ref 98–107)
CO2 SERPL-SCNC: 22 MMOL/L (ref 22–29)
CREAT SERPL-MCNC: 1.89 MG/DL (ref 0.76–1.27)
CROSSMATCH INTERPRETATION: NORMAL
DEPRECATED RDW RBC AUTO: 52.9 FL (ref 37–54)
EGFRCR SERPLBLD CKD-EPI 2021: 38.9 ML/MIN/1.73
EOSINOPHIL # BLD AUTO: 0.3 10*3/MM3 (ref 0–0.4)
EOSINOPHIL NFR BLD AUTO: 2.3 % (ref 0.3–6.2)
ERYTHROCYTE [DISTWIDTH] IN BLOOD BY AUTOMATED COUNT: 17.6 % (ref 12.3–15.4)
GLOBULIN UR ELPH-MCNC: 3.3 GM/DL
GLUCOSE BLDC GLUCOMTR-MCNC: 116 MG/DL (ref 70–105)
GLUCOSE BLDC GLUCOMTR-MCNC: 119 MG/DL (ref 70–105)
GLUCOSE BLDC GLUCOMTR-MCNC: 122 MG/DL (ref 70–105)
GLUCOSE BLDC GLUCOMTR-MCNC: 123 MG/DL (ref 70–105)
GLUCOSE BLDC GLUCOMTR-MCNC: 136 MG/DL (ref 70–105)
GLUCOSE SERPL-MCNC: 121 MG/DL (ref 65–99)
HCT VFR BLD AUTO: 23.2 % (ref 37.5–51)
HGB BLD-MCNC: 7.4 G/DL (ref 13–17.7)
LYMPHOCYTES # BLD AUTO: 1.4 10*3/MM3 (ref 0.7–3.1)
LYMPHOCYTES NFR BLD AUTO: 13.4 % (ref 19.6–45.3)
MAGNESIUM SERPL-MCNC: 1.9 MG/DL (ref 1.6–2.4)
MCH RBC QN AUTO: 27.1 PG (ref 26.6–33)
MCHC RBC AUTO-ENTMCNC: 32 G/DL (ref 31.5–35.7)
MCV RBC AUTO: 84.8 FL (ref 79–97)
MONOCYTES # BLD AUTO: 0.9 10*3/MM3 (ref 0.1–0.9)
MONOCYTES NFR BLD AUTO: 8 % (ref 5–12)
NEUTROPHILS NFR BLD AUTO: 75.8 % (ref 42.7–76)
NEUTROPHILS NFR BLD AUTO: 8.2 10*3/MM3 (ref 1.7–7)
NRBC BLD AUTO-RTO: 0 /100 WBC (ref 0–0.2)
PHOSPHATE SERPL-MCNC: 3.6 MG/DL (ref 2.5–4.5)
PLATELET # BLD AUTO: 240 10*3/MM3 (ref 140–450)
PMV BLD AUTO: 8 FL (ref 6–12)
POTASSIUM SERPL-SCNC: 4.4 MMOL/L (ref 3.5–5.2)
PROT SERPL-MCNC: 5.3 G/DL (ref 6–8.5)
RBC # BLD AUTO: 2.73 10*6/MM3 (ref 4.14–5.8)
SODIUM SERPL-SCNC: 135 MMOL/L (ref 136–145)
UNIT  ABO: NORMAL
UNIT  RH: NORMAL
WBC NRBC COR # BLD: 10.8 10*3/MM3 (ref 3.4–10.8)

## 2023-04-09 PROCEDURE — 25010000002 PIPERACILLIN SOD-TAZOBACTAM PER 1 G: Performed by: NURSE PRACTITIONER

## 2023-04-09 PROCEDURE — 25010000002 PIPERACILLIN SOD-TAZOBACTAM PER 1 G: Performed by: INTERNAL MEDICINE

## 2023-04-09 PROCEDURE — 82962 GLUCOSE BLOOD TEST: CPT

## 2023-04-09 PROCEDURE — 25010000002 MORPHINE PER 10 MG: Performed by: UROLOGY

## 2023-04-09 PROCEDURE — 84100 ASSAY OF PHOSPHORUS: CPT | Performed by: SURGERY

## 2023-04-09 PROCEDURE — 25010000002 MAGNESIUM SULFATE PER 500 MG OF MAGNESIUM: Performed by: SURGERY

## 2023-04-09 PROCEDURE — 25010000002 POTASSIUM CHLORIDE PER 2 MEQ OF POTASSIUM: Performed by: SURGERY

## 2023-04-09 PROCEDURE — 85025 COMPLETE CBC W/AUTO DIFF WBC: CPT | Performed by: INTERNAL MEDICINE

## 2023-04-09 PROCEDURE — 80053 COMPREHEN METABOLIC PANEL: CPT | Performed by: SURGERY

## 2023-04-09 PROCEDURE — 25010000002 CALCIUM GLUCONATE 2-0.675 GM/100ML-% SOLUTION: Performed by: INTERNAL MEDICINE

## 2023-04-09 PROCEDURE — 82330 ASSAY OF CALCIUM: CPT | Performed by: SURGERY

## 2023-04-09 PROCEDURE — 25010000002 METOCLOPRAMIDE PER 10 MG: Performed by: UROLOGY

## 2023-04-09 PROCEDURE — 83735 ASSAY OF MAGNESIUM: CPT | Performed by: SURGERY

## 2023-04-09 PROCEDURE — 25010000002 CALCIUM GLUCONATE PER 10 ML: Performed by: SURGERY

## 2023-04-09 PROCEDURE — 25010000002 MORPHINE PER 10 MG

## 2023-04-09 RX ORDER — CALCIUM GLUCONATE 20 MG/ML
2 INJECTION, SOLUTION INTRAVENOUS ONCE
Status: COMPLETED | OUTPATIENT
Start: 2023-04-09 | End: 2023-04-09

## 2023-04-09 RX ORDER — HYDROCODONE BITARTRATE AND ACETAMINOPHEN 5; 325 MG/1; MG/1
1 TABLET ORAL EVERY 4 HOURS PRN
Status: DISPENSED | OUTPATIENT
Start: 2023-04-09 | End: 2023-04-16

## 2023-04-09 RX ORDER — MORPHINE SULFATE 2 MG/ML
1 INJECTION, SOLUTION INTRAMUSCULAR; INTRAVENOUS EVERY 4 HOURS PRN
Status: DISPENSED | OUTPATIENT
Start: 2023-04-09 | End: 2023-04-10

## 2023-04-09 RX ADMIN — SODIUM BICARBONATE: 84 INJECTION, SOLUTION INTRAVENOUS at 10:14

## 2023-04-09 RX ADMIN — Medication 10 ML: at 21:45

## 2023-04-09 RX ADMIN — POLYETHYLENE GLYCOL 3350 17 G: 17 POWDER, FOR SOLUTION ORAL at 21:45

## 2023-04-09 RX ADMIN — METOCLOPRAMIDE HYDROCHLORIDE 10 MG: 5 INJECTION INTRAMUSCULAR; INTRAVENOUS at 11:58

## 2023-04-09 RX ADMIN — PIPERACILLIN SODIUM AND TAZOBACTAM SODIUM 3.38 G: 3; .375 INJECTION, POWDER, LYOPHILIZED, FOR SOLUTION INTRAVENOUS at 11:58

## 2023-04-09 RX ADMIN — MORPHINE SULFATE 1 MG: 2 INJECTION, SOLUTION INTRAMUSCULAR; INTRAVENOUS at 23:37

## 2023-04-09 RX ADMIN — METOCLOPRAMIDE HYDROCHLORIDE 10 MG: 5 INJECTION INTRAMUSCULAR; INTRAVENOUS at 23:37

## 2023-04-09 RX ADMIN — MORPHINE SULFATE 4 MG: 4 INJECTION, SOLUTION INTRAMUSCULAR; INTRAVENOUS at 19:47

## 2023-04-09 RX ADMIN — CALCIUM GLUCONATE 2 G: 20 INJECTION, SOLUTION INTRAVENOUS at 10:18

## 2023-04-09 RX ADMIN — MORPHINE SULFATE 4 MG: 4 INJECTION, SOLUTION INTRAMUSCULAR; INTRAVENOUS at 01:02

## 2023-04-09 RX ADMIN — POTASSIUM CHLORIDE: 2 INJECTION, SOLUTION, CONCENTRATE INTRAVENOUS at 17:51

## 2023-04-09 RX ADMIN — Medication 10 ML: at 08:01

## 2023-04-09 RX ADMIN — MORPHINE SULFATE 4 MG: 4 INJECTION, SOLUTION INTRAMUSCULAR; INTRAVENOUS at 08:01

## 2023-04-09 RX ADMIN — METOCLOPRAMIDE HYDROCHLORIDE 10 MG: 5 INJECTION INTRAMUSCULAR; INTRAVENOUS at 17:07

## 2023-04-09 RX ADMIN — PIPERACILLIN SODIUM AND TAZOBACTAM SODIUM 3.38 G: 3; .375 INJECTION, POWDER, LYOPHILIZED, FOR SOLUTION INTRAVENOUS at 03:19

## 2023-04-09 RX ADMIN — PIPERACILLIN SODIUM AND TAZOBACTAM SODIUM 3.38 G: 3; .375 INJECTION, POWDER, LYOPHILIZED, FOR SOLUTION INTRAVENOUS at 19:53

## 2023-04-09 RX ADMIN — MORPHINE SULFATE 4 MG: 4 INJECTION, SOLUTION INTRAMUSCULAR; INTRAVENOUS at 12:55

## 2023-04-09 RX ADMIN — Medication 5000 UNITS: at 08:01

## 2023-04-09 RX ADMIN — METOCLOPRAMIDE HYDROCHLORIDE 10 MG: 5 INJECTION INTRAMUSCULAR; INTRAVENOUS at 05:44

## 2023-04-09 RX ADMIN — POLYETHYLENE GLYCOL 3350 17 G: 17 POWDER, FOR SOLUTION ORAL at 08:01

## 2023-04-09 NOTE — PROGRESS NOTES
PROGRESS NOTE      Patient Name: Anthony Moss  : 1955  MRN: [id number]  Primary Care Physician: Reji Topete MD  Date of admission: 3/28/2023    Patient Care Team:  Reji Topete MD as PCP - General (Family Medicine)        Subjective   Subjective:     Resting in bed.  Review of systems:  Review of Systems -   Neg.except weakness.    Allergies:  No Known Allergies    Objective   Exam:     Vital Signs  Temp:  [97.4 °F (36.3 °C)-98.1 °F (36.7 °C)] 97.7 °F (36.5 °C)  Heart Rate:  [84-93] 93  Resp:  [11-16] 11  BP: ()/(50-67) 105/67  SpO2:  [90 %-98 %] 90 %  on  Flow (L/min):  [2] 2;   Device (Oxygen Therapy): nasal cannula  Body mass index is 27.17 kg/m².    General:     male in no acute distress.    Head:      Normocephalic and atraumatic.    Eyes:      PERRL/EOM intact, conjunctiva and sclera clear with out nystagmus.    Neck:      No masses, thyromegaly,  trachea central with normal respiratory effort   Lungs:    Clear bilaterally to auscultation.    Heart:      Regular rate and rhythm, no murmur no gallop  Abd:        Soft, nontender, not distended, bowel sounds positive, no shifting dullness   Pulses:   Pulses palpable  Extr:        No cyanosis or clubbing--+ edema.    Neuro:    No focal deficits.   alert oriented x3  Skin:       Intact without lesions or rashes.    Psych:    Alert and cooperative; normal mood and affect; .      Results Review:  I have personally reviewed most recent Data :  CBC    Results from last 7 days   Lab Units 23  0317 23  0400 23  0811 23  1245 23  2339 23  0309 23  0118   WBC 10*3/mm3 10.80 18.60* 40.80* 21.80* 13.20* 13.20* 11.40*   HEMOGLOBIN g/dL 7.4* 6.5* 8.4* 8.6* 8.4* 8.3* 8.6*   PLATELETS 10*3/mm3 240 248 361 325 296 267 249     CMP   Results from last 7 days   Lab Units 23  0317 23  0400 23  1717 23  0811 23  1245 23  2339 23  1756 23  0309    SODIUM mmol/L 135* 135* 133* 138 136 139  --  139   POTASSIUM mmol/L 4.4 4.4 4.5 4.7 3.8 4.0 3.8 3.3*   CHLORIDE mmol/L 105 104 103 106 102 101  --  97*   CO2 mmol/L 22.0 21.0* 21.0* 20.0* 23.0 25.0  --  30.0*   BUN mg/dL 28* 26* 25* 25* 18 22  --  28*   CREATININE mg/dL 1.89* 1.86* 2.01* 2.05* 1.44* 1.26  --  1.34*   GLUCOSE mg/dL 121* 136* 380* 170* 130* 90  --  104*   ALBUMIN g/dL 2.0* 2.0*  --  2.4*  --   --   --   --    BILIRUBIN mg/dL 0.4 0.3  --  0.6  --   --   --   --    ALK PHOS U/L 204* 136*  --  166*  --   --   --   --    AST (SGOT) U/L 14 15  --  16  --   --   --   --    ALT (SGPT) U/L 10 8  --  9  --   --   --   --      ABG    Results from last 7 days   Lab Units 23  0502   PH, ARTERIAL pH units 7.394   PCO2, ARTERIAL mm Hg 35.6   PO2 ART mm Hg 105.3   O2 SATURATION ART % 98.1*   BASE EXCESS ART mmol/L -2.8*     No radiology results for the last day    Results for orders placed during the hospital encounter of 22    Adult Transthoracic Echo Complete w/ Color, Spectral and Contrast if Necessary Per Protocol    Interpretation Summary  · Left ventricular ejection fraction appears to be 56 - 60%.  · The right ventricular cavity is moderately dilated.  · No pericardial effusion noted    Scheduled Meds:insulin lispro, 2-7 Units, Subcutaneous, Q6H  metoclopramide, 10 mg, Intravenous, Q6H  piperacillin-tazobactam, 3.375 g, Intravenous, Q8H  polyethylene glycol, 17 g, Oral, BID  sodium chloride, 10 mL, Intravenous, Q12H  vitamin D3, 5,000 Units, Oral, Daily      Continuous Infusions:Adult Central Clinimix TPN, , Last Rate: 45 mL/hr at 23 1746  Adult Central Clinimix TPN,   Pharmacy to Dose TPN,   custom IV infusion builder, , Last Rate: 100 mL/hr at 23 1014      PRN Meds:•  acetaminophen **OR** acetaminophen  •  dextrose  •  dextrose  •  docusate sodium  •  glucagon (human recombinant)  •  lactulose  •  Morphine  •  [] HYDROmorphone **AND** naloxone  •  ondansetron **OR**  ondansetron  •  Pharmacy to Dose TPN  •  phenol  •  polyethyl glycol-propyl glycol  •  senna-docusate sodium  •  sodium chloride  •  sodium chloride    Assessment & Plan   Assessment and Plan:         Bladder cancer    History of alcohol use disorder    Prostate cancer    Elevated serum creatinine    Attention to urostomy    Severe Malnutrition (HCC)    ASSESSMENT:  • Acute kidney injury, stage III, status post radical cystectomy getting better hyperkalemia, secondary to CARLOS ALBERTO  • Metabolic acidosis secondary to CKD start low-dose sodium bicarbonate  • History of bladder cancer,   • History of alcohol use,  • Chronic back pain,using NSAID in the past  • B/l pneumonia.  Surprisingly, WBC stable,           Plan:      • At this time patient with history of bladder cancer status post cystectomy urine output is adequate  • Patient has more wound dehiscence and some infection taken back to the OR for 6/20/2023   • Hypotension has improved leukocytosis getting better  • Creatinine slowly getting better again  • Patient will be given Ringer lactate for 8 hours again today   • Repeat labs tomorrow morning  • Continue TPN at this time  • Patient still has significant GI complaints  • Labs tomorrow morning  • Potassium level is acceptable  • Echocardiogram done last time showed ejection fraction 56 to 60%  • Repeat labs tomorrow morning          Electronically signed by Jamie Houston MD,   [facility] kidney consultant  [phone]  4/9/2023  16:48 EDT

## 2023-04-09 NOTE — PROGRESS NOTES
Pharmacy consult - total parental nutrition    Anthony Moss is a 67 y.o.male admitted with bladder cancer. Pharmacy consulted to dose TPN for  ileus associated w/diffuse peritonitis  per Dr. Witt's request. Patient underwent radical cystectomy and prostatectomy with ileal conduit placement 3/28/23. Patient with multiple loose BMs and high NG output with persistent nausea post-op. Patient with fascial dehiscence with evisceration of small bowel on 4/6 and underwent exploration, washout, and repair of fascial dehiscence 4/6. Malnutrition thought to be factor in dehiscence and TPN initiated.       Assessment  Estimated macronutrient goal requirements per RD:    TPN type: Clinimix 5/20 (2000 mL/day)  Line type: Central  Protein: 100 grams/day (1.1 grams/kg/day)  Lipids: 250 mL of 20% lipid infusion 5 days/week  Kcal/day: 2117 kcal (23.5 kcal/kg/day)    Plan    TPN initiated 4/8. Discussed with RDKati, today. Will continue TPN at half goal macros (Clinimix 5/15 1L) hold lipids which will provide the following macros:    Amino acids: 50 grams/day  Dextrose: 150 grams/day  Lipids: holding  Volume: 1092 mL (45 mL/hr over 24 hrs daily)    Na primarily unchanged today but full effect of initial amount still to be seen. Continue same.   K unchanged today. Continue same.   Mag improved and WNL today s/p outside TPN replacement yesterday. Continue same in TPN.  Phos WNL but decreasing. Will add NaPhos to TPN today.    Ionized calcium subtherapeutic but improved s/p replacement outside TPN yesterday. Will replace outside TPN again and continue same inside TPN for now as full effect of initial amount still to be seen.   MVI IV on national shortage - will add thiamine and folic acid to TPN instead on MWF only. Trace on national shortage - will add to TPN MWF only.      Based on labs, will add the following electrolytes/additives to the TPN:     Date: 4/8 4/9       TPN Lytes (60mL)         CaGluc (mEq) 10 10       MgSO4  "(mEq) 8 8       NaCl (mEq)         NaAcetate (mEq) 100 100       KCl (mEq) 30 30       KAcetate (mEq)         NaPhos (mmol)  9       KPhos (mmol)         Famotidine (mg)         MVI (10 mL)         Trace (1 mL)         Humulin R (units)         Folic acid (mg)         Thiamine (mg)             Patient also received the following electrolyte replacement per protocol:    Calcium gluconate: 2 gm IV x1    Pharmacy will continue to follow.          Objective  Relevant clinical data and objective history reviewed:  188 cm (74\")   96 kg (211 lb 10.3 oz)   Ideal body weight: 82.2 kg (181 lb 3.5 oz)  Adjusted ideal body weight: 87.7 kg (193 lb 6.2 oz)  Body mass index is 27.17 kg/m².    Results from last 7 days   Lab Units 04/09/23  0317 04/08/23  0400 04/07/23  1717 04/07/23  0811 04/06/23  1245 04/04/23  2339 04/04/23  1756 04/04/23  0309 04/03/23  0118   SODIUM mmol/L 135* 135* 133* 138 136 139  --  139 139   POTASSIUM mmol/L 4.4 4.4 4.5 4.7 3.8 4.0 3.8 3.3* 3.8   CHLORIDE mmol/L 105 104 103 106 102 101  --  97* 96*   CO2 mmol/L 22.0 21.0* 21.0* 20.0* 23.0 25.0  --  30.0* 28.0   GLUCOSE mg/dL 121* 136* 380* 170* 130* 90  --  104* 105*   BUN mg/dL 28* 26* 25* 25* 18 22  --  28* 33*   CREATININE mg/dL 1.89* 1.86* 2.01* 2.05* 1.44* 1.26  --  1.34* 1.65*   CALCIUM mg/dL 7.7* 7.3* 7.2* 7.6* 8.0* 8.0*  --  8.2* 8.5*   IONIZED CALCIUM mmol/L 1.15* 1.10*  --   --   --   --   --   --   --    PHOSPHORUS mg/dL 3.6 4.2  --  4.5  --  3.3  --  3.7  --    MAGNESIUM mg/dL 1.9 1.7  --  1.5*  --   --   --  1.9 1.9   TOTAL PROTEIN g/dL 5.3* 5.2*  --  5.7*  --   --   --   --   --    ALBUMIN g/dL 2.0* 2.0*  --  2.4*  --   --   --   --   --    PREALBUMIN mg/dL  --  3.5*  --   --   --   --   --   --   --    BILIRUBIN mg/dL 0.4 0.3  --  0.6  --   --   --   --   --    ALK PHOS U/L 204* 136*  --  166*  --   --   --   --   --    AST (SGOT) U/L 14 15  --  16  --   --   --   --   --    ALT (SGPT) U/L 10 8  --  9  --   --   --   --   --  "   TRIGLYCERIDES mg/dL  --   --   --  92  --   --   --   --   --        I/O last 3 completed shifts:  In: 7600 [P.O.:300; I.V.:7000; Blood:300]  Out: 1570 [Urine:1240; Drains:330]  Estimated Creatinine Clearance: 52.9 mL/min (A) (by C-G formula based on SCr of 1.89 mg/dL (H)).    Dietary Orders (From admission, onward)       Start     Ordered    04/07/23 1259  Diet: Liquid Diets; Clear Liquid; Texture: Regular Texture (IDDSI 7); Fluid Consistency: Thin (IDDSI 0)  Diet Effective Now        References:    Diet Order Crosswalk   Question Answer Comment   Diets: Liquid Diets    Liquid Diet: Clear Liquid    Texture: Regular Texture (IDDSI 7)    Fluid Consistency: Thin (IDDSI 0)        04/07/23 1258    04/05/23 1600  Dietary Nutrition Supplements Boost Breeze (Ensure Clear)  3 Times Daily      Question:  Select Supplement:  Answer:  Boost Breeze (Ensure Clear)    04/05/23 2975                    Olivia Anderson, PharmD  08:23 EDT 4/9/2023

## 2023-04-09 NOTE — PROGRESS NOTES
Nutrition Services    Patient Name: Anthony Moss  YOB: 1955  MRN: [id number]  Admission date: 3/28/2023    PPE Documentation        PPE Worn By Provider Did not enter room for this encounter   PPE Worn By Patient  N/A     PROGRESS NOTE      Encounter Information: Progress note to check on TPN. RD collaborated with pharmacist (Olivia). TPN initiated yesteday.  Today will be day #3 of IV nutrition.  To continue 5/15 1L today and will monitor for ability to advance further tomorrow.         PO Diet: Diet: Liquid Diets; Clear Liquid; Texture: Regular Texture (IDDSI 7); Fluid Consistency: Thin (IDDSI 0)   PO Supplements: Boost Breeze TID (Provides 750 kcals, 42 g protein if consumed)   PO Intake:  Minimal intake x 12 days with frequent NPO and liquid diets       Current nutrition support: Clinimix 5/15 1L + hold lipids    Nutrition support review: Documented as above per EMR        Labs (reviewed below): Reviewed, management per attending  Electrolyte management per Pharmacy        GI Function:  Small BM documented 4/8 (yesterday)       Nutrition Intervention Updates: Continue Clinimix 5/15 1000 mL volume + hold lipids and monitor for ability to advance to goal        Results from last 7 days   Lab Units 04/09/23 0317 04/08/23  0400 04/07/23  1717 04/07/23  0811   SODIUM mmol/L 135* 135* 133* 138   POTASSIUM mmol/L 4.4 4.4 4.5 4.7   CHLORIDE mmol/L 105 104 103 106   CO2 mmol/L 22.0 21.0* 21.0* 20.0*   BUN mg/dL 28* 26* 25* 25*   CREATININE mg/dL 1.89* 1.86* 2.01* 2.05*   CALCIUM mg/dL 7.7* 7.3* 7.2* 7.6*   BILIRUBIN mg/dL 0.4 0.3  --  0.6   ALK PHOS U/L 204* 136*  --  166*   ALT (SGPT) U/L 10 8  --  9   AST (SGOT) U/L 14 15  --  16   GLUCOSE mg/dL 121* 136* 380* 170*     Results from last 7 days   Lab Units 04/09/23 0317 04/08/23  0400 04/07/23  0811   MAGNESIUM mg/dL 1.9 1.7 1.5*   PHOSPHORUS mg/dL 3.6 4.2 4.5   HEMOGLOBIN g/dL 7.4* 6.5* 8.4*   HEMATOCRIT % 23.2* 21.2* 26.2*   TRIGLYCERIDES mg/dL  --    --  92     COVID19   Date Value Ref Range Status   03/01/2023 Not Detected Not Detected - Ref. Range Final     Lab Results   Component Value Date    HGBA1C 4.8 03/01/2023       RD to follow up per protocol.    Electronically signed by:  Shanna Shoemaker RD  04/09/23 08:23 EDT

## 2023-04-09 NOTE — PROGRESS NOTES
General Surgery Inpatient Progress Note      Name: Anthony Moss ADMIT: 3/28/2023   : 1955  PCP: Reji Topete MD    MRN: [id number] LOS: 12 days   AGE/SEX: 67 y.o. male  ROOM: [address] [place] [address]      Patient Care Team:  Reji Topete MD as PCP - General (Family Medicine)  No chief complaint on file.      Subjective:  Patient seen and examined.  Pain well controlled.  Passing flatus but has not yet had a bowel movement.  Denies any nausea.  Would like to advance his diet.    Medications:  insulin lispro, 2-7 Units, Subcutaneous, Q6H  metoclopramide, 10 mg, Intravenous, Q6H  piperacillin-tazobactam, 3.375 g, Intravenous, Q8H  polyethylene glycol, 17 g, Oral, BID  sodium chloride, 10 mL, Intravenous, Q12H  vitamin D3, 5,000 Units, Oral, Daily       •  acetaminophen **OR** acetaminophen  •  dextrose  •  dextrose  •  docusate sodium  •  glucagon (human recombinant)  •  lactulose  •  Morphine  •  [] HYDROmorphone **AND** naloxone  •  ondansetron **OR** ondansetron  •  Pharmacy to Dose TPN  •  phenol  •  polyethyl glycol-propyl glycol  •  senna-docusate sodium  •  sodium chloride  •  sodium chloride     Vitals:  Temp:  [97.5 °F (36.4 °C)-98.1 °F (36.7 °C)] 97.6 °F (36.4 °C)  Heart Rate:  [] 102  Resp:  [11-13] 13  BP: ()/(50-69) 109/69     Physical Exam:  No acute distress, alert  Nonlabored respirations  Abdomen soft, minimally distended, appropriately tender to palpation, incision clean dry and intact with fascia closed and skin open, wound base clean  Ileal conduit in place    Labs:  Results from last 7 days   Lab Units 23  0317 23  0400 23  0811   WBC 10*3/mm3 10.80 18.60* 40.80*   HEMOGLOBIN g/dL 7.4* 6.5* 8.4*   HEMATOCRIT % 23.2* 21.2* 26.2*   PLATELETS 10*3/mm3 240 248 361   MONOCYTES % %  --  9.0 4.0*     Results from last 7 days   Lab Units 23  0317 23  0400 23  1717 23  0811   SODIUM mmol/L 135* 135* 133* 138    POTASSIUM mmol/L 4.4 4.4 4.5 4.7   CHLORIDE mmol/L 105 104 103 106   CO2 mmol/L 22.0 21.0* 21.0* 20.0*   BUN mg/dL 28* 26* 25* 25*   CREATININE mg/dL 1.89* 1.86* 2.01* 2.05*   CALCIUM mg/dL 7.7* 7.3* 7.2* 7.6*   BILIRUBIN mg/dL 0.4 0.3  --  0.6   ALK PHOS U/L 204* 136*  --  166*   ALT (SGPT) U/L 10 8  --  9   AST (SGOT) U/L 14 15  --  16   GLUCOSE mg/dL 121* 136* 380* 170*     Assessment and Plan:  67 y.o. male postop day #12 from cystectomy and ileal conduit with subsequent fascial dehiscence now postop day #4 status post closure.  Having some bowel function.    - Continue as needed pain control  - Advance to full liquid diet  -Continue TPN  - Wet-to-dry dressing twice daily to open wound and continue binder  -ID on consult for leukocytosis; on Zosyn  - Further management per primary and other consultants    This note was created using Dragon Voice Recognition software.    Janet Leon MD  04/09/23  17:43 EDT

## 2023-04-09 NOTE — PROGRESS NOTES
[facility] Medicine Services Daily Progress Note    Patient Name: Anthony Moss  : 1955  MRN: [id number]  Primary Care Physician:  Reji Topete MD  Date of admission: 3/28/2023      Subjective      Chief Complaint: Constipation, weakness      Patient Reports     3/31/23: OOB to chair.  PT consulted.  Constipated.  Undecided whether SNF versus HH.  Lives alone.  DC IVF.  Nephrology and urology following. + ARAVIND drain with serosanguineous fluid.    23: Patient with multiple vomiting overnight NG tube placed with bilious discharge.  Repeat KUB.  Gaiting Mov[employer].    23: Patient having NGT removed.  Still feeling pretty rough at this time improved abdominal pain.  Somnolent as well.  Awaiting SNF placement.    4/3/2023: Patient had NGT replaced due to likely ileus.  Having some surgical site TTP and soreness.  Still feeling pretty good overall, forward to improving his p.o. intake and getting started with the rest of his care plan.    23: Still has NGT in place.  Having some more feelings of hunger, agreeable to current management.  Also reporting constipation.    23: NGT was removed today, had large volume bowel movements as well.  Patient feeling a lot better today.  Advance to clear liquid diet.    23: Had significant bowel movement as well, still having some more abdominal pain today.  Denies any nausea, vomiting.  Is tolerating p.o. intake as well, but still only liquids and high-fiber.    23: Patient went to the OR last night for wound dehiscence repair.  General surgery and urology recs are appreciated for current management.  Patient to continue IV ABX, pending ID recs.     23: Patient had drop in Hb to below 7, receiving PRBC at this time.Still reporting significant abdominal pain.  General surgery recs pending.    23: Significant pretty worn down.  Now tolerating clear liquids, GS looking forward to advancing to fulls, still on TPN at  this time.  Pain management still be via IV    Review of Systems   All other systems reviewed and are negative.         Objective      Vitals:   Temp:  [97.4 °F (36.3 °C)-98.1 °F (36.7 °C)] 97.7 °F (36.5 °C)  Heart Rate:  [84-92] 92  Resp:  [11-16] 11  BP: ()/(47-59) 109/59  Flow (L/min):  [2] 2    Physical Exam  HENT:      Head: Normocephalic.      Nose: Nose normal.   Eyes:      Extraocular Movements: Extraocular movements intact.      Pupils: Pupils are equal, round, and reactive to light.   Cardiovascular:      Rate and Rhythm: Normal rate.   Pulmonary:      Effort: Pulmonary effort is normal.      Breath sounds: Normal breath sounds.   Abdominal:      General: Bowel sounds are normal.      Comments: Ileal conduit with urine.  Left lower quadrant ARAVIND drain with serosanguineous fluid.   Musculoskeletal:         General: Normal range of motion.      Cervical back: Normal range of motion.   Skin:     General: Skin is warm.   Neurological:      Mental Status: He is alert. Mental status is at baseline.   Psychiatric:         Mood and Affect: Mood normal.             Result Review    Result Review:  I have personally reviewed the results from the time of this admission to 4/9/2023 12:14 EDT and agree with these findings:  [x]  Laboratory  []  Microbiology  [x]  Radiology  []  EKG/Telemetry   []  Cardiology/Vascular   []  Pathology  [x]  Old records  []  Other:      Wounds (last 24 hours)     LDA Wound     Row Name 04/09/23 0410 04/09/23 0000 04/08/23 2000       Wound 04/06/23 2243 abdomen Incision    Wound - Properties Group Placement Date: 04/06/23  -PH Placement Time: 2243  -PH Present on Hospital Admission: Y  -PH Location: abdomen  -PH Primary Wound Type: Incision  -PH    Dressing Appearance -- -- dry;intact;no drainage  -TJ    Closure -- -- STEFF  -TJ    Base -- -- dressing in place, unable to visualize  -TJ    Drainage Amount -- -- small  -TJ    Retired Wound - Properties Group Placement Date: 04/06/23  -PH  Placement Time: 2243  -PH Present on Hospital Admission: Y  -PH Location: abdomen  -PH Primary Wound Type: Incision  -PH    Retired Wound - Properties Group Date first assessed: 04/06/23  -PH Time first assessed: 2243  -PH Present on Hospital Admission: Y  -PH Location: abdomen  -PH Primary Wound Type: Incision  -PH       Wound 04/07/23 0800 Left posterior heel Pressure Injury    Wound - Properties Group Placement Date: 04/07/23  -PB Placement Time: 0800  -PB Side: Left  -PB Orientation: posterior  -PB Location: heel  -PB Primary Wound Type: Pressure inj  -PB    Wound Image -- -- Images linked: 1  -TJ (r) PB (t) TJ (c)    Pressure Injury Stage -- 1  -TJ (r) CW (t) TJ (c) --    Dressing Appearance -- open to air;other (see comments)  waffle boots  -TJ (r) CW (t) TJ (c) open to air  waffle boots  -TJ    Drainage Amount none  -CW none  -TJ (r) CW (t) TJ (c) --    Retired Wound - Properties Group Placement Date: 04/07/23  -PB Placement Time: 0800  -PB Side: Left  -PB Orientation: posterior  -PB Location: heel  -PB Primary Wound Type: Pressure inj  -PB    Retired Wound - Properties Group Date first assessed: 04/07/23  -PB Time first assessed: 0800  -PB Side: Left  -PB Location: heel  -PB Primary Wound Type: Pressure inj  -PB       Wound Right posterior heel Pressure Injury    Wound - Properties Group Side: Right  -PB Orientation: posterior  -PB Location: heel  -PB Primary Wound Type: Pressure inj  -PB    Wound Image -- -- Images linked: 1  -TJ (r) PB (t) TJ (c)    Pressure Injury Stage -- 1  -TJ (r) CW (t) TJ (c) --    Dressing Appearance -- open to air;other (see comments)  waffle boots  -TJ (r) CW (t) TJ (c) open to air  waffle boots  -TJ    Retired Wound - Properties Group Side: Right  -PB Orientation: posterior  -PB Location: heel  -PB Primary Wound Type: Pressure inj  -PB    Retired Wound - Properties Group Side: Right  -PB Location: heel  -PB Primary Wound Type: Pressure inj  -PB    Row Name 04/08/23 1600              Wound 04/06/23 2243 abdomen Incision    Wound - Properties Group Placement Date: 04/06/23  -PH Placement Time: 2243  -PH Present on Hospital Admission: Y  -PH Location: abdomen  -PH Primary Wound Type: Incision  -PH    Base red;pink  -MS      Drainage Amount small  -MS      Retired Wound - Properties Group Placement Date: 04/06/23  -PH Placement Time: 2243  -PH Present on Hospital Admission: Y  -PH Location: abdomen  -PH Primary Wound Type: Incision  -PH    Retired Wound - Properties Group Date first assessed: 04/06/23  -PH Time first assessed: 2243  -PH Present on Hospital Admission: Y  -PH Location: abdomen  -PH Primary Wound Type: Incision  -PH       Wound 04/07/23 0800 Left posterior heel Pressure Injury    Wound - Properties Group Placement Date: 04/07/23  -PB Placement Time: 0800  -PB Side: Left  -PB Orientation: posterior  -PB Location: heel  -PB Primary Wound Type: Pressure inj  -PB    Retired Wound - Properties Group Placement Date: 04/07/23  -PB Placement Time: 0800  -PB Side: Left  -PB Orientation: posterior  -PB Location: heel  -PB Primary Wound Type: Pressure inj  -PB    Retired Wound - Properties Group Date first assessed: 04/07/23  -PB Time first assessed: 0800  -PB Side: Left  -PB Location: heel  -PB Primary Wound Type: Pressure inj  -PB       Wound Right posterior heel Pressure Injury    Wound - Properties Group Side: Right  -PB Orientation: posterior  -PB Location: heel  -PB Primary Wound Type: Pressure inj  -PB    Periwound Skin Turgor soft  -MS      Retired Wound - Properties Group Side: Right  -PB Orientation: posterior  -PB Location: heel  -PB Primary Wound Type: Pressure inj  -PB    Retired Wound - Properties Group Side: Right  -PB Location: heel  -PB Primary Wound Type: Pressure inj  -PB          User Key  (r) = Recorded By, (t) = Taken By, (c) = Cosigned By    Initials Name Provider Type    Carolyn Arvizu RN Registered Nurse    Mayuri Elizabeth RN Registered Nurse    MS Smith,  Ashley AGARWAL, RN Registered Nurse    Carolyn Bautista RN Registered Nurse    Vickie Barahona LPN Licensed Nurse                  Assessment & Plan      Brief Patient Summary:        insulin lispro, 2-7 Units, Subcutaneous, Q6H  metoclopramide, 10 mg, Intravenous, Q6H  piperacillin-tazobactam, 3.375 g, Intravenous, Q8H  polyethylene glycol, 17 g, Oral, BID  sodium chloride, 10 mL, Intravenous, Q12H  vitamin D3, 5,000 Units, Oral, Daily       Adult [employer] TPN, , Last Rate: 45 mL/hr at 04/08/23 1746  Adult [employer] TPN,   Pharmacy to Dose TPN,   custom IV infusion builder, , Last Rate: 100 mL/hr at 04/09/23 1014         Active Hospital Problems:  Active Hospital Problems    Diagnosis    • **Bladder cancer    • Severe Malnutrition (HCC)    • Attention to urostomy    • Elevated serum creatinine    • Prostate cancer    • History of alcohol use disorder      Surgical wound dehiscence  Postop anemia  Prostate cancer  Bladder cancer  S/p cystectomy with ilial conduit urinary diversion 3/20/2023  CARLOS ALBERTO  Ileus 3/31/2023  Constipation  Hypertension  History of alcohol abuse  Poor p.o. intake, at risk for starvation    Plan:  -Ileus mostly resolved, but patient still not tolerating full p.o. intake  -ARAVIND drain  -Renal function improved.  IVF changed, TPN continued, nephro recs appreciated.  -Encourage OOB to chair.  -Patient undecided whether SNF versus   -- 1 unit PRBC, recheck H&H in 3 hours  -- WBCs in the 40s, likely leukemoid reaction after OR management emergently last night.  -- Currently on TPN.  GS recs appreciated for advancing to full liquid diet.  -- Continue IV ABX, ID recs appreciated  -- Will need to placement to SNF after this stay.  Patient is agreeable.        DVT prophylaxis:  Mechanical DVT prophylaxis orders are present.    CODE STATUS:    Code Status (Patient has no pulse and is not breathing): CPR (Attempt to Resuscitate)  Medical Interventions (Patient has pulse or is breathing):  Full Support      Disposition:  I expect patient to be discharged to SNF once tolerating p.o. intake, clinically stable, likely not for 4 to 5 days.    This patient has been examined wearing appropriate Personal Protective Equipment and discussed with nursing. 04/09/23      Electronically signed by Saima Roberto DO, 04/09/23, 12:14 EDT.  [facility] Hospitalist Team

## 2023-04-09 NOTE — PLAN OF CARE
Goal Outcome Evaluation:   Pt is alert and oriented x4, pt VS are WNL, pt is resting currently, c/o abdomen pain throughout the shift, pt compliant with taking medications, encouraged pt to shift weight every other hour, will continue to monitor pt throughout this shift.
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
## 2023-04-10 LAB
ALBUMIN SERPL-MCNC: 2.2 G/DL (ref 3.5–5.2)
ALBUMIN/GLOB SERPL: 0.6 G/DL
ALP SERPL-CCNC: 208 U/L (ref 39–117)
ALT SERPL W P-5'-P-CCNC: 10 U/L (ref 1–41)
ANION GAP SERPL CALCULATED.3IONS-SCNC: 9 MMOL/L (ref 5–15)
AST SERPL-CCNC: 16 U/L (ref 1–40)
BILIRUB SERPL-MCNC: 0.4 MG/DL (ref 0–1.2)
BUN SERPL-MCNC: 29 MG/DL (ref 8–23)
BUN/CREAT SERPL: 17.7 (ref 7–25)
CA-I SERPL ISE-MCNC: 1.19 MMOL/L (ref 1.2–1.3)
CALCIUM SPEC-SCNC: 8.2 MG/DL (ref 8.6–10.5)
CHLORIDE SERPL-SCNC: 105 MMOL/L (ref 98–107)
CO2 SERPL-SCNC: 25 MMOL/L (ref 22–29)
CREAT SERPL-MCNC: 1.64 MG/DL (ref 0.76–1.27)
CRP SERPL-MCNC: 13.9 MG/DL (ref 0–0.5)
DEPRECATED RDW RBC AUTO: 50.8 FL (ref 37–54)
EGFRCR SERPLBLD CKD-EPI 2021: 46.1 ML/MIN/1.73
ERYTHROCYTE [DISTWIDTH] IN BLOOD BY AUTOMATED COUNT: 17.1 % (ref 12.3–15.4)
GLOBULIN UR ELPH-MCNC: 3.5 GM/DL
GLUCOSE BLDC GLUCOMTR-MCNC: 122 MG/DL (ref 70–105)
GLUCOSE BLDC GLUCOMTR-MCNC: 122 MG/DL (ref 70–105)
GLUCOSE BLDC GLUCOMTR-MCNC: 126 MG/DL (ref 70–105)
GLUCOSE BLDC GLUCOMTR-MCNC: 145 MG/DL (ref 70–105)
GLUCOSE BLDC GLUCOMTR-MCNC: 152 MG/DL (ref 70–105)
GLUCOSE SERPL-MCNC: 132 MG/DL (ref 65–99)
HCT VFR BLD AUTO: 28.4 % (ref 37.5–51)
HGB BLD-MCNC: 8.9 G/DL (ref 13–17.7)
INR PPP: 1.16 (ref 0.93–1.1)
MAGNESIUM SERPL-MCNC: 1.8 MG/DL (ref 1.6–2.4)
MCH RBC QN AUTO: 26.7 PG (ref 26.6–33)
MCHC RBC AUTO-ENTMCNC: 31.3 G/DL (ref 31.5–35.7)
MCV RBC AUTO: 85.3 FL (ref 79–97)
PHOSPHATE SERPL-MCNC: 3.3 MG/DL (ref 2.5–4.5)
PLATELET # BLD AUTO: 251 10*3/MM3 (ref 140–450)
PMV BLD AUTO: 8.1 FL (ref 6–12)
POTASSIUM SERPL-SCNC: 4.1 MMOL/L (ref 3.5–5.2)
PREALB SERPL-MCNC: 6.1 MG/DL (ref 20–40)
PROT SERPL-MCNC: 5.7 G/DL (ref 6–8.5)
PROTHROMBIN TIME: 11.8 SECONDS (ref 9.6–11.7)
RBC # BLD AUTO: 3.32 10*6/MM3 (ref 4.14–5.8)
SODIUM SERPL-SCNC: 139 MMOL/L (ref 136–145)
WBC NRBC COR # BLD: 6.8 10*3/MM3 (ref 3.4–10.8)

## 2023-04-10 PROCEDURE — 25010000002 MORPHINE PER 10 MG

## 2023-04-10 PROCEDURE — 25010000002 MAGNESIUM SULFATE PER 500 MG OF MAGNESIUM: Performed by: SURGERY

## 2023-04-10 PROCEDURE — 84100 ASSAY OF PHOSPHORUS: CPT | Performed by: SURGERY

## 2023-04-10 PROCEDURE — 83735 ASSAY OF MAGNESIUM: CPT | Performed by: SURGERY

## 2023-04-10 PROCEDURE — 86140 C-REACTIVE PROTEIN: CPT | Performed by: SURGERY

## 2023-04-10 PROCEDURE — 82962 GLUCOSE BLOOD TEST: CPT

## 2023-04-10 PROCEDURE — 25010000002 ONDANSETRON PER 1 MG: Performed by: UROLOGY

## 2023-04-10 PROCEDURE — 97530 THERAPEUTIC ACTIVITIES: CPT

## 2023-04-10 PROCEDURE — 99024 POSTOP FOLLOW-UP VISIT: CPT | Performed by: SURGERY

## 2023-04-10 PROCEDURE — 97168 OT RE-EVAL EST PLAN CARE: CPT

## 2023-04-10 PROCEDURE — 25010000002 HEPARIN (PORCINE) PER 1000 UNITS: Performed by: INTERNAL MEDICINE

## 2023-04-10 PROCEDURE — 97112 NEUROMUSCULAR REEDUCATION: CPT

## 2023-04-10 PROCEDURE — 25010000002 CALCIUM GLUCONATE 2-0.675 GM/100ML-% SOLUTION: Performed by: SURGERY

## 2023-04-10 PROCEDURE — 84134 ASSAY OF PREALBUMIN: CPT | Performed by: SURGERY

## 2023-04-10 PROCEDURE — 85027 COMPLETE CBC AUTOMATED: CPT | Performed by: INTERNAL MEDICINE

## 2023-04-10 PROCEDURE — 80053 COMPREHEN METABOLIC PANEL: CPT | Performed by: SURGERY

## 2023-04-10 PROCEDURE — 25010000002 METOCLOPRAMIDE PER 10 MG: Performed by: UROLOGY

## 2023-04-10 PROCEDURE — 25010000002 THIAMINE PER 100 MG: Performed by: SURGERY

## 2023-04-10 PROCEDURE — 25010000002 POTASSIUM CHLORIDE PER 2 MEQ OF POTASSIUM: Performed by: SURGERY

## 2023-04-10 PROCEDURE — 25010000002 PIPERACILLIN SOD-TAZOBACTAM PER 1 G: Performed by: NURSE PRACTITIONER

## 2023-04-10 PROCEDURE — 82330 ASSAY OF CALCIUM: CPT | Performed by: SURGERY

## 2023-04-10 PROCEDURE — 85610 PROTHROMBIN TIME: CPT | Performed by: SURGERY

## 2023-04-10 PROCEDURE — 25010000002 CALCIUM GLUCONATE PER 10 ML: Performed by: SURGERY

## 2023-04-10 RX ORDER — HYDROXYZINE HYDROCHLORIDE 25 MG/1
25 TABLET, FILM COATED ORAL 3 TIMES DAILY PRN
Status: DISCONTINUED | OUTPATIENT
Start: 2023-04-10 | End: 2023-04-20 | Stop reason: HOSPADM

## 2023-04-10 RX ORDER — CALCIUM GLUCONATE 20 MG/ML
2 INJECTION, SOLUTION INTRAVENOUS ONCE
Status: COMPLETED | OUTPATIENT
Start: 2023-04-10 | End: 2023-04-10

## 2023-04-10 RX ORDER — HEPARIN SODIUM 5000 [USP'U]/ML
5000 INJECTION, SOLUTION INTRAVENOUS; SUBCUTANEOUS EVERY 12 HOURS SCHEDULED
Status: DISCONTINUED | OUTPATIENT
Start: 2023-04-10 | End: 2023-04-20 | Stop reason: HOSPADM

## 2023-04-10 RX ADMIN — METOCLOPRAMIDE HYDROCHLORIDE 10 MG: 5 INJECTION INTRAMUSCULAR; INTRAVENOUS at 04:59

## 2023-04-10 RX ADMIN — POLYETHYLENE GLYCOL 3350 17 G: 17 POWDER, FOR SOLUTION ORAL at 08:16

## 2023-04-10 RX ADMIN — METOCLOPRAMIDE HYDROCHLORIDE 10 MG: 5 INJECTION INTRAMUSCULAR; INTRAVENOUS at 12:53

## 2023-04-10 RX ADMIN — PIPERACILLIN SODIUM AND TAZOBACTAM SODIUM 3.38 G: 3; .375 INJECTION, POWDER, LYOPHILIZED, FOR SOLUTION INTRAVENOUS at 12:51

## 2023-04-10 RX ADMIN — METOCLOPRAMIDE HYDROCHLORIDE 10 MG: 5 INJECTION INTRAMUSCULAR; INTRAVENOUS at 23:01

## 2023-04-10 RX ADMIN — Medication 10 ML: at 20:03

## 2023-04-10 RX ADMIN — Medication 10 ML: at 08:16

## 2023-04-10 RX ADMIN — MORPHINE SULFATE 1 MG: 2 INJECTION, SOLUTION INTRAMUSCULAR; INTRAVENOUS at 20:03

## 2023-04-10 RX ADMIN — HEPARIN SODIUM 5000 UNITS: 5000 INJECTION INTRAVENOUS; SUBCUTANEOUS at 20:03

## 2023-04-10 RX ADMIN — Medication 5000 UNITS: at 08:16

## 2023-04-10 RX ADMIN — I.V. FAT EMULSION 50 G: 20 EMULSION INTRAVENOUS at 18:07

## 2023-04-10 RX ADMIN — ONDANSETRON 4 MG: 2 INJECTION INTRAMUSCULAR; INTRAVENOUS at 20:02

## 2023-04-10 RX ADMIN — PIPERACILLIN SODIUM AND TAZOBACTAM SODIUM 3.38 G: 3; .375 INJECTION, POWDER, LYOPHILIZED, FOR SOLUTION INTRAVENOUS at 18:09

## 2023-04-10 RX ADMIN — HYDROXYZINE HYDROCHLORIDE 25 MG: 25 TABLET, FILM COATED ORAL at 20:03

## 2023-04-10 RX ADMIN — HYDROCODONE BITARTRATE AND ACETAMINOPHEN 1 TABLET: 5; 325 TABLET ORAL at 23:06

## 2023-04-10 RX ADMIN — POLYETHYLENE GLYCOL 3350 17 G: 17 POWDER, FOR SOLUTION ORAL at 20:03

## 2023-04-10 RX ADMIN — PIPERACILLIN SODIUM AND TAZOBACTAM SODIUM 3.38 G: 3; .375 INJECTION, POWDER, LYOPHILIZED, FOR SOLUTION INTRAVENOUS at 03:02

## 2023-04-10 RX ADMIN — METOCLOPRAMIDE HYDROCHLORIDE 10 MG: 5 INJECTION INTRAMUSCULAR; INTRAVENOUS at 16:35

## 2023-04-10 RX ADMIN — THIAMINE HYDROCHLORIDE: 100 INJECTION, SOLUTION INTRAMUSCULAR; INTRAVENOUS at 18:06

## 2023-04-10 RX ADMIN — CALCIUM GLUCONATE 2 G: 20 INJECTION, SOLUTION INTRAVENOUS at 12:53

## 2023-04-10 RX ADMIN — MORPHINE SULFATE 1 MG: 2 INJECTION, SOLUTION INTRAMUSCULAR; INTRAVENOUS at 04:59

## 2023-04-10 RX ADMIN — HYDROCODONE BITARTRATE AND ACETAMINOPHEN 1 TABLET: 5; 325 TABLET ORAL at 08:16

## 2023-04-10 RX ADMIN — HYDROCODONE BITARTRATE AND ACETAMINOPHEN 1 TABLET: 5; 325 TABLET ORAL at 19:05

## 2023-04-10 NOTE — PROGRESS NOTES
[facility] Medicine Services Daily Progress Note    Patient Name: Anthony Moss  : 1955  MRN: [id number]  Primary Care Physician:  Reji Topete MD  Date of admission: 3/28/2023      Subjective      Chief Complaint: Constipation, weakness      Patient Reports     3/31/23: OOB to chair.  PT consulted.  Constipated.  Undecided whether SNF versus HH.  Lives alone.  DC IVF.  Nephrology and urology following. + ARAVIND drain with serosanguineous fluid.    23: Patient with multiple vomiting overnight NG tube placed with bilious discharge.  Repeat KUB.  Gaiting [employer].    23: Patient having NGT removed.  Still feeling pretty rough at this time improved abdominal pain.  Somnolent as well.  Awaiting SNF placement.    4/3/2023: Patient had NGT replaced due to likely ileus.  Having some surgical site TTP and soreness.  Still feeling pretty good overall, forward to improving his p.o. intake and getting started with the rest of his care plan.    23: Still has NGT in place.  Having some more feelings of hunger, agreeable to current management.  Also reporting constipation.    23: NGT was removed today, had large volume bowel movements as well.  Patient feeling a lot better today.  Advance to clear liquid diet.    23: Had significant bowel movement as well, still having some more abdominal pain today.  Denies any nausea, vomiting.  Is tolerating p.o. intake as well, but still only liquids and high-fiber.    23: Patient went to the OR last night for wound dehiscence repair.  General surgery and urology recs are appreciated for current management.  Patient to continue IV ABX, pending ID recs.     23: Patient had drop in Hb to below 7, receiving PRBC at this time.Still reporting significant abdominal pain.  General surgery recs pending.    23: Significant pretty worn down.  Now tolerating clear liquids, GS looking forward to advancing to fulls, still on TPN at  this time.  Pain management still be via IV    4/10/23: Still somnolent, had wound VAC placement today.  Pain is well controlled.  Still seeking full care at this time.    Review of Systems   All other systems reviewed and are negative.         Objective      Vitals:   Temp:  [97.4 °F (36.3 °C)-98.2 °F (36.8 °C)] 97.7 °F (36.5 °C)  Heart Rate:  [94-98] 94  Resp:  [12-15] 14  BP: ()/(52-77) 136/77  Flow (L/min):  [2] 2    Physical Exam  HENT:      Head: Normocephalic.      Nose: Nose normal.   Eyes:      Extraocular Movements: Extraocular movements intact.      Pupils: Pupils are equal, round, and reactive to light.   Cardiovascular:      Rate and Rhythm: Normal rate.   Pulmonary:      Effort: Pulmonary effort is normal.      Breath sounds: Normal breath sounds.   Abdominal:      General: Bowel sounds are normal.      Comments: Ileal conduit with urine.  Left lower quadrant ARAVIND drain with serosanguineous fluid.   Musculoskeletal:         General: Normal range of motion.      Cervical back: Normal range of motion.   Skin:     General: Skin is warm.   Neurological:      Mental Status: He is alert. Mental status is at baseline.   Psychiatric:         Mood and Affect: Mood normal.             Result Review    Result Review:  I have personally reviewed the results from the time of this admission to 4/10/2023 17:20 EDT and agree with these findings:  [x]  Laboratory  []  Microbiology  [x]  Radiology  []  EKG/Telemetry   []  Cardiology/Vascular   []  Pathology  [x]  Old records  []  Other:      Wounds (last 24 hours)     LDA Wound     Row Name 04/10/23 1600 04/10/23 1230 04/10/23 0800       Wound 04/06/23 2243 abdomen Incision    Wound - Properties Group Placement Date: 04/06/23  -PH Placement Time: 2243  -PH Present on Hospital Admission: Y  -PH Location: abdomen  -PH Primary Wound Type: Incision  -PH    Dressing Appearance dry;intact  -BM -- --    Closure STEFF  -BM -- --    Base dressing in place, unable to visualize   -BM -- --    Drainage Amount -- small  -MS small  -MS    Dressing Care -- -- dressing changed  wet to dry dressing, covered with abd pad, keep wound moist  -MS    Retired Wound - Properties Group Placement Date: 04/06/23  -PH Placement Time: 2243 -PH Present on Hospital Admission: Y  -PH Location: abdomen  -PH Primary Wound Type: Incision  -PH    Retired Wound - Properties Group Date first assessed: 04/06/23  -PH Time first assessed: 2243 -PH Present on Hospital Admission: Y  -PH Location: abdomen  -PH Primary Wound Type: Incision  -PH       Wound 04/07/23 0800 Left posterior heel Pressure Injury    Wound - Properties Group Placement Date: 04/07/23  -PB Placement Time: 0800 -PB Side: Left  -PB Orientation: posterior  -PB Location: heel  -PB Primary Wound Type: Pressure inj  -PB    Dressing Appearance open to air  -BM -- open to air  -MS    Closure Open to air  -BM -- --    Drainage Amount -- none  -MS none  -MS    Care, Wound liliane boot  -BM -- --    Retired Wound - Properties Group Placement Date: 04/07/23  -PB Placement Time: 0800  -PB Side: Left  -PB Orientation: posterior  -PB Location: heel  -PB Primary Wound Type: Pressure inj  -PB    Retired Wound - Properties Group Date first assessed: 04/07/23  -PB Time first assessed: 0800  -PB Side: Left  -PB Location: heel  -PB Primary Wound Type: Pressure inj  -PB       Wound Right posterior heel Pressure Injury    Wound - Properties Group Side: Right  -PB Orientation: posterior  -PB Location: heel  -PB Primary Wound Type: Pressure inj  -PB    Dressing Appearance open to air  -BM -- open to air  -MS    Closure Open to air  -BM -- --    Periwound Skin Turgor -- soft  -MS soft  -MS    Care, Wound liliane boot  -BM -- --    Retired Wound - Properties Group Side: Right  -PB Orientation: posterior  -PB Location: heel  -PB Primary Wound Type: Pressure inj  -PB    Retired Wound - Properties Group Side: Right  -PB Location: heel  -PB Primary Wound Type: Pressure inj  -PB    Row  Name 04/10/23 0400 04/10/23 0000 04/09/23 2100       Wound 04/06/23 2243 abdomen Incision    Wound - Properties Group Placement Date: 04/06/23  -PH Placement Time: 2243  -PH Present on Hospital Admission: Y  -PH Location: abdomen  -PH Primary Wound Type: Incision  -PH    Dressing Appearance -- -- dry;intact  -NC    Closure STEFF  -NC STEFF  -NC STEFF  -NC    Base dressing in place, unable to visualize  -NC dressing in place, unable to visualize  -NC dressing in place, unable to visualize  -NC    Retired Wound - Properties Group Placement Date: 04/06/23  -PH Placement Time: 2243  -PH Present on Hospital Admission: Y  -PH Location: abdomen  -PH Primary Wound Type: Incision  -PH    Retired Wound - Properties Group Date first assessed: 04/06/23  -PH Time first assessed: 2243  -PH Present on Hospital Admission: Y  -PH Location: abdomen  -PH Primary Wound Type: Incision  -PH       Wound 04/07/23 0800 Left posterior heel Pressure Injury    Wound - Properties Group Placement Date: 04/07/23  -PB Placement Time: 0800  -PB Side: Left  -PB Orientation: posterior  -PB Location: heel  -PB Primary Wound Type: Pressure inj  -PB    Dressing Appearance -- -- open to air  -NC    Retired Wound - Properties Group Placement Date: 04/07/23  -PB Placement Time: 0800  -PB Side: Left  -PB Orientation: posterior  -PB Location: heel  -PB Primary Wound Type: Pressure inj  -PB    Retired Wound - Properties Group Date first assessed: 04/07/23  -PB Time first assessed: 0800  -PB Side: Left  -PB Location: heel  -PB Primary Wound Type: Pressure inj  -PB       Wound Right posterior heel Pressure Injury    Wound - Properties Group Side: Right  -PB Orientation: posterior  -PB Location: heel  -PB Primary Wound Type: Pressure inj  -PB    Dressing Appearance -- -- open to air  -NC    Retired Wound - Properties Group Side: Right  -PB Orientation: posterior  -PB Location: heel  -PB Primary Wound Type: Pressure inj  -PB    Retired Wound - Properties Group Side:  Right  -PB Location: heel  -PB Primary Wound Type: Pressure inj  -PB    Row Name 04/09/23 2000             Wound 04/06/23 2243 abdomen Incision    Wound - Properties Group Placement Date: 04/06/23  -PH Placement Time: 2243  -PH Present on Hospital Admission: Y  -PH Location: abdomen  -PH Primary Wound Type: Incision  -PH    Dressing Appearance dry;intact  -EW      Closure STEFF  -EW      Base dressing in place, unable to visualize  -EW      Drainage Amount small  -EW      Care, Wound cleansed with;sterile water  -EW      Dressing Care gauze;abdominal pad;dressing applied;abdominal binder utilized  wet to dry dressing  -EW      Retired Wound - Properties Group Placement Date: 04/06/23  -PH Placement Time: 2243  -PH Present on Hospital Admission: Y  -PH Location: abdomen  -PH Primary Wound Type: Incision  -PH    Retired Wound - Properties Group Date first assessed: 04/06/23  -PH Time first assessed: 2243  -PH Present on Hospital Admission: Y  -PH Location: abdomen  -PH Primary Wound Type: Incision  -PH       Wound 04/07/23 0800 Left posterior heel Pressure Injury    Wound - Properties Group Placement Date: 04/07/23  -PB Placement Time: 0800  -PB Side: Left  -PB Orientation: posterior  -PB Location: heel  -PB Primary Wound Type: Pressure inj  -PB    Dressing Appearance open to air  -EW      Drainage Amount none  -EW      Care, Wound liliane boot  -EW      Retired Wound - Properties Group Placement Date: 04/07/23  -PB Placement Time: 0800  -PB Side: Left  -PB Orientation: posterior  -PB Location: heel  -PB Primary Wound Type: Pressure inj  -PB    Retired Wound - Properties Group Date first assessed: 04/07/23  -PB Time first assessed: 0800  -PB Side: Left  -PB Location: heel  -PB Primary Wound Type: Pressure inj  -PB       Wound Right posterior heel Pressure Injury    Wound - Properties Group Side: Right  -PB Orientation: posterior  -PB Location: heel  -PB Primary Wound Type: Pressure inj  -PB    Dressing Appearance open to  air  -EW      Care, Wound liliane boot  -EW      Retired Wound - Properties Group Side: Right  -PB Orientation: posterior  -PB Location: heel  -PB Primary Wound Type: Pressure inj  -PB    Retired Wound - Properties Group Side: Right  -PB Location: heel  -PB Primary Wound Type: Pressure inj  -PB          User Key  (r) = Recorded By, (t) = Taken By, (c) = Cosigned By    Initials Name Provider Type    PH Carolyn Marie RN Registered Nurse    Monico Newton LPN Licensed Nurse    BM Shirley Medina RN Registered Nurse    Ashley Youssef RN Registered Nurse    Carolyn Bautista, RN Registered Nurse    EW Cristin Smith RN Registered Nurse                  Assessment & Plan      Brief Patient Summary:        Fat Emulsion Plant Based, 250 mL, Intravenous, Once per day on Mon Tue Wed Thu Fri  heparin (porcine), 5,000 Units, Subcutaneous, Q12H  insulin lispro, 2-7 Units, Subcutaneous, Q6H  metoclopramide, 10 mg, Intravenous, Q6H  piperacillin-tazobactam, 3.375 g, Intravenous, Q8H  polyethylene glycol, 17 g, Oral, BID  sodium chloride, 10 mL, Intravenous, Q12H  vitamin D3, 5,000 Units, Oral, Daily       Adult [employer] TPN, , Last Rate: 45 mL/hr at 04/09/23 1751  Adult [employer] TPN,   Pharmacy to Dose TPN,          Active Hospital Problems:  Active Hospital Problems    Diagnosis    • **Bladder cancer    • Severe Malnutrition (HCC)    • Attention to urostomy    • Elevated serum creatinine    • Prostate cancer    • History of alcohol use disorder      Surgical wound dehiscence  Postop anemia  Prostate cancer  Bladder cancer  S/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 intake, at risk for starvation    Plan:  -Ileus mostly resolved, but patient still not tolerating full p.o. intake  -ARAVIND drain  -Renal function improved.  IVF changed, TPN continued, nephro recs  appreciated.  -Encourage OOB to chair.  -Patient undecided whether SNF versus   -- 1 unit PRBC, Hb has been stable for 2 days now.  -- WBCs now normalized likely leukemoid reaction after OR management  -- Currently on TPN.  GS recs appreciated for advancing to full liquid diet.  Patient tolerating some small amount of intake.  -- Continue IV ABX, ID recs appreciated  -- Will need to placement to SNF after this stay.  Patient is agreeable.        DVT prophylaxis:  Medical and mechanical DVT prophylaxis orders are present.    CODE STATUS:    Code Status (Patient has no pulse and is not breathing): CPR (Attempt to Resuscitate)  Medical Interventions (Patient has pulse or is breathing): Full Support      Disposition:  I expect patient to be discharged to SNF once tolerating p.o. intake, clinically stable, likely not for 4 to 5 days.    This patient has been examined wearing appropriate Personal Protective Equipment and discussed with nursing. 04/10/23      Electronically signed by Saima Roberto DO, 04/10/23, 17:20 EDT.  [facility] Hospitalist Team

## 2023-04-10 NOTE — THERAPY TREATMENT NOTE
"Subjective: Pt agreeable to therapeutic plan of care.  Pt sleepy.     Objective:   Pt underwent repair of abdominal dehiscence on 23. Pt having a wound vac apllied today.     Bed mobility - Min-A of 1 plus one to manage lines  Transfers - Mod-A and Assist x 2  Ambulation - pivoting steps from bed to recliner Mod-A and Assist x 2    Supported standing tolerance was one minute    Vitals: WNL    Pain: 10 VAS   Location: generalized  Intervention for pain: Repositioned, RN notified and Therapeutic Presence    Education: Provided education on the importance of mobility in the acute care setting, Verbal/Tactile Cues and Transfer Training    Assessment: Anthony Moss presents with functional mobility impairments which indicate the need for skilled intervention. PT goals were reviewed and modified as pt had surgical repair of abdominal wound dehiscence on 23. Pt presents with generalized weakness and decreased functional activity tolerance  due to hospitalization since 3/28/23. Pt is far below his baseline for mobility and SNF is still PT recommendation.  Tolerating session today without incident. Will continue to follow and progress as tolerated.     Plan/Recommendations:   Moderate Intensity Therapy recommended post-acute care. This is recommended as therapy feels the patient would require 3-4 days per week and wouldn't tolerate \"3 hour daily\" rehab intensity. SNF would be the preferred choice. If the patient does not agree to SNF, arrange HH or OP depending on home bound status. If patient is medically complex, consider LTACH.. Pt requires no DME at discharge.     Pt desires Skilled Rehab placement at discharge. Pt cooperative; agreeable to therapeutic recommendations and plan of care.         Basic Mobility 6-click:  Rollin = Total, A lot = 2, A little = 3; 4 = None  Supine>Sit:   1 = Total, A lot = 2, A little = 3; 4 = None   Sit>Stand with arms:  1 = Total, A lot = 2, A little = 3; 4 = " None  Bed>Chair:   1 = Total, A lot = 2, A little = 3; 4 = None  Ambulate in room:  1 = Total, A lot = 2, A little = 3; 4 = None  3-5 Steps with railin = Total, A lot = 2, A little = 3; 4 = None  Score: 12    Modified [place]: N/A = No pre-op stroke/TIA    Post-Tx Position: Up in Chair, Staff Present, Alarms activated and Call light and personal items within reach  PPE: gloves

## 2023-04-10 NOTE — PLAN OF CARE
Goal Outcome Evaluation:  Plan of Care Reviewed With: patient        Patient re-evaluated this date after exploratory laparotomy and repair of wound dehiscence. He now has a suprapubic catheter, as well as wound vac to abdomen. He has slightly decreased in overall function since prior to surgery, now requiring Min A with bed mobility, Min Ax2 to come to standing and Mod Ax2 for transfer to chair. He is oriented x4 but very lethargic this date and requires repetition of questions. Pt reports 10/10 pain this date and is unable to truly mobilize in the room beyond transferring to chair from EOB. He requires assistance for toileting, and all lower body self care at this time. OT continues with recommendation of SNF.
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
## 2023-04-10 NOTE — PLAN OF CARE
"Assessment: Anthony Moss presents with functional mobility impairments which indicate the need for skilled intervention. PT goals were reviewed and modified as pt had surgical repair of abdominal wound dehiscence on 4/6/23. Pt presents with generalized weakness and decreased functional activity tolerance  due to hospitalization since 3/28/23. Pt is far below his baseline for mobility and SNF is still PT recommendation.  Tolerating session today without incident. Will continue to follow and progress as tolerated.     Plan/Recommendations:   Moderate Intensity Therapy recommended post-acute care. This is recommended as therapy feels the patient would require 3-4 days per week and wouldn't tolerate \"3 hour daily\" rehab intensity. SNF would be the preferred choice. If the patient does not agree to SNF, arrange HH or OP depending on home bound status. If patient is medically complex, consider LTACH.. Pt requires no DME at discharge.     Pt desires Skilled Rehab placement at discharge. Pt cooperative; agreeable to therapeutic recommendations and plan of care.                    "

## 2023-04-10 NOTE — NURSING NOTE
Call received from patient's brother stating that the patient had called family stating he would like pain medication. RN assured family that patient was seen during shift change and denied any needs at that time, but that RN would go to check on patient.   This RN immediately checked on patient in his room where he was resting with eyes closed. He awoke to verbal stimuli. RN reminded patient about use of call light for any needs.   Pain medication administered per orders. See MAR.

## 2023-04-10 NOTE — PROGRESS NOTES
"  [facility] DAILY PROGRESS NOTE    Patient Identification  Name: Anthony Moss  Age: 67 y.o.  Sex: male  :  1955  MRN: [id number]    Date: 4/10/2023             Subjective:  Interval History: Feeling stronger, no nausea    Objective:    Scheduled Meds:insulin lispro, 2-7 Units, Subcutaneous, Q6H  metoclopramide, 10 mg, Intravenous, Q6H  piperacillin-tazobactam, 3.375 g, Intravenous, Q8H  polyethylene glycol, 17 g, Oral, BID  sodium chloride, 10 mL, Intravenous, Q12H  vitamin D3, 5,000 Units, Oral, Daily      Continuous Infusions:Adult [facility] Clinimix TPN, , Last Rate: 45 mL/hr at 23 1751  Pharmacy to Dose TPN,       PRN Meds:•  acetaminophen **OR** acetaminophen  •  dextrose  •  dextrose  •  docusate sodium  •  glucagon (human recombinant)  •  HYDROcodone-acetaminophen  •  lactulose  •  Morphine  •  [] HYDROmorphone **AND** naloxone  •  ondansetron **OR** ondansetron  •  Pharmacy to Dose TPN  •  phenol  •  polyethyl glycol-propyl glycol  •  senna-docusate sodium  •  sodium chloride  •  sodium chloride    Vital signs in last 24 hours:  Temp:  [97.4 °F (36.3 °C)-97.8 °F (36.6 °C)] 97.8 °F (36.6 °C)  Heart Rate:  [] 98  Resp:  [11-14] 14  BP: ()/(52-71) 110/71    Intake/Output:    Intake/Output Summary (Last 24 hours) at 4/10/2023 0928  Last data filed at 4/10/2023 0516  Gross per 24 hour   Intake 2100 ml   Output 3600 ml   Net -1500 ml       Exam:  /71 (BP Location: Left arm, Patient Position: Lying)   Pulse 98   Temp 97.8 °F (36.6 °C) (Axillary)   Resp 14   Ht 188 cm (74\")   Wt 93 kg (205 lb 0.4 oz)   SpO2 92%   BMI 26.32 kg/m²     General Appearance:    no distress               Abdomen:     Soft, ND, wound packed   :   Conduit pink                Data Review:  All labs (24hrs):   Recent Results (from the past 24 hour(s))   POC Glucose Once    Collection Time: 23 11:39 AM    Specimen: Blood   Result Value Ref Range    Glucose 119 (H) 70 - 105 mg/dL   POC " Glucose Once    Collection Time: 04/09/23  6:11 PM    Specimen: Blood   Result Value Ref Range    Glucose 136 (H) 70 - 105 mg/dL   POC Glucose Once    Collection Time: 04/09/23 11:32 PM    Specimen: Blood   Result Value Ref Range    Glucose 122 (H) 70 - 105 mg/dL   Comprehensive Metabolic Panel    Collection Time: 04/10/23  5:12 AM    Specimen: Blood   Result Value Ref Range    Glucose 132 (H) 65 - 99 mg/dL    BUN 29 (H) 8 - 23 mg/dL    Creatinine 1.64 (H) 0.76 - 1.27 mg/dL    Sodium 139 136 - 145 mmol/L    Potassium 4.1 3.5 - 5.2 mmol/L    Chloride 105 98 - 107 mmol/L    CO2 25.0 22.0 - 29.0 mmol/L    Calcium 8.2 (L) 8.6 - 10.5 mg/dL    Total Protein 5.7 (L) 6.0 - 8.5 g/dL    Albumin 2.2 (L) 3.5 - 5.2 g/dL    ALT (SGPT) 10 1 - 41 U/L    AST (SGOT) 16 1 - 40 U/L    Alkaline Phosphatase 208 (H) 39 - 117 U/L    Total Bilirubin 0.4 0.0 - 1.2 mg/dL    Globulin 3.5 gm/dL    A/G Ratio 0.6 g/dL    BUN/Creatinine Ratio 17.7 7.0 - 25.0    Anion Gap 9.0 5.0 - 15.0 mmol/L    eGFR 46.1 (L) >60.0 mL/min/1.73   Phosphorus    Collection Time: 04/10/23  5:12 AM    Specimen: Blood   Result Value Ref Range    Phosphorus 3.3 2.5 - 4.5 mg/dL   Calcium, Ionized    Collection Time: 04/10/23  5:12 AM    Specimen: Blood   Result Value Ref Range    Ionized Calcium 1.19 (L) 1.20 - 1.30 mmol/L   Magnesium    Collection Time: 04/10/23  5:12 AM    Specimen: Blood   Result Value Ref Range    Magnesium 1.8 1.6 - 2.4 mg/dL   CBC (No Diff)    Collection Time: 04/10/23  5:12 AM    Specimen: Blood   Result Value Ref Range    WBC 6.80 3.40 - 10.80 10*3/mm3    RBC 3.32 (L) 4.14 - 5.80 10*6/mm3    Hemoglobin 8.9 (L) 13.0 - 17.7 g/dL    Hematocrit 28.4 (L) 37.5 - 51.0 %    MCV 85.3 79.0 - 97.0 fL    MCH 26.7 26.6 - 33.0 pg    MCHC 31.3 (L) 31.5 - 35.7 g/dL    RDW 17.1 (H) 12.3 - 15.4 %    RDW-SD 50.8 37.0 - 54.0 fl    MPV 8.1 6.0 - 12.0 fL    Platelets 251 140 - 450 10*3/mm3   POC Glucose Once    Collection Time: 04/10/23  5:16 AM    Specimen: Blood    Result Value Ref Range    Glucose 122 (H) 70 - 105 mg/dL      Imaging Results (Last 24 Hours)     ** No results found for the last 24 hours. **           Assessment:    Bladder cancer    History of alcohol use disorder    Prostate cancer    Elevated serum creatinine    Attention to urostomy    Severe Malnutrition (HCC)      Cystectomy and conduit 3/28/2023  Wound dehiscence with closure 4/6/23    Plan:    Continue dietary support  Continue encourage ambulation  Appreciate all consultants help  To SNF when more recovered        Xu Montero MD  [facility]  [facility], [address]  [place] [address]  Office: [phone]  Cell: [phone]  Also available via Epic Secure Chat  04/10/23  09:28 EDT

## 2023-04-10 NOTE — PROGRESS NOTES
Infectious Diseases Progress Note      LOS: 13 days   Patient Care Team:  Reji Topete MD as PCP - General (Family Medicine)    Chief Complaint: Abdomen wound    Subjective     The patient had no fever during the last 24 hours.  He remained hemodynamically stable.  He denied having any new complaints.  States that he is feeling better today    Review of Systems:   Review of Systems   Constitutional: Negative.    HENT: Negative.    Eyes: Negative.    Respiratory: Negative.    Cardiovascular: Negative.    Gastrointestinal: Negative.    Genitourinary: Negative.    Musculoskeletal: Negative.    Skin: Positive for wound.   Neurological: Negative.    Hematological: Negative.    Psychiatric/Behavioral: Negative.         Objective     Vital Signs  Temp:  [97.4 °F (36.3 °C)-98.2 °F (36.8 °C)] 98.2 °F (36.8 °C)  Heart Rate:  [] 98  Resp:  [12-15] 15  BP: ()/(52-77) 117/77    Physical Exam:  Physical Exam  Vitals and nursing note reviewed.   Constitutional:       Appearance: He is well-developed.   HENT:      Head: Normocephalic and atraumatic.   Eyes:      Pupils: Pupils are equal, round, and reactive to light.   Cardiovascular:      Rate and Rhythm: Normal rate and regular rhythm.      Heart sounds: Normal heart sounds.   Pulmonary:      Effort: Pulmonary effort is normal. No respiratory distress.      Breath sounds: Normal breath sounds. No wheezing or rales.      Comments: Presence of ileal conduit    Mid abdomen incision with large dehiscence and there was packing.  There was no purulent drainage  Abdominal:      General: Bowel sounds are normal. There is no distension.      Palpations: Abdomen is soft. There is no mass.      Tenderness: There is no abdominal tenderness. There is no guarding or rebound.   Musculoskeletal:         General: No deformity. Normal range of motion.      Cervical back: Normal range of motion and neck supple.   Skin:     General: Skin is warm.      Findings: No erythema or  rash.   Neurological:      Mental Status: He is alert and oriented to person, place, and time.      Cranial Nerves: No cranial nerve deficit.          Results Review:    I have reviewed all clinical data, test, lab, and imaging results.     Radiology  No Radiology Exams Resulted Within Past 24 Hours    Cardiology    Laboratory    Results from last 7 days   Lab Units 04/10/23  0512 04/09/23  0317 04/08/23  0400 04/07/23  0811 04/06/23  1245 04/04/23  2339 04/04/23  0309   WBC 10*3/mm3 6.80 10.80 18.60* 40.80* 21.80* 13.20* 13.20*   HEMOGLOBIN g/dL 8.9* 7.4* 6.5* 8.4* 8.6* 8.4* 8.3*   HEMATOCRIT % 28.4* 23.2* 21.2* 26.2* 26.4* 27.5* 26.0*   PLATELETS 10*3/mm3 251 240 248 361 325 296 267     Results from last 7 days   Lab Units 04/10/23  0512 04/09/23  0317 04/08/23  0400 04/07/23  1717 04/07/23  0811 04/06/23  1245 04/04/23  2339   SODIUM mmol/L 139 135* 135* 133* 138 136 139   POTASSIUM mmol/L 4.1 4.4 4.4 4.5 4.7 3.8 4.0   CHLORIDE mmol/L 105 105 104 103 106 102 101   CO2 mmol/L 25.0 22.0 21.0* 21.0* 20.0* 23.0 25.0   BUN mg/dL 29* 28* 26* 25* 25* 18 22   CREATININE mg/dL 1.64* 1.89* 1.86* 2.01* 2.05* 1.44* 1.26   GLUCOSE mg/dL 132* 121* 136* 380* 170* 130* 90   ALBUMIN g/dL 2.2* 2.0* 2.0*  --  2.4*  --   --    BILIRUBIN mg/dL 0.4 0.4 0.3  --  0.6  --   --    ALK PHOS U/L 208* 204* 136*  --  166*  --   --    AST (SGOT) U/L 16 14 15  --  16  --   --    ALT (SGPT) U/L 10 10 8  --  9  --   --    CALCIUM mg/dL 8.2* 7.7* 7.3* 7.2* 7.6* 8.0* 8.0*                 Microbiology   Microbiology Results (last 10 days)     Procedure Component Value - Date/Time    Blood Culture - Blood, Arm, Left [[id number]]  (Normal) Collected: 04/07/23 0520    Lab Status: Preliminary result Specimen: Blood from Arm, Left Updated: 04/10/23 0530     Blood Culture No growth at 3 days    MRSA Screen, PCR (Inpatient) - Swab, Nares [[id number]]  (Normal) Collected: 04/07/23 0456    Lab Status: Final result Specimen: Swab from Nares Updated: 04/07/23  0616     MRSA PCR No MRSA Detected    Narrative:      The negative predictive value of this diagnostic test is high and should only be used to consider de-escalating anti-MRSA therapy. A positive result may indicate colonization with MRSA and must be correlated clinically.    Blood Culture - Blood, Arm, Right [[id number]]  (Normal) Collected: 04/07/23 0441    Lab Status: Preliminary result Specimen: Blood from Arm, Right Updated: 04/10/23 0545     Blood Culture No growth at 3 days    Wound Culture - Surgical Site, Abdominal Wall [[id number]]  (Abnormal)  (Susceptibility) Collected: 04/06/23 1543    Lab Status: Final result Specimen: Surgical Site from Abdominal Wall Updated: 04/08/23 0710     Wound Culture Scant growth (1+) Enterobacter cloacae complex      Scant growth (1+) Corynebacterium species     Comment:   No definitive guidelines. All are susceptible to vancomycin. Resistance to penicillins & cephalosporins does occur.        Gram Stain Few (2+) WBCs per low power field      Rare (1+) Gram positive cocci    Susceptibility      Enterobacter cloacae complex      ALEX      Cefepime Susceptible      Ceftazidime Susceptible      Ceftriaxone Susceptible      Gentamicin Susceptible      Levofloxacin Susceptible      Piperacillin + Tazobactam Susceptible      Tetracycline Susceptible      Trimethoprim + Sulfamethoxazole Susceptible                       Susceptibility Comments     Enterobacter cloacae complex    Cefazolin sensitivity will not be reported for Enterobacteriaceae in non-urine isolates. If cefazolin is preferred, please call the microbiology lab to request an E-test.  With the exception of urinary-sourced infections, aminoglycosides should not be used as monotherapy.                   Medication Review:       Schedule Meds  Fat Emulsion Plant Based, 250 mL, Intravenous, Once per day on Mon Tue Wed Thu Fri  insulin lispro, 2-7 Units, Subcutaneous, Q6H  metoclopramide, 10 mg, Intravenous,  Q6H  piperacillin-tazobactam, 3.375 g, Intravenous, Q8H  polyethylene glycol, 17 g, Oral, BID  sodium chloride, 10 mL, Intravenous, Q12H  vitamin D3, 5,000 Units, Oral, Daily        Infusion Meds  Adult Central Clinimix TPN, , Last Rate: 45 mL/hr at 23 1751  Adult Central Clinimix TPN,   Pharmacy to Dose TPN,         PRN Meds  •  acetaminophen **OR** acetaminophen  •  dextrose  •  dextrose  •  docusate sodium  •  glucagon (human recombinant)  •  HYDROcodone-acetaminophen  •  lactulose  •  Morphine  •  [] HYDROmorphone **AND** naloxone  •  ondansetron **OR** ondansetron  •  Pharmacy to Dose TPN  •  phenol  •  polyethyl glycol-propyl glycol  •  senna-docusate sodium  •  sodium chloride  •  sodium chloride        Assessment & Plan       Antimicrobial Therapy   1.  IV Zosyn        2.        3.        4.        5.              Assessment     Midline wound dehiscence and repair on 2023.  Currently has no obvious purulent drainage or necrotic tissue.  Patient had a radical cystectomy with iliac conduit placement on 3/28/2023 for bladder cancer.   -Intraoperative cultures grew 1+ Enterobacter cloacae complex and 1+ Corynebacterium species.  The last organism is probably contamination     Severe leukocytosis.  Likely related to the above wound dehiscence with incarcerated loop of the small bowel which was noted to appear to be viable during surgery.  Patient denies diarrhea  White count improved significant     Patient did have a postoperative ileus from the initial surgery.  Likely will be placed on TPN per general surgery     Acute kidney injury-nephrology following     History of recently diagnosed prostate and bladder cancer.    He was noted to have severe bilateral hydronephrosis and bilateral nephrostomy tubes were placed on 3/6/2023.  Patient denies any current radiation or chemotherapy treatments  -Nephrostomy tubes were removed by urology on 2023     History of neurogenic bladder-patient had a  suprapubic catheter placed last year and states that he had it changed out every 3 weeks before he had the cystectomy     Chronic respiratory failure.  Patient is normally on 2 to 3 L of oxygen by nasal cannula at home.  Currently is on 2 L     History of EtOH abuse     Plan       Continue IV Zosyn 3.375 g every 8 hours for a total of 7 days- 3 more days  Continue supportive care  Not much more to add from infectious disease standpoint-we will sign off at this time-please call with any questions.    The above note was transcribed for Dr. Singh-physical exam and review of systems were performed by him    Christine Stoll, APRN  04/10/23  14:35 EDT    Note is dictated utilizing voice recognition software/Dragon

## 2023-04-10 NOTE — PLAN OF CARE
Problem: Adult Inpatient Plan of Care  Goal: Plan of Care Review  Outcome: Ongoing, Progressing  Goal: Patient-Specific Goal (Individualized)  Outcome: Ongoing, Progressing  Goal: Absence of Hospital-Acquired Illness or Injury  Outcome: Ongoing, Progressing  Intervention: Identify and Manage Fall Risk  Recent Flowsheet Documentation  Taken 4/10/2023 0600 by Monico Chaudhary LPN  Safety Promotion/Fall Prevention: safety round/check completed  Taken 4/10/2023 0400 by Monico Chaudhary LPN  Safety Promotion/Fall Prevention: safety round/check completed  Taken 4/10/2023 0200 by Monico Chaudhary LPN  Safety Promotion/Fall Prevention: safety round/check completed  Taken 4/10/2023 0000 by Monico Chaudhary LPN  Safety Promotion/Fall Prevention: safety round/check completed  Taken 4/9/2023 2200 by Monico Chaudhary LPN  Safety Promotion/Fall Prevention: safety round/check completed  Goal: Optimal Comfort and Wellbeing  Outcome: Ongoing, Progressing  Goal: Readiness for Transition of Care  Outcome: Ongoing, Progressing     Problem: Chest Pain  Goal: Resolution of Chest Pain Symptoms  Outcome: Ongoing, Progressing     Problem: Skin Injury Risk Increased  Goal: Skin Health and Integrity  Outcome: Ongoing, Progressing  Intervention: Optimize Skin Protection  Recent Flowsheet Documentation  Taken 4/9/2023 2100 by Monico Chaudhary LPN  Pressure Reduction Devices: pressure-redistributing mattress utilized     Problem: Fall Injury Risk  Goal: Absence of Fall and Fall-Related Injury  Outcome: Ongoing, Progressing  Intervention: Promote Injury-Free Environment  Recent Flowsheet Documentation  Taken 4/10/2023 0600 by Monico Chaudhary LPN  Safety Promotion/Fall Prevention: safety round/check completed  Taken 4/10/2023 0400 by Monico Chaudhary LPN  Safety Promotion/Fall Prevention: safety round/check completed  Taken 4/10/2023 0200 by Monico Chaudhary LPN  Safety Promotion/Fall Prevention: safety round/check completed  Taken 4/10/2023 0000 by Monico Chaudhary  LPN  Safety Promotion/Fall Prevention: safety round/check completed  Taken 4/9/2023 2200 by Monico Chaudhary LPN  Safety Promotion/Fall Prevention: safety round/check completed     Problem: Nausea and Vomiting  Goal: Fluid and Electrolyte Balance  Outcome: Ongoing, Progressing  Intervention: Prevent and Manage Nausea and Vomiting  Recent Flowsheet Documentation  Taken 4/9/2023 2100 by Monico Chaudhary LPN  Environmental Support: calm environment promoted     Problem: Malnutrition  Goal: Improved Nutritional Intake  Outcome: Ongoing, Progressing   Goal Outcome Evaluation:

## 2023-04-10 NOTE — PROGRESS NOTES
Nutrition Services    Patient Name: Anthony Moss  YOB: 1955  MRN: [id number]  Admission date: 3/28/2023    PPE Documentation        PPE Worn By Provider Did not enter room for this encounter   PPE Worn By Patient  N/A     PROGRESS NOTE      Encounter Information: Progress note to check on TPN. Today will be day #4 of IV nutrition. Diet advanced to full liquids on 4/9. RD collaborated with pharmacist (Idalia) on pt's TPN regimen.       PO Diet: Diet: Liquid Diets; Full Liquid; Texture: Regular Texture (IDDSI 7); Fluid Consistency: Thin (IDDSI 0)   PO Supplements: Boost Breeze TID (Provides 750 kcals, 42 g protein if consumed)   PO Intake:  Minimal intake since admission with frequent NPO and liquid diets       Current nutrition support: Clinimix 5/15 1L + hold lipids    Nutrition support review: Documented as above per EMR        Labs (reviewed below): Reviewed, management per attending  Electrolyte management per Pharmacy        GI Function:  Small BM documented 4/8        Nutrition Intervention Updates: Advance TPN to goal: Clinimix 5/20 2000 mL volume + 250 mL 20% lipids 5x/week.       Results from last 7 days   Lab Units 04/10/23  0512 04/09/23  0317 04/08/23  0400   SODIUM mmol/L 139 135* 135*   POTASSIUM mmol/L 4.1 4.4 4.4   CHLORIDE mmol/L 105 105 104   CO2 mmol/L 25.0 22.0 21.0*   BUN mg/dL 29* 28* 26*   CREATININE mg/dL 1.64* 1.89* 1.86*   CALCIUM mg/dL 8.2* 7.7* 7.3*   BILIRUBIN mg/dL 0.4 0.4 0.3   ALK PHOS U/L 208* 204* 136*   ALT (SGPT) U/L 10 10 8   AST (SGOT) U/L 16 14 15   GLUCOSE mg/dL 132* 121* 136*     Results from last 7 days   Lab Units 04/10/23  0512 04/09/23 0317 04/08/23  0400 04/07/23  0811   MAGNESIUM mg/dL 1.8 1.9 1.7 1.5*   PHOSPHORUS mg/dL 3.3 3.6 4.2 4.5   HEMOGLOBIN g/dL 8.9* 7.4* 6.5* 8.4*   HEMATOCRIT % 28.4* 23.2* 21.2* 26.2*   TRIGLYCERIDES mg/dL  --   --   --  92     COVID19   Date Value Ref Range Status   03/01/2023 Not Detected Not Detected - Ref. Range Final      Lab Results   Component Value Date    HGBA1C 4.8 03/01/2023       RD to follow up per protocol.    Electronically signed by:  Deneen Pantoja RD  04/10/23 08:12 EDT

## 2023-04-10 NOTE — PROGRESS NOTES
Pharmacy consult - total parental nutrition    Anthony Moss is a 67 y.o.male admitted with bladder cancer. Pharmacy consulted to dose TPN for  ileus associated w/diffuse peritonitis  per Dr. Witt's request. Patient underwent radical cystectomy and prostatectomy with ileal conduit placement 3/28/23. Patient with multiple loose BMs and high NG output with persistent nausea post-op. Patient with fascial dehiscence with evisceration of small bowel on 4/6 and underwent exploration, washout, and repair of fascial dehiscence 4/6. Malnutrition thought to be factor in dehiscence and TPN initiated.       Assessment  Estimated macronutrient goal requirements per RD:    TPN type: Clinimix 5/20 (2000 mL/day)  Line type: Central  Protein: 100 grams/day (1.1 grams/kg/day)  Lipids: 250 mL of 20% lipid infusion 5 days/week  Kcal/day: 2117 kcal (23.5 kcal/kg/day)    Plan    TPN initiated 4/8. Discussed with RD, Deneen, today. Will advance TPN to goal macros (Clinimix 5/20 2L) and start lipids which will provide the following macros:    Amino acids: 100 grams/day  Dextrose: 400 grams/day  Lipids:  250 mL of 20% lipid infusion 5 days/week (M-F)  Volume: 1092 mL (45 mL/hr over 24 hrs daily)    Na WNL. No change today.  K WNL today. Continue same.   Mag improved and WNL today. Continue same in TPN. Will increase slightly to target the higher end of normal.   Phos WNL but continues to decrease. Will continue NaPhos TPN today since the full effect of yesterday is yet to be seen.   Ionized calcium subtherapeutic but continues to improve. Will replace outside TPN again and increase in TPN bag slightly.   Start lipids today. AST/ALT wnl.  MVI IV on national shortage - will add thiamine and folic acid to TPN instead on MWF only. Trace on national shortage - will add to TPN MWF only.      Based on labs, will add the following electrolytes/additives to the TPN:     Date: 4/8 4/9 4/10      TPN Lytes (60mL)         CaGluc (mEq) 10 10 12    "   MgSO4 (mEq) 8 8 8      NaCl (mEq)         NaAcetate (mEq) 100 100 100      KCl (mEq) 30 30 30      KAcetate (mEq)         NaPhos (mmol)  9 9      KPhos (mmol)         Famotidine (mg)         MVI (10 mL)         Trace (1 mL)   1      Humulin R (units)         Folic acid (mg)   1      Thiamine (mg)   20          Patient also received the following electrolyte replacement per protocol:    Calcium gluconate: 2 gm IV x1    Pharmacy will continue to follow.          Objective  Relevant clinical data and objective history reviewed:  188 cm (74\")   93 kg (205 lb 0.4 oz)   Ideal body weight: 82.2 kg (181 lb 3.5 oz)  Adjusted ideal body weight: 86.5 kg (190 lb 11.9 oz)  Body mass index is 26.32 kg/m².    Results from last 7 days   Lab Units 04/10/23  0512 04/09/23  0317 04/08/23  0400 04/07/23  1717 04/07/23  0811 04/06/23  1245 04/04/23  2339 04/04/23  1756 04/04/23  0309   SODIUM mmol/L 139 135* 135* 133* 138 136 139  --  139   POTASSIUM mmol/L 4.1 4.4 4.4 4.5 4.7 3.8 4.0 3.8 3.3*   CHLORIDE mmol/L 105 105 104 103 106 102 101  --  97*   CO2 mmol/L 25.0 22.0 21.0* 21.0* 20.0* 23.0 25.0  --  30.0*   GLUCOSE mg/dL 132* 121* 136* 380* 170* 130* 90  --  104*   BUN mg/dL 29* 28* 26* 25* 25* 18 22  --  28*   CREATININE mg/dL 1.64* 1.89* 1.86* 2.01* 2.05* 1.44* 1.26  --  1.34*   CALCIUM mg/dL 8.2* 7.7* 7.3* 7.2* 7.6* 8.0* 8.0*  --  8.2*   IONIZED CALCIUM mmol/L 1.19* 1.15* 1.10*  --   --   --   --   --   --    PHOSPHORUS mg/dL 3.3 3.6 4.2  --  4.5  --  3.3  --  3.7   MAGNESIUM mg/dL 1.8 1.9 1.7  --  1.5*  --   --   --  1.9   TOTAL PROTEIN g/dL 5.7* 5.3* 5.2*  --  5.7*  --   --   --   --    ALBUMIN g/dL 2.2* 2.0* 2.0*  --  2.4*  --   --   --   --    PREALBUMIN mg/dL  --   --  3.5*  --   --   --   --   --   --    BILIRUBIN mg/dL 0.4 0.4 0.3  --  0.6  --   --   --   --    ALK PHOS U/L 208* 204* 136*  --  166*  --   --   --   --    AST (SGOT) U/L 16 14 15  --  16  --   --   --   --    ALT (SGPT) U/L 10 10 8  --  9  --   --   --   -- "    TRIGLYCERIDES mg/dL  --   --   --   --  92  --   --   --   --        I/O last 3 completed shifts:  In: 2100 [I.V.:2100]  Out: 3780 [Urine:3690; Drains:90]  Estimated Creatinine Clearance: 59.1 mL/min (A) (by C-G formula based on SCr of 1.64 mg/dL (H)).    Dietary Orders (From admission, onward)       Start     Ordered    04/09/23 1135  Diet: Liquid Diets; Full Liquid; Texture: Regular Texture (IDDSI 7); Fluid Consistency: Thin (IDDSI 0)  Diet Effective Now        References:    Diet Order [facility]   Question Answer Comment   Diets: Liquid Diets    Liquid Diet: Full Liquid    Texture: Regular Texture (IDDSI 7)    Fluid Consistency: Thin (IDDSI 0)        04/09/23 1134    04/05/23 1600  Dietary Nutrition Supplements Boost Breeze (Ensure Clear)  3 Times Daily      Question:  Select Supplement:  Answer:  Boost Breeze (Ensure Clear)    04/05/23 6686                    Idalia Busby CARLO  11:32 EDT 4/10/2023

## 2023-04-10 NOTE — PLAN OF CARE
Goal Outcome Evaluation:         Pt had joe drain removed along with both nephrostomy stents by Dr. Gaffney today. Pt has been on 2l nasal canula today or room air at times. Morphine given for pain. Will continue to monitor

## 2023-04-10 NOTE — CASE MANAGEMENT/SOCIAL WORK
Continued Stay Note   [place]     Patient Name: Anthony Moss  MRN: [id number]  Today's Date: 4/10/2023    Admit Date: 3/28/2023    Plan: Accepted to [facility]. Will require precert, PASRR per facility.   Discharge Plan     Row Name 04/10/23 1517       Plan    Plan Accepted to [facility]. Will require precert, PASRR per facility.    Plan Comments DC Barriers: Continues on TPN, full liquid diet, surgery following, nephrology monitoring creatinine. CM updated [facility] liaison that precert will need to restart since patient not yet ready for discharge today, 4/10 and precert scheduled to  at end of day today.              Phone communication or documentation only - no physical contact with patient or family.    Jacquelyn Golden RN     Office Phone: [phone]  Office Cell: [phone]

## 2023-04-10 NOTE — NURSING NOTE
67-year-old male who underwent radical cystectomy with ileal conduit formation on March 28.  Patient seen today for follow-up to the ileal conduit.  Current pouching system is beginning to start to leak.  Patient is quite confused and becomes very easily agitated when he awakens and then referred back to sleep.  He is calling out for people that are not in the room.    The pouch was removed.  The ulceration that has previously been noted to the top of the ostomy is filling in rather nicely.  However the stoma is retracting and sitting in a fold which when patient sits up, starts to cover the ostomy.  Stoma is viable, urine yellow with mucus.  Stents are in place.    The peristomal skin is cleansed with warm water and dried well Skin-Prep was applied cut to fit 1 piece Diann applied pink tape to edges.  Patient is not checking at this time.  Current plan is for patient to discharge to an ECF/rehab facility

## 2023-04-10 NOTE — PLAN OF CARE
Goal Outcome Evaluation:           Progress: no change  Outcome Evaluation: Had wound vac placed today. remains on TPN without issues. Will continue to monitor

## 2023-04-10 NOTE — PROGRESS NOTES
PROGRESS NOTE      Patient Name: Anthony Moss  : 1955  MRN: [id number]  Primary Care Physician: Reji Topete MD  Date of admission: 3/28/2023    Patient Care Team:  Reji Topete MD as PCP - General (Family Medicine)        Subjective   Subjective:     Resting in bed.  Review of systems:  Review of Systems -   Neg.except weakness.    Allergies:  No Known Allergies    Objective   Exam:     Vital Signs  Temp:  [97.4 °F (36.3 °C)-98.2 °F (36.8 °C)] 98.2 °F (36.8 °C)  Heart Rate:  [] 98  Resp:  [12-15] 15  BP: ()/(52-77) 117/77  SpO2:  [91 %-96 %] 92 %  on  Flow (L/min):  [2] 2;   Device (Oxygen Therapy): nasal cannula  Body mass index is 26.32 kg/m².    General:     male in no acute distress.    Head:      Normocephalic and atraumatic.    Eyes:      PERRL/EOM intact, conjunctiva and sclera clear with out nystagmus.    Neck:      No masses, thyromegaly,  trachea central with normal respiratory effort   Lungs:    Clear bilaterally to auscultation.    Heart:      Regular rate and rhythm, no murmur no gallop  Abd:        Soft, nontender, not distended, bowel sounds positive, no shifting dullness   Pulses:   Pulses palpable  Extr:        No cyanosis or clubbing--+ edema.    Neuro:    No focal deficits.   alert oriented x3  Skin:       Intact without lesions or rashes.    Psych:    Alert and cooperative; normal mood and affect; .      Results Review:  I have personally reviewed most recent Data :  CBC    Results from last 7 days   Lab Units 04/10/23  0512 23  0317 23  0400 23  0811 23  1245 23  2339 23  0309   WBC 10*3/mm3 6.80 10.80 18.60* 40.80* 21.80* 13.20* 13.20*   HEMOGLOBIN g/dL 8.9* 7.4* 6.5* 8.4* 8.6* 8.4* 8.3*   PLATELETS 10*3/mm3 251 240 248 361 325 296 267     CMP   Results from last 7 days   Lab Units 04/10/23  0512 23  0317 23  0400 23  1717 23  0811 23  1245 23  2339   SODIUM mmol/L 139  135* 135* 133* 138 136 139   POTASSIUM mmol/L 4.1 4.4 4.4 4.5 4.7 3.8 4.0   CHLORIDE mmol/L 105 105 104 103 106 102 101   CO2 mmol/L 25.0 22.0 21.0* 21.0* 20.0* 23.0 25.0   BUN mg/dL 29* 28* 26* 25* 25* 18 22   CREATININE mg/dL 1.64* 1.89* 1.86* 2.01* 2.05* 1.44* 1.26   GLUCOSE mg/dL 132* 121* 136* 380* 170* 130* 90   ALBUMIN g/dL 2.2* 2.0* 2.0*  --  2.4*  --   --    BILIRUBIN mg/dL 0.4 0.4 0.3  --  0.6  --   --    ALK PHOS U/L 208* 204* 136*  --  166*  --   --    AST (SGOT) U/L 16 14 15  --  16  --   --    ALT (SGPT) U/L 10 10 8  --  9  --   --      ABG    Results from last 7 days   Lab Units 23  0502   PH, ARTERIAL pH units 7.394   PCO2, ARTERIAL mm Hg 35.6   PO2 ART mm Hg 105.3   O2 SATURATION ART % 98.1*   BASE EXCESS ART mmol/L -2.8*     No radiology results for the last day    Results for orders placed during the hospital encounter of 22    Adult Transthoracic Echo Complete w/ Color, Spectral and Contrast if Necessary Per Protocol    Interpretation Summary  · Left ventricular ejection fraction appears to be 56 - 60%.  · The right ventricular cavity is moderately dilated.  · No pericardial effusion noted    Scheduled Meds:Fat Emulsion Plant Based, 250 mL, Intravenous, Once per day on   insulin lispro, 2-7 Units, Subcutaneous, Q6H  metoclopramide, 10 mg, Intravenous, Q6H  piperacillin-tazobactam, 3.375 g, Intravenous, Q8H  polyethylene glycol, 17 g, Oral, BID  sodium chloride, 10 mL, Intravenous, Q12H  vitamin D3, 5,000 Units, Oral, Daily      Continuous Infusions:Adult Central Clinimix TPN, , Last Rate: 45 mL/hr at 23 1751  Adult Central Clinimix TPN,   Pharmacy to Dose TPN,       PRN Meds:•  acetaminophen **OR** acetaminophen  •  dextrose  •  dextrose  •  docusate sodium  •  glucagon (human recombinant)  •  HYDROcodone-acetaminophen  •  lactulose  •  Morphine  •  [] HYDROmorphone **AND** naloxone  •  ondansetron **OR** ondansetron  •  Pharmacy to Dose TPN  •   phenol  •  polyethyl glycol-propyl glycol  •  senna-docusate sodium  •  sodium chloride  •  sodium chloride    Assessment & Plan   Assessment and Plan:         Bladder cancer    History of alcohol use disorder    Prostate cancer    Elevated serum creatinine    Attention to urostomy    Severe Malnutrition (HCC)    ASSESSMENT:  • Acute kidney injury, stage III, status post radical cystectomy getting better hyperkalemia, secondary to CARLOS ALBERTO  • Metabolic acidosis secondary to CKD start low-dose sodium bicarbonate  • History of bladder cancer, recent cystectomy  • Suprapubic catheter  • History of alcohol use,  • Chronic back pain,using NSAID in the past  • B/l pneumonia.  Surprisingly, WBC stable,           Plan:      • At this time patient with history of bladder cancer status post cystectomy urine output is adequate  • Patient has more wound dehiscence and some infection taken back to the OR for 6/20/2023   • Patient creatinine of 1.6 which is slightly better than yesterday  • History of bilateral nephrostomy tube placed last month March 2023  • Continue TPN at this time no need to restart Ringer lactate  • Blood pressure still on the softer side  • Repeat labs tomorrow morning  • Patient still has significant GI complaints  • Labs tomorrow morning  • Potassium level is acceptable  • Echocardiogram done last time showed ejection fraction 56 to 60%  • Repeat labs tomorrow morning          Electronically signed by Jamie Houston MD,   [facility] kidney consultant  [phone]  4/10/2023  14:35 EDT

## 2023-04-10 NOTE — NURSING NOTE
67-year-old male who underwent a radical cystectomy with ileal conduit formation on March 28.  Patient seen this afternoon for a wound VAC placement to his midline wound.  The top of the wound sits in a deep fold.  Patient is again very confused.  Awakens and then drifts back off to sleep.  Less agitated this afternoon.    The dressing was removed.  The base of the wound is mostly bright and pink.  The very distal end has a little bit of slough noted.  The wound was cleansed with normal saline and I applied a silicone sheet to the wound bed then applied black foam suction set at 125 continuous.  I did also applied an adapt ring at the very top skin fold and along the edge located near the ostomy.  Seal obtained suction set at 125 continuous patient tolerated well.  Abdominal binder reapplied

## 2023-04-10 NOTE — PROGRESS NOTES
General Surgery Inpatient Progress Note      Name: Anthony Moss ADMIT: 3/28/2023   : 1955  PCP: Reji Topete MD    MRN: [id number] LOS: 13 days   AGE/SEX: 67 y.o. male  ROOM:    [facility]      Patient Care Team:  Reji Topete MD as PCP - General (Family Medicine)  No chief complaint on file.      Subjective:  Patient seen and examined.  Pain well controlled.  Passing flatus but has not yet had a bowel movement.  Denies any nausea.  Not taking much PO    Medications:  calcium gluconate, 2 g, Intravenous, Once  Fat Emulsion Plant Based, 250 mL, Intravenous, Once per day on   insulin lispro, 2-7 Units, Subcutaneous, Q6H  metoclopramide, 10 mg, Intravenous, Q6H  piperacillin-tazobactam, 3.375 g, Intravenous, Q8H  polyethylene glycol, 17 g, Oral, BID  sodium chloride, 10 mL, Intravenous, Q12H  vitamin D3, 5,000 Units, Oral, Daily       •  acetaminophen **OR** acetaminophen  •  dextrose  •  dextrose  •  docusate sodium  •  glucagon (human recombinant)  •  HYDROcodone-acetaminophen  •  lactulose  •  Morphine  •  [] HYDROmorphone **AND** naloxone  •  ondansetron **OR** ondansetron  •  Pharmacy to Dose TPN  •  phenol  •  polyethyl glycol-propyl glycol  •  senna-docusate sodium  •  sodium chloride  •  sodium chloride     Vitals:  Temp:  [97.4 °F (36.3 °C)-98.2 °F (36.8 °C)] 98.2 °F (36.8 °C)  Heart Rate:  [] 98  Resp:  [11-15] 15  BP: ()/(52-77) 117/77     Physical Exam:  No acute distress, alert  Nonlabored respirations  Abdomen soft, minimally distended, appropriately tender to palpation, incision clean dry and intact with fascia closed and skin open, wound base clean  Ileal conduit in place    Labs:  Results from last 7 days   Lab Units 04/10/23  0512 23  0317 23  0400 23  0811   WBC 10*3/mm3 6.80 10.80 18.60* 40.80*   HEMOGLOBIN g/dL 8.9* 7.4* 6.5* 8.4*   HEMATOCRIT % 28.4* 23.2* 21.2* 26.2*   PLATELETS 10*3/mm3 251 240 248  361   MONOCYTES % %  --   --  9.0 4.0*     Results from last 7 days   Lab Units 04/10/23  0512 04/09/23  0317 04/08/23  0400   SODIUM mmol/L 139 135* 135*   POTASSIUM mmol/L 4.1 4.4 4.4   CHLORIDE mmol/L 105 105 104   CO2 mmol/L 25.0 22.0 21.0*   BUN mg/dL 29* 28* 26*   CREATININE mg/dL 1.64* 1.89* 1.86*   CALCIUM mg/dL 8.2* 7.7* 7.3*   BILIRUBIN mg/dL 0.4 0.4 0.3   ALK PHOS U/L 208* 204* 136*   ALT (SGPT) U/L 10 10 8   AST (SGOT) U/L 16 14 15   GLUCOSE mg/dL 132* 121* 136*     Assessment and Plan:  67 y.o. male postop day #13 from cystectomy and ileal conduit with subsequent fascial dehiscence now postop day #5 status post closure.  Having some bowel function.    - Continue as needed pain control  - Continue full liquid diet  -Continue TPN  - Wet-to-dry dressing twice daily to open wound and continue binder. Will have Wound Care assess for [place]  - ID on consult for leukocytosis, much improved; on Zosyn  - Further management per primary and other consultants    This note was created using Dragon Voice Recognition software.    Michael Witt MD  04/10/23  13:10 EDT

## 2023-04-11 LAB
ALBUMIN SERPL-MCNC: 2.3 G/DL (ref 3.5–5.2)
ALBUMIN/GLOB SERPL: 0.7 G/DL
ALP SERPL-CCNC: 235 U/L (ref 39–117)
ALT SERPL W P-5'-P-CCNC: 11 U/L (ref 1–41)
ANION GAP SERPL CALCULATED.3IONS-SCNC: 9 MMOL/L (ref 5–15)
AST SERPL-CCNC: 19 U/L (ref 1–40)
BASOPHILS # BLD AUTO: 0 10*3/MM3 (ref 0–0.2)
BASOPHILS NFR BLD AUTO: 0.3 % (ref 0–1.5)
BILIRUB SERPL-MCNC: 0.3 MG/DL (ref 0–1.2)
BUN SERPL-MCNC: 32 MG/DL (ref 8–23)
BUN/CREAT SERPL: 21.6 (ref 7–25)
CA-I SERPL ISE-MCNC: 1.21 MMOL/L (ref 1.2–1.3)
CALCIUM SPEC-SCNC: 8.4 MG/DL (ref 8.6–10.5)
CHLORIDE SERPL-SCNC: 109 MMOL/L (ref 98–107)
CO2 SERPL-SCNC: 25 MMOL/L (ref 22–29)
CREAT SERPL-MCNC: 1.48 MG/DL (ref 0.76–1.27)
DEPRECATED RDW RBC AUTO: 50.3 FL (ref 37–54)
EGFRCR SERPLBLD CKD-EPI 2021: 52.2 ML/MIN/1.73
EOSINOPHIL # BLD AUTO: 0.1 10*3/MM3 (ref 0–0.4)
EOSINOPHIL NFR BLD AUTO: 1.6 % (ref 0.3–6.2)
ERYTHROCYTE [DISTWIDTH] IN BLOOD BY AUTOMATED COUNT: 16.9 % (ref 12.3–15.4)
GLOBULIN UR ELPH-MCNC: 3.4 GM/DL
GLUCOSE BLDC GLUCOMTR-MCNC: 126 MG/DL (ref 70–105)
GLUCOSE BLDC GLUCOMTR-MCNC: 134 MG/DL (ref 70–105)
GLUCOSE BLDC GLUCOMTR-MCNC: 136 MG/DL (ref 70–105)
GLUCOSE BLDC GLUCOMTR-MCNC: 143 MG/DL (ref 70–105)
GLUCOSE SERPL-MCNC: 147 MG/DL (ref 65–99)
HCT VFR BLD AUTO: 23.4 % (ref 37.5–51)
HGB BLD-MCNC: 7.4 G/DL (ref 13–17.7)
LYMPHOCYTES # BLD AUTO: 1.1 10*3/MM3 (ref 0.7–3.1)
LYMPHOCYTES NFR BLD AUTO: 19.4 % (ref 19.6–45.3)
MAGNESIUM SERPL-MCNC: 1.7 MG/DL (ref 1.6–2.4)
MCH RBC QN AUTO: 26.8 PG (ref 26.6–33)
MCHC RBC AUTO-ENTMCNC: 31.6 G/DL (ref 31.5–35.7)
MCV RBC AUTO: 84.9 FL (ref 79–97)
MONOCYTES # BLD AUTO: 0.9 10*3/MM3 (ref 0.1–0.9)
MONOCYTES NFR BLD AUTO: 15.6 % (ref 5–12)
NEUTROPHILS NFR BLD AUTO: 3.4 10*3/MM3 (ref 1.7–7)
NEUTROPHILS NFR BLD AUTO: 63.1 % (ref 42.7–76)
NRBC BLD AUTO-RTO: 0.1 /100 WBC (ref 0–0.2)
PHOSPHATE SERPL-MCNC: 3.3 MG/DL (ref 2.5–4.5)
PLATELET # BLD AUTO: 256 10*3/MM3 (ref 140–450)
PMV BLD AUTO: 7.6 FL (ref 6–12)
POTASSIUM SERPL-SCNC: 4.1 MMOL/L (ref 3.5–5.2)
PROT SERPL-MCNC: 5.7 G/DL (ref 6–8.5)
RBC # BLD AUTO: 2.75 10*6/MM3 (ref 4.14–5.8)
SODIUM SERPL-SCNC: 143 MMOL/L (ref 136–145)
WBC NRBC COR # BLD: 5.5 10*3/MM3 (ref 3.4–10.8)

## 2023-04-11 PROCEDURE — 82330 ASSAY OF CALCIUM: CPT | Performed by: SURGERY

## 2023-04-11 PROCEDURE — 99024 POSTOP FOLLOW-UP VISIT: CPT | Performed by: SURGERY

## 2023-04-11 PROCEDURE — 25010000002 MAGNESIUM SULFATE PER 500 MG OF MAGNESIUM: Performed by: SURGERY

## 2023-04-11 PROCEDURE — 85025 COMPLETE CBC W/AUTO DIFF WBC: CPT | Performed by: STUDENT IN AN ORGANIZED HEALTH CARE EDUCATION/TRAINING PROGRAM

## 2023-04-11 PROCEDURE — 25010000002 HEPARIN (PORCINE) PER 1000 UNITS: Performed by: INTERNAL MEDICINE

## 2023-04-11 PROCEDURE — 25010000002 METOCLOPRAMIDE PER 10 MG: Performed by: UROLOGY

## 2023-04-11 PROCEDURE — 80053 COMPREHEN METABOLIC PANEL: CPT | Performed by: SURGERY

## 2023-04-11 PROCEDURE — 25010000002 CALCIUM GLUCONATE PER 10 ML: Performed by: SURGERY

## 2023-04-11 PROCEDURE — 84100 ASSAY OF PHOSPHORUS: CPT | Performed by: SURGERY

## 2023-04-11 PROCEDURE — 83735 ASSAY OF MAGNESIUM: CPT | Performed by: SURGERY

## 2023-04-11 PROCEDURE — 25010000002 POTASSIUM CHLORIDE PER 2 MEQ OF POTASSIUM: Performed by: SURGERY

## 2023-04-11 PROCEDURE — 25010000002 PIPERACILLIN SOD-TAZOBACTAM PER 1 G: Performed by: NURSE PRACTITIONER

## 2023-04-11 PROCEDURE — 82962 GLUCOSE BLOOD TEST: CPT

## 2023-04-11 RX ADMIN — POTASSIUM CHLORIDE: 2 INJECTION, SOLUTION, CONCENTRATE INTRAVENOUS at 18:04

## 2023-04-11 RX ADMIN — POLYETHYLENE GLYCOL 3350 17 G: 17 POWDER, FOR SOLUTION ORAL at 09:33

## 2023-04-11 RX ADMIN — I.V. FAT EMULSION 50 G: 20 EMULSION INTRAVENOUS at 18:04

## 2023-04-11 RX ADMIN — HYDROXYZINE HYDROCHLORIDE 25 MG: 25 TABLET, FILM COATED ORAL at 23:13

## 2023-04-11 RX ADMIN — Medication 10 ML: at 21:53

## 2023-04-11 RX ADMIN — PIPERACILLIN SODIUM AND TAZOBACTAM SODIUM 3.38 G: 3; .375 INJECTION, POWDER, LYOPHILIZED, FOR SOLUTION INTRAVENOUS at 11:37

## 2023-04-11 RX ADMIN — POLYETHYLENE GLYCOL 3350 17 G: 17 POWDER, FOR SOLUTION ORAL at 21:53

## 2023-04-11 RX ADMIN — METOCLOPRAMIDE HYDROCHLORIDE 10 MG: 5 INJECTION INTRAMUSCULAR; INTRAVENOUS at 23:07

## 2023-04-11 RX ADMIN — PIPERACILLIN SODIUM AND TAZOBACTAM SODIUM 3.38 G: 3; .375 INJECTION, POWDER, LYOPHILIZED, FOR SOLUTION INTRAVENOUS at 21:53

## 2023-04-11 RX ADMIN — HYDROCODONE BITARTRATE AND ACETAMINOPHEN 1 TABLET: 5; 325 TABLET ORAL at 03:45

## 2023-04-11 RX ADMIN — METOCLOPRAMIDE HYDROCHLORIDE 10 MG: 5 INJECTION INTRAMUSCULAR; INTRAVENOUS at 11:37

## 2023-04-11 RX ADMIN — HEPARIN SODIUM 5000 UNITS: 5000 INJECTION INTRAVENOUS; SUBCUTANEOUS at 21:52

## 2023-04-11 RX ADMIN — METOCLOPRAMIDE HYDROCHLORIDE 10 MG: 5 INJECTION INTRAMUSCULAR; INTRAVENOUS at 05:44

## 2023-04-11 RX ADMIN — METOCLOPRAMIDE HYDROCHLORIDE 10 MG: 5 INJECTION INTRAMUSCULAR; INTRAVENOUS at 18:04

## 2023-04-11 RX ADMIN — Medication 10 ML: at 11:38

## 2023-04-11 RX ADMIN — PIPERACILLIN SODIUM AND TAZOBACTAM SODIUM 3.38 G: 3; .375 INJECTION, POWDER, LYOPHILIZED, FOR SOLUTION INTRAVENOUS at 03:46

## 2023-04-11 RX ADMIN — Medication 5000 UNITS: at 09:33

## 2023-04-11 RX ADMIN — HEPARIN SODIUM 5000 UNITS: 5000 INJECTION INTRAVENOUS; SUBCUTANEOUS at 09:27

## 2023-04-11 NOTE — CASE MANAGEMENT/SOCIAL WORK
Continued Stay Note   [place]     Patient Name: Anthony Moss  MRN: [id number]  Today's Date: 4/11/2023    Admit Date: 3/28/2023    Plan: Accepted to [facility]. Will require precert, PASRR per facility. Facility able to accept on TPN (however unable to deliver TPN on a Fri/Sat/Sun).   Discharge Plan     Row Name 04/11/23 1312       Plan    Plan Accepted to [facility]. Will require precert, PASRR per facility. Facility able to accept on TPN (however unable to deliver TPN on a Fri/Sat/Sun).    Plan Comments Confirmed with [facility] liaison that they can take patient on TPN but pharmacy is difficult on delivery so they cannot accept on a Friday or over a weekend because of the delivery.              Phone communication or documentation only - no physical contact with patient or family.    Jacquelyn Golden RN     Office Phone: [phone]  Office Cell: [phone]

## 2023-04-11 NOTE — PROGRESS NOTES
PROGRESS NOTE      Patient Name: Anthony Moss  : 1955  MRN: [id number]  Primary Care Physician: Reji Topete MD  Date of admission: 3/28/2023    Patient Care Team:  Reji Topete MD as PCP - General (Family Medicine)        Subjective   Subjective:     Resting in bed.  Review of systems:  Review of Systems -   Neg.except weakness.    Allergies:  No Known Allergies    Objective   Exam:     Vital Signs  Temp:  [97.6 °F (36.4 °C)-98.8 °F (37.1 °C)] 97.6 °F (36.4 °C)  Heart Rate:  [] 100  Resp:  [12-17] 17  BP: (116-147)/(74-90) 147/90  SpO2:  [93 %-95 %] 95 %  on  Flow (L/min):  [2] 2;   Device (Oxygen Therapy): room air  Body mass index is 26.3 kg/m².    General:     male in no acute distress.    Head:      Normocephalic and atraumatic.    Eyes:      PERRL/EOM intact, conjunctiva and sclera clear with out nystagmus.    Neck:      No masses, thyromegaly,  trachea central with normal respiratory effort   Lungs:    Clear bilaterally to auscultation.    Heart:      Regular rate and rhythm, no murmur no gallop  Abd:        Soft, nontender, s/p surgery dressing present   Pulses:   Pulses palpable  Extr:        No cyanosis or clubbing--+ edema.    Neuro:    No focal deficits.   alert oriented x3  Skin:       Intact without lesions or rashes.    Psych:    Alert and cooperative; normal mood and affect; .      Results Review:  I have personally reviewed most recent Data :  CBC    Results from last 7 days   Lab Units 23  0026 04/10/23  0512 23  0317 23  0400 23  0811 23  1245 23  2339   WBC 10*3/mm3 5.50 6.80 10.80 18.60* 40.80* 21.80* 13.20*   HEMOGLOBIN g/dL 7.4* 8.9* 7.4* 6.5* 8.4* 8.6* 8.4*   PLATELETS 10*3/mm3 256 251 240 248 361 325 296     CMP   Results from last 7 days   Lab Units 23  0026 04/10/23  0512 23  0317 23  0400 23  1717 23  0811 23  1245   SODIUM mmol/L 143 139 135* 135* 133* 138 136   POTASSIUM  mmol/L 4.1 4.1 4.4 4.4 4.5 4.7 3.8   CHLORIDE mmol/L 109* 105 105 104 103 106 102   CO2 mmol/L 25.0 25.0 22.0 21.0* 21.0* 20.0* 23.0   BUN mg/dL 32* 29* 28* 26* 25* 25* 18   CREATININE mg/dL 1.48* 1.64* 1.89* 1.86* 2.01* 2.05* 1.44*   GLUCOSE mg/dL 147* 132* 121* 136* 380* 170* 130*   ALBUMIN g/dL 2.3* 2.2* 2.0* 2.0*  --  2.4*  --    BILIRUBIN mg/dL 0.3 0.4 0.4 0.3  --  0.6  --    ALK PHOS U/L 235* 208* 204* 136*  --  166*  --    AST (SGOT) U/L 19 16 14 15  --  16  --    ALT (SGPT) U/L 11 10 10 8  --  9  --      ABG    Results from last 7 days   Lab Units 23  0502   PH, ARTERIAL pH units 7.394   PCO2, ARTERIAL mm Hg 35.6   PO2 ART mm Hg 105.3   O2 SATURATION ART % 98.1*   BASE EXCESS ART mmol/L -2.8*     No radiology results for the last day    Results for orders placed during the hospital encounter of 22    Adult Transthoracic Echo Complete w/ Color, Spectral and Contrast if Necessary Per Protocol    Interpretation Summary  · Left ventricular ejection fraction appears to be 56 - 60%.  · The right ventricular cavity is moderately dilated.  · No pericardial effusion noted    Scheduled Meds:Fat Emulsion Plant Based, 250 mL, Intravenous, Once per day on   heparin (porcine), 5,000 Units, Subcutaneous, Q12H  insulin lispro, 2-7 Units, Subcutaneous, Q6H  metoclopramide, 10 mg, Intravenous, Q6H  piperacillin-tazobactam, 3.375 g, Intravenous, Q8H  polyethylene glycol, 17 g, Oral, BID  sodium chloride, 10 mL, Intravenous, Q12H  vitamin D3, 5,000 Units, Oral, Daily      Continuous Infusions:Adult Central [facility]imix TPN, , Last Rate: 87 mL/hr at 04/10/23 1806  Adult Central Clinimix TPN,   Pharmacy to Dose TPN,       PRN Meds:•  acetaminophen **OR** acetaminophen  •  dextrose  •  dextrose  •  docusate sodium  •  glucagon (human recombinant)  •  HYDROcodone-acetaminophen  •  hydrOXYzine  •  lactulose  •  [] HYDROmorphone **AND** naloxone  •  ondansetron **OR** ondansetron  •  Pharmacy to Dose  TPN  •  phenol  •  polyethyl glycol-propyl glycol  •  senna-docusate sodium  •  sodium chloride  •  sodium chloride    Assessment & Plan   Assessment and Plan:         Bladder cancer    History of alcohol use disorder    Prostate cancer    Elevated serum creatinine    Attention to urostomy    Severe Malnutrition (HCC)    ASSESSMENT:  • Acute kidney injury, stage III, status post radical cystectomy getting better hyperkalemia, secondary to CARLOS ALBERTO  • Metabolic acidosis secondary to CKD start low-dose sodium bicarbonate  • History of bladder cancer, recent cystectomy  • Suprapubic catheter  • History of alcohol use,  • Chronic back pain,using NSAID in the past  • B/l pneumonia.  Surprisingly, WBC stable,           Plan:      • At this time patient with history of bladder cancer status post cystectomy urine output is adequate  • Patient has more wound dehiscence and some infection taken back to the OR for 6/20/2023   • Patient creatinine of 1.48 which is slightly better than yesterday  • History of bilateral nephrostomy tube placed last month March 2023  • Continue TPN at this time no need to restart Ringer lactate  • Blood pressure still on the softer side  • Repeat labs tomorrow morning  • Patient still has significant GI complaints not better yet Pt very sleepy  • Labs tomorrow morning  • Potassium level is acceptable  • Echocardiogram done last time showed ejection fraction 56 to 60%  • Repeat labs tomorrow morning          Electronically signed by Jamie Houston MD,   [facility] kidney consultant  [phone]  4/11/2023  13:59 EDT

## 2023-04-11 NOTE — PROGRESS NOTES
[facility] Medicine Services Daily Progress Note    Patient Name: Anthony Moss  : 1955  MRN: [id number]  Primary Care Physician:  Reji Topete MD  Date of admission: 3/28/2023      Subjective      Chief Complaint: Constipation, weakness      Patient Reports     3/31/23: OOB to chair.  PT consulted.  Constipated.  Undecided whether SNF versus HH.  Lives alone.  DC IVF.  Nephrology and urology following. + ARAVIND drain with serosanguineous fluid.    23: Patient with multiple vomiting overnight NG tube placed with bilious discharge.  Repeat KUB.  Gaiting [employer].    23: Patient having NGT removed.  Still feeling pretty rough at this time improved abdominal pain.  Somnolent as well.  Awaiting SNF placement.    4/3/2023: Patient had NGT replaced due to likely ileus.  Having some surgical site TTP and soreness.  Still feeling pretty good overall, forward to improving his p.o. intake and getting started with the rest of his care plan.    23: Still has NGT in place.  Having some more feelings of hunger, agreeable to current management.  Also reporting constipation.    23: NGT was removed today, had large volume bowel movements as well.  Patient feeling a lot better today.  Advance to clear liquid diet.    23: Had significant bowel movement as well, still having some more abdominal pain today.  Denies any nausea, vomiting.  Is tolerating p.o. intake as well, but still only liquids and high-fiber.    23: Patient went to the OR last night for wound dehiscence repair.  General surgery and urology recs are appreciated for current management.  Patient to continue IV ABX, pending ID recs.     23: Patient had drop in Hb to below 7, receiving PRBC at this time.Still reporting significant abdominal pain.  General surgery recs pending.    23: Significant pretty worn down.  Now tolerating clear liquids, GS looking forward to advancing to fulls, still on TPN at  this time.  Pain management still be via IV    4/11/23: Resting comfortably, poor p.o. intake, nurse reports no acute overnight events.  Patient unable to participate with PT at this time.    Review of Systems   All other systems reviewed and are negative.         Objective      Vitals:   Temp:  [97.6 °F (36.4 °C)-98.8 °F (37.1 °C)] 97.6 °F (36.4 °C)  Heart Rate:  [] 98  Resp:  [12-17] 16  BP: (116-153)/(74-90) 153/89    Physical Exam  HENT:      Head: Normocephalic.      Nose: Nose normal.   Eyes:      Extraocular Movements: Extraocular movements intact.      Pupils: Pupils are equal, round, and reactive to light.   Cardiovascular:      Rate and Rhythm: Normal rate.   Pulmonary:      Effort: Pulmonary effort is normal.      Breath sounds: Normal breath sounds.   Abdominal:      General: Bowel sounds are normal.      Comments: Ileal conduit with urine.  Left lower quadrant ARAVIND drain with serosanguineous fluid.   Musculoskeletal:         General: Normal range of motion.      Cervical back: Normal range of motion.   Skin:     General: Skin is warm.   Neurological:      Mental Status: He is alert. Mental status is at baseline.   Psychiatric:         Mood and Affect: Mood normal.             Result Review    Result Review:  I have personally reviewed the results from the time of this admission to 4/11/2023 17:44 EDT and agree with these findings:  [x]  Laboratory  []  Microbiology  [x]  Radiology  []  EKG/Telemetry   []  Cardiology/Vascular   []  Pathology  [x]  Old records  []  Other:      Wounds (last 24 hours)     LDA Wound     Row Name 04/11/23 1615 04/11/23 1609 04/11/23 1210       Wound 04/06/23 2243 abdomen Incision    Wound - Properties Group Placement Date: 04/06/23  -PH Placement Time: 2243  -PH Present on Hospital Admission: Y  -PH Location: abdomen  -PH Primary Wound Type: Incision  -PH    Dressing Appearance -- -- dry;intact  -MD    Closure STEFF MALIN -- STEFF MALIN    Base dressing in place, unable to  visualize  -MD -- dressing in place, unable to visualize  -MD    Drainage Characteristics/Odor serosanguineous  -MD -- serosanguineous  -MD    Drainage Amount large  -MD -- small  -MD    Care, Wound -- -- negative pressure wound therapy  -MD    Wound Output (mL) -- 470  -MD --    Retired Wound - Properties Group Placement Date: 04/06/23  -PH Placement Time: 2243 -PH Present on Hospital Admission: Y  -PH Location: abdomen  -PH Primary Wound Type: Incision  -PH    Retired Wound - Properties Group Date first assessed: 04/06/23  -PH Time first assessed: 2243  -PH Present on Hospital Admission: Y  -PH Location: abdomen  -PH Primary Wound Type: Incision  -PH       Wound 04/07/23 0800 Left posterior heel Pressure Injury    Wound - Properties Group Placement Date: 04/07/23  -PB Placement Time: 0800  -PB Side: Left  -PB Orientation: posterior  -PB Location: heel  -PB Primary Wound Type: Pressure inj  -PB    Closure Open to air  -MD -- Open to air  -MD    Retired Wound - Properties Group Placement Date: 04/07/23  -PB Placement Time: 0800  -PB Side: Left  -PB Orientation: posterior  -PB Location: heel  -PB Primary Wound Type: Pressure inj  -PB    Retired Wound - Properties Group Date first assessed: 04/07/23  -PB Time first assessed: 0800  -PB Side: Left  -PB Location: heel  -PB Primary Wound Type: Pressure inj  -PB       Wound Right posterior heel Pressure Injury    Wound - Properties Group Side: Right  -PB Orientation: posterior  -PB Location: heel  -PB Primary Wound Type: Pressure inj  -PB    Retired Wound - Properties Group Side: Right  -PB Orientation: posterior  -PB Location: heel  -PB Primary Wound Type: Pressure inj  -PB    Retired Wound - Properties Group Side: Right  -PB Location: heel  -PB Primary Wound Type: Pressure inj  -PB       Wound 04/11/23 0005 thoracic spine    Wound - Properties Group Placement Date: 04/11/23  -AB Placement Time: 0005  -AB Location: thoracic spine  -AB    Retired Wound - Properties Group  Placement Date: 04/11/23  -AB Placement Time: 0005 -AB Location: thoracic spine  -AB    Retired Wound - Properties Group Date first assessed: 04/11/23  -AB Time first assessed: 0005 -AB Location: thoracic spine  -AB    Row Name 04/11/23 0809 04/11/23 0400 04/11/23 0000       Wound 04/06/23 2243 abdomen Incision    Wound - Properties Group Placement Date: 04/06/23  -PH Placement Time: 2243  -PH Present on Hospital Admission: Y  -PH Location: abdomen  -PH Primary Wound Type: Incision  -PH    Dressing Appearance dry;intact  -MD dry;intact  -AB --    Closure STEFF  -MD STEFF  -AB STEFF  -AB    Base dressing in place, unable to visualize  -MD dressing in place, unable to visualize  -AB dressing in place, unable to visualize  -AB    Drainage Characteristics/Odor serosanguineous  -MD serosanguineous  -AB serosanguineous  -AB    Drainage Amount -- small  -AB small  -AB    Care, Wound negative pressure wound therapy  -MD negative pressure wound therapy  -AB negative pressure wound therapy  -AB    Retired Wound - Properties Group Placement Date: 04/06/23  -PH Placement Time: 2243  -PH Present on Hospital Admission: Y  -PH Location: abdomen  -PH Primary Wound Type: Incision  -PH    Retired Wound - Properties Group Date first assessed: 04/06/23  -PH Time first assessed: 2243  -PH Present on Hospital Admission: Y  -PH Location: abdomen  -PH Primary Wound Type: Incision  -PH       Wound 04/07/23 0800 Left posterior heel Pressure Injury    Wound - Properties Group Placement Date: 04/07/23  -PB Placement Time: 0800  -PB Side: Left  -PB Orientation: posterior  -PB Location: heel  -PB Primary Wound Type: Pressure inj  -PB    Pressure Injury Stage 1  -MD -- --    Dressing Appearance open to air  -MD -- open to air  pt refused RN placing boots/pillows even after education  -AB    Closure Open to air  -MD Open to air  pt refused RN placing boots/pillows even after education  -AB Open to air  pt refused RN placing boots/pillows even after  education  -AB    Care, Wound -- other (see comments)  pt refused RN placing boots/pillows even after education  -AB other (see comments)  pt refused RN placing boots/pillows even after education  -AB    Retired Wound - Properties Group Placement Date: 04/07/23  -PB Placement Time: 0800  -PB Side: Left  -PB Orientation: posterior  -PB Location: heel  -PB Primary Wound Type: Pressure inj  -PB    Retired Wound - Properties Group Date first assessed: 04/07/23  -PB Time first assessed: 0800  -PB Side: Left  -PB Location: heel  -PB Primary Wound Type: Pressure inj  -PB       Wound Right posterior heel Pressure Injury    Wound - Properties Group Side: Right  -PB Orientation: posterior  -PB Location: heel  -PB Primary Wound Type: Pressure inj  -PB    Dressing Appearance -- -- open to air  -AB    Closure -- Open to air  pt refused RN placing boots/pillows even after education  -AB Open to air  pt refused RN placing boots/pillows even after education  -AB    Care, Wound -- other (see comments)  pt refused RN placing boots/pillows even after education  -AB other (see comments)  pt refused RN placing boots/pillows even after education  -AB    Retired Wound - Properties Group Side: Right  -PB Orientation: posterior  -PB Location: heel  -PB Primary Wound Type: Pressure inj  -PB    Retired Wound - Properties Group Side: Right  -PB Location: heel  -PB Primary Wound Type: Pressure inj  -PB       Wound 04/11/23 0005 thoracic spine    Wound - Properties Group Placement Date: 04/11/23  -AB Placement Time: 0005  -AB Location: thoracic spine  -AB    Dressing Appearance -- dry;intact  -AB dry;intact  -AB    Drainage Amount -- none  -AB none  -AB    Periwound Care -- dry periwound area maintained  -AB dry periwound area maintained  -AB    Retired Wound - Properties Group Placement Date: 04/11/23  -AB Placement Time: 0005  -AB Location: thoracic spine  -AB    Retired Wound - Properties Group Date first assessed: 04/11/23  -AB Time first  assessed: 0005  -AB Location: thoracic spine  -AB    Row Name 04/10/23 2028 04/10/23 2000          Wound 04/06/23 2243 abdomen Incision    Wound - Properties Group Placement Date: 04/06/23  -PH Placement Time: 2243  -PH Present on Hospital Admission: Y  -PH Location: abdomen  -PH Primary Wound Type: Incision  -PH    Closure -- STEFF  -AB     Base -- dressing in place, unable to visualize  -AB     Drainage Characteristics/Odor -- serosanguineous  -AB     Drainage Amount -- small  -AB     Care, Wound -- negative pressure wound therapy  -AB     Wound Output (mL) 30  level at 30 mL in wound vac  -AB --     Retired Wound - Properties Group Placement Date: 04/06/23  -PH Placement Time: 2243  -PH Present on Hospital Admission: Y  -PH Location: abdomen  -PH Primary Wound Type: Incision  -PH    Retired Wound - Properties Group Date first assessed: 04/06/23  -PH Time first assessed: 2243  -PH Present on Hospital Admission: Y  -PH Location: abdomen  -PH Primary Wound Type: Incision  -PH       Wound 04/07/23 0800 Left posterior heel Pressure Injury    Wound - Properties Group Placement Date: 04/07/23  -PB Placement Time: 0800  -PB Side: Left  -PB Orientation: posterior  -PB Location: heel  -PB Primary Wound Type: Pressure inj  -PB    Dressing Appearance -- open to air  pt refused RN placing boots/pillows even after education  -AB     Closure -- Open to air  -AB     Care, Wound -- other (see comments)  refused liliane boot/pillow even after education  -AB     Retired Wound - Properties Group Placement Date: 04/07/23  -PB Placement Time: 0800  -PB Side: Left  -PB Orientation: posterior  -PB Location: heel  -PB Primary Wound Type: Pressure inj  -PB    Retired Wound - Properties Group Date first assessed: 04/07/23  -PB Time first assessed: 0800  -PB Side: Left  -PB Location: heel  -PB Primary Wound Type: Pressure inj  -PB       Wound Right posterior heel Pressure Injury    Wound - Properties Group Side: Right  -PB Orientation: posterior   -PB Location: heel  -PB Primary Wound Type: Pressure inj  -PB    Dressing Appearance -- open to air  refused liliane boot/pillow even after education  -AB     Closure -- Open to air  -AB     Care, Wound -- other (see comments)  refused liliane boot/pillow even after education  -AB     Retired Wound - Properties Group Side: Right  -PB Orientation: posterior  -PB Location: heel  -PB Primary Wound Type: Pressure inj  -PB    Retired Wound - Properties Group Side: Right  -PB Location: heel  -PB Primary Wound Type: Pressure inj  -PB       Wound 04/11/23 0005 thoracic spine    Wound - Properties Group Placement Date: 04/11/23  -AB Placement Time: 0005 -AB Location: thoracic spine  -AB    Dressing Appearance -- dry;intact  -AB     Drainage Amount -- none  -AB     Care, Wound -- cleansed with;soap and water  -AB     Dressing Care -- dressing applied  -AB     Periwound Care -- dry periwound area maintained  -AB     Retired Wound - Properties Group Placement Date: 04/11/23  -AB Placement Time: 0005 -AB Location: thoracic spine  -AB    Retired Wound - Properties Group Date first assessed: 04/11/23 -AB Time first assessed: 0005 -AB Location: thoracic spine  -AB          User Key  (r) = Recorded By, (t) = Taken By, (c) = Cosigned By    Initials Name Provider Type    Vero Bermudez RN Registered Nurse    PH Carolyn Marie RN Registered Nurse    Carolyn Bautista RN Registered Nurse    Dell Marlow RN Registered Nurse                  Assessment & Plan      Brief Patient Summary:        Fat Emulsion Plant Based, 250 mL, Intravenous, Once per day on Mon Tue Wed Thu Fri  heparin (porcine), 5,000 Units, Subcutaneous, Q12H  insulin lispro, 2-7 Units, Subcutaneous, Q6H  metoclopramide, 10 mg, Intravenous, Q6H  piperacillin-tazobactam, 3.375 g, Intravenous, Q8H  polyethylene glycol, 17 g, Oral, BID  sodium chloride, 10 mL, Intravenous, Q12H  vitamin D3, 5,000 Units, Oral, Daily       Adult [employer] TPN, , Last  Rate: 87 mL/hr at 04/10/23 1806  Adult [employer] TPN,   Pharmacy to Dose TPN,          Active Hospital Problems:  Active Hospital Problems    Diagnosis    • **Bladder cancer    • Severe Malnutrition (HCC)    • Attention to urostomy    • Elevated serum creatinine    • Prostate cancer    • History of alcohol use disorder      Surgical wound dehiscence  Postop anemia  Prostate cancer  Bladder cancer  S/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 intake, at risk for starvation    Plan:  -Ileus mostly resolved, but patient still not tolerating full p.o. intake  -ARAVIND drain  -Renal function improved.  IVF changed, TPN continued, nephro recs appreciated.  -Encourage OOB to chair.  -Patient undecided whether SNF versus   -- 1 unit PRBC, Hb has been stable for 2 days now.  -- WBCs now normalized likely leukemoid reaction after OR management  -- Currently on TPN.  GS recs appreciated for advancing to full liquid diet.  Patient tolerating some small amount of intake.  -- Continue IV ABX, ID recs appreciated  -- Will need to placement to SNF after this stay.  Patient is agreeable.        DVT prophylaxis:  Medical and mechanical DVT prophylaxis orders are present.    CODE STATUS:    Code Status (Patient has no pulse and is not breathing): CPR (Attempt to Resuscitate)  Medical Interventions (Patient has pulse or is breathing): Full Support      Disposition:  I expect patient to be discharged to SNF once tolerating p.o. intake, clinically stable, likely not for 4 to 5 days.    This patient has been examined wearing appropriate Personal Protective Equipment and discussed with nursing. 04/11/23      Electronically signed by Saima Roberto DO, 04/11/23, 17:44 EDT.  [facility] Hospitalist Team

## 2023-04-11 NOTE — PLAN OF CARE
Goal Outcome Evaluation:           Progress: improving    Outcome Evaluation: pt c/o pain and anxiety overnight that was medicated (per MAR); pt answered A&O questions appropriately upon initial assessment but pt had AMS overnight (pt woke up a few times and tried to call family thinking it was 3 pm instead of 3 am; pt accidentally pulled urostomy and caused leaking but had blamed it on a man coming in his room during the night); pt urostomy bag changed twice overnight for leaking; pt is on TPN/lipids for nutrition for lack of adequate PO intake; pt HR sinus tach overnight; wound vac in place and appears to be working appropriately; pt is a HD pt M/W/F's; pt tunneled cath removed for possible infection; VSS overnight

## 2023-04-11 NOTE — PROGRESS NOTES
"  [facility] DAILY PROGRESS NOTE    Patient Identification  Name: Anthony Moss  Age: 67 y.o.  Sex: male  :  1955  MRN: [id number]    Date: 2023             Subjective:  Interval History: Wound vac placed, no nausea    Objective:    Scheduled Meds:Fat Emulsion Plant Based, 250 mL, Intravenous, Once per day on   heparin (porcine), 5,000 Units, Subcutaneous, Q12H  insulin lispro, 2-7 Units, Subcutaneous, Q6H  metoclopramide, 10 mg, Intravenous, Q6H  piperacillin-tazobactam, 3.375 g, Intravenous, Q8H  polyethylene glycol, 17 g, Oral, BID  sodium chloride, 10 mL, Intravenous, Q12H  vitamin D3, 5,000 Units, Oral, Daily      Continuous Infusions:Adult [employer] TPN, , Last Rate: 87 mL/hr at 04/10/23 1806  Pharmacy to Dose TPN,       PRN Meds:•  acetaminophen **OR** acetaminophen  •  dextrose  •  dextrose  •  docusate sodium  •  glucagon (human recombinant)  •  HYDROcodone-acetaminophen  •  hydrOXYzine  •  lactulose  •  [] HYDROmorphone **AND** naloxone  •  ondansetron **OR** ondansetron  •  Pharmacy to Dose TPN  •  phenol  •  polyethyl glycol-propyl glycol  •  senna-docusate sodium  •  sodium chloride  •  sodium chloride    Vital signs in last 24 hours:  Temp:  [97.7 °F (36.5 °C)-98.8 °F (37.1 °C)] 98.8 °F (37.1 °C)  Heart Rate:  [] 102  Resp:  [12-17] 17  BP: (116-138)/(74-84) 131/81    Intake/Output:    Intake/Output Summary (Last 24 hours) at 2023 0813  Last data filed at 2023 0507  Gross per 24 hour   Intake --   Output 2275 ml   Net -2275 ml       Exam:  /81 (BP Location: Left arm, Patient Position: Lying)   Pulse 102   Temp 98.8 °F (37.1 °C) (Oral)   Resp 17   Ht 188 cm (74\")   Wt 92.9 kg (204 lb 12.9 oz)   SpO2 95%   BMI 26.30 kg/m²     General Appearance:    no distress               Abdomen:     Soft, ND, wound vac   :   Conduit pink                Data Review:  All labs (24hrs):   Recent Results (from the past 24 hour(s))   POC " Glucose Once    Collection Time: 04/10/23 11:51 AM    Specimen: Blood   Result Value Ref Range    Glucose 152 (H) 70 - 105 mg/dL   Prealbumin    Collection Time: 04/10/23  1:05 PM    Specimen: Blood   Result Value Ref Range    Prealbumin 6.1 (L) 20.0 - 40.0 mg/dL   Protime-INR    Collection Time: 04/10/23  1:05 PM    Specimen: Blood   Result Value Ref Range    Protime 11.8 (H) 9.6 - 11.7 Seconds    INR 1.16 (H) 0.93 - 1.10   C-reactive Protein    Collection Time: 04/10/23  1:05 PM    Specimen: Blood   Result Value Ref Range    C-Reactive Protein 13.90 (H) 0.00 - 0.50 mg/dL   POC Glucose Once    Collection Time: 04/10/23  4:32 PM    Specimen: Blood   Result Value Ref Range    Glucose 126 (H) 70 - 105 mg/dL   POC Glucose Once    Collection Time: 04/10/23  5:55 PM    Specimen: Blood   Result Value Ref Range    Glucose 122 (H) 70 - 105 mg/dL   POC Glucose Once    Collection Time: 04/10/23 11:10 PM    Specimen: Blood   Result Value Ref Range    Glucose 145 (H) 70 - 105 mg/dL   Comprehensive Metabolic Panel    Collection Time: 04/11/23 12:26 AM    Specimen: Blood   Result Value Ref Range    Glucose 147 (H) 65 - 99 mg/dL    BUN 32 (H) 8 - 23 mg/dL    Creatinine 1.48 (H) 0.76 - 1.27 mg/dL    Sodium 143 136 - 145 mmol/L    Potassium 4.1 3.5 - 5.2 mmol/L    Chloride 109 (H) 98 - 107 mmol/L    CO2 25.0 22.0 - 29.0 mmol/L    Calcium 8.4 (L) 8.6 - 10.5 mg/dL    Total Protein 5.7 (L) 6.0 - 8.5 g/dL    Albumin 2.3 (L) 3.5 - 5.2 g/dL    ALT (SGPT) 11 1 - 41 U/L    AST (SGOT) 19 1 - 40 U/L    Alkaline Phosphatase 235 (H) 39 - 117 U/L    Total Bilirubin 0.3 0.0 - 1.2 mg/dL    Globulin 3.4 gm/dL    A/G Ratio 0.7 g/dL    BUN/Creatinine Ratio 21.6 7.0 - 25.0    Anion Gap 9.0 5.0 - 15.0 mmol/L    eGFR 52.2 (L) >60.0 mL/min/1.73   Phosphorus    Collection Time: 04/11/23 12:26 AM    Specimen: Blood   Result Value Ref Range    Phosphorus 3.3 2.5 - 4.5 mg/dL   Calcium, Ionized    Collection Time: 04/11/23 12:26 AM    Specimen: Blood   Result  Value Ref Range    Ionized Calcium 1.21 1.20 - 1.30 mmol/L   Magnesium    Collection Time: 04/11/23 12:26 AM    Specimen: Blood   Result Value Ref Range    Magnesium 1.7 1.6 - 2.4 mg/dL   CBC Auto Differential    Collection Time: 04/11/23 12:26 AM    Specimen: Blood   Result Value Ref Range    WBC 5.50 3.40 - 10.80 10*3/mm3    RBC 2.75 (L) 4.14 - 5.80 10*6/mm3    Hemoglobin 7.4 (L) 13.0 - 17.7 g/dL    Hematocrit 23.4 (L) 37.5 - 51.0 %    MCV 84.9 79.0 - 97.0 fL    MCH 26.8 26.6 - 33.0 pg    MCHC 31.6 31.5 - 35.7 g/dL    RDW 16.9 (H) 12.3 - 15.4 %    RDW-SD 50.3 37.0 - 54.0 fl    MPV 7.6 6.0 - 12.0 fL    Platelets 256 140 - 450 10*3/mm3    Neutrophil % 63.1 42.7 - 76.0 %    Lymphocyte % 19.4 (L) 19.6 - 45.3 %    Monocyte % 15.6 (H) 5.0 - 12.0 %    Eosinophil % 1.6 0.3 - 6.2 %    Basophil % 0.3 0.0 - 1.5 %    Neutrophils, Absolute 3.40 1.70 - 7.00 10*3/mm3    Lymphocytes, Absolute 1.10 0.70 - 3.10 10*3/mm3    Monocytes, Absolute 0.90 0.10 - 0.90 10*3/mm3    Eosinophils, Absolute 0.10 0.00 - 0.40 10*3/mm3    Basophils, Absolute 0.00 0.00 - 0.20 10*3/mm3    nRBC 0.1 0.0 - 0.2 /100 WBC   POC Glucose Once    Collection Time: 04/11/23  5:52 AM    Specimen: Blood   Result Value Ref Range    Glucose 143 (H) 70 - 105 mg/dL      Imaging Results (Last 24 Hours)     ** No results found for the last 24 hours. **           Assessment:    Bladder cancer    History of alcohol use disorder    Prostate cancer    Elevated serum creatinine    Attention to urostomy    Severe Malnutrition (HCC)      Cystectomy and conduit 3/28/2023  Wound dehiscence with closure 4/6/23    Plan:    Continue dietary support  Continue encourage ambulation  Wound vac  Appreciate all consultants help  To SNF when more recovered        Xu Montero MD  [facility]  [address], [address]  [place] [address]  Office: [phone]  Cell: [phone]  Also available via Epic Secure Chat  04/11/23  08:13 EDT

## 2023-04-11 NOTE — PLAN OF CARE
Goal Outcome Evaluation:  Plan of Care Reviewed With: patient        Progress: no change       VS stable over shift, pt on room air, pt somewhat oriented but makes illogical statements or comments at times, pt arousable but drowsy most of day, pt's urostomy and wound vac site intact, both functional, pt's wound vac only draining a small amount of think serosanguinous fluid the first part of the day, now draining a considerable amount over only a few hours--over 500 ccs in less than 1-2 hrs opposed to less than 100 cc for the first few hrs of the shift. Dr. Witt notified--no new orders at present. Canister changed. Pt's wound vac site still intact, no change from earlier--drainage still serosanguinous but thinner.

## 2023-04-11 NOTE — NURSING NOTE
Checked on patient today.  He is dozing.  Awakens but dozes right back off to sleep events of last night noted.  The patient pulled his pouch off the current pouching system is in place and holding the wound VAC dressing is holding.  We will continue to follow.  We will plan a VAC dressing change tomorrow.  Patient is not appropriate for teaching at this time
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
## 2023-04-11 NOTE — SIGNIFICANT NOTE
04/11/23 1506   Rehab Time/Intention   Session Not Performed   (very drowsy, could not stay awake to participate in tx session)   Recommendation   PT - Next Appointment 04/12/23

## 2023-04-11 NOTE — PROGRESS NOTES
General Surgery Inpatient Progress Note      Name: Anthony Moss ADMIT: 3/28/2023   : 1955  PCP: Reji Topete MD    MRN: [id number] LOS: 14 days   AGE/SEX: 67 y.o. male  ROOM: [address] [place] [address]      Patient Care Team:  Reji Topete MD as PCP - General (Family Medicine)  No chief complaint on file.      Subjective:  Patient seen and examined. Asleep, but arousable. Not taking much PO still. WV in place with appropirate colored output    Medications:  Fat Emulsion Plant Based, 250 mL, Intravenous, Once per day on   heparin (porcine), 5,000 Units, Subcutaneous, Q12H  insulin lispro, 2-7 Units, Subcutaneous, Q6H  metoclopramide, 10 mg, Intravenous, Q6H  piperacillin-tazobactam, 3.375 g, Intravenous, Q8H  polyethylene glycol, 17 g, Oral, BID  sodium chloride, 10 mL, Intravenous, Q12H  vitamin D3, 5,000 Units, Oral, Daily       •  acetaminophen **OR** acetaminophen  •  dextrose  •  dextrose  •  docusate sodium  •  glucagon (human recombinant)  •  HYDROcodone-acetaminophen  •  hydrOXYzine  •  lactulose  •  [] HYDROmorphone **AND** naloxone  •  ondansetron **OR** ondansetron  •  Pharmacy to Dose TPN  •  phenol  •  polyethyl glycol-propyl glycol  •  senna-docusate sodium  •  sodium chloride  •  sodium chloride     Vitals:  Temp:  [97.6 °F (36.4 °C)-98.8 °F (37.1 °C)] 97.6 °F (36.4 °C)  Heart Rate:  [] 100  Resp:  [12-17] 17  BP: (116-147)/(74-90) 147/90     Physical Exam:  No acute distress, alert  Nonlabored respirations  Abdomen soft, minimally distended, appropriately tender to palpation, WV in place with s/s output  Ileal conduit in place, appears viable    Labs:  Results from last 7 days   Lab Units 23  0026 04/10/23  0512 23  0317 23  0400 23  0811   WBC 10*3/mm3 5.50 6.80 10.80 18.60* 40.80*   HEMOGLOBIN g/dL 7.4* 8.9* 7.4* 6.5* 8.4*   HEMATOCRIT % 23.4* 28.4* 23.2* 21.2* 26.2*   PLATELETS 10*3/mm3 256 251 240 248 361    MONOCYTES % %  --   --   --  9.0 4.0*     Results from last 7 days   Lab Units 04/11/23  0026 04/10/23  0512 04/09/23  0317   SODIUM mmol/L 143 139 135*   POTASSIUM mmol/L 4.1 4.1 4.4   CHLORIDE mmol/L 109* 105 105   CO2 mmol/L 25.0 25.0 22.0   BUN mg/dL 32* 29* 28*   CREATININE mg/dL 1.48* 1.64* 1.89*   CALCIUM mg/dL 8.4* 8.2* 7.7*   BILIRUBIN mg/dL 0.3 0.4 0.4   ALK PHOS U/L 235* 208* 204*   ALT (SGPT) U/L 11 10 10   AST (SGOT) U/L 19 16 14   GLUCOSE mg/dL 147* 132* 121*     Assessment and Plan:  67 y.o. male postop day #14 from cystectomy and ileal conduit with subsequent fascial dehiscence now postop day #6 status post closure.  Having some bowel function.    - Continue as needed pain control  - ADAT  - Continue TPN until taking adequate PO  - Continue WV with changes M,W,F  - ID on consult for leukocytosis, much improved; on Zosyn  -- Monitor anemia   - Further management per primary and other consultants    This note was created using Dragon Voice Recognition software.    Michael Witt MD  04/11/23  10:43 EDT

## 2023-04-11 NOTE — PROGRESS NOTES
Pharmacy consult - total parental nutrition    Anthony Moss is a 67 y.o.male admitted with bladder cancer. Pharmacy consulted to dose TPN for  ileus associated w/diffuse peritonitis  per Dr. Witt's request. Patient underwent radical cystectomy and prostatectomy with ileal conduit placement 3/28/23. Patient with multiple loose BMs and high NG output with persistent nausea post-op. Patient with fascial dehiscence with evisceration of small bowel on 4/6 and underwent exploration, washout, and repair of fascial dehiscence 4/6. Malnutrition thought to be factor in dehiscence and TPN initiated.       Assessment  Estimated macronutrient goal requirements per RD:    TPN type: Clinimix 5/20 (2000 mL/day)  Line type: Central  Protein: 100 grams/day (1.1 grams/kg/day)  Lipids: 250 mL of 20% lipid infusion 5 days/week  Kcal/day: 2117 kcal (23.5 kcal/kg/day)    Plan    TPN initiated 4/8. Discussed with RD, Deneen, today. Will advance TPN to goal macros (Clinimix 5/20 2L) and start lipids which will provide the following macros:    Amino acids: 100 grams/day  Dextrose: 400 grams/day  Lipids:  250 mL of 20% lipid infusion 5 days/week (M-F)  Volume: 2096 mL (87 mL/hr over 24 hrs daily)    1. Na WNL but trending upwards. Will decrease Na in the bag today.   2. K WNL today. Continue same.   3. Mg on the lower end of normal. Will increase slightly today.   4. Phos WNL. No change today.   5. Ionized calcium WNL today; no change especially since increased yesterday.   6. Started lipids 4/10. AST/ALT wnl. Continue lipids 5x/week.  7. MVI IV on national shortage - will add thiamine and folic acid to TPN instead on MWF only. Trace on national shortage - will add to TPN MWF only.      Based on labs, will add the following electrolytes/additives to the TPN:     Date: 4/8 4/9 4/10 4/11     TPN Lytes (60mL)         CaGluc (mEq) 10 10 12 12     MgSO4 (mEq) 8 8 8 10     NaCl (mEq)         NaAcetate (mEq) 100 100 100 80     KCl (mEq) 30  "30 30 30     KAcetate (mEq)         NaPhos (mmol)  9 9 9     KPhos (mmol)         Famotidine (mg)         MVI (10 mL)         Trace (1 mL)   1      Humulin R (units)         Folic acid (mg)   1      Thiamine (mg)   20          Patient also received the following electrolyte replacement per protocol:    None    Pharmacy will continue to follow.          Objective  Relevant clinical data and objective history reviewed:  188 cm (74\")   92.9 kg (204 lb 12.9 oz)   Ideal body weight: 82.2 kg (181 lb 3.5 oz)  Adjusted ideal body weight: 86.5 kg (190 lb 10.5 oz)  Body mass index is 26.3 kg/m².    Results from last 7 days   Lab Units 04/11/23  0026 04/10/23  1305 04/10/23  0512 04/09/23  0317 04/08/23  0400 04/07/23  1717 04/07/23  0811 04/06/23  1245 04/04/23  2339 04/04/23  2339 04/04/23  1756   SODIUM mmol/L 143  --  139 135* 135* 133* 138 136   < > 139  --    POTASSIUM mmol/L 4.1  --  4.1 4.4 4.4 4.5 4.7 3.8  --  4.0 3.8   CHLORIDE mmol/L 109*  --  105 105 104 103 106 102   < > 101  --    CO2 mmol/L 25.0  --  25.0 22.0 21.0* 21.0* 20.0* 23.0   < > 25.0  --    GLUCOSE mg/dL 147*  --  132* 121* 136* 380* 170* 130*   < > 90  --    BUN mg/dL 32*  --  29* 28* 26* 25* 25* 18   < > 22  --    CREATININE mg/dL 1.48*  --  1.64* 1.89* 1.86* 2.01* 2.05* 1.44*   < > 1.26  --    CALCIUM mg/dL 8.4*  --  8.2* 7.7* 7.3* 7.2* 7.6* 8.0*   < > 8.0*  --    IONIZED CALCIUM mmol/L 1.21  --  1.19* 1.15* 1.10*  --   --   --   --   --   --    PHOSPHORUS mg/dL 3.3  --  3.3 3.6 4.2  --  4.5  --    < > 3.3  --    MAGNESIUM mg/dL 1.7  --  1.8 1.9 1.7  --  1.5*  --   --   --   --    TOTAL PROTEIN g/dL 5.7*  --  5.7* 5.3* 5.2*  --  5.7*  --   --   --   --    ALBUMIN g/dL 2.3*  --  2.2* 2.0* 2.0*  --  2.4*  --   --   --   --    PREALBUMIN mg/dL  --  6.1*  --   --  3.5*  --   --   --   --   --   --    BILIRUBIN mg/dL 0.3  --  0.4 0.4 0.3  --  0.6  --   --   --   --    ALK PHOS U/L 235*  --  208* 204* 136*  --  166*  --   --   --   --    AST (SGOT) U/L 19 "  --  16 14 15  --  16  --   --   --   --    ALT (SGPT) U/L 11  --  10 10 8  --  9  --   --   --   --    TRIGLYCERIDES mg/dL  --   --   --   --   --   --  92  --   --   --   --     < > = values in this interval not displayed.       I/O last 3 completed shifts:  In: 240 [P.O.:240]  Out: 4175 [Urine:4145]  Estimated Creatinine Clearance: 65.4 mL/min (A) (by C-G formula based on SCr of 1.48 mg/dL (H)).    Dietary Orders (From admission, onward)       Start     Ordered    04/09/23 1135  Diet: Liquid Diets; Full Liquid; Texture: Regular Texture (IDDSI 7); Fluid Consistency: Thin (IDDSI 0)  Diet Effective Now        References:    Diet Order [facility]   Question Answer Comment   Diets: Liquid Diets    Liquid Diet: Full Liquid    Texture: Regular Texture (IDDSI 7)    Fluid Consistency: Thin (IDDSI 0)        04/09/23 1134    04/05/23 1600  Dietary Nutrition Supplements Boost Breeze (Ensure Clear)  3 Times Daily      Question:  Select Supplement:  Answer:  Boost Breeze (Ensure Clear)    04/05/23 6825                    Idalia Busby [facility]  08:31 EDT 4/11/2023

## 2023-04-12 LAB
ALBUMIN SERPL-MCNC: 2.2 G/DL (ref 3.5–5.2)
ALBUMIN/GLOB SERPL: 0.6 G/DL
ALP SERPL-CCNC: 204 U/L (ref 39–117)
ALT SERPL W P-5'-P-CCNC: 11 U/L (ref 1–41)
ANION GAP SERPL CALCULATED.3IONS-SCNC: 12 MMOL/L (ref 5–15)
AST SERPL-CCNC: 18 U/L (ref 1–40)
BACTERIA SPEC AEROBE CULT: NORMAL
BACTERIA SPEC AEROBE CULT: NORMAL
BASOPHILS # BLD AUTO: 0.1 10*3/MM3 (ref 0–0.2)
BASOPHILS NFR BLD AUTO: 0.9 % (ref 0–1.5)
BILIRUB SERPL-MCNC: 0.3 MG/DL (ref 0–1.2)
BUN SERPL-MCNC: 36 MG/DL (ref 8–23)
BUN/CREAT SERPL: 27.3 (ref 7–25)
CA-I SERPL ISE-MCNC: 1.17 MMOL/L (ref 1.2–1.3)
CALCIUM SPEC-SCNC: 7.9 MG/DL (ref 8.6–10.5)
CHLORIDE SERPL-SCNC: 105 MMOL/L (ref 98–107)
CO2 SERPL-SCNC: 25 MMOL/L (ref 22–29)
CREAT SERPL-MCNC: 1.32 MG/DL (ref 0.76–1.27)
DEPRECATED RDW RBC AUTO: 52.1 FL (ref 37–54)
EGFRCR SERPLBLD CKD-EPI 2021: 59.9 ML/MIN/1.73
EOSINOPHIL # BLD AUTO: 0.2 10*3/MM3 (ref 0–0.4)
EOSINOPHIL NFR BLD AUTO: 2.5 % (ref 0.3–6.2)
ERYTHROCYTE [DISTWIDTH] IN BLOOD BY AUTOMATED COUNT: 17.3 % (ref 12.3–15.4)
GLOBULIN UR ELPH-MCNC: 3.7 GM/DL
GLUCOSE BLDC GLUCOMTR-MCNC: 131 MG/DL (ref 70–105)
GLUCOSE BLDC GLUCOMTR-MCNC: 147 MG/DL (ref 70–105)
GLUCOSE BLDC GLUCOMTR-MCNC: 150 MG/DL (ref 70–105)
GLUCOSE SERPL-MCNC: 105 MG/DL (ref 65–99)
HCT VFR BLD AUTO: 24.5 % (ref 37.5–51)
HGB BLD-MCNC: 7.7 G/DL (ref 13–17.7)
LYMPHOCYTES # BLD AUTO: 1.5 10*3/MM3 (ref 0.7–3.1)
LYMPHOCYTES NFR BLD AUTO: 21.6 % (ref 19.6–45.3)
MAGNESIUM SERPL-MCNC: 1.6 MG/DL (ref 1.6–2.4)
MCH RBC QN AUTO: 26.4 PG (ref 26.6–33)
MCHC RBC AUTO-ENTMCNC: 31.2 G/DL (ref 31.5–35.7)
MCV RBC AUTO: 84.6 FL (ref 79–97)
MONOCYTES # BLD AUTO: 1 10*3/MM3 (ref 0.1–0.9)
MONOCYTES NFR BLD AUTO: 14.1 % (ref 5–12)
NEUTROPHILS NFR BLD AUTO: 4.2 10*3/MM3 (ref 1.7–7)
NEUTROPHILS NFR BLD AUTO: 60.9 % (ref 42.7–76)
NRBC BLD AUTO-RTO: 0 /100 WBC (ref 0–0.2)
PHOSPHATE SERPL-MCNC: 3.2 MG/DL (ref 2.5–4.5)
PLATELET # BLD AUTO: 290 10*3/MM3 (ref 140–450)
PMV BLD AUTO: 7.6 FL (ref 6–12)
POTASSIUM SERPL-SCNC: 3.9 MMOL/L (ref 3.5–5.2)
PROT SERPL-MCNC: 5.9 G/DL (ref 6–8.5)
RBC # BLD AUTO: 2.9 10*6/MM3 (ref 4.14–5.8)
SODIUM SERPL-SCNC: 142 MMOL/L (ref 136–145)
WBC NRBC COR # BLD: 6.8 10*3/MM3 (ref 3.4–10.8)

## 2023-04-12 PROCEDURE — 25010000002 CALCIUM GLUCONATE 2-0.675 GM/100ML-% SOLUTION: Performed by: SURGERY

## 2023-04-12 PROCEDURE — 25010000002 PIPERACILLIN SOD-TAZOBACTAM PER 1 G: Performed by: NURSE PRACTITIONER

## 2023-04-12 PROCEDURE — 99024 POSTOP FOLLOW-UP VISIT: CPT | Performed by: SURGERY

## 2023-04-12 PROCEDURE — 80053 COMPREHEN METABOLIC PANEL: CPT | Performed by: SURGERY

## 2023-04-12 PROCEDURE — 25010000002 MAGNESIUM SULFATE IN D5W 1G/100ML (PREMIX) 1-5 GM/100ML-% SOLUTION: Performed by: SURGERY

## 2023-04-12 PROCEDURE — 25010000002 POTASSIUM CHLORIDE PER 2 MEQ OF POTASSIUM: Performed by: SURGERY

## 2023-04-12 PROCEDURE — 25010000002 HEPARIN (PORCINE) PER 1000 UNITS: Performed by: INTERNAL MEDICINE

## 2023-04-12 PROCEDURE — 83735 ASSAY OF MAGNESIUM: CPT | Performed by: SURGERY

## 2023-04-12 PROCEDURE — 82962 GLUCOSE BLOOD TEST: CPT

## 2023-04-12 PROCEDURE — 25010000002 METOCLOPRAMIDE PER 10 MG: Performed by: UROLOGY

## 2023-04-12 PROCEDURE — 82330 ASSAY OF CALCIUM: CPT | Performed by: SURGERY

## 2023-04-12 PROCEDURE — 84100 ASSAY OF PHOSPHORUS: CPT | Performed by: SURGERY

## 2023-04-12 PROCEDURE — 25010000002 THIAMINE PER 100 MG: Performed by: SURGERY

## 2023-04-12 PROCEDURE — 25010000002 MAGNESIUM SULFATE PER 500 MG OF MAGNESIUM: Performed by: SURGERY

## 2023-04-12 PROCEDURE — 25010000002 CALCIUM GLUCONATE PER 10 ML: Performed by: SURGERY

## 2023-04-12 PROCEDURE — 85025 COMPLETE CBC W/AUTO DIFF WBC: CPT | Performed by: STUDENT IN AN ORGANIZED HEALTH CARE EDUCATION/TRAINING PROGRAM

## 2023-04-12 RX ORDER — CALCIUM GLUCONATE 20 MG/ML
2 INJECTION, SOLUTION INTRAVENOUS ONCE
Status: COMPLETED | OUTPATIENT
Start: 2023-04-12 | End: 2023-04-12

## 2023-04-12 RX ORDER — MAGNESIUM SULFATE 1 G/100ML
1 INJECTION INTRAVENOUS ONCE
Status: COMPLETED | OUTPATIENT
Start: 2023-04-12 | End: 2023-04-12

## 2023-04-12 RX ADMIN — PIPERACILLIN SODIUM AND TAZOBACTAM SODIUM 3.38 G: 3; .375 INJECTION, POWDER, LYOPHILIZED, FOR SOLUTION INTRAVENOUS at 02:38

## 2023-04-12 RX ADMIN — CALCIUM GLUCONATE 2 G: 20 INJECTION, SOLUTION INTRAVENOUS at 09:49

## 2023-04-12 RX ADMIN — HEPARIN SODIUM 5000 UNITS: 5000 INJECTION INTRAVENOUS; SUBCUTANEOUS at 09:50

## 2023-04-12 RX ADMIN — I.V. FAT EMULSION 50 G: 20 EMULSION INTRAVENOUS at 18:09

## 2023-04-12 RX ADMIN — METOCLOPRAMIDE HYDROCHLORIDE 10 MG: 5 INJECTION INTRAMUSCULAR; INTRAVENOUS at 13:17

## 2023-04-12 RX ADMIN — PIPERACILLIN SODIUM AND TAZOBACTAM SODIUM 3.38 G: 3; .375 INJECTION, POWDER, LYOPHILIZED, FOR SOLUTION INTRAVENOUS at 13:17

## 2023-04-12 RX ADMIN — Medication 10 ML: at 09:50

## 2023-04-12 RX ADMIN — MAGNESIUM SULFATE IN DEXTROSE 1 G: 10 INJECTION, SOLUTION INTRAVENOUS at 09:49

## 2023-04-12 RX ADMIN — METOCLOPRAMIDE HYDROCHLORIDE 10 MG: 5 INJECTION INTRAMUSCULAR; INTRAVENOUS at 05:30

## 2023-04-12 RX ADMIN — METOCLOPRAMIDE HYDROCHLORIDE 10 MG: 5 INJECTION INTRAMUSCULAR; INTRAVENOUS at 18:10

## 2023-04-12 RX ADMIN — HEPARIN SODIUM 5000 UNITS: 5000 INJECTION INTRAVENOUS; SUBCUTANEOUS at 20:22

## 2023-04-12 RX ADMIN — Medication 5000 UNITS: at 09:50

## 2023-04-12 RX ADMIN — HYDROCODONE BITARTRATE AND ACETAMINOPHEN 1 TABLET: 5; 325 TABLET ORAL at 09:59

## 2023-04-12 RX ADMIN — THIAMINE HYDROCHLORIDE: 100 INJECTION, SOLUTION INTRAMUSCULAR; INTRAVENOUS at 18:09

## 2023-04-12 RX ADMIN — PIPERACILLIN SODIUM AND TAZOBACTAM SODIUM 3.38 G: 3; .375 INJECTION, POWDER, LYOPHILIZED, FOR SOLUTION INTRAVENOUS at 18:44

## 2023-04-12 RX ADMIN — Medication 10 ML: at 20:23

## 2023-04-12 NOTE — PROGRESS NOTES
Nutrition Services    Patient Name: Anthony Moss  YOB: 1955  MRN: [id number]  Admission date: 3/28/2023    PPE Documentation        PPE Worn By Provider Did not enter room for this encounter   PPE Worn By Patient  N/A     PROGRESS NOTE      Encounter Information: Progress note to check on TPN. Today will be day #6 of IV nutrition. Pt continues on full liquids. Pt tolerating diet but with minimal PO intake. RD collaborated with pharmacist (Idalia) on pt's TPN regimen.       PO Diet: Diet: Liquid Diets; Full Liquid; Texture: Regular Texture (IDDSI 7); Fluid Consistency: Thin (IDDSI 0)  Adult Central Clinimix TPN   PO Supplements: Boost Breeze TID (Provides 750 kcals, 42 g protein if consumed)   PO Intake:  Minimal intake since admission with frequent NPO and liquid diets       Current nutrition support: Clinimix 5/20 2L + 20 mL lipids 5X weekly    Nutrition support review: Documented as above per EMR        Labs (reviewed below): Reviewed, management per attending  Electrolyte management per Pharmacy        GI Function:  Small BM documented 4/8        Nutrition Intervention Updates: Continue TPN at goal: Clinimix 5/20 2000 mL volume + 250 mL 20% lipids 5x/week.       Results from last 7 days   Lab Units 04/12/23 0347 04/11/23  0026 04/10/23  0512   SODIUM mmol/L 142 143 139   POTASSIUM mmol/L 3.9 4.1 4.1   CHLORIDE mmol/L 105 109* 105   CO2 mmol/L 25.0 25.0 25.0   BUN mg/dL 36* 32* 29*   CREATININE mg/dL 1.32* 1.48* 1.64*   CALCIUM mg/dL 7.9* 8.4* 8.2*   BILIRUBIN mg/dL 0.3 0.3 0.4   ALK PHOS U/L 204* 235* 208*   ALT (SGPT) U/L 11 11 10   AST (SGOT) U/L 18 19 16   GLUCOSE mg/dL 105* 147* 132*     Results from last 7 days   Lab Units 04/12/23 0347 04/11/23  0026 04/10/23  0512 04/08/23  0400 04/07/23  0811   MAGNESIUM mg/dL 1.6 1.7 1.8   < > 1.5*   PHOSPHORUS mg/dL 3.2 3.3 3.3   < > 4.5   HEMOGLOBIN g/dL 7.7* 7.4* 8.9*   < > 8.4*   HEMATOCRIT % 24.5* 23.4* 28.4*   < > 26.2*   TRIGLYCERIDES mg/dL  --    --   --   --  92    < > = values in this interval not displayed.     COVID19   Date Value Ref Range Status   03/01/2023 Not Detected Not Detected - Ref. Range Final     Lab Results   Component Value Date    HGBA1C 4.8 03/01/2023       RD to follow up per protocol.    Electronically signed by:  Deneen Pantoja RD  04/12/23 08:27 EDT

## 2023-04-12 NOTE — PROGRESS NOTES
PROGRESS NOTE      Patient Name: Anthony Moss  : 1955  MRN: [id number]  Primary Care Physician: Reji Topete MD  Date of admission: 3/28/2023    Patient Care Team:  Reji Topete MD as PCP - General (Family Medicine)        Subjective   Subjective:     Resting in bed.  Review of systems:  Review of Systems -   Neg.except weakness.    Allergies:  No Known Allergies    Objective   Exam:     Vital Signs  Temp:  [97.5 °F (36.4 °C)-99.3 °F (37.4 °C)] 98.7 °F (37.1 °C)  Heart Rate:  [] 95  Resp:  [16] 16  BP: (118-153)/(77-90) 144/85  SpO2:  [93 %-95 %] 95 %  on   ;   Device (Oxygen Therapy): room air  Body mass index is 26.3 kg/m².    General:     male in no acute distress.    Head:      Normocephalic and atraumatic.    Eyes:      PERRL/EOM intact, conjunctiva and sclera clear with out nystagmus.    Neck:      No masses, thyromegaly,  trachea central with normal respiratory effort   Lungs:    Clear bilaterally to auscultation.    Heart:      Regular rate and rhythm, no murmur no gallop  Abd:        Soft, nontender, s/p surgery dressing present   Pulses:   Pulses palpable  Extr:        No cyanosis or clubbing--+ edema.    Neuro:    No focal deficits.   alert oriented x3  Skin:       Intact without lesions or rashes.    Psych:    Alert and cooperative; normal mood and affect; .      Results Review:  I have personally reviewed most recent Data :  CBC    Results from last 7 days   Lab Units 23  0347 23  0026 04/10/23  0512 23  0317 23  0400 23  0811 23  1245   WBC 10*3/mm3 6.80 5.50 6.80 10.80 18.60* 40.80* 21.80*   HEMOGLOBIN g/dL 7.7* 7.4* 8.9* 7.4* 6.5* 8.4* 8.6*   PLATELETS 10*3/mm3 290 256 251 240 248 361 325     CMP   Results from last 7 days   Lab Units 23  0347 23  0026 04/10/23  0512 23  0317 23  0400 23  1717 23  0811   SODIUM mmol/L 142 143 139 135* 135* 133* 138   POTASSIUM mmol/L 3.9 4.1 4.1 4.4  4.4 4.5 4.7   CHLORIDE mmol/L 105 109* 105 105 104 103 106   CO2 mmol/L 25.0 25.0 25.0 22.0 21.0* 21.0* 20.0*   BUN mg/dL 36* 32* 29* 28* 26* 25* 25*   CREATININE mg/dL 1.32* 1.48* 1.64* 1.89* 1.86* 2.01* 2.05*   GLUCOSE mg/dL 105* 147* 132* 121* 136* 380* 170*   ALBUMIN g/dL 2.2* 2.3* 2.2* 2.0* 2.0*  --  2.4*   BILIRUBIN mg/dL 0.3 0.3 0.4 0.4 0.3  --  0.6   ALK PHOS U/L 204* 235* 208* 204* 136*  --  166*   AST (SGOT) U/L 18 19 16 14 15  --  16   ALT (SGPT) U/L 11 11 10 10 8  --  9     ABG    Results from last 7 days   Lab Units 23  0502   PH, ARTERIAL pH units 7.394   PCO2, ARTERIAL mm Hg 35.6   PO2 ART mm Hg 105.3   O2 SATURATION ART % 98.1*   BASE EXCESS ART mmol/L -2.8*     No radiology results for the last day    Results for orders placed during the hospital encounter of 22    Adult Transthoracic Echo Complete w/ Color, Spectral and Contrast if Necessary Per Protocol    Interpretation Summary  · Left ventricular ejection fraction appears to be 56 - 60%.  · The right ventricular cavity is moderately dilated.  · No pericardial effusion noted    Scheduled Meds:Fat Emulsion Plant Based, 250 mL, Intravenous, Once per day on   heparin (porcine), 5,000 Units, Subcutaneous, Q12H  insulin lispro, 2-7 Units, Subcutaneous, Q6H  metoclopramide, 10 mg, Intravenous, Q6H  piperacillin-tazobactam, 3.375 g, Intravenous, Q8H  polyethylene glycol, 17 g, Oral, BID  sodium chloride, 10 mL, Intravenous, Q12H  vitamin D3, 5,000 Units, Oral, Daily      Continuous Infusions:Adult Central Clinimix TPN, , Last Rate: 87 mL/hr at 23 1804  Adult Central Clinimix TPN,   Pharmacy to Dose TPN,       PRN Meds:•  acetaminophen **OR** acetaminophen  •  dextrose  •  dextrose  •  docusate sodium  •  glucagon (human recombinant)  •  HYDROcodone-acetaminophen  •  hydrOXYzine  •  lactulose  •  [] HYDROmorphone **AND** naloxone  •  ondansetron **OR** ondansetron  •  Pharmacy to Dose TPN  •  phenol  •   polyethyl glycol-propyl glycol  •  senna-docusate sodium  •  sodium chloride  •  sodium chloride    Assessment & Plan   Assessment and Plan:         Bladder cancer    History of alcohol use disorder    Prostate cancer    Elevated serum creatinine    Attention to urostomy    Severe Malnutrition (HCC)    ASSESSMENT:  • Acute kidney injury, stage III, status post radical cystectomy getting better hyperkalemia, secondary to CARLOS ALBERTO  • Metabolic acidosis secondary to CKD start low-dose sodium bicarbonate  • History of bladder cancer, recent cystectomy  • Suprapubic catheter  • History of alcohol use,  • Chronic back pain,using NSAID in the past  • B/l pneumonia.  Surprisingly, WBC stable,           Plan:      • At this time patient with history of bladder cancer status post cystectomy urine output is adequate   • Suprapubic urostomy noted  • Patient has more wound dehiscence and some infection taken back to the OR for 6/20/2023   • Patient creatinine of 1.3 to which is slightly better than yesterday  • History of bilateral nephrostomy tube placed last month March 2023  • Continue TPN at this time no need to restart Ringer lactate  • Blood pressure stable  • Repeat labs tomorrow morning  • Patient still has significant GI complaints not better yet Pt very sleepy  • Labs tomorrow morning  • Potassium level is acceptable  • Echocardiogram done last time showed ejection fraction 56 to 60%  • Repeat labs tomorrow morning          Electronically signed by Jamie Houston MD,   [facility] kidney consultant  [phone]  4/12/2023  12:42 EDT    Rest

## 2023-04-12 NOTE — NURSING NOTE
67-year-old male who underwent a radical cystectomy with ileal conduit formation on March 28.  Patient is confused he is dozing at intervals.  Patient is seen today for follow-up to a wound VAC dressing change to his midline wound.    There is a large amount wound with minimal wound  Neuro normal.  The dressing was removed 70% of the wound base is bright pink granulating at the most proximal edge and distal edge.  There is some loosening yellow slough.  No odor is noted.  Erythema induration warmth noted to the periwound skin.  The wound was irrigated with normal saline Skin-Prep applied to the periwound skin and adapt ring was used at the top and along the right edge of the wound.  Mepitel was placed and black foam inserted.  Suction set at 125 continuous good seal obtained.
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
## 2023-04-12 NOTE — PLAN OF CARE
Goal Outcome Evaluation:           Progress: improving    Outcome Evaluation: pt c/o pain and anxiety overnight that was medicated (per MAR); pt answered A&O questions appropriately upon initial assessment but pt had AMS overnight; pt urostomy working appropriately (output charted); pt is on TPN/lipids for nutrition for lack of adequate PO intake; pt HR sinus tach overnight; wound vac in place and appears to be working appropriately (cannister changed overnight); VSS overnight

## 2023-04-12 NOTE — PROGRESS NOTES
Pharmacy consult - total parental nutrition    Anthony Moss is a 67 y.o.male admitted with bladder cancer. Pharmacy consulted to dose TPN for  ileus associated w/diffuse peritonitis  per Dr. Witt's request. Patient underwent radical cystectomy and prostatectomy with ileal conduit placement 3/28/23. Patient with multiple loose BMs and high NG output with persistent nausea post-op. Patient with fascial dehiscence with evisceration of small bowel on 4/6 and underwent exploration, washout, and repair of fascial dehiscence 4/6. Malnutrition thought to be factor in dehiscence and TPN initiated.       Assessment  Estimated macronutrient goal requirements per RD:    TPN type: Clinimix 5/20 (2000 mL/day)  Line type: Central  Protein: 100 grams/day (1.1 grams/kg/day)  Lipids: 250 mL of 20% lipid infusion 5 days/week  Kcal/day: 2117 kcal (23.5 kcal/kg/day)    Plan    TPN initiated 4/8. Discussed with RDDeneen, today. TPN advanced to goal macros (Clinimix 5/20 2L) and lipids started on 4/10 providing the following macros:    Amino acids: 100 grams/day  Dextrose: 400 grams/day  Lipids:  250 mL of 20% lipid infusion 5 days/week (M-F)  Volume: 2094 mL (87 mL/hr over 24 hrs daily)    Na WNL after decrease yesterday. No changes today.   K WNL today. Continue same.   Mg remains on the lower end of normal. Will give Mg 1g x1 outside of bag today. No increase in bag as full effect of yesterday's increase has not been seen yet.   Phos WNL. No change today.   Ionized calcium slightly below goal range; will give 2g x1 today. Increase in TPN bag as well.   Started lipids 4/10. AST/ALT wnl. Continue lipids 5x/week.  MVI IV on national shortage - will add thiamine and folic acid to TPN instead on MWF only. Trace on national shortage - will add to TPN MWF only.      Based on labs, will add the following electrolytes/additives to the TPN:     Date: 4/8 4/9 4/10 4/11 4/12    TPN Lytes (60mL)         CaGluc (mEq) 10 10 12 12 15   "  MgSO4 (mEq) 8 8 8 10 10    NaCl (mEq)         NaAcetate (mEq) 100 100 100 80 80    KCl (mEq) 30 30 30 30 30    KAcetate (mEq)         NaPhos (mmol)  9 9 9 9    KPhos (mmol)         Famotidine (mg)         MVI (10 mL)         Trace (1 mL)   1  1    Humulin R (units)         Folic acid (mg)   1  1    Thiamine (mg)   20  20        Patient also received the following electrolyte replacement per protocol:    Magnesium sulfate: 1 gm IV x1 and Calcium gluconate: 2 gm IV x1    Pharmacy will continue to follow.          Objective  Relevant clinical data and objective history reviewed:  188 cm (74\")   92.9 kg (204 lb 12.9 oz)   Ideal body weight: 82.2 kg (181 lb 3.5 oz)  Adjusted ideal body weight: 86.5 kg (190 lb 10.5 oz)  Body mass index is 26.3 kg/m².    Results from last 7 days   Lab Units 04/12/23  0347 04/11/23  0026 04/10/23  1305 04/10/23  0512 04/09/23  0317 04/08/23  0400 04/07/23  1717 04/07/23  0811 04/06/23  1245 04/06/23  1245   SODIUM mmol/L 142 143  --  139 135* 135* 133* 138  --  136   POTASSIUM mmol/L 3.9 4.1  --  4.1 4.4 4.4 4.5 4.7  --  3.8   CHLORIDE mmol/L 105 109*  --  105 105 104 103 106  --  102   CO2 mmol/L 25.0 25.0  --  25.0 22.0 21.0* 21.0* 20.0*  --  23.0   GLUCOSE mg/dL 105* 147*  --  132* 121* 136* 380* 170*  --  130*   BUN mg/dL 36* 32*  --  29* 28* 26* 25* 25*  --  18   CREATININE mg/dL 1.32* 1.48*  --  1.64* 1.89* 1.86* 2.01* 2.05*  --  1.44*   CALCIUM mg/dL 7.9* 8.4*  --  8.2* 7.7* 7.3* 7.2* 7.6*  --  8.0*   IONIZED CALCIUM mmol/L 1.17* 1.21  --  1.19* 1.15* 1.10*  --   --   --   --    PHOSPHORUS mg/dL 3.2 3.3  --  3.3 3.6 4.2  --  4.5   < >  --    MAGNESIUM mg/dL 1.6 1.7  --  1.8 1.9 1.7  --  1.5*   < >  --    TOTAL PROTEIN g/dL 5.9* 5.7*  --  5.7* 5.3* 5.2*  --  5.7*   < >  --    ALBUMIN g/dL 2.2* 2.3*  --  2.2* 2.0* 2.0*  --  2.4*   < >  --    PREALBUMIN mg/dL  --   --  6.1*  --   --  3.5*  --   --   --   --    BILIRUBIN mg/dL 0.3 0.3  --  0.4 0.4 0.3  --  0.6   < >  --    ALK PHOS U/L " 204* 235*  --  208* 204* 136*  --  166*   < >  --    AST (SGOT) U/L 18 19  --  16 14 15  --  16   < >  --    ALT (SGPT) U/L 11 11  --  10 10 8  --  9   < >  --    TRIGLYCERIDES mg/dL  --   --   --   --   --   --   --  92  --   --     < > = values in this interval not displayed.       I/O last 3 completed shifts:  In: 835 [I.V.:835]  Out: 6170 [Urine:4970]  Estimated Creatinine Clearance: 73.3 mL/min (A) (by C-G formula based on SCr of 1.32 mg/dL (H)).    Dietary Orders (From admission, onward)       Start     Ordered    04/09/23 1135  Diet: Liquid Diets; Full Liquid; Texture: Regular Texture (IDDSI 7); Fluid Consistency: Thin (IDDSI 0)  Diet Effective Now        References:    Diet Order [facility]   Question Answer Comment   Diets: Liquid Diets    Liquid Diet: Full Liquid    Texture: Regular Texture (IDDSI 7)    Fluid Consistency: Thin (IDDSI 0)        04/09/23 1134    04/05/23 1600  Dietary Nutrition Supplements Boost Breeze (Ensure Clear)  3 Times Daily      Question:  Select Supplement:  Answer:  Boost Breeze (Ensure Clear)    04/05/23 1455                    Idalia Busby RPH  08:03 EDT 4/12/2023

## 2023-04-12 NOTE — SIGNIFICANT NOTE
04/12/23 1149   OTHER   Discipline physical therapist   Rehab Time/Intention   Session Not Performed patient/family declined treatment  (Pt refused PT at this time.  Education provided as to the importance of OOB activities and risks of prolonged bed rest.  Pt adamently refused.)   Therapy Assessment/Plan (PT)   Criteria for Skilled Interventions Met (PT) yes;meets criteria   Recommendation   PT - Next Appointment 04/13/23

## 2023-04-12 NOTE — PROGRESS NOTES
General Surgery Inpatient Progress Note      Name: Anthony Moss ADMIT: 3/28/2023   : 1955  PCP: Reji Topete MD    MRN: [id number] LOS: 15 days   AGE/SEX: 67 y.o. male  ROOM: [address] [place] [address]      Patient Care Team:  Reji Topete MD as PCP - General (Family Medicine)  No chief complaint on file.      Subjective:  Patient seen and examined. Still very drousy. Refused PT. Poor PO intake    Medications:  Fat Emulsion Plant Based, 250 mL, Intravenous, Once per day on   heparin (porcine), 5,000 Units, Subcutaneous, Q12H  insulin lispro, 2-7 Units, Subcutaneous, Q6H  metoclopramide, 10 mg, Intravenous, Q6H  piperacillin-tazobactam, 3.375 g, Intravenous, Q8H  polyethylene glycol, 17 g, Oral, BID  sodium chloride, 10 mL, Intravenous, Q12H  vitamin D3, 5,000 Units, Oral, Daily       •  acetaminophen **OR** acetaminophen  •  dextrose  •  dextrose  •  docusate sodium  •  glucagon (human recombinant)  •  HYDROcodone-acetaminophen  •  hydrOXYzine  •  lactulose  •  [] HYDROmorphone **AND** naloxone  •  ondansetron **OR** ondansetron  •  Pharmacy to Dose TPN  •  phenol  •  polyethyl glycol-propyl glycol  •  senna-docusate sodium  •  sodium chloride  •  sodium chloride     Vitals:  Temp:  [97.5 °F (36.4 °C)-99.3 °F (37.4 °C)] 98.1 °F (36.7 °C)  Heart Rate:  [] 96  Resp:  [16-17] 17  BP: (118-153)/(77-89) 135/82     Physical Exam:  No acute distress, alert  Nonlabored respirations  Abdomen soft, minimally distended, appropriately tender to palpation, WV in place with s/s output  Ileal conduit in place, appears viable    Labs:  Results from last 7 days   Lab Units 23  0347 23  0026 04/10/23  0512 23  0317 23  0400 23  0811   WBC 10*3/mm3 6.80 5.50 6.80   < > 18.60* 40.80*   HEMOGLOBIN g/dL 7.7* 7.4* 8.9*   < > 6.5* 8.4*   HEMATOCRIT % 24.5* 23.4* 28.4*   < > 21.2* 26.2*   PLATELETS 10*3/mm3 290 256 251   < > 248 361   MONOCYTES % %   --   --   --   --  9.0 4.0*    < > = values in this interval not displayed.     Results from last 7 days   Lab Units 04/12/23  0347 04/11/23  0026 04/10/23  0512   SODIUM mmol/L 142 143 139   POTASSIUM mmol/L 3.9 4.1 4.1   CHLORIDE mmol/L 105 109* 105   CO2 mmol/L 25.0 25.0 25.0   BUN mg/dL 36* 32* 29*   CREATININE mg/dL 1.32* 1.48* 1.64*   CALCIUM mg/dL 7.9* 8.4* 8.2*   BILIRUBIN mg/dL 0.3 0.3 0.4   ALK PHOS U/L 204* 235* 208*   ALT (SGPT) U/L 11 11 10   AST (SGOT) U/L 18 19 16   GLUCOSE mg/dL 105* 147* 132*     Assessment and Plan:  67 y.o. male postop day #15 from cystectomy and ileal conduit with subsequent fascial dehiscence now postop day #7 status post closure.  Having some bowel function.    - Continue as needed pain control. Limit narcotics with drousiness  -- Needs to mobilize more  - ADAT  - Continue TPN until taking adequate PO  - Continue WV with changes M,W,F  - ID on consult for leukocytosis, much improved; on Zosyn  -- Monitor anemia   - Further management per primary and other consultants    This note was created using Dragon Voice Recognition software.    Michael Witt MD  04/12/23  15:00 EDT

## 2023-04-12 NOTE — SIGNIFICANT NOTE
04/12/23 1155   OTHER   Discipline occupational therapist   Rehab Time/Intention   Session Not Performed patient/family declined treatment  (Pt refused despite encouragement and education on mobility in the acute setting)   Recommendation   OT - Next Appointment 04/13/23

## 2023-04-12 NOTE — PROGRESS NOTES
[facility] Medicine Services Daily Progress Note    Patient Name: Anthony Moss  : 1955  MRN: [id number]  Primary Care Physician:  Reji Topete MD  Date of admission: 3/28/2023      Subjective      Chief Complaint: Constipation, weakness      Patient Reports     3/31/23: OOB to chair.  PT consulted.  Constipated.  Undecided whether SNF versus HH.  Lives alone.  DC IVF.  Nephrology and urology following. + ARAVIND drain with serosanguineous fluid.    23: Patient with multiple vomiting overnight NG tube placed with bilious discharge.  Repeat KUB.  Gaiting [employer].    23: Patient having NGT removed.  Still feeling pretty rough at this time improved abdominal pain.  Somnolent as well.  Awaiting SNF placement.    4/3/2023: Patient had NGT replaced due to likely ileus.  Having some surgical site TTP and soreness.  Still feeling pretty good overall, forward to improving his p.o. intake and getting started with the rest of his care plan.    23: Still has NGT in place.  Having some more feelings of hunger, agreeable to current management.  Also reporting constipation.    23: NGT was removed today, had large volume bowel movements as well.  Patient feeling a lot better today.  Advance to clear liquid diet.    23: Had significant bowel movement as well, still having some more abdominal pain today.  Denies any nausea, vomiting.  Is tolerating p.o. intake as well, but still only liquids and high-fiber.    23: Patient went to the OR last night for wound dehiscence repair.  General surgery and urology recs are appreciated for current management.  Patient to continue IV ABX, pending ID recs.     23: Patient had drop in Hb to below 7, receiving PRBC at this time.Still reporting significant abdominal pain.  General surgery recs pending.    23: Significant pretty worn down.  Now tolerating clear liquids, GS looking forward to advancing to fulls, still on TPN at  this time.  Pain management still be via IV    4/12/23: More alert today, but still feeling pretty fatigued.  Denied PT participation.  That is required for SNF placement by tomorrow otherwise it will go till Monday due to his TPN needs.  Patient is having more p.o. intake now and feeling somewhat better.    Review of Systems   All other systems reviewed and are negative.         Objective      Vitals:   Temp:  [97.5 °F (36.4 °C)-99.3 °F (37.4 °C)] 98.1 °F (36.7 °C)  Heart Rate:  [] 96  Resp:  [16-17] 17  BP: (118-144)/(77-86) 135/82    Physical Exam  HENT:      Head: Normocephalic.      Nose: Nose normal.   Eyes:      Extraocular Movements: Extraocular movements intact.      Pupils: Pupils are equal, round, and reactive to light.   Cardiovascular:      Rate and Rhythm: Normal rate.   Pulmonary:      Effort: Pulmonary effort is normal.      Breath sounds: Normal breath sounds.   Abdominal:      General: Bowel sounds are normal.      Comments: Ileal conduit with urine.  Left lower quadrant ARAVIND drain with serosanguineous fluid.   Musculoskeletal:         General: Normal range of motion.      Cervical back: Normal range of motion.   Skin:     General: Skin is warm.   Neurological:      Mental Status: He is alert. Mental status is at baseline.   Psychiatric:         Mood and Affect: Mood normal.             Result Review    Result Review:  I have personally reviewed the results from the time of this admission to 4/12/2023 16:42 EDT and agree with these findings:  [x]  Laboratory  []  Microbiology  [x]  Radiology  []  EKG/Telemetry   []  Cardiology/Vascular   []  Pathology  [x]  Old records  []  Other:      Wounds (last 24 hours)     LDA Wound     Row Name 04/12/23 0845 04/12/23 0700 04/12/23 0400       Wound 04/06/23 2243 abdomen Incision    Wound - Properties Group Placement Date: 04/06/23  -PH Placement Time: 2243  -PH Present on Hospital Admission: Y  -PH Location: abdomen  -PH Primary Wound Type: Incision  -PH     Dressing Appearance dry;intact  -SC -- --    Closure STEFF  -SC -- STEFF  -AB    Base dressing in place, unable to visualize  -SC -- dressing in place, unable to visualize  -AB    Drainage Characteristics/Odor -- -- serosanguineous  -AB    Drainage Amount -- -- large  -AB    Care, Wound negative pressure wound therapy  -SC -- negative pressure wound therapy  -AB    Periwound Care dry periwound area maintained  -SC -- --    Wound Output (mL) -- 150  -SC --    Retired Wound - Properties Group Placement Date: 04/06/23  -PH Placement Time: 2243 -PH Present on Hospital Admission: Y  -PH Location: abdomen  -PH Primary Wound Type: Incision  -PH    Retired Wound - Properties Group Date first assessed: 04/06/23  -PH Time first assessed: 2243 -PH Present on Hospital Admission: Y  -PH Location: abdomen  -PH Primary Wound Type: Incision  -PH       Wound 04/07/23 0800 Left posterior heel Pressure Injury    Wound - Properties Group Placement Date: 04/07/23  -PB Placement Time: 0800  -PB Side: Left  -PB Orientation: posterior  -PB Location: heel  -PB Primary Wound Type: Pressure inj  -PB    Dressing Appearance open to air  -SC -- --    Closure Surface sutures  -SC -- Open to air  pt refused liliane boot even after education provided  -AB    Care, Wound -- -- other (see comments)  pt refused liliane boot even after education provided  -AB    Periwound Care dry periwound area maintained  -SC -- --    Retired Wound - Properties Group Placement Date: 04/07/23  -PB Placement Time: 0800  -PB Side: Left  -PB Orientation: posterior  -PB Location: heel  -PB Primary Wound Type: Pressure inj  -PB    Retired Wound - Properties Group Date first assessed: 04/07/23  -PB Time first assessed: 0800  -PB Side: Left  -PB Location: heel  -PB Primary Wound Type: Pressure inj  -PB       Wound Right posterior heel Pressure Injury    Wound - Properties Group Side: Right  -PB Orientation: posterior  -PB Location: heel  -PB Primary Wound Type: Pressure inj  -PB     Dressing Appearance open to air  -SC -- --    Closure Surface sutures  -SC -- Open to air  pt refused liliane boot even after education provided  -AB    Care, Wound -- -- other (see comments)  pt refused liliane boot even after education provided  -AB    Periwound Care dry periwound area maintained  -SC -- --    Retired Wound - Properties Group Side: Right  -PB Orientation: posterior  -PB Location: heel  -PB Primary Wound Type: Pressure inj  -PB    Retired Wound - Properties Group Side: Right  -PB Location: heel  -PB Primary Wound Type: Pressure inj  -PB       Wound 04/11/23 0005 thoracic spine    Wound - Properties Group Placement Date: 04/11/23  -AB Placement Time: 0005  -AB Location: thoracic spine  -AB    Dressing Appearance dry;intact  -SC -- dry;intact  -AB    Closure STEFF  -SC -- --    Base dressing in place, unable to visualize  -SC -- --    Periwound Care dry periwound area maintained  -SC -- dry periwound area maintained  -AB    Retired Wound - Properties Group Placement Date: 04/11/23  -AB Placement Time: 0005 -AB Location: thoracic spine  -AB    Retired Wound - Properties Group Date first assessed: 04/11/23  -AB Time first assessed: 0005  -AB Location: thoracic spine  -AB    Row Name 04/12/23 0000 04/11/23 2000 04/11/23 1804       Wound 04/06/23 2243 abdomen Incision    Wound - Properties Group Placement Date: 04/06/23  -PH Placement Time: 2243  -PH Present on Hospital Admission: Y  -PH Location: abdomen  -PH Primary Wound Type: Incision  -PH    Closure STEFF  -AB STEFF  -AB --    Base dressing in place, unable to visualize  -AB dressing in place, unable to visualize  -AB --    Drainage Characteristics/Odor serosanguineous  -AB serosanguineous  -AB --    Drainage Amount large  -AB large  -AB --    Care, Wound -- negative pressure wound therapy  -AB --    Wound Output (mL) 200  vac container changed  -AB -- 350  -MD    Retired Wound - Properties Group Placement Date: 04/06/23  -PH Placement Time: 2243  -PH Present on  Hospital Admission: Y  -PH Location: abdomen  -PH Primary Wound Type: Incision  -PH    Retired Wound - Properties Group Date first assessed: 04/06/23  -PH Time first assessed: 2243  -PH Present on Hospital Admission: Y  -PH Location: abdomen  -PH Primary Wound Type: Incision  -PH       Wound 04/07/23 0800 Left posterior heel Pressure Injury    Wound - Properties Group Placement Date: 04/07/23  -PB Placement Time: 0800  -PB Side: Left  -PB Orientation: posterior  -PB Location: heel  -PB Primary Wound Type: Pressure inj  -PB    Closure Open to air  pt refused liliane boot even after education provided  -AB Open to air  pt refused liliane boot even after education provided  -AB --    Care, Wound -- other (see comments)  pt refused liliane boot even after education provided  -AB --    Retired Wound - Properties Group Placement Date: 04/07/23  -PB Placement Time: 0800  -PB Side: Left  -PB Orientation: posterior  -PB Location: heel  -PB Primary Wound Type: Pressure inj  -PB    Retired Wound - Properties Group Date first assessed: 04/07/23  -PB Time first assessed: 0800  -PB Side: Left  -PB Location: heel  -PB Primary Wound Type: Pressure inj  -PB       Wound Right posterior heel Pressure Injury    Wound - Properties Group Side: Right  -PB Orientation: posterior  -PB Location: heel  -PB Primary Wound Type: Pressure inj  -PB    Closure Open to air  pt refused liliane boot even after education provided  -AB Open to air  pt refused liliane boot even after education provided  -AB --    Care, Wound -- other (see comments)  pt refused liliane boot even after education provided  -AB --    Retired Wound - Properties Group Side: Right  -PB Orientation: posterior  -PB Location: heel  -PB Primary Wound Type: Pressure inj  -PB    Retired Wound - Properties Group Side: Right  -PB Location: heel  -PB Primary Wound Type: Pressure inj  -PB       Wound 04/11/23 0005 thoracic spine    Wound - Properties Group Placement Date: 04/11/23  -AB Placement Time: 0005  -AB  Location: thoracic spine  -AB    Dressing Appearance -- dry;intact  -AB --    Periwound Care -- dry periwound area maintained  -AB --    Retired Wound - Properties Group Placement Date: 04/11/23  -AB Placement Time: 0005 -AB Location: thoracic spine  -AB    Retired Wound - Properties Group Date first assessed: 04/11/23  -AB Time first assessed: 0005 -AB Location: thoracic spine  -AB          User Key  (r) = Recorded By, (t) = Taken By, (c) = Cosigned By    Initials Name Provider Type    Vero Bermudez, RN Registered Nurse    Carolyn Arvizu RN Registered Nurse    Linda Lechuga, RN Registered Nurse    Carolyn Bautista, RN Registered Nurse    Dell Marlow RN Registered Nurse                  Assessment & Plan      Brief Patient Summary:        Fat Emulsion Plant Based, 250 mL, Intravenous, Once per day on Mon Tue Wed Thu Fri  heparin (porcine), 5,000 Units, Subcutaneous, Q12H  insulin lispro, 2-7 Units, Subcutaneous, Q6H  metoclopramide, 10 mg, Intravenous, Q6H  piperacillin-tazobactam, 3.375 g, Intravenous, Q8H  polyethylene glycol, 17 g, Oral, BID  sodium chloride, 10 mL, Intravenous, Q12H  vitamin D3, 5,000 Units, Oral, Daily       Adult Central Clinimix TPN, , Last Rate: 87 mL/hr at 04/11/23 1804  Adult Central Clinimix TPN,   Pharmacy to Dose TPN,          Active Hospital Problems:  Active Hospital Problems    Diagnosis    • **Bladder cancer    • Severe Malnutrition (HCC)    • Attention to urostomy    • Elevated serum creatinine    • Prostate cancer    • History of alcohol use disorder      Surgical wound dehiscence  Postop anemia  Prostate cancer  Bladder cancer  S/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 intake, at risk for starvation    Plan:  -Ileus mostly resolved, but patient still not tolerating full p.o. intake  -ARAVIND drain  -Renal  function improved.  IVF changed, TPN continued, nephro recs appreciated.  -Encourage OOB to chair.  -Patient undecided whether SNF versus   -- 1 unit PRBC, Hb has been stable for 2 days now.  -- WBCs now normalized likely leukemoid reaction after OR management  -- Continue on TPN.  GS recs appreciated for advancing to full liquid diet.  Patient tolerating some small amount of intake.  -- Continue IV ABX, ID recs appreciated  -- Will need to placement to SNF after this stay.  Patient is agreeable.        DVT prophylaxis:  Medical and mechanical DVT prophylaxis orders are present.    CODE STATUS:    Code Status (Patient has no pulse and is not breathing): CPR (Attempt to Resuscitate)  Medical Interventions (Patient has pulse or is breathing): Full Support      Disposition:  I expect patient to be discharged to SNF once tolerating p.o. intake, clinically stable, likely not for 4 to 5 days.    This patient has been examined wearing appropriate Personal Protective Equipment and discussed with nursing. 04/12/23      Electronically signed by Saima Roberto DO, 04/12/23, 16:42 EDT.  [facility] Hospitalist Team

## 2023-04-13 PROBLEM — R79.89 ELEVATED SERUM CREATININE: Status: RESOLVED | Noted: 2023-03-28 | Resolved: 2023-04-13

## 2023-04-13 LAB
ALBUMIN SERPL-MCNC: 2.3 G/DL (ref 3.5–5.2)
ALBUMIN/GLOB SERPL: 0.6 G/DL
ALP SERPL-CCNC: 225 U/L (ref 39–117)
ALT SERPL W P-5'-P-CCNC: 14 U/L (ref 1–41)
ANION GAP SERPL CALCULATED.3IONS-SCNC: 10 MMOL/L (ref 5–15)
AST SERPL-CCNC: 21 U/L (ref 1–40)
BILIRUB SERPL-MCNC: 0.3 MG/DL (ref 0–1.2)
BUN SERPL-MCNC: 40 MG/DL (ref 8–23)
BUN/CREAT SERPL: 27.6 (ref 7–25)
CA-I SERPL ISE-MCNC: 1.24 MMOL/L (ref 1.2–1.3)
CALCIUM SPEC-SCNC: 8.5 MG/DL (ref 8.6–10.5)
CHLORIDE SERPL-SCNC: 105 MMOL/L (ref 98–107)
CO2 SERPL-SCNC: 24 MMOL/L (ref 22–29)
CREAT SERPL-MCNC: 1.45 MG/DL (ref 0.76–1.27)
DEPRECATED RDW RBC AUTO: 50.8 FL (ref 37–54)
EGFRCR SERPLBLD CKD-EPI 2021: 53.5 ML/MIN/1.73
ERYTHROCYTE [DISTWIDTH] IN BLOOD BY AUTOMATED COUNT: 17 % (ref 12.3–15.4)
GLOBULIN UR ELPH-MCNC: 3.8 GM/DL
GLUCOSE BLDC GLUCOMTR-MCNC: 137 MG/DL (ref 70–105)
GLUCOSE BLDC GLUCOMTR-MCNC: 150 MG/DL (ref 70–105)
GLUCOSE BLDC GLUCOMTR-MCNC: 151 MG/DL (ref 70–105)
GLUCOSE BLDC GLUCOMTR-MCNC: 164 MG/DL (ref 70–105)
GLUCOSE BLDC GLUCOMTR-MCNC: 178 MG/DL (ref 70–105)
GLUCOSE SERPL-MCNC: 136 MG/DL (ref 65–99)
HCT VFR BLD AUTO: 24.7 % (ref 37.5–51)
HGB BLD-MCNC: 7.8 G/DL (ref 13–17.7)
MAGNESIUM SERPL-MCNC: 1.7 MG/DL (ref 1.6–2.4)
MCH RBC QN AUTO: 26.5 PG (ref 26.6–33)
MCHC RBC AUTO-ENTMCNC: 31.4 G/DL (ref 31.5–35.7)
MCV RBC AUTO: 84.4 FL (ref 79–97)
PHOSPHATE SERPL-MCNC: 2.4 MG/DL (ref 2.5–4.5)
PLATELET # BLD AUTO: 306 10*3/MM3 (ref 140–450)
PMV BLD AUTO: 7.8 FL (ref 6–12)
POTASSIUM SERPL-SCNC: 4.1 MMOL/L (ref 3.5–5.2)
PROT SERPL-MCNC: 6.1 G/DL (ref 6–8.5)
RBC # BLD AUTO: 2.93 10*6/MM3 (ref 4.14–5.8)
SODIUM SERPL-SCNC: 139 MMOL/L (ref 136–145)
WBC NRBC COR # BLD: 8.3 10*3/MM3 (ref 3.4–10.8)

## 2023-04-13 PROCEDURE — 82330 ASSAY OF CALCIUM: CPT | Performed by: SURGERY

## 2023-04-13 PROCEDURE — 25010000002 CALCIUM GLUCONATE PER 10 ML: Performed by: SURGERY

## 2023-04-13 PROCEDURE — 25010000002 MAGNESIUM SULFATE PER 500 MG OF MAGNESIUM: Performed by: SURGERY

## 2023-04-13 PROCEDURE — 82962 GLUCOSE BLOOD TEST: CPT

## 2023-04-13 PROCEDURE — 97116 GAIT TRAINING THERAPY: CPT

## 2023-04-13 PROCEDURE — 99024 POSTOP FOLLOW-UP VISIT: CPT | Performed by: SURGERY

## 2023-04-13 PROCEDURE — 97530 THERAPEUTIC ACTIVITIES: CPT

## 2023-04-13 PROCEDURE — 25010000002 POTASSIUM CHLORIDE PER 2 MEQ OF POTASSIUM: Performed by: SURGERY

## 2023-04-13 PROCEDURE — 25010000002 HEPARIN (PORCINE) PER 1000 UNITS: Performed by: INTERNAL MEDICINE

## 2023-04-13 PROCEDURE — 84100 ASSAY OF PHOSPHORUS: CPT | Performed by: SURGERY

## 2023-04-13 PROCEDURE — 83735 ASSAY OF MAGNESIUM: CPT | Performed by: SURGERY

## 2023-04-13 PROCEDURE — 85027 COMPLETE CBC AUTOMATED: CPT | Performed by: INTERNAL MEDICINE

## 2023-04-13 PROCEDURE — 25010000002 METOCLOPRAMIDE PER 10 MG: Performed by: UROLOGY

## 2023-04-13 PROCEDURE — 63710000001 INSULIN LISPRO (HUMAN) PER 5 UNITS: Performed by: SURGERY

## 2023-04-13 PROCEDURE — 80053 COMPREHEN METABOLIC PANEL: CPT | Performed by: SURGERY

## 2023-04-13 PROCEDURE — 25010000002 MAGNESIUM SULFATE 2 GM/50ML SOLUTION: Performed by: SURGERY

## 2023-04-13 PROCEDURE — 97535 SELF CARE MNGMENT TRAINING: CPT

## 2023-04-13 RX ORDER — PSEUDOEPHEDRINE HCL 30 MG
100 TABLET ORAL 2 TIMES DAILY PRN
Qty: 30 CAPSULE | Refills: 0 | Status: SHIPPED | OUTPATIENT
Start: 2023-04-13 | End: 2023-04-28

## 2023-04-13 RX ORDER — HYDROXYZINE HYDROCHLORIDE 25 MG/1
25 TABLET, FILM COATED ORAL 3 TIMES DAILY PRN
Qty: 15 TABLET | Refills: 0 | Status: SHIPPED | OUTPATIENT
Start: 2023-04-13 | End: 2023-04-28

## 2023-04-13 RX ORDER — SODIUM PHOSPHATE IN 0.9 % NACL 15MMOL/100
15 PLASTIC BAG, INJECTION (ML) INTRAVENOUS ONCE
Status: COMPLETED | OUTPATIENT
Start: 2023-04-13 | End: 2023-04-13

## 2023-04-13 RX ORDER — GUAIFENESIN 600 MG/1
1200 TABLET, EXTENDED RELEASE ORAL EVERY 12 HOURS SCHEDULED
Status: DISCONTINUED | OUTPATIENT
Start: 2023-04-13 | End: 2023-04-20 | Stop reason: HOSPADM

## 2023-04-13 RX ORDER — LACTULOSE 10 G/15ML
10 SOLUTION ORAL DAILY PRN
Start: 2023-04-13 | End: 2023-04-28

## 2023-04-13 RX ORDER — MAGNESIUM SULFATE HEPTAHYDRATE 40 MG/ML
2 INJECTION, SOLUTION INTRAVENOUS ONCE
Status: COMPLETED | OUTPATIENT
Start: 2023-04-13 | End: 2023-04-13

## 2023-04-13 RX ORDER — POLYETHYLENE GLYCOL 3350 17 G/17G
17 POWDER, FOR SOLUTION ORAL 2 TIMES DAILY
Qty: 60 PACKET | Refills: 0 | Status: SHIPPED | OUTPATIENT
Start: 2023-04-13 | End: 2023-04-28

## 2023-04-13 RX ADMIN — I.V. FAT EMULSION 50 G: 20 EMULSION INTRAVENOUS at 18:19

## 2023-04-13 RX ADMIN — Medication 10 ML: at 08:36

## 2023-04-13 RX ADMIN — MAGNESIUM SULFATE HEPTAHYDRATE 2 G: 2 INJECTION, SOLUTION INTRAVENOUS at 13:11

## 2023-04-13 RX ADMIN — HEPARIN SODIUM 5000 UNITS: 5000 INJECTION INTRAVENOUS; SUBCUTANEOUS at 08:35

## 2023-04-13 RX ADMIN — METOCLOPRAMIDE HYDROCHLORIDE 10 MG: 5 INJECTION INTRAMUSCULAR; INTRAVENOUS at 10:45

## 2023-04-13 RX ADMIN — HYDROXYZINE HYDROCHLORIDE 25 MG: 25 TABLET, FILM COATED ORAL at 22:03

## 2023-04-13 RX ADMIN — INSULIN LISPRO 2 UNITS: 100 INJECTION, SOLUTION INTRAVENOUS; SUBCUTANEOUS at 18:19

## 2023-04-13 RX ADMIN — HEPARIN SODIUM 5000 UNITS: 5000 INJECTION INTRAVENOUS; SUBCUTANEOUS at 22:03

## 2023-04-13 RX ADMIN — INSULIN LISPRO 2 UNITS: 100 INJECTION, SOLUTION INTRAVENOUS; SUBCUTANEOUS at 13:11

## 2023-04-13 RX ADMIN — METOCLOPRAMIDE HYDROCHLORIDE 10 MG: 5 INJECTION INTRAMUSCULAR; INTRAVENOUS at 18:19

## 2023-04-13 RX ADMIN — GUAIFENESIN 1200 MG: 600 TABLET, EXTENDED RELEASE ORAL at 01:41

## 2023-04-13 RX ADMIN — GUAIFENESIN 1200 MG: 600 TABLET, EXTENDED RELEASE ORAL at 22:03

## 2023-04-13 RX ADMIN — Medication 5000 UNITS: at 08:35

## 2023-04-13 RX ADMIN — METOCLOPRAMIDE HYDROCHLORIDE 10 MG: 5 INJECTION INTRAMUSCULAR; INTRAVENOUS at 05:24

## 2023-04-13 RX ADMIN — GUAIFENESIN 1200 MG: 600 TABLET, EXTENDED RELEASE ORAL at 08:35

## 2023-04-13 RX ADMIN — METOCLOPRAMIDE HYDROCHLORIDE 10 MG: 5 INJECTION INTRAMUSCULAR; INTRAVENOUS at 00:43

## 2023-04-13 RX ADMIN — METOCLOPRAMIDE HYDROCHLORIDE 10 MG: 5 INJECTION INTRAMUSCULAR; INTRAVENOUS at 22:31

## 2023-04-13 RX ADMIN — SODIUM PHOSPHATE, MONOBASIC, MONOHYDRATE 15 MMOL: 276; 142 INJECTION, SOLUTION INTRAVENOUS at 10:45

## 2023-04-13 RX ADMIN — POTASSIUM CHLORIDE: 2 INJECTION, SOLUTION, CONCENTRATE INTRAVENOUS at 18:19

## 2023-04-13 RX ADMIN — Medication 10 ML: at 22:05

## 2023-04-13 NOTE — PROGRESS NOTES
General Surgery Inpatient Progress Note      Name: Anthony Moss ADMIT: 3/28/2023   : 1955  PCP: Reji Topete MD    MRN: [id number] LOS: 16 days   AGE/SEX: 67 y.o. male  ROOM: [address] [place] [address]      Patient Care Team:  Reji Topete MD as PCP - General (Family Medicine)  No chief complaint on file.      Subjective:  Patient seen and examined. More awake this AM. Reports wanting regular food.    Medications:  Fat Emulsion Plant Based, 250 mL, Intravenous, Once per day on   guaiFENesin, 1,200 mg, Oral, Q12H  heparin (porcine), 5,000 Units, Subcutaneous, Q12H  insulin lispro, 2-7 Units, Subcutaneous, Q6H  metoclopramide, 10 mg, Intravenous, Q6H  polyethylene glycol, 17 g, Oral, BID  sodium chloride, 10 mL, Intravenous, Q12H  vitamin D3, 5,000 Units, Oral, Daily       •  acetaminophen **OR** acetaminophen  •  dextrose  •  dextrose  •  docusate sodium  •  glucagon (human recombinant)  •  HYDROcodone-acetaminophen  •  hydrOXYzine  •  lactulose  •  [] HYDROmorphone **AND** naloxone  •  ondansetron **OR** ondansetron  •  Pharmacy to Dose TPN  •  phenol  •  polyethyl glycol-propyl glycol  •  senna-docusate sodium  •  sodium chloride  •  sodium chloride     Vitals:  Temp:  [97.8 °F (36.6 °C)-99.1 °F (37.3 °C)] 97.8 °F (36.6 °C)  Heart Rate:  [] 99  Resp:  [16-25] 20  BP: (118-144)/(70-85) 126/75     Physical Exam:  No acute distress, alert  Nonlabored respirations  Abdomen soft, minimally distended, appropriately tender to palpation, WV in place with s/s output  Ileal conduit in place, appears viable    Labs:  Results from last 7 days   Lab Units 23  0058 23  0347 23  0026 23  0317 23  0400 23  0811   WBC 10*3/mm3 8.30 6.80 5.50   < > 18.60* 40.80*   HEMOGLOBIN g/dL 7.8* 7.7* 7.4*   < > 6.5* 8.4*   HEMATOCRIT % 24.7* 24.5* 23.4*   < > 21.2* 26.2*   PLATELETS 10*3/mm3 306 290 256   < > 248 361   MONOCYTES % %  --   --   --    --  9.0 4.0*    < > = values in this interval not displayed.     Results from last 7 days   Lab Units 04/13/23  0058 04/12/23  0347 04/11/23  0026   SODIUM mmol/L 139 142 143   POTASSIUM mmol/L 4.1 3.9 4.1   CHLORIDE mmol/L 105 105 109*   CO2 mmol/L 24.0 25.0 25.0   BUN mg/dL 40* 36* 32*   CREATININE mg/dL 1.45* 1.32* 1.48*   CALCIUM mg/dL 8.5* 7.9* 8.4*   BILIRUBIN mg/dL 0.3 0.3 0.3   ALK PHOS U/L 225* 204* 235*   ALT (SGPT) U/L 14 11 11   AST (SGOT) U/L 21 18 19   GLUCOSE mg/dL 136* 105* 147*     Assessment and Plan:  67 y.o. male postop day #16 from cystectomy and ileal conduit with subsequent fascial dehiscence now postop day #8 status post closure.  Having some bowel function.    - Continue as needed pain control. Limit narcotics with drousiness  -- Needs to mobilize more with PT/OT. Encouraged patient to no longer refuse  - ADAT  - Continue TPN until taking adequate PO  - Continue WV with changes M,W,F  - Leukocytosis resolved  -- Monitor anemia   - Further management per primary and other consultants    This note was created using Dragon Voice Recognition software.    Michael Witt MD  04/13/23  07:42 EDT

## 2023-04-13 NOTE — PLAN OF CARE
"Assessment: Anthony Moss presents with ADL impairments affecting function including balance, endurance / activity tolerance and strength. Demonstrated functioning below baseline abilities indicate the need for continued skilled intervention while inpatient. Patient cont to display far below baseline activity tolerance and overall weakness. Upon standing from [facility], patient braced himself on the bed in a forward-flexed position. Min A to stand and ambulate to chair,  Max A-DEP for toilet hygiene and lower body clothing management. Patient requires SNF at d/c to facilitate return to baseline. Tolerating session today without incident. Will continue to follow and progress as tolerated.      Plan/Recommendations:   Moderate Intensity Therapy recommended post-acute care. This is recommended as therapy feels the patient would require 3-4 days per week and wouldn't tolerate \"3 hour daily\" rehab intensity. SNF would be the preferred choice. If the patient does not agree to SNF, arrange HH or OP depending on home bound status. If patient is medically complex, consider LTACH.. Pt requires no DME at discharge.   "

## 2023-04-13 NOTE — PROGRESS NOTES
Nutrition Services    Patient Name: Anthony Moss  YOB: 1955  MRN: [id number]  Admission date: 3/28/2023    PPE Documentation        PPE Worn By Provider Did not enter room for this encounter   PPE Worn By Patient  N/A     PROGRESS NOTE      Encounter Information: Progress note to check on TPN. Today will be day #7 of IV nutrition. Pt tolerating diet but with minimal PO intake.Pt requesting regular textures, diet advanced.  RD collaborated with pharmacist (Mackenzie) on pt's TPN regimen.       PO Diet: Diet: Regular/House Diet; Texture: Regular Texture (IDDSI 7); Fluid Consistency: Thin (IDDSI 0)   PO Supplements: Boost Breeze TID (Provides 750 kcals, 42 g protein if consumed)   PO Intake:  Minimal intake since admission with frequent NPO and liquid diets       Current nutrition support: Clinimix 5/20 2L + 20 mL lipids 5X weekly    Nutrition support review: Documented as above per EMR        Labs (reviewed below): Reviewed, management per attending  Electrolyte management per Pharmacy        GI Function:  BM documented 4/13       Nutrition Intervention Updates: Continue TPN at goal: Clinimix 5/20 2000 mL volume + 250 mL 20% lipids 5x/week. Continue current regular diet with ONS.       Results from last 7 days   Lab Units 04/13/23 0058 04/12/23 0347 04/11/23  0026   SODIUM mmol/L 139 142 143   POTASSIUM mmol/L 4.1 3.9 4.1   CHLORIDE mmol/L 105 105 109*   CO2 mmol/L 24.0 25.0 25.0   BUN mg/dL 40* 36* 32*   CREATININE mg/dL 1.45* 1.32* 1.48*   CALCIUM mg/dL 8.5* 7.9* 8.4*   BILIRUBIN mg/dL 0.3 0.3 0.3   ALK PHOS U/L 225* 204* 235*   ALT (SGPT) U/L 14 11 11   AST (SGOT) U/L 21 18 19   GLUCOSE mg/dL 136* 105* 147*     Results from last 7 days   Lab Units 04/13/23 0058 04/12/23 0347 04/11/23  0026 04/08/23  0400 04/07/23  0811   MAGNESIUM mg/dL 1.7 1.6 1.7   < > 1.5*   PHOSPHORUS mg/dL 2.4* 3.2 3.3   < > 4.5   HEMOGLOBIN g/dL 7.8* 7.7* 7.4*   < > 8.4*   HEMATOCRIT % 24.7* 24.5* 23.4*   < > 26.2*    TRIGLYCERIDES mg/dL  --   --   --   --  92    < > = values in this interval not displayed.     COVID19   Date Value Ref Range Status   03/01/2023 Not Detected Not Detected - Ref. Range Final     Lab Results   Component Value Date    HGBA1C 4.8 03/01/2023       RD to follow up per protocol.    Electronically signed by:  Deneen Pantoja RD  04/13/23 08:38 EDT

## 2023-04-13 NOTE — PLAN OF CARE
Goal Outcome Evaluation:           Progress: improving      Outcome Evaluation: pt currently resting comfortably in bed; pt urostomy working appropriately (output charted); miralax held for multiple BMs on dayshift and pt had 5 watery BMs overnight; pt is very eager to advance diet from full liquid to regular diet; pt is on TPN/lipids for nutrition for lack of adequate PO intake; pt HR sinus tach overnight; wound vac in place and appears to be working appropriately (cannister changed overnight; output charted); VSS overnight

## 2023-04-13 NOTE — PROGRESS NOTES
Pharmacy consult - total parental nutrition    Anthony Moss is a 67 y.o.male admitted with bladder cancer. Pharmacy consulted to dose TPN for  ileus associated w/diffuse peritonitis  per Dr. Witt's request. Patient underwent radical cystectomy and prostatectomy with ileal conduit placement 3/28/23. Patient with multiple loose BMs and high NG output with persistent nausea post-op. Patient with fascial dehiscence with evisceration of small bowel on 4/6 and underwent exploration, washout, and repair of fascial dehiscence 4/6. Malnutrition thought to be factor in dehiscence and TPN initiated.       Assessment  Estimated macronutrient goal requirements per RD:    TPN type: Clinimix 5/20 (2000 mL/day)  Line type: Central  Protein: 100 grams/day (1.1 grams/kg/day)  Lipids: 250 mL of 20% lipid infusion 5 days/week  Kcal/day: 2117 kcal (23.5 kcal/kg/day)    Plan    TPN initiated 4/8. Discussed with RDDeneen, today. TPN continued at goal macros (Clinimix 5/20 2L) and lipids started on 4/10 providing the following macros:    Amino acids: 100 grams/day  Dextrose: 400 grams/day  Lipids:  250 mL of 20% lipid infusion 5 days/week (M-F)  Volume: 2094.7 mL (87 mL/hr over 24 hrs daily)    Magnesium low today. Will increase in TPP and give runner.   Phos low, will give runner and increase in TPN.  MVI IV on national shortage - will add thiamine and folic acid to TPN instead on MWF only. Trace on national shortage - will add to TPN MWF only.      Based on labs, will add the following electrolytes/additives to the TPN:     Date: 4/8 4/9 4/10 4/11 4/12 4/13   TPN Lytes (60mL)         CaGluc (mEq) 10 10 12 12 15 15   MgSO4 (mEq) 8 8 8 10 10 14   NaCl (mEq)         NaAcetate (mEq) 100 100 100 80 80 80   KCl (mEq) 30 30 30 30 30 30   KAcetate (mEq)         NaPhos (mmol)  9 9 9 9 12   KPhos (mmol)         Famotidine (mg)         MVI (10 mL)         Trace (1 mL)   1  1    Humulin R (units)         Folic acid (mg)   1  1    Thiamine  "(mg)   20  20        Patient also received the following electrolyte replacement per protocol:    Magnesium sulfate: 2 gm IV x1 and Sodium phosphate: 15 mmol IV X1    Pharmacy will continue to follow.          Objective  Relevant clinical data and objective history reviewed:  188 cm (74\")   93.3 kg (205 lb 11 oz)   Ideal body weight: 82.2 kg (181 lb 3.5 oz)  Adjusted ideal body weight: 86.6 kg (191 lb 0.1 oz)  Body mass index is 26.41 kg/m².    Results from last 7 days   Lab Units 04/13/23  0058 04/12/23  0347 04/11/23  0026 04/10/23  1305 04/10/23  0512 04/09/23  0317 04/08/23  0400 04/07/23  1717 04/07/23  0811 04/07/23  0811 04/06/23  1245 04/06/23  1245   SODIUM mmol/L 139 142 143  --  139 135* 135* 133*  --  138  --  136   POTASSIUM mmol/L 4.1 3.9 4.1  --  4.1 4.4 4.4 4.5  --  4.7  --  3.8   CHLORIDE mmol/L 105 105 109*  --  105 105 104 103  --  106  --  102   CO2 mmol/L 24.0 25.0 25.0  --  25.0 22.0 21.0* 21.0*  --  20.0*  --  23.0   GLUCOSE mg/dL 136* 105* 147*  --  132* 121* 136* 380*  --  170*  --  130*   BUN mg/dL 40* 36* 32*  --  29* 28* 26* 25*  --  25*  --  18   CREATININE mg/dL 1.45* 1.32* 1.48*  --  1.64* 1.89* 1.86* 2.01*  --  2.05*  --  1.44*   CALCIUM mg/dL 8.5* 7.9* 8.4*  --  8.2* 7.7* 7.3* 7.2*  --  7.6*  --  8.0*   IONIZED CALCIUM mmol/L 1.24 1.17* 1.21  --  1.19* 1.15* 1.10*  --    < >  --   --   --    PHOSPHORUS mg/dL 2.4* 3.2 3.3  --  3.3 3.6 4.2  --   --  4.5   < >  --    MAGNESIUM mg/dL 1.7 1.6 1.7  --  1.8 1.9 1.7  --   --  1.5*   < >  --    TOTAL PROTEIN g/dL 6.1 5.9* 5.7*  --  5.7* 5.3* 5.2*  --   --  5.7*   < >  --    ALBUMIN g/dL 2.3* 2.2* 2.3*  --  2.2* 2.0* 2.0*  --   --  2.4*   < >  --    PREALBUMIN mg/dL  --   --   --  6.1*  --   --  3.5*  --   --   --   --   --    BILIRUBIN mg/dL 0.3 0.3 0.3  --  0.4 0.4 0.3  --   --  0.6   < >  --    ALK PHOS U/L 225* 204* 235*  --  208* 204* 136*  --   --  166*   < >  --    AST (SGOT) U/L 21 18 19  --  16 14 15  --   --  16   < >  --    ALT " (SGPT) U/L 14 11 11  --  10 10 8  --   --  9   < >  --    TRIGLYCERIDES mg/dL  --   --   --   --   --   --   --   --   --  92  --   --     < > = values in this interval not displayed.       I/O last 3 completed shifts:  In: 4454 [P.O.:840; I.V.:889]  Out: 6735 [Urine:5875]  Estimated Creatinine Clearance: 67 mL/min (A) (by C-G formula based on SCr of 1.45 mg/dL (H)).    Dietary Orders (From admission, onward)       Start     Ordered    04/13/23 0743  Diet: Regular/House Diet; Texture: Regular Texture (IDDSI 7); Fluid Consistency: Thin (IDDSI 0)  Diet Effective Now        References:    Diet Order [place]   Question Answer Comment   Diets: Regular/House Diet    Texture: Regular Texture (IDDSI 7)    Fluid Consistency: Thin (IDDSI 0)        04/13/23 0742    04/05/23 1600  Dietary Nutrition Supplements Boost Breeze (Ensure Clear)  3 Times Daily      Question:  Select Supplement:  Answer:  Boost Breeze (Ensure Clear)    04/05/23 1455                    Mackenzie Wilson, PharmD  08:52 EDT 4/13/2023

## 2023-04-13 NOTE — DISCHARGE SUMMARY
[facility] Medicine Services  Discharge Summary    Date of Service: 23   Patient Name: Anthony Moss  : 1955  MRN: [id number]    Date of Admission: 3/28/2023  Discharge Diagnosis:    Diagnosis Plan   1. Bladder cancer  Tissue Pathology Exam    Tissue Pathology Exam      2. Gross hematuria  Tissue Pathology Exam    Tissue Pathology Exam      3. Severe malnutrition         4. Surgical wound dehiscence  5. Post op ileus  Date of Discharge:  23   Primary Care Physician: Reji Topete MD      Presenting Problem:   Bladder cancer [C67.9]  Gross hematuria [R31.0]  Wound dehiscence, internal operation, initial encounter [T81.32XA]    Active and Resolved Hospital Problems:  Active Hospital Problems    Diagnosis POA   • **Bladder cancer [C67.9] Yes   • Severe Malnutrition (HCC) [E43] Yes   • Attention to urostomy [Z43.6] Not Applicable   • Prostate cancer [C61] Yes   • History of alcohol use disorder [Z87.898] Yes      Resolved Hospital Problems    Diagnosis POA   • Elevated serum creatinine [R79.89] Yes         Hospital Course     Hospital Course:  Anthony Moss is a 67 y.o. male came in for planned radical cysectomy for Bladder cancer. Complicated with Ileus and wound dehiscence. Was quite deconditioned prior to second surgery for dehiscence, but then required TPN. Renal function also deteriorated but now improved. Is now improved enough on just PO intake. Will DC TPN. Still needs rehab. Further hospital course in rest of chart. Urology, GS, Nephro, ID, all consulted on this patient.     Discharge was delayed from last Thursday due to determining TPN needs.  Facility was available yesterday, however patient had slight decrease in hemoglobin.  It is remained stable today, patient is up and eating regular diet, able to participate with PT.  Still reporting continued weakness, but able to sit up by himself in bed.  Definitely stable for rehab, will need significant  follow-up however with oncology as well due to persistent anemia.  Nephrology believes Neupogen may be necessary.    DISCHARGE Follow Up Recommendations for labs and diagnostics: CBC, BMP in one week      Reasons For Change In Medications and Indications for New Medications:      Day of Discharge     Vital Signs:  Temp:  [98.1 °F (36.7 °C)-98.3 °F (36.8 °C)] 98.1 °F (36.7 °C)  Heart Rate:  [104] 104  Resp:  [20-21] 20  BP: (114-118)/(61-81) 114/61    Physical Exam:  Physical Exam  Vitals and nursing note reviewed.   Constitutional:       General: He is not in acute distress.     Appearance: Normal appearance. He is normal weight. He is ill-appearing.   HENT:      Head: Normocephalic and atraumatic.      Mouth/Throat:      Mouth: Mucous membranes are moist.      Pharynx: Oropharynx is clear.   Eyes:      Extraocular Movements: Extraocular movements intact.      Conjunctiva/sclera: Conjunctivae normal.      Pupils: Pupils are equal, round, and reactive to light.   Cardiovascular:      Rate and Rhythm: Normal rate and regular rhythm.      Pulses: Normal pulses.      Heart sounds: Normal heart sounds.   Pulmonary:      Effort: Pulmonary effort is normal. No respiratory distress.      Breath sounds: Normal breath sounds. No wheezing.   Abdominal:      General: Abdomen is flat. Bowel sounds are normal. There is no distension.      Palpations: Abdomen is soft.      Tenderness: There is abdominal tenderness. There is no guarding.      Comments: Wound vac in place, draining well.   Mild - mod surgical site TTP   Musculoskeletal:         General: No swelling, tenderness or signs of injury.      Cervical back: Normal range of motion and neck supple.   Skin:     General: Skin is warm and dry.      Capillary Refill: Capillary refill takes less than 2 seconds.   Neurological:      General: No focal deficit present.      Mental Status: He is alert and oriented to person, place, and time. Mental status is at baseline.      Motor:  No weakness.   Psychiatric:         Mood and Affect: Mood normal.         Behavior: Behavior normal.         Judgment: Judgment normal.            Pertinent  and/or Most Recent Results     LAB RESULTS:      Lab 04/18/23  0747 04/17/23  1704 04/17/23  0024 04/14/23  0514 04/13/23  0058   WBC 14.80*  --  11.70* 10.70 8.30   HEMOGLOBIN 8.2*  --  7.4* 7.7* 7.8*   HEMATOCRIT 25.8*  --  23.8* 24.1* 24.7*   PLATELETS 323  --  268 291 306   MCV 84.2  --  85.1 85.1 84.4   CRP  --  4.54*  --   --   --    PROTIME  --  10.6  --   --   --          Lab 04/19/23  0620 04/18/23  0747 04/17/23  0024 04/16/23  0539 04/15/23  0433   SODIUM 141 139 139 138 142   POTASSIUM 4.8 4.8 4.5 4.0 4.3   CHLORIDE 111* 109* 110* 109* 112*   CO2 16.0* 17.0* 19.0* 19.0* 19.0*   ANION GAP 14.0 13.0 10.0 10.0 11.0   BUN 44* 42* 41* 40* 41*   CREATININE 1.68* 1.68* 1.73* 1.48* 1.37*   EGFR 44.8* 44.8* 43.3* 52.2* 57.2*   GLUCOSE 109* 114* 122* 134* 96   CALCIUM 8.7 8.4* 8.0* 8.2* 8.2*   IONIZED CALCIUM 1.23 1.24 1.18* 1.20 1.18*   MAGNESIUM 1.8 1.8 2.0 1.6 1.8   PHOSPHORUS 5.4* 5.2* 3.8 3.5 4.6*         Lab 04/19/23  0620 04/18/23 0747 04/17/23  0024 04/16/23  0539 04/15/23  0433   TOTAL PROTEIN 7.2 7.1 6.3 6.1 6.4   ALBUMIN 3.0* 3.1* 2.6* 2.6* 2.5*   GLOBULIN 4.2 4.0 3.7 3.5 3.9   ALT (SGPT) 30 34 38 40 33   AST (SGOT) 26 29 35 47* 59*   BILIRUBIN 0.2 0.2 <0.2 0.2 0.2   ALK PHOS 330* 346* 286* 299* 269*         Lab 04/17/23  1704   PROTIME 10.6   INR 0.99         Lab 04/14/23  0514   TRIGLYCERIDES 164*         Lab 04/17/23  0024   IRON 101   IRON SATURATION 44   TIBC 231*   TRANSFERRIN 155*         Brief Urine Lab Results  (Last result in the past 365 days)      Color   Clarity   Blood   Leuk Est   Nitrite   Protein   CREAT   Urine HCG        02/28/23 2130 Red  Comment: Any Substance that causes an abnormal urine color can alter the accuracy of the chemical reactions.   Slightly Cloudy  Comment: Result checked     Large (3+)   Small (1+)   Positive    >=300 mg/dL (3+)               Microbiology Results (last 10 days)     ** No results found for the last 240 hours. **          XR Chest 1 View    Result Date: 4/7/2023  Impression: 1.  Low lung volumes, accentuate the heart size. 2.  Basilar opacities (atelectasis, aspiration, infection). Electronically signed by:  Dinora Nguyen M.D.  4/7/2023 3:20 AM Mountain Time    XR Abdomen KUB    Result Date: 4/2/2023  Impression: Impression: 1.Dilated loops of bowel, similar to mildly decreased compared to the prior exam, which could indicate ileus. 2.Enteric tube terminates in the right upper quadrant with tip likely in the distal stomach/proximal duodenum. 3.Postsurgical changes, as before. Electronically Signed: Fabio Holland  4/2/2023 9:26 PM EDT  Workstation ID: [id number]    XR Abdomen KUB    Result Date: 4/1/2023  Impression: Impression: Dilated small and large bowel loops similar previous study likely due to ileus or less likely obstruction. Postsurgical changes. No significant change from previous x-ray. Electronically Signed: Neetu Braden  4/1/2023 2:03 PM EDT  Workstation ID: [id number]    XR Abdomen KUB    Result Date: 3/31/2023  Impression: Impression: 1. Gaseous small and large bowel distention suggesting postoperative ileus. 2. Status post cystectomy with postoperative findings above. Electronically Signed: Kuldip Daniels  3/31/2023 8:35 PM EDT  Workstation ID: [id number]              Results for orders placed during the hospital encounter of 01/05/22    Adult Transthoracic Echo Complete w/ Color, Spectral and Contrast if Necessary Per Protocol    Interpretation Summary  · Left ventricular ejection fraction appears to be 56 - 60%.  · The right ventricular cavity is moderately dilated.  · No pericardial effusion noted      Labs Pending at Discharge:      Procedures Performed  Procedure(s):  LAPAROTOMY EXPLORATORY REPAIR OF DEHISCENCE         Consults:   Consults     Date and Time Order Name Status Description    4/6/2023   7:26 PM Inpatient General Surgery Consult      4/6/2023  3:03 PM Inpatient Infectious Diseases Consult Completed     4/2/2023  2:55 PM Inpatient General Surgery Consult Completed     3/30/2023 12:11 PM Inpatient Nephrology Consult      3/28/2023  7:38 PM Inpatient Hospitalist Consult Completed     3/1/2023  5:31 AM Inpatient Urology Consult Completed             Discharge Details        Discharge Medications      New Medications      Instructions Start Date   docusate sodium 100 MG capsule   100 mg, Oral, 2 Times Daily PRN      hydrOXYzine 25 MG tablet  Commonly known as: ATARAX   25 mg, Oral, 3 Times Daily PRN      lactulose solution  Commonly known as: CHRONULAC   10 g, Oral, Daily PRN      polyethylene glycol 17 g packet  Commonly known as: MIRALAX   17 g, Oral, 2 Times Daily         Changes to Medications      Instructions Start Date   sodium bicarbonate 650 MG tablet  What changed: how much to take   1,300 mg, Oral, 3 Times Daily         Continue These Medications      Instructions Start Date   vitamin D3 125 MCG (5000 UT) capsule capsule   5,000 Units, Oral, Daily             No Known Allergies      Discharge Disposition: Stable to DC to   Skilled Nursing Facility (DC - External)    Diet:  Hospital:  Diet Order   Procedures   • Diet: Regular/House Diet; Texture: Regular Texture (IDDSI 7); Fluid Consistency: Thin (IDDSI 0)         Discharge Activity:   Activity Instructions     Activity as Tolerated      Activity as Tolerated              CODE STATUS:  Code Status and Medical Interventions:   Ordered at: 03/28/23 1938     Code Status (Patient has no pulse and is not breathing):    CPR (Attempt to Resuscitate)     Medical Interventions (Patient has pulse or is breathing):    Full Support         No future appointments.    Additional Instructions for the Follow-ups that You Need to Schedule     Call MD With Problems / Concerns   As directed      Instructions: Call [phone] or email [email or web address]  for problems or concerns.    Order Comments: Instructions: Call [phone] or email [email or web address] for problems or concerns.          Call MD With Problems / Concerns   As directed      Instructions: Call [phone] or email [email or web address] for problems or concerns.    Order Comments: Instructions: Call [phone] or email [email or web address] for problems or concerns.          Discharge Follow-up with PCP   As directed       Currently Documented PCP:    Reji Topete MD    PCP Phone Number:    [phone]     Follow Up Details: Wound Vac, Deconditionign         Discharge Follow-up with PCP   As directed       Currently Documented PCP:    Reji Topete MD    PCP Phone Number:    [phone]     Follow Up Details: f/u Gen Surg, Urology, Nutrition, Rehab               Time spent on Discharge including face to face service:  39 minutes    This patient has been examined wearing appropriate Personal Protective Equipment and discussed with Nurse. 04/19/23      Signature: Electronically signed by Naty Melo MD, 04/19/23, 12:03 EDT.  [facility] Hospitalist Team

## 2023-04-13 NOTE — PROGRESS NOTES
"  [facility] DAILY PROGRESS NOTE    Patient Identification  Name: Anthony Moss  Age: 67 y.o.  Sex: male  :  1955  MRN: [id number]    Date: 2023             Subjective:  Interval History: Wound vac in place, feeling stronger    Objective:    Scheduled Meds:Fat Emulsion Plant Based, 250 mL, Intravenous, Once per day on   guaiFENesin, 1,200 mg, Oral, Q12H  heparin (porcine), 5,000 Units, Subcutaneous, Q12H  insulin lispro, 2-7 Units, Subcutaneous, Q6H  metoclopramide, 10 mg, Intravenous, Q6H  polyethylene glycol, 17 g, Oral, BID  sodium chloride, 10 mL, Intravenous, Q12H  vitamin D3, 5,000 Units, Oral, Daily      Continuous Infusions:Adult Central Clinimix TPN, , Last Rate: 87 mL/hr at 23 1809  Pharmacy to Dose TPN,       PRN Meds:•  acetaminophen **OR** acetaminophen  •  dextrose  •  dextrose  •  docusate sodium  •  glucagon (human recombinant)  •  HYDROcodone-acetaminophen  •  hydrOXYzine  •  lactulose  •  [] HYDROmorphone **AND** naloxone  •  ondansetron **OR** ondansetron  •  Pharmacy to Dose TPN  •  phenol  •  polyethyl glycol-propyl glycol  •  senna-docusate sodium  •  sodium chloride  •  sodium chloride    Vital signs in last 24 hours:  Temp:  [97.8 °F (36.6 °C)-99.1 °F (37.3 °C)] 97.8 °F (36.6 °C)  Heart Rate:  [] 99  Resp:  [16-25] 20  BP: (118-144)/(70-85) 132/81    Intake/Output:    Intake/Output Summary (Last 24 hours) at 2023 0633  Last data filed at 2023 0526  Gross per 24 hour   Intake 4454 ml   Output 3235 ml   Net 1219 ml       Exam:  /81 (BP Location: Left arm, Patient Position: Lying)   Pulse 99   Temp 97.8 °F (36.6 °C) (Axillary)   Resp 20   Ht 188 cm (74\")   Wt 92.9 kg (204 lb 12.9 oz)   SpO2 93%   BMI 26.30 kg/m²     General Appearance:    no distress               Abdomen:     Soft, ND, wound vac   :   Conduit pink                Data Review:  All labs (24hrs):   Recent Results (from the past 24 hour(s))   POC " Glucose Once    Collection Time: 04/12/23 11:30 AM    Specimen: Blood   Result Value Ref Range    Glucose 147 (H) 70 - 105 mg/dL   POC Glucose Once    Collection Time: 04/12/23  5:56 PM    Specimen: Blood   Result Value Ref Range    Glucose 131 (H) 70 - 105 mg/dL   POC Glucose Once    Collection Time: 04/13/23 12:22 AM    Specimen: Blood   Result Value Ref Range    Glucose 150 (H) 70 - 105 mg/dL   Comprehensive Metabolic Panel    Collection Time: 04/13/23 12:58 AM    Specimen: Blood   Result Value Ref Range    Glucose 136 (H) 65 - 99 mg/dL    BUN 40 (H) 8 - 23 mg/dL    Creatinine 1.45 (H) 0.76 - 1.27 mg/dL    Sodium 139 136 - 145 mmol/L    Potassium 4.1 3.5 - 5.2 mmol/L    Chloride 105 98 - 107 mmol/L    CO2 24.0 22.0 - 29.0 mmol/L    Calcium 8.5 (L) 8.6 - 10.5 mg/dL    Total Protein 6.1 6.0 - 8.5 g/dL    Albumin 2.3 (L) 3.5 - 5.2 g/dL    ALT (SGPT) 14 1 - 41 U/L    AST (SGOT) 21 1 - 40 U/L    Alkaline Phosphatase 225 (H) 39 - 117 U/L    Total Bilirubin 0.3 0.0 - 1.2 mg/dL    Globulin 3.8 gm/dL    A/G Ratio 0.6 g/dL    BUN/Creatinine Ratio 27.6 (H) 7.0 - 25.0    Anion Gap 10.0 5.0 - 15.0 mmol/L    eGFR 53.5 (L) >60.0 mL/min/1.73   Phosphorus    Collection Time: 04/13/23 12:58 AM    Specimen: Blood   Result Value Ref Range    Phosphorus 2.4 (L) 2.5 - 4.5 mg/dL   Calcium, Ionized    Collection Time: 04/13/23 12:58 AM    Specimen: Blood   Result Value Ref Range    Ionized Calcium 1.24 1.20 - 1.30 mmol/L   Magnesium    Collection Time: 04/13/23 12:58 AM    Specimen: Blood   Result Value Ref Range    Magnesium 1.7 1.6 - 2.4 mg/dL   CBC (No Diff)    Collection Time: 04/13/23 12:58 AM    Specimen: Blood   Result Value Ref Range    WBC 8.30 3.40 - 10.80 10*3/mm3    RBC 2.93 (L) 4.14 - 5.80 10*6/mm3    Hemoglobin 7.8 (L) 13.0 - 17.7 g/dL    Hematocrit 24.7 (L) 37.5 - 51.0 %    MCV 84.4 79.0 - 97.0 fL    MCH 26.5 (L) 26.6 - 33.0 pg    MCHC 31.4 (L) 31.5 - 35.7 g/dL    RDW 17.0 (H) 12.3 - 15.4 %    RDW-SD 50.8 37.0 - 54.0 fl     MPV 7.8 6.0 - 12.0 fL    Platelets 306 140 - 450 10*3/mm3   POC Glucose Once    Collection Time: 04/13/23  5:12 AM    Specimen: Blood   Result Value Ref Range    Glucose 151 (H) 70 - 105 mg/dL      Imaging Results (Last 24 Hours)     ** No results found for the last 24 hours. **           Assessment:    Bladder cancer    History of alcohol use disorder    Prostate cancer    Elevated serum creatinine    Attention to urostomy    Severe Malnutrition (HCC)      Cystectomy and conduit 3/28/2023  Wound dehiscence with closure 4/6/23    Plan:    Continue dietary support  Continue encourage ambulation  Wound vac  Appreciate all consultants help  To SNF when more recovered, feeling stronger and looking more alert now        Xu Montero MD  [facility]  [address], [address]  [place] [address]  Office: [phone]  Cell: [phone]  Also available via Epic Secure Chat  04/13/23  06:33 EDT

## 2023-04-13 NOTE — THERAPY TREATMENT NOTE
"Subjective: Pt agreeable to therapeutic plan of care. Pt frustrated over urgent need to toilet    Objective:     Pt has removed gown and is attempting to detangle gown from medical lines.  Pt requires max assist  Bed mobility - CGA  Transfers - CGA with cues for hand placement.  Performs sit to stand x 4 during session  Ambulation - 5 feet x 2 with HHA 1 stepping from bed to BSC and back. Pt needs max cues for positioning to turn toward bed to manage medical lines.      Pt requires total assist for incontinence of bowel clean up and clothing change.      Pt demonstrates good static sit EOB and on BSC,  Pt stands with UE supported and supervision.    Vitals: WNL    Pain: 0 VAS   Location: n/a  Intervention for pain: N/A    Education: Provided education on the importance of mobility in the acute care setting and Transfer Training    Assessment: Anthony Moss presents with functional mobility impairments which indicate the need for skilled intervention. Tolerating session today without incident. Pt demonstrates improved participation but fatigues quickly with minimal activities.  Pt requires max cues for problem solving but is able to follow cues. He is limited by endurance and fecal incontinence Will continue to follow and progress as tolerated.     Plan/Recommendations:   Moderate Intensity Therapy recommended post-acute care. This is recommended as therapy feels the patient would require 3-4 days per week and wouldn't tolerate \"3 hour daily\" rehab intensity. SNF would be the preferred choice. If the patient does not agree to SNF, arrange HH or OP depending on home bound status. If patient is medically complex, consider LTACH.. Pt requires no DME at discharge.     Pt desires Skilled Rehab placement at discharge. Pt cooperative; agreeable to therapeutic recommendations and plan of care.         Basic Mobility 6-click:  Rollin = Total, A lot = 2, A little = 3; 4 = None  Supine>Sit:   1 = Total, A lot = " 2, A little = 3; 4 = None   Sit>Stand with arms:  1 = Total, A lot = 2, A little = 3; 4 = None  Bed>Chair:   1 = Total, A lot = 2, A little = 3; 4 = None  Ambulate in room:  1 = Total, A lot = 2, A little = 3; 4 = None  3-5 Steps with railin = Total, A lot = 2, A little = 3; 4 = None  Score: 17    Modified [place]: N/A = No pre-op stroke/TIA    Post-Tx Position: Supine with HOB Elevated, Alarms activated and Call light and personal items within reach  PPE: gloves and surgical mask

## 2023-04-13 NOTE — PLAN OF CARE
"Goal Outcome Evaluation:               Assessment: Anthony Moss presents with functional mobility impairments which indicate the need for skilled intervention. Tolerating session today without incident. Pt demonstrates improved participation but fatigues quickly with minimal activities.  Pt requires max cues for problem solving but is able to follow cues. He is limited by endurance and fecal incontinence  He requires min A 1 for mobility but max A for personal care. Will continue to follow and progress as tolerated.      Plan/Recommendations:   Moderate Intensity Therapy recommended post-acute care. This is recommended as therapy feels the patient would require 3-4 days per week and wouldn't tolerate \"3 hour daily\" rehab intensity. SNF would be the preferred choice. If the patient does not agree to SNF, arrange HH or OP depending on home bound status. If patient is medically complex, consider LTACH.. Pt requires no DME at discharge.      Pt desires Skilled Rehab placement at discharge. Pt cooperative; agreeable to therapeutic recommendations and plan of care.   "

## 2023-04-13 NOTE — THERAPY TREATMENT NOTE
"Subjective: Pt agreeable to therapeutic plan of care.  Cognition: oriented to Person, Place, Time and Situation    Objective:     Bed Mobility: N/A or Not attempted. Up on BSC upon OT entry  Functional Transfers: Min-A sit to stand from BSC     Balance: unsupported, static, dynamic and standing Min-A  Functional Ambulation: Min-A ambulating 5' from BSC to chair    Toileting: Max-A  ADL Position: supported standing and unsupported sitting  ADL Comments: Patient had loose BM when attempting to get to BSC with nursing, with stool on the bed rails, linens, and sheets upon OT entry. Upon finishing BM, patient stood with Min A requiring heavy assist to perform eva-hygiene, as well as to manage briefs up over hips. DEP for changing linens and cleaning floor.      Vitals: WNL    Pain: 0 VAS  Location: NA  Interventions for pain: N/A  Education: Provided education on the importance of mobility in the acute care setting, Verbal/Tactile Cues, ADL training and Transfer Training      Assessment: Anthony Moss presents with ADL impairments affecting function including balance, endurance / activity tolerance and strength. Demonstrated functioning below baseline abilities indicate the need for continued skilled intervention while inpatient. Patient cont to display far below baseline activity tolerance and overall weakness. Upon standing from BSC, patient braced himself on the bed in a forward-flexed position. Min A to stand and ambulate to chair,  Max A-DEP for toilet hygiene and lower body clothing management. Patient requires SNF at d/c to facilitate return to baseline. Tolerating session today without incident. Will continue to follow and progress as tolerated.     Plan/Recommendations:   Moderate Intensity Therapy recommended post-acute care. This is recommended as therapy feels the patient would require 3-4 days per week and wouldn't tolerate \"3 hour daily\" rehab intensity. SNF would be the preferred choice. If the patient " does not agree to SNF, arrange HH or OP depending on home bound status. If patient is medically complex, consider LTACH.. Pt requires no DME at discharge.     Pt desires Skilled Rehab placement at discharge. Pt cooperative; agreeable to therapeutic recommendations and plan of care.     Modified [place]: N/A = No pre-op stroke/TIA    Post-Tx Position: Up in Chair, Alarms activated and Call light and personal items within reach  PPE: gloves and surgical mask

## 2023-04-13 NOTE — PROGRESS NOTES
PROGRESS NOTE      Patient Name: Anthony Moss  : 1955  MRN: [id number]  Primary Care Physician: Reji Topete MD  Date of admission: 3/28/2023    Patient Care Team:  Reji Topete MD as PCP - General (Family Medicine)        Subjective   Subjective:     Resting in bed.  Review of systems:  Review of Systems -   Neg.except weakness.    Allergies:  No Known Allergies    Objective   Exam:     Vital Signs  Temp:  [97.8 °F (36.6 °C)-99.1 °F (37.3 °C)] 97.8 °F (36.6 °C)  Heart Rate:  [] 99  Resp:  [16-25] 20  BP: (118-144)/(70-85) 126/75  SpO2:  [92 %-98 %] 98 %  on   ;   Device (Oxygen Therapy): room air  Body mass index is 26.41 kg/m².    General:     male in no acute distress.    Head:      Normocephalic and atraumatic.    Eyes:      PERRL/EOM intact, conjunctiva and sclera clear with out nystagmus.    Neck:      No masses, thyromegaly,  trachea central with normal respiratory effort   Lungs:    Clear bilaterally to auscultation.    Heart:      Regular rate and rhythm, no murmur no gallop  Abd:        Soft, nontender, s/p surgery dressing present   Pulses:   Pulses palpable  Extr:        No cyanosis or clubbing--+ edema.    Neuro:    No focal deficits.   alert oriented x3  Skin:       Intact without lesions or rashes.    Psych:    Alert and cooperative; normal mood and affect; .      Results Review:  I have personally reviewed most recent Data :  CBC    Results from last 7 days   Lab Units 23  0058 23  0347 23  0026 04/10/23  0512 23  0317 23  0400 23  0811   WBC 10*3/mm3 8.30 6.80 5.50 6.80 10.80 18.60* 40.80*   HEMOGLOBIN g/dL 7.8* 7.7* 7.4* 8.9* 7.4* 6.5* 8.4*   PLATELETS 10*3/mm3 306 290 256 251 240 248 361     CMP   Results from last 7 days   Lab Units 23  0058 23  0347 23  0026 04/10/23  0512 23  0317 23  0400 23  1717 23  0811   SODIUM mmol/L 139 142 143 139 135* 135* 133* 138   POTASSIUM  mmol/L 4.1 3.9 4.1 4.1 4.4 4.4 4.5 4.7   CHLORIDE mmol/L 105 105 109* 105 105 104 103 106   CO2 mmol/L 24.0 25.0 25.0 25.0 22.0 21.0* 21.0* 20.0*   BUN mg/dL 40* 36* 32* 29* 28* 26* 25* 25*   CREATININE mg/dL 1.45* 1.32* 1.48* 1.64* 1.89* 1.86* 2.01* 2.05*   GLUCOSE mg/dL 136* 105* 147* 132* 121* 136* 380* 170*   ALBUMIN g/dL 2.3* 2.2* 2.3* 2.2* 2.0* 2.0*  --  2.4*   BILIRUBIN mg/dL 0.3 0.3 0.3 0.4 0.4 0.3  --  0.6   ALK PHOS U/L 225* 204* 235* 208* 204* 136*  --  166*   AST (SGOT) U/L 21 18 19 16 14 15  --  16   ALT (SGPT) U/L 14 11 11 10 10 8  --  9     ABG    Results from last 7 days   Lab Units 04/07/23  0502   PH, ARTERIAL pH units 7.394   PCO2, ARTERIAL mm Hg 35.6   PO2 ART mm Hg 105.3   O2 SATURATION ART % 98.1*   BASE EXCESS ART mmol/L -2.8*     No radiology results for the last day    Results for orders placed during the hospital encounter of 01/05/22    Adult Transthoracic Echo Complete w/ Color, Spectral and Contrast if Necessary Per Protocol    Interpretation Summary  · Left ventricular ejection fraction appears to be 56 - 60%.  · The right ventricular cavity is moderately dilated.  · No pericardial effusion noted    Scheduled Meds:Fat Emulsion Plant Based, 250 mL, Intravenous, Once per day on Mon Tue Wed Thu Fri  guaiFENesin, 1,200 mg, Oral, Q12H  heparin (porcine), 5,000 Units, Subcutaneous, Q12H  insulin lispro, 2-7 Units, Subcutaneous, Q6H  magnesium sulfate, 2 g, Intravenous, Once  metoclopramide, 10 mg, Intravenous, Q6H  polyethylene glycol, 17 g, Oral, BID  sodium chloride, 10 mL, Intravenous, Q12H  sodium phosphate IVPB, 15 mmol, Intravenous, Once  vitamin D3, 5,000 Units, Oral, Daily      Continuous Infusions:Adult [facility] [employer] TPN, , Last Rate: 87 mL/hr at 04/12/23 1809  Adult [employer] TPN,   Pharmacy to Dose TPN,       PRN Meds:•  acetaminophen **OR** acetaminophen  •  dextrose  •  dextrose  •  docusate sodium  •  glucagon (human recombinant)  •  HYDROcodone-acetaminophen  •   hydrOXYzine  •  lactulose  •  [] HYDROmorphone **AND** naloxone  •  ondansetron **OR** ondansetron  •  Pharmacy to Dose TPN  •  phenol  •  polyethyl glycol-propyl glycol  •  senna-docusate sodium  •  sodium chloride  •  sodium chloride    Assessment & Plan   Assessment and Plan:         Bladder cancer    History of alcohol use disorder    Prostate cancer    Elevated serum creatinine    Attention to urostomy    Severe Malnutrition (HCC)    ASSESSMENT:  • Acute kidney injury, stage III, status post radical cystectomy getting better hyperkalemia, secondary to CARLOS ALBERTO  • Metabolic acidosis secondary to CKD start low-dose sodium bicarbonate  • History of bladder cancer, recent cystectomy  • Suprapubic catheter  • History of alcohol use,  • Chronic back pain,using NSAID in the past  • B/l pneumonia.  Surprisingly, WBC stable,           Plan:      • At this time patient with history of bladder cancer status post cystectomy urine output is adequate   • Suprapubic urostomy placed  • Patient has more wound dehiscence and some infection taken back to the OR for 2023   • Patient creatinine of 1.4 which is close to baseline   • Continue TPN at this time no need to restart Ringer lactate  • Blood pressure stable  • Repeat labs tomorrow morning  • Patient still has significant GI complaints not better yet Pt very sleepy  • Labs tomorrow morning  • Potassium level is acceptable  • Echocardiogram done last time showed ejection fraction 56 to 60%  • Repeat labs tomorrow morning          Electronically signed by Jamie Houston MD,   [facility] kidney consultant  [phone]  2023  09:35 EDT    Rest

## 2023-04-13 NOTE — NURSING NOTE
WOCN note:    WOCN follow up for leaking urostomy pouch. Leaking pouch removed and skin cleansed and dried. Stents remain in place but urine is noted coming thru the stoma. The mucocutaneous junction is healing. There is maceration of the eva-stomal skin of < 0.5cm.    Skin barrier spray was applied. A small bead of stoma paste was used in the 3:00 crease. An [employer] barrier ring was placed around the stoma and a 2 piece [place] cut to fit convex skin barrier was applied with the opening cut slightly larger to decrease pressure on the mucocutaneous junction. A belt was added to assist with flattening the deep fold and allowing the stoma to protrude into the pouch. The pouch was reattached to bedside drainage bag. We will continue to follow.

## 2023-04-13 NOTE — PLAN OF CARE
Goal Outcome Evaluation:  Plan of Care Reviewed With: patient        Progress: improving    Diet upgraded today, per surgery. Tolerated well. TPN continues. Mag and Sodium Phos replaced per orders. Urostomy bag changed multiple times this shift. Anticipate d/c to [place]; precert pending. VSS. No complaints at this time.       Problem: Adult Inpatient Plan of Care  Goal: Plan of Care Review  Outcome: Ongoing, Progressing  Flowsheets (Taken 4/13/2023 1815)  Progress: improving  Plan of Care Reviewed With: patient  Goal: Patient-Specific Goal (Individualized)  Outcome: Ongoing, Progressing  Goal: Absence of Hospital-Acquired Illness or Injury  Outcome: Ongoing, Progressing  Intervention: Identify and Manage Fall Risk  Recent Flowsheet Documentation  Taken 4/13/2023 1615 by Linda Keys, RN  Safety Promotion/Fall Prevention:   safety round/check completed   room organization consistent   nonskid shoes/slippers when out of bed   fall prevention program maintained   clutter free environment maintained   assistive device/personal items within reach  Taken 4/13/2023 1215 by Linda Keys, RN  Safety Promotion/Fall Prevention:   safety round/check completed   room organization consistent   nonskid shoes/slippers when out of bed   fall prevention program maintained   clutter free environment maintained   assistive device/personal items within reach  Taken 4/13/2023 1015 by Linda Keys, RN  Safety Promotion/Fall Prevention:   safety round/check completed   room organization consistent   nonskid shoes/slippers when out of bed   fall prevention program maintained   clutter free environment maintained   assistive device/personal items within reach  Taken 4/13/2023 0815 by Linda Keys, RN  Safety Promotion/Fall Prevention:   safety round/check completed   room organization consistent   nonskid shoes/slippers when out of bed   fall prevention program maintained   clutter free environment  maintained   assistive device/personal items within reach  Intervention: Prevent Skin Injury  Recent Flowsheet Documentation  Taken 4/13/2023 1615 by Linda Keys RN  Body Position:   position changed independently   supine, legs elevated   weight shifting  Skin Protection:   adhesive use limited   tubing/devices free from skin contact  Taken 4/13/2023 1215 by Linda Keys RN  Body Position:   position changed independently   weight shifting  Skin Protection:   adhesive use limited   tubing/devices free from skin contact  Taken 4/13/2023 1055 by Linda Keys RN  Body Position:   supine, legs elevated   weight shifting  Taken 4/13/2023 1015 by Linda Keys RN  Body Position:   position changed independently   legs elevated  Taken 4/13/2023 0815 by Linda Keys RN  Body Position: position changed independently  Skin Protection:   adhesive use limited   tubing/devices free from skin contact  Intervention: Prevent and Manage VTE (Venous Thromboembolism) Risk  Recent Flowsheet Documentation  Taken 4/13/2023 1615 by Linda Keys RN  Activity Management:   activity encouraged   up to bedside commode  Taken 4/13/2023 1215 by Linda Keys RN  Activity Management: activity encouraged  Taken 4/13/2023 1055 by Linda Keys RN  Activity Management: back to bed  Taken 4/13/2023 1015 by Linda Keys RN  Activity Management:   activity encouraged   up in chair  Taken 4/13/2023 0815 by Linda Keys RN  Activity Management:   activity encouraged   up to bedside commode  Range of Motion: active ROM (range of motion) encouraged  Intervention: Prevent Infection  Recent Flowsheet Documentation  Taken 4/13/2023 1615 by Linda Keys RN  Infection Prevention:   single patient room provided   personal protective equipment utilized   hand hygiene promoted  Taken 4/13/2023 1215 by Linda Keys RN  Infection Prevention:   single patient  room provided   personal protective equipment utilized   hand hygiene promoted  Taken 4/13/2023 1015 by Linda Keys RN  Infection Prevention:   single patient room provided   personal protective equipment utilized   hand hygiene promoted  Taken 4/13/2023 0815 by Linda Keys RN  Infection Prevention:   single patient room provided   personal protective equipment utilized   hand hygiene promoted  Goal: Optimal Comfort and Wellbeing  Outcome: Ongoing, Progressing  Intervention: Monitor Pain and Promote Comfort  Recent Flowsheet Documentation  Taken 4/13/2023 1615 by Linda Keys RN  Pain Management Interventions:   care clustered   pain management plan reviewed with patient/caregiver   pillow support provided   position adjusted   quiet environment facilitated   relaxation techniques promoted  Taken 4/13/2023 1215 by Linda Keys RN  Pain Management Interventions:   care clustered   pain management plan reviewed with patient/caregiver   pillow support provided   position adjusted   quiet environment facilitated   relaxation techniques promoted  Taken 4/13/2023 0815 by Linda Keys RN  Pain Management Interventions:   care clustered   pain management plan reviewed with patient/caregiver   pillow support provided   position adjusted   quiet environment facilitated   relaxation techniques promoted  Intervention: Provide Person-Centered Care  Recent Flowsheet Documentation  Taken 4/13/2023 1615 by Linda Keys RN  Trust Relationship/Rapport:   care explained   questions answered   questions encouraged  Taken 4/13/2023 1215 by Linda Keys RN  Trust Relationship/Rapport:   care explained   questions answered   questions encouraged  Taken 4/13/2023 0815 by Linda Keys RN  Trust Relationship/Rapport:   care explained   questions answered   questions encouraged  Goal: Readiness for Transition of Care  Outcome: Ongoing, Progressing     Problem: Chest  Pain  Goal: Resolution of Chest Pain Symptoms  Outcome: Ongoing, Progressing     Problem: Skin Injury Risk Increased  Goal: Skin Health and Integrity  Outcome: Ongoing, Progressing  Intervention: Optimize Skin Protection  Recent Flowsheet Documentation  Taken 4/13/2023 1615 by Linda Keys RN  Pressure Reduction Techniques: frequent weight shift encouraged  Head of Bed (HOB) Positioning: HOB elevated  Pressure Reduction Devices: pressure-redistributing mattress utilized  Skin Protection:   adhesive use limited   tubing/devices free from skin contact  Taken 4/13/2023 1215 by Linda Keys RN  Pressure Reduction Techniques: frequent weight shift encouraged  Head of Bed (HOB) Positioning: HOB elevated  Pressure Reduction Devices: pressure-redistributing mattress utilized  Skin Protection:   adhesive use limited   tubing/devices free from skin contact  Taken 4/13/2023 1055 by Linda Keys RN  Head of Bed (HOB) Positioning: HOB lowered  Taken 4/13/2023 1015 by Linda Keys RN  Head of Bed (HOB) Positioning: HOB elevated  Taken 4/13/2023 0815 by Linda Keys RN  Pressure Reduction Techniques: frequent weight shift encouraged  Head of Bed (HOB) Positioning: HOB elevated  Pressure Reduction Devices: pressure-redistributing mattress utilized  Skin Protection:   adhesive use limited   tubing/devices free from skin contact     Problem: Fall Injury Risk  Goal: Absence of Fall and Fall-Related Injury  Outcome: Ongoing, Progressing  Intervention: Identify and Manage Contributors  Recent Flowsheet Documentation  Taken 4/13/2023 1615 by Linda Keys RN  Medication Review/Management: medications reviewed  Taken 4/13/2023 1215 by Linda Keys RN  Medication Review/Management: medications reviewed  Taken 4/13/2023 1015 by Linda Keys RN  Medication Review/Management: medications reviewed  Taken 4/13/2023 0815 by Linda Keys RN  Medication  Review/Management: medications reviewed  Intervention: Promote Injury-Free Environment  Recent Flowsheet Documentation  Taken 4/13/2023 1615 by Linda Keys RN  Safety Promotion/Fall Prevention:   safety round/check completed   room organization consistent   nonskid shoes/slippers when out of bed   fall prevention program maintained   clutter free environment maintained   assistive device/personal items within reach  Taken 4/13/2023 1215 by Linda Keys RN  Safety Promotion/Fall Prevention:   safety round/check completed   room organization consistent   nonskid shoes/slippers when out of bed   fall prevention program maintained   clutter free environment maintained   assistive device/personal items within reach  Taken 4/13/2023 1015 by Linda Keys RN  Safety Promotion/Fall Prevention:   safety round/check completed   room organization consistent   nonskid shoes/slippers when out of bed   fall prevention program maintained   clutter free environment maintained   assistive device/personal items within reach  Taken 4/13/2023 0815 by Linda Keys RN  Safety Promotion/Fall Prevention:   safety round/check completed   room organization consistent   nonskid shoes/slippers when out of bed   fall prevention program maintained   clutter free environment maintained   assistive device/personal items within reach     Problem: Nausea and Vomiting  Goal: Fluid and Electrolyte Balance  Outcome: Ongoing, Progressing  Intervention: Monitor and Manage Hypovolemia  Recent Flowsheet Documentation  Taken 4/13/2023 0815 by Linda Keys RN  Fluid/Electrolyte Management: fluids provided  Intervention: Prevent and Manage Nausea and Vomiting  Recent Flowsheet Documentation  Taken 4/13/2023 1615 by Linda Keys RN  Environmental Support:   environmental consistency promoted   calm environment promoted  Taken 4/13/2023 1215 by Linda Keys RN  Environmental Support: environmental  consistency promoted  Taken 4/13/2023 0815 by Linda Keys, RN  Environmental Support: environmental consistency promoted     Problem: Malnutrition  Goal: Improved Nutritional Intake  Outcome: Ongoing, Progressing  Intervention: Promote and Optimize Nutrition Support  Recent Flowsheet Documentation  Taken 4/13/2023 0815 by Linda Keys, RN  Nutrition Support Management: weight trending reviewed

## 2023-04-13 NOTE — CASE MANAGEMENT/SOCIAL WORK
Discharge Planning Assessment  [facility]     Patient Name: Anthony Moss  MRN: [id number]  Today's Date: 4/13/2023    Admit Date: 3/28/2023    Plan: [facility] accepted pending precert. Precert started 4/13. PASRR per facility. Facility able to accept TPN (however, unable to deliver TPN Fri/Sat/Sun).         Discharge Plan     Row Name 04/13/23 1602       Plan    Plan [facility] accepted pending precert. Precert started 4/13. PASRR per facility. Facility able to accept TPN (however, unable to deliver TPN Fri/Sat/Sun).    Patient/Family in Agreement with Plan yes    Plan Comments CM contacted [facility] liaison, precert started once PT notes available, started approx. 1125am. CM updated RN/MD, able to d/c once precert approved per MD. CM spoke to patient at bedside, IMM letter reviewed with patient, verbal consent and copy left at bedside. Patient agreeable to d/c plan. CM confirmed with [facility] liaison that precert remains pending at 1600. CM updated RN/MD. CM updated ER CM who can assist with d/c if precert approved this evening-CM notified facility liaison who to contact. Barrier to D/C: pending precert for rehab.                   Expected Discharge Date and Time     Expected Discharge Date Expected Discharge Time    Apr 13, 2023              Patient Forms     Row Name 04/13/23 1606       Patient Forms    Important Message from Medicare (IMM) Delivered  4/13/22    Delivered to Patient    Method of delivery In person  reviewed with patient, verbal consent and copy left at bedside              Met with patient in room. Maintained distance greater than six feet and spent less than 15 minutes in the room.      LONI GarayN, RN    [place] [id number]    Office: [phone]  Fax: [phone]

## 2023-04-13 NOTE — PROGRESS NOTES
[facility] Medicine Services Daily Progress Note    Patient Name: Anthony Moss  : 1955  MRN: [id number]  Primary Care Physician:  Reji Topete MD  Date of admission: 3/28/2023      Subjective      Chief Complaint: Constipation, weakness      Patient Reports     3/31/23: OOB to chair.  PT consulted.  Constipated.  Undecided whether SNF versus HH.  Lives alone.  DC IVF.  Nephrology and urology following. + ARAVIND drain with serosanguineous fluid.    23: Patient with multiple vomiting overnight NG tube placed with bilious discharge.  Repeat KUB.  Gaiting [employer].    23: Patient having NGT removed.  Still feeling pretty rough at this time improved abdominal pain.  Somnolent as well.  Awaiting SNF placement.    4/3/2023: Patient had NGT replaced due to likely ileus.  Having some surgical site TTP and soreness.  Still feeling pretty good overall, forward to improving his p.o. intake and getting started with the rest of his care plan.    23: Still has NGT in place.  Having some more feelings of hunger, agreeable to current management.  Also reporting constipation.    23: NGT was removed today, had large volume bowel movements as well.  Patient feeling a lot better today.  Advance to clear liquid diet.    23: Had significant bowel movement as well, still having some more abdominal pain today.  Denies any nausea, vomiting.  Is tolerating p.o. intake as well, but still only liquids and high-fiber.    23: Patient went to the OR last night for wound dehiscence repair.  General surgery and urology recs are appreciated for current management.  Patient to continue IV ABX, pending ID recs.     23: Patient had drop in Hb to below 7, receiving PRBC at this time.Still reporting significant abdominal pain.  General surgery recs pending.    23: Significant pretty worn down.  Now tolerating clear liquids, GS looking forward to advancing to fulls, still on TPN at  this time.  Pain management still be via IV    4/12/23: More alert today, but still feeling pretty fatigued.  Denied PT participation.  That is required for SNF placement by tomorrow otherwise it will go till Monday due to his TPN needs.  Patient is having more p.o. intake now and feeling somewhat better.    4/13/2023: Having improved functional ability and participation with PT.  A lot more energetic.  General surgery and urology recs appreciated.  Patient stable for DC to SNF, pre-CERT still pending.    Review of Systems   All other systems reviewed and are negative.         Objective      Vitals:   Temp:  [97.8 °F (36.6 °C)-99.1 °F (37.3 °C)] 97.8 °F (36.6 °C)  Heart Rate:  [] 98  Resp:  [16-25] 19  BP: (118-141)/(70-88) 139/88    Physical Exam  HENT:      Head: Normocephalic.      Nose: Nose normal.   Eyes:      Extraocular Movements: Extraocular movements intact.      Pupils: Pupils are equal, round, and reactive to light.   Cardiovascular:      Rate and Rhythm: Normal rate.   Pulmonary:      Effort: Pulmonary effort is normal.      Breath sounds: Normal breath sounds.   Abdominal:      General: Bowel sounds are normal.      Comments: Ileal conduit with urine.  Left lower quadrant ARAVIND drain with serosanguineous fluid.   Musculoskeletal:         General: Normal range of motion.      Cervical back: Normal range of motion.   Skin:     General: Skin is warm.   Neurological:      Mental Status: He is alert. Mental status is at baseline.   Psychiatric:         Mood and Affect: Mood normal.             Result Review    Result Review:  I have personally reviewed the results from the time of this admission to 4/13/2023 16:01 EDT and agree with these findings:  [x]  Laboratory  []  Microbiology  [x]  Radiology  []  EKG/Telemetry   []  Cardiology/Vascular   []  Pathology  [x]  Old records  []  Other:      Wounds (last 24 hours)     LDA Wound     Row Name 04/13/23 0815 04/13/23 0526 04/13/23 0400       Wound 04/06/23  2243 abdomen Incision    Wound - Properties Group Placement Date: 04/06/23  -PH Placement Time: 2243 -PH Present on Hospital Admission: Y  -PH Location: abdomen  -PH Primary Wound Type: Incision  -PH    Dressing Appearance dry;intact  -SC -- --    Closure STEFF  -SC -- STEFF  -AB    Base dressing in place, unable to visualize  -SC -- dressing in place, unable to visualize  -AB    Drainage Characteristics/Odor -- -- serosanguineous  -AB    Care, Wound negative pressure wound therapy  -SC -- --    Periwound Care dry periwound area maintained  -SC -- dry periwound area maintained  -AB    Wound Output (mL) -- 60  cannister marked at 60 mL  -AB --    Retired Wound - Properties Group Placement Date: 04/06/23  -PH Placement Time: 2243 -PH Present on Hospital Admission: Y  -PH Location: abdomen  -PH Primary Wound Type: Incision  -PH    Retired Wound - Properties Group Date first assessed: 04/06/23  -PH Time first assessed: 2243 -PH Present on Hospital Admission: Y  -PH Location: abdomen  -PH Primary Wound Type: Incision  -PH       Wound 04/07/23 0800 Left posterior heel Pressure Injury    Wound - Properties Group Placement Date: 04/07/23  -PB Placement Time: 0800  -PB Side: Left  -PB Orientation: posterior  -PB Location: heel  -PB Primary Wound Type: Pressure inj  -PB    Dressing Appearance open to air  -SC -- --    Closure Open to air  -SC -- --    Care, Wound liliane boot  -SC -- --    Dressing Care open to air  -SC -- --    Periwound Care dry periwound area maintained  -SC -- --    Retired Wound - Properties Group Placement Date: 04/07/23  -PB Placement Time: 0800  -PB Side: Left  -PB Orientation: posterior  -PB Location: heel  -PB Primary Wound Type: Pressure inj  -PB    Retired Wound - Properties Group Date first assessed: 04/07/23  -PB Time first assessed: 0800  -PB Side: Left  -PB Location: heel  -PB Primary Wound Type: Pressure inj  -PB       Wound Right posterior heel Pressure Injury    Wound - Properties Group Side:  Right  -PB Orientation: posterior  -PB Location: heel  -PB Primary Wound Type: Pressure inj  -PB    Dressing Appearance open to air  -SC -- --    Closure Open to air  -SC -- --    Care, Wound liliane boot  -SC -- --    Periwound Care dry periwound area maintained  -SC -- --    Retired Wound - Properties Group Side: Right  -PB Orientation: posterior  -PB Location: heel  -PB Primary Wound Type: Pressure inj  -PB    Retired Wound - Properties Group Side: Right  -PB Location: heel  -PB Primary Wound Type: Pressure inj  -PB       Wound 04/11/23 0005 thoracic spine    Wound - Properties Group Placement Date: 04/11/23  -AB Placement Time: 0005  -AB Location: thoracic spine  -AB    Dressing Appearance dry;intact  -SC -- --    Closure STEFF  -SC -- STEFF  -AB    Base dressing in place, unable to visualize  -SC -- dressing in place, unable to visualize  -AB    Periwound Care dry periwound area maintained  -SC -- --    Retired Wound - Properties Group Placement Date: 04/11/23  -AB Placement Time: 0005 -AB Location: thoracic spine  -AB    Retired Wound - Properties Group Date first assessed: 04/11/23  -AB Time first assessed: 0005  -AB Location: thoracic spine  -AB    Row Name 04/13/23 0000 04/12/23 2111 04/12/23 2000       Wound 04/06/23 2243 abdomen Incision    Wound - Properties Group Placement Date: 04/06/23  -PH Placement Time: 2243  -PH Present on Hospital Admission: Y  -PH Location: abdomen  -PH Primary Wound Type: Incision  -PH    Closure STEFF  -AB -- STEFF  -AB    Base dressing in place, unable to visualize  -AB -- dressing in place, unable to visualize  -AB    Drainage Characteristics/Odor serosanguineous  -AB -- serosanguineous  -AB    Care, Wound -- -- negative pressure wound therapy  -AB    Periwound Care -- -- dry periwound area maintained  -AB    Wound Output (mL) -- 200  cannister changed  -AB --    Retired Wound - Properties Group Placement Date: 04/06/23  -PH Placement Time: 2243  -PH Present on Hospital Admission: Y   -PH Location: abdomen  -PH Primary Wound Type: Incision  -PH    Retired Wound - Properties Group Date first assessed: 04/06/23  -PH Time first assessed: 2243  -PH Present on Hospital Admission: Y  -PH Location: abdomen  -PH Primary Wound Type: Incision  -PH       Wound 04/07/23 0800 Left posterior heel Pressure Injury    Wound - Properties Group Placement Date: 04/07/23  -PB Placement Time: 0800  -PB Side: Left  -PB Orientation: posterior  -PB Location: heel  -PB Primary Wound Type: Pressure inj  -PB    Dressing Appearance -- -- open to air  refused liliane boots after education  -AB    Periwound Care -- -- dry periwound area maintained  -AB    Retired Wound - Properties Group Placement Date: 04/07/23  -PB Placement Time: 0800  -PB Side: Left  -PB Orientation: posterior  -PB Location: heel  -PB Primary Wound Type: Pressure inj  -PB    Retired Wound - Properties Group Date first assessed: 04/07/23  -PB Time first assessed: 0800  -PB Side: Left  -PB Location: heel  -PB Primary Wound Type: Pressure inj  -PB       Wound Right posterior heel Pressure Injury    Wound - Properties Group Side: Right  -PB Orientation: posterior  -PB Location: heel  -PB Primary Wound Type: Pressure inj  -PB    Dressing Appearance -- -- open to air  refused liliane boots after education  -AB    Periwound Care -- -- dry periwound area maintained  -AB    Retired Wound - Properties Group Side: Right  -PB Orientation: posterior  -PB Location: heel  -PB Primary Wound Type: Pressure inj  -PB    Retired Wound - Properties Group Side: Right  -PB Location: heel  -PB Primary Wound Type: Pressure inj  -PB       Wound 04/11/23 0005 thoracic spine    Wound - Properties Group Placement Date: 04/11/23  -AB Placement Time: 0005  -AB Location: thoracic spine  -AB    Dressing Appearance -- -- dry;intact  -AB    Closure STEFF  -AB -- STEFF  -AB    Base dressing in place, unable to visualize  -AB -- dressing in place, unable to visualize  -AB    Periwound Care -- -- dry  periwound area maintained  -AB    Retired Wound - Properties Group Placement Date: 04/11/23  -AB Placement Time: 0005  -AB Location: thoracic spine  -AB    Retired Wound - Properties Group Date first assessed: 04/11/23  -AB Time first assessed: 0005  -AB Location: thoracic spine  -AB    Row Name 04/12/23 1809             Wound 04/06/23 2243 abdomen Incision    Wound - Properties Group Placement Date: 04/06/23  -PH Placement Time: 2243  -PH Present on Hospital Admission: Y  -PH Location: abdomen  -PH Primary Wound Type: Incision  -PH    Wound Output (mL) 250  -SC      Retired Wound - Properties Group Placement Date: 04/06/23  -PH Placement Time: 2243  -PH Present on Hospital Admission: Y  -PH Location: abdomen  -PH Primary Wound Type: Incision  -PH    Retired Wound - Properties Group Date first assessed: 04/06/23  -PH Time first assessed: 2243  -PH Present on Hospital Admission: Y  -PH Location: abdomen  -PH Primary Wound Type: Incision  -PH       Wound 04/07/23 0800 Left posterior heel Pressure Injury    Wound - Properties Group Placement Date: 04/07/23  -PB Placement Time: 0800  -PB Side: Left  -PB Orientation: posterior  -PB Location: heel  -PB Primary Wound Type: Pressure inj  -PB    Retired Wound - Properties Group Placement Date: 04/07/23  -PB Placement Time: 0800  -PB Side: Left  -PB Orientation: posterior  -PB Location: heel  -PB Primary Wound Type: Pressure inj  -PB    Retired Wound - Properties Group Date first assessed: 04/07/23  -PB Time first assessed: 0800  -PB Side: Left  -PB Location: heel  -PB Primary Wound Type: Pressure inj  -PB       Wound Right posterior heel Pressure Injury    Wound - Properties Group Side: Right  -PB Orientation: posterior  -PB Location: heel  -PB Primary Wound Type: Pressure inj  -PB    Retired Wound - Properties Group Side: Right  -PB Orientation: posterior  -PB Location: heel  -PB Primary Wound Type: Pressure inj  -PB    Retired Wound - Properties Group Side: Right  -PB  Location: heel  -PB Primary Wound Type: Pressure inj  -PB       Wound 04/11/23 0005 thoracic spine    Wound - Properties Group Placement Date: 04/11/23 -AB Placement Time: 0005 -AB Location: thoracic spine  -AB    Retired Wound - Properties Group Placement Date: 04/11/23  -AB Placement Time: 0005 -AB Location: thoracic spine  -AB    Retired Wound - Properties Group Date first assessed: 04/11/23 -AB Time first assessed: 0005 -AB Location: thoracic spine  -AB          User Key  (r) = Recorded By, (t) = Taken By, (c) = Cosigned By    Initials Name Provider Type    PH Carolyn Marie, RN Registered Nurse    Linda Lechuga RN Registered Nurse    Carolyn Bautista, RN Registered Nurse    Dell Marlow RN Registered Nurse                  Assessment & Plan      Brief Patient Summary:        Fat Emulsion Plant Based, 250 mL, Intravenous, Once per day on Mon Tue Wed Thu Fri  guaiFENesin, 1,200 mg, Oral, Q12H  heparin (porcine), 5,000 Units, Subcutaneous, Q12H  insulin lispro, 2-7 Units, Subcutaneous, Q6H  metoclopramide, 10 mg, Intravenous, Q6H  polyethylene glycol, 17 g, Oral, BID  sodium chloride, 10 mL, Intravenous, Q12H  vitamin D3, 5,000 Units, Oral, Daily       Adult [facility] [employer]imix TPN, , Last Rate: 87 mL/hr at 04/12/23 1809  Adult [facility] [employer] TPN,   Pharmacy to Dose TPN,          Active Hospital Problems:  Active Hospital Problems    Diagnosis    • **Bladder cancer    • Severe Malnutrition (HCC)    • Attention to urostomy    • Prostate cancer    • History of alcohol use disorder      Surgical wound dehiscence  Postop anemia  Prostate cancer  Bladder cancer  S/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 intake, at risk for starvation    Plan:  -Ileus mostly resolved, but patient still not tolerating full p.o. intake but improved.  -ARAVIND drain  -Renal  function improved.  IVF changed, TPN continued, nephro recs appreciated.  -Encourage OOB to chair.  -Patient undecided whether SNF versus   -- 1 unit PRBC, Hb has been stable  -- WBCs now normalized likely leukemoid reaction after OR management  -- Continue on TPN.  GS recs appreciated for advancing to full liquid diet.  Patient tolerating some small amount of intake.  -- Continue IV ABX, ID recs appreciated  -- Will need to placement to SNF after this stay.  Patient is agreeable.        DVT prophylaxis:  Medical and mechanical DVT prophylaxis orders are present.    CODE STATUS:    Code Status (Patient has no pulse and is not breathing): CPR (Attempt to Resuscitate)  Medical Interventions (Patient has pulse or is breathing): Full Support      Disposition:  I expect patient to be discharged to SNF once tolerating p.o. intake, clinically stable, hopefully can DC in the next 1 to 2 days.    This patient has been examined wearing appropriate Personal Protective Equipment and discussed with nursing. 04/13/23      Electronically signed by Saima Roberto DO, 04/13/23, 16:01 EDT.  [facility] Hospitalist Team

## 2023-04-14 LAB
ALBUMIN SERPL-MCNC: 2.4 G/DL (ref 3.5–5.2)
ALBUMIN/GLOB SERPL: 0.6 G/DL
ALP SERPL-CCNC: 241 U/L (ref 39–117)
ALT SERPL W P-5'-P-CCNC: 15 U/L (ref 1–41)
ANION GAP SERPL CALCULATED.3IONS-SCNC: 10 MMOL/L (ref 5–15)
AST SERPL-CCNC: 21 U/L (ref 1–40)
BILIRUB SERPL-MCNC: 0.2 MG/DL (ref 0–1.2)
BUN SERPL-MCNC: 41 MG/DL (ref 8–23)
BUN/CREAT SERPL: 29.1 (ref 7–25)
CA-I SERPL ISE-MCNC: 1.18 MMOL/L (ref 1.2–1.3)
CALCIUM SPEC-SCNC: 8.1 MG/DL (ref 8.6–10.5)
CHLORIDE SERPL-SCNC: 108 MMOL/L (ref 98–107)
CO2 SERPL-SCNC: 20 MMOL/L (ref 22–29)
CREAT SERPL-MCNC: 1.41 MG/DL (ref 0.76–1.27)
DEPRECATED RDW RBC AUTO: 51.6 FL (ref 37–54)
EGFRCR SERPLBLD CKD-EPI 2021: 55.3 ML/MIN/1.73
ERYTHROCYTE [DISTWIDTH] IN BLOOD BY AUTOMATED COUNT: 17.2 % (ref 12.3–15.4)
GLOBULIN UR ELPH-MCNC: 3.9 GM/DL
GLUCOSE BLDC GLUCOMTR-MCNC: 107 MG/DL (ref 70–105)
GLUCOSE BLDC GLUCOMTR-MCNC: 116 MG/DL (ref 70–105)
GLUCOSE BLDC GLUCOMTR-MCNC: 148 MG/DL (ref 70–105)
GLUCOSE BLDC GLUCOMTR-MCNC: 152 MG/DL (ref 70–105)
GLUCOSE SERPL-MCNC: 159 MG/DL (ref 65–99)
HCT VFR BLD AUTO: 24.1 % (ref 37.5–51)
HGB BLD-MCNC: 7.7 G/DL (ref 13–17.7)
MAGNESIUM SERPL-MCNC: 1.9 MG/DL (ref 1.6–2.4)
MCH RBC QN AUTO: 27 PG (ref 26.6–33)
MCHC RBC AUTO-ENTMCNC: 31.7 G/DL (ref 31.5–35.7)
MCV RBC AUTO: 85.1 FL (ref 79–97)
PHOSPHATE SERPL-MCNC: 3.4 MG/DL (ref 2.5–4.5)
PLATELET # BLD AUTO: 291 10*3/MM3 (ref 140–450)
PMV BLD AUTO: 7.6 FL (ref 6–12)
POTASSIUM SERPL-SCNC: 3.5 MMOL/L (ref 3.5–5.2)
PROT SERPL-MCNC: 6.3 G/DL (ref 6–8.5)
RBC # BLD AUTO: 2.84 10*6/MM3 (ref 4.14–5.8)
SODIUM SERPL-SCNC: 138 MMOL/L (ref 136–145)
TRIGL SERPL-MCNC: 164 MG/DL (ref 0–150)
WBC NRBC COR # BLD: 10.7 10*3/MM3 (ref 3.4–10.8)

## 2023-04-14 PROCEDURE — 80053 COMPREHEN METABOLIC PANEL: CPT | Performed by: SURGERY

## 2023-04-14 PROCEDURE — 25010000002 METOCLOPRAMIDE PER 10 MG: Performed by: UROLOGY

## 2023-04-14 PROCEDURE — 84478 ASSAY OF TRIGLYCERIDES: CPT | Performed by: SURGERY

## 2023-04-14 PROCEDURE — 82962 GLUCOSE BLOOD TEST: CPT

## 2023-04-14 PROCEDURE — 83735 ASSAY OF MAGNESIUM: CPT | Performed by: SURGERY

## 2023-04-14 PROCEDURE — 85027 COMPLETE CBC AUTOMATED: CPT | Performed by: INTERNAL MEDICINE

## 2023-04-14 PROCEDURE — 82330 ASSAY OF CALCIUM: CPT | Performed by: SURGERY

## 2023-04-14 PROCEDURE — 99024 POSTOP FOLLOW-UP VISIT: CPT | Performed by: SURGERY

## 2023-04-14 PROCEDURE — 84100 ASSAY OF PHOSPHORUS: CPT | Performed by: SURGERY

## 2023-04-14 PROCEDURE — 25010000002 CALCIUM GLUCONATE 2-0.675 GM/100ML-% SOLUTION: Performed by: SURGERY

## 2023-04-14 PROCEDURE — 25010000002 HEPARIN (PORCINE) PER 1000 UNITS: Performed by: INTERNAL MEDICINE

## 2023-04-14 RX ORDER — POTASSIUM CHLORIDE 20 MEQ/1
40 TABLET, EXTENDED RELEASE ORAL ONCE
Status: COMPLETED | OUTPATIENT
Start: 2023-04-14 | End: 2023-04-14

## 2023-04-14 RX ORDER — POTASSIUM CHLORIDE 20 MEQ/1
40 TABLET, EXTENDED RELEASE ORAL ONCE
Qty: 2 TABLET | Refills: 0 | Status: SHIPPED | OUTPATIENT
Start: 2023-04-14 | End: 2023-04-19 | Stop reason: HOSPADM

## 2023-04-14 RX ORDER — CALCIUM GLUCONATE 20 MG/ML
2 INJECTION, SOLUTION INTRAVENOUS ONCE
Status: COMPLETED | OUTPATIENT
Start: 2023-04-14 | End: 2023-04-14

## 2023-04-14 RX ORDER — SODIUM BICARBONATE 650 MG/1
650 TABLET ORAL 3 TIMES DAILY
Status: DISCONTINUED | OUTPATIENT
Start: 2023-04-14 | End: 2023-04-18

## 2023-04-14 RX ADMIN — SODIUM BICARBONATE 650 MG: 650 TABLET ORAL at 17:11

## 2023-04-14 RX ADMIN — Medication 10 ML: at 20:34

## 2023-04-14 RX ADMIN — HEPARIN SODIUM 5000 UNITS: 5000 INJECTION INTRAVENOUS; SUBCUTANEOUS at 09:01

## 2023-04-14 RX ADMIN — GUAIFENESIN 1200 MG: 600 TABLET, EXTENDED RELEASE ORAL at 09:01

## 2023-04-14 RX ADMIN — METOCLOPRAMIDE HYDROCHLORIDE 10 MG: 5 INJECTION INTRAMUSCULAR; INTRAVENOUS at 13:06

## 2023-04-14 RX ADMIN — METOCLOPRAMIDE HYDROCHLORIDE 10 MG: 5 INJECTION INTRAMUSCULAR; INTRAVENOUS at 23:36

## 2023-04-14 RX ADMIN — POTASSIUM CHLORIDE 40 MEQ: 1500 TABLET, EXTENDED RELEASE ORAL at 17:11

## 2023-04-14 RX ADMIN — METOCLOPRAMIDE HYDROCHLORIDE 10 MG: 5 INJECTION INTRAMUSCULAR; INTRAVENOUS at 05:14

## 2023-04-14 RX ADMIN — SODIUM BICARBONATE 650 MG: 650 TABLET ORAL at 20:34

## 2023-04-14 RX ADMIN — Medication 5000 UNITS: at 09:01

## 2023-04-14 RX ADMIN — HEPARIN SODIUM 5000 UNITS: 5000 INJECTION INTRAVENOUS; SUBCUTANEOUS at 20:34

## 2023-04-14 RX ADMIN — METOCLOPRAMIDE HYDROCHLORIDE 10 MG: 5 INJECTION INTRAMUSCULAR; INTRAVENOUS at 17:11

## 2023-04-14 RX ADMIN — CALCIUM GLUCONATE 2 G: 20 INJECTION, SOLUTION INTRAVENOUS at 13:07

## 2023-04-14 RX ADMIN — GUAIFENESIN 1200 MG: 600 TABLET, EXTENDED RELEASE ORAL at 20:34

## 2023-04-14 RX ADMIN — Medication 10 ML: at 09:02

## 2023-04-14 NOTE — PROGRESS NOTES
Nutrition Services    Patient Name: Anthony Moss  YOB: 1955  MRN: [id number]  Admission date: 3/28/2023    PPE Documentation        PPE Worn By Provider Did not enter room for this encounter   PPE Worn By Patient  N/A     PROGRESS NOTE      Encounter Information: Progress note to check on TPN. Today will be day #8 of IV nutrition. Pt tolerating diet and is eating significantly more with diet advancement to regular diet. RD collaborated with pharmacist (Mackenzie) and Dr. Witt on pt's TPN regimen. Dr. Witt agreeable to stopping TPN with pt eating well.       PO Diet: Diet: Regular/House Diet; Texture: Regular Texture (IDDSI 7); Fluid Consistency: Thin (IDDSI 0)   PO Supplements: Boost Breeze TID (Provides 750 kcals, 42 g protein if consumed)   PO Intake:  % x 3 meals recorded on 4/13       Current nutrition support: Clinimix 5/20 2L + 20 mL lipids 5X weekly    Nutrition support review: Documented as above per EMR        Labs (reviewed below): Reviewed, management per attending         GI Function:  BM documented 4/14       Nutrition Intervention Updates: Plan to wean TPN today. Continue current diet and ONS.        Results from last 7 days   Lab Units 04/14/23 0514 04/13/23 0058 04/12/23 0347   SODIUM mmol/L 138 139 142   POTASSIUM mmol/L 3.5 4.1 3.9   CHLORIDE mmol/L 108* 105 105   CO2 mmol/L 20.0* 24.0 25.0   BUN mg/dL 41* 40* 36*   CREATININE mg/dL 1.41* 1.45* 1.32*   CALCIUM mg/dL 8.1* 8.5* 7.9*   BILIRUBIN mg/dL 0.2 0.3 0.3   ALK PHOS U/L 241* 225* 204*   ALT (SGPT) U/L 15 14 11   AST (SGOT) U/L 21 21 18   GLUCOSE mg/dL 159* 136* 105*     Results from last 7 days   Lab Units 04/14/23 0514 04/13/23 0058 04/12/23  0347   MAGNESIUM mg/dL 1.9 1.7 1.6   PHOSPHORUS mg/dL 3.4 2.4* 3.2   HEMOGLOBIN g/dL 7.7* 7.8* 7.7*   HEMATOCRIT % 24.1* 24.7* 24.5*   TRIGLYCERIDES mg/dL 164*  --   --      COVID19   Date Value Ref Range Status   03/01/2023 Not Detected Not Detected - Ref. Range  Final     Lab Results   Component Value Date    HGBA1C 4.8 03/01/2023       RD to follow up per protocol.    Electronically signed by:  Deneen Pantoja RD  04/14/23 09:51 EDT

## 2023-04-14 NOTE — PLAN OF CARE
Goal Outcome Evaluation:  Plan of Care Reviewed With: patient        Progress: improving     Med sitter d/c yesterday. TPN d/c today. Wound vac changed today per WOCN. PO intake improving. Precert approved; anticipate d/c to [place] Monday. VSS. No complaints at this time.      Problem: Adult Inpatient Plan of Care  Goal: Plan of Care Review  Outcome: Ongoing, Progressing  Flowsheets (Taken 4/14/2023 1815)  Progress: improving  Plan of Care Reviewed With: patient  Goal: Patient-Specific Goal (Individualized)  Outcome: Ongoing, Progressing  Goal: Absence of Hospital-Acquired Illness or Injury  Outcome: Ongoing, Progressing  Intervention: Identify and Manage Fall Risk  Recent Flowsheet Documentation  Taken 4/14/2023 1615 by Linda Keys, RN  Safety Promotion/Fall Prevention:   safety round/check completed   room organization consistent   nonskid shoes/slippers when out of bed   fall prevention program maintained   clutter free environment maintained   assistive device/personal items within reach  Taken 4/14/2023 1403 by Linda Keys, RN  Safety Promotion/Fall Prevention:   safety round/check completed   room organization consistent   nonskid shoes/slippers when out of bed   fall prevention program maintained   clutter free environment maintained   assistive device/personal items within reach  Taken 4/14/2023 1215 by Linda Keys, RN  Safety Promotion/Fall Prevention:   room organization consistent   safety round/check completed   nonskid shoes/slippers when out of bed   fall prevention program maintained   clutter free environment maintained   assistive device/personal items within reach  Taken 4/14/2023 0815 by Linda Keys, RN  Safety Promotion/Fall Prevention:   safety round/check completed   room organization consistent   nonskid shoes/slippers when out of bed   fall prevention program maintained   clutter free environment maintained   assistive device/personal items within  reach  Intervention: Prevent Skin Injury  Recent Flowsheet Documentation  Taken 4/14/2023 1615 by Linda Keys RN  Body Position:   position changed independently   supine   weight shifting  Skin Protection:   adhesive use limited   tubing/devices free from skin contact  Taken 4/14/2023 1403 by Linda Keys RN  Body Position:   position changed independently   supine, legs elevated  Taken 4/14/2023 1215 by Linda Keys RN  Body Position:   position changed independently   weight shifting  Skin Protection:   adhesive use limited   tubing/devices free from skin contact  Taken 4/14/2023 0815 by Linda Keys RN  Body Position:   position changed independently   sitting up in bed  Skin Protection:   adhesive use limited   tubing/devices free from skin contact  Intervention: Prevent and Manage VTE (Venous Thromboembolism) Risk  Recent Flowsheet Documentation  Taken 4/14/2023 1615 by Linda Keys RN  Activity Management: activity encouraged  Taken 4/14/2023 1403 by Linda Keys RN  Activity Management: activity encouraged  Taken 4/14/2023 1215 by Linda Keys RN  Activity Management: activity encouraged  Taken 4/14/2023 0815 by Linda Keys RN  Activity Management:   activity encouraged   up to bedside commode  VTE Prevention/Management:   bilateral   sequential compression devices off  Range of Motion: active ROM (range of motion) encouraged  Intervention: Prevent Infection  Recent Flowsheet Documentation  Taken 4/14/2023 1615 by Linda Keys RN  Infection Prevention:   single patient room provided   personal protective equipment utilized   hand hygiene promoted  Taken 4/14/2023 1403 by Linda Keys RN  Infection Prevention:   single patient room provided   personal protective equipment utilized   hand hygiene promoted  Taken 4/14/2023 1215 by Linda Keys RN  Infection Prevention:   single patient room provided   personal  protective equipment utilized   hand hygiene promoted  Taken 4/14/2023 0815 by Linda Keys RN  Infection Prevention:   single patient room provided   personal protective equipment utilized   hand hygiene promoted  Goal: Optimal Comfort and Wellbeing  Outcome: Ongoing, Progressing  Intervention: Monitor Pain and Promote Comfort  Recent Flowsheet Documentation  Taken 4/14/2023 1615 by Linda Keys RN  Pain Management Interventions:   care clustered   pain management plan reviewed with patient/caregiver   pillow support provided   position adjusted   quiet environment facilitated   relaxation techniques promoted  Taken 4/14/2023 1215 by Linda Keys RN  Pain Management Interventions:   care clustered   pain management plan reviewed with patient/caregiver   pillow support provided   position adjusted   quiet environment facilitated   relaxation techniques promoted  Taken 4/14/2023 0815 by Linda Keys RN  Pain Management Interventions:   care clustered   pain management plan reviewed with patient/caregiver   pillow support provided   position adjusted   quiet environment facilitated   relaxation techniques promoted  Intervention: Provide Person-Centered Care  Recent Flowsheet Documentation  Taken 4/14/2023 1615 by Linda Keys RN  Trust Relationship/Rapport:   care explained   questions answered   questions encouraged  Taken 4/14/2023 1215 by Linda Keys RN  Trust Relationship/Rapport:   care explained   questions answered   questions encouraged  Taken 4/14/2023 0815 by Linda Keys RN  Trust Relationship/Rapport:   care explained   questions answered   questions encouraged  Goal: Readiness for Transition of Care  Outcome: Ongoing, Progressing     Problem: Chest Pain  Goal: Resolution of Chest Pain Symptoms  Outcome: Ongoing, Progressing     Problem: Skin Injury Risk Increased  Goal: Skin Health and Integrity  Outcome: Ongoing, Progressing  Intervention:  Optimize Skin Protection  Recent Flowsheet Documentation  Taken 4/14/2023 1615 by Linda Keys RN  Pressure Reduction Techniques: frequent weight shift encouraged  Head of Bed (HOB) Positioning: [facility] elevated  Pressure Reduction Devices: pressure-redistributing mattress utilized  Skin Protection:   adhesive use limited   tubing/devices free from skin contact  Taken 4/14/2023 1403 by Linda Keys RN  Head of Bed (HOB) Positioning: HOB elevated  Taken 4/14/2023 1215 by Linda Keys RN  Pressure Reduction Techniques: frequent weight shift encouraged  Head of Bed (HOB) Positioning: [facility] elevated  Pressure Reduction Devices: pressure-redistributing mattress utilized  Skin Protection:   adhesive use limited   tubing/devices free from skin contact  Taken 4/14/2023 0815 by Linda Keys RN  Pressure Reduction Techniques: frequent weight shift encouraged  Head of Bed (HOB) Positioning: [facility] elevated  Pressure Reduction Devices: pressure-redistributing mattress utilized  Skin Protection:   adhesive use limited   tubing/devices free from skin contact     Problem: Fall Injury Risk  Goal: Absence of Fall and Fall-Related Injury  Outcome: Ongoing, Progressing  Intervention: Identify and Manage Contributors  Recent Flowsheet Documentation  Taken 4/14/2023 1615 by Linda Keys RN  Medication Review/Management: medications reviewed  Taken 4/14/2023 1403 by Linda Keys RN  Medication Review/Management: medications reviewed  Taken 4/14/2023 1215 by Linda Keys RN  Medication Review/Management: medications reviewed  Taken 4/14/2023 0815 by Linda Keys RN  Medication Review/Management: medications reviewed  Intervention: Promote Injury-Free Environment  Recent Flowsheet Documentation  Taken 4/14/2023 1615 by Linda Keys RN  Safety Promotion/Fall Prevention:   safety round/check completed   room organization consistent   nonskid shoes/slippers when out of  bed   fall prevention program maintained   clutter free environment maintained   assistive device/personal items within reach  Taken 4/14/2023 1403 by Linda Keys RN  Safety Promotion/Fall Prevention:   safety round/check completed   room organization consistent   nonskid shoes/slippers when out of bed   fall prevention program maintained   clutter free environment maintained   assistive device/personal items within reach  Taken 4/14/2023 1215 by Linda Keys RN  Safety Promotion/Fall Prevention:   room organization consistent   safety round/check completed   nonskid shoes/slippers when out of bed   fall prevention program maintained   clutter free environment maintained   assistive device/personal items within reach  Taken 4/14/2023 0815 by Linda Keys RN  Safety Promotion/Fall Prevention:   safety round/check completed   room organization consistent   nonskid shoes/slippers when out of bed   fall prevention program maintained   clutter free environment maintained   assistive device/personal items within reach     Problem: Nausea and Vomiting  Goal: Fluid and Electrolyte Balance  Outcome: Ongoing, Progressing  Intervention: Prevent and Manage Nausea and Vomiting  Recent Flowsheet Documentation  Taken 4/14/2023 1615 by Linda Keys RN  Environmental Support: calm environment promoted  Taken 4/14/2023 1215 by Linda Keys RN  Environmental Support:   calm environment promoted   environmental consistency promoted  Taken 4/14/2023 0815 by Linda Keys RN  Environmental Support:   calm environment promoted   environmental consistency promoted     Problem: Malnutrition  Goal: Improved Nutritional Intake  Outcome: Ongoing, Progressing  Intervention: Promote and Optimize Nutrition Support  Recent Flowsheet Documentation  Taken 4/14/2023 0815 by Linda Keys RN  Nutrition Support Management: weight trending reviewed

## 2023-04-14 NOTE — PLAN OF CARE
Problem: Adult Inpatient Plan of Care  Goal: Plan of Care Review  Outcome: Ongoing, Progressing  Goal: Patient-Specific Goal (Individualized)  Outcome: Ongoing, Progressing  Goal: Absence of Hospital-Acquired Illness or Injury  Outcome: Ongoing, Progressing  Intervention: Identify and Manage Fall Risk  Recent Flowsheet Documentation  Taken 4/14/2023 0400 by Monico Chaudhary LPN  Safety Promotion/Fall Prevention: safety round/check completed  Taken 4/14/2023 0200 by Monico Chaudhary LPN  Safety Promotion/Fall Prevention: safety round/check completed  Taken 4/14/2023 0000 by Monico Chaudhary LPN  Safety Promotion/Fall Prevention: safety round/check completed  Goal: Optimal Comfort and Wellbeing  Outcome: Ongoing, Progressing  Goal: Readiness for Transition of Care  Outcome: Ongoing, Progressing     Problem: Chest Pain  Goal: Resolution of Chest Pain Symptoms  Outcome: Ongoing, Progressing     Problem: Skin Injury Risk Increased  Goal: Skin Health and Integrity  Outcome: Ongoing, Progressing  Intervention: Optimize Skin Protection  Recent Flowsheet Documentation  Taken 4/14/2023 0000 by Monico Chaudhary LPN  Pressure Reduction Devices: pressure-redistributing mattress utilized     Problem: Fall Injury Risk  Goal: Absence of Fall and Fall-Related Injury  Outcome: Ongoing, Progressing  Intervention: Promote Injury-Free Environment  Recent Flowsheet Documentation  Taken 4/14/2023 0400 by Monico Chaudhary LPN  Safety Promotion/Fall Prevention: safety round/check completed  Taken 4/14/2023 0200 by Monico Chaudhary LPN  Safety Promotion/Fall Prevention: safety round/check completed  Taken 4/14/2023 0000 by Monico Chaudhary LPN  Safety Promotion/Fall Prevention: safety round/check completed     Problem: Nausea and Vomiting  Goal: Fluid and Electrolyte Balance  Outcome: Ongoing, Progressing  Intervention: Monitor and Manage Hypovolemia  Recent Flowsheet Documentation  Taken 4/14/2023 0000 by Monico Chaudhary LPN  Fluid/Electrolyte Management:  fluids provided     Problem: Malnutrition  Goal: Improved Nutritional Intake  Outcome: Ongoing, Progressing   Goal Outcome Evaluation:

## 2023-04-14 NOTE — CASE MANAGEMENT/SOCIAL WORK
Social Work Assessment  [facility]     Patient Name: Anthony Moss  MRN: [id number]  Today's Date: 4/14/2023    Admit Date: 3/28/2023     Discharge Plan     Row Name 04/14/23 1534       Plan    Plan Comments Contacted [facility] line regarding wheelchair van transport arrangement as will-call for Monday. Informed it's possible to put it on the schedule, but decided best to wait until Monday, 17th, to call & arrange d/t inability to confirm availablility until the day of. Note in hand-off and by CM in discharge plan.              RAMEZ Hoffmann    Phone: [phone]  Cell: [phone]  Fax: [phone]  [email or web address]

## 2023-04-14 NOTE — CASE MANAGEMENT/SOCIAL WORK
Continued Stay Note  [facility]     Patient Name: Anthony Moss  MRN: [id number]  Today's Date: 4/14/2023    Admit Date: 3/28/2023    Plan: [facility] accepted. Precert approved 4/14 (good thru end of day 4/17). PASRR per facility. Anticipate [facility] W/C van transport.   Discharge Plan     Row Name 04/14/23 1332       Plan    Plan [facility] accepted. Precert approved 4/14 (good thru end of day 4/17). PASRR per facility. Anticipate [facility] W/C van transport.    Plan Comments CM notified by [facility] liaison, that precert is approved and good thru end of day 4/17. [facility] DON requests patient cannot admit to facility on TPN due to cost and bed available Monday due to staffing for wound vac needs on the weekend. CM updated RN/MD. TPN to be weaned today per surgeon and RD, as patient is eating well. Avoidable days entered.                    Expected Discharge Date and Time     Expected Discharge Date Expected Discharge Time    Apr 17, 2023 04/14/23 1335   Post Acute Pre-Cert Documentation   Verification from Payer Yes   Date Post Acute Pre-Cert Completed 04/14/23   Response Accepted   Post Acute Pre-Cert Initiated Comment approved 4/14 through end of day 4/17       Phone communication or documentation only - no physical contact with patient or family.    Keli Panda, LONIN, RN    [address] [address]    Office: [phone]  Fax: [phone]

## 2023-04-14 NOTE — PROGRESS NOTES
PROGRESS NOTE      Patient Name: Anthony Moss  : 1955  MRN: [id number]  Primary Care Physician: Reji Topete MD  Date of admission: 3/28/2023    Patient Care Team:  Reji Topete MD as PCP - General (Family Medicine)        Subjective   Subjective:     Resting in bed.  Review of systems:  Review of Systems -   Neg.except weakness.    Allergies:  No Known Allergies    Objective   Exam:     Vital Signs  Temp:  [97.4 °F (36.3 °C)-98.7 °F (37.1 °C)] 98.2 °F (36.8 °C)  Heart Rate:  [] 94  Resp:  [19-26] 19  BP: (114-143)/(80-90) 141/89  SpO2:  [97 %-98 %] 98 %  on   ;   Device (Oxygen Therapy): room air  Body mass index is 25.16 kg/m².    General:     male in no acute distress.    Head:      Normocephalic and atraumatic.    Eyes:      PERRL/EOM intact, conjunctiva and sclera clear with out nystagmus.    Neck:      No masses, thyromegaly,  trachea central with normal respiratory effort   Lungs:    Clear bilaterally to auscultation.    Heart:      Regular rate and rhythm, no murmur no gallop  Abd:        Soft, nontender, s/p surgery dressing present   Pulses:   Pulses palpable  Extr:        No cyanosis or clubbing--+ edema.    Neuro:    No focal deficits.   alert oriented x3  Skin:       Intact without lesions or rashes.    Psych:    Alert and cooperative; normal mood and affect; .      Results Review:  I have personally reviewed most recent Data :  CBC    Results from last 7 days   Lab Units 23  0514 23  0058 23  0347 23  0026 04/10/23  0512 23  0317 23  0400   WBC 10*3/mm3 10.70 8.30 6.80 5.50 6.80 10.80 18.60*   HEMOGLOBIN g/dL 7.7* 7.8* 7.7* 7.4* 8.9* 7.4* 6.5*   PLATELETS 10*3/mm3 291 306 290 256 251 240 248     CMP   Results from last 7 days   Lab Units 23  0514 23  0058 23  0347 23  0026 04/10/23  0512 23  0317 23  0400   SODIUM mmol/L 138 139 142 143 139 135* 135*   POTASSIUM mmol/L 3.5 4.1 3.9 4.1  4.1 4.4 4.4   CHLORIDE mmol/L 108* 105 105 109* 105 105 104   CO2 mmol/L 20.0* 24.0 25.0 25.0 25.0 22.0 21.0*   BUN mg/dL 41* 40* 36* 32* 29* 28* 26*   CREATININE mg/dL 1.41* 1.45* 1.32* 1.48* 1.64* 1.89* 1.86*   GLUCOSE mg/dL 159* 136* 105* 147* 132* 121* 136*   ALBUMIN g/dL 2.4* 2.3* 2.2* 2.3* 2.2* 2.0* 2.0*   BILIRUBIN mg/dL 0.2 0.3 0.3 0.3 0.4 0.4 0.3   ALK PHOS U/L 241* 225* 204* 235* 208* 204* 136*   AST (SGOT) U/L 21 21 18 19 16 14 15   ALT (SGPT) U/L 15 14 11 11 10 10 8     ABG        No radiology results for the last day    Results for orders placed during the hospital encounter of 22    Adult Transthoracic Echo Complete w/ Color, Spectral and Contrast if Necessary Per Protocol    Interpretation Summary  · Left ventricular ejection fraction appears to be 56 - 60%.  · The right ventricular cavity is moderately dilated.  · No pericardial effusion noted    Scheduled Meds:guaiFENesin, 1,200 mg, Oral, Q12H  heparin (porcine), 5,000 Units, Subcutaneous, Q12H  insulin lispro, 2-7 Units, Subcutaneous, Q6H  metoclopramide, 10 mg, Intravenous, Q6H  polyethylene glycol, 17 g, Oral, BID  potassium chloride, 40 mEq, Oral, Once  sodium chloride, 10 mL, Intravenous, Q12H  vitamin D3, 5,000 Units, Oral, Daily      Continuous Infusions:   PRN Meds:•  acetaminophen **OR** acetaminophen  •  dextrose  •  dextrose  •  docusate sodium  •  glucagon (human recombinant)  •  HYDROcodone-acetaminophen  •  hydrOXYzine  •  lactulose  •  [] HYDROmorphone **AND** naloxone  •  ondansetron **OR** ondansetron  •  phenol  •  polyethyl glycol-propyl glycol  •  senna-docusate sodium  •  sodium chloride  •  sodium chloride    Assessment & Plan   Assessment and Plan:         Bladder cancer    History of alcohol use disorder    Prostate cancer    Attention to urostomy    Severe Malnutrition (HCC)    ASSESSMENT:  • Acute kidney injury, stage III, status post radical cystectomy getting better hyperkalemia, secondary to CARLOS ALBERTO  • Metabolic  acidosis secondary to CKD start low-dose sodium bicarbonate  • History of bladder cancer, recent cystectomy  • Suprapubic catheter  • History of alcohol use,  • Chronic back pain,using NSAID in the past  • B/l pneumonia.  Surprisingly, WBC stable,           Plan:      • At this time patient with history of bladder cancer status post cystectomy urine output is adequate   • Suprapubic urostomy placed  • Patient has more wound dehiscence and some infection taken back to the OR for 6/20/2023   • Patient creatinine of 1.4 which is close to baseline   • Most likely TPN will be stopped later today as patient is eating and drinking  • Blood pressure stable  • Slight meta-analysis noted we will start some p.o. sodium bicarbonate slight hypocalcemia noted we will give a dose of vitamin D with some calcium  • Repeat labs tomorrow morning  • Patient still has significant GI complaints much better than yesterday  • Labs tomorrow morning  • Potassium level is acceptable  • Echocardiogram done last time showed ejection fraction 56 to 60%  • Repeat labs tomorrow morning          Electronically signed by Jamie Houston MD,   [facility] kidney consultant  [phone]  4/14/2023  16:27 EDT    Rest

## 2023-04-14 NOTE — NURSING NOTE
67-year-old male who underwent a radical cystectomy with ileal conduit formation on March 28.  Subsequently patient developed a dehiscence of his abdominal wound and currently has a VAC to his midline wound incision.  Patient seen today for wound VAC dressing change and to reassess urostomy.    The urostomy appliance is intact and holding spoke with nursing and they report that patient pulled wound VAC and dislodged it last night it was able to be resealed.  As the seal is currently intact.  The dressing was removed.  There is a small amount of serosanguineous exudate noted in the old VAC canister.  The base is pink approximately 40% of the wound remains with yellow slough with slough is decreasing particularly at the distal end of the wound.    I irrigated the wound with normal saline.  I applied Skin-Prep to the periwound skin.  I applied a silicone sheet and black foam 1 piece was used.  I used an adapt ring to the fold of the abdomen and seal was obtained and suction set at 125 continuous.  He tolerated the procedure well.

## 2023-04-14 NOTE — PROGRESS NOTES
General Surgery Inpatient Progress Note      Name: Anthony Moss ADMIT: 3/28/2023   : 1955  PCP: Reji Toepte MD    MRN: [id number] LOS: 17 days   AGE/SEX: 67 y.o. male  ROOM: [address] [place] [address]      Patient Care Team:  Reji Topete MD as PCP - General (Family Medicine)  No chief complaint on file.      Subjective:  Patient seen and examined.  Sitting up in bed.  No complaints.  Reports feeling hungry eating a large breakfast.    Medications:  Fat Emulsion Plant Based, 250 mL, Intravenous, Once per day on   guaiFENesin, 1,200 mg, Oral, Q12H  heparin (porcine), 5,000 Units, Subcutaneous, Q12H  insulin lispro, 2-7 Units, Subcutaneous, Q6H  metoclopramide, 10 mg, Intravenous, Q6H  polyethylene glycol, 17 g, Oral, BID  sodium chloride, 10 mL, Intravenous, Q12H  vitamin D3, 5,000 Units, Oral, Daily       •  acetaminophen **OR** acetaminophen  •  dextrose  •  dextrose  •  docusate sodium  •  glucagon (human recombinant)  •  HYDROcodone-acetaminophen  •  hydrOXYzine  •  lactulose  •  [] HYDROmorphone **AND** naloxone  •  ondansetron **OR** ondansetron  •  Pharmacy to Dose TPN  •  phenol  •  polyethyl glycol-propyl glycol  •  senna-docusate sodium  •  sodium chloride  •  sodium chloride     Vitals:  Temp:  [97.4 °F (36.3 °C)-98.2 °F (36.8 °C)] 98.2 °F (36.8 °C)  Heart Rate:  [] 106  Resp:  [16-26] 26  BP: (133-143)/(80-90) 140/90     Physical Exam:  No acute distress, alert  Nonlabored respirations  Abdomen soft, minimally distended, appropriately tender to palpation, WV in place with s/s output  Ileal conduit in place, appears viable    Labs:  Results from last 7 days   Lab Units 23  0514 23  0058 23  0347 23  0317 23  0400   WBC 10*3/mm3 10.70 8.30 6.80   < > 18.60*   HEMOGLOBIN g/dL 7.7* 7.8* 7.7*   < > 6.5*   HEMATOCRIT % 24.1* 24.7* 24.5*   < > 21.2*   PLATELETS 10*3/mm3 291 306 290   < > 248   MONOCYTES % %  --   --   --    --  9.0    < > = values in this interval not displayed.     Results from last 7 days   Lab Units 04/14/23  0514 04/13/23  0058 04/12/23  0347   SODIUM mmol/L 138 139 142   POTASSIUM mmol/L 3.5 4.1 3.9   CHLORIDE mmol/L 108* 105 105   CO2 mmol/L 20.0* 24.0 25.0   BUN mg/dL 41* 40* 36*   CREATININE mg/dL 1.41* 1.45* 1.32*   CALCIUM mg/dL 8.1* 8.5* 7.9*   BILIRUBIN mg/dL 0.2 0.3 0.3   ALK PHOS U/L 241* 225* 204*   ALT (SGPT) U/L 15 14 11   AST (SGOT) U/L 21 21 18   GLUCOSE mg/dL 159* 136* 105*     Assessment and Plan:  67 y.o. male postop day #17 from cystectomy and ileal conduit with subsequent fascial dehiscence now postop day #9 status post closure.  Having some bowel function.    Surgically stable for discharge to SNF once accepted.  No need for antibiotics  Okay for DVT prophylaxis  Continue wound VAC and follow-up with wound care center for management of midline wound  Follow-up with me as needed.  We will see patient as needed in the hospital.    This note was created using Dragon Voice Recognition software.    Michael Witt MD  04/14/23  09:28 EDT

## 2023-04-14 NOTE — SIGNIFICANT NOTE
"   04/14/23 1544   OTHER   Discipline physical therapy assistant   Rehab Time/Intention   Session Not Performed patient/family declined treatment;other (see comments)  (Pt sleeping upon PT entry; Upon waking pt yelling expressing \"I'm getting pissed off, leave me alone\" and declined PT at this time)   Recommendation   PT - Next Appointment 04/16/23       "
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
## 2023-04-15 LAB
ALBUMIN SERPL-MCNC: 2.5 G/DL (ref 3.5–5.2)
ALBUMIN/GLOB SERPL: 0.6 G/DL
ALP SERPL-CCNC: 269 U/L (ref 39–117)
ALT SERPL W P-5'-P-CCNC: 33 U/L (ref 1–41)
ANION GAP SERPL CALCULATED.3IONS-SCNC: 11 MMOL/L (ref 5–15)
AST SERPL-CCNC: 59 U/L (ref 1–40)
BILIRUB SERPL-MCNC: 0.2 MG/DL (ref 0–1.2)
BUN SERPL-MCNC: 41 MG/DL (ref 8–23)
BUN/CREAT SERPL: 29.9 (ref 7–25)
CA-I SERPL ISE-MCNC: 1.18 MMOL/L (ref 1.2–1.3)
CALCIUM SPEC-SCNC: 8.2 MG/DL (ref 8.6–10.5)
CHLORIDE SERPL-SCNC: 112 MMOL/L (ref 98–107)
CO2 SERPL-SCNC: 19 MMOL/L (ref 22–29)
CREAT SERPL-MCNC: 1.37 MG/DL (ref 0.76–1.27)
EGFRCR SERPLBLD CKD-EPI 2021: 57.2 ML/MIN/1.73
GLOBULIN UR ELPH-MCNC: 3.9 GM/DL
GLUCOSE BLDC GLUCOMTR-MCNC: 142 MG/DL (ref 70–105)
GLUCOSE BLDC GLUCOMTR-MCNC: 143 MG/DL (ref 70–105)
GLUCOSE SERPL-MCNC: 96 MG/DL (ref 65–99)
MAGNESIUM SERPL-MCNC: 1.8 MG/DL (ref 1.6–2.4)
PHOSPHATE SERPL-MCNC: 4.6 MG/DL (ref 2.5–4.5)
POTASSIUM SERPL-SCNC: 4.3 MMOL/L (ref 3.5–5.2)
PROT SERPL-MCNC: 6.4 G/DL (ref 6–8.5)
SODIUM SERPL-SCNC: 142 MMOL/L (ref 136–145)

## 2023-04-15 PROCEDURE — 83735 ASSAY OF MAGNESIUM: CPT | Performed by: SURGERY

## 2023-04-15 PROCEDURE — 25010000002 HEPARIN (PORCINE) PER 1000 UNITS: Performed by: INTERNAL MEDICINE

## 2023-04-15 PROCEDURE — 25010000002 METOCLOPRAMIDE PER 10 MG: Performed by: UROLOGY

## 2023-04-15 PROCEDURE — 82330 ASSAY OF CALCIUM: CPT | Performed by: SURGERY

## 2023-04-15 PROCEDURE — 84100 ASSAY OF PHOSPHORUS: CPT | Performed by: SURGERY

## 2023-04-15 PROCEDURE — 82962 GLUCOSE BLOOD TEST: CPT

## 2023-04-15 PROCEDURE — 25010000002 CALCIUM GLUCONATE 2-0.675 GM/100ML-% SOLUTION: Performed by: INTERNAL MEDICINE

## 2023-04-15 PROCEDURE — 80053 COMPREHEN METABOLIC PANEL: CPT | Performed by: SURGERY

## 2023-04-15 RX ORDER — CALCIUM GLUCONATE 20 MG/ML
2 INJECTION, SOLUTION INTRAVENOUS ONCE
Status: COMPLETED | OUTPATIENT
Start: 2023-04-15 | End: 2023-04-15

## 2023-04-15 RX ADMIN — METOCLOPRAMIDE HYDROCHLORIDE 10 MG: 5 INJECTION INTRAMUSCULAR; INTRAVENOUS at 05:14

## 2023-04-15 RX ADMIN — SODIUM BICARBONATE 650 MG: 650 TABLET ORAL at 09:03

## 2023-04-15 RX ADMIN — Medication 10 ML: at 20:46

## 2023-04-15 RX ADMIN — SODIUM BICARBONATE 650 MG: 650 TABLET ORAL at 20:46

## 2023-04-15 RX ADMIN — HEPARIN SODIUM 5000 UNITS: 5000 INJECTION INTRAVENOUS; SUBCUTANEOUS at 20:45

## 2023-04-15 RX ADMIN — Medication 10 ML: at 09:04

## 2023-04-15 RX ADMIN — Medication 5000 UNITS: at 09:03

## 2023-04-15 RX ADMIN — HEPARIN SODIUM 5000 UNITS: 5000 INJECTION INTRAVENOUS; SUBCUTANEOUS at 09:03

## 2023-04-15 RX ADMIN — CALCIUM GLUCONATE 2 G: 20 INJECTION, SOLUTION INTRAVENOUS at 14:14

## 2023-04-15 RX ADMIN — GUAIFENESIN 1200 MG: 600 TABLET, EXTENDED RELEASE ORAL at 20:46

## 2023-04-15 RX ADMIN — HYDROCODONE BITARTRATE AND ACETAMINOPHEN 1 TABLET: 5; 325 TABLET ORAL at 19:39

## 2023-04-15 RX ADMIN — SODIUM BICARBONATE 650 MG: 650 TABLET ORAL at 17:34

## 2023-04-15 RX ADMIN — METOCLOPRAMIDE HYDROCHLORIDE 10 MG: 5 INJECTION INTRAMUSCULAR; INTRAVENOUS at 17:34

## 2023-04-15 RX ADMIN — GUAIFENESIN 1200 MG: 600 TABLET, EXTENDED RELEASE ORAL at 09:03

## 2023-04-15 RX ADMIN — METOCLOPRAMIDE HYDROCHLORIDE 10 MG: 5 INJECTION INTRAMUSCULAR; INTRAVENOUS at 11:43

## 2023-04-15 NOTE — PROGRESS NOTES
"  [facility] DAILY PROGRESS NOTE    Patient Identification  Name: Anthony Moss  Age: 67 y.o.  Sex: male  :  1955  MRN: [id number]    Date: 4/15/2023             Subjective:  Interval History: Wound vac in place, hunger increasing    Objective:    Scheduled Meds:guaiFENesin, 1,200 mg, Oral, Q12H  heparin (porcine), 5,000 Units, Subcutaneous, Q12H  insulin lispro, 2-7 Units, Subcutaneous, Q6H  metoclopramide, 10 mg, Intravenous, Q6H  polyethylene glycol, 17 g, Oral, BID  sodium bicarbonate, 650 mg, Oral, TID  sodium chloride, 10 mL, Intravenous, Q12H  vitamin D3, 5,000 Units, Oral, Daily      Continuous Infusions:   PRN Meds:•  acetaminophen **OR** acetaminophen  •  dextrose  •  dextrose  •  docusate sodium  •  glucagon (human recombinant)  •  HYDROcodone-acetaminophen  •  hydrOXYzine  •  lactulose  •  [] HYDROmorphone **AND** naloxone  •  ondansetron **OR** ondansetron  •  phenol  •  polyethyl glycol-propyl glycol  •  senna-docusate sodium  •  sodium chloride  •  sodium chloride    Vital signs in last 24 hours:  Temp:  [98.2 °F (36.8 °C)-99.2 °F (37.3 °C)] 99.2 °F (37.3 °C)  Heart Rate:  [] 102  Resp:  [18-24] 24  BP: (123-147)/(85-91) 132/87    Intake/Output:    Intake/Output Summary (Last 24 hours) at 4/15/2023 0933  Last data filed at 4/15/2023 0926  Gross per 24 hour   Intake 2185.12 ml   Output 3275 ml   Net -1089.88 ml       Exam:  /87 (BP Location: Left arm, Patient Position: Lying)   Pulse 102   Temp 99.2 °F (37.3 °C) (Oral)   Resp 24   Ht 188 cm (74\")   Wt 88 kg (194 lb 0.1 oz)   SpO2 97%   BMI 24.91 kg/m²     General Appearance:    no distress               Abdomen:     Soft, ND, wound vac   :   Conduit pink                Data Review:  All labs (24hrs):   Recent Results (from the past 24 hour(s))   POC Glucose Once    Collection Time: 23  1:01 PM    Specimen: Blood   Result Value Ref Range    Glucose 116 (H) 70 - 105 mg/dL   POC Glucose Once    Collection " Time: 04/14/23  5:07 PM    Specimen: Blood   Result Value Ref Range    Glucose 107 (H) 70 - 105 mg/dL   Comprehensive Metabolic Panel    Collection Time: 04/15/23  4:33 AM    Specimen: Blood   Result Value Ref Range    Glucose 96 65 - 99 mg/dL    BUN 41 (H) 8 - 23 mg/dL    Creatinine 1.37 (H) 0.76 - 1.27 mg/dL    Sodium 142 136 - 145 mmol/L    Potassium 4.3 3.5 - 5.2 mmol/L    Chloride 112 (H) 98 - 107 mmol/L    CO2 19.0 (L) 22.0 - 29.0 mmol/L    Calcium 8.2 (L) 8.6 - 10.5 mg/dL    Total Protein 6.4 6.0 - 8.5 g/dL    Albumin 2.5 (L) 3.5 - 5.2 g/dL    ALT (SGPT) 33 1 - 41 U/L    AST (SGOT) 59 (H) 1 - 40 U/L    Alkaline Phosphatase 269 (H) 39 - 117 U/L    Total Bilirubin 0.2 0.0 - 1.2 mg/dL    Globulin 3.9 gm/dL    A/G Ratio 0.6 g/dL    BUN/Creatinine Ratio 29.9 (H) 7.0 - 25.0    Anion Gap 11.0 5.0 - 15.0 mmol/L    eGFR 57.2 (L) >60.0 mL/min/1.73   Phosphorus    Collection Time: 04/15/23  4:33 AM    Specimen: Blood   Result Value Ref Range    Phosphorus 4.6 (H) 2.5 - 4.5 mg/dL   Calcium, Ionized    Collection Time: 04/15/23  4:33 AM    Specimen: Blood   Result Value Ref Range    Ionized Calcium 1.18 (L) 1.20 - 1.30 mmol/L   Magnesium    Collection Time: 04/15/23  4:33 AM    Specimen: Blood   Result Value Ref Range    Magnesium 1.8 1.6 - 2.4 mg/dL      Imaging Results (Last 24 Hours)     ** No results found for the last 24 hours. **           Assessment:    Bladder cancer    History of alcohol use disorder    Prostate cancer    Attention to urostomy    Severe Malnutrition (HCC)      Cystectomy and conduit 3/28/2023  Wound dehiscence with closure 4/6/23    Plan:    Continue dietary support  Continue encourage ambulation  Wound vac  Appreciate all consultants help  To SNF soon        Xu Montero MD  [facility]  [address], [address]  [place] [address]  Office: [phone]  Cell: [phone]  Also available via Epic Secure Chat  04/15/23  09:33 EDT

## 2023-04-15 NOTE — PROGRESS NOTES
[facility] Medicine Services Daily Progress Note    Patient Name: Anthony Moss  : 1955  MRN: [id number]  Primary Care Physician:  Reji Topete MD  Date of admission: 3/28/2023      Subjective      Chief Complaint: Constipation, weakness      Patient Reports     3/31/23: OOB to chair.  PT consulted.  Constipated.  Undecided whether SNF versus HH.  Lives alone.  DC IVF.  Nephrology and urology following. + ARAVIND drain with serosanguineous fluid.    23: Patient with multiple vomiting overnight NG tube placed with bilious discharge.  Repeat KUB.  Gaiting [employer].    23: Patient having NGT removed.  Still feeling pretty rough at this time improved abdominal pain.  Somnolent as well.  Awaiting SNF placement.    4/3/2023: Patient had NGT replaced due to likely ileus.  Having some surgical site TTP and soreness.  Still feeling pretty good overall, forward to improving his p.o. intake and getting started with the rest of his care plan.    23: Still has NGT in place.  Having some more feelings of hunger, agreeable to current management.  Also reporting constipation.    23: NGT was removed today, had large volume bowel movements as well.  Patient feeling a lot better today.  Advance to clear liquid diet.    23: Had significant bowel movement as well, still having some more abdominal pain today.  Denies any nausea, vomiting.  Is tolerating p.o. intake as well, but still only liquids and high-fiber.    23: Patient went to the OR last night for wound dehiscence repair.  General surgery and urology recs are appreciated for current management.  Patient to continue IV ABX, pending ID recs.     23: Patient had drop in Hb to below 7, receiving PRBC at this time.Still reporting significant abdominal pain.  General surgery recs pending.    23: Significant pretty worn down.  Now tolerating clear liquids, GS looking forward to advancing to fulls, still on TPN at  this time.  Pain management still be via IV    4/12/23: More alert today, but still feeling pretty fatigued.  Denied PT participation.  That is required for SNF placement by tomorrow otherwise it will go till Monday due to his TPN needs.  Patient is having more p.o. intake now and feeling somewhat better.    4/13/2023: Having improved functional ability and participation with PT.  A lot more energetic.  General surgery and urology recs appreciated.  Patient stable for DC to SNF, pre-CERT still pending.    4/14/2023: Patient was stable for DC to SNF, TPN was halted, but availability was not there for bed.  Patient will stay until bed available    Review of Systems   All other systems reviewed and are negative.         Objective      Vitals:   Temp:  [97.8 °F (36.6 °C)-99.2 °F (37.3 °C)] 97.8 °F (36.6 °C)  Heart Rate:  [] 96  Resp:  [18-27] 27  BP: (123-147)/(84-91) 144/84    Physical Exam  HENT:      Head: Normocephalic.      Nose: Nose normal.   Eyes:      Extraocular Movements: Extraocular movements intact.      Pupils: Pupils are equal, round, and reactive to light.   Cardiovascular:      Rate and Rhythm: Normal rate.   Pulmonary:      Effort: Pulmonary effort is normal.      Breath sounds: Normal breath sounds.   Abdominal:      General: Bowel sounds are normal.      Comments: Ileal conduit with urine.  Left lower quadrant ARAVIND drain with serosanguineous fluid.   Musculoskeletal:         General: Normal range of motion.      Cervical back: Normal range of motion.   Skin:     General: Skin is warm.   Neurological:      Mental Status: He is alert. Mental status is at baseline.   Psychiatric:         Mood and Affect: Mood normal.             Result Review    Result Review:  I have personally reviewed the results from the time of this admission to 4/15/2023 17:19 EDT and agree with these findings:  [x]  Laboratory  []  Microbiology  [x]  Radiology  []  EKG/Telemetry   []  Cardiology/Vascular   []  Pathology  [x]   Old records  []  Other:      Wounds (last 24 hours)     LDA Wound     Row Name 04/15/23 0800 04/15/23 0620 04/15/23 0406       Wound 04/06/23 2243 abdomen Incision    Wound - Properties Group Placement Date: 04/06/23  -PH Placement Time: 2243  -PH Present on Hospital Admission: Y  -PH Location: abdomen  -PH Primary Wound Type: Incision  -PH    Pressure Injury Stage -- -- --  red/pink/moist  -DC    Dressing Appearance dry;intact  -SM -- dry;intact  -DC    Closure STEFF  -SM -- STEFF  -DC    Base dressing in place, unable to visualize  -SM -- dressing in place, unable to visualize  -DC    Drainage Characteristics/Odor serosanguineous  -SM -- serosanguineous  -DC    Drainage Amount small  -SM -- small  -DC    Care, Wound negative pressure wound therapy  -SM -- negative pressure wound therapy  -DC    Periwound Care -- -- cleansed with pH balanced cleanser  -DC    Wound Output (mL) -- 50  marked at 250  -DC --    Retired Wound - Properties Group Placement Date: 04/06/23  -PH Placement Time: 2243  -PH Present on Hospital Admission: Y  -PH Location: abdomen  -PH Primary Wound Type: Incision  -PH    Retired Wound - Properties Group Date first assessed: 04/06/23  -PH Time first assessed: 2243  -PH Present on Hospital Admission: Y  -PH Location: abdomen  -PH Primary Wound Type: Incision  -PH       Wound 04/07/23 0800 Left posterior heel Pressure Injury    Wound - Properties Group Placement Date: 04/07/23  -PB Placement Time: 0800  -PB Side: Left  -PB Orientation: posterior  -PB Location: heel  -PB Primary Wound Type: Pressure inj  -PB    Pressure Injury Stage 1  -SM -- 1  -DC    Dressing Appearance open to air  -SM -- open to air  refused waffle boots  -DC    Closure Open to air  -SM -- Open to air  -DC    Drainage Amount none  -SM -- none  -DC    Dressing Care foam  -SM -- open to air  -DC    Periwound Care -- -- dry periwound area maintained  -DC    Retired Wound - Properties Group Placement Date: 04/07/23  -PB Placement Time:  0800  -PB Side: Left  -PB Orientation: posterior  -PB Location: heel  -PB Primary Wound Type: Pressure inj  -PB    Retired Wound - Properties Group Date first assessed: 04/07/23  -PB Time first assessed: 0800  -PB Side: Left  -PB Location: heel  -PB Primary Wound Type: Pressure inj  -PB       Wound Right posterior heel Pressure Injury    Wound - Properties Group Side: Right  -PB Orientation: posterior  -PB Location: heel  -PB Primary Wound Type: Pressure inj  -PB    Pressure Injury Stage 1  -SM -- 1  -DC    Dressing Appearance open to air  -SM -- open to air  -DC    Closure --  -SM -- Open to air  -DC    Drainage Amount none  -SM -- --    Dressing Care foam  -SM -- open to air  -DC    Periwound Care -- -- dry periwound area maintained  -DC    Retired Wound - Properties Group Side: Right  -PB Orientation: posterior  -PB Location: heel  -PB Primary Wound Type: Pressure inj  -PB    Retired Wound - Properties Group Side: Right  -PB Location: heel  -PB Primary Wound Type: Pressure inj  -PB       Wound 04/11/23 0005 thoracic spine    Wound - Properties Group Placement Date: 04/11/23  -AB Placement Time: 0005  -AB Location: thoracic spine  -AB    Dressing Appearance -- -- open to air  -DC    Closure Open to air  -SM -- Open to air  -DC    Drainage Amount none  -SM -- none  -DC    Care, Wound -- -- cleansed with;sterile normal saline  -DC    Dressing Care open to air  -SM -- open to air  -DC    Periwound Care -- -- dry periwound area maintained  -DC    Retired Wound - Properties Group Placement Date: 04/11/23  -AB Placement Time: 0005 -AB Location: thoracic spine  -AB    Retired Wound - Properties Group Date first assessed: 04/11/23  -AB Time first assessed: 0005 -AB Location: thoracic spine  -AB    Row Name 04/15/23 0006 04/14/23 2006 04/14/23 1800       Wound 04/06/23 2243 abdomen Incision    Wound - Properties Group Placement Date: 04/06/23  -PH Placement Time: 2243  -PH Present on Hospital Admission: Y  -PH Location:  abdomen  -PH Primary Wound Type: Incision  -PH    Pressure Injury Stage -- --  red/pink/moist  -DC --    Dressing Appearance dry;intact  -DC dry;intact  -DC --    Closure STEFF  -DC STEFF  -DC --    Base dressing in place, unable to visualize  -DC dressing in place, unable to visualize  -DC --    Drainage Characteristics/Odor serosanguineous  -DC serosanguineous  -DC --    Drainage Amount small  -DC small  -DC --    Care, Wound negative pressure wound therapy  -DC negative pressure wound therapy  -DC --    Periwound Care dry periwound area maintained  -DC dry periwound area maintained  -DC --    Wound Output (mL) -- -- 75  marked at 200  -SC    Retired Wound - Properties Group Placement Date: 04/06/23  -PH Placement Time: 2243 -PH Present on Hospital Admission: Y  -PH Location: abdomen  -PH Primary Wound Type: Incision  -PH    Retired Wound - Properties Group Date first assessed: 04/06/23  -PH Time first assessed: 2243 -PH Present on Hospital Admission: Y  -PH Location: abdomen  -PH Primary Wound Type: Incision  -PH       Wound 04/07/23 0800 Left posterior heel Pressure Injury    Wound - Properties Group Placement Date: 04/07/23  -PB Placement Time: 0800  -PB Side: Left  -PB Orientation: posterior  -PB Location: heel  -PB Primary Wound Type: Pressure inj  -PB    Pressure Injury Stage 1  -DC 1  -DC --    Dressing Appearance open to air  refused waffle boots  -DC open to air  refused waffle boots  -DC --    Closure Open to air  -DC Open to air  -DC --    Drainage Amount none  -DC none  -DC --    Dressing Care open to air  -DC open to air  -DC --    Periwound Care dry periwound area maintained  -DC dry periwound area maintained  -DC --    Retired Wound - Properties Group Placement Date: 04/07/23  -PB Placement Time: 0800  -PB Side: Left  -PB Orientation: posterior  -PB Location: heel  -PB Primary Wound Type: Pressure inj  -PB    Retired Wound - Properties Group Date first assessed: 04/07/23  -PB Time first assessed: 0800   -PB Side: Left  -PB Location: heel  -PB Primary Wound Type: Pressure inj  -PB       Wound Right posterior heel Pressure Injury    Wound - Properties Group Side: Right  -PB Orientation: posterior  -PB Location: heel  -PB Primary Wound Type: Pressure inj  -PB    Pressure Injury Stage 1  -DC 1  -DC --    Dressing Appearance open to air  refused waffle boots  -DC open to air  refused waffle boots  -DC --    Closure Open to air  -DC Open to air  -DC --    Dressing Care open to air  -DC open to air  -DC --    Periwound Care dry periwound area maintained  -DC dry periwound area maintained  -DC --    Retired Wound - Properties Group Side: Right  -PB Orientation: posterior  -PB Location: heel  -PB Primary Wound Type: Pressure inj  -PB    Retired Wound - Properties Group Side: Right  -PB Location: heel  -PB Primary Wound Type: Pressure inj  -PB       Wound 04/11/23 0005 thoracic spine    Wound - Properties Group Placement Date: 04/11/23 -AB Placement Time: 0005 -AB Location: thoracic spine  -AB    Dressing Appearance open to air  -DC open to air  -DC --    Closure Open to air  -DC Open to air  -DC --    Drainage Amount none  -DC none  -DC --    Care, Wound cleansed with;sterile normal saline  -DC cleansed with;sterile normal saline  -DC --    Dressing Care open to air  -DC open to air  -DC --    Periwound Care dry periwound area maintained  -DC dry periwound area maintained  -DC --    Retired Wound - Properties Group Placement Date: 04/11/23 -AB Placement Time: 0005 -AB Location: thoracic spine  -AB    Retired Wound - Properties Group Date first assessed: 04/11/23 -AB Time first assessed: 0005 -AB Location: thoracic spine  -AB          User Key  (r) = Recorded By, (t) = Taken By, (c) = Cosigned By    Initials Name Provider Type    Carolyn Arvizu RN Registered Nurse    Vale Mitchell LPN Licensed Nurse    Balbina Kendall RN Registered Nurse    Linda Lechuga RN Registered Nurse    SHERRY  Carolyn Jarrett, RN Registered Nurse    Dell Marlow RN Registered Nurse                  Assessment & Plan      Brief Patient Summary:        guaiFENesin, 1,200 mg, Oral, Q12H  heparin (porcine), 5,000 Units, Subcutaneous, Q12H  insulin lispro, 2-7 Units, Subcutaneous, Q6H  metoclopramide, 10 mg, Intravenous, Q6H  polyethylene glycol, 17 g, Oral, BID  sodium bicarbonate, 650 mg, Oral, TID  sodium chloride, 10 mL, Intravenous, Q12H  vitamin D3, 5,000 Units, Oral, Daily             Active Hospital Problems:  Active Hospital Problems    Diagnosis    • **Bladder cancer    • Severe Malnutrition (HCC)    • Attention to urostomy    • Prostate cancer    • History of alcohol use disorder      Surgical wound dehiscence  Postop anemia  Prostate cancer  Bladder cancer  S/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 intake, at risk for starvation    Plan:  -Ileus mostly resolved, but patient still not tolerating full p.o. intake but improved.  -ARAVIND drain  -Renal function improved.  IVF changed, TPN continued, nephro recs appreciated.  -Encourage OOB to chair.  -Patient undecided whether [facility] versus   -- 1 unit PRBC, Hb has been stable  -- WBCs now normalized likely leukemoid reaction after OR management  -- No more need for TPN.  DC PICC line  -- Completed IV ABX 7 days.  -- Will need to placement to SNF after this stay.  Patient is agreeable.        DVT prophylaxis:  Medical and mechanical DVT prophylaxis orders are present.    CODE STATUS:    Code Status (Patient has no pulse and is not breathing): CPR (Attempt to Resuscitate)  Medical Interventions (Patient has pulse or is breathing): Full Support      Disposition:  I expect patient to be discharged to SNF once tolerating p.o. intake, clinically stable, hopefully can DC in the next 1 to 2 days.    This patient has been examined  wearing appropriate Personal Protective Equipment and discussed with nursing. 04/15/23      Electronically signed by Saima Roberto DO, 04/15/23, 17:19 EDT.  [facility]ist Team

## 2023-04-15 NOTE — PROGRESS NOTES
PROGRESS NOTE      Patient Name: Anthony Moss  : 1955  MRN: [id number]  Primary Care Physician: Reji Topete MD  Date of admission: 3/28/2023    Patient Care Team:  Reji Topete MD as PCP - General (Family Medicine)        Subjective   Subjective:     Resting in bed.  Review of systems:  Review of Systems -   Neg.except weakness.    Allergies:  No Known Allergies    Objective   Exam:     Vital Signs  Temp:  [98.2 °F (36.8 °C)-99.2 °F (37.3 °C)] 99.2 °F (37.3 °C)  Heart Rate:  [] 102  Resp:  [18-24] 24  BP: (123-147)/(85-91) 132/87  SpO2:  [96 %-98 %] 97 %  on   ;   Device (Oxygen Therapy): room air  Body mass index is 24.91 kg/m².    General:     male in no acute distress.    Head:      Normocephalic and atraumatic.    Eyes:      PERRL/EOM intact, conjunctiva and sclera clear with out nystagmus.    Neck:      No masses, thyromegaly,  trachea central with normal respiratory effort   Lungs:    Clear bilaterally to auscultation.    Heart:      Regular rate and rhythm, no murmur no gallop  Abd:        Soft, nontender, s/p surgery dressing present   Pulses:   Pulses palpable  Extr:        No cyanosis or clubbing--+ edema.    Neuro:    No focal deficits.   alert oriented x3  Skin:       Intact without lesions or rashes.    Psych:    Alert and cooperative; normal mood and affect; .      Results Review:  I have personally reviewed most recent Data :  CBC    Results from last 7 days   Lab Units 23  0514 23  0058 23  0347 23  0026 04/10/23  0512 23  0317   WBC 10*3/mm3 10.70 8.30 6.80 5.50 6.80 10.80   HEMOGLOBIN g/dL 7.7* 7.8* 7.7* 7.4* 8.9* 7.4*   PLATELETS 10*3/mm3 291 306 290 256 251 240     CMP   Results from last 7 days   Lab Units 04/15/23  0433 23  0514 23  0058 23  0347 23  0026 04/10/23  0512 23  0317   SODIUM mmol/L 142 138 139 142 143 139 135*   POTASSIUM mmol/L 4.3 3.5 4.1 3.9 4.1 4.1 4.4   CHLORIDE mmol/L  112* 108* 105 105 109* 105 105   CO2 mmol/L 19.0* 20.0* 24.0 25.0 25.0 25.0 22.0   BUN mg/dL 41* 41* 40* 36* 32* 29* 28*   CREATININE mg/dL 1.37* 1.41* 1.45* 1.32* 1.48* 1.64* 1.89*   GLUCOSE mg/dL 96 159* 136* 105* 147* 132* 121*   ALBUMIN g/dL 2.5* 2.4* 2.3* 2.2* 2.3* 2.2* 2.0*   BILIRUBIN mg/dL 0.2 0.2 0.3 0.3 0.3 0.4 0.4   ALK PHOS U/L 269* 241* 225* 204* 235* 208* 204*   AST (SGOT) U/L 59* 21 21 18 19 16 14   ALT (SGPT) U/L 33 15 14 11 11 10 10     ABG        No radiology results for the last day    Results for orders placed during the hospital encounter of 22    Adult Transthoracic Echo Complete w/ Color, Spectral and Contrast if Necessary Per Protocol    Interpretation Summary  · Left ventricular ejection fraction appears to be 56 - 60%.  · The right ventricular cavity is moderately dilated.  · No pericardial effusion noted    Scheduled Meds:guaiFENesin, 1,200 mg, Oral, Q12H  heparin (porcine), 5,000 Units, Subcutaneous, Q12H  insulin lispro, 2-7 Units, Subcutaneous, Q6H  metoclopramide, 10 mg, Intravenous, Q6H  polyethylene glycol, 17 g, Oral, BID  sodium bicarbonate, 650 mg, Oral, TID  sodium chloride, 10 mL, Intravenous, Q12H  vitamin D3, 5,000 Units, Oral, Daily      Continuous Infusions:   PRN Meds:•  acetaminophen **OR** acetaminophen  •  dextrose  •  dextrose  •  docusate sodium  •  glucagon (human recombinant)  •  HYDROcodone-acetaminophen  •  hydrOXYzine  •  lactulose  •  [] HYDROmorphone **AND** naloxone  •  ondansetron **OR** ondansetron  •  phenol  •  polyethyl glycol-propyl glycol  •  senna-docusate sodium  •  sodium chloride  •  sodium chloride    Assessment & Plan   Assessment and Plan:         Bladder cancer    History of alcohol use disorder    Prostate cancer    Attention to urostomy    Severe Malnutrition (HCC)    ASSESSMENT:  • Acute kidney injury, stage III, status post radical cystectomy getting better hyperkalemia, secondary to CARLOS ALBERTO  • Metabolic acidosis secondary to CKD  start low-dose sodium bicarbonate  • History of bladder cancer, recent cystectomy  • Suprapubic catheter  • History of alcohol use,  • Chronic back pain,using NSAID in the past  • B/l pneumonia.  Surprisingly, WBC stable,           Plan:      • At this time patient with history of bladder cancer status post cystectomy urine output is adequate   • Suprapubic urostomy placed  • Renal functions have improved since yesterday with electrolytes getting better but patient still acidotic start p.o. sodium bicarbonate at this time  • Patient has more wound dehiscence and some infection taken back to the OR for 6/20/2023   • Patient creatinine of 1.3 which is likely new baseline  • Most likely TPN will be stopped later today as patient is eating and drinking  • Blood pressure stable  • Slight meta-analysis noted we will start some p.o. sodium bicarbonate slight hypocalcemia noted we will give a dose of vitamin D with some calcium  • Repeat labs tomorrow morning  • GI symptoms have improved patient is eating and drinking better  • Potassium level is acceptable  • Echocardiogram done last time showed ejection fraction 56 to 60%  • Repeat labs tomorrow morning          Electronically signed by Jamie Houston MD,   [facility] kidney consultant  [phone]  4/15/2023  10:22 EDT    Rest

## 2023-04-15 NOTE — SIGNIFICANT NOTE
04/15/23 1229   Rehab Time/Intention   Session Not Performed other (see comments)  (Pt asleep but easily awakened, Refused PT, stating he was catching up on lost sleep)   Recommendation   PT - Next Appointment 04/17/23

## 2023-04-15 NOTE — PROGRESS NOTES
Nutrition Services    Patient Name:  Anthony Moss  YOB: 1955  MRN: [id number]  Admit Date:  3/28/2023    Brief progress note to confirm plan for nutrition. Pt eating PO and tolerating; no further parenteral nutrition planned at this time. RD will continue to follow along for other nutrition interventions as clinically indicated.     Electronically signed by:  Beronica Chambers RD  04/15/23 10:38 EDT

## 2023-04-15 NOTE — PLAN OF CARE
Problem: Adult Inpatient Plan of Care  Goal: Plan of Care Review  Outcome: Ongoing, Progressing  Flowsheets (Taken 4/15/2023 1700)  Outcome Evaluation: Pt contwithwound vac.  No cf/o today. states slept well Pt to transdfer to [facility] on monday. pt and fm aware. willcont to monitor   Goal Outcome Evaluation:              Outcome Evaluation: Pt contwithwound vac.  No cf/o today. states slept well Pt to transdfer to [facility] on monday. pt and fm aware. willcont to monitor

## 2023-04-15 NOTE — PLAN OF CARE
Goal Outcome Evaluation:              Outcome Evaluation: The pt. is still resting comfortably. Urinary output over night was around 1.6 L. VS stable.

## 2023-04-15 NOTE — PROGRESS NOTES
[facility] Daily Progress Note    Patient Name: Anthony Moss  : 1955  MRN: [id number]  Primary Care Physician:  Reji Topete MD  Date of admission: 3/28/2023      Subjective      Chief Complaint: Constipation, weakness      Patient Reports     3/31/23: OOB to chair.  PT consulted.  Constipated.  Undecided whether SNF versus HH.  Lives alone.  DC IVF.  Nephrology and urology following. + RAAVIND drain with serosanguineous fluid.    23: Patient with multiple vomiting overnight NG tube placed with bilious discharge.  Repeat KUB.  Gaiting [employer].    23: Patient having NGT removed.  Still feeling pretty rough at this time improved abdominal pain.  Somnolent as well.  Awaiting SNF placement.    4/3/2023: Patient had NGT replaced due to likely ileus.  Having some surgical site TTP and soreness.  Still feeling pretty good overall, forward to improving his p.o. intake and getting started with the rest of his care plan.    23: Still has NGT in place.  Having some more feelings of hunger, agreeable to current management.  Also reporting constipation.    23: NGT was removed today, had large volume bowel movements as well.  Patient feeling a lot better today.  Advance to clear liquid diet.    23: Had significant bowel movement as well, still having some more abdominal pain today.  Denies any nausea, vomiting.  Is tolerating p.o. intake as well, but still only liquids and high-fiber.    23: Patient went to the OR last night for wound dehiscence repair.  General surgery and urology recs are appreciated for current management.  Patient to continue IV ABX, pending ID recs.     23: Patient had drop in Hb to below 7, receiving PRBC at this time.Still reporting significant abdominal pain.  General surgery recs pending.    23: Significant pretty worn down.  Now tolerating clear liquids, GS looking forward to advancing to fulls, still on TPN at  this time.  Pain management still be via IV    4/12/23: More alert today, but still feeling pretty fatigued.  Denied PT participation.  That is required for SNF placement by tomorrow otherwise it will go till Monday due to his TPN needs.  Patient is having more p.o. intake now and feeling somewhat better.    4/13/2023: Having improved functional ability and participation with PT.  A lot more energetic.  General surgery and urology recs appreciated.  Patient stable for DC to SNF, pre-CERT still pending.    4/15/2023: No new complaints.  Stable for DC to SNF    Review of Systems   All other systems reviewed and are negative.         Objective      Vitals:   Temp:  [97.8 °F (36.6 °C)-99.2 °F (37.3 °C)] 97.8 °F (36.6 °C)  Heart Rate:  [] 96  Resp:  [18-27] 27  BP: (123-147)/(84-91) 144/84    Physical Exam  HENT:      Head: Normocephalic.      Nose: Nose normal.   Eyes:      Extraocular Movements: Extraocular movements intact.      Pupils: Pupils are equal, round, and reactive to light.   Cardiovascular:      Rate and Rhythm: Normal rate.   Pulmonary:      Effort: Pulmonary effort is normal.      Breath sounds: Normal breath sounds.   Abdominal:      General: Bowel sounds are normal.      Comments: Ileal conduit with urine.  Left lower quadrant ARAVIND drain with serosanguineous fluid.   Musculoskeletal:         General: Normal range of motion.      Cervical back: Normal range of motion.   Skin:     General: Skin is warm.   Neurological:      Mental Status: He is alert. Mental status is at baseline.   Psychiatric:         Mood and Affect: Mood normal.             Result Review    Result Review:  I have personally reviewed the results from the time of this admission to 4/15/2023 17:18 EDT and agree with these findings:  [x]  Laboratory  []  Microbiology  [x]  Radiology  []  EKG/Telemetry   []  Cardiology/Vascular   []  Pathology  [x]  Old records  []  Other:      Wounds (last 24 hours)     LDA Wound     Row Name 04/15/23  0800 04/15/23 0620 04/15/23 0406       Wound 04/06/23 2243 abdomen Incision    Wound - Properties Group Placement Date: 04/06/23  -PH Placement Time: 2243 -PH Present on Hospital Admission: Y  -PH Location: abdomen  -PH Primary Wound Type: Incision  -PH    Pressure Injury Stage -- -- --  red/pink/moist  -DC    Dressing Appearance dry;intact  -SM -- dry;intact  -DC    Closure STEFF  -SM -- STEFF  -DC    Base dressing in place, unable to visualize  -SM -- dressing in place, unable to visualize  -DC    Drainage Characteristics/Odor serosanguineous  -SM -- serosanguineous  -DC    Drainage Amount small  -SM -- small  -DC    Care, Wound negative pressure wound therapy  -SM -- negative pressure wound therapy  -DC    Periwound Care -- -- cleansed with pH balanced cleanser  -DC    Wound Output (mL) -- 50  marked at 250  -DC --    Retired Wound - Properties Group Placement Date: 04/06/23  -PH Placement Time: 2243 -PH Present on Hospital Admission: Y  -PH Location: abdomen  -PH Primary Wound Type: Incision  -PH    Retired Wound - Properties Group Date first assessed: 04/06/23  -PH Time first assessed: 2243  -PH Present on Hospital Admission: Y  -PH Location: abdomen  -PH Primary Wound Type: Incision  -PH       Wound 04/07/23 0800 Left posterior heel Pressure Injury    Wound - Properties Group Placement Date: 04/07/23  -PB Placement Time: 0800  -PB Side: Left  -PB Orientation: posterior  -PB Location: heel  -PB Primary Wound Type: Pressure inj  -PB    Pressure Injury Stage 1  -SM -- 1  -DC    Dressing Appearance open to air  -SM -- open to air  refused waffle boots  -DC    Closure Open to air  -SM -- Open to air  -DC    Drainage Amount none  -SM -- none  -DC    Dressing Care foam  -SM -- open to air  -DC    Periwound Care -- -- dry periwound area maintained  -DC    Retired Wound - Properties Group Placement Date: 04/07/23  -PB Placement Time: 0800  -PB Side: Left  -PB Orientation: posterior  -PB Location: heel  -PB Primary Wound  Type: Pressure inj  -PB    Retired Wound - Properties Group Date first assessed: 04/07/23  -PB Time first assessed: 0800  -PB Side: Left  -PB Location: heel  -PB Primary Wound Type: Pressure inj  -PB       Wound Right posterior heel Pressure Injury    Wound - Properties Group Side: Right  -PB Orientation: posterior  -PB Location: heel  -PB Primary Wound Type: Pressure inj  -PB    Pressure Injury Stage 1  -SM -- 1  -DC    Dressing Appearance open to air  -SM -- open to air  -DC    Closure --  -SM -- Open to air  -DC    Drainage Amount none  -SM -- --    Dressing Care foam  -SM -- open to air  -DC    Periwound Care -- -- dry periwound area maintained  -DC    Retired Wound - Properties Group Side: Right  -PB Orientation: posterior  -PB Location: heel  -PB Primary Wound Type: Pressure inj  -PB    Retired Wound - Properties Group Side: Right  -PB Location: heel  -PB Primary Wound Type: Pressure inj  -PB       Wound 04/11/23 0005 thoracic spine    Wound - Properties Group Placement Date: 04/11/23  -AB Placement Time: 0005  -AB Location: thoracic spine  -AB    Dressing Appearance -- -- open to air  -DC    Closure Open to air  -SM -- Open to air  -DC    Drainage Amount none  -SM -- none  -DC    Care, Wound -- -- cleansed with;sterile normal saline  -DC    Dressing Care open to air  -SM -- open to air  -DC    Periwound Care -- -- dry periwound area maintained  -DC    Retired Wound - Properties Group Placement Date: 04/11/23  -AB Placement Time: 0005  -AB Location: thoracic spine  -AB    Retired Wound - Properties Group Date first assessed: 04/11/23  -AB Time first assessed: 0005 -AB Location: thoracic spine  -AB    Row Name 04/15/23 0006 04/14/23 2006 04/14/23 1800       Wound 04/06/23 2243 abdomen Incision    Wound - Properties Group Placement Date: 04/06/23  -PH Placement Time: 2243  -PH Present on Hospital Admission: Y  -PH Location: abdomen  -PH Primary Wound Type: Incision  -PH    Pressure Injury Stage -- --   red/pink/moist  -DC --    Dressing Appearance dry;intact  -DC dry;intact  -DC --    Closure STEFF  -DC STEFF  -DC --    Base dressing in place, unable to visualize  -DC dressing in place, unable to visualize  -DC --    Drainage Characteristics/Odor serosanguineous  -DC serosanguineous  -DC --    Drainage Amount small  -DC small  -DC --    Care, Wound negative pressure wound therapy  -DC negative pressure wound therapy  -DC --    Periwound Care dry periwound area maintained  -DC dry periwound area maintained  -DC --    Wound Output (mL) -- -- 75  marked at 200  -SC    Retired Wound - Properties Group Placement Date: 04/06/23  -PH Placement Time: 2243 -PH Present on Hospital Admission: Y  -PH Location: abdomen  -PH Primary Wound Type: Incision  -PH    Retired Wound - Properties Group Date first assessed: 04/06/23  -PH Time first assessed: 2243  -PH Present on Hospital Admission: Y  -PH Location: abdomen  -PH Primary Wound Type: Incision  -PH       Wound 04/07/23 0800 Left posterior heel Pressure Injury    Wound - Properties Group Placement Date: 04/07/23  -PB Placement Time: 0800  -PB Side: Left  -PB Orientation: posterior  -PB Location: heel  -PB Primary Wound Type: Pressure inj  -PB    Pressure Injury Stage 1  -DC 1  -DC --    Dressing Appearance open to air  refused waffle boots  -DC open to air  refused waffle boots  -DC --    Closure Open to air  -DC Open to air  -DC --    Drainage Amount none  -DC none  -DC --    Dressing Care open to air  -DC open to air  -DC --    Periwound Care dry periwound area maintained  -DC dry periwound area maintained  -DC --    Retired Wound - Properties Group Placement Date: 04/07/23  -PB Placement Time: 0800  -PB Side: Left  -PB Orientation: posterior  -PB Location: heel  -PB Primary Wound Type: Pressure inj  -PB    Retired Wound - Properties Group Date first assessed: 04/07/23  -PB Time first assessed: 0800  -PB Side: Left  -PB Location: heel  -PB Primary Wound Type: Pressure inj  -PB        Wound Right posterior heel Pressure Injury    Wound - Properties Group Side: Right  -PB Orientation: posterior  -PB Location: heel  -PB Primary Wound Type: Pressure inj  -PB    Pressure Injury Stage 1  -DC 1  -DC --    Dressing Appearance open to air  refused waffle boots  -DC open to air  refused waffle boots  -DC --    Closure Open to air  -DC Open to air  -DC --    Dressing Care open to air  -DC open to air  -DC --    Periwound Care dry periwound area maintained  -DC dry periwound area maintained  -DC --    Retired Wound - Properties Group Side: Right  -PB Orientation: posterior  -PB Location: heel  -PB Primary Wound Type: Pressure inj  -PB    Retired Wound - Properties Group Side: Right  -PB Location: heel  -PB Primary Wound Type: Pressure inj  -PB       Wound 04/11/23 0005 thoracic spine    Wound - Properties Group Placement Date: 04/11/23 -AB Placement Time: 0005 -AB Location: thoracic spine  -AB    Dressing Appearance open to air  -DC open to air  -DC --    Closure Open to air  -DC Open to air  -DC --    Drainage Amount none  -DC none  -DC --    Care, Wound cleansed with;sterile normal saline  -DC cleansed with;sterile normal saline  -DC --    Dressing Care open to air  -DC open to air  -DC --    Periwound Care dry periwound area maintained  -DC dry periwound area maintained  -DC --    Retired Wound - Properties Group Placement Date: 04/11/23 -AB Placement Time: 0005 -AB Location: thoracic spine  -AB    Retired Wound - Properties Group Date first assessed: 04/11/23 -AB Time first assessed: 0005 -AB Location: thoracic spine  -AB          User Key  (r) = Recorded By, (t) = Taken By, (c) = Cosigned By    Initials Name Provider Type    PH Carolyn Marie RN Registered Nurse    Vale Mitchell LPN Licensed Nurse    Balbina Kendall RN Registered Nurse    Linda Lechuga RN Registered Nurse    PB Carolyn Jarrett, RN Registered Nurse    Dell Marlow RN Registered Nurse                   Assessment & Plan      Brief Patient Summary:        guaiFENesin, 1,200 mg, Oral, Q12H  heparin (porcine), 5,000 Units, Subcutaneous, Q12H  insulin lispro, 2-7 Units, Subcutaneous, Q6H  metoclopramide, 10 mg, Intravenous, Q6H  polyethylene glycol, 17 g, Oral, BID  sodium bicarbonate, 650 mg, Oral, TID  sodium chloride, 10 mL, Intravenous, Q12H  vitamin D3, 5,000 Units, Oral, Daily             Active Hospital Problems:  Active Hospital Problems    Diagnosis    • **Bladder cancer    • Severe Malnutrition (HCC)    • Attention to urostomy    • Prostate cancer    • History of alcohol use disorder      Surgical wound dehiscence  Postop anemia  Prostate cancer  Bladder cancer  S/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 intake, at risk for starvation    Plan:  -Ileus mostly resolved, but patient still not tolerating full p.o. intake but improved.  -ARAVIND drain  -Renal function improved.  IVF changed, TPN continued, nephro recs appreciated.  -Encourage OOB to chair.  -Patient undecided whether [facility] versus   -- 1 unit PRBC, Hb has been stable  -- WBCs now normalized likely leukemoid reaction after OR management  -- DC TPN.  -- IV ABX completed 7 days.  DC PICC line.  -- Pending SNF placement on Monday        DVT prophylaxis:  Medical and mechanical DVT prophylaxis orders are present.    CODE STATUS:    Code Status (Patient has no pulse and is not breathing): CPR (Attempt to Resuscitate)  Medical Interventions (Patient has pulse or is breathing): Full Support      Disposition:  I expect patient to be discharged to SNF once tolerating p.o. intake, clinically stable, hopefully can DC in the next 1 to 2 days.    This patient has been examined wearing appropriate Personal Protective Equipment and discussed with nursing. 04/15/23      Electronically signed by Saima Roberto DO  04/15/23, 17:18 EDT.  [facility] Hospitalist Team

## 2023-04-16 LAB
ALBUMIN SERPL-MCNC: 2.6 G/DL (ref 3.5–5.2)
ALBUMIN/GLOB SERPL: 0.7 G/DL
ALP SERPL-CCNC: 299 U/L (ref 39–117)
ALT SERPL W P-5'-P-CCNC: 40 U/L (ref 1–41)
ANION GAP SERPL CALCULATED.3IONS-SCNC: 10 MMOL/L (ref 5–15)
AST SERPL-CCNC: 47 U/L (ref 1–40)
BILIRUB SERPL-MCNC: 0.2 MG/DL (ref 0–1.2)
BUN SERPL-MCNC: 40 MG/DL (ref 8–23)
BUN/CREAT SERPL: 27 (ref 7–25)
CA-I SERPL ISE-MCNC: 1.2 MMOL/L (ref 1.2–1.3)
CALCIUM SPEC-SCNC: 8.2 MG/DL (ref 8.6–10.5)
CHLORIDE SERPL-SCNC: 109 MMOL/L (ref 98–107)
CO2 SERPL-SCNC: 19 MMOL/L (ref 22–29)
CREAT SERPL-MCNC: 1.48 MG/DL (ref 0.76–1.27)
EGFRCR SERPLBLD CKD-EPI 2021: 52.2 ML/MIN/1.73
GLOBULIN UR ELPH-MCNC: 3.5 GM/DL
GLUCOSE BLDC GLUCOMTR-MCNC: 111 MG/DL (ref 70–105)
GLUCOSE BLDC GLUCOMTR-MCNC: 112 MG/DL (ref 70–105)
GLUCOSE BLDC GLUCOMTR-MCNC: 133 MG/DL (ref 70–105)
GLUCOSE BLDC GLUCOMTR-MCNC: 159 MG/DL (ref 70–105)
GLUCOSE BLDC GLUCOMTR-MCNC: 175 MG/DL (ref 70–105)
GLUCOSE SERPL-MCNC: 134 MG/DL (ref 65–99)
MAGNESIUM SERPL-MCNC: 1.6 MG/DL (ref 1.6–2.4)
PHOSPHATE SERPL-MCNC: 3.5 MG/DL (ref 2.5–4.5)
POTASSIUM SERPL-SCNC: 4 MMOL/L (ref 3.5–5.2)
PROT SERPL-MCNC: 6.1 G/DL (ref 6–8.5)
SODIUM SERPL-SCNC: 138 MMOL/L (ref 136–145)

## 2023-04-16 PROCEDURE — 83735 ASSAY OF MAGNESIUM: CPT | Performed by: SURGERY

## 2023-04-16 PROCEDURE — 82330 ASSAY OF CALCIUM: CPT | Performed by: SURGERY

## 2023-04-16 PROCEDURE — 25010000002 METOCLOPRAMIDE PER 10 MG: Performed by: UROLOGY

## 2023-04-16 PROCEDURE — 25010000002 MAGNESIUM SULFATE 2 GM/50ML SOLUTION: Performed by: INTERNAL MEDICINE

## 2023-04-16 PROCEDURE — 84100 ASSAY OF PHOSPHORUS: CPT | Performed by: SURGERY

## 2023-04-16 PROCEDURE — 82962 GLUCOSE BLOOD TEST: CPT

## 2023-04-16 PROCEDURE — 25010000002 HEPARIN (PORCINE) PER 1000 UNITS: Performed by: INTERNAL MEDICINE

## 2023-04-16 PROCEDURE — 80053 COMPREHEN METABOLIC PANEL: CPT | Performed by: SURGERY

## 2023-04-16 PROCEDURE — 63710000001 INSULIN LISPRO (HUMAN) PER 5 UNITS: Performed by: SURGERY

## 2023-04-16 RX ORDER — MAGNESIUM SULFATE HEPTAHYDRATE 40 MG/ML
2 INJECTION, SOLUTION INTRAVENOUS ONCE
Status: COMPLETED | OUTPATIENT
Start: 2023-04-16 | End: 2023-04-16

## 2023-04-16 RX ADMIN — HEPARIN SODIUM 5000 UNITS: 5000 INJECTION INTRAVENOUS; SUBCUTANEOUS at 20:48

## 2023-04-16 RX ADMIN — Medication 10 ML: at 20:48

## 2023-04-16 RX ADMIN — INSULIN LISPRO 2 UNITS: 100 INJECTION, SOLUTION INTRAVENOUS; SUBCUTANEOUS at 17:54

## 2023-04-16 RX ADMIN — HYDROXYZINE HYDROCHLORIDE 25 MG: 25 TABLET, FILM COATED ORAL at 20:48

## 2023-04-16 RX ADMIN — METOCLOPRAMIDE HYDROCHLORIDE 10 MG: 5 INJECTION INTRAMUSCULAR; INTRAVENOUS at 10:41

## 2023-04-16 RX ADMIN — SODIUM BICARBONATE 650 MG: 650 TABLET ORAL at 17:54

## 2023-04-16 RX ADMIN — METOCLOPRAMIDE HYDROCHLORIDE 10 MG: 5 INJECTION INTRAMUSCULAR; INTRAVENOUS at 23:46

## 2023-04-16 RX ADMIN — METOCLOPRAMIDE HYDROCHLORIDE 10 MG: 5 INJECTION INTRAMUSCULAR; INTRAVENOUS at 17:53

## 2023-04-16 RX ADMIN — GUAIFENESIN 1200 MG: 600 TABLET, EXTENDED RELEASE ORAL at 08:29

## 2023-04-16 RX ADMIN — POLYETHYLENE GLYCOL 3350 17 G: 17 POWDER, FOR SOLUTION ORAL at 20:47

## 2023-04-16 RX ADMIN — Medication 5000 UNITS: at 08:28

## 2023-04-16 RX ADMIN — HYDROCODONE BITARTRATE AND ACETAMINOPHEN 1 TABLET: 5; 325 TABLET ORAL at 20:47

## 2023-04-16 RX ADMIN — Medication 10 ML: at 08:29

## 2023-04-16 RX ADMIN — HEPARIN SODIUM 5000 UNITS: 5000 INJECTION INTRAVENOUS; SUBCUTANEOUS at 08:29

## 2023-04-16 RX ADMIN — METOCLOPRAMIDE HYDROCHLORIDE 10 MG: 5 INJECTION INTRAMUSCULAR; INTRAVENOUS at 05:45

## 2023-04-16 RX ADMIN — MAGNESIUM SULFATE HEPTAHYDRATE 2 G: 2 INJECTION, SOLUTION INTRAVENOUS at 10:41

## 2023-04-16 RX ADMIN — METOCLOPRAMIDE HYDROCHLORIDE 10 MG: 5 INJECTION INTRAMUSCULAR; INTRAVENOUS at 00:27

## 2023-04-16 RX ADMIN — SODIUM BICARBONATE 650 MG: 650 TABLET ORAL at 21:37

## 2023-04-16 RX ADMIN — SODIUM BICARBONATE 650 MG: 650 TABLET ORAL at 08:29

## 2023-04-16 RX ADMIN — GUAIFENESIN 1200 MG: 600 TABLET, EXTENDED RELEASE ORAL at 20:48

## 2023-04-16 NOTE — PROGRESS NOTES
"  [facility] DAILY PROGRESS NOTE    Patient Identification  Name: Anthony Moss  Age: 67 y.o.  Sex: male  :  1955  MRN: [id number]    Date: 2023             Subjective:  Interval History: Eating well, getting stronger    Objective:    Scheduled Meds:guaiFENesin, 1,200 mg, Oral, Q12H  heparin (porcine), 5,000 Units, Subcutaneous, Q12H  insulin lispro, 2-7 Units, Subcutaneous, Q6H  metoclopramide, 10 mg, Intravenous, Q6H  polyethylene glycol, 17 g, Oral, BID  sodium bicarbonate, 650 mg, Oral, TID  sodium chloride, 10 mL, Intravenous, Q12H  vitamin D3, 5,000 Units, Oral, Daily      Continuous Infusions:   PRN Meds:•  acetaminophen **OR** acetaminophen  •  dextrose  •  dextrose  •  docusate sodium  •  glucagon (human recombinant)  •  HYDROcodone-acetaminophen  •  hydrOXYzine  •  lactulose  •  [] HYDROmorphone **AND** naloxone  •  ondansetron **OR** ondansetron  •  phenol  •  polyethyl glycol-propyl glycol  •  senna-docusate sodium  •  sodium chloride  •  sodium chloride    Vital signs in last 24 hours:  Temp:  [98 °F (36.7 °C)-98.6 °F (37 °C)] 98.6 °F (37 °C)  Heart Rate:  [] 102  Resp:  [17-25] 17  BP: (114-125)/(76-80) 119/77    Intake/Output:    Intake/Output Summary (Last 24 hours) at 2023 1420  Last data filed at 2023 0549  Gross per 24 hour   Intake 240 ml   Output 2575 ml   Net -2335 ml       Exam:  /77 (BP Location: Left arm, Patient Position: Lying)   Pulse 102   Temp 98.6 °F (37 °C) (Axillary)   Resp 17   Ht 188 cm (74\")   Wt 89.8 kg (197 lb 15.6 oz)   SpO2 96%   BMI 25.42 kg/m²     General Appearance:    no distress               Abdomen:     Soft, ND, wound vac   :   Conduit pink                Data Review:  All labs (24hrs):   Recent Results (from the past 24 hour(s))   POC Glucose Once    Collection Time: 04/15/23  5:33 PM    Specimen: Blood   Result Value Ref Range    Glucose 142 (H) 70 - 105 mg/dL   Comprehensive Metabolic Panel    Collection Time: " 04/16/23  5:39 AM    Specimen: Blood   Result Value Ref Range    Glucose 134 (H) 65 - 99 mg/dL    BUN 40 (H) 8 - 23 mg/dL    Creatinine 1.48 (H) 0.76 - 1.27 mg/dL    Sodium 138 136 - 145 mmol/L    Potassium 4.0 3.5 - 5.2 mmol/L    Chloride 109 (H) 98 - 107 mmol/L    CO2 19.0 (L) 22.0 - 29.0 mmol/L    Calcium 8.2 (L) 8.6 - 10.5 mg/dL    Total Protein 6.1 6.0 - 8.5 g/dL    Albumin 2.6 (L) 3.5 - 5.2 g/dL    ALT (SGPT) 40 1 - 41 U/L    AST (SGOT) 47 (H) 1 - 40 U/L    Alkaline Phosphatase 299 (H) 39 - 117 U/L    Total Bilirubin 0.2 0.0 - 1.2 mg/dL    Globulin 3.5 gm/dL    A/G Ratio 0.7 g/dL    BUN/Creatinine Ratio 27.0 (H) 7.0 - 25.0    Anion Gap 10.0 5.0 - 15.0 mmol/L    eGFR 52.2 (L) >60.0 mL/min/1.73   Phosphorus    Collection Time: 04/16/23  5:39 AM    Specimen: Blood   Result Value Ref Range    Phosphorus 3.5 2.5 - 4.5 mg/dL   Calcium, Ionized    Collection Time: 04/16/23  5:39 AM    Specimen: Blood   Result Value Ref Range    Ionized Calcium 1.20 1.20 - 1.30 mmol/L   Magnesium    Collection Time: 04/16/23  5:39 AM    Specimen: Blood   Result Value Ref Range    Magnesium 1.6 1.6 - 2.4 mg/dL   POC Glucose Once    Collection Time: 04/16/23  6:13 AM    Specimen: Blood   Result Value Ref Range    Glucose 111 (H) 70 - 105 mg/dL   POC Glucose Once    Collection Time: 04/16/23 10:42 AM    Specimen: Blood   Result Value Ref Range    Glucose 112 (H) 70 - 105 mg/dL      Imaging Results (Last 24 Hours)     ** No results found for the last 24 hours. **           Assessment:    Bladder cancer    History of alcohol use disorder    Prostate cancer    Attention to urostomy    Severe Malnutrition (HCC)      Cystectomy and conduit 3/28/2023  Wound dehiscence with closure 4/6/23    Plan:    Continue dietary support  Continue encourage ambulation  Wound vac  Appreciate all consultants help  To SNF soon        Xu Montero MD  [facility]  [address], [address]  [place] [address]  Office: [phone]  Cell:  [phone]  Also available via Epic Secure Chat  04/16/23  14:20 EDT

## 2023-04-16 NOTE — PROGRESS NOTES
[facility] Medicine Services Daily Progress Note    Patient Name: Anthony Moss  : 1955  MRN: [id number]  Primary Care Physician:  Reji Topete MD  Date of admission: 3/28/2023      Subjective      Chief Complaint: Constipation, weakness      Patient Reports     3/31/23: OOB to chair.  PT consulted.  Constipated.  Undecided whether SNF versus HH.  Lives alone.  DC IVF.  Nephrology and urology following. + ARAVIND drain with serosanguineous fluid.    23: Patient with multiple vomiting overnight NG tube placed with bilious discharge.  Repeat KUB.  Gaiting Mov[employer].    23: Patient having NGT removed.  Still feeling pretty rough at this time improved abdominal pain.  Somnolent as well.  Awaiting SNF placement.    4/3/2023: Patient had NGT replaced due to likely ileus.  Having some surgical site TTP and soreness.  Still feeling pretty good overall, forward to improving his p.o. intake and getting started with the rest of his care plan.    23: Still has NGT in place.  Having some more feelings of hunger, agreeable to current management.  Also reporting constipation.    23: NGT was removed today, had large volume bowel movements as well.  Patient feeling a lot better today.  Advance to clear liquid diet.    23: Had significant bowel movement as well, still having some more abdominal pain today.  Denies any nausea, vomiting.  Is tolerating p.o. intake as well, but still only liquids and high-fiber.    23: Patient went to the OR last night for wound dehiscence repair.  General surgery and urology recs are appreciated for current management.  Patient to continue IV ABX, pending ID recs.     23: Patient had drop in Hb to below 7, receiving PRBC at this time.Still reporting significant abdominal pain.  General surgery recs pending.    23: Significant pretty worn down.  Now tolerating clear liquids, GS looking forward to advancing to fulls, still on TPN at  this time.  Pain management still be via IV    4/12/23: More alert today, but still feeling pretty fatigued.  Denied PT participation.  That is required for SNF placement by tomorrow otherwise it will go till Monday due to his TPN needs.  Patient is having more p.o. intake now and feeling somewhat better.    4/13/2023: Having improved functional ability and participation with PT.  A lot more energetic.  General surgery and urology recs appreciated.  Patient stable for DC to SNF, pre-CERT still pending.    4/15/2023: No new complaints.  Stable for DC to SNF    4/16/2023: Nurse reporting no new complaints.  Patient awaiting DC to SNF.  Will transfer [facility] of PCU to [facility] as he is not tolerating p.o. intake, does not require.  Completed IV ABX as well.  We will DC PICC line.    Review of Systems   All other systems reviewed and are negative.         Objective      Vitals:   Temp:  [98 °F (36.7 °C)-98.6 °F (37 °C)] 98.6 °F (37 °C)  Heart Rate:  [] 102  Resp:  [17-25] 17  BP: (114-125)/(76-80) 119/77    Physical Exam  HENT:      Head: Normocephalic.      Nose: Nose normal.   Eyes:      Extraocular Movements: Extraocular movements intact.      Pupils: Pupils are equal, round, and reactive to light.   Cardiovascular:      Rate and Rhythm: Normal rate.   Pulmonary:      Effort: Pulmonary effort is normal.      Breath sounds: Normal breath sounds.   Abdominal:      General: Bowel sounds are normal.      Comments: Ileal conduit with urine.  Left lower quadrant ARAVIND drain with serosanguineous fluid.   Musculoskeletal:         General: Normal range of motion.      Cervical back: Normal range of motion.   Skin:     General: Skin is warm.   Neurological:      Mental Status: He is alert. Mental status is at baseline.   Psychiatric:         Mood and Affect: Mood normal.             Result Review    Result Review:  I have personally reviewed the results from the time of this admission to 4/16/2023 14:06 EDT and agree with these  findings:  [x]  Laboratory  []  Microbiology  [x]  Radiology  []  EKG/Telemetry   []  Cardiology/Vascular   []  Pathology  [x]  Old records  []  Other:      Wounds (last 24 hours)     LDA Wound     Row Name 04/16/23 1200 04/16/23 0800 04/16/23 0412       Wound 04/06/23 2243 abdomen Incision    Wound - Properties Group Placement Date: 04/06/23  -PH Placement Time: 2243  -PH Present on Hospital Admission: Y  -PH Location: abdomen  -PH Primary Wound Type: Incision  -PH    Pressure Injury Stage -- -- --  pink/moist/red  -DC    Dressing Appearance -- -- dry;intact  -DC    Closure STEFF  -SM STEFF  -SM STEFF  -DC    Base dressing in place, unable to visualize  -SM dressing in place, unable to visualize  -SM dressing in place, unable to visualize  -DC    Drainage Characteristics/Odor serosanguineous  -SM serosanguineous  -SM serosanguineous  -DC    Drainage Amount small  -SM small  -SM small  -DC    Care, Wound -- -- negative pressure wound therapy  -DC    Dressing Care -- other (see comments)  wound vac  -SM dressing changed  -DC    Periwound Care -- -- dry periwound area maintained  -DC    Retired Wound - Properties Group Placement Date: 04/06/23  -PH Placement Time: 2243  -PH Present on Hospital Admission: Y  -PH Location: abdomen  -PH Primary Wound Type: Incision  -PH    Retired Wound - Properties Group Date first assessed: 04/06/23  -PH Time first assessed: 2243  -PH Present on Hospital Admission: Y  -PH Location: abdomen  -PH Primary Wound Type: Incision  -PH       Wound 04/07/23 0800 Left posterior heel Pressure Injury    Wound - Properties Group Placement Date: 04/07/23  -PB Placement Time: 0800  -PB Side: Left  -PB Orientation: posterior  -PB Location: heel  -PB Primary Wound Type: Pressure inj  -PB    Pressure Injury Stage -- -- 1  -DC    Dressing Appearance -- open to air  -SM open to air  -DC    Closure Open to air  -SM Open to air  -SM Open to air  -DC    Drainage Amount none  -SM none  -SM none  -DC    Care, Wound  -- pressure removed  -SM --    Dressing Care -- -- foam  -DC    Periwound Care -- -- dry periwound area maintained  -DC    Retired Wound - Properties Group Placement Date: 04/07/23 -PB Placement Time: 0800  -PB Side: Left  -PB Orientation: posterior  -PB Location: heel  -PB Primary Wound Type: Pressure inj  -PB    Retired Wound - Properties Group Date first assessed: 04/07/23  -PB Time first assessed: 0800  -PB Side: Left  -PB Location: heel  -PB Primary Wound Type: Pressure inj  -PB       Wound Right posterior heel Pressure Injury    Wound - Properties Group Side: Right  -PB Orientation: posterior  -PB Location: heel  -PB Primary Wound Type: Pressure inj  -PB    Pressure Injury Stage -- -- 1  -DC    Dressing Appearance -- open to air  -SM open to air  -DC    Closure Open to air  -SM Open to air  -SM Open to air  -DC    Drainage Amount none  -SM none  -SM none  -DC    Care, Wound -- pressure removed  -SM --    Dressing Care -- -- foam  -DC    Periwound Care -- -- dry periwound area maintained  -DC    Retired Wound - Properties Group Side: Right  -PB Orientation: posterior  -PB Location: heel  -PB Primary Wound Type: Pressure inj  -PB    Retired Wound - Properties Group Side: Right  -PB Location: heel  -PB Primary Wound Type: Pressure inj  -PB       Wound 04/11/23 0005 thoracic spine    Wound - Properties Group Placement Date: 04/11/23  -AB Placement Time: 0005 -AB Location: thoracic spine  -AB    Dressing Appearance -- open to air  -SM open to air  -DC    Closure Open to air  -SM Open to air  -SM Open to air  -DC    Base scab  -SM scab  -SM red;scab  -DC    Drainage Amount none  -SM none  -SM none  -DC    Care, Wound -- -- cleansed with;sterile normal saline  -DC    Dressing Care -- open to air  -SM open to air  -DC    Periwound Care -- -- dry periwound area maintained  -DC    Retired Wound - Properties Group Placement Date: 04/11/23  -AB Placement Time: 0005 -AB Location: thoracic spine  -AB    Retired Wound -  Properties Group Date first assessed: 04/11/23  -AB Time first assessed: 0005 -AB Location: thoracic spine  -AB    Row Name 04/16/23 0012 04/15/23 2012 04/15/23 1700       Wound 04/06/23 2243 abdomen Incision    Wound - Properties Group Placement Date: 04/06/23  -PH Placement Time: 2243  -PH Present on Hospital Admission: Y  -PH Location: abdomen  -PH Primary Wound Type: Incision  -PH    Pressure Injury Stage --  pink/red/moist  -DC -- --    Dressing Appearance dry;intact  -DC dry;intact  -DC --    Closure STEFF  -DC STEFF  -DC --    Base dressing in place, unable to visualize  -DC dressing in place, unable to visualize  -DC --    Drainage Characteristics/Odor serosanguineous  -DC serosanguineous  -DC --    Drainage Amount small  -DC small  -DC --    Care, Wound negative pressure wound therapy  -DC negative pressure wound therapy  -DC --    Dressing Care -- dressing changed  -DC --    Periwound Care dry periwound area maintained  -DC dry periwound area maintained  -DC --    Wound Output (mL) -- -- 50  -SM    Retired Wound - Properties Group Placement Date: 04/06/23  -PH Placement Time: 2243  -PH Present on Hospital Admission: Y  -PH Location: abdomen  -PH Primary Wound Type: Incision  -PH    Retired Wound - Properties Group Date first assessed: 04/06/23  -PH Time first assessed: 2243  -PH Present on Hospital Admission: Y  -PH Location: abdomen  -PH Primary Wound Type: Incision  -PH       Wound 04/07/23 0800 Left posterior heel Pressure Injury    Wound - Properties Group Placement Date: 04/07/23  -PB Placement Time: 0800  -PB Side: Left  -PB Orientation: posterior  -PB Location: heel  -PB Primary Wound Type: Pressure inj  -PB    Pressure Injury Stage 1  -DC 1  -DC --    Dressing Appearance open to air  -DC open to air  -DC --    Closure Open to air  -DC Open to air  -DC --    Drainage Amount none  -DC none  -DC --    Dressing Care foam  -DC foam  -DC --    Periwound Care dry periwound area maintained  -DC dry periwound  area maintained  -DC --    Retired Wound - Properties Group Placement Date: 04/07/23  -PB Placement Time: 0800  -PB Side: Left  -PB Orientation: posterior  -PB Location: heel  -PB Primary Wound Type: Pressure inj  -PB    Retired Wound - Properties Group Date first assessed: 04/07/23  -PB Time first assessed: 0800  -PB Side: Left  -PB Location: heel  -PB Primary Wound Type: Pressure inj  -PB       Wound Right posterior heel Pressure Injury    Wound - Properties Group Side: Right  -PB Orientation: posterior  -PB Location: heel  -PB Primary Wound Type: Pressure inj  -PB    Pressure Injury Stage 1  -DC 1  -DC --    Dressing Appearance open to air  -DC open to air  -DC --    Closure Open to air  -DC Open to air  -DC --    Drainage Amount none  -DC none  -DC --    Dressing Care foam  -DC foam  -DC --    Periwound Care dry periwound area maintained  -DC dry periwound area maintained  -DC --    Retired Wound - Properties Group Side: Right  -PB Orientation: posterior  -PB Location: heel  -PB Primary Wound Type: Pressure inj  -PB    Retired Wound - Properties Group Side: Right  -PB Location: heel  -PB Primary Wound Type: Pressure inj  -PB       Wound 04/11/23 0005 thoracic spine    Wound - Properties Group Placement Date: 04/11/23  -AB Placement Time: 0005 -AB Location: thoracic spine  -AB    Dressing Appearance open to air  -DC -- --    Closure Open to air  -DC Open to air  -DC --    Base red;scab  -DC red;scab  -DC --    Drainage Amount none  -DC none  -DC --    Care, Wound cleansed with;sterile normal saline  -DC cleansed with;sterile normal saline  -DC --    Dressing Care open to air  -DC open to air  -DC --    Periwound Care dry periwound area maintained  -DC dry periwound area maintained  -DC --    Retired Wound - Properties Group Placement Date: 04/11/23  -AB Placement Time: 0005 -AB Location: thoracic spine  -AB    Retired Wound - Properties Group Date first assessed: 04/11/23 -AB Time first assessed: 0005 -AB  Location: thoracic spine  -AB    Row Name 04/15/23 1600             Wound 04/06/23 2243 abdomen Incision    Wound - Properties Group Placement Date: 04/06/23  -PH Placement Time: 2243  -PH Present on Hospital Admission: Y  -PH Location: abdomen  -PH Primary Wound Type: Incision  -PH    Closure STEFF  -SM      Base dressing in place, unable to visualize  -SM      Drainage Characteristics/Odor serosanguineous  -SM      Drainage Amount small  -SM      Retired Wound - Properties Group Placement Date: 04/06/23  -PH Placement Time: 2243  -PH Present on Hospital Admission: Y  -PH Location: abdomen  -PH Primary Wound Type: Incision  -PH    Retired Wound - Properties Group Date first assessed: 04/06/23  -PH Time first assessed: 2243  -PH Present on Hospital Admission: Y  -PH Location: abdomen  -PH Primary Wound Type: Incision  -PH       Wound 04/07/23 0800 Left posterior heel Pressure Injury    Wound - Properties Group Placement Date: 04/07/23  -PB Placement Time: 0800  -PB Side: Left  -PB Orientation: posterior  -PB Location: heel  -PB Primary Wound Type: Pressure inj  -PB    Closure Open to air  -SM      Drainage Amount none  -SM      Retired Wound - Properties Group Placement Date: 04/07/23  -PB Placement Time: 0800  -PB Side: Left  -PB Orientation: posterior  -PB Location: heel  -PB Primary Wound Type: Pressure inj  -PB    Retired Wound - Properties Group Date first assessed: 04/07/23  -PB Time first assessed: 0800  -PB Side: Left  -PB Location: heel  -PB Primary Wound Type: Pressure inj  -PB       Wound Right posterior heel Pressure Injury    Wound - Properties Group Side: Right  -PB Orientation: posterior  -PB Location: heel  -PB Primary Wound Type: Pressure inj  -PB    Drainage Amount none  -SM      Retired Wound - Properties Group Side: Right  -PB Orientation: posterior  -PB Location: heel  -PB Primary Wound Type: Pressure inj  -PB    Retired Wound - Properties Group Side: Right  -PB Location: heel  -PB Primary Wound  Type: Pressure inj  -PB       Wound 04/11/23 0005 thoracic spine    Wound - Properties Group Placement Date: 04/11/23  -AB Placement Time: 0005 -AB Location: thoracic spine  -AB    Closure Open to air  -SM      Drainage Amount none  -SM      Retired Wound - Properties Group Placement Date: 04/11/23  -AB Placement Time: 0005 -AB Location: thoracic spine  -AB    Retired Wound - Properties Group Date first assessed: 04/11/23  -AB Time first assessed: 0005 -AB Location: thoracic spine  -AB          User Key  (r) = Recorded By, (t) = Taken By, (c) = Cosigned By    Initials Name Provider Type    PH Carolyn Marie RN Registered Nurse    Vale Mitchell LPN Licensed Nurse    Balbina Kendall RN Registered Nurse    Carolyn Bautista, RN Registered Nurse    Dell Marlow RN Registered Nurse                  Assessment & Plan      Brief Patient Summary:        guaiFENesin, 1,200 mg, Oral, Q12H  heparin (porcine), 5,000 Units, Subcutaneous, Q12H  insulin lispro, 2-7 Units, Subcutaneous, Q6H  metoclopramide, 10 mg, Intravenous, Q6H  polyethylene glycol, 17 g, Oral, BID  sodium bicarbonate, 650 mg, Oral, TID  sodium chloride, 10 mL, Intravenous, Q12H  vitamin D3, 5,000 Units, Oral, Daily             Active Hospital Problems:  Active Hospital Problems    Diagnosis    • **Bladder cancer    • Severe Malnutrition (HCC)    • Attention to urostomy    • Prostate cancer    • History of alcohol use disorder      Surgical wound dehiscence  Postop anemia  Prostate cancer  Bladder cancer  S/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 intake, at risk for starvation    Plan:  -Ileus resolved, now tolerating full p.o. intake.  -Wound VAC in place.  Minor drainage  -Renal function improved. nephro recs appreciated.  -Encourage OOB to chair.  -- 1 unit PRBC, Hb has been stable  --  WBCs now normalized likely leukemoid reaction after OR management  -- Pending SNF placement on Monday        DVT prophylaxis:  Medical and mechanical DVT prophylaxis orders are present.    CODE STATUS:    Code Status (Patient has no pulse and is not breathing): CPR (Attempt to Resuscitate)  Medical Interventions (Patient has pulse or is breathing): Full Support      Disposition:  I expect patient to be discharged to SNF once tolerating p.o. intake, clinically stable, hopefully can DC in the next 1 to 2 days.    This patient has been examined wearing appropriate Personal Protective Equipment and discussed with nursing. 04/16/23      Electronically signed by Saima Roberto DO, 04/16/23, 14:06 EDT.  [facility] Hospitalist Team

## 2023-04-16 NOTE — PROGRESS NOTES
PROGRESS NOTE      Patient Name: Anthony Moss  : 1955  MRN: [id number]  Primary Care Physician: Reji Topete MD  Date of admission: 3/28/2023    Patient Care Team:  Reji Topete MD as PCP - General (Family Medicine)        Subjective   Subjective:     Resting in bed.  Review of systems:  Review of Systems -   Neg.except weakness.    Allergies:  No Known Allergies    Objective   Exam:     Vital Signs  Temp:  [97.8 °F (36.6 °C)-98.5 °F (36.9 °C)] 98.5 °F (36.9 °C)  Heart Rate:  [] 98  Resp:  [21-27] 21  BP: (114-144)/(76-84) 125/79  SpO2:  [94 %-96 %] 96 %  on   ;   Device (Oxygen Therapy): room air  Body mass index is 25.42 kg/m².    General:     male in no acute distress.    Head:      Normocephalic and atraumatic.    Eyes:      PERRL/EOM intact, conjunctiva and sclera clear with out nystagmus.    Neck:      No masses, thyromegaly,  trachea central with normal respiratory effort   Lungs:    Clear bilaterally to auscultation.    Heart:      Regular rate and rhythm, no murmur no gallop  Abd:        Soft, nontender, s/p surgery dressing present   Pulses:   Pulses palpable  Extr:        No cyanosis or clubbing--+ edema.    Neuro:    No focal deficits.   alert oriented x3  Skin:       Intact without lesions or rashes.    Psych:    Alert and cooperative; normal mood and affect; .      Results Review:  I have personally reviewed most recent Data :  CBC    Results from last 7 days   Lab Units 23  0514 23  0058 237 23  0026 04/10/23  0512   WBC 10*3/mm3 10.70 8.30 6.80 5.50 6.80   HEMOGLOBIN g/dL 7.7* 7.8* 7.7* 7.4* 8.9*   PLATELETS 10*3/mm3 291 306 290 256 251     CMP   Results from last 7 days   Lab Units 23  0539 04/15/23  0433 23  0514 23  0058 23  0347 23  0026 04/10/23  0512   SODIUM mmol/L 138 142 138 139 142 143 139   POTASSIUM mmol/L 4.0 4.3 3.5 4.1 3.9 4.1 4.1   CHLORIDE mmol/L 109* 112* 108* 105 105 109* 105    CO2 mmol/L 19.0* 19.0* 20.0* 24.0 25.0 25.0 25.0   BUN mg/dL 40* 41* 41* 40* 36* 32* 29*   CREATININE mg/dL 1.48* 1.37* 1.41* 1.45* 1.32* 1.48* 1.64*   GLUCOSE mg/dL 134* 96 159* 136* 105* 147* 132*   ALBUMIN g/dL 2.6* 2.5* 2.4* 2.3* 2.2* 2.3* 2.2*   BILIRUBIN mg/dL 0.2 0.2 0.2 0.3 0.3 0.3 0.4   ALK PHOS U/L 299* 269* 241* 225* 204* 235* 208*   AST (SGOT) U/L 47* 59* 21 21 18 19 16   ALT (SGPT) U/L 40 33 15 14 11 11 10     ABG        No radiology results for the last day    Results for orders placed during the hospital encounter of 22    Adult Transthoracic Echo Complete w/ Color, Spectral and Contrast if Necessary Per Protocol    Interpretation Summary  · Left ventricular ejection fraction appears to be 56 - 60%.  · The right ventricular cavity is moderately dilated.  · No pericardial effusion noted    Scheduled Meds:guaiFENesin, 1,200 mg, Oral, Q12H  heparin (porcine), 5,000 Units, Subcutaneous, Q12H  insulin lispro, 2-7 Units, Subcutaneous, Q6H  magnesium sulfate, 2 g, Intravenous, Once  metoclopramide, 10 mg, Intravenous, Q6H  polyethylene glycol, 17 g, Oral, BID  sodium bicarbonate, 650 mg, Oral, TID  sodium chloride, 10 mL, Intravenous, Q12H  vitamin D3, 5,000 Units, Oral, Daily      Continuous Infusions:   PRN Meds:•  acetaminophen **OR** acetaminophen  •  dextrose  •  dextrose  •  docusate sodium  •  glucagon (human recombinant)  •  HYDROcodone-acetaminophen  •  hydrOXYzine  •  lactulose  •  [] HYDROmorphone **AND** naloxone  •  ondansetron **OR** ondansetron  •  phenol  •  polyethyl glycol-propyl glycol  •  senna-docusate sodium  •  sodium chloride  •  sodium chloride    Assessment & Plan   Assessment and Plan:         Bladder cancer    History of alcohol use disorder    Prostate cancer    Attention to urostomy    Severe Malnutrition (HCC)    ASSESSMENT:  • Acute kidney injury, stage III, status post radical cystectomy getting better hyperkalemia, secondary to CARLOS ALBERTO  • Metabolic acidosis  secondary to CKD start low-dose sodium bicarbonate  • History of bladder cancer, recent cystectomy  • Suprapubic catheter  • History of alcohol use,  • Chronic back pain,using NSAID in the past  • B/l pneumonia.  Surprisingly, WBC stable,           Plan:      • At this time patient with history of bladder cancer status post cystectomy urine output is adequate   • Suprapubic urostomy placed  • Renal functions have improved since yesterday with electrolytes getting better but patient still acidotic continue same dose of sodium bicarbonate at this time patient has more wound dehiscence and some infection taken back to the OR for 6/20/2023   • Patient creatinine of 1.4 which is likely new baseline  • Appetite is getting better eating and drinking better  • Continue vitamin D and calcium supplements   • repeat labs tomorrow morning  • GI symptoms have improved patient is eating and drinking better  • Potassium level is acceptable  • Echocardiogram done last time showed ejection fraction 56 to 60%  • Repeat labs tomorrow morning          Electronically signed by Jamie Houston MD,   [facility] kidney consultant  [phone]  4/16/2023  09:43 EDT    Rest

## 2023-04-17 LAB
ALBUMIN SERPL-MCNC: 2.6 G/DL (ref 3.5–5.2)
ALBUMIN/GLOB SERPL: 0.7 G/DL
ALP SERPL-CCNC: 286 U/L (ref 39–117)
ALT SERPL W P-5'-P-CCNC: 38 U/L (ref 1–41)
ANION GAP SERPL CALCULATED.3IONS-SCNC: 10 MMOL/L (ref 5–15)
AST SERPL-CCNC: 35 U/L (ref 1–40)
BILIRUB SERPL-MCNC: <0.2 MG/DL (ref 0–1.2)
BUN SERPL-MCNC: 41 MG/DL (ref 8–23)
BUN/CREAT SERPL: 23.7 (ref 7–25)
CA-I SERPL ISE-MCNC: 1.18 MMOL/L (ref 1.2–1.3)
CALCIUM SPEC-SCNC: 8 MG/DL (ref 8.6–10.5)
CHLORIDE SERPL-SCNC: 110 MMOL/L (ref 98–107)
CO2 SERPL-SCNC: 19 MMOL/L (ref 22–29)
CREAT SERPL-MCNC: 1.73 MG/DL (ref 0.76–1.27)
CRP SERPL-MCNC: 4.54 MG/DL (ref 0–0.5)
DEPRECATED RDW RBC AUTO: 51.2 FL (ref 37–54)
EGFRCR SERPLBLD CKD-EPI 2021: 43.3 ML/MIN/1.73
ERYTHROCYTE [DISTWIDTH] IN BLOOD BY AUTOMATED COUNT: 17 % (ref 12.3–15.4)
GLOBULIN UR ELPH-MCNC: 3.7 GM/DL
GLUCOSE BLDC GLUCOMTR-MCNC: 102 MG/DL (ref 70–105)
GLUCOSE BLDC GLUCOMTR-MCNC: 123 MG/DL (ref 70–105)
GLUCOSE BLDC GLUCOMTR-MCNC: 140 MG/DL (ref 70–105)
GLUCOSE BLDC GLUCOMTR-MCNC: 98 MG/DL (ref 70–105)
GLUCOSE SERPL-MCNC: 122 MG/DL (ref 65–99)
HCT VFR BLD AUTO: 23.8 % (ref 37.5–51)
HGB BLD-MCNC: 7.4 G/DL (ref 13–17.7)
INR PPP: 0.99 (ref 0.93–1.1)
IRON 24H UR-MRATE: 101 MCG/DL (ref 59–158)
IRON SATN MFR SERPL: 44 % (ref 20–50)
MAGNESIUM SERPL-MCNC: 2 MG/DL (ref 1.6–2.4)
MCH RBC QN AUTO: 26.3 PG (ref 26.6–33)
MCHC RBC AUTO-ENTMCNC: 30.9 G/DL (ref 31.5–35.7)
MCV RBC AUTO: 85.1 FL (ref 79–97)
PHOSPHATE SERPL-MCNC: 3.8 MG/DL (ref 2.5–4.5)
PLATELET # BLD AUTO: 268 10*3/MM3 (ref 140–450)
PMV BLD AUTO: 8.4 FL (ref 6–12)
POTASSIUM SERPL-SCNC: 4.5 MMOL/L (ref 3.5–5.2)
PREALB SERPL-MCNC: 21.6 MG/DL (ref 20–40)
PROT SERPL-MCNC: 6.3 G/DL (ref 6–8.5)
PROTHROMBIN TIME: 10.6 SECONDS (ref 9.6–11.7)
RBC # BLD AUTO: 2.8 10*6/MM3 (ref 4.14–5.8)
SODIUM SERPL-SCNC: 139 MMOL/L (ref 136–145)
TIBC SERPL-MCNC: 231 MCG/DL (ref 298–536)
TRANSFERRIN SERPL-MCNC: 155 MG/DL (ref 200–360)
WBC NRBC COR # BLD: 11.7 10*3/MM3 (ref 3.4–10.8)

## 2023-04-17 PROCEDURE — 94799 UNLISTED PULMONARY SVC/PX: CPT

## 2023-04-17 PROCEDURE — 84100 ASSAY OF PHOSPHORUS: CPT | Performed by: SURGERY

## 2023-04-17 PROCEDURE — 85610 PROTHROMBIN TIME: CPT | Performed by: INTERNAL MEDICINE

## 2023-04-17 PROCEDURE — 86140 C-REACTIVE PROTEIN: CPT | Performed by: INTERNAL MEDICINE

## 2023-04-17 PROCEDURE — 82962 GLUCOSE BLOOD TEST: CPT

## 2023-04-17 PROCEDURE — 25010000002 HEPARIN (PORCINE) PER 1000 UNITS: Performed by: INTERNAL MEDICINE

## 2023-04-17 PROCEDURE — 25010000002 CALCIUM GLUCONATE 2-0.675 GM/100ML-% SOLUTION: Performed by: INTERNAL MEDICINE

## 2023-04-17 PROCEDURE — 83735 ASSAY OF MAGNESIUM: CPT | Performed by: SURGERY

## 2023-04-17 PROCEDURE — 84134 ASSAY OF PREALBUMIN: CPT | Performed by: INTERNAL MEDICINE

## 2023-04-17 PROCEDURE — 25010000002 METOCLOPRAMIDE PER 10 MG: Performed by: INTERNAL MEDICINE

## 2023-04-17 PROCEDURE — 85027 COMPLETE CBC AUTOMATED: CPT | Performed by: INTERNAL MEDICINE

## 2023-04-17 PROCEDURE — 82330 ASSAY OF CALCIUM: CPT | Performed by: SURGERY

## 2023-04-17 PROCEDURE — 83540 ASSAY OF IRON: CPT | Performed by: INTERNAL MEDICINE

## 2023-04-17 PROCEDURE — 25010000002 METOCLOPRAMIDE PER 10 MG: Performed by: UROLOGY

## 2023-04-17 PROCEDURE — 80053 COMPREHEN METABOLIC PANEL: CPT | Performed by: SURGERY

## 2023-04-17 PROCEDURE — 84466 ASSAY OF TRANSFERRIN: CPT | Performed by: INTERNAL MEDICINE

## 2023-04-17 RX ORDER — INSULIN LISPRO 100 [IU]/ML
2-7 INJECTION, SOLUTION INTRAVENOUS; SUBCUTANEOUS
Status: DISCONTINUED | OUTPATIENT
Start: 2023-04-17 | End: 2023-04-20 | Stop reason: HOSPADM

## 2023-04-17 RX ORDER — CALCIUM GLUCONATE 20 MG/ML
2 INJECTION, SOLUTION INTRAVENOUS ONCE
Status: COMPLETED | OUTPATIENT
Start: 2023-04-17 | End: 2023-04-17

## 2023-04-17 RX ADMIN — GUAIFENESIN 1200 MG: 600 TABLET, EXTENDED RELEASE ORAL at 20:55

## 2023-04-17 RX ADMIN — CALCIUM GLUCONATE 2 G: 20 INJECTION, SOLUTION INTRAVENOUS at 10:48

## 2023-04-17 RX ADMIN — GUAIFENESIN 1200 MG: 600 TABLET, EXTENDED RELEASE ORAL at 08:37

## 2023-04-17 RX ADMIN — Medication 5000 UNITS: at 08:38

## 2023-04-17 RX ADMIN — HEPARIN SODIUM 5000 UNITS: 5000 INJECTION INTRAVENOUS; SUBCUTANEOUS at 08:38

## 2023-04-17 RX ADMIN — SODIUM BICARBONATE 650 MG: 650 TABLET ORAL at 08:38

## 2023-04-17 RX ADMIN — Medication 10 ML: at 08:42

## 2023-04-17 RX ADMIN — METOCLOPRAMIDE HYDROCHLORIDE 10 MG: 5 INJECTION INTRAMUSCULAR; INTRAVENOUS at 22:49

## 2023-04-17 RX ADMIN — METOCLOPRAMIDE HYDROCHLORIDE 10 MG: 5 INJECTION INTRAMUSCULAR; INTRAVENOUS at 10:48

## 2023-04-17 RX ADMIN — METOCLOPRAMIDE HYDROCHLORIDE 10 MG: 5 INJECTION INTRAMUSCULAR; INTRAVENOUS at 16:37

## 2023-04-17 RX ADMIN — SODIUM BICARBONATE 650 MG: 650 TABLET ORAL at 16:37

## 2023-04-17 RX ADMIN — HEPARIN SODIUM 5000 UNITS: 5000 INJECTION INTRAVENOUS; SUBCUTANEOUS at 20:55

## 2023-04-17 RX ADMIN — SODIUM BICARBONATE 650 MG: 650 TABLET ORAL at 20:55

## 2023-04-17 RX ADMIN — METOCLOPRAMIDE HYDROCHLORIDE 10 MG: 5 INJECTION INTRAMUSCULAR; INTRAVENOUS at 04:50

## 2023-04-17 RX ADMIN — Medication 10 ML: at 22:49

## 2023-04-17 NOTE — NURSING NOTE
67-year-old male who underwent a radical cystectomy with ileal conduit formation on March 28.  Subsequently patient developed his dehiscence of his abdominal wound.  Patient is seen today for dressing change to the midline wound.  Patient right lower quadrant ostomy is leaking at the time of the visit.  Patient is more awake and alert today.  He is able to carry on an appropriate conversation.  However he frequently nods and drifts back off to sleep.    The right lower quadrant ileal conduit is leaking the stoma sits within a deep fold and the stoma itself is retracted.  The sutures and pulled away causing a break in the skin.  This area is mostly healed however there is a new skin tear to the outer edge of the ostomy appliance noted.  Urine is yellow with mucus.  There are 2 stents in place.  The ostomy continues to leak at 3 and 9:00.  Skin was cleansed.  I applied Stomahesive powder and Skin-Prep to the open denuded skin tear.  I also applied a small piece of Maxorb over it.  I applied a new 1 piece Leslee cut to fit, pouching system with the use of an adapt thin cut darts within the pouch at 3 and 9:00 to better securing to the deep fold.  I did instruct the patient on the steps of the procedure patient did best when Marlan 1 piece with a belt was used however we currently are out of stock that are awaiting new supplies.  It is likely patient is going to need some form of convexity even likely may need soft convexity versus [place] oval convexity.    The midline abdominal wound VAC dressing change was taken down.  The wound base is mostly beefy red with some yellow slough noted at the proximal and distal end of the wound.  Overall the depth has decreased moderate serosanguineous exudate is noted in the VAC canister.  The wound was irrigated well with normal saline Skin-Prep applied to the periwound skin piece of silicone sheet was applied black foam inserted.  Suction set at 125 continuous and adapt ring was  used as the wound itself sits within a deep fold and at the top of it adapt ring was applied to secure the dressing.  Good seal was obtained patient tolerated.

## 2023-04-17 NOTE — PROGRESS NOTES
PROGRESS NOTE      Patient Name: Anthony Moss  : 1955  MRN: [id number]  Primary Care Physician: Reji Topete MD  Date of admission: 3/28/2023    Patient Care Team:  Reji Topete MD as PCP - General (Family Medicine)        Subjective   Subjective:     Resting in bed.  Review of systems:  Review of Systems -   Neg.except weakness.    Allergies:  No Known Allergies    Objective   Exam:     Vital Signs  Temp:  [97.9 °F (36.6 °C)-98.6 °F (37 °C)] 98 °F (36.7 °C)  Heart Rate:  [] 105  Resp:  [17-25] 24  BP: ()/(73-96) 98/76  SpO2:  [95 %-99 %] 97 %  on   ;   Device (Oxygen Therapy): room air  Body mass index is 24.14 kg/m².    General:     male in no acute distress.    Head:      Normocephalic and atraumatic.    Eyes:      PERRL/EOM intact, conjunctiva and sclera clear with out nystagmus.    Neck:      No masses, thyromegaly,  trachea central with normal respiratory effort   Lungs:    Clear bilaterally to auscultation.    Heart:      Regular rate and rhythm, no murmur no gallop  Abd:        Soft, nontender, s/p surgery dressing present   Pulses:   Pulses palpable  Extr:        No cyanosis or clubbing--+ edema.    Neuro:    No focal deficits.   alert oriented x3  Skin:       Intact without lesions or rashes.    Psych:    Alert and cooperative; normal mood and affect; .      Results Review:  I have personally reviewed most recent Data :  CBC    Results from last 7 days   Lab Units 23  0024 23  0514 23  0058 23  0347 23  0026   WBC 10*3/mm3 11.70* 10.70 8.30 6.80 5.50   HEMOGLOBIN g/dL 7.4* 7.7* 7.8* 7.7* 7.4*   PLATELETS 10*3/mm3 268 291 306 290 256     CMP   Results from last 7 days   Lab Units 23  0024 23  0539 04/15/23  0433 23  0514 23  0058 23  0347 23  0026   SODIUM mmol/L 139 138 142 138 139 142 143   POTASSIUM mmol/L 4.5 4.0 4.3 3.5 4.1 3.9 4.1   CHLORIDE mmol/L 110* 109* 112* 108* 105 105 109*    CO2 mmol/L 19.0* 19.0* 19.0* 20.0* 24.0 25.0 25.0   BUN mg/dL 41* 40* 41* 41* 40* 36* 32*   CREATININE mg/dL 1.73* 1.48* 1.37* 1.41* 1.45* 1.32* 1.48*   GLUCOSE mg/dL 122* 134* 96 159* 136* 105* 147*   ALBUMIN g/dL 2.6* 2.6* 2.5* 2.4* 2.3* 2.2* 2.3*   BILIRUBIN mg/dL <0.2 0.2 0.2 0.2 0.3 0.3 0.3   ALK PHOS U/L 286* 299* 269* 241* 225* 204* 235*   AST (SGOT) U/L 35 47* 59* 21 21 18 19   ALT (SGPT) U/L 38 40 33 15 14 11 11     ABG        No radiology results for the last day    Results for orders placed during the hospital encounter of 22    Adult Transthoracic Echo Complete w/ Color, Spectral and Contrast if Necessary Per Protocol    Interpretation Summary  · Left ventricular ejection fraction appears to be 56 - 60%.  · The right ventricular cavity is moderately dilated.  · No pericardial effusion noted    Scheduled Meds:guaiFENesin, 1,200 mg, Oral, Q12H  heparin (porcine), 5,000 Units, Subcutaneous, Q12H  insulin lispro, 2-7 Units, Subcutaneous, TID With Meals  metoclopramide, 10 mg, Intravenous, Q6H  polyethylene glycol, 17 g, Oral, BID  sodium bicarbonate, 650 mg, Oral, TID  sodium chloride, 10 mL, Intravenous, Q12H  vitamin D3, 5,000 Units, Oral, Daily      Continuous Infusions:   PRN Meds:•  acetaminophen **OR** acetaminophen  •  dextrose  •  dextrose  •  docusate sodium  •  glucagon (human recombinant)  •  hydrOXYzine  •  lactulose  •  [] HYDROmorphone **AND** naloxone  •  ondansetron **OR** ondansetron  •  phenol  •  polyethyl glycol-propyl glycol  •  senna-docusate sodium  •  sodium chloride  •  sodium chloride    Assessment & Plan   Assessment and Plan:         Bladder cancer    History of alcohol use disorder    Prostate cancer    Attention to urostomy    Severe Malnutrition (HCC)    ASSESSMENT:  • Acute kidney injury, stage III, status post radical cystectomy getting better hyperkalemia, secondary to CARLOS ALBERTO  • Metabolic acidosis secondary to CKD start low-dose sodium bicarbonate  • History of  bladder cancer, recent cystectomy  • Suprapubic catheter  • History of alcohol use,  • Chronic back pain,using NSAID in the past  • B/l pneumonia.  Surprisingly, WBC stable,           Plan:      • At this time patient with history of bladder cancer status post cystectomy urine output is adequate   • Suprapubic urostomy placed  • Renal functions have improved since yesterday with electrolytes getting better but patient still acidotic continue same dose of sodium bicarbonate at this time patient has more wound dehiscence and some infection taken back to the OR for 6/20/2023   • Patient creatinine of 1.4 which is likely new baseline but today increased with some drop in Hb d/w Dr Roberto  about pt will get Oncology f/u in on e week IRON stores adequate   • Appetite is getting better eating and drinking better  • Continue vitamin D and calcium supplements   • repeat labs tomorrow morning  • GI symptoms have improved patient is eating and drinking better  • Potassium level is acceptable  • Echocardiogram done last time showed ejection fraction 56 to 60%  • Repeat labs tomorrow morning          Electronically signed by Jamie Houston MD,   [facility] kidney consultant  [phone]  4/17/2023  14:36 EDT    Rest

## 2023-04-17 NOTE — PROGRESS NOTES
[facility] Medicine Services Daily Progress Note    Patient Name: Anthony Moss  : 1955  MRN: [id number]  Primary Care Physician:  Reji Topete MD  Date of admission: 3/28/2023      Subjective      Chief Complaint: Constipation, weakness      Patient Reports     3/31/23: OOB to chair.  PT consulted.  Constipated.  Undecided whether SNF versus HH.  Lives alone.  DC IVF.  Nephrology and urology following. + ARAVIND drain with serosanguineous fluid.    23: Patient with multiple vomiting overnight NG tube placed with bilious discharge.  Repeat KUB.  Gaiting [employer].    23: Patient having NGT removed.  Still feeling pretty rough at this time improved abdominal pain.  Somnolent as well.  Awaiting SNF placement.    4/3/2023: Patient had NGT replaced due to likely ileus.  Having some surgical site TTP and soreness.  Still feeling pretty good overall, forward to improving his p.o. intake and getting started with the rest of his care plan.    23: Still has NGT in place.  Having some more feelings of hunger, agreeable to current management.  Also reporting constipation.    23: NGT was removed today, had large volume bowel movements as well.  Patient feeling a lot better today.  Advance to clear liquid diet.    23: Had significant bowel movement as well, still having some more abdominal pain today.  Denies any nausea, vomiting.  Is tolerating p.o. intake as well, but still only liquids and high-fiber.    23: Patient went to the OR last night for wound dehiscence repair.  General surgery and urology recs are appreciated for current management.  Patient to continue IV ABX, pending ID recs.     23: Patient had drop in Hb to below 7, receiving PRBC at this time.Still reporting significant abdominal pain.  General surgery recs pending.    23: Significant pretty worn down.  Now tolerating clear liquids, GS looking forward to advancing to fulls, still on TPN at  this time.  Pain management still be via IV    4/12/23: More alert today, but still feeling pretty fatigued.  Denied PT participation.  That is required for SNF placement by tomorrow otherwise it will go till Monday due to his TPN needs.  Patient is having more p.o. intake now and feeling somewhat better.    4/13/2023: Having improved functional ability and participation with PT.  A lot more energetic.  General surgery and urology recs appreciated.  Patient stable for DC to SNF, pre-CERT still pending.    4/15/2023: No new complaints.  Stable for DC to SNF    4/16/2023: Nurse reporting no new complaints.  Patient awaiting DC to SNF.  Will transfer off of PCU to [facility] as he is not tolerating p.o. intake, does not require.  Completed IV ABX as well.  We will DC PICC line.    4/17/2023: Patient stable for DC from urology, nephrology, general surgery perspective.  Nephrology recommending oncology follow-up due to persistent anemia even though he was given IV iron and has now normalized his iron studies.  Patient also reports feeling nervous about leaving as he is not familiar with his ostomy and wound VAC care needs.    Review of Systems   All other systems reviewed and are negative.         Objective      Vitals:   Temp:  [97.9 °F (36.6 °C)-98.6 °F (37 °C)] 97.9 °F (36.6 °C)  Heart Rate:  [] 105  Resp:  [17-25] 22  BP: ()/(73-96) 132/87    Physical Exam  HENT:      Head: Normocephalic.      Nose: Nose normal.   Eyes:      Extraocular Movements: Extraocular movements intact.      Pupils: Pupils are equal, round, and reactive to light.   Cardiovascular:      Rate and Rhythm: Normal rate.   Pulmonary:      Effort: Pulmonary effort is normal.      Breath sounds: Normal breath sounds.   Abdominal:      General: Bowel sounds are normal.      Comments: Ileal conduit with urine.  Left lower quadrant ARAVIND drain with serosanguineous fluid.   Musculoskeletal:         General: Normal range of motion.      Cervical  back: Normal range of motion.   Skin:     General: Skin is warm.   Neurological:      Mental Status: He is alert. Mental status is at baseline.   Psychiatric:         Mood and Affect: Mood normal.             Result Review    Result Review:  I have personally reviewed the results from the time of this admission to 4/17/2023 17:21 EDT and agree with these findings:  [x]  Laboratory  []  Microbiology  [x]  Radiology  []  EKG/Telemetry   []  Cardiology/Vascular   []  Pathology  [x]  Old records  []  Other:      Wounds (last 24 hours)     LDA Wound     Row Name 04/17/23 0825 04/17/23 0422 04/17/23 0018       Wound 04/06/23 2243 abdomen Incision    Wound - Properties Group Placement Date: 04/06/23  -PH Placement Time: 2243  -PH Present on Hospital Admission: Y  -PH Location: abdomen  -PH Primary Wound Type: Incision  -PH    Dressing Appearance intact;dry  -MS (r) GC (t) MS (c) intact  -LC dry;intact  -LC    Closure STEFF  -MS (r) GC (t) MS (c) -- --    Base dressing in place, unable to visualize  -MS (r) GC (t) MS (c) -- --    Drainage Characteristics/Odor serosanguineous  -MS (r) GC (t) MS (c) -- --    Care, Wound negative pressure wound therapy  -MS (r) GC (t) MS (c) negative pressure wound therapy  -LC negative pressure wound therapy  -LC    Periwound Care dry periwound area maintained  -MS (r) GC (t) MS (c) -- --    Wound Output (mL) --  current output 400  -MS (r) GC (t) MS (c) -- --    Retired Wound - Properties Group Placement Date: 04/06/23  -PH Placement Time: 2243  -PH Present on Hospital Admission: Y  -PH Location: abdomen  -PH Primary Wound Type: Incision  -PH    Retired Wound - Properties Group Date first assessed: 04/06/23  -PH Time first assessed: 2243  -PH Present on Hospital Admission: Y  -PH Location: abdomen  -PH Primary Wound Type: Incision  -PH       Wound 04/07/23 0800 Left posterior heel Pressure Injury    Wound - Properties Group Placement Date: 04/07/23  -PB Placement Time: 0800  -PB Side: Left   -PB Orientation: posterior  -PB Location: heel  -PB Primary Wound Type: Pressure inj  -PB    Dressing Appearance open to air  -MS (r) GC (t) MS (c) -- --    Closure Open to air  -MS (r) GC (t) MS (c) -- --    Retired Wound - Properties Group Placement Date: 04/07/23  -PB Placement Time: 0800  -PB Side: Left  -PB Orientation: posterior  -PB Location: heel  -PB Primary Wound Type: Pressure inj  -PB    Retired Wound - Properties Group Date first assessed: 04/07/23  -PB Time first assessed: 0800  -PB Side: Left  -PB Location: heel  -PB Primary Wound Type: Pressure inj  -PB       Wound Right posterior heel Pressure Injury    Wound - Properties Group Side: Right  -PB Orientation: posterior  -PB Location: heel  -PB Primary Wound Type: Pressure inj  -PB    Dressing Appearance open to air  -MS (r) GC (t) MS (c) -- --    Closure Open to air  -MS (r) GC (t) MS (c) -- --    Retired Wound - Properties Group Side: Right  -PB Orientation: posterior  -PB Location: heel  -PB Primary Wound Type: Pressure inj  -PB    Retired Wound - Properties Group Side: Right  -PB Location: heel  -PB Primary Wound Type: Pressure inj  -PB       Wound 04/11/23 0005 thoracic spine    Wound - Properties Group Placement Date: 04/11/23  -AB Placement Time: 0005  -AB Location: thoracic spine  -AB    Dressing Appearance open to air  -MS (r) GC (t) MS (c) -- --    Closure Open to air  -MS (r) GC (t) MS (c) -- --    Base scab  -MS (r) GC (t) MS (c) -- --    Retired Wound - Properties Group Placement Date: 04/11/23  -AB Placement Time: 0005  -AB Location: thoracic spine  -AB    Retired Wound - Properties Group Date first assessed: 04/11/23  -AB Time first assessed: 0005  -AB Location: thoracic spine  -AB    Row Name 04/16/23 2032             Wound 04/06/23 2243 abdomen Incision    Wound - Properties Group Placement Date: 04/06/23  -PH Placement Time: 2243  -PH Present on Hospital Admission: Y  -PH Location: abdomen  -PH Primary Wound Type: Incision  -PH     Dressing Appearance intact  -LC      Closure STEFF  -LC      Care, Wound negative pressure wound therapy  -LC      Retired Wound - Properties Group Placement Date: 04/06/23  -PH Placement Time: 2243 -PH Present on Hospital Admission: Y  -PH Location: abdomen  -PH Primary Wound Type: Incision  -PH    Retired Wound - Properties Group Date first assessed: 04/06/23  -PH Time first assessed: 2243  -PH Present on Hospital Admission: Y  -PH Location: abdomen  -PH Primary Wound Type: Incision  -PH       Wound 04/07/23 0800 Left posterior heel Pressure Injury    Wound - Properties Group Placement Date: 04/07/23  -PB Placement Time: 0800  -PB Side: Left  -PB Orientation: posterior  -PB Location: heel  -PB Primary Wound Type: Pressure inj  -PB    Dressing Appearance open to air  -LC      Retired Wound - Properties Group Placement Date: 04/07/23  -PB Placement Time: 0800  -PB Side: Left  -PB Orientation: posterior  -PB Location: heel  -PB Primary Wound Type: Pressure inj  -PB    Retired Wound - Properties Group Date first assessed: 04/07/23  -PB Time first assessed: 0800  -PB Side: Left  -PB Location: heel  -PB Primary Wound Type: Pressure inj  -PB       Wound Right posterior heel Pressure Injury    Wound - Properties Group Side: Right  -PB Orientation: posterior  -PB Location: heel  -PB Primary Wound Type: Pressure inj  -PB    Dressing Appearance open to air  -LC      Retired Wound - Properties Group Side: Right  -PB Orientation: posterior  -PB Location: heel  -PB Primary Wound Type: Pressure inj  -PB    Retired Wound - Properties Group Side: Right  -PB Location: heel  -PB Primary Wound Type: Pressure inj  -PB       Wound 04/11/23 0005 thoracic spine    Wound - Properties Group Placement Date: 04/11/23  -AB Placement Time: 0005  -AB Location: thoracic spine  -AB    Dressing Appearance open to air  -LC      Retired Wound - Properties Group Placement Date: 04/11/23  -AB Placement Time: 0005  -AB Location: thoracic spine  -AB     Retired Wound - Properties Group Date first assessed: 04/11/23  -AB Time first assessed: 0005  -AB Location: thoracic spine  -AB          User Key  (r) = Recorded By, (t) = Taken By, (c) = Cosigned By    Initials Name Provider Type    Carolyn Arvizu RN Registered Nurse    Amber Covington LPN Licensed Nurse    Carolyn Bautista, RN Registered Nurse    Pat Ordoñez RN Registered Nurse    Juan Antonio Walsh RN Registered Nurse    Dell Marlow RN Registered Nurse                  Assessment & Plan      Brief Patient Summary:        guaiFENesin, 1,200 mg, Oral, Q12H  heparin (porcine), 5,000 Units, Subcutaneous, Q12H  insulin lispro, 2-7 Units, Subcutaneous, TID With Meals  metoclopramide, 10 mg, Intravenous, Q6H  polyethylene glycol, 17 g, Oral, BID  sodium bicarbonate, 650 mg, Oral, TID  sodium chloride, 10 mL, Intravenous, Q12H  vitamin D3, 5,000 Units, Oral, Daily             Active Hospital Problems:  Active Hospital Problems    Diagnosis    • **Bladder cancer    • Severe Malnutrition (HCC)    • Attention to urostomy    • Prostate cancer    • History of alcohol use disorder      Surgical wound dehiscence  Persistent postop anemia  Prostate cancer  Bladder cancer  S/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 intake, at risk for starvation    Plan:  -Ileus resolved, now tolerating full p.o. intake.  -Wound VAC in place.  Minor drainage  -Renal function improved. nephro recs appreciated.  -Encourage OOB to chair.  -- 1 unit PRBC, Hb has been stable  -- WBCs now normalized likely leukemoid reaction after OR management  -- Pending SNF placement, stable, accepting facility present.  -- DC was held due to downtrending Hb, will recheck in a.m. and transfuse as needed and assess for bleed source.  If stable may DC to SNF with recommended follow-ups per  urology, general surgery, oncology.      DVT prophylaxis:  Medical and mechanical DVT prophylaxis orders are present.    CODE STATUS:    Code Status (Patient has no pulse and is not breathing): CPR (Attempt to Resuscitate)  Medical Interventions (Patient has pulse or is breathing): Full Support      Disposition:  I expect patient to be discharged to SNF once tolerating p.o. intake, clinically stable, hopefully can DC in the next 1 to 2 days.    This patient has been examined wearing appropriate Personal Protective Equipment and discussed with nursing. 04/17/23      Electronically signed by Saima Roberto DO, 04/17/23, 17:21 EDT.  [facility] Hospitalist Team

## 2023-04-17 NOTE — CASE MANAGEMENT/SOCIAL WORK
Continued Stay Note  RADHA Ruffin     Patient Name: Anthony Moss  MRN: [id number]  Today's Date: 4/17/2023    Admit Date: 3/28/2023    Plan: Accepted to [facility], will require precert, PASRR per facility.   Discharge Plan     Row Name 04/17/23 1526       Plan    Plan Accepted to [facility], will require precert, PASRR per facility.    Patient/Family in Agreement with Plan yes    Plan Comments DC Barriers: HGB 7.4 (7.7 yesterday). Discussed with MD in unit rounds that iron studies ordered and case discussed with nephrology. CM updated [facility] liaison that patient not yet ready for discharge. Liaison reported precert expires end of day and will need restarted again once d/c is anticipated. CM met with patient at bedside to discuss IMM letter.              Jacquelyn Golden RN     Office Phone: [phone]  Office Cell: [phone]

## 2023-04-17 NOTE — PLAN OF CARE
Goal Outcome Evaluation:      Decided not to D/C today. Monitoring kidney function and hemoglobin with AM labs. Wound care nurse changed wound vac dressing/urostomy. Monitoring urostomy for leakage. No new complaints of pain or issues noted.  Problem: Adult Inpatient Plan of Care  Goal: Plan of Care Review  Outcome: Ongoing, Progressing  Goal: Patient-Specific Goal (Individualized)  Outcome: Ongoing, Progressing  Goal: Absence of Hospital-Acquired Illness or Injury  Outcome: Ongoing, Progressing  Intervention: Identify and Manage Fall Risk  Recent Flowsheet Documentation  Taken 4/17/2023 1623 by Christel, Amber, LPN  Safety Promotion/Fall Prevention:   activity supervised   assistive device/personal items within reach   clutter free environment maintained   fall prevention program maintained   nonskid shoes/slippers when out of bed   room organization consistent   safety round/check completed  Taken 4/17/2023 1407 by Amber Kearney LPN  Safety Promotion/Fall Prevention:   activity supervised   assistive device/personal items within reach   clutter free environment maintained   fall prevention program maintained   nonskid shoes/slippers when out of bed   room organization consistent   safety round/check completed  Taken 4/17/2023 1232 by Christel, Amber, LPN  Safety Promotion/Fall Prevention:   assistive device/personal items within reach   activity supervised   clutter free environment maintained   fall prevention program maintained   nonskid shoes/slippers when out of bed   room organization consistent   safety round/check completed  Taken 4/17/2023 1003 by Christel, Amber, LPN  Safety Promotion/Fall Prevention:   activity supervised   assistive device/personal items within reach   clutter free environment maintained   fall prevention program maintained   nonskid shoes/slippers when out of bed   room organization consistent   safety round/check completed  Taken 4/17/2023 0825 by Amber Kearney  LPN  Safety Promotion/Fall Prevention:   activity supervised   assistive device/personal items within reach   clutter free environment maintained   fall prevention program maintained   nonskid shoes/slippers when out of bed   room organization consistent   safety round/check completed  Intervention: Prevent Skin Injury  Recent Flowsheet Documentation  Taken 4/17/2023 1623 by Amber Kearney LPN  Body Position: position changed independently  Skin Protection:   adhesive use limited   transparent dressing maintained   tubing/devices free from skin contact  Taken 4/17/2023 1407 by Amber Kearney LPN  Body Position: position changed independently  Taken 4/17/2023 1232 by Amber Kearney LPN  Body Position: position changed independently  Skin Protection:   adhesive use limited   transparent dressing maintained   tubing/devices free from skin contact  Taken 4/17/2023 1003 by Amber Kearney LPN  Body Position: position changed independently  Taken 4/17/2023 0825 by Amber Kearney LPN  Body Position: position changed independently  Skin Protection:   adhesive use limited   transparent dressing maintained   tubing/devices free from skin contact  Intervention: Prevent and Manage VTE (Venous Thromboembolism) Risk  Recent Flowsheet Documentation  Taken 4/17/2023 1623 by Amber Kearney LPN  Activity Management: activity encouraged  VTE Prevention/Management:   bilateral   sequential compression devices off  Range of Motion: active ROM (range of motion) encouraged  Taken 4/17/2023 1407 by Amber Kearney LPN  Activity Management: activity encouraged  Taken 4/17/2023 1232 by Amber Kearney LPN  Activity Management: activity encouraged  VTE Prevention/Management:   bilateral   sequential compression devices off  Range of Motion: active ROM (range of motion) encouraged  Taken 4/17/2023 1003 by Amber Kearney LPN  Activity Management: activity encouraged  Taken 4/17/2023 0900 by Christel  ANIYAH Clark  VTE Prevention/Management:   bilateral   sequential compression devices off  Taken 4/17/2023 0825 by Amber Kearney LPN  Activity Management: activity encouraged  Range of Motion: active ROM (range of motion) encouraged  Intervention: Prevent Infection  Recent Flowsheet Documentation  Taken 4/17/2023 1623 by Amber Kearney LPN  Infection Prevention:   cohorting utilized   environmental surveillance performed   hand hygiene promoted   personal protective equipment utilized   single patient room provided   visitors restricted/screened  Taken 4/17/2023 1407 by Amber Kearney LPN  Infection Prevention:   cohorting utilized   environmental surveillance performed   hand hygiene promoted   personal protective equipment utilized   single patient room provided  Taken 4/17/2023 1232 by Amber Kearney LPN  Infection Prevention:   environmental surveillance performed   cohorting utilized   hand hygiene promoted   personal protective equipment utilized   single patient room provided  Taken 4/17/2023 1003 by Amber Kearney LPN  Infection Prevention:   cohorting utilized   environmental surveillance performed   hand hygiene promoted   personal protective equipment utilized   single patient room provided  Taken 4/17/2023 0825 by Amber Kearney LPN  Infection Prevention:   cohorting utilized   environmental surveillance performed   hand hygiene promoted   single patient room provided   personal protective equipment utilized  Goal: Optimal Comfort and Wellbeing  Outcome: Ongoing, Progressing  Intervention: Monitor Pain and Promote Comfort  Recent Flowsheet Documentation  Taken 4/17/2023 1623 by Amber Kearney LPN  Pain Management Interventions:   pillow support provided   position adjusted  Taken 4/17/2023 1232 by Amber Kearney LPN  Pain Management Interventions:   pillow support provided   position adjusted  Taken 4/17/2023 0900 by Amber Kearney LPN  Pain Management  Interventions:   pillow support provided   position adjusted  Intervention: Provide Person-Centered Care  Recent Flowsheet Documentation  Taken 4/17/2023 1623 by Amber Kearney LPN  Trust Relationship/Rapport:   care explained   questions encouraged  Taken 4/17/2023 1232 by Amber Kearney LPN  Trust Relationship/Rapport:   care explained   questions encouraged  Taken 4/17/2023 0900 by Amber Kearney LPN  Trust Relationship/Rapport:   care explained   questions encouraged  Goal: Readiness for Transition of Care  Outcome: Ongoing, Progressing     Problem: Chest Pain  Goal: Resolution of Chest Pain Symptoms  Outcome: Ongoing, Progressing     Problem: Skin Injury Risk Increased  Goal: Skin Health and Integrity  Outcome: Ongoing, Progressing  Intervention: Optimize Skin Protection  Recent Flowsheet Documentation  Taken 4/17/2023 1623 by Amber Kearney LPN  Pressure Reduction Techniques: frequent weight shift encouraged  Head of Bed (HOB) Positioning: HOB elevated  Pressure Reduction Devices: positioning supports utilized  Skin Protection:   adhesive use limited   transparent dressing maintained   tubing/devices free from skin contact  Taken 4/17/2023 1407 by Amber Kearney LPN  Head of Bed (HOB) Positioning: HOB elevated  Taken 4/17/2023 1232 by Amber Kearney LPN  Pressure Reduction Techniques: frequent weight shift encouraged  Head of Bed (HOB) Positioning: HOB elevated  Pressure Reduction Devices: positioning supports utilized  Skin Protection:   adhesive use limited   transparent dressing maintained   tubing/devices free from skin contact  Taken 4/17/2023 1003 by Amber Kearney LPN  Head of Bed (HOB) Positioning: HOB elevated  Taken 4/17/2023 0825 by Amber Kearney LPN  Pressure Reduction Techniques:   frequent weight shift encouraged   heels elevated off bed   weight shift assistance provided  Head of Bed (HOB) Positioning: HOB elevated  Pressure Reduction Devices: positioning  supports utilized  Skin Protection:   adhesive use limited   transparent dressing maintained   tubing/devices free from skin contact     Problem: Fall Injury Risk  Goal: Absence of Fall and Fall-Related Injury  Outcome: Ongoing, Progressing  Intervention: Identify and Manage Contributors  Recent Flowsheet Documentation  Taken 4/17/2023 1623 by Amber Kearney LPN  Medication Review/Management: medications reviewed  Taken 4/17/2023 1407 by Amber Kearney LPN  Medication Review/Management: medications reviewed  Taken 4/17/2023 1232 by Amber Kearney LPN  Medication Review/Management: medications reviewed  Taken 4/17/2023 1003 by Amber Kearney LPN  Medication Review/Management: medications reviewed  Taken 4/17/2023 0825 by Amber Kearney LPN  Medication Review/Management: medications reviewed  Intervention: Promote Injury-Free Environment  Recent Flowsheet Documentation  Taken 4/17/2023 1623 by Christel, Amber, LPN  Safety Promotion/Fall Prevention:   activity supervised   assistive device/personal items within reach   clutter free environment maintained   fall prevention program maintained   nonskid shoes/slippers when out of bed   room organization consistent   safety round/check completed  Taken 4/17/2023 1407 by Amber Kearney LPN  Safety Promotion/Fall Prevention:   activity supervised   assistive device/personal items within reach   clutter free environment maintained   fall prevention program maintained   nonskid shoes/slippers when out of bed   room organization consistent   safety round/check completed  Taken 4/17/2023 1232 by Christel, Amber, LPN  Safety Promotion/Fall Prevention:   assistive device/personal items within reach   activity supervised   clutter free environment maintained   fall prevention program maintained   nonskid shoes/slippers when out of bed   room organization consistent   safety round/check completed  Taken 4/17/2023 1003 by Amber Kearney  LPN  Safety Promotion/Fall Prevention:   activity supervised   assistive device/personal items within reach   clutter free environment maintained   fall prevention program maintained   nonskid shoes/slippers when out of bed   room organization consistent   safety round/check completed  Taken 4/17/2023 0825 by Christel, Amber, LPN  Safety Promotion/Fall Prevention:   activity supervised   assistive device/personal items within reach   clutter free environment maintained   fall prevention program maintained   nonskid shoes/slippers when out of bed   room organization consistent   safety round/check completed     Problem: Nausea and Vomiting  Goal: Fluid and Electrolyte Balance  Outcome: Ongoing, Progressing  Intervention: Prevent and Manage Nausea and Vomiting  Recent Flowsheet Documentation  Taken 4/17/2023 1623 by Amber Kearney LPN  Environmental Support: calm environment promoted  Taken 4/17/2023 1232 by Amber Kearney LPN  Environmental Support: calm environment promoted  Taken 4/17/2023 0900 by Amber Kearney LPN  Environmental Support: calm environment promoted     Problem: Malnutrition  Goal: Improved Nutritional Intake  Outcome: Ongoing, Progressing

## 2023-04-17 NOTE — PROGRESS NOTES
"  [facility] DAILY PROGRESS NOTE    Patient Identification  Name: Anthony Moss  Age: 67 y.o.  Sex: male  :  1955  MRN: [id number]    Date: 2023             Subjective:  Interval History: Eating well, getting stronger but still hesitant about discharge    Objective:    Scheduled Meds:guaiFENesin, 1,200 mg, Oral, Q12H  heparin (porcine), 5,000 Units, Subcutaneous, Q12H  insulin lispro, 2-7 Units, Subcutaneous, TID With Meals  metoclopramide, 10 mg, Intravenous, Q6H  polyethylene glycol, 17 g, Oral, BID  sodium bicarbonate, 650 mg, Oral, TID  sodium chloride, 10 mL, Intravenous, Q12H  vitamin D3, 5,000 Units, Oral, Daily      Continuous Infusions:   PRN Meds:•  acetaminophen **OR** acetaminophen  •  dextrose  •  dextrose  •  docusate sodium  •  glucagon (human recombinant)  •  hydrOXYzine  •  lactulose  •  [] HYDROmorphone **AND** naloxone  •  ondansetron **OR** ondansetron  •  phenol  •  polyethyl glycol-propyl glycol  •  senna-docusate sodium  •  sodium chloride  •  sodium chloride    Vital signs in last 24 hours:  Temp:  [97.9 °F (36.6 °C)-98.6 °F (37 °C)] 98 °F (36.7 °C)  Heart Rate:  [] 108  Resp:  [17-25] 21  BP: (117-133)/(73-96) 117/73    Intake/Output:    Intake/Output Summary (Last 24 hours) at 2023 1224  Last data filed at 2023 0827  Gross per 24 hour   Intake 960 ml   Output 2325 ml   Net -1365 ml       Exam:  /73 (BP Location: Left arm, Patient Position: Lying)   Pulse 108   Temp 98 °F (36.7 °C) (Oral)   Resp 21   Ht 188 cm (74\")   Wt 85.3 kg (188 lb 0.8 oz) Comment: wound vac off bed when weighed  SpO2 95%   BMI 24.14 kg/m²     General Appearance:    no distress               Abdomen:     Soft, ND, wound vac   :   Conduit pink                Data Review:  All labs (24hrs):   Recent Results (from the past 24 hour(s))   POC Glucose Once    Collection Time: 23  4:34 PM    Specimen: Blood   Result Value Ref Range    Glucose 175 (H) 70 - 105 mg/dL "   POC Glucose Once    Collection Time: 04/16/23  8:04 PM    Specimen: Blood   Result Value Ref Range    Glucose 133 (H) 70 - 105 mg/dL   POC Glucose Once    Collection Time: 04/16/23 11:44 PM    Specimen: Blood   Result Value Ref Range    Glucose 159 (H) 70 - 105 mg/dL   Comprehensive Metabolic Panel    Collection Time: 04/17/23 12:24 AM    Specimen: Blood   Result Value Ref Range    Glucose 122 (H) 65 - 99 mg/dL    BUN 41 (H) 8 - 23 mg/dL    Creatinine 1.73 (H) 0.76 - 1.27 mg/dL    Sodium 139 136 - 145 mmol/L    Potassium 4.5 3.5 - 5.2 mmol/L    Chloride 110 (H) 98 - 107 mmol/L    CO2 19.0 (L) 22.0 - 29.0 mmol/L    Calcium 8.0 (L) 8.6 - 10.5 mg/dL    Total Protein 6.3 6.0 - 8.5 g/dL    Albumin 2.6 (L) 3.5 - 5.2 g/dL    ALT (SGPT) 38 1 - 41 U/L    AST (SGOT) 35 1 - 40 U/L    Alkaline Phosphatase 286 (H) 39 - 117 U/L    Total Bilirubin <0.2 0.0 - 1.2 mg/dL    Globulin 3.7 gm/dL    A/G Ratio 0.7 g/dL    BUN/Creatinine Ratio 23.7 7.0 - 25.0    Anion Gap 10.0 5.0 - 15.0 mmol/L    eGFR 43.3 (L) >60.0 mL/min/1.73   Phosphorus    Collection Time: 04/17/23 12:24 AM    Specimen: Blood   Result Value Ref Range    Phosphorus 3.8 2.5 - 4.5 mg/dL   Calcium, Ionized    Collection Time: 04/17/23 12:24 AM    Specimen: Blood   Result Value Ref Range    Ionized Calcium 1.18 (L) 1.20 - 1.30 mmol/L   Magnesium    Collection Time: 04/17/23 12:24 AM    Specimen: Blood   Result Value Ref Range    Magnesium 2.0 1.6 - 2.4 mg/dL   CBC (No Diff)    Collection Time: 04/17/23 12:24 AM    Specimen: Blood   Result Value Ref Range    WBC 11.70 (H) 3.40 - 10.80 10*3/mm3    RBC 2.80 (L) 4.14 - 5.80 10*6/mm3    Hemoglobin 7.4 (L) 13.0 - 17.7 g/dL    Hematocrit 23.8 (L) 37.5 - 51.0 %    MCV 85.1 79.0 - 97.0 fL    MCH 26.3 (L) 26.6 - 33.0 pg    MCHC 30.9 (L) 31.5 - 35.7 g/dL    RDW 17.0 (H) 12.3 - 15.4 %    RDW-SD 51.2 37.0 - 54.0 fl    MPV 8.4 6.0 - 12.0 fL    Platelets 268 140 - 450 10*3/mm3   Iron Profile    Collection Time: 04/17/23 12:24 AM     Specimen: Blood   Result Value Ref Range    Iron 101 59 - 158 mcg/dL    Iron Saturation 44 20 - 50 %    Transferrin 155 (L) 200 - 360 mg/dL    TIBC 231 (L) 298 - 536 mcg/dL   POC Glucose Once    Collection Time: 04/17/23  7:22 AM    Specimen: Blood   Result Value Ref Range    Glucose 98 70 - 105 mg/dL   POC Glucose Once    Collection Time: 04/17/23 10:54 AM    Specimen: Blood   Result Value Ref Range    Glucose 123 (H) 70 - 105 mg/dL      Imaging Results (Last 24 Hours)     ** No results found for the last 24 hours. **           Assessment:    Bladder cancer    History of alcohol use disorder    Prostate cancer    Attention to urostomy    Severe Malnutrition (HCC)      Cystectomy and conduit 3/28/2023  Wound dehiscence with closure 4/6/23    Plan:    Continue dietary support  Continue encourage ambulation  Wound vac  Appreciate all consultants help  To SNF likely today        Xu Montero MD  [facility]  [address], [address]  [place] [address]  Office: [phone]  Cell: [phone]  Also available via [employer]  04/17/23  12:24 EDT

## 2023-04-17 NOTE — SIGNIFICANT NOTE
Pt declined PT.  He stated to come back another time as he is trying to get his cell phone fixed.

## 2023-04-17 NOTE — PLAN OF CARE
Problem: Adult Inpatient Plan of Care  Goal: Plan of Care Review  Outcome: Ongoing, Progressing  Flowsheets (Taken 4/17/2023 0303)  Progress: improving  Outcome Evaluation: RLQ urostomy appliance changed during this shift r/t leakage. Karen-stomal excoriation noted. Urostomy connected to BSD with clear yellow urine observed. Pt C/O pain earlier in this shift with PRN medication administered with + effect noted. Wound vac to midline ABD continues. Dressing remains intact at this time with vac at 125 mm Hg. Pt has been sinus tach throughout this shift so far. No additional C/O voiced per pt at this time. Pt resting abed at this time. Will continue to monitor.  Goal: Patient-Specific Goal (Individualized)  Outcome: Ongoing, Progressing  Goal: Absence of Hospital-Acquired Illness or Injury  Outcome: Ongoing, Progressing  Intervention: Identify and Manage Fall Risk  Recent Flowsheet Documentation  Taken 4/17/2023 0229 by Juan Antonio Kearney RN  Safety Promotion/Fall Prevention:   safety round/check completed   fall prevention program maintained  Taken 4/17/2023 0018 by Juan Antonio Kearney RN  Safety Promotion/Fall Prevention:   safety round/check completed   fall prevention program maintained  Taken 4/16/2023 2245 by Juan Antonio Kearney RN  Safety Promotion/Fall Prevention:   safety round/check completed   fall prevention program maintained  Taken 4/16/2023 2032 by Juan Antonio Kearney RN  Safety Promotion/Fall Prevention:   safety round/check completed   nonskid shoes/slippers when out of bed   fall prevention program maintained   clutter free environment maintained   assistive device/personal items within reach   activity supervised  Intervention: Prevent Skin Injury  Recent Flowsheet Documentation  Taken 4/17/2023 0018 by Juan Antonio Kearney RN  Skin Protection:   adhesive use limited   incontinence pads utilized   tubing/devices free from skin contact  Taken 4/16/2023 2032 by Juan Antonio Kearney RN  Body Position: position changed  independently  Skin Protection:   adhesive use limited   incontinence pads utilized   tubing/devices free from skin contact  Intervention: Prevent and Manage VTE (Venous Thromboembolism) Risk  Recent Flowsheet Documentation  Taken 4/16/2023 2032 by Juan Antonio Kearney RN  Activity Management:   up to bedside commode   back to bed  Intervention: Prevent Infection  Recent Flowsheet Documentation  Taken 4/17/2023 0229 by Juan Antonio Kearney RN  Infection Prevention:   hand hygiene promoted   personal protective equipment utilized   rest/sleep promoted   single patient room provided  Taken 4/17/2023 0018 by Juan Antonio Kearney RN  Infection Prevention:   hand hygiene promoted   personal protective equipment utilized   rest/sleep promoted   single patient room provided  Taken 4/16/2023 2245 by Juan Antonio Kearney RN  Infection Prevention:   hand hygiene promoted   personal protective equipment utilized   rest/sleep promoted   single patient room provided  Taken 4/16/2023 2032 by Juan Antonio Kearney RN  Infection Prevention:   hand hygiene promoted   personal protective equipment utilized   rest/sleep promoted   single patient room provided  Goal: Optimal Comfort and Wellbeing  Outcome: Ongoing, Progressing  Intervention: Monitor Pain and Promote Comfort  Recent Flowsheet Documentation  Taken 4/16/2023 2032 by Juan Antonio Kearney RN  Pain Management Interventions:   care clustered   diversional activity provided   pillow support provided   position adjusted   see MAR   unnecessary movement minimized  Intervention: Provide Person-Centered Care  Recent Flowsheet Documentation  Taken 4/16/2023 2032 by Juan Antonio Kearney RN  Trust Relationship/Rapport:   care explained   choices provided   questions encouraged   questions answered   reassurance provided   thoughts/feelings acknowledged  Goal: Readiness for Transition of Care  Outcome: Ongoing, Progressing     Problem: Chest Pain  Goal: Resolution of Chest Pain Symptoms  Outcome: Ongoing,  Progressing     Problem: Skin Injury Risk Increased  Goal: Skin Health and Integrity  Outcome: Ongoing, Progressing  Intervention: Optimize Skin Protection  Recent Flowsheet Documentation  Taken 4/17/2023 0018 by Juan Antonio Kearney RN  Pressure Reduction Techniques:   frequent weight shift encouraged   weight shift assistance provided  Pressure Reduction Devices: pressure-redistributing mattress utilized  Skin Protection:   adhesive use limited   incontinence pads utilized   tubing/devices free from skin contact  Taken 4/16/2023 2032 by Juan Antonio Kearney RN  Pressure Reduction Techniques:   frequent weight shift encouraged   weight shift assistance provided  Head of Bed (HOB) Positioning: HOB lowered  Pressure Reduction Devices: pressure-redistributing mattress utilized  Skin Protection:   adhesive use limited   incontinence pads utilized   tubing/devices free from skin contact     Problem: Fall Injury Risk  Goal: Absence of Fall and Fall-Related Injury  Outcome: Ongoing, Progressing  Intervention: Identify and Manage Contributors  Recent Flowsheet Documentation  Taken 4/17/2023 0229 by Juan Antonio Kearney RN  Medication Review/Management: medications reviewed  Taken 4/17/2023 0018 by Juan Antonio Kearney RN  Medication Review/Management: medications reviewed  Taken 4/16/2023 2245 by Juan Antonio Kearney RN  Medication Review/Management: medications reviewed  Taken 4/16/2023 2032 by Juan Antonio Kearney RN  Medication Review/Management: medications reviewed  Intervention: Promote Injury-Free Environment  Recent Flowsheet Documentation  Taken 4/17/2023 0229 by Juan Antonio Kearney RN  Safety Promotion/Fall Prevention:   safety round/check completed   fall prevention program maintained  Taken 4/17/2023 0018 by Juan Antonio Kearney RN  Safety Promotion/Fall Prevention:   safety round/check completed   fall prevention program maintained  Taken 4/16/2023 2245 by Juan Antonio Kearney RN  Safety Promotion/Fall Prevention:   safety round/check  completed   fall prevention program maintained  Taken 4/16/2023 2032 by Juan Antonio Kearney, RN  Safety Promotion/Fall Prevention:   safety round/check completed   nonskid shoes/slippers when out of bed   fall prevention program maintained   clutter free environment maintained   assistive device/personal items within reach   activity supervised     Problem: Nausea and Vomiting  Goal: Fluid and Electrolyte Balance  Outcome: Ongoing, Progressing  Intervention: Prevent and Manage Nausea and Vomiting  Recent Flowsheet Documentation  Taken 4/16/2023 2032 by Juan Antonio Kearney, RN  Environmental Support: calm environment promoted     Problem: Malnutrition  Goal: Improved Nutritional Intake  Outcome: Ongoing, Progressing   Goal Outcome Evaluation:           Progress: improving  Outcome Evaluation: RLQ urostomy appliance changed during this shift r/t leakage. Karen-stomal excoriation noted. Urostomy connected to BSD with clear yellow urine observed. Pt C/O pain earlier in this shift with PRN medication administered with + effect noted. Wound vac to midline ABD continues. Dressing remains intact at this time with vac at 125 mm Hg. Pt has been sinus tach throughout this shift so far. No additional C/O voiced per pt at this time. Pt resting abed at this time. Will continue to monitor.

## 2023-04-18 LAB
ALBUMIN SERPL-MCNC: 3.1 G/DL (ref 3.5–5.2)
ALBUMIN/GLOB SERPL: 0.8 G/DL
ALP SERPL-CCNC: 346 U/L (ref 39–117)
ALT SERPL W P-5'-P-CCNC: 34 U/L (ref 1–41)
ANION GAP SERPL CALCULATED.3IONS-SCNC: 13 MMOL/L (ref 5–15)
AST SERPL-CCNC: 29 U/L (ref 1–40)
BILIRUB SERPL-MCNC: 0.2 MG/DL (ref 0–1.2)
BUN SERPL-MCNC: 42 MG/DL (ref 8–23)
BUN/CREAT SERPL: 25 (ref 7–25)
CA-I SERPL ISE-MCNC: 1.24 MMOL/L (ref 1.2–1.3)
CALCIUM SPEC-SCNC: 8.4 MG/DL (ref 8.6–10.5)
CHLORIDE SERPL-SCNC: 109 MMOL/L (ref 98–107)
CO2 SERPL-SCNC: 17 MMOL/L (ref 22–29)
CREAT SERPL-MCNC: 1.68 MG/DL (ref 0.76–1.27)
DEPRECATED RDW RBC AUTO: 54.7 FL (ref 37–54)
EGFRCR SERPLBLD CKD-EPI 2021: 44.8 ML/MIN/1.73
ERYTHROCYTE [DISTWIDTH] IN BLOOD BY AUTOMATED COUNT: 17.5 % (ref 12.3–15.4)
GLOBULIN UR ELPH-MCNC: 4 GM/DL
GLUCOSE BLDC GLUCOMTR-MCNC: 102 MG/DL (ref 70–105)
GLUCOSE BLDC GLUCOMTR-MCNC: 125 MG/DL (ref 70–105)
GLUCOSE BLDC GLUCOMTR-MCNC: 95 MG/DL (ref 70–105)
GLUCOSE SERPL-MCNC: 114 MG/DL (ref 65–99)
HCT VFR BLD AUTO: 25.8 % (ref 37.5–51)
HGB BLD-MCNC: 8.2 G/DL (ref 13–17.7)
MAGNESIUM SERPL-MCNC: 1.8 MG/DL (ref 1.6–2.4)
MCH RBC QN AUTO: 26.7 PG (ref 26.6–33)
MCHC RBC AUTO-ENTMCNC: 31.7 G/DL (ref 31.5–35.7)
MCV RBC AUTO: 84.2 FL (ref 79–97)
PHOSPHATE SERPL-MCNC: 5.2 MG/DL (ref 2.5–4.5)
PLATELET # BLD AUTO: 323 10*3/MM3 (ref 140–450)
PMV BLD AUTO: 7.9 FL (ref 6–12)
POTASSIUM SERPL-SCNC: 4.8 MMOL/L (ref 3.5–5.2)
PROT SERPL-MCNC: 7.1 G/DL (ref 6–8.5)
RBC # BLD AUTO: 3.06 10*6/MM3 (ref 4.14–5.8)
SODIUM SERPL-SCNC: 139 MMOL/L (ref 136–145)
WBC NRBC COR # BLD: 14.8 10*3/MM3 (ref 3.4–10.8)

## 2023-04-18 PROCEDURE — 97116 GAIT TRAINING THERAPY: CPT

## 2023-04-18 PROCEDURE — 85027 COMPLETE CBC AUTOMATED: CPT | Performed by: INTERNAL MEDICINE

## 2023-04-18 PROCEDURE — 25010000002 METOCLOPRAMIDE PER 10 MG: Performed by: INTERNAL MEDICINE

## 2023-04-18 PROCEDURE — 97530 THERAPEUTIC ACTIVITIES: CPT

## 2023-04-18 PROCEDURE — 80053 COMPREHEN METABOLIC PANEL: CPT | Performed by: INTERNAL MEDICINE

## 2023-04-18 PROCEDURE — 84100 ASSAY OF PHOSPHORUS: CPT | Performed by: INTERNAL MEDICINE

## 2023-04-18 PROCEDURE — 94799 UNLISTED PULMONARY SVC/PX: CPT

## 2023-04-18 PROCEDURE — 97535 SELF CARE MNGMENT TRAINING: CPT

## 2023-04-18 PROCEDURE — 25010000002 HEPARIN (PORCINE) PER 1000 UNITS: Performed by: INTERNAL MEDICINE

## 2023-04-18 PROCEDURE — 83735 ASSAY OF MAGNESIUM: CPT | Performed by: INTERNAL MEDICINE

## 2023-04-18 PROCEDURE — 82962 GLUCOSE BLOOD TEST: CPT

## 2023-04-18 PROCEDURE — 82330 ASSAY OF CALCIUM: CPT | Performed by: INTERNAL MEDICINE

## 2023-04-18 RX ORDER — SODIUM BICARBONATE 650 MG/1
1300 TABLET ORAL 3 TIMES DAILY
Status: DISCONTINUED | OUTPATIENT
Start: 2023-04-18 | End: 2023-04-20 | Stop reason: HOSPADM

## 2023-04-18 RX ADMIN — GUAIFENESIN 1200 MG: 600 TABLET, EXTENDED RELEASE ORAL at 09:40

## 2023-04-18 RX ADMIN — METOCLOPRAMIDE HYDROCHLORIDE 10 MG: 5 INJECTION INTRAMUSCULAR; INTRAVENOUS at 06:02

## 2023-04-18 RX ADMIN — POLYETHYLENE GLYCOL 3350 17 G: 17 POWDER, FOR SOLUTION ORAL at 09:41

## 2023-04-18 RX ADMIN — HEPARIN SODIUM 5000 UNITS: 5000 INJECTION INTRAVENOUS; SUBCUTANEOUS at 22:00

## 2023-04-18 RX ADMIN — ACETAMINOPHEN 650 MG: 325 TABLET, FILM COATED ORAL at 12:15

## 2023-04-18 RX ADMIN — SODIUM BICARBONATE 650 MG: 650 TABLET ORAL at 09:42

## 2023-04-18 RX ADMIN — Medication 5000 UNITS: at 09:40

## 2023-04-18 RX ADMIN — SODIUM BICARBONATE 1300 MG: 650 TABLET ORAL at 22:00

## 2023-04-18 RX ADMIN — SODIUM BICARBONATE 1300 MG: 650 TABLET ORAL at 16:47

## 2023-04-18 RX ADMIN — METOCLOPRAMIDE HYDROCHLORIDE 10 MG: 5 INJECTION INTRAMUSCULAR; INTRAVENOUS at 22:55

## 2023-04-18 RX ADMIN — Medication 10 ML: at 09:41

## 2023-04-18 RX ADMIN — HEPARIN SODIUM 5000 UNITS: 5000 INJECTION INTRAVENOUS; SUBCUTANEOUS at 09:41

## 2023-04-18 RX ADMIN — GUAIFENESIN 1200 MG: 600 TABLET, EXTENDED RELEASE ORAL at 22:00

## 2023-04-18 RX ADMIN — Medication 10 ML: at 22:00

## 2023-04-18 RX ADMIN — METOCLOPRAMIDE HYDROCHLORIDE 10 MG: 5 INJECTION INTRAMUSCULAR; INTRAVENOUS at 16:47

## 2023-04-18 RX ADMIN — METOCLOPRAMIDE HYDROCHLORIDE 10 MG: 5 INJECTION INTRAMUSCULAR; INTRAVENOUS at 12:15

## 2023-04-18 RX ADMIN — HYDROXYZINE HYDROCHLORIDE 25 MG: 25 TABLET, FILM COATED ORAL at 12:15

## 2023-04-18 NOTE — CASE MANAGEMENT/SOCIAL WORK
Continued Stay Note   [place]     Patient Name: Anthony Moss  MRN: [id number]  Today's Date: 2023    Admit Date: 3/28/2023    Plan: DC Plan: Accepted to [facility], precert started 23. PASRR per facility   Discharge Plan     Row Name 23 1656       Plan    Plan DC Plan: Accepted to [facility], precert started 23. PASRR per facility    Patient/Family in Agreement with Plan yes    Plan Comments Notifed DC ready.  Precert has  end of day yesterday after an extension, will require new PT notes and new precert.  PT, RN, MD notified via secure chat.  4th Precert started per Maia.              Expected Discharge Date and Time     Expected Discharge Date Expected Discharge Time    2023       Phone communication or documentation only - no physical contact with patient or family.    Rochelle Serra RN     Office Phone [phone]  Office Cell [phone]

## 2023-04-18 NOTE — THERAPY TREATMENT NOTE
"Subjective: Pt agreeable to therapeutic plan of care.  Cognition: oriented to Person, Place and Time    Objective:     Bed Mobility: CGA   Functional Transfers: CGA     Balance: supported, static and standing SBA  Functional Ambulation: N/A or Not attempted.    Upper Body Dressing: Min-A  ADL Position: supported sitting      Lower Body Dressing: Mod-A  ADL Position: supported sitting      Vitals: WNL    Pain: 5 VAS  Location: abdomen  Interventions for pain: Repositioned  Education: Provided education on the importance of mobility in the acute care setting      Assessment: Anthony Moss presents with ADL impairments affecting function including balance, endurance / activity tolerance, pain and strength. Demonstrated functioning below baseline abilities indicate the need for continued skilled intervention while inpatient. Tolerating session today without incident. Will continue to follow and progress as tolerated.     Plan/Recommendations:   Moderate Intensity Therapy recommended post-acute care. This is recommended as therapy feels the patient would require 3-4 days per week and wouldn't tolerate \"3 hour daily\" rehab intensity. SNF would be the preferred choice. If the patient does not agree to SNF, arrange HH or OP depending on home bound status. If patient is medically complex, consider LTACH.. Pt requires no DME at discharge.     Pt desires Skilled Rehab placement at discharge. Pt cooperative; agreeable to therapeutic recommendations and plan of care.     Modified [facility]: N/A = No pre-op stroke/TIA    Post-Tx Position: Supine with HOB Elevated  PPE: gloves and surgical mask    "

## 2023-04-18 NOTE — PLAN OF CARE
"Goal Outcome Evaluation:        Bed mobility - CGA supine to sit to supine  Transfers - Min-A and with rolling walker  Ambulation - 25 feet CGA and with rolling walker  Stooped posture due to painful abdomen.  Pt able to ambulate a little further. Pt still with poor endurance      Moderate Intensity Therapy recommended post-acute care. This is recommended as therapy feels the patient would require 3-4 days per week and wouldn't tolerate \"3 hour daily\" rehab intensity. SNF would be recommended. Pt requires no DME at discharge.     Pt desires Skilled Rehab placement at discharge. Pt cooperative; agreeable to therapeutic recommendations and plan of care.    "

## 2023-04-18 NOTE — PLAN OF CARE
Problem: Adult Inpatient Plan of Care  Goal: Plan of Care Review  Outcome: Adequate for Care Transition  Goal: Patient-Specific Goal (Individualized)  Outcome: Adequate for Care Transition  Goal: Absence of Hospital-Acquired Illness or Injury  Outcome: Adequate for Care Transition  Intervention: Identify and Manage Fall Risk  Recent Flowsheet Documentation  Taken 4/18/2023 1415 by Jayla Jsoeph RN  Safety Promotion/Fall Prevention:  • assistive device/personal items within reach  • clutter free environment maintained  • fall prevention program maintained  • lighting adjusted  • nonskid shoes/slippers when out of bed  • room organization consistent  • safety round/check completed  Taken 4/18/2023 1204 by Jayla Joseph RN  Safety Promotion/Fall Prevention:  • assistive device/personal items within reach  • clutter free environment maintained  • fall prevention program maintained  • lighting adjusted  • nonskid shoes/slippers when out of bed  • room organization consistent  • safety round/check completed  Taken 4/18/2023 1002 by Jayla Joseph RN  Safety Promotion/Fall Prevention:  • assistive device/personal items within reach  • clutter free environment maintained  • fall prevention program maintained  • lighting adjusted  • nonskid shoes/slippers when out of bed  • room organization consistent  • safety round/check completed  Taken 4/18/2023 0815 by Jayla Joseph RN  Safety Promotion/Fall Prevention:  • assistive device/personal items within reach  • clutter free environment maintained  • fall prevention program maintained  • lighting adjusted  • nonskid shoes/slippers when out of bed  • room organization consistent  • safety round/check completed  Intervention: Prevent Skin Injury  Recent Flowsheet Documentation  Taken 4/18/2023 1415 by Jayla Joseph RN  Body Position: position changed independently  Taken 4/18/2023 1204 by Jayla Joseph RN  Body Position: position changed independently  Taken 4/18/2023 1002 by  Jayla Joseph RN  Body Position: position changed independently  Taken 4/18/2023 0815 by Jayla Joseph RN  Body Position: position changed independently  Intervention: Prevent and Manage VTE (Venous Thromboembolism) Risk  Recent Flowsheet Documentation  Taken 4/18/2023 1415 by Jayla Joseph RN  Activity Management: activity encouraged  Taken 4/18/2023 1204 by Jayla Joseph RN  Activity Management: activity encouraged  Taken 4/18/2023 1002 by Jayla Joseph RN  Activity Management: activity encouraged  Taken 4/18/2023 0815 by Jayla Joseph RN  Activity Management: activity encouraged  Range of Motion: active ROM (range of motion) encouraged  Taken 4/18/2023 0739 by Jayla Joseph RN  Activity Management: ambulated outside room  Intervention: Prevent Infection  Recent Flowsheet Documentation  Taken 4/18/2023 1415 by Jayla Joseph RN  Infection Prevention:  • hand hygiene promoted  • personal protective equipment utilized  Taken 4/18/2023 1204 by Jayla Joseph RN  Infection Prevention:  • hand hygiene promoted  • personal protective equipment utilized  Taken 4/18/2023 1002 by Jayla Joseph RN  Infection Prevention:  • hand hygiene promoted  • personal protective equipment utilized  Taken 4/18/2023 0815 by Jayla Joseph RN  Infection Prevention:  • hand hygiene promoted  • personal protective equipment utilized  Goal: Optimal Comfort and Wellbeing  Outcome: Adequate for Care Transition  Intervention: Provide Person-Centered Care  Recent Flowsheet Documentation  Taken 4/18/2023 0815 by Jayla Joseph RN  Trust Relationship/Rapport: care explained  Goal: Readiness for Transition of Care  Outcome: Adequate for Care Transition     Problem: Chest Pain  Goal: Resolution of Chest Pain Symptoms  Outcome: Adequate for Care Transition     Problem: Skin Injury Risk Increased  Goal: Skin Health and Integrity  Outcome: Adequate for Care Transition  Intervention: Optimize Skin Protection  Recent Flowsheet Documentation  Taken 4/18/2023  1415 by Jayla Joseph RN  Head of Bed (HOB) Positioning: HOB elevated  Taken 4/18/2023 1204 by Jayla Joseph RN  Head of Bed (HOB) Positioning: HOB elevated  Taken 4/18/2023 1002 by Jayla Joseph RN  Head of Bed (HOB) Positioning: HOB elevated  Taken 4/18/2023 0815 by Jayla Joseph RN  Pressure Reduction Techniques: frequent weight shift encouraged  Head of Bed (HOB) Positioning: HOB elevated  Pressure Reduction Devices: pressure-redistributing mattress utilized     Problem: Fall Injury Risk  Goal: Absence of Fall and Fall-Related Injury  Outcome: Adequate for Care Transition  Intervention: Identify and Manage Contributors  Recent Flowsheet Documentation  Taken 4/18/2023 1415 by Jayla Joseph RN  Medication Review/Management: medications reviewed  Taken 4/18/2023 1204 by Jayla Joseph RN  Medication Review/Management: medications reviewed  Taken 4/18/2023 1002 by Jayla Joseph RN  Medication Review/Management: medications reviewed  Taken 4/18/2023 0815 by Jayla Joseph RN  Medication Review/Management: medications reviewed  Intervention: Promote Injury-Free Environment  Recent Flowsheet Documentation  Taken 4/18/2023 1415 by Jayla Joseph RN  Safety Promotion/Fall Prevention:  • assistive device/personal items within reach  • clutter free environment maintained  • fall prevention program maintained  • lighting adjusted  • nonskid shoes/slippers when out of bed  • room organization consistent  • safety round/check completed  Taken 4/18/2023 1204 by Jayla Joseph RN  Safety Promotion/Fall Prevention:  • assistive device/personal items within reach  • clutter free environment maintained  • fall prevention program maintained  • lighting adjusted  • nonskid shoes/slippers when out of bed  • room organization consistent  • safety round/check completed  Taken 4/18/2023 1002 by Jayla Joseph RN  Safety Promotion/Fall Prevention:  • assistive device/personal items within reach  • clutter free environment maintained  • fall  prevention program maintained  • lighting adjusted  • nonskid shoes/slippers when out of bed  • room organization consistent  • safety round/check completed  Taken 4/18/2023 0815 by Jayla Joseph, RN  Safety Promotion/Fall Prevention:  • assistive device/personal items within reach  • clutter free environment maintained  • fall prevention program maintained  • lighting adjusted  • nonskid shoes/slippers when out of bed  • room organization consistent  • safety round/check completed     Problem: Nausea and Vomiting  Goal: Fluid and Electrolyte Balance  Outcome: Adequate for Care Transition  Intervention: Prevent and Manage Nausea and Vomiting  Recent Flowsheet Documentation  Taken 4/18/2023 0815 by Jayla Joseph, RN  Environmental Support: calm environment promoted     Problem: Malnutrition  Goal: Improved Nutritional Intake  Outcome: Adequate for Care Transition   Goal Outcome Evaluation:      Patient to DC pending Precert. Started again today. . DC orders in. Urostomy bag changed X2 this shift. Continues to leak. Patient resting at this time. Will continue to monitor.

## 2023-04-18 NOTE — THERAPY TREATMENT NOTE
"Subjective: Pt agreeable to therapeutic plan of care.    Objective:     Bed mobility - CGA supine to sit to supine  Transfers - Min-A and with rolling walker  Ambulation - 25 feet CGA and with rolling walker  Stooped posture due to painful abdomen.  Pt able to ambulate a little further. Pt still with poor endurance    Vitals: WNL    Pain: 5 VAS   Location: abdomen (surgery site) painful during transfers especially out and back into bed.  Intervention for pain: Repositioned, RN provided medication, Increased Activity and Therapeutic Presence    Education: Provided education on the importance of mobility in the acute care setting, Verbal/Tactile Cues, Transfer Training and Gait Training    Assessment: Anthony Moss presents with functional mobility impairments which indicate the need for skilled intervention. Tolerating session today without incident.  Pt is slow making progress.  Pt is motivated and pt should recover his mobility with rehab at d/c.  Will continue to follow and progress as tolerated.     Plan/Recommendations:   Moderate Intensity Therapy recommended post-acute care. This is recommended as therapy feels the patient would require 3-4 days per week and wouldn't tolerate \"3 hour daily\" rehab intensity. SNF would be recommended. Pt requires no DME at discharge.     Pt desires Skilled Rehab placement at discharge. Pt cooperative; agreeable to therapeutic recommendations and plan of care.     Basic Mobility 6-click:  Rollin = Total, A lot = 2, A little = 3; 4 = None  Supine>Sit:   1 = Total, A lot = 2, A little = 3; 4 = None   Sit>Stand with arms:  1 = Total, A lot = 2, A little = 3; 4 = None  Bed>Chair:   1 = Total, A lot = 2, A little = 3; 4 = None  Ambulate in room:  1 = Total, A lot = 2, A little = 3; 4 = None  3-5 Steps with railin = Total, A lot = 2, A little = 3; 4 = None  Score: 18    Modified [place]: 4 = Moderately severe disability (Unable to attend to own bodily needs without " assistance, and unable to walk unassisted)     Post-Tx Position: Supine with HOB Elevated, Alarms activated and Call light and personal items within reach  PPE: gloves and surgical mask

## 2023-04-18 NOTE — PLAN OF CARE
Goal Outcome Evaluation:      Pt. Demonstrates improved functional mobility this date CGA sit to stand transfer w/ bed rail support, requires assist to change linens and min A donning/doffing hospital gown. Pt. Completes bed mobility w/ min A for lines. Pt. Will require SNF at d/c to address ADL deficits and facilitate return to PLOF.

## 2023-04-18 NOTE — PLAN OF CARE
Problem: Adult Inpatient Plan of Care  Goal: Plan of Care Review  Outcome: Ongoing, Progressing  Flowsheets (Taken 4/18/2023 0309)  Progress: improving  Outcome Evaluation: RLQ urostomy appliance remains clean, dry, & intact at this time. Clear, yellow urine noted to BSD. Wound vac intact to midline ABD wound at 125 mm Hg. Pt has requested multiple drinks & snacks throughout this shift. Pt has been observed to rest for short periods of time throughout the noc. Pt has orders to downgrade to med/surg, once bed is available. Pt is anticipated to D/C to [facility], pre-cert required. No C/O voiced per pt so far at this time. Will continue to monitor.  Goal: Patient-Specific Goal (Individualized)  Outcome: Ongoing, Progressing  Goal: Absence of Hospital-Acquired Illness or Injury  Outcome: Ongoing, Progressing  Intervention: Identify and Manage Fall Risk  Recent Flowsheet Documentation  Taken 4/18/2023 0207 by Juan Antonio Kearney RN  Safety Promotion/Fall Prevention:   safety round/check completed   fall prevention program maintained  Taken 4/18/2023 0007 by Juan Antonio Kearney RN  Safety Promotion/Fall Prevention:   safety round/check completed   fall prevention program maintained  Intervention: Prevent Skin Injury  Recent Flowsheet Documentation  Taken 4/18/2023 0007 by Juan Antonio Kearney RN  Skin Protection:   adhesive use limited   incontinence pads utilized   tubing/devices free from skin contact  Intervention: Prevent and Manage VTE (Venous Thromboembolism) Risk  Recent Flowsheet Documentation  Taken 4/18/2023 0221 by Juan Antonio Kearney RN  Activity Management:   up to bedside commode   back to bed  Intervention: Prevent Infection  Recent Flowsheet Documentation  Taken 4/18/2023 0207 by Juan Antonio Kearney RN  Infection Prevention:   hand hygiene promoted   personal protective equipment utilized   rest/sleep promoted   single patient room provided  Taken 4/18/2023 0007 by Juan Antonio Kearney RN  Infection Prevention:   hand  hygiene promoted   personal protective equipment utilized   rest/sleep promoted   single patient room provided  Goal: Optimal Comfort and Wellbeing  Outcome: Ongoing, Progressing  Goal: Readiness for Transition of Care  Outcome: Ongoing, Progressing     Problem: Chest Pain  Goal: Resolution of Chest Pain Symptoms  Outcome: Ongoing, Progressing     Problem: Skin Injury Risk Increased  Goal: Skin Health and Integrity  Outcome: Ongoing, Progressing  Intervention: Optimize Skin Protection  Recent Flowsheet Documentation  Taken 4/18/2023 0007 by Juan Antonio Kearney RN  Pressure Reduction Techniques: frequent weight shift encouraged  Pressure Reduction Devices: pressure-redistributing mattress utilized  Skin Protection:   adhesive use limited   incontinence pads utilized   tubing/devices free from skin contact     Problem: Fall Injury Risk  Goal: Absence of Fall and Fall-Related Injury  Outcome: Ongoing, Progressing  Intervention: Identify and Manage Contributors  Recent Flowsheet Documentation  Taken 4/18/2023 0207 by Juan Antonio Kearney RN  Medication Review/Management: medications reviewed  Taken 4/18/2023 0007 by Juan Antonio Kearney RN  Medication Review/Management: medications reviewed  Intervention: Promote Injury-Free Environment  Recent Flowsheet Documentation  Taken 4/18/2023 0207 by Juan Antonio Kearney RN  Safety Promotion/Fall Prevention:   safety round/check completed   fall prevention program maintained  Taken 4/18/2023 0007 by Juan Antonio Kearney RN  Safety Promotion/Fall Prevention:   safety round/check completed   fall prevention program maintained     Problem: Nausea and Vomiting  Goal: Fluid and Electrolyte Balance  Outcome: Ongoing, Progressing     Problem: Malnutrition  Goal: Improved Nutritional Intake  Outcome: Ongoing, Progressing   Goal Outcome Evaluation:           Progress: improving  Outcome Evaluation: RLQ urostomy appliance remains clean, dry, & intact at this time. Clear, yellow urine noted to BSD. Wound  vac intact to midline ABD wound at 125 mm Hg. Pt has requested multiple drinks & snacks throughout this shift. Pt has been observed to rest for short periods of time throughout the noc. Pt has orders to downgrade to med/surg, once bed is available. Pt is anticipated to D/C to [place], pre-cert required. No C/O voiced per pt so far at this time. Will continue to monitor.

## 2023-04-18 NOTE — PROGRESS NOTES
"  [facility] DAILY PROGRESS NOTE    Patient Identification  Name: Anthony Moss  Age: 67 y.o.  Sex: male  :  1955  MRN: [id number]    Date: 2023             Subjective:  Interval History: Out to floor    Objective:    Scheduled Meds:guaiFENesin, 1,200 mg, Oral, Q12H  heparin (porcine), 5,000 Units, Subcutaneous, Q12H  insulin lispro, 2-7 Units, Subcutaneous, TID With Meals  metoclopramide, 10 mg, Intravenous, Q6H  polyethylene glycol, 17 g, Oral, BID  sodium bicarbonate, 1,300 mg, Oral, TID  sodium chloride, 10 mL, Intravenous, Q12H  vitamin D3, 5,000 Units, Oral, Daily      Continuous Infusions:   PRN Meds:•  acetaminophen **OR** acetaminophen  •  dextrose  •  dextrose  •  docusate sodium  •  glucagon (human recombinant)  •  hydrOXYzine  •  lactulose  •  [] HYDROmorphone **AND** naloxone  •  ondansetron **OR** ondansetron  •  phenol  •  polyethyl glycol-propyl glycol  •  senna-docusate sodium  •  sodium chloride  •  sodium chloride    Vital signs in last 24 hours:  Temp:  [97.9 °F (36.6 °C)-99.4 °F (37.4 °C)] 99.4 °F (37.4 °C)  Heart Rate:  [101-106] 106  Resp:  [22-27] 22  BP: (119-132)/(76-87) 119/76    Intake/Output:    Intake/Output Summary (Last 24 hours) at 2023 1619  Last data filed at 2023 0800  Gross per 24 hour   Intake 1320 ml   Output 2200 ml   Net -880 ml       Exam:  /76 (BP Location: Left arm, Patient Position: Lying)   Pulse 106   Temp 99.4 °F (37.4 °C) (Oral)   Resp 22   Ht 188 cm (74\")   Wt 85.3 kg (188 lb 0.8 oz) Comment: wound vac off bed when weighed  SpO2 97%   BMI 24.14 kg/m²     General Appearance:    no distress               Abdomen:     Soft, ND, wound vac   :   Conduit pink                Data Review:  All labs (24hrs):   Recent Results (from the past 24 hour(s))   Prealbumin    Collection Time: 23  5:04 PM    Specimen: Blood   Result Value Ref Range    Prealbumin 21.6 20.0 - 40.0 mg/dL   Protime-INR    Collection Time: 23  " 5:04 PM    Specimen: Blood   Result Value Ref Range    Protime 10.6 9.6 - 11.7 Seconds    INR 0.99 0.93 - 1.10   C-reactive Protein    Collection Time: 04/17/23  5:04 PM    Specimen: Blood   Result Value Ref Range    C-Reactive Protein 4.54 (H) 0.00 - 0.50 mg/dL   POC Glucose Once    Collection Time: 04/17/23  5:15 PM    Specimen: Blood   Result Value Ref Range    Glucose 102 70 - 105 mg/dL   POC Glucose Once    Collection Time: 04/17/23  8:00 PM    Specimen: Blood   Result Value Ref Range    Glucose 140 (H) 70 - 105 mg/dL   POC Glucose Once    Collection Time: 04/18/23  6:57 AM    Specimen: Blood   Result Value Ref Range    Glucose 125 (H) 70 - 105 mg/dL   Comprehensive Metabolic Panel    Collection Time: 04/18/23  7:47 AM    Specimen: Blood   Result Value Ref Range    Glucose 114 (H) 65 - 99 mg/dL    BUN 42 (H) 8 - 23 mg/dL    Creatinine 1.68 (H) 0.76 - 1.27 mg/dL    Sodium 139 136 - 145 mmol/L    Potassium 4.8 3.5 - 5.2 mmol/L    Chloride 109 (H) 98 - 107 mmol/L    CO2 17.0 (L) 22.0 - 29.0 mmol/L    Calcium 8.4 (L) 8.6 - 10.5 mg/dL    Total Protein 7.1 6.0 - 8.5 g/dL    Albumin 3.1 (L) 3.5 - 5.2 g/dL    ALT (SGPT) 34 1 - 41 U/L    AST (SGOT) 29 1 - 40 U/L    Alkaline Phosphatase 346 (H) 39 - 117 U/L    Total Bilirubin 0.2 0.0 - 1.2 mg/dL    Globulin 4.0 gm/dL    A/G Ratio 0.8 g/dL    BUN/Creatinine Ratio 25.0 7.0 - 25.0    Anion Gap 13.0 5.0 - 15.0 mmol/L    eGFR 44.8 (L) >60.0 mL/min/1.73   Phosphorus    Collection Time: 04/18/23  7:47 AM    Specimen: Blood   Result Value Ref Range    Phosphorus 5.2 (H) 2.5 - 4.5 mg/dL   Calcium, Ionized    Collection Time: 04/18/23  7:47 AM    Specimen: Blood   Result Value Ref Range    Ionized Calcium 1.24 1.20 - 1.30 mmol/L   Magnesium    Collection Time: 04/18/23  7:47 AM    Specimen: Blood   Result Value Ref Range    Magnesium 1.8 1.6 - 2.4 mg/dL   CBC (No Diff)    Collection Time: 04/18/23  7:47 AM    Specimen: Blood   Result Value Ref Range    WBC 14.80 (H) 3.40 - 10.80  10*3/mm3    RBC 3.06 (L) 4.14 - 5.80 10*6/mm3    Hemoglobin 8.2 (L) 13.0 - 17.7 g/dL    Hematocrit 25.8 (L) 37.5 - 51.0 %    MCV 84.2 79.0 - 97.0 fL    MCH 26.7 26.6 - 33.0 pg    MCHC 31.7 31.5 - 35.7 g/dL    RDW 17.5 (H) 12.3 - 15.4 %    RDW-SD 54.7 (H) 37.0 - 54.0 fl    MPV 7.9 6.0 - 12.0 fL    Platelets 323 140 - 450 10*3/mm3   POC Glucose Once    Collection Time: 04/18/23 12:29 PM    Specimen: Blood   Result Value Ref Range    Glucose 95 70 - 105 mg/dL      Imaging Results (Last 24 Hours)     ** No results found for the last 24 hours. **           Assessment:    Bladder cancer    History of alcohol use disorder    Prostate cancer    Attention to urostomy    Severe Malnutrition (HCC)      Cystectomy and conduit 3/28/2023  Wound dehiscence with closure 4/6/23    Plan:    Continue dietary support  Continue encourage ambulation  Wound vac  Appreciate all consultants help  To SNF        Xu Montero MD  [facility]  [address], [address]  [place] [address]  Office: [phone]  Cell: [phone]  Also available via Epic Secure Chat  04/18/23  16:19 EDT

## 2023-04-18 NOTE — PROGRESS NOTES
PROGRESS NOTE      Patient Name: Anthony Moss  : 1955  MRN: [id number]  Primary Care Physician: Reji Topete MD  Date of admission: 3/28/2023    Patient Care Team:  Reji Topete MD as PCP - General (Family Medicine)        Subjective   Subjective:     Resting in bed.  Review of systems:  Review of Systems -   Neg.except weakness.    Allergies:  No Known Allergies    Objective   Exam:     Vital Signs  Temp:  [97.9 °F (36.6 °C)-99.4 °F (37.4 °C)] 99.4 °F (37.4 °C)  Heart Rate:  [101-106] 106  Resp:  [22-27] 22  BP: ()/(76-87) 119/76  SpO2:  [97 %-98 %] 97 %  on   ;   Device (Oxygen Therapy): room air  Body mass index is 24.14 kg/m².    General:     male in no acute distress.    Head:      Normocephalic and atraumatic.    Eyes:      PERRL/EOM intact, conjunctiva and sclera clear with out nystagmus.    Neck:      No masses, thyromegaly,  trachea central with normal respiratory effort   Lungs:    Clear bilaterally to auscultation.    Heart:      Regular rate and rhythm, no murmur no gallop  Abd:        Soft, nontender, s/p surgery dressing present   Pulses:   Pulses palpable  Extr:        No cyanosis or clubbing--+ edema.    Neuro:    No focal deficits.   alert oriented x3  Skin:       Intact without lesions or rashes.    Psych:    Alert and cooperative; normal mood and affect; .      Results Review:  I have personally reviewed most recent Data :  CBC    Results from last 7 days   Lab Units 23  0747 23  0024 23  0514 23  0058 23  0347   WBC 10*3/mm3 14.80* 11.70* 10.70 8.30 6.80   HEMOGLOBIN g/dL 8.2* 7.4* 7.7* 7.8* 7.7*   PLATELETS 10*3/mm3 323 268 291 306 290     CMP   Results from last 7 days   Lab Units 23  0747 23  0024 23  0539 04/15/23  0433 23  0514 23  0058 23  0347   SODIUM mmol/L 139 139 138 142 138 139 142   POTASSIUM mmol/L 4.8 4.5 4.0 4.3 3.5 4.1 3.9   CHLORIDE mmol/L 109* 110* 109* 112* 108* 105  105   CO2 mmol/L 17.0* 19.0* 19.0* 19.0* 20.0* 24.0 25.0   BUN mg/dL 42* 41* 40* 41* 41* 40* 36*   CREATININE mg/dL 1.68* 1.73* 1.48* 1.37* 1.41* 1.45* 1.32*   GLUCOSE mg/dL 114* 122* 134* 96 159* 136* 105*   ALBUMIN g/dL 3.1* 2.6* 2.6* 2.5* 2.4* 2.3* 2.2*   BILIRUBIN mg/dL 0.2 <0.2 0.2 0.2 0.2 0.3 0.3   ALK PHOS U/L 346* 286* 299* 269* 241* 225* 204*   AST (SGOT) U/L 29 35 47* 59* 21 21 18   ALT (SGPT) U/L 34 38 40 33 15 14 11     ABG        No radiology results for the last day    Results for orders placed during the hospital encounter of 22    Adult Transthoracic Echo Complete w/ Color, Spectral and Contrast if Necessary Per Protocol    Interpretation Summary  · Left ventricular ejection fraction appears to be 56 - 60%.  · The right ventricular cavity is moderately dilated.  · No pericardial effusion noted    Scheduled Meds:guaiFENesin, 1,200 mg, Oral, Q12H  heparin (porcine), 5,000 Units, Subcutaneous, Q12H  insulin lispro, 2-7 Units, Subcutaneous, TID With Meals  metoclopramide, 10 mg, Intravenous, Q6H  polyethylene glycol, 17 g, Oral, BID  sodium bicarbonate, 1,300 mg, Oral, TID  sodium chloride, 10 mL, Intravenous, Q12H  vitamin D3, 5,000 Units, Oral, Daily      Continuous Infusions:   PRN Meds:•  acetaminophen **OR** acetaminophen  •  dextrose  •  dextrose  •  docusate sodium  •  glucagon (human recombinant)  •  hydrOXYzine  •  lactulose  •  [] HYDROmorphone **AND** naloxone  •  ondansetron **OR** ondansetron  •  phenol  •  polyethyl glycol-propyl glycol  •  senna-docusate sodium  •  sodium chloride  •  sodium chloride    Assessment & Plan   Assessment and Plan:         Bladder cancer    History of alcohol use disorder    Prostate cancer    Attention to urostomy    Severe Malnutrition (HCC)    ASSESSMENT:  • Acute kidney injury, stage III, status post radical cystectomy getting better hyperkalemia, secondary to CARLOS ALBERTO  • Metabolic acidosis secondary to CKD start low-dose sodium  bicarbonate  • History of bladder cancer, recent cystectomy  • Suprapubic catheter  • History of alcohol use,  • Chronic back pain,using NSAID in the past  • B/l pneumonia.  Surprisingly, WBC stable,           Plan:      • At this time patient with history of bladder cancer status post cystectomy urine output is adequate   • Suprapubic urostomy placed  • Renal function slightly higher than baseline likely related to some progressive CKD   • Significant acidosis noted will increase sodium bicarbonate   • With borderline potassium significant acidosis patient might be developing some distal RTA  • patient has more wound dehiscence and some infection taken back to the OR for 6/20/2023   • Patient creatinine 1.7 today hemoglobin is slightly better after blood transfusion yesterday white cell count is still elevated  • Appetite is getting better eating and drinking better  • Continue vitamin D and calcium supplements   • repeat labs tomorrow morning  • GI symptoms have improved patient is eating and drinking better  • Potassium level is acceptable  • Echocardiogram done last time showed ejection fraction 56 to 60%  • Repeat labs tomorrow morning          Electronically signed by Jamie Houston MD,   [facility] kidney consultant  [phone]  4/18/2023  12:35 EDT    Rest

## 2023-04-19 LAB
ALBUMIN SERPL-MCNC: 3 G/DL (ref 3.5–5.2)
ALBUMIN/GLOB SERPL: 0.7 G/DL
ALP SERPL-CCNC: 330 U/L (ref 39–117)
ALT SERPL W P-5'-P-CCNC: 30 U/L (ref 1–41)
ANION GAP SERPL CALCULATED.3IONS-SCNC: 14 MMOL/L (ref 5–15)
AST SERPL-CCNC: 26 U/L (ref 1–40)
BILIRUB SERPL-MCNC: 0.2 MG/DL (ref 0–1.2)
BUN SERPL-MCNC: 44 MG/DL (ref 8–23)
BUN/CREAT SERPL: 26.2 (ref 7–25)
CA-I SERPL ISE-MCNC: 1.23 MMOL/L (ref 1.2–1.3)
CALCIUM SPEC-SCNC: 8.7 MG/DL (ref 8.6–10.5)
CHLORIDE SERPL-SCNC: 111 MMOL/L (ref 98–107)
CO2 SERPL-SCNC: 16 MMOL/L (ref 22–29)
CREAT SERPL-MCNC: 1.68 MG/DL (ref 0.76–1.27)
EGFRCR SERPLBLD CKD-EPI 2021: 44.8 ML/MIN/1.73
GLOBULIN UR ELPH-MCNC: 4.2 GM/DL
GLUCOSE BLDC GLUCOMTR-MCNC: 101 MG/DL (ref 70–105)
GLUCOSE BLDC GLUCOMTR-MCNC: 155 MG/DL (ref 70–105)
GLUCOSE BLDC GLUCOMTR-MCNC: 183 MG/DL (ref 70–105)
GLUCOSE SERPL-MCNC: 109 MG/DL (ref 65–99)
MAGNESIUM SERPL-MCNC: 1.8 MG/DL (ref 1.6–2.4)
PHOSPHATE SERPL-MCNC: 5.4 MG/DL (ref 2.5–4.5)
POTASSIUM SERPL-SCNC: 4.8 MMOL/L (ref 3.5–5.2)
PROT SERPL-MCNC: 7.2 G/DL (ref 6–8.5)
SODIUM SERPL-SCNC: 141 MMOL/L (ref 136–145)
WHOLE BLOOD HOLD SPECIMEN: NORMAL

## 2023-04-19 PROCEDURE — 63710000001 INSULIN LISPRO (HUMAN) PER 5 UNITS: Performed by: NURSE PRACTITIONER

## 2023-04-19 PROCEDURE — 82962 GLUCOSE BLOOD TEST: CPT

## 2023-04-19 PROCEDURE — 97530 THERAPEUTIC ACTIVITIES: CPT

## 2023-04-19 PROCEDURE — 82330 ASSAY OF CALCIUM: CPT | Performed by: INTERNAL MEDICINE

## 2023-04-19 PROCEDURE — 80053 COMPREHEN METABOLIC PANEL: CPT | Performed by: INTERNAL MEDICINE

## 2023-04-19 PROCEDURE — 84100 ASSAY OF PHOSPHORUS: CPT | Performed by: INTERNAL MEDICINE

## 2023-04-19 PROCEDURE — 94799 UNLISTED PULMONARY SVC/PX: CPT

## 2023-04-19 PROCEDURE — 25010000002 METOCLOPRAMIDE PER 10 MG: Performed by: INTERNAL MEDICINE

## 2023-04-19 PROCEDURE — 83735 ASSAY OF MAGNESIUM: CPT | Performed by: INTERNAL MEDICINE

## 2023-04-19 PROCEDURE — 97110 THERAPEUTIC EXERCISES: CPT

## 2023-04-19 PROCEDURE — 25010000002 HEPARIN (PORCINE) PER 1000 UNITS: Performed by: INTERNAL MEDICINE

## 2023-04-19 RX ORDER — SODIUM BICARBONATE 650 MG/1
1300 TABLET ORAL 3 TIMES DAILY
Qty: 180 TABLET | Refills: 0 | Status: SHIPPED | OUTPATIENT
Start: 2023-04-19 | End: 2023-04-28

## 2023-04-19 RX ADMIN — METOCLOPRAMIDE HYDROCHLORIDE 10 MG: 5 INJECTION INTRAMUSCULAR; INTRAVENOUS at 06:27

## 2023-04-19 RX ADMIN — HEPARIN SODIUM 5000 UNITS: 5000 INJECTION INTRAVENOUS; SUBCUTANEOUS at 08:39

## 2023-04-19 RX ADMIN — Medication 10 ML: at 08:40

## 2023-04-19 RX ADMIN — INSULIN LISPRO 2 UNITS: 100 INJECTION, SOLUTION INTRAVENOUS; SUBCUTANEOUS at 12:06

## 2023-04-19 RX ADMIN — HEPARIN SODIUM 5000 UNITS: 5000 INJECTION INTRAVENOUS; SUBCUTANEOUS at 22:00

## 2023-04-19 RX ADMIN — GUAIFENESIN 1200 MG: 600 TABLET, EXTENDED RELEASE ORAL at 22:00

## 2023-04-19 RX ADMIN — Medication 5000 UNITS: at 08:38

## 2023-04-19 RX ADMIN — METOCLOPRAMIDE HYDROCHLORIDE 10 MG: 5 INJECTION INTRAMUSCULAR; INTRAVENOUS at 16:41

## 2023-04-19 RX ADMIN — Medication 10 ML: at 22:00

## 2023-04-19 RX ADMIN — SODIUM BICARBONATE 1300 MG: 650 TABLET ORAL at 08:39

## 2023-04-19 RX ADMIN — SODIUM BICARBONATE 1300 MG: 650 TABLET ORAL at 16:41

## 2023-04-19 RX ADMIN — GUAIFENESIN 1200 MG: 600 TABLET, EXTENDED RELEASE ORAL at 08:39

## 2023-04-19 RX ADMIN — SODIUM BICARBONATE 1300 MG: 650 TABLET ORAL at 22:00

## 2023-04-19 RX ADMIN — METOCLOPRAMIDE HYDROCHLORIDE 10 MG: 5 INJECTION INTRAMUSCULAR; INTRAVENOUS at 22:34

## 2023-04-19 RX ADMIN — METOCLOPRAMIDE HYDROCHLORIDE 10 MG: 5 INJECTION INTRAMUSCULAR; INTRAVENOUS at 11:42

## 2023-04-19 NOTE — PROGRESS NOTES
"Refused ABG after violently jerking away and screaming upon initial attempt.  States that we will \"have to figure something else out\".    "

## 2023-04-19 NOTE — PROGRESS NOTES
Discharge already in place as of 4/18. Updates to dc medications include  1. Increased sodium bicarbonate to 1300 m bid as advised by nephrology  2. Discontinued one time potassium chloride order  No other changes. HGB 8.2. Pt may dc to snf as earlier planned

## 2023-04-19 NOTE — PLAN OF CARE
Pt presents with functional mobility impairments which indicate the need for skilled intervention. Tolerating session today without incident. Pt fatigued and difficult to arouse at beginning of session.  Pt performed bed mobility with Min A , stand pivot transfer to b/s chair with min/Mod A and completed BLE strengthening exercises x 10 reps each. Pt refused ambulation secondary to fatigue.  Will continue to follow and progress as tolerated.

## 2023-04-19 NOTE — PROGRESS NOTES
Nutrition Services    Patient Name: Anthony Moss  YOB: 1955  MRN: [id number]  Admission date: 3/28/2023    PPE Documentation        PPE Worn By Provider Did not enter room for this encounter   PPE Worn By Patient  N/A     PROGRESS NOTE      Encounter Information: Progress note to check on PO intake. Pt appears to be eating well.        PO Diet: Diet: Regular/House Diet; Texture: Regular Texture (IDDSI 7); Fluid Consistency: Thin (IDDSI 0)   PO Supplements: Boost Breeze TID   PO Intake:  75%       Current nutrition support: None    Nutrition support review: TPN during admission, d/c        Labs (reviewed below): Reviewed         GI Function:  Stool Output  Stool (mL): 0 mL (03/31/23 0017)  Stool Unmeasured Occurrence: 1 (04/18/23 0414)  Bowel Incontinence: No (04/18/23 0414)  Stool Amount: small (04/18/23 0414)          Nutrition Intervention Updates: Continue current nutrition interventions     Results from last 7 days   Lab Units 04/19/23  0620 04/18/23 0747 04/17/23  0024   SODIUM mmol/L 141 139 139   POTASSIUM mmol/L 4.8 4.8 4.5   CHLORIDE mmol/L 111* 109* 110*   CO2 mmol/L 16.0* 17.0* 19.0*   BUN mg/dL 44* 42* 41*   CREATININE mg/dL 1.68* 1.68* 1.73*   CALCIUM mg/dL 8.7 8.4* 8.0*   BILIRUBIN mg/dL 0.2 0.2 <0.2   ALK PHOS U/L 330* 346* 286*   ALT (SGPT) U/L 30 34 38   AST (SGOT) U/L 26 29 35   GLUCOSE mg/dL 109* 114* 122*     Results from last 7 days   Lab Units 04/19/23  0620 04/18/23  0747 04/17/23  0024 04/15/23  0433 04/14/23  0514   MAGNESIUM mg/dL 1.8 1.8 2.0   < > 1.9   PHOSPHORUS mg/dL 5.4* 5.2* 3.8   < > 3.4   HEMOGLOBIN g/dL  --  8.2* 7.4*  --  7.7*   HEMATOCRIT %  --  25.8* 23.8*  --  24.1*   TRIGLYCERIDES mg/dL  --   --   --   --  164*    < > = values in this interval not displayed.     COVID19   Date Value Ref Range Status   03/01/2023 Not Detected Not Detected - Ref. Range Final     Lab Results   Component Value Date    HGBA1C 4.8 03/01/2023           RD to follow up per  protocol.    Electronically signed by:  Gabriela Schmitt RD  04/19/23 15:08 EDT

## 2023-04-19 NOTE — PLAN OF CARE
Problem: Adult Inpatient Plan of Care  Goal: Absence of Hospital-Acquired Illness or Injury  Intervention: Identify and Manage Fall Risk  Recent Flowsheet Documentation  Taken 4/19/2023 1633 by Jayla Joseph RN  Safety Promotion/Fall Prevention:   assistive device/personal items within reach   clutter free environment maintained   fall prevention program maintained   lighting adjusted   nonskid shoes/slippers when out of bed   room organization consistent   safety round/check completed  Taken 4/19/2023 1425 by Jayla Joseph RN  Safety Promotion/Fall Prevention:   assistive device/personal items within reach   clutter free environment maintained   fall prevention program maintained   nonskid shoes/slippers when out of bed   room organization consistent   safety round/check completed  Taken 4/19/2023 1217 by Jayla Joseph RN  Safety Promotion/Fall Prevention:   assistive device/personal items within reach   clutter free environment maintained   fall prevention program maintained   lighting adjusted   nonskid shoes/slippers when out of bed   room organization consistent   safety round/check completed  Taken 4/19/2023 1033 by Jayla Joseph RN  Safety Promotion/Fall Prevention:   assistive device/personal items within reach   clutter free environment maintained   fall prevention program maintained   lighting adjusted   nonskid shoes/slippers when out of bed   room organization consistent   safety round/check completed  Taken 4/19/2023 0842 by Jayla Joseph RN  Safety Promotion/Fall Prevention:   assistive device/personal items within reach   clutter free environment maintained   fall prevention program maintained   lighting adjusted   nonskid shoes/slippers when out of bed   safety round/check completed   room organization consistent  Intervention: Prevent Skin Injury  Recent Flowsheet Documentation  Taken 4/19/2023 1633 by Jayla Joseph RN  Body Position: position changed independently  Taken 4/19/2023 1425 by Jake  JSOE Dickerson  Body Position: weight shifting  Taken 4/19/2023 1217 by Jayla Joseph RN  Body Position: turned  Taken 4/19/2023 1033 by Jayla Joseph RN  Body Position: position changed independently  Taken 4/19/2023 0842 by Jayla Joseph RN  Body Position: position changed independently  Skin Protection: incontinence pads utilized  Intervention: Prevent and Manage VTE (Venous Thromboembolism) Risk  Recent Flowsheet Documentation  Taken 4/19/2023 1633 by Jayla Joseph RN  Activity Management: standing at bedside  Taken 4/19/2023 1425 by Jayla Joseph RN  Activity Management: activity encouraged  Taken 4/19/2023 1217 by Jayla Joseph RN  Activity Management: activity encouraged  Taken 4/19/2023 1033 by Jayla Joseph RN  Activity Management: activity encouraged  Taken 4/19/2023 0842 by Jayla Joseph RN  Activity Management:   up ad kylie   up to bedside commode  Range of Motion: active ROM (range of motion) encouraged  Taken 4/19/2023 0739 by Jayla Joseph RN  Activity Management: sitting, edge of bed  Intervention: Prevent Infection  Recent Flowsheet Documentation  Taken 4/19/2023 1633 by Jayla Joseph RN  Infection Prevention:   hand hygiene promoted   personal protective equipment utilized  Taken 4/19/2023 1425 by Jayla Joseph RN  Infection Prevention:   hand hygiene promoted   personal protective equipment utilized  Taken 4/19/2023 1217 by Jayla Joseph RN  Infection Prevention:   hand hygiene promoted   personal protective equipment utilized  Taken 4/19/2023 1033 by Jayla Joseph RN  Infection Prevention:   hand hygiene promoted   personal protective equipment utilized  Taken 4/19/2023 0842 by Jayla Joseph RN  Infection Prevention:   hand hygiene promoted   personal protective equipment utilized  Goal: Optimal Comfort and Wellbeing  Intervention: Provide Person-Centered Care  Recent Flowsheet Documentation  Taken 4/19/2023 0842 by Jayla Joseph RN  Trust Relationship/Rapport: care explained     Problem: Skin Injury  Risk Increased  Goal: Skin Health and Integrity  Intervention: Optimize Skin Protection  Recent Flowsheet Documentation  Taken 4/19/2023 1633 by Jayla Joseph RN  Head of Bed ([facility]) Positioning: HOB elevated  Taken 4/19/2023 1425 by Jayla Joseph RN  Head of Bed ([facility]) Positioning: HOB elevated  Taken 4/19/2023 1217 by Jayla Joseph RN  Head of Bed ([facility]) Positioning: HOB elevated  Taken 4/19/2023 1033 by Jayla Joseph RN  Head of Bed ([facility]) Positioning: HOB elevated  Taken 4/19/2023 0842 by Jayla Joseph RN  Pressure Reduction Techniques:   frequent weight shift encouraged   pressure points protected   weight shift assistance provided   heels elevated off bed  Head of Bed (HOB) Positioning: HOB elevated  Pressure Reduction Devices: pressure-redistributing mattress utilized  Skin Protection: incontinence pads utilized     Problem: Fall Injury Risk  Goal: Absence of Fall and Fall-Related Injury  Intervention: Identify and Manage Contributors  Recent Flowsheet Documentation  Taken 4/19/2023 1633 by Jayla Joseph RN  Medication Review/Management: medications reviewed  Taken 4/19/2023 1425 by Jayla Joseph RN  Medication Review/Management: medications reviewed  Taken 4/19/2023 1217 by Jayla Joseph RN  Medication Review/Management: medications reviewed  Taken 4/19/2023 1033 by Jayla Joseph RN  Medication Review/Management: medications reviewed  Taken 4/19/2023 0842 by Jayla Joseph RN  Medication Review/Management: medications reviewed  Intervention: Promote Injury-Free Environment  Recent Flowsheet Documentation  Taken 4/19/2023 1633 by Jayla Joseph RN  Safety Promotion/Fall Prevention:   assistive device/personal items within reach   clutter free environment maintained   fall prevention program maintained   lighting adjusted   nonskid shoes/slippers when out of bed   room organization consistent   safety round/check completed  Taken 4/19/2023 1425 by Jayla Joseph RN  Safety Promotion/Fall Prevention:   assistive  device/personal items within reach   clutter free environment maintained   fall prevention program maintained   nonskid shoes/slippers when out of bed   room organization consistent   safety round/check completed  Taken 4/19/2023 1217 by Jayla Joseph RN  Safety Promotion/Fall Prevention:   assistive device/personal items within reach   clutter free environment maintained   fall prevention program maintained   lighting adjusted   nonskid shoes/slippers when out of bed   room organization consistent   safety round/check completed  Taken 4/19/2023 1033 by Jayla Joseph RN  Safety Promotion/Fall Prevention:   assistive device/personal items within reach   clutter free environment maintained   fall prevention program maintained   lighting adjusted   nonskid shoes/slippers when out of bed   room organization consistent   safety round/check completed  Taken 4/19/2023 0842 by Jayla Joseph RN  Safety Promotion/Fall Prevention:   assistive device/personal items within reach   clutter free environment maintained   fall prevention program maintained   lighting adjusted   nonskid shoes/slippers when out of bed   safety round/check completed   room organization consistent     Problem: Nausea and Vomiting  Goal: Fluid and Electrolyte Balance  Intervention: Prevent and Manage Nausea and Vomiting  Recent Flowsheet Documentation  Taken 4/19/2023 0842 by Jayla Joseph, JOSE  Environmental Support: calm environment promoted   Goal Outcome Evaluation:      Patient awaiting precert. Started 4/18. No complaints this shift. Urostomy in tact with no issues this shift. Possible DC in the AM.

## 2023-04-19 NOTE — PLAN OF CARE
Problem: Adult Inpatient Plan of Care  Goal: Absence of Hospital-Acquired Illness or Injury  Intervention: Identify and Manage Fall Risk  Description: Perform standard risk assessment on admission using a validated tool or comprehensive approach appropriate to the patient; reassess fall risk frequently, with change in status or transfer to another level of care.  Communicate fall injury risk to interprofessional healthcare team.  Determine need for increased observation, equipment and environmental modification, such as low bed, signage and supportive, nonskid footwear.  Adjust safety measures to individual developmental age, stage and identified risk factors.  Reinforce the importance of safety and physical activity with patient and family.  Perform regular intentional rounding to assess need for position change, pain assessment and personal needs, including assistance with toileting.  Recent Flowsheet Documentation  Taken 4/19/2023 0010 by Andre Juan LPN  Safety Promotion/Fall Prevention:   safety round/check completed   room organization consistent   nonskid shoes/slippers when out of bed   muscle strengthening facilitated   mobility aid in reach   lighting adjusted   fall prevention program maintained   clutter free environment maintained   assistive device/personal items within reach   activity supervised  Taken 4/18/2023 2200 by Andre Juan LPN  Safety Promotion/Fall Prevention:   safety round/check completed   room organization consistent   nonskid shoes/slippers when out of bed   muscle strengthening facilitated   mobility aid in reach   lighting adjusted   fall prevention program maintained   clutter free environment maintained   assistive device/personal items within reach   activity supervised  Intervention: Prevent Skin Injury  Description: Perform a screening for skin injury risk, such as pressure or moisture associated skin damage on admission and at regular intervals throughout  hospital stay.  Keep all areas of skin (especially folds) clean and dry.  Maintain adequate skin hydration.  Relieve and redistribute pressure and protect bony prominences; implement measures based on patient-specific risk factors.  Match turning and repositioning schedule to clinical condition.  Encourage weight shift frequently; assist with reposition if unable to complete independently.  Float heels off bed; avoid pressure on the Achilles tendon.  Keep skin free from extended contact with medical devices.  Encourage functional activity and mobility, as early as tolerated.  Use aids (e.g., slide boards, mechanical lift) during transfer.  Recent Flowsheet Documentation  Taken 4/19/2023 0010 by Andre Juan LPN  Body Position: position changed independently  Skin Protection: incontinence pads utilized  Taken 4/18/2023 2200 by Andre Juan LPN  Body Position: position changed independently  Taken 4/18/2023 1942 by Andre Juan LPN  Skin Protection: incontinence pads utilized  Intervention: Prevent and Manage VTE (Venous Thromboembolism) Risk  Description: Assess for VTE (venous thromboembolism) risk.  Encourage and assist with early ambulation.  Initiate and maintain compression or other therapy, as indicated, based on identified risk in accordance with organizational protocol and provider order.  Encourage both active and passive leg exercises while in bed, if unable to ambulate.  Recent Flowsheet Documentation  Taken 4/19/2023 0010 by Andre Juan LPN  Activity Management: activity encouraged  Taken 4/18/2023 2200 by Andre Juan LPN  Activity Management: up in chair  Taken 4/18/2023 1939 by Andre Juan LPN  Activity Management: up to bedside commode  Intervention: Prevent Infection  Description: Maintain skin and mucous membrane integrity; promote hand, oral and pulmonary hygiene.  Optimize fluid balance, nutrition, sleep and glycemic control to maximize infection  resistance.  Identify potential sources of infection early to prevent or mitigate progression of infection (e.g., wound, lines, devices).  Evaluate ongoing need for invasive devices; remove promptly when no longer indicated.  Recent Flowsheet Documentation  Taken 4/19/2023 0010 by Andre Juan LPN  Infection Prevention:   visitors restricted/screened   single patient room provided   rest/sleep promoted   personal protective equipment utilized   hand hygiene promoted  Taken 4/18/2023 2200 by Andre Juan LPN  Infection Prevention:   visitors restricted/screened   single patient room provided   rest/sleep promoted   personal protective equipment utilized   hand hygiene promoted  Goal: Optimal Comfort and Wellbeing  Intervention: Monitor Pain and Promote Comfort  Description: Assess pain level, treatment efficacy and patient response at regular intervals using a consistent pain scale.  Consider the presence and impact of preexisting chronic pain.  Encourage patient and caregiver involvement in pain assessment, interventions and safety measures.  Recent Flowsheet Documentation  Taken 4/19/2023 0010 by Andre Juan LPN  Pain Management Interventions:   position adjusted   pillow support provided   see MAR   quiet environment facilitated  Taken 4/18/2023 1942 by Andre Juan LPN  Pain Management Interventions:   position adjusted   pillow support provided     Problem: Skin Injury Risk Increased  Goal: Skin Health and Integrity  Intervention: Optimize Skin Protection  Description: Perform a full pressure injury risk assessment, as indicated by screening, upon admission to care unit.  Reassess skin (injury risk, full inspection) frequently (e.g., scheduled interval, with change in condition) to provide optimal early detection and prevention.  Maintain adequate tissue perfusion (e.g., encourage fluid balance; avoid crossing legs, constrictive clothing or devices) to promote tissue  oxygenation.  Maintain head of bed at lowest degree of elevation tolerated, considering medical condition and other restrictions.  Avoid positioning onto an area that remains reddened.  Minimize incontinence and moisture (e.g., toileting schedule; moisture-wicking pad, diaper or incontinence collection device; skin moisture barrier).  Cleanse skin promptly and gently when soiled utilizing a pH-balanced cleanser.  Relieve and redistribute pressure (e.g., scheduled position changes, weight shifts, use of support surface, medical device repositioning, protective dressing application, use of positioning device, microclimate control, use of pressure-injury-monitor  Encourage increased activity, such as sitting in a chair at the bedside or early mobilization, when able to tolerate.  Recent Flowsheet Documentation  Taken 4/19/2023 0010 by Andre Juan LPN  Pressure Reduction Techniques: frequent weight shift encouraged  Head of Bed (HOB) Positioning: HOB at 20-30 degrees  Pressure Reduction Devices: pressure-redistributing mattress utilized  Skin Protection: incontinence pads utilized  Taken 4/18/2023 2200 by Andre Juan LPN  Head of Bed (HOB) Positioning: HOB at 20-30 degrees  Taken 4/18/2023 1942 by Andre Juan LPN  Pressure Reduction Techniques:   frequent weight shift encouraged   heels elevated off bed  Pressure Reduction Devices: pressure-redistributing mattress utilized  Skin Protection: incontinence pads utilized     Problem: Fall Injury Risk  Goal: Absence of Fall and Fall-Related Injury  Intervention: Identify and Manage Contributors  Description: Develop a fall prevention plan with the patient and caregiver/family.  Provide reorientation, appropriate sensory stimulation and routines with changes in mental status to decrease risk of fall.  Promote use of personal vision and auditory aids.  Assess assistance level required for safe and effective self-care; provide support as needed, such  as toileting, mobilization. For age 65 and older, implement timed toileting with assistance.  Encourage physical activity, such as performance of mobility and self-care at highest level of patient ability, multicomponent exercise program and provision of appropriate assistive devices.  If fall occurs, assess the severity of injury; implement fall injury protocol. Determine the cause and revise fall injury prevention plan.  Regularly review medication contribution to fall risk; adjust medication administration times to minimize risk of falling.  Consider risk related to polypharmacy and age.  Balance adequate pain management with potential for oversedation.  Recent Flowsheet Documentation  Taken 4/19/2023 0010 by Andre Juan LPN  Medication Review/Management: medications reviewed  Taken 4/18/2023 2200 by Andre Juan LPN  Medication Review/Management: medications reviewed  Intervention: Promote Injury-Free Environment  Description: Provide a safe, barrier-free environment that encourages independent activity.  Keep care area uncluttered and well-lighted.  Determine need for increased observation or monitoring.  Avoid use of devices that minimize mobility, such as restraints or indwelling urinary catheter.  Recent Flowsheet Documentation  Taken 4/19/2023 0010 by Andre Juan LPN  Safety Promotion/Fall Prevention:   safety round/check completed   room organization consistent   nonskid shoes/slippers when out of bed   muscle strengthening facilitated   mobility aid in reach   lighting adjusted   fall prevention program maintained   clutter free environment maintained   assistive device/personal items within reach   activity supervised  Taken 4/18/2023 2200 by Andre Juan LPN  Safety Promotion/Fall Prevention:   safety round/check completed   room organization consistent   nonskid shoes/slippers when out of bed   muscle strengthening facilitated   mobility aid in reach   lighting  adjusted   fall prevention program maintained   clutter free environment maintained   assistive device/personal items within reach   activity supervised     Problem: Nausea and Vomiting  Goal: Fluid and Electrolyte Balance  Intervention: Prevent and Manage Nausea and Vomiting  Description: Adjust position to prevent aspiration.  Monitor intake, output and laboratory value trends; advocate for adjustment in treatment with imbalance.  Evaluate medication (addition, withdrawal, toxicity) as potential source or trigger, such as an opioid agent.  Administer antiemetic agent per prescribed regimen.  Assess fluid status and ability to take oral fluids; if unable to provide or achieve oral intake, provide intravenous fluid therapy and electrolyte replacement.  Promote oral hygiene after each emesis episode to protect the mouth from hydrochloric acid damage.  Consider complementary therapy, such as acupuncture, point P6 stimulation, aromatherapy or controlled breathing.  Provide comfort measures, such as use of a cool cloth and decreased environmental stimuli, including light, noise and odor.  Recent Flowsheet Documentation  Taken 4/18/2023 1942 by Andre Juan LPN  Nausea/Vomiting Interventions: see MAR  Environmental Support:   calm environment promoted   rest periods encouraged   personal routine supported   Goal Outcome Evaluation  Progressing toward goal. Possible d/c in am. Awaiting pending approval , cont to require appliance change due to leakage of urostomy. Progress toward goal

## 2023-04-19 NOTE — CASE MANAGEMENT/SOCIAL WORK
Continued Stay Note   [place]     Patient Name: Anthony Moss  MRN: [id number]  Today's Date: 4/19/2023    Admit Date: 3/28/2023    Plan: DC Plan: Accepted to [facility], precert started 4/18/23. PASRR per facility   Discharge Plan     Row Name 04/19/23 1632       Plan    Plan DC Plan: Accepted to [facility], precert started 4/18/23. PASRR per facility    Patient/Family in Agreement with Plan yes    Plan Comments Facility [facility] checked on status of precert multiple times throughout day.   Awaiting precert.  Remains discharge ready.              Expected Discharge Date and Time     Expected Discharge Date Expected Discharge Time    Apr 20, 2023           Phone communication or documentation only - no physical contact with patient or family.  Rochelle Serra RN      Office Phone [phone]  Office Cell [phone]

## 2023-04-19 NOTE — PROGRESS NOTES
PROGRESS NOTE      Patient Name: Anthony Moss  : 1955  MRN: [id number]  Primary Care Physician: Reji Topete MD  Date of admission: 3/28/2023    Patient Care Team:  Reji Topete MD as PCP - General (Family Medicine)        Subjective   Subjective:     Resting in bed.  Review of systems:  Review of Systems -   Neg.except weakness.    Allergies:  No Known Allergies    Objective   Exam:     Vital Signs  Temp:  [98.1 °F (36.7 °C)-98.3 °F (36.8 °C)] 98.1 °F (36.7 °C)  Heart Rate:  [104] 104  Resp:  [20-21] 20  BP: (114-118)/(61-81) 114/61  SpO2:  [96 %] 96 %  on   ;   Device (Oxygen Therapy): room air  Body mass index is 24.14 kg/m².    General:     male in no acute distress.    Head:      Normocephalic and atraumatic.    Eyes:      PERRL/EOM intact, conjunctiva and sclera clear with out nystagmus.    Neck:      No masses, thyromegaly,  trachea central with normal respiratory effort   Lungs:    Clear bilaterally to auscultation.    Heart:      Regular rate and rhythm, no murmur no gallop  Abd:        Soft, nontender, s/p surgery dressing present   Pulses:   Pulses palpable  Extr:        No cyanosis or clubbing--+ edema.    Neuro:    No focal deficits.   alert oriented x3  Skin:       Intact without lesions or rashes.    Psych:    Alert and cooperative; normal mood and affect; .      Results Review:  I have personally reviewed most recent Data :  CBC    Results from last 7 days   Lab Units 23  0747 23  0024 23  0514 23  0058   WBC 10*3/mm3 14.80* 11.70* 10.70 8.30   HEMOGLOBIN g/dL 8.2* 7.4* 7.7* 7.8*   PLATELETS 10*3/mm3 323 268 291 306     CMP   Results from last 7 days   Lab Units 23  0620 23  0747 23  0024 23  0539 04/15/23  0433 23  0514 23  0058   SODIUM mmol/L 141 139 139 138 142 138 139   POTASSIUM mmol/L 4.8 4.8 4.5 4.0 4.3 3.5 4.1   CHLORIDE mmol/L 111* 109* 110* 109* 112* 108* 105   CO2 mmol/L 16.0* 17.0* 19.0*  19.0* 19.0* 20.0* 24.0   BUN mg/dL 44* 42* 41* 40* 41* 41* 40*   CREATININE mg/dL 1.68* 1.68* 1.73* 1.48* 1.37* 1.41* 1.45*   GLUCOSE mg/dL 109* 114* 122* 134* 96 159* 136*   ALBUMIN g/dL 3.0* 3.1* 2.6* 2.6* 2.5* 2.4* 2.3*   BILIRUBIN mg/dL 0.2 0.2 <0.2 0.2 0.2 0.2 0.3   ALK PHOS U/L 330* 346* 286* 299* 269* 241* 225*   AST (SGOT) U/L 26 29 35 47* 59* 21 21   ALT (SGPT) U/L 30 34 38 40 33 15 14     ABG        No radiology results for the last day    Results for orders placed during the hospital encounter of 22    Adult Transthoracic Echo Complete w/ Color, Spectral and Contrast if Necessary Per Protocol    Interpretation Summary  · Left ventricular ejection fraction appears to be 56 - 60%.  · The right ventricular cavity is moderately dilated.  · No pericardial effusion noted    Scheduled Meds:guaiFENesin, 1,200 mg, Oral, Q12H  heparin (porcine), 5,000 Units, Subcutaneous, Q12H  insulin lispro, 2-7 Units, Subcutaneous, TID With Meals  metoclopramide, 10 mg, Intravenous, Q6H  polyethylene glycol, 17 g, Oral, BID  sodium bicarbonate, 1,300 mg, Oral, TID  sodium chloride, 10 mL, Intravenous, Q12H  vitamin D3, 5,000 Units, Oral, Daily      Continuous Infusions:   PRN Meds:•  acetaminophen **OR** acetaminophen  •  dextrose  •  dextrose  •  docusate sodium  •  glucagon (human recombinant)  •  hydrOXYzine  •  lactulose  •  [] HYDROmorphone **AND** naloxone  •  ondansetron **OR** ondansetron  •  phenol  •  polyethyl glycol-propyl glycol  •  senna-docusate sodium  •  sodium chloride  •  sodium chloride    Assessment & Plan   Assessment and Plan:         Bladder cancer    History of alcohol use disorder    Prostate cancer    Attention to urostomy    Severe Malnutrition (HCC)    ASSESSMENT:  • Acute kidney injury, stage III, status post radical cystectomy getting better hyperkalemia, secondary to CARLOS ALBERTO  • Metabolic acidosis secondary to CKD start low-dose sodium bicarbonate  • History of bladder cancer, recent  cystectomy  • Suprapubic catheter  • History of alcohol use,  • Chronic back pain,using NSAID in the past  • B/l pneumonia.  Surprisingly, WBC stable,           Plan:      • At this time patient with history of bladder cancer status post cystectomy urine output is adequate   • Suprapubic urostomy placed  • Renal function same as yesterday but still not at baseline likely related to some progressive CKD   • Significant acidosis noted continue high dose of sodium bicarbonate we will follow-up tomorrow  • Ordering ABG at this time because of presence of significant acidosis  • With borderline potassium significant acidosis patient might be developing some distal RTA  • patient has more wound dehiscence and some infection taken back to the OR for 6/20/2023   • Patient creatinine 1.7 today hemoglobin is slightly better after blood transfusion yesterday white cell count is still elevated  • Appetite is still stable  • Continue vitamin D and calcium supplements   • repeat labs tomorrow morning  • GI symptoms have improved patient is eating and drinking better  • Potassium level is acceptable  • Echocardiogram done last time showed ejection fraction 56 to 60%  • Repeat labs tomorrow morning          Electronically signed by Jamie Houston MD,   [facility] kidney consultant  [phone]  4/19/2023  12:25 EDT    Rest

## 2023-04-19 NOTE — NURSING NOTE
Right lower quadrant ileal conduit pouch changed wafer is washing out.  Patient is quite drowsy and difficult to arouse or awaken moaning frequently.  The pouch was removed.  The previous skin tear from tape has epithelialized over skin has some very mild excoriation noted.  The stoma is retracted and sits within a deep fold 1).  The stoma is pretty much closed over.  The stoma itself is pink and moist.  There is yellow urine with mucus noted in the old appliance.  The area was cleansed, dried well Skin-Prep applied.  I did apply an adapt thin ring to help when the fold and then I applied a [place] 2 piece cera plus soft convex pouching system and attached it to a bedside bag.  I also applied an ostomy belt and I used washcloths to pad each side of the belt.  I do believe that the patient would likely benefit from a soft 1 piece convex system or even more of a 1 piece to her adhesive convex from [facility] with a belt to try to secure and hold into the fold.

## 2023-04-19 NOTE — THERAPY TREATMENT NOTE
"Subjective: Pt agreeable to therapeutic plan of care.    Objective:     Bed mobility - Min A  Transfers -  Min/Mod A stand pivot to chair   Ambulation -  NT secondary to fatigue     Therapeutic Exercise - 10 Reps Bilaterally and B LE AROM supported sitting / chair  Ankle pumps, LAQs, hip flexion, GS     Vitals: WNL    Pain: 5 VAS   Location: Low back   Intervention for pain: Repositioned and Therapeutic Presence    Education: Provided education on the importance of mobility in the acute care setting, Verbal/Tactile Cues, Transfer Training and Energy conservation strategies    Assessment: Anthony Moss presents with functional mobility impairments which indicate the need for skilled intervention. Tolerating session today without incident. Pt fatigued and difficult to arouse at beginning of session.  Pt performed bed mobility with Min A , stand pivot transfer to b/s chair with min/Mod A and completed BLE strengthening exercises x 10 reps each. Pt refused ambulation secondary to fatigue.  Will continue to follow and progress as tolerated.     Plan/Recommendations:   Moderate Intensity Therapy recommended post-acute care. This is recommended as therapy feels the patient would require 3-4 days per week and wouldn't tolerate \"3 hour daily\" rehab intensity. SNF would be the preferred choice. If the patient does not agree to SNF, arrange HH or OP depending on home bound status. If patient is medically complex, consider LTACH.. Pt requires no DME at discharge.     Pt desires Skilled Rehab placement at discharge. Pt cooperative; agreeable to therapeutic recommendations and plan of care.         Basic Mobility 6-click:  Rollin = Total, A lot = 2, A little = 3; 4 = None  Supine>Sit:   1 = Total, A lot = 2, A little = 3; 4 = None   Sit>Stand with arms:  1 = Total, A lot = 2, A little = 3; 4 = None  Bed>Chair:   1 = Total, A lot = 2, A little = 3; 4 = None  Ambulate in room:  1 = Total, A lot = 2, A little = 3; 4 = " None  3-5 Steps with railin = Total, A lot = 2, A little = 3; 4 = None  Score: 16      Post-Tx Position: Up in Chair, Alarms activated and Call light and personal items within reach  PPE: gloves and surgical mask

## 2023-04-19 NOTE — NURSING NOTE
67-year-old male who underwent a radical cystectomy with ileal conduit formation on March 28.  Subsequently patient developed his dehiscence of his abdominal wound.  Patient is seen today for dressing change to the midline wound.    Patient is awake and alert and cooperative today.    The wound VAC dressing was removed wound base is 90% red moist granular wound.  The distal end does still have some yellow slough and deeper area depth.  Serosanguineous exudate is noted in the wound VAC canister.  There are no overt symptoms of infection noted to the wound base.    The wound was irrigated with normal saline Skin-Prep applied to periwound skin Mepitel and one piece black foam applied.  Suction set at 125 continuous a good seal was obtained.    Currently the urostomy appliance is intact, but it appears to be washing out.  We are waiting for the arrival of the [place] 1 piece urostomy pouch as they have been on order.  We have our best wear time utilizing the system.  We will also check to see if we have had a delivery of our 2 piece soft convexity system.

## 2023-04-20 VITALS
DIASTOLIC BLOOD PRESSURE: 81 MMHG | HEART RATE: 110 BPM | RESPIRATION RATE: 21 BRPM | OXYGEN SATURATION: 97 % | TEMPERATURE: 98.1 F | WEIGHT: 188.05 LBS | BODY MASS INDEX: 24.13 KG/M2 | HEIGHT: 74 IN | SYSTOLIC BLOOD PRESSURE: 127 MMHG

## 2023-04-20 LAB
ALBUMIN SERPL-MCNC: 3.1 G/DL (ref 3.5–5.2)
ALBUMIN/GLOB SERPL: 0.7 G/DL
ALP SERPL-CCNC: 345 U/L (ref 39–117)
ALT SERPL W P-5'-P-CCNC: 26 U/L (ref 1–41)
ANION GAP SERPL CALCULATED.3IONS-SCNC: 14 MMOL/L (ref 5–15)
AST SERPL-CCNC: 25 U/L (ref 1–40)
BILIRUB SERPL-MCNC: 0.2 MG/DL (ref 0–1.2)
BUN SERPL-MCNC: 53 MG/DL (ref 8–23)
BUN/CREAT SERPL: 31.2 (ref 7–25)
CA-I SERPL ISE-MCNC: 1.25 MMOL/L (ref 1.2–1.3)
CALCIUM SPEC-SCNC: 8.8 MG/DL (ref 8.6–10.5)
CHLORIDE SERPL-SCNC: 109 MMOL/L (ref 98–107)
CO2 SERPL-SCNC: 16 MMOL/L (ref 22–29)
CREAT SERPL-MCNC: 1.7 MG/DL (ref 0.76–1.27)
EGFRCR SERPLBLD CKD-EPI 2021: 44.2 ML/MIN/1.73
GLOBULIN UR ELPH-MCNC: 4.2 GM/DL
GLUCOSE BLDC GLUCOMTR-MCNC: 153 MG/DL (ref 70–105)
GLUCOSE SERPL-MCNC: 111 MG/DL (ref 65–99)
MAGNESIUM SERPL-MCNC: 1.8 MG/DL (ref 1.6–2.4)
PHOSPHATE SERPL-MCNC: 5 MG/DL (ref 2.5–4.5)
POTASSIUM SERPL-SCNC: 4.4 MMOL/L (ref 3.5–5.2)
PROT SERPL-MCNC: 7.3 G/DL (ref 6–8.5)
SODIUM SERPL-SCNC: 139 MMOL/L (ref 136–145)
WHOLE BLOOD HOLD SPECIMEN: NORMAL

## 2023-04-20 PROCEDURE — 63710000001 INSULIN LISPRO (HUMAN) PER 5 UNITS: Performed by: NURSE PRACTITIONER

## 2023-04-20 PROCEDURE — 25010000002 HEPARIN (PORCINE) PER 1000 UNITS: Performed by: INTERNAL MEDICINE

## 2023-04-20 PROCEDURE — 84100 ASSAY OF PHOSPHORUS: CPT | Performed by: INTERNAL MEDICINE

## 2023-04-20 PROCEDURE — 80053 COMPREHEN METABOLIC PANEL: CPT | Performed by: INTERNAL MEDICINE

## 2023-04-20 PROCEDURE — 83735 ASSAY OF MAGNESIUM: CPT | Performed by: INTERNAL MEDICINE

## 2023-04-20 PROCEDURE — 82962 GLUCOSE BLOOD TEST: CPT

## 2023-04-20 PROCEDURE — 82330 ASSAY OF CALCIUM: CPT | Performed by: INTERNAL MEDICINE

## 2023-04-20 PROCEDURE — 25010000002 METOCLOPRAMIDE PER 10 MG: Performed by: INTERNAL MEDICINE

## 2023-04-20 RX ADMIN — Medication 5000 UNITS: at 09:05

## 2023-04-20 RX ADMIN — SODIUM BICARBONATE 1300 MG: 650 TABLET ORAL at 09:05

## 2023-04-20 RX ADMIN — INSULIN LISPRO 2 UNITS: 100 INJECTION, SOLUTION INTRAVENOUS; SUBCUTANEOUS at 09:07

## 2023-04-20 RX ADMIN — METOCLOPRAMIDE HYDROCHLORIDE 10 MG: 5 INJECTION INTRAMUSCULAR; INTRAVENOUS at 05:27

## 2023-04-20 RX ADMIN — HEPARIN SODIUM 5000 UNITS: 5000 INJECTION INTRAVENOUS; SUBCUTANEOUS at 09:06

## 2023-04-20 RX ADMIN — Medication 10 ML: at 09:09

## 2023-04-20 RX ADMIN — GUAIFENESIN 1200 MG: 600 TABLET, EXTENDED RELEASE ORAL at 09:06

## 2023-04-20 NOTE — PROGRESS NOTES
PROGRESS NOTE      Patient Name: Anthony Moss  : 1955  MRN: [id number]  Primary Care Physician: Reji Topete MD  Date of admission: 3/28/2023    Patient Care Team:  Reji Topete MD as PCP - General (Family Medicine)        Subjective   Subjective:     Resting in bed.  Review of systems:  Review of Systems -   Neg.except weakness.    Allergies:  No Known Allergies    Objective   Exam:     Vital Signs  Temp:  [98 °F (36.7 °C)-98.2 °F (36.8 °C)] 98.1 °F (36.7 °C)  Heart Rate:  [101-110] 110  Resp:  [19-24] 21  BP: (117-127)/(81-99) 127/81  SpO2:  [97 %] 97 %  on   ;   Device (Oxygen Therapy): room air  Body mass index is 24.14 kg/m².    General:     male in no acute distress.    Head:      Normocephalic and atraumatic.    Eyes:      PERRL/EOM intact, conjunctiva and sclera clear with out nystagmus.    Neck:      No masses, thyromegaly,  trachea central with normal respiratory effort   Lungs:    Clear bilaterally to auscultation.    Heart:      Regular rate and rhythm, no murmur no gallop  Abd:        Soft, nontender, s/p surgery dressing present   Pulses:   Pulses palpable  Extr:        No cyanosis or clubbing--+ edema.    Neuro:    No focal deficits.   alert oriented x3  Skin:       Intact without lesions or rashes.    Psych:    Alert and cooperative; normal mood and affect; .      Results Review:  I have personally reviewed most recent Data :  CBC    Results from last 7 days   Lab Units 23  0747 23  0024 23  0514   WBC 10*3/mm3 14.80* 11.70* 10.70   HEMOGLOBIN g/dL 8.2* 7.4* 7.7*   PLATELETS 10*3/mm3 323 268 291     CMP   Results from last 7 days   Lab Units 23  0620 23  0747 23  0024 23  0539 04/15/23  0433 23  0514   SODIUM mmol/L 141 139 139 138 142 138   POTASSIUM mmol/L 4.8 4.8 4.5 4.0 4.3 3.5   CHLORIDE mmol/L 111* 109* 110* 109* 112* 108*   CO2 mmol/L 16.0* 17.0* 19.0* 19.0* 19.0* 20.0*   BUN mg/dL 44* 42* 41* 40* 41* 41*    CREATININE mg/dL 1.68* 1.68* 1.73* 1.48* 1.37* 1.41*   GLUCOSE mg/dL 109* 114* 122* 134* 96 159*   ALBUMIN g/dL 3.0* 3.1* 2.6* 2.6* 2.5* 2.4*   BILIRUBIN mg/dL 0.2 0.2 <0.2 0.2 0.2 0.2   ALK PHOS U/L 330* 346* 286* 299* 269* 241*   AST (SGOT) U/L 26 29 35 47* 59* 21   ALT (SGPT) U/L 30 34 38 40 33 15     ABG        No radiology results for the last day    Results for orders placed during the hospital encounter of 22    Adult Transthoracic Echo Complete w/ Color, Spectral and Contrast if Necessary Per Protocol    Interpretation Summary  · Left ventricular ejection fraction appears to be 56 - 60%.  · The right ventricular cavity is moderately dilated.  · No pericardial effusion noted    Scheduled Meds:guaiFENesin, 1,200 mg, Oral, Q12H  heparin (porcine), 5,000 Units, Subcutaneous, Q12H  insulin lispro, 2-7 Units, Subcutaneous, TID With Meals  metoclopramide, 10 mg, Intravenous, Q6H  polyethylene glycol, 17 g, Oral, BID  sodium bicarbonate, 1,300 mg, Oral, TID  sodium chloride, 10 mL, Intravenous, Q12H  vitamin D3, 5,000 Units, Oral, Daily      Continuous Infusions:   PRN Meds:•  acetaminophen **OR** acetaminophen  •  dextrose  •  dextrose  •  docusate sodium  •  glucagon (human recombinant)  •  hydrOXYzine  •  lactulose  •  [] HYDROmorphone **AND** naloxone  •  ondansetron **OR** ondansetron  •  phenol  •  polyethyl glycol-propyl glycol  •  senna-docusate sodium  •  sodium chloride  •  sodium chloride    Assessment & Plan   Assessment and Plan:         Bladder cancer    History of alcohol use disorder    Prostate cancer    Attention to urostomy    Severe Malnutrition (HCC)    ASSESSMENT:  • Acute kidney injury, stage III, status post radical cystectomy getting better hyperkalemia, secondary to CARLOS ALBERTO  • Metabolic acidosis secondary to CKD start low-dose sodium bicarbonate  • History of bladder cancer, recent cystectomy  • Suprapubic catheter  • History of alcohol use,  • Chronic back pain,using NSAID in  the past  • B/l pneumonia.  Surprisingly, WBC stable,           Plan:      • At this time patient with history of bladder cancer status post cystectomy urine output is adequate   • Suprapubic urostomy placed  • Renal function same as yesterday but still not at baseline likely related to some progressive CKD   • Significant acidosis noted continue high dose of sodium bicarbonate we will follow-up tomorrow  • ABG ordered but still not done.  Repeat labs are pending from today.  • With borderline potassium significant acidosis patient might be developing some distal RTA  • patient has more wound dehiscence and some infection taken back to the OR for 6/20/2023   • Patient creatinine 1.7 today hemoglobin is slightly better after blood transfusion yesterday white cell count is still elevated  • Appetite is still stable  • Continue vitamin D and calcium supplements   • repeat labs tomorrow morning  • GI symptoms have improved patient is eating and drinking better  • Potassium level is acceptable but increasing to 4.8 yesterday repeat labs pending  • Echocardiogram done last time showed ejection fraction 56 to 60%  • Repeat labs tomorrow morning          Electronically signed by Jamie Houston MD,   [facility] kidney consultant  [phone]  4/20/2023  10:46 EDT    Rest

## 2023-04-20 NOTE — PLAN OF CARE
Problem: Adult Inpatient Plan of Care  Goal: Absence of Hospital-Acquired Illness or Injury  Intervention: Identify and Manage Fall Risk  Description: Perform standard risk assessment on admission using a validated tool or comprehensive approach appropriate to the patient; reassess fall risk frequently, with change in status or transfer to another level of care.  Communicate fall injury risk to interprofessional healthcare team.  Determine need for increased observation, equipment and environmental modification, such as low bed, signage and supportive, nonskid footwear.  Adjust safety measures to individual developmental age, stage and identified risk factors.  Reinforce the importance of safety and physical activity with patient and family.  Perform regular intentional rounding to assess need for position change, pain assessment and personal needs, including assistance with toileting.  Recent Flowsheet Documentation  Taken 4/20/2023 0000 by Andre Juan LPN  Safety Promotion/Fall Prevention:   safety round/check completed   room organization consistent   nonskid shoes/slippers when out of bed   muscle strengthening facilitated   mobility aid in reach   lighting adjusted   fall prevention program maintained   clutter free environment maintained   assistive device/personal items within reach   activity supervised  Taken 4/19/2023 2200 by Andre Juan LPN  Safety Promotion/Fall Prevention:   safety round/check completed   room organization consistent   nonskid shoes/slippers when out of bed   muscle strengthening facilitated   mobility aid in reach   lighting adjusted   fall prevention program maintained   assistive device/personal items within reach   clutter free environment maintained   activity supervised  Taken 4/19/2023 2030 by Andre Juan LPN  Safety Promotion/Fall Prevention:   safety round/check completed   room organization consistent   nonskid shoes/slippers when out of bed    muscle strengthening facilitated   mobility aid in reach   lighting adjusted   fall prevention program maintained   clutter free environment maintained   assistive device/personal items within reach   activity supervised  Intervention: Prevent Skin Injury  Description: Perform a screening for skin injury risk, such as pressure or moisture associated skin damage on admission and at regular intervals throughout hospital stay.  Keep all areas of skin (especially folds) clean and dry.  Maintain adequate skin hydration.  Relieve and redistribute pressure and protect bony prominences; implement measures based on patient-specific risk factors.  Match turning and repositioning schedule to clinical condition.  Encourage weight shift frequently; assist with reposition if unable to complete independently.  Float heels off bed; avoid pressure on the Achilles tendon.  Keep skin free from extended contact with medical devices.  Encourage functional activity and mobility, as early as tolerated.  Use aids (e.g., slide boards, mechanical lift) during transfer.  Recent Flowsheet Documentation  Taken 4/20/2023 0000 by Andre Juan LPN  Body Position: position changed independently  Taken 4/19/2023 2200 by Andre Juan LPN  Body Position:   position changed independently   head facing, right  Taken 4/19/2023 2030 by Andre Juan LPN  Body Position: position changed independently  Intervention: Prevent and Manage VTE (Venous Thromboembolism) Risk  Description: Assess for VTE (venous thromboembolism) risk.  Encourage and assist with early ambulation.  Initiate and maintain compression or other therapy, as indicated, based on identified risk in accordance with organizational protocol and provider order.  Encourage both active and passive leg exercises while in bed, if unable to ambulate.  Recent Flowsheet Documentation  Taken 4/20/2023 0000 by Andre Juan LPN  Activity Management: ambulated outside  room  Taken 4/19/2023 2200 by Andre Juan LPN  Activity Management:   activity encouraged   up to bedside commode  Taken 4/19/2023 2030 by Andre Juan LPN  Activity Management: activity encouraged  Taken 4/19/2023 1939 by Andre Juan LPN  Activity Management: patient refuses activity  Intervention: Prevent Infection  Description: Maintain skin and mucous membrane integrity; promote hand, oral and pulmonary hygiene.  Optimize fluid balance, nutrition, sleep and glycemic control to maximize infection resistance.  Identify potential sources of infection early to prevent or mitigate progression of infection (e.g., wound, lines, devices).  Evaluate ongoing need for invasive devices; remove promptly when no longer indicated.  Recent Flowsheet Documentation  Taken 4/20/2023 0000 by Andre Juan LPN  Infection Prevention:   visitors restricted/screened   rest/sleep promoted   single patient room provided   personal protective equipment utilized   hand hygiene promoted  Taken 4/19/2023 2200 by Andre Juan LPN  Infection Prevention:   visitors restricted/screened   single patient room provided   rest/sleep promoted   personal protective equipment utilized   hand hygiene promoted  Taken 4/19/2023 2030 by Andre Juan LPN  Infection Prevention:   visitors restricted/screened   single patient room provided   rest/sleep promoted   personal protective equipment utilized   hand hygiene promoted  Goal: Optimal Comfort and Wellbeing  Intervention: Monitor Pain and Promote Comfort  Description: Assess pain level, treatment efficacy and patient response at regular intervals using a consistent pain scale.  Consider the presence and impact of preexisting chronic pain.  Encourage patient and caregiver involvement in pain assessment, interventions and safety measures.  Recent Flowsheet Documentation  Taken 4/20/2023 0000 by Andre Juan LPN  Pain Management Interventions:   see  MAR   quiet environment facilitated   position adjusted   pain management plan reviewed with patient/caregiver   medication offered but refused  Taken 4/19/2023 2030 by Andre Juan LPN  Pain Management Interventions:   see MAR   quiet environment facilitated   position adjusted     Problem: Skin Injury Risk Increased  Goal: Skin Health and Integrity  Intervention: Optimize Skin Protection  Description: Perform a full pressure injury risk assessment, as indicated by screening, upon admission to care unit.  Reassess skin (injury risk, full inspection) frequently (e.g., scheduled interval, with change in condition) to provide optimal early detection and prevention.  Maintain adequate tissue perfusion (e.g., encourage fluid balance; avoid crossing legs, constrictive clothing or devices) to promote tissue oxygenation.  Maintain head of bed at lowest degree of elevation tolerated, considering medical condition and other restrictions.  Avoid positioning onto an area that remains reddened.  Minimize incontinence and moisture (e.g., toileting schedule; moisture-wicking pad, diaper or incontinence collection device; skin moisture barrier).  Cleanse skin promptly and gently when soiled utilizing a pH-balanced cleanser.  Relieve and redistribute pressure (e.g., scheduled position changes, weight shifts, use of support surface, medical device repositioning, protective dressing application, use of positioning device, microclimate control, use of pressure-injury-monitor  Encourage increased activity, such as sitting in a chair at the bedside or early mobilization, when able to tolerate.  Recent Flowsheet Documentation  Taken 4/20/2023 0000 by Andre Juan LPN  Head of Bed (HOB) Positioning: HOB at 20-30 degrees  Taken 4/19/2023 2200 by Andre Juan LPN  Head of Bed (HOB) Positioning: HOB at 20-30 degrees  Taken 4/19/2023 2030 by Andre Juan LPN  Pressure Reduction Techniques:   frequent weight  shift encouraged   heels elevated off bed  Head of Bed (HOB) Positioning: HOB at 20-30 degrees  Pressure Reduction Devices:   pressure-redistributing mattress utilized   heel offloading device utilized     Problem: Fall Injury Risk  Goal: Absence of Fall and Fall-Related Injury  Intervention: Identify and Manage Contributors  Description: Develop a fall prevention plan with the patient and caregiver/family.  Provide reorientation, appropriate sensory stimulation and routines with changes in mental status to decrease risk of fall.  Promote use of personal vision and auditory aids.  Assess assistance level required for safe and effective self-care; provide support as needed, such as toileting, mobilization. For age 65 and older, implement timed toileting with assistance.  Encourage physical activity, such as performance of mobility and self-care at highest level of patient ability, multicomponent exercise program and provision of appropriate assistive devices.  If fall occurs, assess the severity of injury; implement fall injury protocol. Determine the cause and revise fall injury prevention plan.  Regularly review medication contribution to fall risk; adjust medication administration times to minimize risk of falling.  Consider risk related to polypharmacy and age.  Balance adequate pain management with potential for oversedation.  Recent Flowsheet Documentation  Taken 4/20/2023 0000 by Andre Juan LPN  Medication Review/Management: medications reviewed  Taken 4/19/2023 2200 by Andre Juan LPN  Medication Review/Management: medications reviewed  Taken 4/19/2023 2030 by Andre Juan LPN  Medication Review/Management: medications reviewed  Intervention: Promote Injury-Free Environment  Description: Provide a safe, barrier-free environment that encourages independent activity.  Keep care area uncluttered and well-lighted.  Determine need for increased observation or monitoring.  Avoid use of  devices that minimize mobility, such as restraints or indwelling urinary catheter.  Recent Flowsheet Documentation  Taken 4/20/2023 0000 by Andre Juan LPN  Safety Promotion/Fall Prevention:   safety round/check completed   room organization consistent   nonskid shoes/slippers when out of bed   muscle strengthening facilitated   mobility aid in reach   lighting adjusted   fall prevention program maintained   clutter free environment maintained   assistive device/personal items within reach   activity supervised  Taken 4/19/2023 2200 by Andre Juan LPN  Safety Promotion/Fall Prevention:   safety round/check completed   room organization consistent   nonskid shoes/slippers when out of bed   muscle strengthening facilitated   mobility aid in reach   lighting adjusted   fall prevention program maintained   assistive device/personal items within reach   clutter free environment maintained   activity supervised  Taken 4/19/2023 2030 by Andre Juan LPN  Safety Promotion/Fall Prevention:   safety round/check completed   room organization consistent   nonskid shoes/slippers when out of bed   muscle strengthening facilitated   mobility aid in reach   lighting adjusted   fall prevention program maintained   clutter free environment maintained   assistive device/personal items within reach   activity supervised   Goal Outcome Evaluation:\  Plan of care review, await approval of [facility] , Wound seen and apply to applance to ostomy appears to be dry and Intact , will monitor

## 2023-04-20 NOTE — PROGRESS NOTES
Patient was unable to discharge on 4/19 due to precertification process.  He is now approved for skilled nursing placement, and can proceed with discharge to skilled nursing facility today.  No material changes to discharge plan or instructions

## 2023-04-20 NOTE — NURSING NOTE
urostomy has not leaked this shift, pt up in chair for a short period , freq call for request throughout shift ,

## 2023-04-20 NOTE — CASE MANAGEMENT/SOCIAL WORK
Case Management Discharge Note      Final Note: [facility]      Selected Continued Care - Admitted Since 3/28/2023     Destination Coordination complete.    Service Provider Selected Services Address Phone Fax Patient Preferred    [facility] [address] [place] [address] [phone] --           Transportation Services  W/C Van:  ([facility])    Final Discharge Disposition Code: 03 - skilled nursing facility (SNF)       Continued Stay Note  [facility]     Patient Name: Anthony Moss  MRN: [id number]  Today's Date: 4/20/2023    Admit Date: 3/28/2023    Plan: DC Plan: Accepted to [facility] approved 4/20/23.  Dc orders noted.  [facility]   Discharge Plan     Row Name 04/20/23 1023       Plan    Plan DC Plan: [facility] [facility] 4/20/23.  Dc orders noted.  [facility].                     Expected Discharge Date and Time     Expected Discharge Date Expected Discharge Time    Apr 20, 2023           Phone communication or documentation only - no physical contact with patient or family.  Rochelle Serra RN     Office Phone [phone]  Office Cell [phone]

## 2023-04-28 ENCOUNTER — HOSPITAL ENCOUNTER (INPATIENT)
Facility: HOSPITAL | Age: 68
LOS: 7 days | Discharge: NURSING, REHAB OR LONG-TERM CARE | DRG: 863 | End: 2023-05-05
Attending: EMERGENCY MEDICINE | Admitting: STUDENT IN AN ORGANIZED HEALTH CARE EDUCATION/TRAINING PROGRAM
Payer: MEDICARE

## 2023-04-28 ENCOUNTER — APPOINTMENT (OUTPATIENT)
Dept: CT IMAGING | Facility: HOSPITAL | Age: 68
DRG: 863 | End: 2023-04-28
Payer: MEDICARE

## 2023-04-28 DIAGNOSIS — N17.9 AKI (ACUTE KIDNEY INJURY): ICD-10-CM

## 2023-04-28 DIAGNOSIS — R10.9 ABDOMINAL PAIN, UNSPECIFIED ABDOMINAL LOCATION: Primary | ICD-10-CM

## 2023-04-28 DIAGNOSIS — C61 PROSTATE CANCER: Chronic | ICD-10-CM

## 2023-04-28 DIAGNOSIS — N30.01 ACUTE CYSTITIS WITH HEMATURIA: ICD-10-CM

## 2023-04-28 LAB
ALBUMIN SERPL-MCNC: 3 G/DL (ref 3.5–5.2)
ALBUMIN/GLOB SERPL: 0.7 G/DL
ALP SERPL-CCNC: 307 U/L (ref 39–117)
ALT SERPL W P-5'-P-CCNC: 25 U/L (ref 1–41)
ANION GAP SERPL CALCULATED.3IONS-SCNC: 13 MMOL/L (ref 5–15)
APTT PPP: 28.6 SECONDS (ref 61–76.5)
AST SERPL-CCNC: 32 U/L (ref 1–40)
BACTERIA UR QL AUTO: ABNORMAL /HPF
BASOPHILS # BLD MANUAL: 0.13 10*3/MM3 (ref 0–0.2)
BASOPHILS NFR BLD MANUAL: 1 % (ref 0–1.5)
BILIRUB SERPL-MCNC: 0.3 MG/DL (ref 0–1.2)
BILIRUB UR QL STRIP: NEGATIVE
BUN SERPL-MCNC: 50 MG/DL (ref 8–23)
BUN/CREAT SERPL: 20.9 (ref 7–25)
CALCIUM SPEC-SCNC: 9.2 MG/DL (ref 8.6–10.5)
CHLORIDE SERPL-SCNC: 105 MMOL/L (ref 98–107)
CLARITY UR: ABNORMAL
CO2 SERPL-SCNC: 22 MMOL/L (ref 22–29)
COLOR UR: YELLOW
CREAT SERPL-MCNC: 2.39 MG/DL (ref 0.76–1.27)
D-LACTATE SERPL-SCNC: 0.8 MMOL/L (ref 0.3–2)
DEPRECATED RDW RBC AUTO: 54.7 FL (ref 37–54)
EGFRCR SERPLBLD CKD-EPI 2021: 29.4 ML/MIN/1.73
EOSINOPHIL # BLD MANUAL: 0.13 10*3/MM3 (ref 0–0.4)
EOSINOPHIL NFR BLD MANUAL: 1 % (ref 0.3–6.2)
ERYTHROCYTE [DISTWIDTH] IN BLOOD BY AUTOMATED COUNT: 18.6 % (ref 12.3–15.4)
FERRITIN SERPL-MCNC: 82.61 NG/ML (ref 30–400)
GLOBULIN UR ELPH-MCNC: 4.6 GM/DL
GLUCOSE SERPL-MCNC: 131 MG/DL (ref 65–99)
GLUCOSE UR STRIP-MCNC: NEGATIVE MG/DL
HCT VFR BLD AUTO: 27.3 % (ref 37.5–51)
HGB BLD-MCNC: 8.7 G/DL (ref 13–17.7)
HGB UR QL STRIP.AUTO: ABNORMAL
HYALINE CASTS UR QL AUTO: ABNORMAL /LPF
INR PPP: 1.18 (ref 0.93–1.1)
IRON 24H UR-MRATE: 28 MCG/DL (ref 59–158)
IRON SATN MFR SERPL: 12 % (ref 20–50)
KETONES UR QL STRIP: NEGATIVE
LEUKOCYTE ESTERASE UR QL STRIP.AUTO: ABNORMAL
LYMPHOCYTES # BLD MANUAL: 1.95 10*3/MM3 (ref 0.7–3.1)
LYMPHOCYTES NFR BLD MANUAL: 9 % (ref 5–12)
MCH RBC QN AUTO: 26.8 PG (ref 26.6–33)
MCHC RBC AUTO-ENTMCNC: 31.9 G/DL (ref 31.5–35.7)
MCV RBC AUTO: 83.8 FL (ref 79–97)
MONOCYTES # BLD: 1.17 10*3/MM3 (ref 0.1–0.9)
MUCOUS THREADS URNS QL MICRO: ABNORMAL /HPF
NEUTROPHILS # BLD AUTO: 9.62 10*3/MM3 (ref 1.7–7)
NEUTROPHILS NFR BLD MANUAL: 73 % (ref 42.7–76)
NEUTS BAND NFR BLD MANUAL: 1 % (ref 0–5)
NITRITE UR QL STRIP: NEGATIVE
PH UR STRIP.AUTO: 7 [PH] (ref 5–8)
PLAT MORPH BLD: NORMAL
PLATELET # BLD AUTO: 270 10*3/MM3 (ref 140–450)
PMV BLD AUTO: 7.8 FL (ref 6–12)
POTASSIUM SERPL-SCNC: 4.3 MMOL/L (ref 3.5–5.2)
PROT SERPL-MCNC: 7.6 G/DL (ref 6–8.5)
PROT UR QL STRIP: ABNORMAL
PROTHROMBIN TIME: 12.5 SECONDS (ref 9.6–11.7)
RBC # BLD AUTO: 3.26 10*6/MM3 (ref 4.14–5.8)
RBC # UR STRIP: ABNORMAL /HPF
RBC MORPH BLD: NORMAL
REF LAB TEST METHOD: ABNORMAL
SCAN SLIDE: NORMAL
SODIUM SERPL-SCNC: 140 MMOL/L (ref 136–145)
SP GR UR STRIP: 1.01 (ref 1–1.03)
SQUAMOUS #/AREA URNS HPF: ABNORMAL /HPF
TIBC SERPL-MCNC: 232 MCG/DL (ref 298–536)
TRANSFERRIN SERPL-MCNC: 156 MG/DL (ref 200–360)
UROBILINOGEN UR QL STRIP: ABNORMAL
VARIANT LYMPHS NFR BLD MANUAL: 15 % (ref 19.6–45.3)
WBC # UR STRIP: ABNORMAL /HPF
WBC MORPH BLD: NORMAL
WBC NRBC COR # BLD: 13 10*3/MM3 (ref 3.4–10.8)
YEAST URNS QL MICRO: ABNORMAL /HPF

## 2023-04-28 PROCEDURE — 85025 COMPLETE CBC W/AUTO DIFF WBC: CPT | Performed by: EMERGENCY MEDICINE

## 2023-04-28 PROCEDURE — 25010000002 VANCOMYCIN HCL 1.25 G RECONSTITUTED SOLUTION 1 EACH VIAL: Performed by: STUDENT IN AN ORGANIZED HEALTH CARE EDUCATION/TRAINING PROGRAM

## 2023-04-28 PROCEDURE — 87040 BLOOD CULTURE FOR BACTERIA: CPT | Performed by: EMERGENCY MEDICINE

## 2023-04-28 PROCEDURE — 85610 PROTHROMBIN TIME: CPT | Performed by: EMERGENCY MEDICINE

## 2023-04-28 PROCEDURE — 25010000002 MORPHINE PER 10 MG: Performed by: EMERGENCY MEDICINE

## 2023-04-28 PROCEDURE — 74176 CT ABD & PELVIS W/O CONTRAST: CPT

## 2023-04-28 PROCEDURE — 81001 URINALYSIS AUTO W/SCOPE: CPT | Performed by: EMERGENCY MEDICINE

## 2023-04-28 PROCEDURE — 80053 COMPREHEN METABOLIC PANEL: CPT | Performed by: EMERGENCY MEDICINE

## 2023-04-28 PROCEDURE — 0 CEFEPIME PER 500 MG: Performed by: EMERGENCY MEDICINE

## 2023-04-28 PROCEDURE — 99285 EMERGENCY DEPT VISIT HI MDM: CPT

## 2023-04-28 PROCEDURE — 83540 ASSAY OF IRON: CPT | Performed by: STUDENT IN AN ORGANIZED HEALTH CARE EDUCATION/TRAINING PROGRAM

## 2023-04-28 PROCEDURE — 87086 URINE CULTURE/COLONY COUNT: CPT | Performed by: EMERGENCY MEDICINE

## 2023-04-28 PROCEDURE — 85730 THROMBOPLASTIN TIME PARTIAL: CPT | Performed by: EMERGENCY MEDICINE

## 2023-04-28 PROCEDURE — 25010000002 ONDANSETRON PER 1 MG: Performed by: EMERGENCY MEDICINE

## 2023-04-28 PROCEDURE — 84466 ASSAY OF TRANSFERRIN: CPT | Performed by: STUDENT IN AN ORGANIZED HEALTH CARE EDUCATION/TRAINING PROGRAM

## 2023-04-28 PROCEDURE — 85007 BL SMEAR W/DIFF WBC COUNT: CPT | Performed by: EMERGENCY MEDICINE

## 2023-04-28 PROCEDURE — 82728 ASSAY OF FERRITIN: CPT | Performed by: STUDENT IN AN ORGANIZED HEALTH CARE EDUCATION/TRAINING PROGRAM

## 2023-04-28 PROCEDURE — 83605 ASSAY OF LACTIC ACID: CPT

## 2023-04-28 RX ORDER — ONDANSETRON 4 MG/1
4 TABLET, FILM COATED ORAL EVERY 6 HOURS PRN
Status: DISCONTINUED | OUTPATIENT
Start: 2023-04-28 | End: 2023-05-05 | Stop reason: HOSPADM

## 2023-04-28 RX ORDER — SODIUM CHLORIDE, SODIUM LACTATE, POTASSIUM CHLORIDE, CALCIUM CHLORIDE 600; 310; 30; 20 MG/100ML; MG/100ML; MG/100ML; MG/100ML
50 INJECTION, SOLUTION INTRAVENOUS CONTINUOUS
Status: DISCONTINUED | OUTPATIENT
Start: 2023-04-28 | End: 2023-05-04

## 2023-04-28 RX ORDER — SODIUM CHLORIDE 0.9 % (FLUSH) 0.9 %
10 SYRINGE (ML) INJECTION EVERY 12 HOURS SCHEDULED
Status: DISCONTINUED | OUTPATIENT
Start: 2023-04-28 | End: 2023-05-05 | Stop reason: HOSPADM

## 2023-04-28 RX ORDER — METRONIDAZOLE 500 MG/100ML
500 INJECTION, SOLUTION INTRAVENOUS EVERY 8 HOURS
Status: DISCONTINUED | OUTPATIENT
Start: 2023-04-28 | End: 2023-05-02

## 2023-04-28 RX ORDER — NITROGLYCERIN 0.4 MG/1
0.4 TABLET SUBLINGUAL
Status: DISCONTINUED | OUTPATIENT
Start: 2023-04-28 | End: 2023-05-05 | Stop reason: HOSPADM

## 2023-04-28 RX ORDER — ONDANSETRON 2 MG/ML
4 INJECTION INTRAMUSCULAR; INTRAVENOUS EVERY 6 HOURS PRN
Status: DISCONTINUED | OUTPATIENT
Start: 2023-04-28 | End: 2023-05-05 | Stop reason: HOSPADM

## 2023-04-28 RX ORDER — TAMSULOSIN HYDROCHLORIDE 0.4 MG/1
1 CAPSULE ORAL 2 TIMES DAILY
COMMUNITY

## 2023-04-28 RX ORDER — HYDROXYZINE HYDROCHLORIDE 50 MG/ML
25 INJECTION, SOLUTION INTRAMUSCULAR EVERY 6 HOURS PRN
Status: DISCONTINUED | OUTPATIENT
Start: 2023-04-28 | End: 2023-05-01

## 2023-04-28 RX ORDER — SODIUM CHLORIDE 0.9 % (FLUSH) 0.9 %
10 SYRINGE (ML) INJECTION AS NEEDED
Status: DISCONTINUED | OUTPATIENT
Start: 2023-04-28 | End: 2023-05-05 | Stop reason: HOSPADM

## 2023-04-28 RX ORDER — MORPHINE SULFATE 2 MG/ML
2 INJECTION, SOLUTION INTRAMUSCULAR; INTRAVENOUS EVERY 4 HOURS PRN
Status: DISCONTINUED | OUTPATIENT
Start: 2023-04-28 | End: 2023-05-05 | Stop reason: HOSPADM

## 2023-04-28 RX ORDER — NITROFURANTOIN MACROCRYSTALS 50 MG/1
50 CAPSULE ORAL DAILY
COMMUNITY
End: 2023-05-05 | Stop reason: HOSPADM

## 2023-04-28 RX ORDER — ONDANSETRON 2 MG/ML
4 INJECTION INTRAMUSCULAR; INTRAVENOUS ONCE
Status: COMPLETED | OUTPATIENT
Start: 2023-04-28 | End: 2023-04-28

## 2023-04-28 RX ORDER — SODIUM CHLORIDE 9 MG/ML
40 INJECTION, SOLUTION INTRAVENOUS AS NEEDED
Status: DISCONTINUED | OUTPATIENT
Start: 2023-04-28 | End: 2023-05-05 | Stop reason: HOSPADM

## 2023-04-28 RX ORDER — SODIUM CHLORIDE 0.9 % (FLUSH) 0.9 %
10 SYRINGE (ML) INJECTION AS NEEDED
Status: DISCONTINUED | OUTPATIENT
Start: 2023-04-28 | End: 2023-05-01

## 2023-04-28 RX ADMIN — ONDANSETRON 4 MG: 2 INJECTION INTRAMUSCULAR; INTRAVENOUS at 17:49

## 2023-04-28 RX ADMIN — METRONIDAZOLE 500 MG: 500 INJECTION, SOLUTION INTRAVENOUS at 21:25

## 2023-04-28 RX ADMIN — Medication 10 ML: at 21:25

## 2023-04-28 RX ADMIN — VANCOMYCIN HYDROCHLORIDE 1250 MG: 1.25 INJECTION, POWDER, LYOPHILIZED, FOR SOLUTION INTRAVENOUS at 21:25

## 2023-04-28 RX ADMIN — MORPHINE SULFATE 4 MG: 4 INJECTION INTRAVENOUS at 17:49

## 2023-04-28 RX ADMIN — CEFEPIME 2 G: 2 INJECTION, POWDER, FOR SOLUTION INTRAVENOUS at 17:49

## 2023-04-28 RX ADMIN — SODIUM CHLORIDE 1000 ML: 9 INJECTION, SOLUTION INTRAVENOUS at 14:44
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
## 2023-04-28 NOTE — Clinical Note
Level of Care: Med/Surg [1]   Admitting Physician: SHAJI CUMMINGS [[id number]]   Attending Physician: SHAJI CUMMINGS [[id number]]   Bed Request Comments: tele
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
## 2023-04-29 LAB
ANION GAP SERPL CALCULATED.3IONS-SCNC: 11 MMOL/L (ref 5–15)
BACTERIA SPEC AEROBE CULT: NORMAL
BUN SERPL-MCNC: 45 MG/DL (ref 8–23)
BUN/CREAT SERPL: 20.5 (ref 7–25)
CALCIUM SPEC-SCNC: 8.9 MG/DL (ref 8.6–10.5)
CHLORIDE SERPL-SCNC: 108 MMOL/L (ref 98–107)
CO2 SERPL-SCNC: 21 MMOL/L (ref 22–29)
CREAT SERPL-MCNC: 2.2 MG/DL (ref 0.76–1.27)
DEPRECATED RDW RBC AUTO: 54.3 FL (ref 37–54)
EGFRCR SERPLBLD CKD-EPI 2021: 32.4 ML/MIN/1.73
ERYTHROCYTE [DISTWIDTH] IN BLOOD BY AUTOMATED COUNT: 18.3 % (ref 12.3–15.4)
GLUCOSE SERPL-MCNC: 100 MG/DL (ref 65–99)
HCT VFR BLD AUTO: 24.4 % (ref 37.5–51)
HGB BLD-MCNC: 7.7 G/DL (ref 13–17.7)
MCH RBC QN AUTO: 26.7 PG (ref 26.6–33)
MCHC RBC AUTO-ENTMCNC: 31.7 G/DL (ref 31.5–35.7)
MCV RBC AUTO: 84.2 FL (ref 79–97)
MRSA DNA SPEC QL NAA+PROBE: NORMAL
PLATELET # BLD AUTO: 204 10*3/MM3 (ref 140–450)
PMV BLD AUTO: 7.9 FL (ref 6–12)
POTASSIUM SERPL-SCNC: 3.8 MMOL/L (ref 3.5–5.2)
PSA SERPL-MCNC: 0.39 NG/ML (ref 0–4)
RBC # BLD AUTO: 2.9 10*6/MM3 (ref 4.14–5.8)
SODIUM SERPL-SCNC: 140 MMOL/L (ref 136–145)
VANCOMYCIN SERPL-MCNC: 18.1 MCG/ML (ref 5–40)
WBC NRBC COR # BLD: 10.9 10*3/MM3 (ref 3.4–10.8)

## 2023-04-29 PROCEDURE — 87070 CULTURE OTHR SPECIMN AEROBIC: CPT | Performed by: STUDENT IN AN ORGANIZED HEALTH CARE EDUCATION/TRAINING PROGRAM

## 2023-04-29 PROCEDURE — 99024 POSTOP FOLLOW-UP VISIT: CPT | Performed by: STUDENT IN AN ORGANIZED HEALTH CARE EDUCATION/TRAINING PROGRAM

## 2023-04-29 PROCEDURE — 80202 ASSAY OF VANCOMYCIN: CPT | Performed by: STUDENT IN AN ORGANIZED HEALTH CARE EDUCATION/TRAINING PROGRAM

## 2023-04-29 PROCEDURE — 25010000002 MORPHINE PER 10 MG: Performed by: STUDENT IN AN ORGANIZED HEALTH CARE EDUCATION/TRAINING PROGRAM

## 2023-04-29 PROCEDURE — 25010000002 VANCOMYCIN HCL 1.25 G RECONSTITUTED SOLUTION 1 EACH VIAL: Performed by: STUDENT IN AN ORGANIZED HEALTH CARE EDUCATION/TRAINING PROGRAM

## 2023-04-29 PROCEDURE — 84153 ASSAY OF PSA TOTAL: CPT | Performed by: UROLOGY

## 2023-04-29 PROCEDURE — 87205 SMEAR GRAM STAIN: CPT | Performed by: STUDENT IN AN ORGANIZED HEALTH CARE EDUCATION/TRAINING PROGRAM

## 2023-04-29 PROCEDURE — 99222 1ST HOSP IP/OBS MODERATE 55: CPT | Performed by: INTERNAL MEDICINE

## 2023-04-29 PROCEDURE — 87186 SC STD MICRODIL/AGAR DIL: CPT | Performed by: STUDENT IN AN ORGANIZED HEALTH CARE EDUCATION/TRAINING PROGRAM

## 2023-04-29 PROCEDURE — 0 CEFEPIME PER 500 MG: Performed by: STUDENT IN AN ORGANIZED HEALTH CARE EDUCATION/TRAINING PROGRAM

## 2023-04-29 PROCEDURE — 85027 COMPLETE CBC AUTOMATED: CPT | Performed by: STUDENT IN AN ORGANIZED HEALTH CARE EDUCATION/TRAINING PROGRAM

## 2023-04-29 PROCEDURE — 87077 CULTURE AEROBIC IDENTIFY: CPT | Performed by: STUDENT IN AN ORGANIZED HEALTH CARE EDUCATION/TRAINING PROGRAM

## 2023-04-29 PROCEDURE — 80048 BASIC METABOLIC PNL TOTAL CA: CPT | Performed by: STUDENT IN AN ORGANIZED HEALTH CARE EDUCATION/TRAINING PROGRAM

## 2023-04-29 PROCEDURE — 87641 MR-STAPH DNA AMP PROBE: CPT | Performed by: STUDENT IN AN ORGANIZED HEALTH CARE EDUCATION/TRAINING PROGRAM

## 2023-04-29 RX ORDER — HYDROCODONE BITARTRATE AND ACETAMINOPHEN 7.5; 325 MG/1; MG/1
1 TABLET ORAL EVERY 6 HOURS PRN
Status: DISCONTINUED | OUTPATIENT
Start: 2023-04-29 | End: 2023-05-05 | Stop reason: HOSPADM

## 2023-04-29 RX ADMIN — SODIUM CHLORIDE, POTASSIUM CHLORIDE, SODIUM LACTATE AND CALCIUM CHLORIDE 75 ML/HR: 600; 310; 30; 20 INJECTION, SOLUTION INTRAVENOUS at 00:07

## 2023-04-29 RX ADMIN — HYDROCODONE BITARTRATE AND ACETAMINOPHEN 1 TABLET: 7.5; 325 TABLET ORAL at 20:47

## 2023-04-29 RX ADMIN — METRONIDAZOLE 500 MG: 500 INJECTION, SOLUTION INTRAVENOUS at 19:29

## 2023-04-29 RX ADMIN — HYDROCODONE BITARTRATE AND ACETAMINOPHEN 1 TABLET: 7.5; 325 TABLET ORAL at 12:38

## 2023-04-29 RX ADMIN — SODIUM CHLORIDE, POTASSIUM CHLORIDE, SODIUM LACTATE AND CALCIUM CHLORIDE 75 ML/HR: 600; 310; 30; 20 INJECTION, SOLUTION INTRAVENOUS at 16:39

## 2023-04-29 RX ADMIN — METRONIDAZOLE 500 MG: 500 INJECTION, SOLUTION INTRAVENOUS at 12:38

## 2023-04-29 RX ADMIN — CEFEPIME 2 G: 2 INJECTION, POWDER, FOR SOLUTION INTRAVENOUS at 07:42

## 2023-04-29 RX ADMIN — VANCOMYCIN HYDROCHLORIDE 1250 MG: 1.25 INJECTION, POWDER, LYOPHILIZED, FOR SOLUTION INTRAVENOUS at 09:44

## 2023-04-29 RX ADMIN — CEFEPIME 2 G: 2 INJECTION, POWDER, FOR SOLUTION INTRAVENOUS at 18:33

## 2023-04-29 RX ADMIN — MORPHINE SULFATE 2 MG: 2 INJECTION, SOLUTION INTRAMUSCULAR; INTRAVENOUS at 09:44

## 2023-04-29 RX ADMIN — METRONIDAZOLE 500 MG: 500 INJECTION, SOLUTION INTRAVENOUS at 04:17

## 2023-04-29 NOTE — PLAN OF CARE
Goal Outcome Evaluation:              Outcome Evaluation: pt requested pain medicine x2; dressing changed per MD order; continue on IV fluids and antibiotics; ID consulted; continue to monitor
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
## 2023-04-29 NOTE — PROGRESS NOTES
"Enter Query Response Below      Query Response: Prostate cancer into bladder             If applicable, please update the problem list.     Patient: Anthony Moss        : 1955  Account: [id number]           Admit Date: 3/28/2023        How to Respond to this query:       a. Click New Note     b. Answer query within the yellow box.                c. Update the Problem List, if applicable.      If you have any questions about this query contact me at: [email or web address]     Dr. Montero    67-year-old male admitted 3/28/23 with bladder cancer and prostate cancer per the dc summary. 3/28 OR- Radical cystectomy and ileal conduit. Pathology \"Bladder and prostate, cystoprostatectomy:  Acinar adenocarcinoma ([place] grade 5+5=10) extensively involving prostate and bladder tissue\", \"Margin Status: Invasive carcinoma present at margin\".    Please clarify the following:  Prostate and bladder cancer are both primary sites  Prostate cancer with mets to bladder  Bladder cancer with mets to prostate  Other explanation for clinical impression and treatment plan_________  Clinically indeterminable    By submitting this query, we are merely seeking further clarification of documentation to accurately reflect all conditions that you are monitoring, evaluating, treating or that extend the hospitalization or utilize additional resources of care. Please utilize your independent clinical judgment when addressing the question(s) above.     This query and your response, once completed, will be entered into the legal medical record.    Sincerely,  Darlene DE LA CRUZ, RN, CCDS  Clinical Documentation Integrity Program   "

## 2023-04-29 NOTE — PROGRESS NOTES
"Pharmacy Antimicrobial Dosing Service    Subjective:  Anthony Moss is a 67 y.o.male admitted with abdominal pain. Patient is s/p radical cystectomy and ileal conduit urinary diversion, complicated by wound dehiscence. Recent hospitalization at [facility] (discharged on 4/20/23 and placed in SNF). Pharmacy has been consulted to dose Vancomycin for possible intra-abdominal infection.        Assessment/Plan    1. Day #1 Vancomycin: Pulse dosing d/t renal dysfxn. Vancomycin 1250 mg IV x 1 given in ED. Obtain random level on 4/29 at 0800, approximately 12 hours from the dose and plan to re-oquendo when random level is expected to be <20 mcg/mL.    2. Day #1 Cefepime: 2 gm IV q12h for estCrCl 30-59 mL/min.    3. Day #1 Metronidazole: 500 mg IV q8h.     Will continue to monitor drug levels, renal function, culture and sensitivities, and patient clinical status.       Objective:  Relevant clinical data and objective history reviewed:  188 cm (74\")   80.5 kg (177 lb 7.5 oz)   Ideal body weight: 82.2 kg (181 lb 3.5 oz)  Body mass index is 22.79 kg/m².        Results from last 7 days   Lab Units 04/28/23  1438   CREATININE mg/dL 2.39*     Estimated Creatinine Clearance: 35.1 mL/min (A) (by C-G formula based on SCr of 2.39 mg/dL (H)).  No intake/output data recorded.    Results from last 7 days   Lab Units 04/28/23  1438   WBC 10*3/mm3 13.00*     Temperature    04/28/23 1343   Temp: 97.8 °F (36.6 °C)     Baseline culture/source/susceptibility:  Microbiology Results (last 10 days)       ** No results found for the last 240 hours. **            Anti-Infectives (From admission, onward)      Ordered     Dose/Rate Route Frequency Start Stop    04/1955  cefepime 2 gm IVPB in 100 ml NS (MBP)        Ordering Provider: Ede Mcdaniel,     2 g  over 30 Minutes Intravenous Every 12 Hours 04/29/23 0700 05/04/23 0659    04/1955  Vancomycin HCl 1,250 mg in sodium chloride 0.9 % 250 mL IVPB        Ordering Provider: Ede Mcdaniel, " DO    1,250 mg Intravenous Once 04/28/23 2100      04/28/23 1952  metroNIDAZOLE (FLAGYL) IVPB 500 mg        Ordering Provider: Ede Mcdaniel DO    500 mg  over 30 Minutes Intravenous Every 8 Hours 04/28/23 2000 05/05/23 1959 04/1955  Vancomycin Pharmacy Intermittent/Pulse Dosing        Ordering Provider: Ede Mcdaniel DO     Does not apply As Needed 04/28/23 1954 05/03/23 1953 04/28/23 1952  Pharmacy to dose vancomycin        Ordering Provider: Ede Mcdaniel DO     Does not apply Continuous PRN 04/28/23 1952 05/03/23 1951 04/28/23 1952  Pharmacy to Dose Cefepime        Ordering Provider: Ede Mcdaniel DO     Does not apply Continuous PRN 04/28/23 1952 05/03/23 1951 04/28/23 1707  cefepime 2 gm IVPB in 100 ml NS (MBP)        Ordering Provider: Timothy Baires DO    2 g  over 30 Minutes Intravenous Once 04/28/23 1709 04/28/23 1854 04/28/23 1952  nitrofurantoin (MACRODANTIN) 50 MG capsule        Ordering Provider: Janice Lerma MD    50 mg Oral Daily --              Stephanie Slater, PharmD  04/28/23 19:58 EDT
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
## 2023-04-29 NOTE — PROGRESS NOTES
"Pharmacy Antimicrobial Dosing Service    Subjective:  Anthony Moss is a 67 y.o.male admitted with abdominal pain. Pharmacy has been consulted to dose Vancomycin for possible intra-abdominal infection. Patient recently admitted to [facility] 3/28 - 4/20 after presenting for planned radical cystectomy and ileal conduit urinary diversion which was complicated by wound dehiscence requiring exlap and repair. ID followed last admission for enterobacter cloacae intraop culture treated with 7 days of Zosyn (also grew corynebacterium but thought to be a contaminant). Patient presented this admission with abdominal pain, copious amounts of pus in colostomy bag, and malodorous abdominal wound.     PMH: Bladder cancer, post-op ileus requiring TPN during previous admission (not sent home on TPN), neurogenic bladder, rest as per [facility]     4/29 Wound Abd: pending       Assessment/Plan    1. Day #2 Vancomycin: Pulse dosing d/t renal dysfxn. CARLOS ALBERTO - baseline ~1.2 - 1.4 mg/dL. Received 1250 mg (~15 mg/kg ABW) IV x1 on 4/28. Random today ~7 hrs post-dose = 18.1 mcg/mL. Will re-dose with another 1250 mg IV x1 today and order random with AM labs tomorrow as SCr still indicative of CARLOS ALBERTO. Plan to re-dose when level <20 mcg/mL.     2. Day #2 Cefepime: 2 gm IV q12h for estCrCl 30-59 mL/min.    3. Day #2 Metronidazole: 500 mg IV q8h.     Will continue to monitor drug levels, renal function, culture and sensitivities, and patient clinical status.       Objective:  Relevant clinical data and objective history reviewed:  188 cm (74\")   81 kg (178 lb 9.2 oz)   Ideal body weight: 82.2 kg (181 lb 3.5 oz)  Body mass index is 22.93 kg/m².    Results from last 7 days   Lab Units 04/29/23  0417   VANCOMYCIN RM mcg/mL 18.10     Results from last 7 days   Lab Units 04/29/23 0417 04/28/23  1438   CREATININE mg/dL 2.20* 2.39*     Estimated Creatinine Clearance: 38.4 mL/min (A) (by C-G formula based on SCr of 2.2 mg/dL (H)).  I/O last 3 completed shifts:  In: 240 " [P.O.:240]  Out: 700 [Urine:700]    Results from last 7 days   Lab Units 04/29/23  0417 04/28/23  1438   WBC 10*3/mm3 10.90* 13.00*     Temperature    04/28/23 2045 04/28/23 2351 04/29/23 0404   Temp: 98.1 °F (36.7 °C) 97.5 °F (36.4 °C) 98.7 °F (37.1 °C)     Baseline culture/source/susceptibility:  Microbiology Results (last 10 days)       Procedure Component Value - Date/Time    Wound Culture - Wound, Abdominal Cavity [[id number]] Collected: 04/29/23 0428    Lab Status: Preliminary result Specimen: Wound from Abdominal Cavity Updated: 04/29/23 0720     Gram Stain Many (4+) WBCs per low power field      Rare (1+) Gram negative bacilli            Anti-Infectives (From admission, onward)      Ordered     Dose/Rate Route Frequency Start Stop    04/1957  cefepime 2 gm IVPB in 100 ml NS (MBP)        Ordering Provider: Ede Mcdaniel DO    2 g  over 30 Minutes Intravenous Every 12 Hours 04/29/23 0700 05/04/23 0659    04/1957  Vancomycin HCl 1,250 mg in sodium chloride 0.9 % 250 mL IVPB        Ordering Provider: Ede Mcdaniel DO    1,250 mg Intravenous Once 04/28/23 2100 04/28/23 2125 04/28/23 1952  metroNIDAZOLE (FLAGYL) IVPB 500 mg        Ordering Provider: Ede Mcdaniel DO    500 mg  over 30 Minutes Intravenous Every 8 Hours 04/28/23 2000 05/05/23 1959 04/1957  Vancomycin Pharmacy Intermittent/Pulse Dosing        Ordering Provider: Ede Mcdaniel DO     Does not apply As Needed 04/28/23 1954 05/03/23 1953 04/28/23 1952  Pharmacy to dose vancomycin        Ordering Provider: Ede Mcdaniel DO     Does not apply Continuous PRN 04/28/23 1952 05/03/23 1951 04/28/23 1952  Pharmacy to Dose Cefepime        Ordering Provider: Ede Mcdaniel DO Does not apply Continuous PRN 04/28/23 1952 05/03/23 1951 04/28/23 1707  cefepime 2 gm IVPB in 100 ml NS (MBP)        Ordering Provider: Timothy Baires, DO    2 g  over 30 Minutes Intravenous Once 04/28/23 1700 04/28/23 4896             Olivia Anderson, PharmD  04/29/23 08:38 EDT

## 2023-04-29 NOTE — PROGRESS NOTES
[facility] Daily Progress Note    Patient Name: Anthony Moss  : 1955  MRN: [id number]  Primary Care Physician:  Provider, No Known  Date of admission: 2023      Subjective      Chief Complaint: Abdominal pain    Subjective  Patient still having some abdominal discomfort but better, denies for any other active complaint.    ROS       Objective      Vitals:   Temp:  [97.5 °F (36.4 °C)-98.7 °F (37.1 °C)] 98.4 °F (36.9 °C)  Heart Rate:  [] 95  Resp:  [16-21] 18  BP: ()/(63-79) 106/75    Physical Exam  Vitals and nursing note reviewed.   Constitutional:       General: He is not in acute distress.     Appearance: Normal appearance. He is well-developed. He is not ill-appearing, toxic-appearing or diaphoretic.   HENT:      Head: Normocephalic and atraumatic.      Right Ear: Ear canal and external ear normal.      Left Ear: Ear canal and external ear normal.      Nose: Nose normal. No congestion or rhinorrhea.      Mouth/Throat:      Mouth: Mucous membranes are moist.      Pharynx: No oropharyngeal exudate.   Eyes:      General: No scleral icterus.        Right eye: No discharge.         Left eye: No discharge.      Extraocular Movements: Extraocular movements intact.      Conjunctiva/sclera: Conjunctivae normal.      Pupils: Pupils are equal, round, and reactive to light.   Neck:      Thyroid: No thyromegaly.      Vascular: No carotid bruit or JVD.      Trachea: No tracheal deviation.   Cardiovascular:      Rate and Rhythm: Normal rate and regular rhythm.      Pulses: Normal pulses.      Heart sounds: Normal heart sounds. No murmur heard.    No friction rub. No gallop.   Pulmonary:      Effort: Pulmonary effort is normal. No respiratory distress.      Breath sounds: Normal breath sounds. No stridor. No wheezing, rhonchi or rales.   Chest:      Chest wall: No tenderness.   Abdominal:      General: Bowel sounds are normal. There is no distension.       Palpations: Abdomen is soft. There is no mass.      Tenderness: There is no abdominal tenderness. There is no guarding or rebound.      Hernia: No hernia is present.   Musculoskeletal:         General: No swelling, tenderness, deformity or signs of injury. Normal range of motion.      Cervical back: Normal range of motion and neck supple. No rigidity. No muscular tenderness.      Right lower leg: No edema.      Left lower leg: No edema.   Lymphadenopathy:      Cervical: No cervical adenopathy.   Skin:     General: Skin is warm and dry.      Coloration: Skin is not jaundiced or pale.      Findings: No bruising, erythema or rash.   Neurological:      General: No focal deficit present.      Mental Status: He is alert and oriented to person, place, and time. Mental status is at baseline.      Cranial Nerves: No cranial nerve deficit.      Sensory: No sensory deficit.      Motor: No weakness or abnormal muscle tone.      Coordination: Coordination normal.   Psychiatric:         Mood and Affect: Mood normal.         Behavior: Behavior normal.         Thought Content: Thought content normal.         Judgment: Judgment normal.               Result Review    Result Review:  I have personally reviewed the results from the time of this admission to 4/29/2023 12:39 EDT and agree with these findings:  [x]  Laboratory  [x]  Microbiology  [x]  Radiology  [x]  EKG/Telemetry   []  Cardiology/Vascular   []  Pathology  []  Old records  []  Other:  Most notable findings include:     Wounds (last 24 hours)     LDA Wound     Row Name 04/29/23 0735 04/28/23 2102          Wound 04/06/23 2243 abdomen Incision    Wound - Properties Group Placement Date: 04/06/23  -PH Placement Time: 2243  -PH Present on Hospital Admission: Y  -PH Location: abdomen  -PH Primary Wound Type: Incision  -PH    Dressing Appearance moist drainage  -VS copious drainage  -SA     Base red;moist  -VS yellow;red;moist  -SA     Drainage Characteristics/Odor  serosanguineous  -VS --     Drainage Amount large  -VS copious  -SA     Care, Wound cleansed with;sterile normal saline  -VS cleansed with;sterile normal saline  -SA     Dressing Care dressing changed;gauze;abdominal pad  packed W/D dressing/covered with ABD foam tape  -VS dressing changed;abdominal pad;gauze  -SA     Retired Wound - Properties Group Placement Date: 04/06/23  -PH Placement Time: 2243  -PH Present on Hospital Admission: Y  -PH Location: abdomen  -PH Primary Wound Type: Incision  -PH    Retired Wound - Properties Group Date first assessed: 04/06/23  -PH Time first assessed: 2243  -PH Present on Hospital Admission: Y  -PH Location: abdomen  -PH Primary Wound Type: Incision  -PH       [REMOVED] Wound 05/25/22 1423 anterior pubis Incision    Wound - Properties Group Placement Date: 05/25/22  -EP Placement Time: 1423  -EP Present on Hospital Admission: N  -EP Orientation: anterior  -EP Location: pubis  -EP Primary Wound Type: Incision  -EP Additional Comments: insertion of suprapubic catheter  -EP Removal Date: 04/28/23  -SA Removal Time: 2223 -SA    Retired Wound - Properties Group Placement Date: 05/25/22  -EP Placement Time: 1423  -EP Present on Hospital Admission: N  -EP Orientation: anterior  -EP Location: pubis  -EP Primary Wound Type: Incision  -EP Additional Comments: insertion of suprapubic catheter  -EP Removal Date: 04/28/23  -SA Removal Time: 2223 -SA    Retired Wound - Properties Group Date first assessed: 05/25/22  -EP Time first assessed: 1423  -EP Present on Hospital Admission: N  -EP Location: pubis  -EP Primary Wound Type: Incision  -EP Additional Comments: insertion of suprapubic catheter  -EP Resolution Date: 04/28/23  -SA Resolution Time: 2223 -SA       [REMOVED] Wound 04/11/23 0005 thoracic spine    Wound - Properties Group Placement Date: 04/11/23  -AB Placement Time: 0005  -AB Location: thoracic spine  -AB Removal Date: 04/29/23  -SA Removal Time: 0012 -SA    Retired Wound -  Properties Group Placement Date: 04/11/23  -AB Placement Time: 0005 -AB Location: thoracic spine  -AB Removal Date: 04/29/23 -SA Removal Time: 0012 -SA    Retired Wound - Properties Group Date first assessed: 04/11/23  -AB Time first assessed: 0005  -AB Location: thoracic spine  -AB Resolution Date: 04/29/23  -SA Resolution Time: 0012 -SA          User Key  (r) = Recorded By, (t) = Taken By, (c) = Cosigned By    Initials Name Provider Type    PH Carolyn Marie, RN Registered Nurse    Erika Shirley RN Registered Nurse    Neetu Ulloa RN Registered Nurse    VS Sturgeon, Valerie, LPN Licensed Nurse    Dell Marlow RN Registered Nurse                  Assessment & Plan      Brief Patient Summary:  Anthony Moss is a 67 y.o. male who       cefepime, 2 g, Intravenous, Q12H  metroNIDAZOLE, 500 mg, Intravenous, Q8H  sodium chloride, 10 mL, Intravenous, Q12H       lactated ringers, 75 mL/hr, Last Rate: 75 mL/hr (04/29/23 0007)  Pharmacy to Dose Cefepime,   Pharmacy to dose vancomycin,          Active Hospital Problems:  Active Hospital Problems    Diagnosis    • **Abdominal pain, unspecified abdominal location      Plan:     #Abdominal Wound infection    - s/p Radical cystectomy and ileal conduit urinary diversion,   complicated by wound dehiscence    - s/p ex-lap with repair of wound dehiscence on 4/6/23    - hospitalized at [facility] from 03/28-4/19/23    - abdominal pain and malodorous pus like discharge    - Due to recent hospital stay, broad spectrum abx coverage    - Vancomycin, pharm to dose    - Cefepime, pharm to dose    - Flagyl 500mg IV q8h    - blood cultures    - wound cultures    - general surgery consulted, awaited input.    - WOCN    - NPO     - LR @ 75/hr    - pain control    - pt/ot    -CM  - consult ID     #UTI    - UA suggests UTI    - urine culture     - blood cultures    - wbc 13.0, trend     - lactate 0.8    - Rocephin 2g IV daily     #Bladder cancer  #Osseous mets    - s/p  surgery    - CT a/p shows postsurgical changes of cystoprostatectomy with bilateral nephroureteral stent placement with ileal conduit and RLQ urostomy.  Mode left hydronephrosis. Development of new scerotic osseous lesions concerning for bone mets    - heme/onc consulted    - pain control     #Ileus vs SBO - possible    - CT a/p shows possible post-op ileus vs obstruction    - NPO    - LR @ 75/hr    - surgery contuls     #CARLOS ALBERTO on CKD improving  #Hydronephrosis, left    - Cr 2.39 on admission, base is around 1.3    - suspect 2/2 hydro    - evident on CT a/p    - urology to evaluate    - LR @ 75/hr     #Anemia   - hgb 8.7 on admission, base around 9.0    - check iron and ferritin    - no overt bleeding, follow labs     #Anxiety    - Vistaril PRN        DVT prophylaxis: SCDs      DVT prophylaxis:  Mechanical DVT prophylaxis orders are present.    CODE STATUS:    Code Status (Patient has no pulse and is not breathing): CPR (Attempt to Resuscitate)  Medical Interventions (Patient has pulse or is breathing): Full Support      Disposition:  I expect patient to be discharged as per clinical course..    This patient has been examined wearing appropriate Personal Protective Equipment and discussed with hospital infection control department. 04/29/23      Electronically signed by Andrey Valdez MD, 04/29/23, 12:39 EDT.  [facility] Hospitalist Team

## 2023-04-29 NOTE — PLAN OF CARE
Goal Outcome Evaluation:  Plan of Care Reviewed With: patient        Progress: no change  Outcome Evaluation: Patient came in through the ER for abd pain. Patient currently has an open wound and a urostomy. Wound has significant drainage. Wound culture obtained and sent down. IVF an atb infusing over night. Oncology, Urology, and General Surgery to be consulted.
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
## 2023-04-29 NOTE — PROGRESS NOTES
General Surgery Progress Note    Name: Anthony Moss ADMIT: 2023   : 1955  PCP: Provider, No Known    MRN: [id number] LOS: 1 days   AGE/SEX: 67 y.o. male  ROOM: [address] [place] [address]    Chief Complaint   Patient presents with   • Suprapubic catherter problem     Sent by urologist, worried about sepsis     Subjective     Patient readmitted with drainage from his midline incision.  He had a wound VAC on at his rehab facility.  On exam today he has some purulent drainage from his incision his fascia is intact.  CT abdomen and pelvis without fascial defect.    Objective     Scheduled Medications:   cefepime, 2 g, Intravenous, Q12H  metroNIDAZOLE, 500 mg, Intravenous, Q8H  sodium chloride, 10 mL, Intravenous, Q12H        Active Infusions:  lactated ringers, 75 mL/hr, Last Rate: 75 mL/hr (23 1639)  Pharmacy to Dose Cefepime,   Pharmacy to dose vancomycin,         As Needed Medications:  •  HYDROcodone-acetaminophen  •  hydrOXYzine  •  Morphine  •  nitroglycerin  •  ondansetron **OR** ondansetron  •  Pharmacy to Dose Cefepime  •  Pharmacy to dose vancomycin  •  [COMPLETED] Insert Peripheral IV **AND** sodium chloride  •  sodium chloride  •  sodium chloride  •  Vancomycin Pharmacy Intermittent/Pulse Dosing    Vital Signs  Vital Signs Patient Vitals for the past 24 hrs:   BP Temp Temp src Pulse Resp SpO2 Weight   23 103/61 98.7 °F (37.1 °C) Oral 99 18 98 % --   23 1559 96/64 -- -- 92 -- 99 % --   23 1208 106/75 98.4 °F (36.9 °C) -- 95 18 97 % --   23 0735 121/71 -- -- 95 18 97 % --   23 0404 114/72 98.7 °F (37.1 °C) Oral 95 16 98 % 81 kg (178 lb 9.2 oz)   23 2351 116/79 97.5 °F (36.4 °C) Oral 93 17 99 % --   23 2045 109/63 98.1 °F (36.7 °C) Oral 102 21 100 % --     I/O:  I/O last 3 completed shifts:  In: 660 [P.O.:660]  Out: 1800 [Urine:1800]    Physical Exam:  Physical Exam  Constitutional:       General: He is not in acute distress.     Appearance: Normal  appearance. He is not ill-appearing.   HENT:      Head: Normocephalic and atraumatic.      Right Ear: External ear normal.      Left Ear: External ear normal.   Eyes:      Extraocular Movements: Extraocular movements intact.      Conjunctiva/sclera: Conjunctivae normal.   Cardiovascular:      Rate and Rhythm: Normal rate and regular rhythm.   Pulmonary:      Effort: Pulmonary effort is normal. No respiratory distress.   Abdominal:      General: There is no distension.      Palpations: Abdomen is soft.      Tenderness: There is no abdominal tenderness.   Musculoskeletal:         General: No swelling or deformity.   Skin:     General: Skin is warm and dry.   Neurological:      Mental Status: He is alert and oriented to person, place, and time. Mental status is at baseline.         Results Review:     CBC    Results from last 7 days   Lab Units 04/29/23  0417 04/28/23  1438   WBC 10*3/mm3 10.90* 13.00*   HEMOGLOBIN g/dL 7.7* 8.7*   PLATELETS 10*3/mm3 204 270     BMP   Results from last 7 days   Lab Units 04/29/23  0417 04/28/23  1438   SODIUM mmol/L 140 140   POTASSIUM mmol/L 3.8 4.3   CHLORIDE mmol/L 108* 105   CO2 mmol/L 21.0* 22.0   BUN mg/dL 45* 50*   CREATININE mg/dL 2.20* 2.39*   GLUCOSE mg/dL 100* 131*     Radiology(recent) CT Abdomen Pelvis Without Contrast    Result Date: 4/28/2023  Impression: 1. Postsurgical changes of cystoprostatectomy with bilateral nephroureteral stent placement with ileal conduit and right lower quadrant urostomy. Moderate left hydronephrosis. No hydronephrosis on right. 2. Postsurgical changes at midline abdominal wall with multiple small foci of subcutaneous air. No organized fluid collection or abscess. No recurrent bowel herniation. 3. Fluid and air-filled mildly dilated proximal small bowel loops measuring up to 4 cm could relate to postoperative ileus, low-grade partial obstruction difficult to exclude. No discrete transition point. 4. Development of new sclerotic osseous lesions  most concerning for osseous metastases. 5. Additional incidental chronic findings above. Electronically Signed: Kuldip Daniels  4/28/2023 6:02 PM EDT  Workstation ID: [id number]      I reviewed the patient's new clinical results.    Assessment & Plan       Abdominal pain, unspecified abdominal location      67-year-old gentleman status post takeback for fascial dehiscence and closure of the abdomen by Dr. Witt.  Patient readmitted with drainage from his midline incision.  He had a wound VAC on at his rehab facility.  On exam today he has some purulent drainage from his incision his fascia is intact.  CT abdomen and pelvis without fascial defect.  Recommend switching to wet-to-dry dressing changes twice daily for few days until wound is cleaned up before placing wound VAC.  No plan for surgical intervention at this time.  Okay for diet from my standpoint.        This note was created using Dragon Voice Recognition software.    Bob Hall MD  04/29/23  19:57 EDT

## 2023-04-30 LAB
ALBUMIN SERPL-MCNC: 2.7 G/DL (ref 3.5–5.2)
ALBUMIN/GLOB SERPL: 0.7 G/DL
ALP SERPL-CCNC: 207 U/L (ref 39–117)
ALT SERPL W P-5'-P-CCNC: 16 U/L (ref 1–41)
ANION GAP SERPL CALCULATED.3IONS-SCNC: 12 MMOL/L (ref 5–15)
ANISOCYTOSIS BLD QL: ABNORMAL
AST SERPL-CCNC: 23 U/L (ref 1–40)
BILIRUB SERPL-MCNC: 0.3 MG/DL (ref 0–1.2)
BUN SERPL-MCNC: 35 MG/DL (ref 8–23)
BUN/CREAT SERPL: 17.4 (ref 7–25)
CALCIUM SPEC-SCNC: 8.5 MG/DL (ref 8.6–10.5)
CHLORIDE SERPL-SCNC: 110 MMOL/L (ref 98–107)
CO2 SERPL-SCNC: 21 MMOL/L (ref 22–29)
CREAT SERPL-MCNC: 2.01 MG/DL (ref 0.76–1.27)
DEPRECATED RDW RBC AUTO: 56 FL (ref 37–54)
EGFRCR SERPLBLD CKD-EPI 2021: 36.1 ML/MIN/1.73
EOSINOPHIL # BLD MANUAL: 0.78 10*3/MM3 (ref 0–0.4)
EOSINOPHIL NFR BLD MANUAL: 8 % (ref 0.3–6.2)
ERYTHROCYTE [DISTWIDTH] IN BLOOD BY AUTOMATED COUNT: 18.2 % (ref 12.3–15.4)
GLOBULIN UR ELPH-MCNC: 4 GM/DL
GLUCOSE SERPL-MCNC: 140 MG/DL (ref 65–99)
HCT VFR BLD AUTO: 25.2 % (ref 37.5–51)
HGB BLD-MCNC: 8.1 G/DL (ref 13–17.7)
LYMPHOCYTES # BLD MANUAL: 1.36 10*3/MM3 (ref 0.7–3.1)
LYMPHOCYTES NFR BLD MANUAL: 9 % (ref 5–12)
MCH RBC QN AUTO: 26.8 PG (ref 26.6–33)
MCHC RBC AUTO-ENTMCNC: 32.3 G/DL (ref 31.5–35.7)
MCV RBC AUTO: 82.9 FL (ref 79–97)
METAMYELOCYTES NFR BLD MANUAL: 1 % (ref 0–0)
MONOCYTES # BLD: 0.87 10*3/MM3 (ref 0.1–0.9)
MYELOCYTES NFR BLD MANUAL: 3 % (ref 0–0)
NEUTROPHILS # BLD AUTO: 6.21 10*3/MM3 (ref 1.7–7)
NEUTROPHILS NFR BLD MANUAL: 62 % (ref 42.7–76)
NEUTS BAND NFR BLD MANUAL: 2 % (ref 0–5)
PATHOLOGY REVIEW: YES
PLAT MORPH BLD: NORMAL
PLATELET # BLD AUTO: 199 10*3/MM3 (ref 140–450)
PMV BLD AUTO: 8.4 FL (ref 6–12)
POTASSIUM SERPL-SCNC: 3.7 MMOL/L (ref 3.5–5.2)
PROMYELOCYTES NFR BLD MANUAL: 1 % (ref 0–0)
PROT SERPL-MCNC: 6.7 G/DL (ref 6–8.5)
RBC # BLD AUTO: 3.03 10*6/MM3 (ref 4.14–5.8)
SCAN SLIDE: NORMAL
SODIUM SERPL-SCNC: 143 MMOL/L (ref 136–145)
TOXIC GRANULATION: ABNORMAL
VANCOMYCIN SERPL-MCNC: 18.7 MCG/ML (ref 5–40)
VARIANT LYMPHS NFR BLD MANUAL: 14 % (ref 19.6–45.3)
WBC NRBC COR # BLD: 9.7 10*3/MM3 (ref 3.4–10.8)

## 2023-04-30 PROCEDURE — 0 CEFEPIME PER 500 MG: Performed by: STUDENT IN AN ORGANIZED HEALTH CARE EDUCATION/TRAINING PROGRAM

## 2023-04-30 PROCEDURE — 80202 ASSAY OF VANCOMYCIN: CPT | Performed by: INTERNAL MEDICINE

## 2023-04-30 PROCEDURE — 99232 SBSQ HOSP IP/OBS MODERATE 35: CPT | Performed by: INTERNAL MEDICINE

## 2023-04-30 PROCEDURE — 80053 COMPREHEN METABOLIC PANEL: CPT | Performed by: INTERNAL MEDICINE

## 2023-04-30 PROCEDURE — 85007 BL SMEAR W/DIFF WBC COUNT: CPT | Performed by: INTERNAL MEDICINE

## 2023-04-30 PROCEDURE — 25010000002 MORPHINE PER 10 MG: Performed by: STUDENT IN AN ORGANIZED HEALTH CARE EDUCATION/TRAINING PROGRAM

## 2023-04-30 PROCEDURE — 99024 POSTOP FOLLOW-UP VISIT: CPT | Performed by: STUDENT IN AN ORGANIZED HEALTH CARE EDUCATION/TRAINING PROGRAM

## 2023-04-30 PROCEDURE — 85025 COMPLETE CBC W/AUTO DIFF WBC: CPT | Performed by: INTERNAL MEDICINE

## 2023-04-30 PROCEDURE — 25010000002 VANCOMYCIN HCL 1.25 G RECONSTITUTED SOLUTION 1 EACH VIAL: Performed by: INTERNAL MEDICINE

## 2023-04-30 PROCEDURE — 97166 OT EVAL MOD COMPLEX 45 MIN: CPT | Performed by: OCCUPATIONAL THERAPIST

## 2023-04-30 PROCEDURE — 97162 PT EVAL MOD COMPLEX 30 MIN: CPT

## 2023-04-30 PROCEDURE — 25010000002 ONDANSETRON PER 1 MG: Performed by: STUDENT IN AN ORGANIZED HEALTH CARE EDUCATION/TRAINING PROGRAM

## 2023-04-30 RX ORDER — TAMSULOSIN HYDROCHLORIDE 0.4 MG/1
0.4 CAPSULE ORAL 2 TIMES DAILY
Status: DISCONTINUED | OUTPATIENT
Start: 2023-04-30 | End: 2023-05-05 | Stop reason: HOSPADM

## 2023-04-30 RX ADMIN — HYDROCODONE BITARTRATE AND ACETAMINOPHEN 1 TABLET: 7.5; 325 TABLET ORAL at 06:33

## 2023-04-30 RX ADMIN — CEFEPIME 2 G: 2 INJECTION, POWDER, FOR SOLUTION INTRAVENOUS at 18:08

## 2023-04-30 RX ADMIN — METRONIDAZOLE 500 MG: 500 INJECTION, SOLUTION INTRAVENOUS at 12:58

## 2023-04-30 RX ADMIN — VANCOMYCIN HYDROCHLORIDE 1250 MG: 1.25 INJECTION, POWDER, LYOPHILIZED, FOR SOLUTION INTRAVENOUS at 11:51

## 2023-04-30 RX ADMIN — Medication 10 ML: at 20:45

## 2023-04-30 RX ADMIN — TAMSULOSIN HYDROCHLORIDE 0.4 MG: 0.4 CAPSULE ORAL at 20:45

## 2023-04-30 RX ADMIN — METRONIDAZOLE 500 MG: 500 INJECTION, SOLUTION INTRAVENOUS at 19:25

## 2023-04-30 RX ADMIN — CEFEPIME 2 G: 2 INJECTION, POWDER, FOR SOLUTION INTRAVENOUS at 06:03

## 2023-04-30 RX ADMIN — METRONIDAZOLE 500 MG: 500 INJECTION, SOLUTION INTRAVENOUS at 04:51

## 2023-04-30 RX ADMIN — MORPHINE SULFATE 2 MG: 2 INJECTION, SOLUTION INTRAMUSCULAR; INTRAVENOUS at 13:40

## 2023-04-30 RX ADMIN — ONDANSETRON 4 MG: 2 INJECTION INTRAMUSCULAR; INTRAVENOUS at 20:45

## 2023-04-30 RX ADMIN — SODIUM CHLORIDE, POTASSIUM CHLORIDE, SODIUM LACTATE AND CALCIUM CHLORIDE 75 ML/HR: 600; 310; 30; 20 INJECTION, SOLUTION INTRAVENOUS at 08:38

## 2023-04-30 RX ADMIN — MORPHINE SULFATE 2 MG: 2 INJECTION, SOLUTION INTRAMUSCULAR; INTRAVENOUS at 20:45

## 2023-04-30 RX ADMIN — HYDROCODONE BITARTRATE AND ACETAMINOPHEN 1 TABLET: 7.5; 325 TABLET ORAL at 12:59

## 2023-04-30 RX ADMIN — MORPHINE SULFATE 2 MG: 2 INJECTION, SOLUTION INTRAMUSCULAR; INTRAVENOUS at 08:38

## 2023-04-30 NOTE — PROGRESS NOTES
General Surgery Progress Note    Name: Anthony Moss ADMIT: 2023   : 1955  PCP: Provider, No Known    MRN: [id number] LOS: 2 days   AGE/SEX: 67 y.o. male  ROOM: [address] [place] [address]    Chief Complaint   Patient presents with   • Suprapubic catherter problem     Sent by urologist, worried about sepsis     Subjective     No new issues or complaints, resting comfortably    Objective     Scheduled Medications:   cefepime, 2 g, Intravenous, Q12H  metroNIDAZOLE, 500 mg, Intravenous, Q8H  sodium chloride, 10 mL, Intravenous, Q12H        Active Infusions:  lactated ringers, 75 mL/hr, Last Rate: 75 mL/hr (23 0838)  Pharmacy to Dose Cefepime,   Pharmacy to dose vancomycin,         As Needed Medications:  •  HYDROcodone-acetaminophen  •  hydrOXYzine  •  Morphine  •  nitroglycerin  •  ondansetron **OR** ondansetron  •  Pharmacy to Dose Cefepime  •  Pharmacy to dose vancomycin  •  [COMPLETED] Insert Peripheral IV **AND** sodium chloride  •  sodium chloride  •  sodium chloride  •  Vancomycin Pharmacy Intermittent/Pulse Dosing    Vital Signs  Vital Signs Patient Vitals for the past 24 hrs:   BP Temp Temp src Pulse Resp SpO2 Weight   23 0832 103/71 -- -- 94 16 97 % --   23 0439 109/72 98.6 °F (37 °C) Oral 96 (!) 29 97 % --   23 0015 98/66 98.6 °F (37 °C) Oral 96 14 97 % 85.5 kg (188 lb 7.9 oz)   23 1951 103/61 98.7 °F (37.1 °C) Oral 99 18 98 % --   23 1559 96/64 -- -- 92 -- 99 % --   23 1208 106/75 98.4 °F (36.9 °C) -- 95 18 97 % --     I/O:  I/O last 3 completed shifts:  In: 660 [P.O.:660]  Out: 2800 [Urine:2800]    Physical Exam:  Physical Exam  Constitutional:       General: He is not in acute distress.     Appearance: Normal appearance. He is not ill-appearing.   HENT:      Head: Normocephalic and atraumatic.      Right Ear: External ear normal.      Left Ear: External ear normal.   Eyes:      Extraocular Movements: Extraocular movements intact.      Conjunctiva/sclera:  Conjunctivae normal.   Cardiovascular:      Rate and Rhythm: Normal rate and regular rhythm.   Pulmonary:      Effort: Pulmonary effort is normal. No respiratory distress.   Abdominal:      General: There is no distension.      Palpations: Abdomen is soft.      Tenderness: There is no abdominal tenderness.   Musculoskeletal:         General: No swelling or deformity.   Skin:     General: Skin is warm and dry.   Neurological:      Mental Status: He is alert and oriented to person, place, and time. Mental status is at baseline.         Results Review:     CBC    Results from last 7 days   Lab Units 04/30/23  0849 04/29/23  0417 04/28/23  1438   WBC 10*3/mm3 9.70 10.90* 13.00*   HEMOGLOBIN g/dL 8.1* 7.7* 8.7*   PLATELETS 10*3/mm3 199 204 270     BMP   Results from last 7 days   Lab Units 04/30/23  0849 04/29/23  0417 04/28/23  1438   SODIUM mmol/L 143 140 140   POTASSIUM mmol/L 3.7 3.8 4.3   CHLORIDE mmol/L 110* 108* 105   CO2 mmol/L 21.0* 21.0* 22.0   BUN mg/dL 35* 45* 50*   CREATININE mg/dL 2.01* 2.20* 2.39*   GLUCOSE mg/dL 140* 100* 131*     Radiology(recent) CT Abdomen Pelvis Without Contrast    Result Date: 4/28/2023  Impression: 1. Postsurgical changes of cystoprostatectomy with bilateral nephroureteral stent placement with ileal conduit and right lower quadrant urostomy. Moderate left hydronephrosis. No hydronephrosis on right. 2. Postsurgical changes at midline abdominal wall with multiple small foci of subcutaneous air. No organized fluid collection or abscess. No recurrent bowel herniation. 3. Fluid and air-filled mildly dilated proximal small bowel loops measuring up to 4 cm could relate to postoperative ileus, low-grade partial obstruction difficult to exclude. No discrete transition point. 4. Development of new sclerotic osseous lesions most concerning for osseous metastases. 5. Additional incidental chronic findings above. Electronically Signed: Kuldip Daniels  4/28/2023 6:02 PM EDT  Workstation ID:  [id number]      I reviewed the patient's new clinical results.    Assessment & Plan       Abdominal pain, unspecified abdominal location      67-year-old gentleman status post takeback for fascial dehiscence and closure of the abdomen by Dr. Witt.  Patient readmitted with drainage from his midline incision.  He had a wound VAC on at his rehab facility.  On exam today he has some purulent drainage from his incision his fascia is intact.  CT abdomen and pelvis without fascial defect.  Recommend switching to wet-to-dry dressing changes twice daily for few days until wound is cleaned up before placing wound VAC.  No plan for surgical intervention at this time.  Okay for diet from my standpoint.    Update: Tolerating dressing changes, labs improving.  Dr. Witt to return tomorrow.        This note was created using Dragon Voice Recognition software.    Bob Hall MD  04/30/23  10:41 EDT

## 2023-04-30 NOTE — PROGRESS NOTES
"Pharmacy Antimicrobial Dosing Service    Subjective:  Anthony Moss is a 67 y.o.male admitted with abdominal pain. Pharmacy has been consulted to dose Vancomycin for possible intra-abdominal infection. Patient recently admitted to [facility] 3/28 - 4/20 after presenting for planned radical cystectomy and ileal conduit urinary diversion which was complicated by wound dehiscence requiring exlap and repair. ID followed last admission for enterobacter cloacae intraop culture treated with 7 days of Zosyn (also grew corynebacterium but thought to be a contaminant). Patient presented this admission with abdominal pain, copious amounts of pus in colostomy bag, and malodorous abdominal wound. ID consulted 4/29 - will need 2 weeks IV abx pending culture results.     PMH: Bladder cancer, post-op ileus requiring TPN during previous admission (not sent home on TPN), neurogenic bladder, rest as per HPI     4/29 Wound Abd: GPC + GNR (P)       Assessment/Plan    1. Day #3 Vancomycin: Pulse dosing d/t renal dysfxn. CARLOS ALBERTO - baseline ~1.2 - 1.4 mg/dL. Last dose 1250 mg (~15 mg/kg ABW) IV x1 yesterday. Random today ~23 hrs post-dose = 18.7 mcg/mL. Will re-dose with another 1250 mg IV x1 today and order random with AM labs tomorrow as SCr still indicative of CARLOS ALBERTO. Plan to re-dose when level <20 mcg/mL.     2. Day #3 Cefepime: 2 gm IV q12h for estCrCl 30-59 mL/min.    3. Day #3 Metronidazole: 500 mg IV q8h.     Will continue to monitor drug levels, renal function, culture and sensitivities, and patient clinical status.       Objective:  Relevant clinical data and objective history reviewed:  188 cm (74\")   85.5 kg (188 lb 7.9 oz)   Ideal body weight: 82.2 kg (181 lb 3.5 oz)  Adjusted ideal body weight: 83.5 kg (184 lb 2.1 oz)  Body mass index is 24.2 kg/m².    Results from last 7 days   Lab Units 04/30/23  0849 04/29/23 0417   VANCOMYCIN RM mcg/mL 18.70 18.10     Results from last 7 days   Lab Units 04/30/23  0849 04/29/23  0417 04/28/23  1438 "   CREATININE mg/dL 2.01* 2.20* 2.39*     Estimated Creatinine Clearance: 44.3 mL/min (A) (by C-G formula based on SCr of 2.01 mg/dL (H)).  I/O last 3 completed shifts:  In: 660 [P.O.:660]  Out: 2800 [Urine:2800]    Results from last 7 days   Lab Units 04/30/23  0849 04/29/23  0417 04/28/23  1438   WBC 10*3/mm3 9.70 10.90* 13.00*     Temperature    04/29/23 1951 04/30/23 0015 04/30/23 0439   Temp: 98.7 °F (37.1 °C) 98.6 °F (37 °C) 98.6 °F (37 °C)     Baseline culture/source/susceptibility:  Microbiology Results (last 10 days)       Procedure Component Value - Date/Time    MRSA Screen, PCR (Inpatient) - Swab, Nares [[id number]]  (Normal) Collected: 04/29/23 1238    Lab Status: Final result Specimen: Swab from Nares Updated: 04/29/23 1448     MRSA PCR No MRSA Detected    Narrative:      The negative predictive value of this diagnostic test is high and should only be used to consider de-escalating anti-MRSA therapy. A positive result may indicate colonization with MRSA and must be correlated clinically.    Wound Culture - Wound, Abdominal Cavity [[id number]]  (Abnormal) Collected: 04/29/23 0428    Lab Status: Preliminary result Specimen: Wound from Abdominal Cavity Updated: 04/30/23 1017     Wound Culture Moderate growth (3+) Gram Positive Cocci      Rare Gram Negative Bacilli     Gram Stain Many (4+) WBCs per low power field      Rare (1+) Gram negative bacilli    Urine Culture - Urine, Urine, Clean Catch [[id number]] Collected: 04/28/23 1648    Lab Status: Final result Specimen: Urine, Clean Catch Updated: 04/29/23 1459     Urine Culture 50,000 CFU/mL Mixed Nidia Isolated    Narrative:      Specimen contains mixed organisms of questionable pathogenicity suggestive of contamination. If symptoms persist, suggest recollection.  Colonization of the urinary tract without infection is common. Treatment is discouraged unless the patient is symptomatic, pregnant, or undergoing an invasive urologic procedure.    Blood Culture -  Blood, Arm, Left [[id number]]  (Normal) Collected: 04/28/23 1538    Lab Status: Preliminary result Specimen: Blood from Arm, Left Updated: 04/29/23 1545     Blood Culture No growth at 24 hours    Blood Culture - Blood, Arm, Right [[id number]]  (Normal) Collected: 04/28/23 1438    Lab Status: Preliminary result Specimen: Blood from Arm, Right Updated: 04/29/23 1445     Blood Culture No growth at 24 hours            Anti-Infectives (From admission, onward)      Ordered     Dose/Rate Route Frequency Start Stop    04/29/23 0849  Vancomycin HCl 1,250 mg in sodium chloride 0.9 % 250 mL IVPB        Ordering Provider: Ede Mcdaniel DO    1,250 mg Intravenous Once 04/29/23 0945 04/29/23 0944    04/1955  cefepime 2 gm IVPB in 100 ml NS (MBP)        Ordering Provider: Ede Mcdaniel DO    2 g  over 30 Minutes Intravenous Every 12 Hours 04/29/23 0700 05/04/23 0659    04/1955  Vancomycin HCl 1,250 mg in sodium chloride 0.9 % 250 mL IVPB        Ordering Provider: Ede Mcdaniel DO    1,250 mg Intravenous Once 04/28/23 2100 04/28/23 2125 04/28/23 1952  metroNIDAZOLE (FLAGYL) IVPB 500 mg        Ordering Provider: Ede Mcdaniel DO    500 mg  over 30 Minutes Intravenous Every 8 Hours 04/28/23 2000 05/05/23 1959 04/1955  Vancomycin Pharmacy Intermittent/Pulse Dosing        Ordering Provider: Ede Mcdaniel DO     Does not apply As Needed 04/28/23 1954 05/03/23 1953 04/28/23 1952  Pharmacy to dose vancomycin        Ordering Provider: dEe Mcdaniel DO     Does not apply Continuous PRN 04/28/23 1952 05/03/23 1951 04/28/23 1952  Pharmacy to Dose Cefepime        Ordering Provider: Ede Mcdaniel DO     Does not apply Continuous PRN 04/28/23 1952 05/03/23 1951 04/28/23 1707  cefepime 2 gm IVPB in 100 ml NS (MBP)        Ordering Provider: Timothy Baires DO    2 g  over 30 Minutes Intravenous Once 04/28/23 1709 04/28/23 1854            Olivia Anderson, PharmD  04/30/23 10:51 EDT

## 2023-04-30 NOTE — PLAN OF CARE
Goal Outcome Evaluation:  Plan of Care Reviewed With: patient        Progress: improving  Outcome Evaluation: Patient 66 yo male admitted to the hospital with the complaint of abdominal pain and malodorous pus like discharge. Recent Dx includes Abdominal Wound infection , UTI, Bladder cancer,  Osseous mets, Hydronephrosis left. At baseline, patient resides alone in a house and is independent in mobility and ADLs without using AD. His friends provide transportation when needed.  As per today's evaluation, patient is Mod Ind in Bed Mob, required CGA for transfers and Min A for ambulation of around 30ft. PT recommend SNF for rehab at d/c to return to PLOF.

## 2023-04-30 NOTE — PROGRESS NOTES
Wound infxn s/p RC/IC for advanced prostate cancer  CARLOS ALBERTO with left hydro  Bone lesion    AFVSS  wound with decreased purulence from inferior portion of fascia    Cr 2.2 (2.4)    PSA 0.4    Plan: cont abx  Cont wet to dry with plan for later vac - appreciate GNS  Bone lesion W/U - per onc, plan biopsy.  PSA is very low, but with given poor differentiation of the prostate cancer, we can still see mets with a low PSA,  Appreciate onc input

## 2023-04-30 NOTE — PLAN OF CARE
Goal Outcome Evaluation:  Plan of Care Reviewed With: patient           Outcome Evaluation: Pt is a 67 y.o. M adm 04/2823 with c/o abd pain. Pt is s/p radical cystectomy and ileal conduit urinary diversion on 3/28 complicated by wound dehiscence requirng ex-lap and repair on  04/06/23. PMH: prostatic acinar adenocarcinoma, osseous metastatic disease, ARF, ETOH. Pt dx with abd wound inf, possible UTI & anemia. Prior to sx in March, pt  lived home alone & was ind with ADLs & amb. He lived in 1-story home with basement that he does not need to access very often. He was adm from SNF where he was receiving rehab following his previous sx. Pt's LTG is to return home as before. Upon eval, pt is A&O X4. He required min A for sup>sit transfer with v.c. for abd prec/log rolling tech. He requires min A for LB ADLs & CGA for ADL transfers. He has impaired standing balance & dec strength & activity tolerance especially for standing tasks. OT will continue to follow to tx for aforementioned deficits & recommend SNF upon discharge for continued rehab to prepare for goal of eventually returning home ind.
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
## 2023-04-30 NOTE — PROGRESS NOTES
[facility] Medicine Services Daily Progress Note    Patient Name: Anthony Moss  : 1955  MRN: [id number]  Primary Care Physician:  Provider, No Known  Date of admission: 2023      Subjective      Chief Complaint: Abdominal pain    Subjective  Patient still having some abdominal discomfort but better, denies for any other active complaint.    ROS       Objective      Vitals:   Temp:  [98.6 °F (37 °C)-98.7 °F (37.1 °C)] 98.6 °F (37 °C)  Heart Rate:  [92-99] 97  Resp:  [14-29] 16  BP: ()/(56-72) 100/64    Physical Exam  Vitals and nursing note reviewed.   Constitutional:       General: He is not in acute distress.     Appearance: Normal appearance. He is well-developed. He is not ill-appearing, toxic-appearing or diaphoretic.   HENT:      Head: Normocephalic and atraumatic.      Right Ear: Ear canal and external ear normal.      Left Ear: Ear canal and external ear normal.      Nose: Nose normal. No congestion or rhinorrhea.      Mouth/Throat:      Mouth: Mucous membranes are moist.      Pharynx: No oropharyngeal exudate.   Eyes:      General: No scleral icterus.        Right eye: No discharge.         Left eye: No discharge.      Extraocular Movements: Extraocular movements intact.      Conjunctiva/sclera: Conjunctivae normal.      Pupils: Pupils are equal, round, and reactive to light.   Neck:      Thyroid: No thyromegaly.      Vascular: No carotid bruit or JVD.      Trachea: No tracheal deviation.   Cardiovascular:      Rate and Rhythm: Normal rate and regular rhythm.      Pulses: Normal pulses.      Heart sounds: Normal heart sounds. No murmur heard.    No friction rub. No gallop.   Pulmonary:      Effort: Pulmonary effort is normal. No respiratory distress.      Breath sounds: Normal breath sounds. No stridor. No wheezing, rhonchi or rales.   Chest:      Chest wall: No tenderness.   Abdominal:      General: Bowel sounds are normal. There is no distension.       Palpations: Abdomen is soft. There is no mass.      Tenderness: There is no abdominal tenderness. There is no guarding or rebound.      Hernia: No hernia is present.   Musculoskeletal:         General: No swelling, tenderness, deformity or signs of injury. Normal range of motion.      Cervical back: Normal range of motion and neck supple. No rigidity. No muscular tenderness.      Right lower leg: No edema.      Left lower leg: No edema.   Lymphadenopathy:      Cervical: No cervical adenopathy.   Skin:     General: Skin is warm and dry.      Coloration: Skin is not jaundiced or pale.      Findings: No bruising, erythema or rash.   Neurological:      General: No focal deficit present.      Mental Status: He is alert and oriented to person, place, and time. Mental status is at baseline.      Cranial Nerves: No cranial nerve deficit.      Sensory: No sensory deficit.      Motor: No weakness or abnormal muscle tone.      Coordination: Coordination normal.   Psychiatric:         Mood and Affect: Mood normal.         Behavior: Behavior normal.         Thought Content: Thought content normal.         Judgment: Judgment normal.               Result Review    Result Review:  I have personally reviewed the results from the time of this admission to 4/30/2023 13:09 EDT and agree with these findings:  [x]  Laboratory  [x]  Microbiology  [x]  Radiology  [x]  EKG/Telemetry   []  Cardiology/Vascular   []  Pathology  []  Old records  []  Other:  Most notable findings include:     Wounds (last 24 hours)     LDA Wound     Row Name 04/30/23 0832 04/30/23 0633 04/29/23 2037       Wound 04/06/23 2243 abdomen Incision    Wound - Properties Group Placement Date: 04/06/23  -PH Placement Time: 2243  -PH Present on Hospital Admission: Y  -PH Location: abdomen  -PH Primary Wound Type: Incision  -PH    Dressing Appearance -- moist drainage  -DD dry;intact  -VS    Closure STEFF  -VS -- STEFF  -VS    Base dressing in place, unable to visualize  -VS  dressing in place, unable to visualize  -DD dressing in place, unable to visualize  -VS    Drainage Characteristics/Odor -- serosanguineous  -DD --  -VS    Drainage Amount -- copious  -DD --  -VS    Care, Wound -- cleansed with;sterile normal saline  -DD --    Dressing Care -- dressing changed;gauze, wet-to-moist;abdominal pad  -DD --    Retired Wound - Properties Group Placement Date: 04/06/23  -PH Placement Time: 2243  -PH Present on Hospital Admission: Y  -PH Location: abdomen  -PH Primary Wound Type: Incision  -PH    Retired Wound - Properties Group Date first assessed: 04/06/23  -PH Time first assessed: 2243  -PH Present on Hospital Admission: Y  -PH Location: abdomen  -PH Primary Wound Type: Incision  -PH    Row Name 04/29/23 1631             Wound 04/06/23 2243 abdomen Incision    Wound - Properties Group Placement Date: 04/06/23  -PH Placement Time: 2243  -PH Present on Hospital Admission: Y  -PH Location: abdomen  -PH Primary Wound Type: Incision  -PH    Dressing Appearance dry;intact  -VS      Base red;moist;slough  -VS      Drainage Characteristics/Odor serosanguineous  -VS      Drainage Amount copious  -VS      Care, Wound cleansed with;sterile normal saline  -VS      Dressing Care dressing changed;gauze, [id number];abdominal pad  -VS      Retired Wound - Properties Group Placement Date: 04/06/23  -PH Placement Time: 2243  -PH Present on Hospital Admission: Y  -PH Location: abdomen  -PH Primary Wound Type: Incision  -PH    Retired Wound - Properties Group Date first assessed: 04/06/23  -PH Time first assessed: 2243  -PH Present on Hospital Admission: Y  -PH Location: abdomen  -PH Primary Wound Type: Incision  -PH          User Key  (r) = Recorded By, (t) = Taken By, (c) = Cosigned By    Initials Name Provider Type    PH Carolyn Marie RN Registered Nurse    VS Sturgeon, Valerie, LPN Licensed Nurse    Joanne France LPN Licensed Nurse                  Assessment & Plan      Brief Patient  Summary:  Anthony Moss is a 67 y.o. male who       cefepime, 2 g, Intravenous, Q12H  metroNIDAZOLE, 500 mg, Intravenous, Q8H  sodium chloride, 10 mL, Intravenous, Q12H       lactated ringers, 75 mL/hr, Last Rate: 75 mL/hr (04/30/23 0838)  Pharmacy to Dose Cefepime,   Pharmacy to dose vancomycin,          Active Hospital Problems:  Active Hospital Problems    Diagnosis    • **Abdominal pain, unspecified abdominal location      Plan:     #Abdominal Wound infection    - s/p Radical cystectomy and ileal conduit urinary diversion,   complicated by wound dehiscence    - s/p ex-lap with repair of wound dehiscence on 4/6/23    - hospitalized at [facility] from 03/28-4/19/23    - abdominal pain and malodorous pus like discharge    - Due to recent hospital stay, broad spectrum abx coverage    - Vancomycin, pharm to dose    - Cefepime, pharm to dose    - Flagyl 500mg IV q8h    - blood cultures    - wound cultures    - general surgery consulted ... tolerating regular diet.    - WOCN    - LR @ 50 ml per hour.    - pain control    - pt/ot    -CM  - consulted ID .... advised current antibiotics.     #UTI    - UA suggests UTI    - urine culture     - blood cultures    - wbc 13.0, trend     - lactate 0.8    - Rocephin 2g IV daily     #Bladder cancer  #Osseous mets    - s/p surgery    - CT a/p shows postsurgical changes of cystoprostatectomy with bilateral nephroureteral stent placement with ileal conduit and RLQ urostomy.  Mode left hydronephrosis. Development of new scerotic osseous lesions concerning for bone mets    - heme/onc consulted    - pain control     #Ileus vs SBO - possible    - CT a/p shows possible post-op ileus vs obstruction    - NPO    - LR @ 75/hr    - surgery contuls     #CARLOS ALBERTO on CKD continue to improve.  #Hydronephrosis, left    - Cr 2.39 on admission, base is around 1.3    - suspect 2/2 hydro    - evident on CT a/p    - urology to evaluate    - LR @ 75/hr     #Anemia   - hgb 8.7 on admission, base around 9.0    -  check iron and ferritin    - no overt bleeding, follow labs     #Anxiety    - Vistaril PRN        DVT prophylaxis: SCDs      DVT prophylaxis:  Mechanical DVT prophylaxis orders are present.    CODE STATUS:    Code Status (Patient has no pulse and is not breathing): CPR (Attempt to Resuscitate)  Medical Interventions (Patient has pulse or is breathing): Full Support      Disposition:  I expect patient to be discharged as per clinical course..    This patient has been examined wearing appropriate Personal Protective Equipment and discussed with hospital infection control department. 04/30/23      Electronically signed by Andrey Valdez MD, 04/30/23, 13:09 EDT.  [facility] Hospitalist Team

## 2023-04-30 NOTE — PLAN OF CARE
Goal Outcome Evaluation:              Outcome Evaluation: pt requested pain medicine a couple of times throughout shift; worked with PT/OT, sat up in chair; dressing changed as ordered; remains on IV fluids and abx; continue to monitor

## 2023-04-30 NOTE — PLAN OF CARE
Goal Outcome Evaluation:              Outcome Evaluation: Pt up and down during the night.   Complaints of pain at the start of shift.  Pt receiving IV antibiotics.  stable at this time.
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
## 2023-05-01 PROBLEM — E44.0 MODERATE MALNUTRITION: Status: ACTIVE | Noted: 2023-05-01

## 2023-05-01 LAB
ANION GAP SERPL CALCULATED.3IONS-SCNC: 12 MMOL/L (ref 5–15)
BUN SERPL-MCNC: 25 MG/DL (ref 8–23)
BUN/CREAT SERPL: 14.7 (ref 7–25)
CALCIUM SPEC-SCNC: 8.9 MG/DL (ref 8.6–10.5)
CHLORIDE SERPL-SCNC: 112 MMOL/L (ref 98–107)
CO2 SERPL-SCNC: 20 MMOL/L (ref 22–29)
CREAT SERPL-MCNC: 1.7 MG/DL (ref 0.76–1.27)
DEPRECATED RDW RBC AUTO: 54.3 FL (ref 37–54)
EGFRCR SERPLBLD CKD-EPI 2021: 44.2 ML/MIN/1.73
ERYTHROCYTE [DISTWIDTH] IN BLOOD BY AUTOMATED COUNT: 18.4 % (ref 12.3–15.4)
GLUCOSE SERPL-MCNC: 101 MG/DL (ref 65–99)
HCT VFR BLD AUTO: 25.1 % (ref 37.5–51)
HGB BLD-MCNC: 7.9 G/DL (ref 13–17.7)
LAB AP CASE REPORT: NORMAL
MCH RBC QN AUTO: 26.6 PG (ref 26.6–33)
MCHC RBC AUTO-ENTMCNC: 31.5 G/DL (ref 31.5–35.7)
MCV RBC AUTO: 84.6 FL (ref 79–97)
PATH REPORT.FINAL DX SPEC: NORMAL
PLATELET # BLD AUTO: 177 10*3/MM3 (ref 140–450)
PMV BLD AUTO: 8.1 FL (ref 6–12)
POTASSIUM SERPL-SCNC: 3.6 MMOL/L (ref 3.5–5.2)
RBC # BLD AUTO: 2.97 10*6/MM3 (ref 4.14–5.8)
SODIUM SERPL-SCNC: 144 MMOL/L (ref 136–145)
VANCOMYCIN SERPL-MCNC: 25.7 MCG/ML (ref 5–40)
WBC NRBC COR # BLD: 9.9 10*3/MM3 (ref 3.4–10.8)

## 2023-05-01 PROCEDURE — 99232 SBSQ HOSP IP/OBS MODERATE 35: CPT | Performed by: INTERNAL MEDICINE

## 2023-05-01 PROCEDURE — 0 CEFEPIME PER 500 MG: Performed by: STUDENT IN AN ORGANIZED HEALTH CARE EDUCATION/TRAINING PROGRAM

## 2023-05-01 PROCEDURE — 80048 BASIC METABOLIC PNL TOTAL CA: CPT | Performed by: HOSPITALIST

## 2023-05-01 PROCEDURE — 99024 POSTOP FOLLOW-UP VISIT: CPT | Performed by: SURGERY

## 2023-05-01 PROCEDURE — 85027 COMPLETE CBC AUTOMATED: CPT | Performed by: HOSPITALIST

## 2023-05-01 PROCEDURE — 80202 ASSAY OF VANCOMYCIN: CPT | Performed by: INTERNAL MEDICINE

## 2023-05-01 RX ORDER — HYDROXYZINE HYDROCHLORIDE 25 MG/1
25 TABLET, FILM COATED ORAL 4 TIMES DAILY PRN
Status: DISCONTINUED | OUTPATIENT
Start: 2023-05-01 | End: 2023-05-05 | Stop reason: HOSPADM

## 2023-05-01 RX ADMIN — Medication 10 ML: at 20:48

## 2023-05-01 RX ADMIN — METRONIDAZOLE 500 MG: 500 INJECTION, SOLUTION INTRAVENOUS at 03:04

## 2023-05-01 RX ADMIN — TAMSULOSIN HYDROCHLORIDE 0.4 MG: 0.4 CAPSULE ORAL at 07:57

## 2023-05-01 RX ADMIN — HYDROCODONE BITARTRATE AND ACETAMINOPHEN 1 TABLET: 7.5; 325 TABLET ORAL at 11:56

## 2023-05-01 RX ADMIN — CEFEPIME 2 G: 2 INJECTION, POWDER, FOR SOLUTION INTRAVENOUS at 07:57

## 2023-05-01 RX ADMIN — METRONIDAZOLE 500 MG: 500 INJECTION, SOLUTION INTRAVENOUS at 11:56

## 2023-05-01 RX ADMIN — METRONIDAZOLE 500 MG: 500 INJECTION, SOLUTION INTRAVENOUS at 20:48

## 2023-05-01 RX ADMIN — Medication 10 ML: at 07:57

## 2023-05-01 RX ADMIN — CEFEPIME 2 G: 2 INJECTION, POWDER, FOR SOLUTION INTRAVENOUS at 18:05

## 2023-05-01 RX ADMIN — TAMSULOSIN HYDROCHLORIDE 0.4 MG: 0.4 CAPSULE ORAL at 20:48

## 2023-05-01 NOTE — CASE MANAGEMENT/SOCIAL WORK
Discharge Planning Assessment   [place]     Patient Name: Anthony Moss  MRN: [id number]  Today's Date: 5/1/2023    Admit Date: 4/28/2023    Plan: From [place], ok to return per liaison. Pt's alternative choices pending. Will require precert.   Discharge Needs Assessment       Row Name 05/01/23 1419       Living Environment    People in Home facility resident  [facility] (Rehab)    Current Living Arrangements extended care facility    Potentially Unsafe Housing Conditions none    Primary Care Provided by self;other (see comments)    Provides Primary Care For no one, unable/limited ability to care for self    Family Caregiver if Needed friend(s)    Family Caregiver Names Friends-Maximo, Annita    Quality of Family Relationships helpful;supportive    Able to Return to Prior Arrangements yes       Resource/Environmental Concerns    Resource/Environmental Concerns none    Transportation Concerns none       Transition Planning    Patient/Family Anticipates Transition to inpatient rehabilitation facility    Patient/Family Anticipated Services at Transition skilled nursing    Transportation Anticipated health plan transportation       Discharge Needs Assessment    Readmission Within the Last 30 Days other (see comments)  2 previous admissions: 2/28-3/20, 3/28-4/20    Current Outpatient/Agency/Support Group skilled nursing facility    Equipment Currently Used at Home wheelchair;walker, rolling;colostomy/ostomy supplies;negative pressure wound therapy device    Concerns to be Addressed care coordination/care conferences;discharge planning    Anticipated Changes Related to Illness none    Equipment Needed After Discharge none    Outpatient/Agency/Support Group Needs skilled nursing facility    Discharge Facility/Level of Care Needs nursing facility, skilled    Provided Post Acute Provider List? Yes    Post Acute Provider List Nursing Home    Delivered To Patient    Method of Delivery In person               "     Discharge Plan       Row Name 05/01/23 1448       Plan    Plan From [facility] to return per liaison. Pt's alternative choices pending. Will require precert.    Patient/Family in Agreement with Plan yes    Plan Comments CM reviewed chart and added [facility] to [facility] in basket. Message sent to liaison Maia who verified pt was there for rehab, good to return with precert. CM met with pt at bedside and he verbalized being upset with his care at facility. His complaints include the food and \"feeling ignored or not checked on.\" Per chart review, pt had previous hosptial stay 2/28-3/20 and went to [facility]; he reports he also didn't like it there. Provided SNF list for pt to review for alternative choices. ANA Thakkar and RAMEZ notified that pt will require new precert for discharge.                  Continued Care and Services - Admitted Since 4/28/2023       Destination       Service Provider Request Status Selected Services Address Phone Fax Patient Preferred    [facility] Accepted N/A [address] [place] [id number] [phone] --               Expected Discharge Date and Time       Expected Discharge Date Expected Discharge Time    May 3, 2023            Demographic Summary       Row Name 05/01/23 1418       General Information    Admission Type inpatient    Arrived From emergency department    Referral Source admission list    Reason for Consult care coordination/care conference;discharge planning    Preferred Language English       Contact Information    Permission Granted to Share Info With                    Functional Status       Row Name 05/01/23 1419       Functional Status    Usual Activity Tolerance moderate    Current Activity Tolerance moderate       Functional Status, IADL    Medications assistive person    Meal Preparation assistive person    Housekeeping assistive person    Laundry assistive person    Shopping assistive person "                  Case Management Readmission Assessment Note    Case Management Readmission Assessment (all recorded)       Readmission Interview       [facility] Name 05/01/23 1446             Readmission Indications    Is this hospitalization related to the prior hospital diagnosis? Yes      What was the reason you were admitted? Pt with abd wound infection s/p exp lap 4/6.        [facility] Name 05/01/23 1446             Recommendation for rehospitalization    Did you speak with your physician prior to coming to the hospital Yes      If yes, what physician did you speak with? Dr Witt      Who recommended you return to the hospital? Specialist  General Surgeon      Did you seek care elsewhere prior to coming to the hospital? No        [facility] Name 05/01/23 1446             Follow up appointment    Do you have a PCP? No      Did you have an appointment with PCP/specialist after hospitalization within 7 days? No      Did you keep your appointment? No      If you did not keep appointment, why? Other (comment)  No PCP, pt went to SNF        [facility] Name 05/01/23 1446             Medications    Did you have newly prescribed medications at discharge? No      Did you understand the reasons for your medications at discharge and how to take them? No      Did you understand the side effects of your medications? No      Are you taking all of you prescribed medications? No      If not, why? Other (comment)  No medications prescribed        [facility] Name 05/01/23 1446             Discharge Instructions    Did you understand your discharge instructions? Yes      Did your family/caregiver hear your instructions? No      Were you told to eat a special diet? Yes      Did you adhere to the diet? Yes      Were you given a number of someone to call if you had questions or concerns? Yes        Row Name 05/01/23 1446             Index discharge location/services    Where did you go upon discharge? Skilled Nursing Facility  [facility]      Do you have  supportive family or friends in the home? Yes      What services were arranged at discharge? Transportation        Row Name 05/01/23 7993             Discharge Readiness    On a scale of 1-5 (5 being well prepared), how ready were you for discharge 3      Recommendation based on interview Education on diagnosis/self management;Goals of care discussion/advanced care planning                    4/24- wound culture ordered for abdominal wound.    4/26- yellow, milky discharge from stoma. Abx ordered. Orders to contact surgeon's office, call back 4/27 to schedule appt, discontinue wound VAC.     4/28- appt scheduled with surgeon (Urology?), sent to hospital from there.    Megan Naegele, RN     Office Phone: [phone]  Office Cell: [phone]

## 2023-05-01 NOTE — NURSING NOTE
WOCN note:    67 yr old male admitted 4/28/23 with abdominal pain. WOCN consult received for midline abdominal wound and ileal conduit urostomy.     Patient is well known to this service from his previous admission from 3/28/23-4/19/23. He is s/p cystectomy with ileal conduit urostomy placement on 3/28/23 and exploratory laparotomy with repair of wound dehiscence on 4/6/23.     Midline abdominal wound cleansed and soaked with Vashe gauze and measures 7x3x4.5cm. The wound bed is beefy red and granular with healing at the edges. There is a 1x1cm area at the distal incision line with a depth of 4.5cm with visible fascia. From this depth there is undermining and a tunnel at 3:00. There is pooling of purulent exudate in the base of the wound. There is no erythema, induration or odor noted. One piece of black granufoam was wrapped in fenestrated silicone and packed into the wound. A second piece of foam was placed at the surface to support the trac pad with drape applied to all intact skin. The dressing was attached to 125mmHg continuous negative pressure with good seal obtained.     The RLQ urostomy pouch was changed. The eva-stomal skin was cleansed and dried. Erythema was noted where the previous pouch had been cut too large. An Adapt barrier ring was cut and placed in creases at 3:00 and 9:00 and placed around the stoma to protect the skin. A Leslee Ceramide soft convex barrier was applied with a belt. The urostomy pouch was reattached to bedside drainage. Patient dozed through the majority of the procedure. We will continue to follow.

## 2023-05-01 NOTE — PROGRESS NOTES
[facility] Daily Progress Note    Patient Name: Anthony Moss  : 1955  MRN: [id number]  Primary Care Physician:  Maria Guadalupe, No Known  Date of admission: 2023      Subjective      Chief Complaint: Abdominal pain    Subjective  2023; patient still having some abdominal discomfort but better, denies for any other active complaint.  Resting in the bed, no family at bedside, spoke with the RN.    ROS       Objective      Vitals:   Temp:  [97.6 °F (36.4 °C)-97.9 °F (36.6 °C)] 97.9 °F (36.6 °C)  Heart Rate:  [] 103  Resp:  [15-24] 24  BP: ()/(59-65) 99/61    Physical Exam  Vitals and nursing note reviewed.   Constitutional:       General: He is not in acute distress.     Appearance: Normal appearance. He is well-developed. He is not ill-appearing, toxic-appearing or diaphoretic.   HENT:      Head: Normocephalic and atraumatic.      Right Ear: Ear canal and external ear normal.      Left Ear: Ear canal and external ear normal.      Nose: Nose normal. No congestion or rhinorrhea.      Mouth/Throat:      Mouth: Mucous membranes are moist.      Pharynx: No oropharyngeal exudate.   Eyes:      General: No scleral icterus.        Right eye: No discharge.         Left eye: No discharge.      Extraocular Movements: Extraocular movements intact.      Conjunctiva/sclera: Conjunctivae normal.      Pupils: Pupils are equal, round, and reactive to light.   Neck:      Thyroid: No thyromegaly.      Vascular: No carotid bruit or JVD.      Trachea: No tracheal deviation.   Cardiovascular:      Rate and Rhythm: Normal rate and regular rhythm.      Pulses: Normal pulses.      Heart sounds: Normal heart sounds. No murmur heard.    No friction rub. No gallop.   Pulmonary:      Effort: Pulmonary effort is normal. No respiratory distress.      Breath sounds: Normal breath sounds. No stridor. No wheezing, rhonchi or rales.   Chest:      Chest wall: No tenderness.   Abdominal:       Tenderness: There is no rebound.   Musculoskeletal:         General: No swelling, tenderness, deformity or signs of injury. Normal range of motion.      Cervical back: Normal range of motion and neck supple. No rigidity. No muscular tenderness.      Right lower leg: No edema.      Left lower leg: No edema.   Lymphadenopathy:      Cervical: No cervical adenopathy.   Skin:     General: Skin is warm and dry.      Coloration: Skin is not jaundiced or pale.      Findings: No bruising, erythema or rash.   Neurological:      General: No focal deficit present.      Mental Status: He is alert and oriented to person, place, and time. Mental status is at baseline.      Cranial Nerves: No cranial nerve deficit.      Sensory: No sensory deficit.      Motor: No weakness or abnormal muscle tone.      Coordination: Coordination normal.   Psychiatric:         Mood and Affect: Mood normal.         Behavior: Behavior normal.         Thought Content: Thought content normal.         Judgment: Judgment normal.        Abdomen;Soft,nontender,  Wound lower portion of fascia with approx 2-3 cm defect with purulent drainage remainder of wound granulating well, Urostomy p/p/p           Result Review    Result Review:  I have personally reviewed the results from the time of this admission to 5/1/2023 15:22 EDT and agree with these findings:  [x]  Laboratory  [x]  Microbiology  [x]  Radiology  [x]  EKG/Telemetry   []  Cardiology/Vascular   []  Pathology  []  Old records  []  Other:  Most notable findings include:     Wounds (last 24 hours)     LDA Wound     Row Name 05/01/23 1156 05/01/23 0758 04/30/23 2115       Wound 04/06/23 2243 abdomen Incision    Wound - Properties Group Placement Date: 04/06/23  -PH Placement Time: 2243  -PH Present on Hospital Admission: Y  -PH Location: abdomen  -PH Primary Wound Type: Incision  -PH    Dressing Appearance dry;intact;no drainage  -EH dry;intact;no drainage  -EH moist drainage  -VS    Base --  clean;pink;yellow  -EH pink;slough;yellow  -VS    Drainage Characteristics/Odor -- purulent;creamy;yellow  -EH purulent;brown;creamy  with foul odor  -VS    Drainage Amount -- -- copious  -VS    Care, Wound -- cleansed with;sterile normal saline  -EH cleansed with;sterile normal saline  -VS    Dressing Care -- dressing changed;gauze, wet-to-moist;abdominal pad  -EH dressing changed;gauze, [id number];abdominal pad  foam tape  -VS    Retired Wound - Properties Group Placement Date: 04/06/23  -PH Placement Time: 2243  -PH Present on Hospital Admission: Y  -PH Location: abdomen  -PH Primary Wound Type: Incision  -PH    Retired Wound - Properties Group Date first assessed: 04/06/23  -PH Time first assessed: 2243  -PH Present on Hospital Admission: Y  -PH Location: abdomen  -PH Primary Wound Type: Incision  -PH          User Key  (r) = Recorded By, (t) = Taken By, (c) = Cosigned By    Initials Name Provider Type     Carolyn Marie, RN Registered Nurse    VS Sturgeon, Valerie, LPN Licensed Nurse     Ekaterina Shelton RN Registered Nurse            CT Abdomen Pelvis Without Contrast    Result Date: 4/28/2023  Impression: 1. Postsurgical changes of cystoprostatectomy with bilateral nephroureteral stent placement with ileal conduit and right lower quadrant urostomy. Moderate left hydronephrosis. No hydronephrosis on right. 2. Postsurgical changes at midline abdominal wall with multiple small foci of subcutaneous air. No organized fluid collection or abscess. No recurrent bowel herniation. 3. Fluid and air-filled mildly dilated proximal small bowel loops measuring up to 4 cm could relate to postoperative ileus, low-grade partial obstruction difficult to exclude. No discrete transition point. 4. Development of new sclerotic osseous lesions most concerning for osseous metastases. 5. Additional incidental chronic findings above. Electronically Signed: Kuldip Daniels  4/28/2023 6:02 PM EDT  Workstation ID:  [id number]        Assessment & Plan      Brief Patient Summary:  Anthony Moss is a 67 y.o. male who       cefepime, 2 g, Intravenous, Q12H  metroNIDAZOLE, 500 mg, Intravenous, Q8H  sodium chloride, 10 mL, Intravenous, Q12H  tamsulosin, 0.4 mg, Oral, BID       lactated ringers, 75 mL/hr, Last Rate: 75 mL/hr (05/01/23 0804)  Pharmacy to Dose Cefepime,          Active Hospital Problems:  Active Hospital Problems    Diagnosis    • **Abdominal pain, unspecified abdominal location      Plan:     #Abdominal Wound infection    - s/p Radical cystectomy and ileal conduit urinary diversion,   complicated by wound dehiscence    - s/p ex-lap with repair of wound dehiscence on 4/6/23    - hospitalized at [facility] from 03/28-4/19/23    - abdominal pain and malodorous pus like discharge    - Due to recent hospital stay, broad spectrum abx coverage    - Vancomycin, pharm to dose    - Cefepime, pharm to dose    - Flagyl 500mg IV q8h    - blood cultures x2 no growth to date.    - wound cultures Enterobacter cloacae plus Corynebacterium species    - general surgery consulted ... tolerating regular diet.    - WOCN    - LR @ 50 ml per hour.    - pain control    - pt/ot    -CM  - consulted ID .... advised current antibiotics.     #UTI    - UA suggests UTI    - urine culture 50,000 CFU per mL of mixed elizabeth    - blood cultures x2 no growth to date.    - wbc 13.0, trend     - lactate 0.8    - Rocephin 2g IV daily     #Bladder cancer  #Osseous mets    - s/p surgery    - CT a/p shows postsurgical changes of cystoprostatectomy with bilateral nephroureteral stent placement with ileal conduit and RLQ urostomy.  Mode left hydronephrosis. Development of new scerotic osseous lesions concerning for bone mets    - heme/onc consulted    - pain control     #Ileus vs SBO - possible    - CT a/p shows possible post-op ileus vs obstruction    - NPO    - LR @ 75/hr    - surgery consulted and following.     #CARLOS ALBERTO on CKD continue to improve.  #Hydronephrosis,  left    - Cr 2.39 on admission--> 1.77,, base is around 1.3    - suspect 2/2 hydro    - evident on CT a/p    - urology following    - LR @ 75/hr  --We will consult nephrology.     #Anemia   - hgb 8.7 on admission, base around 9.0    - check iron and ferritin    - no overt bleeding, follow labs     #Anxiety    - Vistaril PRN        DVT prophylaxis: SCDs      DVT prophylaxis:  Mechanical DVT prophylaxis orders are present.    CODE STATUS:    Code Status (Patient has no pulse and is not breathing): CPR (Attempt to Resuscitate)  Medical Interventions (Patient has pulse or is breathing): Full Support      Disposition:  I expect patient to be discharged as per clinical course..    This patient has been examined wearing appropriate Personal Protective Equipment and discussed with hospital infection control department. 05/01/23      Electronically signed by Shira Meadows MD, 05/01/23, 15:22 EDT.  [facility]ist Team

## 2023-05-01 NOTE — PROGRESS NOTES
General Surgery Progress Note    Name: Anthony Moss ADMIT: 2023   : 1955  PCP: Provider, No Known    MRN: [id number] LOS: 3 days   AGE/SEX: 67 y.o. male  ROOM: [address] [place] [address]    Chief Complaint   Patient presents with   • Suprapubic catherter problem     Sent by urologist, worried about sepsis     Subjective     No new issues or complaints, resting comfortably    Objective     Scheduled Medications:   cefepime, 2 g, Intravenous, Q12H  metroNIDAZOLE, 500 mg, Intravenous, Q8H  sodium chloride, 10 mL, Intravenous, Q12H  tamsulosin, 0.4 mg, Oral, BID        Active Infusions:  lactated ringers, 75 mL/hr, Last Rate: 75 mL/hr (23 0804)  Pharmacy to Dose Cefepime,         As Needed Medications:  •  HYDROcodone-acetaminophen  •  hydrOXYzine  •  Morphine  •  nitroglycerin  •  ondansetron **OR** ondansetron  •  Pharmacy to Dose Cefepime  •  sodium chloride  •  sodium chloride    Vital Signs  Vital Signs   Patient Vitals for the past 24 hrs:   BP Temp Temp src Pulse Resp SpO2 Weight   23 1157 99/61 97.9 °F (36.6 °C) Oral 103 24 93 % --   23 0413 97/62 97.7 °F (36.5 °C) Oral 98 17 98 % 85.7 kg (188 lb 15 oz)   23 2347 101/65 97.6 °F (36.4 °C) Axillary 92 20 98 % --   23 2040 106/59 97.8 °F (36.6 °C) Oral 94 15 97 % --     I/O:  I/O last 3 completed shifts:  In: 1640 [P.O.:840; IV Piggyback:800]  Out: 4275 [Urine:4275]    Physical Exam:  Physical Exam  Constitutional:       General: He is not in acute distress.     Appearance: Normal appearance. He is not ill-appearing.   HENT:      Head: Normocephalic and atraumatic.      Right Ear: External ear normal.      Left Ear: External ear normal.   Eyes:      Extraocular Movements: Extraocular movements intact.      Conjunctiva/sclera: Conjunctivae normal.   Cardiovascular:      Rate and Rhythm: Normal rate and regular rhythm.   Pulmonary:      Effort: Pulmonary effort is normal. No respiratory distress.   Abdominal:      General: There  is no distension.      Palpations: Abdomen is soft.      Tenderness: There is no abdominal tenderness.      Comments: WV in place with intact suction and minimal output   Musculoskeletal:         General: No swelling or deformity.   Skin:     General: Skin is warm and dry.   Neurological:      Mental Status: He is alert and oriented to person, place, and time. Mental status is at baseline.         Results Review:     CBC    Results from last 7 days   Lab Units 05/01/23  0727 04/30/23  0849 04/29/23 0417 04/28/23  1438   WBC 10*3/mm3 9.90 9.70 10.90* 13.00*   HEMOGLOBIN g/dL 7.9* 8.1* 7.7* 8.7*   PLATELETS 10*3/mm3 177 199 204 270     BMP   Results from last 7 days   Lab Units 05/01/23 0727 04/30/23  0849 04/29/23 0417 04/28/23  1438   SODIUM mmol/L 144 143 140 140   POTASSIUM mmol/L 3.6 3.7 3.8 4.3   CHLORIDE mmol/L 112* 110* 108* 105   CO2 mmol/L 20.0* 21.0* 21.0* 22.0   BUN mg/dL 25* 35* 45* 50*   CREATININE mg/dL 1.70* 2.01* 2.20* 2.39*   GLUCOSE mg/dL 101* 140* 100* 131*     Radiology(recent) No radiology results for the last day    I reviewed the patient's new clinical results.    Assessment & Plan       Abdominal pain, unspecified abdominal location    Moderate Malnutrition (HCC)    Events of the weekend noted.  Fascia intact, no evidence of abscess.  Patient with bacterial colonization of the midline wound.  Continue antibiotics per infectious disease.  Continue local wound care per wound ostomy nurses  No need to return to the OR for incision and drainage at this time.  Diet as tolerated  Okay for anticoagulation  Please call if we can be of assistance    This note was created using Dragon Voice Recognition software.    Michael Witt MD  05/01/23  19:13 EDT
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
## 2023-05-01 NOTE — PROGRESS NOTES
"  [facility] DAILY PROGRESS NOTE    Patient Identification  Name: Anthony Moss  Age: 67 y.o.  Sex: male  :  1955  MRN: [id number]    Date: 2023             Subjective:  Interval History: Stable    Objective:    Scheduled Meds:cefepime, 2 g, Intravenous, Q12H  metroNIDAZOLE, 500 mg, Intravenous, Q8H  sodium chloride, 10 mL, Intravenous, Q12H  tamsulosin, 0.4 mg, Oral, BID      Continuous Infusions:lactated ringers, 75 mL/hr, Last Rate: 75 mL/hr (23 0804)  Pharmacy to Dose Cefepime,   Pharmacy to dose vancomycin,       PRN Meds:•  HYDROcodone-acetaminophen  •  hydrOXYzine  •  Morphine  •  nitroglycerin  •  ondansetron **OR** ondansetron  •  Pharmacy to Dose Cefepime  •  Pharmacy to dose vancomycin  •  sodium chloride  •  sodium chloride  •  Vancomycin Pharmacy Intermittent/Pulse Dosing    Vital signs in last 24 hours:  Temp:  [97.6 °F (36.4 °C)-97.8 °F (36.6 °C)] 97.7 °F (36.5 °C)  Heart Rate:  [88-98] 98  Resp:  [15-20] 17  BP: ()/(56-65) 97/62    Intake/Output:    Intake/Output Summary (Last 24 hours) at 2023 0919  Last data filed at 2023 0413  Gross per 24 hour   Intake 1160 ml   Output 2300 ml   Net -1140 ml       Exam:  BP 97/62 (BP Location: Left arm, Patient Position: Lying)   Pulse 98   Temp 97.7 °F (36.5 °C) (Oral)   Resp 17   Ht 188 cm (74\")   Wt 85.7 kg (188 lb 15 oz)   SpO2 98%   BMI 24.26 kg/m²     General Appearance:    no distress               Abdomen:    Wound packed   :   Conduit with yellow urine stents in place                Data Review:  All labs (24hrs):   Recent Results (from the past 24 hour(s))   CBC (No Diff)    Collection Time: 23  7:27 AM    Specimen: Blood   Result Value Ref Range    WBC 9.90 3.40 - 10.80 10*3/mm3    RBC 2.97 (L) 4.14 - 5.80 10*6/mm3    Hemoglobin 7.9 (L) 13.0 - 17.7 g/dL    Hematocrit 25.1 (L) 37.5 - 51.0 %    MCV 84.6 79.0 - 97.0 fL    MCH 26.6 26.6 - 33.0 pg    MCHC 31.5 31.5 - 35.7 g/dL    RDW 18.4 (H) 12.3 - 15.4 " %    RDW-SD 54.3 (H) 37.0 - 54.0 fl    MPV 8.1 6.0 - 12.0 fL    Platelets 177 140 - 450 10*3/mm3   Basic Metabolic Panel    Collection Time: 05/01/23  7:27 AM    Specimen: Blood   Result Value Ref Range    Glucose 101 (H) 65 - 99 mg/dL    BUN 25 (H) 8 - 23 mg/dL    Creatinine 1.70 (H) 0.76 - 1.27 mg/dL    Sodium 144 136 - 145 mmol/L    Potassium 3.6 3.5 - 5.2 mmol/L    Chloride 112 (H) 98 - 107 mmol/L    CO2 20.0 (L) 22.0 - 29.0 mmol/L    Calcium 8.9 8.6 - 10.5 mg/dL    BUN/Creatinine Ratio 14.7 7.0 - 25.0    Anion Gap 12.0 5.0 - 15.0 mmol/L    eGFR 44.2 (L) >60.0 mL/min/1.73      Imaging Results (Last 24 Hours)     ** No results found for the last 24 hours. **           Assessment:    Abdominal pain, unspecified abdominal location      Status post cystectomy and ileal conduit for advanced prostate cancer  Wound infection    Plan:    Continue local wound care and antibiotic course  Fascia intact  Continue diet  Appreciate oncology input, PSA low due to very poorly differentiated prostate cancer and evidence of worsening metastasis on CT    Xu Montero MD  [facility]  [address] [facility], [address]  [place] [address]  Office: [phone]  Cell: [phone]  Also available via Epic Secure Chat  05/01/23  09:19 EDT
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
## 2023-05-01 NOTE — PROGRESS NOTES
Infectious Diseases Progress Note      LOS: 3 days   Patient Care Team:  Provider, No Known as PCP - General    Chief Complaint: Drainage from abdominal wall    Subjective       The patient has been afebrile for the last 24 hours.  The patient is on room air, hemodynamically stable, and is tolerating antimicrobial therapy.      Review of Systems:   Review of Systems   Constitutional: Negative.    HENT: Negative.    Eyes: Negative.    Respiratory: Negative.    Cardiovascular: Negative.    Gastrointestinal: Positive for abdominal pain.   Endocrine: Negative.    Genitourinary: Negative.    Musculoskeletal: Negative.    Skin: Positive for wound.   Neurological: Negative.    Psychiatric/Behavioral: Negative.    All other systems reviewed and are negative.       Objective     Vital Signs  Temp:  [97.6 °F (36.4 °C)-97.9 °F (36.6 °C)] 97.9 °F (36.6 °C)  Heart Rate:  [] 103  Resp:  [15-24] 24  BP: ()/(59-65) 99/61    Physical Exam:  Physical Exam  Vitals and nursing note reviewed.   Constitutional:       General: He is not in acute distress.     Appearance: Normal appearance. He is well-developed and normal weight. He is not diaphoretic.   HENT:      Head: Normocephalic and atraumatic.   Eyes:      Conjunctiva/sclera: Conjunctivae normal.      Pupils: Pupils are equal, round, and reactive to light.   Cardiovascular:      Rate and Rhythm: Normal rate and regular rhythm.      Heart sounds: Normal heart sounds, S1 normal and S2 normal.   Pulmonary:      Effort: Pulmonary effort is normal. No respiratory distress.      Breath sounds: Normal breath sounds. No stridor. No wheezing or rales.   Abdominal:      General: Bowel sounds are normal. There is no distension.      Palpations: Abdomen is soft. There is no mass.      Tenderness: There is abdominal tenderness. There is no guarding.      Comments: Presence of ileal conduit on the right lower quadrant    Mid abdomen incision with purulent drainage.  RN states there is  only one small area that is draining purulent drainage today.  Currently with dressing in place   Musculoskeletal:         General: No deformity. Normal range of motion.      Cervical back: Neck supple.   Skin:     General: Skin is warm and dry.      Coloration: Skin is not pale.      Findings: No erythema or rash.   Neurological:      Mental Status: He is alert and oriented to person, place, and time.      Cranial Nerves: No cranial nerve deficit.   Psychiatric:         Mood and Affect: Mood normal.          Results Review:    I have reviewed all clinical data, test, lab, and imaging results.     Radiology  No Radiology Exams Resulted Within Past 24 Hours    Cardiology    Laboratory    Results from last 7 days   Lab Units 05/01/23 0727 04/30/23  0849 04/29/23  0417 04/28/23  1438   WBC 10*3/mm3 9.90 9.70 10.90* 13.00*   HEMOGLOBIN g/dL 7.9* 8.1* 7.7* 8.7*   HEMATOCRIT % 25.1* 25.2* 24.4* 27.3*   PLATELETS 10*3/mm3 177 199 204 270     Results from last 7 days   Lab Units 05/01/23 0727 04/30/23  0849 04/29/23 0417 04/28/23  1438   SODIUM mmol/L 144 143 140 140   POTASSIUM mmol/L 3.6 3.7 3.8 4.3   CHLORIDE mmol/L 112* 110* 108* 105   CO2 mmol/L 20.0* 21.0* 21.0* 22.0   BUN mg/dL 25* 35* 45* 50*   CREATININE mg/dL 1.70* 2.01* 2.20* 2.39*   GLUCOSE mg/dL 101* 140* 100* 131*   ALBUMIN g/dL  --  2.7*  --  3.0*   BILIRUBIN mg/dL  --  0.3  --  0.3   ALK PHOS U/L  --  207*  --  307*   AST (SGOT) U/L  --  23  --  32   ALT (SGPT) U/L  --  16  --  25   CALCIUM mg/dL 8.9 8.5* 8.9 9.2                 Microbiology   Microbiology Results (last 10 days)     Procedure Component Value - Date/Time    MRSA Screen, PCR (Inpatient) - Swab, Nares [[id number]]  (Normal) Collected: 04/29/23 1238    Lab Status: Final result Specimen: Swab from Nares Updated: 04/29/23 1448     MRSA PCR No MRSA Detected    Narrative:      The negative predictive value of this diagnostic test is high and should only be used to consider de-escalating anti-MRSA  therapy. A positive result may indicate colonization with MRSA and must be correlated clinically.    Wound Culture - Wound, Abdominal Cavity [[id number]]  (Abnormal)  (Susceptibility) Collected: 04/29/23 0428    Lab Status: Preliminary result Specimen: Wound from Abdominal Cavity Updated: 05/01/23 1007     Wound Culture Moderate growth (3+) Streptococcus anginosus      Rare Gram Negative Bacilli      Rare Gram Negative Bacilli     Gram Stain Many (4+) WBCs per low power field      Rare (1+) Gram negative bacilli    Susceptibility      Streptococcus anginosus      ALEX      Ceftriaxone Susceptible      Penicillin G Susceptible      Vancomycin Susceptible                           Urine Culture - Urine, Urine, Clean Catch [[id number]] Collected: 04/28/23 1648    Lab Status: Final result Specimen: Urine, Clean Catch Updated: 04/29/23 1459     Urine Culture 50,000 CFU/mL Mixed Nidia Isolated    Narrative:      Specimen contains mixed organisms of questionable pathogenicity suggestive of contamination. If symptoms persist, suggest recollection.  Colonization of the urinary tract without infection is common. Treatment is discouraged unless the patient is symptomatic, pregnant, or undergoing an invasive urologic procedure.    Blood Culture - Blood, Arm, Left [[id number]]  (Normal) Collected: 04/28/23 1538    Lab Status: Preliminary result Specimen: Blood from Arm, Left Updated: 04/30/23 1545     Blood Culture No growth at 2 days    Blood Culture - Blood, Arm, Right [[id number]]  (Normal) Collected: 04/28/23 1438    Lab Status: Preliminary result Specimen: Blood from Arm, Right Updated: 04/30/23 1445     Blood Culture No growth at 2 days          Medication Review:       Schedule Meds  cefepime, 2 g, Intravenous, Q12H  metroNIDAZOLE, 500 mg, Intravenous, Q8H  sodium chloride, 10 mL, Intravenous, Q12H  tamsulosin, 0.4 mg, Oral, BID        Infusion Meds  lactated ringers, 75 mL/hr, Last Rate: 75 mL/hr (05/01/23 0804)  Pharmacy to  Dose Cefepime,         PRN Meds  •  HYDROcodone-acetaminophen  •  hydrOXYzine  •  Morphine  •  nitroglycerin  •  ondansetron **OR** ondansetron  •  Pharmacy to Dose Cefepime  •  sodium chloride  •  sodium chloride        Assessment & Plan       Antimicrobial Therapy   1.  IV cefepime        2.  IV Flagyl        3.  IV vancomycin        4.        5.            Assessment     Infected abdomen wall with significant purulent drainage.  Cultures are growing Streptococcus anginosus and 2 types of gram-negative bacilli  -Blood cultures are negative so far     S/p radical cystectomy with ileal conduit on March 28, 2023 for advanced prostate cancer.  That followed with wound dehiscence repair by general surgery service on April 6, 2023     History of alcohol abuse     Chronic kidney disease     Plan     Discontinue IV vancomycin  Continue IV cefepime and Flagyl for now  Waiting on cultures to finalize  Patient will need 2 weeks of IV antibiotics  Continue supportive care  A.m. labs  Case discussed with RN at bedside       Christine Stoll, APRN  05/01/23  13:27 EDT    Note is dictated utilizing voice recognition software/Dragon
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
## 2023-05-01 NOTE — CASE MANAGEMENT/SOCIAL WORK
Case Management/Social Work    Patient Name:  Anthony Moss  YOB: 1955  MRN: [id number]  Admit Date:  4/28/2023 05/01/23 1143   Post Acute Pre-Cert Documentation   Date Post Acute Pre-Cert Inititated per Facility 05/01/23   Verification from Payer Yes  ([employer] Auth #: [id number])   Post Acute Pre-Cert Initiated Comment RAMEZ submitted SNF precert via [employer]. [employer] Auth #: [id number]. RNTIFFANIE made aware.     Electronically signed by:  RAMEZ Hoffmann  05/01/23 11:43 EDT

## 2023-05-01 NOTE — PLAN OF CARE
Goal Outcome Evaluation:      Pt A&Ox4, VS stable, pt on RA. Wound Vac applied to abd wound today by wound care RN. Nephrology consulted. Palliative team consulted. Will continue to monitor.

## 2023-05-01 NOTE — DISCHARGE PLACEMENT REQUEST
"Anthony Moss (67 y.o. Male)       Date of Birth   1955    Social Security Number       Address   [address] [place] [address]    Home Phone   [phone]    MRN   [id number]       Orthodox   None    Marital Status                               Admission Date   4/28/23    Admission Type   Emergency    Admitting Provider   Ede Mcdaniel DO    Attending Provider   Shira Meadows MD    Department, Room/Bed   [facility] 3C MEDICAL INPATIENT, 355/1       Discharge Date       Discharge Disposition       Discharge Destination                                 Attending Provider: Shira Meadows MD    Allergies: No Known Allergies    Isolation: None   Infection: MRSA No Isolation this Admit (03/01/23)   Code Status: CPR    Ht: 188 cm (74\")   Wt: 85.7 kg (188 lb 15 oz)    Admission Cmt: None   Principal Problem: Abdominal pain, unspecified abdominal location [R10.9]                   Active Insurance as of 4/28/2023       Primary Coverage       Payor Plan Insurance Group Employer/Plan Group    ANTHEM MEDICARE REPLACEMENT ANTHEM MEDICARE ADVANTAGE [id number]       Payor Plan Address Payor Plan Phone Number Payor Plan Fax Number Effective Dates    [address] [phone]  4/1/2023 - None Entered    [facility] [address]         Subscriber Name Subscriber Birth Date Member ID       ANTHONY MOSS 1955 [id number]               Secondary Coverage       Payor Plan Insurance Group Employer/Plan Group    [place]        Payor Plan Address Payor Plan Phone Number Payor Plan Fax Number Effective Dates    [address]   2/28/2023 - None Entered    [place] [address]         Subscriber Name Subscriber Birth Date Member ID       ANTHONY MOSS 1955 [id number]                     Emergency Contacts        (Rel.) Home Phone Work Phone Mobile Phone    DURAN ERWIN (Friend) [phone] -- [phone]    YANETH PEREZ (Friend) " [phone] -- [phone]
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
## 2023-05-02 LAB
ANION GAP SERPL CALCULATED.3IONS-SCNC: 11 MMOL/L (ref 5–15)
ANISOCYTOSIS BLD QL: ABNORMAL
BACTERIA SPEC AEROBE CULT: ABNORMAL
BASOPHILS # BLD MANUAL: 0.23 10*3/MM3 (ref 0–0.2)
BASOPHILS NFR BLD MANUAL: 2 % (ref 0–1.5)
BUN SERPL-MCNC: 22 MG/DL (ref 8–23)
BUN/CREAT SERPL: 13 (ref 7–25)
CALCIUM SPEC-SCNC: 8.7 MG/DL (ref 8.6–10.5)
CHLORIDE SERPL-SCNC: 109 MMOL/L (ref 98–107)
CO2 SERPL-SCNC: 23 MMOL/L (ref 22–29)
CREAT SERPL-MCNC: 1.69 MG/DL (ref 0.76–1.27)
DEPRECATED RDW RBC AUTO: 57.8 FL (ref 37–54)
EGFRCR SERPLBLD CKD-EPI 2021: 44.5 ML/MIN/1.73
EOSINOPHIL # BLD MANUAL: 0.45 10*3/MM3 (ref 0–0.4)
EOSINOPHIL NFR BLD MANUAL: 4 % (ref 0.3–6.2)
ERYTHROCYTE [DISTWIDTH] IN BLOOD BY AUTOMATED COUNT: 18.7 % (ref 12.3–15.4)
GLUCOSE SERPL-MCNC: 126 MG/DL (ref 65–99)
GRAM STN SPEC: ABNORMAL
GRAM STN SPEC: ABNORMAL
HCT VFR BLD AUTO: 25.2 % (ref 37.5–51)
HGB BLD-MCNC: 8.1 G/DL (ref 13–17.7)
LYMPHOCYTES # BLD MANUAL: 1.47 10*3/MM3 (ref 0.7–3.1)
LYMPHOCYTES NFR BLD MANUAL: 9 % (ref 5–12)
MCH RBC QN AUTO: 26.5 PG (ref 26.6–33)
MCHC RBC AUTO-ENTMCNC: 32.1 G/DL (ref 31.5–35.7)
MCV RBC AUTO: 82.4 FL (ref 79–97)
METAMYELOCYTES NFR BLD MANUAL: 2 % (ref 0–0)
MONOCYTES # BLD: 1.02 10*3/MM3 (ref 0.1–0.9)
MYELOCYTES NFR BLD MANUAL: 1 % (ref 0–0)
NEUTROPHILS # BLD AUTO: 7.8 10*3/MM3 (ref 1.7–7)
NEUTROPHILS NFR BLD MANUAL: 61 % (ref 42.7–76)
NEUTS BAND NFR BLD MANUAL: 8 % (ref 0–5)
PLAT MORPH BLD: NORMAL
PLATELET # BLD AUTO: 198 10*3/MM3 (ref 140–450)
PMV BLD AUTO: 8.7 FL (ref 6–12)
POTASSIUM SERPL-SCNC: 3.7 MMOL/L (ref 3.5–5.2)
RBC # BLD AUTO: 3.06 10*6/MM3 (ref 4.14–5.8)
SCAN SLIDE: NORMAL
SODIUM SERPL-SCNC: 143 MMOL/L (ref 136–145)
VARIANT LYMPHS NFR BLD MANUAL: 13 % (ref 19.6–45.3)
WBC MORPH BLD: NORMAL
WBC NRBC COR # BLD: 11.3 10*3/MM3 (ref 3.4–10.8)

## 2023-05-02 PROCEDURE — 97110 THERAPEUTIC EXERCISES: CPT

## 2023-05-02 PROCEDURE — 25010000002 MORPHINE PER 10 MG: Performed by: STUDENT IN AN ORGANIZED HEALTH CARE EDUCATION/TRAINING PROGRAM

## 2023-05-02 PROCEDURE — 87102 FUNGUS ISOLATION CULTURE: CPT | Performed by: HOSPITALIST

## 2023-05-02 PROCEDURE — 0 CEFEPIME PER 500 MG: Performed by: NURSE PRACTITIONER

## 2023-05-02 PROCEDURE — 0 CEFEPIME PER 500 MG: Performed by: STUDENT IN AN ORGANIZED HEALTH CARE EDUCATION/TRAINING PROGRAM

## 2023-05-02 PROCEDURE — 97535 SELF CARE MNGMENT TRAINING: CPT | Performed by: OCCUPATIONAL THERAPIST

## 2023-05-02 PROCEDURE — 85007 BL SMEAR W/DIFF WBC COUNT: CPT | Performed by: NURSE PRACTITIONER

## 2023-05-02 PROCEDURE — 97530 THERAPEUTIC ACTIVITIES: CPT | Performed by: OCCUPATIONAL THERAPIST

## 2023-05-02 PROCEDURE — 85025 COMPLETE CBC W/AUTO DIFF WBC: CPT | Performed by: NURSE PRACTITIONER

## 2023-05-02 PROCEDURE — 97530 THERAPEUTIC ACTIVITIES: CPT

## 2023-05-02 PROCEDURE — 99497 ADVNCD CARE PLAN 30 MIN: CPT | Performed by: NURSE PRACTITIONER

## 2023-05-02 PROCEDURE — 99231 SBSQ HOSP IP/OBS SF/LOW 25: CPT | Performed by: INTERNAL MEDICINE

## 2023-05-02 PROCEDURE — 80048 BASIC METABOLIC PNL TOTAL CA: CPT | Performed by: INTERNAL MEDICINE

## 2023-05-02 PROCEDURE — 99222 1ST HOSP IP/OBS MODERATE 55: CPT | Performed by: NURSE PRACTITIONER

## 2023-05-02 RX ORDER — METRONIDAZOLE 500 MG/1
500 TABLET ORAL EVERY 8 HOURS SCHEDULED
Status: DISCONTINUED | OUTPATIENT
Start: 2023-05-02 | End: 2023-05-05 | Stop reason: HOSPADM

## 2023-05-02 RX ADMIN — CEFEPIME 2 G: 2 INJECTION, POWDER, FOR SOLUTION INTRAVENOUS at 18:08

## 2023-05-02 RX ADMIN — METRONIDAZOLE 500 MG: 500 INJECTION, SOLUTION INTRAVENOUS at 03:42

## 2023-05-02 RX ADMIN — Medication 10 ML: at 20:54

## 2023-05-02 RX ADMIN — HYDROXYZINE HYDROCHLORIDE 25 MG: 25 TABLET, FILM COATED ORAL at 20:53

## 2023-05-02 RX ADMIN — Medication 10 ML: at 08:42

## 2023-05-02 RX ADMIN — MORPHINE SULFATE 2 MG: 2 INJECTION, SOLUTION INTRAMUSCULAR; INTRAVENOUS at 20:53

## 2023-05-02 RX ADMIN — TAMSULOSIN HYDROCHLORIDE 0.4 MG: 0.4 CAPSULE ORAL at 20:53

## 2023-05-02 RX ADMIN — METRONIDAZOLE 500 MG: 500 INJECTION, SOLUTION INTRAVENOUS at 11:21

## 2023-05-02 RX ADMIN — METRONIDAZOLE 500 MG: 500 TABLET ORAL at 21:01

## 2023-05-02 RX ADMIN — HYDROCODONE BITARTRATE AND ACETAMINOPHEN 1 TABLET: 7.5; 325 TABLET ORAL at 16:31

## 2023-05-02 RX ADMIN — HYDROCODONE BITARTRATE AND ACETAMINOPHEN 1 TABLET: 7.5; 325 TABLET ORAL at 08:42

## 2023-05-02 RX ADMIN — TAMSULOSIN HYDROCHLORIDE 0.4 MG: 0.4 CAPSULE ORAL at 08:42

## 2023-05-02 RX ADMIN — CEFEPIME 2 G: 2 INJECTION, POWDER, FOR SOLUTION INTRAVENOUS at 06:09

## 2023-05-02 NOTE — PROGRESS NOTES
PROGRESS NOTE      Patient Name: Anthony Moss  : 1955  MRN: [id number]  Primary Care Physician: Provider, No Known  Date of admission: 2023    Patient Care Team:  Provider, No Known as PCP - General        Subjective   Subjective:     Feels better  Review of systems:  Weak otherwise negative.tolerating trujillo catheter.      Allergies:  No Known Allergies    Objective   Exam:     Vital Signs  Temp:  [97.3 °F (36.3 °C)-98.2 °F (36.8 °C)] 97.9 °F (36.6 °C)  Heart Rate:  [] 115  Resp:  [12-22] 20  BP: ()/(65-80) 98/66  SpO2:  [95 %-98 %] 95 %  on   ;   Device (Oxygen Therapy): room air  Body mass index is 24.26 kg/m².    General:     male in no acute distress.    Head:      Normocephalic and atraumatic.    Eyes:      PERRL/EOM intact, conjunctiva and sclera clear with out nystagmus.    Neck:      No masses, thyromegaly,  trachea central with normal respiratory effort   Lungs:    Clear bilaterally to auscultation.    Heart:      Regular rate and rhythm, no murmur no gallop  Abd:        Soft, nontender, not distended, bowel sounds positive, no shifting dullness   Pulses:   Pulses palpable  Extr:       + edema.    Neuro:    No focal deficits.   alert oriented x3  Skin:       Intact without lesions or rashes.    Psych:    Alert and cooperative; normal mood and affect; .      Results Review:  I have personally reviewed most recent Data :  CBC    Results from last 7 days   Lab Units 23  04123  0727 23  0849 23  0417 23  1438   WBC 10*3/mm3 11.30* 9.90 9.70 10.90* 13.00*   HEMOGLOBIN g/dL 8.1* 7.9* 8.1* 7.7* 8.7*   PLATELETS 10*3/mm3 198 177 199 204 270     CMP   Results from last 7 days   Lab Units 23  0414 23  0727 23  0849 23  0417 23  1438   SODIUM mmol/L 143 144 143 140 140   POTASSIUM mmol/L 3.7 3.6 3.7 3.8 4.3   CHLORIDE mmol/L 109* 112* 110* 108* 105   CO2 mmol/L 23.0 20.0* 21.0* 21.0* 22.0   BUN mg/dL 22 25* 35* 45* 50*    CREATININE mg/dL 1.69* 1.70* 2.01* 2.20* 2.39*   GLUCOSE mg/dL 126* 101* 140* 100* 131*   ALBUMIN g/dL  --   --  2.7*  --  3.0*   BILIRUBIN mg/dL  --   --  0.3  --  0.3   ALK PHOS U/L  --   --  207*  --  307*   AST (SGOT) U/L  --   --  23  --  32   ALT (SGPT) U/L  --   --  16  --  25     ABG      No radiology results for the last day    Results for orders placed during the hospital encounter of 01/05/22    Adult Transthoracic Echo Complete w/ Color, Spectral and Contrast if Necessary Per Protocol    Interpretation Summary  · Left ventricular ejection fraction appears to be 56 - 60%.  · The right ventricular cavity is moderately dilated.  · No pericardial effusion noted    Scheduled Meds:cefepime, 2 g, Intravenous, Q12H  metroNIDAZOLE, 500 mg, Oral, Q8H  sodium chloride, 10 mL, Intravenous, Q12H  tamsulosin, 0.4 mg, Oral, BID      Continuous Infusions:lactated ringers, 75 mL/hr, Last Rate: 75 mL/hr (05/01/23 0804)  Pharmacy to Dose Cefepime,       PRN Meds:•  HYDROcodone-acetaminophen  •  hydrOXYzine  •  Morphine  •  nitroglycerin  •  ondansetron **OR** ondansetron  •  Pharmacy to Dose Cefepime  •  sodium chloride  •  sodium chloride    Assessment & Plan   Assessment and Plan:         Abdominal pain, unspecified abdominal location    Moderate Malnutrition (HCC)    ASSESSMENT:  • Acute kidney injury, stage III, status post radical cystectomy,ileal conduit.  • Metabolic acidosis secondary to CKD start low-dose sodium bicarbonate  • History of bladder cancer, recent cystectomy  • Suprapubic catheter.  Infected abdomen wall with significant purulent drainage.  Cultures are growing Streptococcus anginosus, Enterobacter cloacae complex, and Morganella Morgagni  -Blood cultures are negative so far     S/p radical cystectomy with ileal conduit on March 28, 2023 for advanced prostate cancer.  That followed with wound dehiscence repair by general surgery service on April 6, 2023  • History of alcohol use,  • Chronic back  pain,using NSAID in the past           Plan:      • Continue iv hydration with ringers lactate.  • At this time patient with history of bladder cancer status post cystectomy urine output is adequate ,renal function acceptable but at risk of atn secondary to underlying infection.  • Suprapubic urostomy placed  • Renal function slowly stabilizing from a peak of 2.2 with underlying  progressive CKD   • Significant acidosis noted continue high dose of sodium bicarbonate we will follow-up tomorrow.  • Abx per inf disease for abd wall wound infxn,cultures.  • aEchocardiogram done last time showed ejection fraction 56 to 60%  • Repeat labs tomorrow morning            Electronically signed by Tom Tolbert MD,   [facility] kidney consultant  [phone]  5/2/2023  18:53 EDT

## 2023-05-02 NOTE — PROGRESS NOTES
Infectious Diseases Progress Note      LOS: 4 days   Patient Care Team:  Provider, No Known as PCP - General    Chief Complaint: Drainage from abdominal wall    Subjective       The patient has been afebrile for the last 24 hours.  The patient is on room air, hemodynamically stable, and is tolerating antimicrobial therapy.  Patient states he is feeling better today      Review of Systems:   Review of Systems   Constitutional: Negative.    HENT: Negative.    Eyes: Negative.    Respiratory: Negative.    Cardiovascular: Negative.    Gastrointestinal: Positive for abdominal pain.   Endocrine: Negative.    Genitourinary: Negative.    Musculoskeletal: Negative.    Skin: Positive for wound.   Neurological: Negative.    Psychiatric/Behavioral: Negative.    All other systems reviewed and are negative.       Objective     Vital Signs  Temp:  [97.3 °F (36.3 °C)-98.2 °F (36.8 °C)] 97.3 °F (36.3 °C)  Heart Rate:  [] 99  Resp:  [12-22] 22  BP: (101-118)/(65-80) 101/69    Physical Exam:  Physical Exam  Vitals and nursing note reviewed.   Constitutional:       General: He is not in acute distress.     Appearance: Normal appearance. He is well-developed and normal weight. He is not diaphoretic.   HENT:      Head: Normocephalic and atraumatic.   Eyes:      Conjunctiva/sclera: Conjunctivae normal.      Pupils: Pupils are equal, round, and reactive to light.   Cardiovascular:      Rate and Rhythm: Normal rate and regular rhythm.      Heart sounds: Normal heart sounds, S1 normal and S2 normal.   Pulmonary:      Effort: Pulmonary effort is normal. No respiratory distress.      Breath sounds: Normal breath sounds. No stridor. No wheezing or rales.   Abdominal:      General: Bowel sounds are normal. There is no distension.      Palpations: Abdomen is soft. There is no mass.      Tenderness: There is abdominal tenderness. There is no guarding.      Comments: Presence of ileal conduit on the right lower quadrant    Mid abdomen incision  with purulent drainage.  RN states there is only one small area that is draining purulent drainage today.  Currently with dressing in place   Musculoskeletal:         General: No deformity. Normal range of motion.      Cervical back: Neck supple.   Skin:     General: Skin is warm and dry.      Coloration: Skin is not pale.      Findings: No erythema or rash.   Neurological:      Mental Status: He is alert and oriented to person, place, and time.      Cranial Nerves: No cranial nerve deficit.   Psychiatric:         Mood and Affect: Mood normal.          Results Review:    I have reviewed all clinical data, test, lab, and imaging results.     Radiology  No Radiology Exams Resulted Within Past 24 Hours    Cardiology    Laboratory    Results from last 7 days   Lab Units 05/02/23  0414 05/01/23  0727 04/30/23  0849 04/29/23  0417 04/28/23  1438   WBC 10*3/mm3 11.30* 9.90 9.70 10.90* 13.00*   HEMOGLOBIN g/dL 8.1* 7.9* 8.1* 7.7* 8.7*   HEMATOCRIT % 25.2* 25.1* 25.2* 24.4* 27.3*   PLATELETS 10*3/mm3 198 177 199 204 270     Results from last 7 days   Lab Units 05/02/23  0414 05/01/23  0727 04/30/23  0849 04/29/23  0417 04/28/23  1438   SODIUM mmol/L 143 144 143 140 140   POTASSIUM mmol/L 3.7 3.6 3.7 3.8 4.3   CHLORIDE mmol/L 109* 112* 110* 108* 105   CO2 mmol/L 23.0 20.0* 21.0* 21.0* 22.0   BUN mg/dL 22 25* 35* 45* 50*   CREATININE mg/dL 1.69* 1.70* 2.01* 2.20* 2.39*   GLUCOSE mg/dL 126* 101* 140* 100* 131*   ALBUMIN g/dL  --   --  2.7*  --  3.0*   BILIRUBIN mg/dL  --   --  0.3  --  0.3   ALK PHOS U/L  --   --  207*  --  307*   AST (SGOT) U/L  --   --  23  --  32   ALT (SGPT) U/L  --   --  16  --  25   CALCIUM mg/dL 8.7 8.9 8.5* 8.9 9.2                 Microbiology   Microbiology Results (last 10 days)     Procedure Component Value - Date/Time    MRSA Screen, PCR (Inpatient) - Swab, Nares [[id number]]  (Normal) Collected: 04/29/23 1238    Lab Status: Final result Specimen: Swab from Nares Updated: 04/29/23 1448     MRSA PCR  No MRSA Detected    Narrative:      The negative predictive value of this diagnostic test is high and should only be used to consider de-escalating anti-MRSA therapy. A positive result may indicate colonization with MRSA and must be correlated clinically.    Wound Culture - Wound, Abdominal Cavity [[id number]]  (Abnormal)  (Susceptibility) Collected: 04/29/23 0428    Lab Status: Final result Specimen: Wound from Abdominal Cavity Updated: 05/02/23 0957     Wound Culture Moderate growth (3+) Streptococcus anginosus      Rare Enterobacter cloacae complex      Rare Morganella morganii ssp morganii     Gram Stain Many (4+) WBCs per low power field      Rare (1+) Gram negative bacilli    Susceptibility      Streptococcus anginosus      ALEX      Ceftriaxone Susceptible      Penicillin G Susceptible      Vancomycin Susceptible                       Susceptibility      Enterobacter cloacae complex      ALEX      Cefepime Susceptible      Ceftazidime Susceptible      Ceftriaxone Intermediate      Gentamicin Susceptible      Levofloxacin Susceptible      Piperacillin + Tazobactam Susceptible      Tetracycline Susceptible      Trimethoprim + Sulfamethoxazole Susceptible                       Susceptibility      Morganella morganii ssp morganii      ALEX      Ampicillin Resistant     Ampicillin + Sulbactam Susceptible      Cefepime Susceptible      Ceftazidime Susceptible      Ceftriaxone Susceptible      Gentamicin Susceptible      Levofloxacin Intermediate      Piperacillin + Tazobactam Susceptible      Tetracycline Susceptible      Trimethoprim + Sulfamethoxazole Resistant                      Susceptibility Comments     Enterobacter cloacae complex    Cefazolin sensitivity will not be reported for Enterobacteriaceae in non-urine isolates. If cefazolin is preferred, please call the microbiology lab to request an E-test.  With the exception of urinary-sourced infections, aminoglycosides should not be used as monotherapy.     Morganella morganii ssp morganii    Cefazolin sensitivity will not be reported for Enterobacteriaceae in non-urine isolates. If cefazolin is preferred, please call the microbiology lab to request an E-test.  With the exception of urinary-sourced infections, aminoglycosides should not be used as monotherapy.             Urine Culture - Urine, Urine, Clean Catch [[id number]] Collected: 04/28/23 1648    Lab Status: Final result Specimen: Urine, Clean Catch Updated: 04/29/23 1459     Urine Culture 50,000 CFU/mL Mixed Nidia Isolated    Narrative:      Specimen contains mixed organisms of questionable pathogenicity suggestive of contamination. If symptoms persist, suggest recollection.  Colonization of the urinary tract without infection is common. Treatment is discouraged unless the patient is symptomatic, pregnant, or undergoing an invasive urologic procedure.    Blood Culture - Blood, Arm, Left [[id number]]  (Normal) Collected: 04/28/23 1538    Lab Status: Preliminary result Specimen: Blood from Arm, Left Updated: 05/01/23 1545     Blood Culture No growth at 3 days    Blood Culture - Blood, Arm, Right [[id number]]  (Normal) Collected: 04/28/23 1438    Lab Status: Preliminary result Specimen: Blood from Arm, Right Updated: 05/02/23 1445     Blood Culture No growth at 4 days          Medication Review:       Schedule Meds  cefepime, 2 g, Intravenous, Q12H  metroNIDAZOLE, 500 mg, Intravenous, Q8H  sodium chloride, 10 mL, Intravenous, Q12H  tamsulosin, 0.4 mg, Oral, BID        Infusion Meds  lactated ringers, 75 mL/hr, Last Rate: 75 mL/hr (05/01/23 0804)  Pharmacy to Dose Cefepime,         PRN Meds  •  HYDROcodone-acetaminophen  •  hydrOXYzine  •  Morphine  •  nitroglycerin  •  ondansetron **OR** ondansetron  •  Pharmacy to Dose Cefepime  •  sodium chloride  •  sodium chloride        Assessment & Plan       Antimicrobial Therapy   1.  IV cefepime        2.  IV/p.o. Flagyl        3.           4.        5.            Assessment     Infected abdomen wall with significant purulent drainage.  Cultures are growing Streptococcus anginosus, Enterobacter cloacae complex, and Morganella Morgagni  -Blood cultures are negative so far     S/p radical cystectomy with ileal conduit on March 28, 2023 for advanced prostate cancer.  That followed with wound dehiscence repair by general surgery service on April 6, 2023     History of alcohol abuse     Chronic kidney disease     Plan     Continue IV cefepime 2 g every 12 hours for 2 weeks total treatment-last day on 5/12/2023  Continue Flagyl but switch to p.o. Flagyl 500 mg 3 times daily for 2 weeks of treatment  Patient already has a PICC line-please remove once IV antibiotics are completed   CBC and creatinine in 4-5 days     Okay to discharge from Infectious Disease standpoint    Please fax all post discharge lab results, imaging studies and correspondence to this fax number [phone]  For any question or concern please contact our service number [phone]       DANIS Lisa  05/02/23  15:27 EDT    Note is dictated utilizing voice recognition software/Dragon

## 2023-05-02 NOTE — PLAN OF CARE
"Goal Assessment:   Anthony Moss presents with ADL impairments affecting function including balance and endurance / activity tolerance. Pt provided with emotional support today as he expressed feelings of anger & frustration of his CA dx & general medical status. He continues to demonstrated functioning below baseline abilities indicate the need for continued skilled intervention while inpatient. Tolerating session today without incident. Will continue to follow and progress as tolerated.     Plan/Recommendations:   Moderate Intensity Therapy recommended post-acute care. This is recommended as therapy feels the patient would require 3-4 days per week and wouldn't tolerate \"3 hour daily\" rehab intensity. SNF would be the preferred choice. If the patient does not agree to SNF, arrange HH or OP depending on home bound status. If patient is medically complex, consider LTACH.. Pt requires no DME at discharge.     Pt desires Skilled Rehab placement at discharge. Pt cooperative; agreeable to therapeutic recommendations and plan of care. l Outcome Evaluation:  "

## 2023-05-02 NOTE — THERAPY TREATMENT NOTE
"Subjective: Pt agreeable to therapeutic plan of care. Pt agreeable to get up to chair and exercise only. PT reviewed detriments of bedrest w/ pt.     Objective:     Bed mobility - Min-A  Transfers - Min-A  Ambulation - 0 feet N/A or Not attempted.    Therapeutic Exercise - 20 Reps B LE AROM supported sitting / chair    Vitals: WNL    Pain: 0 VAS   Location: n/a  Intervention for pain: Repositioned, Increased Activity and Therapeutic Presence    Education: Provided education on the importance of mobility in the acute care setting, Verbal/Tactile Cues and Transfer Training    Assessment: Anthony Moss needs to increase out of bed activity. Would not agree to get out of bed until 3:30 PM this date. He presents with functional mobility impairments which indicate the need for skilled intervention. Tolerating session today without incident. Will continue to follow and progress as tolerated.     Plan/Recommendations:   Moderate Intensity Therapy recommended post-acute care. This is recommended as therapy feels the patient would require 3-4 days per week and wouldn't tolerate \"3 hour daily\" rehab intensity. SNF would be the preferred choice. If the patient does not agree to SNF, arrange HH or OP depending on home bound status. If patient is medically complex, consider LTACH.. Pt requires no DME at discharge.     Pt desires Skilled Rehab placement at discharge. Pt cooperative; agreeable to therapeutic recommendations and plan of care.         Basic Mobility 6-click:  Rollin = Total, A lot = 2, A little = 3; 4 = None  Supine>Sit:   1 = Total, A lot = 2, A little = 3; 4 = None   Sit>Stand with arms:  1 = Total, A lot = 2, A little = 3; 4 = None  Bed>Chair:   1 = Total, A lot = 2, A little = 3; 4 = None  Ambulate in room:  1 = Total, A lot = 2, A little = 3; 4 = None  3-5 Steps with railin = Total, A lot = 2, A little = 3; 4 = None  Score: 17    Modified [place]: N/A = No pre-op stroke/TIA    Post-Tx " Position: Up in Chair and Call light and personal items within reach  PPE: gloves, surgical mask and gown

## 2023-05-02 NOTE — PROGRESS NOTES
[facility] Daily Progress Note    Patient Name: Anthony Moss  : 1955  MRN: [id number]  Primary Care Physician:  Maria Guadalupe, No Known  Date of admission: 2023      Subjective      Chief Complaint: Abdominal pain    Subjective  2023; patient still having some abdominal discomfort but better, denies for any other active complaint.  Resting in the bed, no family at bedside, spoke with the RN.  2023; patient is resting in the bed denies any nausea vomiting or abdominal pain tolerating diet, spoke with the bedside RN.  ROS   Abdominal pain, rest of systems are negative    Objective      Vitals:   Temp:  [97.3 °F (36.3 °C)-98.2 °F (36.8 °C)] 97.3 °F (36.3 °C)  Heart Rate:  [] 99  Resp:  [12-22] 22  BP: (101-118)/(65-80) 101/69    Physical Exam  Vitals and nursing note reviewed.   Constitutional:       General: He is not in acute distress.     Appearance: Normal appearance. He is well-developed. He is not ill-appearing, toxic-appearing or diaphoretic.   HENT:      Head: Normocephalic and atraumatic.      Right Ear: Ear canal and external ear normal.      Left Ear: Ear canal and external ear normal.      Nose: Nose normal. No congestion or rhinorrhea.      Mouth/Throat:      Mouth: Mucous membranes are moist.      Pharynx: No oropharyngeal exudate.   Eyes:      General: No scleral icterus.        Right eye: No discharge.         Left eye: No discharge.      Extraocular Movements: Extraocular movements intact.      Conjunctiva/sclera: Conjunctivae normal.      Pupils: Pupils are equal, round, and reactive to light.   Neck:      Thyroid: No thyromegaly.      Vascular: No JVD.      Trachea: No tracheal deviation.   Cardiovascular:      Rate and Rhythm: Normal rate and regular rhythm.      Pulses: Normal pulses.      Heart sounds: Normal heart sounds. No murmur heard.    No friction rub. No gallop.   Pulmonary:      Effort: Pulmonary effort is normal. No  respiratory distress.      Breath sounds: Normal breath sounds. No stridor. No wheezing, rhonchi or rales.   Chest:      Chest wall: No tenderness.   Abdominal:      Tenderness: There is no rebound.      Comments: Abdominal wound dehiscence with wound VAC, ileal conduit   Musculoskeletal:      Cervical back: No muscular tenderness.   Skin:     General: Skin is warm and dry.      Coloration: Skin is not jaundiced or pale.      Findings: No bruising, erythema or rash.   Neurological:      General: No focal deficit present.      Mental Status: He is alert and oriented to person, place, and time. Mental status is at baseline.      Cranial Nerves: No cranial nerve deficit.      Sensory: No sensory deficit.      Motor: No weakness or abnormal muscle tone.      Coordination: Coordination normal.   Psychiatric:         Mood and Affect: Mood normal.         Behavior: Behavior normal.         Thought Content: Thought content normal.         Judgment: Judgment normal.        Abdomen;Soft,nontender,  Wound lower portion of fascia with approx 2-3 cm defect with purulent drainage remainder of wound granulating well, Urostomy p/p/p           Result Review    Result Review:  I have personally reviewed the results from the time of this admission to 5/2/2023 12:41 EDT and agree with these findings:  [x]  Laboratory  [x]  Microbiology  [x]  Radiology  [x]  EKG/Telemetry   []  Cardiology/Vascular   []  Pathology  []  Old records  []  Other:  Most notable findings include:     Wounds (last 24 hours)     LDA Wound     Row Name 05/02/23 0842 05/01/23 2001          Wound 04/06/23 2243 abdomen Incision    Wound - Properties Group Placement Date: 04/06/23  -PH Placement Time: 2243  -PH Present on Hospital Admission: Y  -PH Location: abdomen  -PH Primary Wound Type: Incision  -PH    Dressing Appearance -- dry;intact  -KF     Closure STEFF  -SC STEFF  -KF     Base clean;pink  -SC clean;pink  -KF     Retired Wound - Properties Group Placement Date:  04/06/23  -PH Placement Time: 2243  -PH Present on Hospital Admission: Y  -PH Location: abdomen  -PH Primary Wound Type: Incision  -PH    Retired Wound - Properties Group Date first assessed: 04/06/23  -PH Time first assessed: 2243  -PH Present on Hospital Admission: Y  -PH Location: abdomen  -PH Primary Wound Type: Incision  -PH          User Key  (r) = Recorded By, (t) = Taken By, (c) = Cosigned By    Initials Name Provider Type     Carolyn Marie RN Registered Nurse    Clara Quintanilla LPN Licensed Nurse    Tanisha Galindo RN Registered Nurse            CT Abdomen Pelvis Without Contrast    Result Date: 4/28/2023  Impression: 1. Postsurgical changes of cystoprostatectomy with bilateral nephroureteral stent placement with ileal conduit and right lower quadrant urostomy. Moderate left hydronephrosis. No hydronephrosis on right. 2. Postsurgical changes at midline abdominal wall with multiple small foci of subcutaneous air. No organized fluid collection or abscess. No recurrent bowel herniation. 3. Fluid and air-filled mildly dilated proximal small bowel loops measuring up to 4 cm could relate to postoperative ileus, low-grade partial obstruction difficult to exclude. No discrete transition point. 4. Development of new sclerotic osseous lesions most concerning for osseous metastases. 5. Additional incidental chronic findings above. Electronically Signed: Kuldip Daniels  4/28/2023 6:02 PM EDT  Workstation ID: [id number]        Assessment & Plan      Brief Patient Summary:  Anthony Moss is a 67 y.o. male who       cefepime, 2 g, Intravenous, Q12H  metroNIDAZOLE, 500 mg, Intravenous, Q8H  sodium chloride, 10 mL, Intravenous, Q12H  tamsulosin, 0.4 mg, Oral, BID       lactated ringers, 75 mL/hr, Last Rate: 75 mL/hr (05/01/23 0804)  Pharmacy to Dose Cefepime,          Active Hospital Problems:  Active Hospital Problems    Diagnosis    • **Abdominal pain, unspecified abdominal location    • Moderate Malnutrition  ([facility], located within [facility])      Plan:     #Abdominal Wound infection    - s/p Radical cystectomy and ileal conduit urinary diversion,   complicated by wound dehiscence    - s/p ex-lap with repair of wound dehiscence on 4/6/23    - hospitalized at [facility] from 03/28-4/19/23    - abdominal pain and malodorous pus like discharge    - Due to recent hospital stay, broad spectrum abx coverage    - Vancomycin, pharm to dose    - Cefepime, pharm to dose    - Flagyl 500mg IV q8h    - blood cultures x2 no growth to date.    - wound cultures Enterobacter cloacae plus Corynebacterium species    - general surgery consulted ... tolerating regular diet.    - WOCN    - LR @ 50 ml per hour.    - pain control    - pt/ot    -CM  --Wound VAC in place  - consulted ID .... advised current antibiotics waiting for cultures to finalize.     #UTI    - UA suggests UTI    - urine culture 50,000 CFU per mL of mixed elizabeth    - blood cultures x2 no growth to date.    - wbc 13.0, trend     - lactate 0.8         #Bladder cancer  #Osseous mets    - s/p surgery    - CT a/p shows postsurgical changes of cystoprostatectomy with bilateral nephroureteral stent placement with ileal conduit and RLQ urostomy.  Mode left hydronephrosis. Development of new sclerotic osseous lesions concerning for bone mets    - heme/onc consulted and following    - pain control     #Ileus    - CT a/p shows possible post-op ileus resolved    - NPO--> diet as tolerated.    - LR @ 75/hr    - surgery consulted and following.     #CARLOS ALBERTO on CKD continue to improve.  Nephrology managing  #Metabolic acidosis; serum bicarbonate  #Hydronephrosis, left    - Cr 2.39 on admission--> 1.77--> 1.69,, base is around 1.3    - suspect 2/2 hydro    - evident on CT a/p    - urology following    - LR @ 75/hr  -- nephrology managing..     #Anemia   - hgb 8.7 on admission, base around 9.0    - check iron and ferritin iron deficiency    - no overt bleeding, follow labs     #Anxiety    - Vistaril PRN        DVT prophylaxis:  SCDs      DVT prophylaxis:  Mechanical DVT prophylaxis orders are present.    CODE STATUS:    Code Status (Patient has no pulse and is not breathing): CPR (Attempt to Resuscitate)  Medical Interventions (Patient has pulse or is breathing): Full Support      Disposition:  I expect patient to be discharged as per clinical course..    This patient has been examined wearing appropriate Personal Protective Equipment and discussed with hospital infection control department. 05/02/23      Electronically signed by Shira Meadows MD, 05/02/23, 12:41 EDT.  [facility] Hospitalist Team

## 2023-05-02 NOTE — CONSULTS
Palliative Care Consultation    Patient Name: Anthony Moss  : 1955  MRN: [id number]  Allergies: Patient has no known allergies.    Requesting clinician:  Abdiel  Reason for consult: Consultation for clarification of goals of care and code status.      Patient Code Status:   Code Status and Medical Interventions:   Ordered at: 23     Code Status (Patient has no pulse and is not breathing):    CPR (Attempt to Resuscitate)     Medical Interventions (Patient has pulse or is breathing):    Full Support           Chief Complaint:    Abdominal pain    History of Present Illness    Anthony Moss is a 67 y.o. male who presented to [facility] ED on  with reports of abdominal pain. He reported constant pain with copious pus noted in his colostomy bag. He stated there was an odor from his abdominal wound and he was having weakness and fatigue. No other reported symptoms. He was seen by urologist and told to come to ED.     Of note: patient has bladder cancer with recent radical cystectomy and ileal conduit urinary diversion on 3/28. Recovery complicated by wound dehiscence with ex-lap and repair.     In ED: Glucose 131, Creatinine 2.39, BUN 50, Lactate 0.8, WBC 13, Hgb 8.7    UA suggestive of UTI.    CT postsurgical changes of cystoprostatectomy with b/l stent placement with ileal conduit and RLQ urostomy complicated by moderate left hydronephrosis with postsurgical changes at midline abdominal wall with small foci of subcutaneous air along with difficult to exclude ileus vs obstruction with new sclerotic osseious lesions concerning for mets.    Wound cultures and blood cultures obtained. Patient started on IV antibiotics. General surgery consulted. Hem onc consulted for possible new mets. Urology consulted for hydronephrosis. Stents flushed.     ID also consulted for wound assessment. No plans for surgical intervention. Nephrology consulted on  for worsening creatinine.      Palliative consult for  goals of care discussion in an acutely ill patient with underlying bladder and prostate cancer.    VITAL SIGNS:   Temp:  [97.5 °F (36.4 °C)-98.2 °F (36.8 °C)] 98.2 °F (36.8 °C)  Heart Rate:  [] 96  Resp:  [12-24] 12  BP: ()/(61-80) 116/80       PMH: Bladder Cancer, UTI, Prostate Ca, BPH    Past Surgical History:   Procedure Laterality Date   • CYSTECTOMY N/A 3/28/2023    Procedure: CYSTECTOMY RADICAL WITH CONDUIT;  Surgeon: Xu Montero MD;  Location: [facility] MAIN OR;  Service: Urology;  Laterality: N/A;   • CYSTOSCOPY W/ URETERAL STENT PLACEMENT Bilateral 3/3/2023    Procedure: CYSTOSCOPY URETHERAL DILATION, BLADDER BIOPSY;  Surgeon: Xu Montero MD;  Location: [facility] MAIN OR;  Service: Urology;  Laterality: Bilateral;   • EXPLORATORY LAPAROTOMY N/A 2023    Procedure: LAPAROTOMY EXPLORATORY REPAIR OF DEHISCENCE;  Surgeon: Michael Witt MD;  Location: [facility] MAIN OR;  Service: General;  Laterality: N/A;   • SUPRAPUBIC TUBE PLACEMENT N/A 2022    Procedure: ASPIRATION BLADDER, SUPRAPUBIC CATHETER INSERTION;  Surgeon: Xu Montero MD;  Location: [facility] MAIN OR;  Service: Urology;  Laterality: N/A;   • SUPRAPUBIC TUBE PLACEMENT N/A 3/3/2023    Procedure: SUPRAPUBIC CATHETER EXCHANGE ;  Surgeon: Xu Montero MD;  Location: [facility] MAIN OR;  Service: Urology;  Laterality: N/A;   • VASECTOMY         Family History   Problem Relation Age of Onset   • Cancer Mother 89        Unknown origin   • Heart disease Father        Social History     Tobacco Use   • Smoking status: Former     Packs/day: 2.00     Years: 25.00     Pack years: 50.00     Types: Cigarettes     Start date:      Quit date: 1995     Years since quittin.3   • Smokeless tobacco: Never   Vaping Use   • Vaping Use: Never used   Substance Use Topics   • Alcohol use: Not Currently     Comment: Abstinent since 2034   • Drug use: Not Currently           LABS:    Results from last 7 days   Lab Units  05/02/23  0414   WBC 10*3/mm3 11.30*   HEMOGLOBIN g/dL 8.1*   HEMATOCRIT % 25.2*   PLATELETS 10*3/mm3 198     Results from last 7 days   Lab Units 05/02/23  0414   SODIUM mmol/L 143   POTASSIUM mmol/L 3.7   CHLORIDE mmol/L 109*   CO2 mmol/L 23.0   BUN mg/dL 22   CREATININE mg/dL 1.69*   GLUCOSE mg/dL 126*   CALCIUM mg/dL 8.7     Results from last 7 days   Lab Units 05/02/23  0414 05/01/23  0727 04/30/23  0849   SODIUM mmol/L 143   < > 143   POTASSIUM mmol/L 3.7   < > 3.7   CHLORIDE mmol/L 109*   < > 110*   CO2 mmol/L 23.0   < > 21.0*   BUN mg/dL 22   < > 35*   CREATININE mg/dL 1.69*   < > 2.01*   CALCIUM mg/dL 8.7   < > 8.5*   BILIRUBIN mg/dL  --   --  0.3   ALK PHOS U/L  --   --  207*   ALT (SGPT) U/L  --   --  16   AST (SGOT) U/L  --   --  23   GLUCOSE mg/dL 126*   < > 140*    < > = values in this interval not displayed.         IMAGING STUDIES:  No radiology results for the last day      I reviewed the patient's new clinical results including labs, imaging, and vitals.        Scheduled Meds:  cefepime, 2 g, Intravenous, Q12H  metroNIDAZOLE, 500 mg, Intravenous, Q8H  sodium chloride, 10 mL, Intravenous, Q12H  tamsulosin, 0.4 mg, Oral, BID      Continuous Infusions:  lactated ringers, 75 mL/hr, Last Rate: 75 mL/hr (05/01/23 0804)  Pharmacy to Dose Cefepime,         I have reviewed patient's current medication list.     Review of Systems   Constitutional: Positive for fatigue.   Gastrointestinal: Positive for abdominal pain.   Skin: Positive for wound.   All other systems reviewed and are negative.        Physical Exam  Vitals and nursing note reviewed.   Constitutional:       Appearance: Normal appearance.   HENT:      Head: Normocephalic and atraumatic.      Nose: Nose normal.      Mouth/Throat:      Mouth: Mucous membranes are dry.      Pharynx: Oropharynx is clear.   Eyes:      Extraocular Movements: Extraocular movements intact.      Conjunctiva/sclera: Conjunctivae normal.      Pupils: Pupils are equal, round,  and reactive to light.   Cardiovascular:      Rate and Rhythm: Normal rate.   Pulmonary:      Effort: Pulmonary effort is normal.   Abdominal:      General: Abdomen is flat.      Tenderness: There is abdominal tenderness.      Comments: Wound to abdomen   Musculoskeletal:         General: Normal range of motion.      Cervical back: Normal range of motion.   Skin:     General: Skin is warm and dry.      Coloration: Skin is pale.   Neurological:      General: No focal deficit present.      Mental Status: He is alert and oriented to person, place, and time. Mental status is at baseline.   Psychiatric:         Mood and Affect: Mood normal.             PROBLEM LIST:    Abdominal pain, unspecified abdominal location    Moderate Malnutrition (HCC)          ASSESSMENT/PLAN:    Abdominal wound: s/p radical cystectomy and ileal conduit urinary diversion, complicated by wound dehiscence on . Reported pus like discharge. Started on IV antibiotics. Wound and blood cultures obtained. Surgery consulted. Infectious disease consulted. No plants for surgical intervention. Cultures are growing Streptococcus anginosus and 2 types of gram-negative bacilli    UTI: Noted on admission UA. Cultures obtained. Patient on IV antibiotics.     Bladder cancer: s/p radical cystectomy and ileal conduit urinary diversion. Osseous mets noted on CT. Hem onc consulted.      Prostate Ca: Molena 10. Per hem onc, bone mets likely secondary to prostate ca. Will need outpatient PET.     Ileus vs SBO: Noted on CT. Patient is NPO. General surgery following.     CARLOS ALBERTO: with hydronephrosis on CT. Patient on IV fluids. Urology following. Stents flushed. Nephrology consulted.     Anemia/Anxiety: chronic conditions per primary.     These illnesses and their management contribute to the need for a palliative consult and advanced care planning.      ADVANCED CARE PLANNIN/2 Met with patient this am. He is alert and oriented. He tells me that he is feeling  better this am. We discussed his current clinical status. He tells me that he has never been sick prior to the last few months. He states that prior to his surgeries, he lived at home independently. He tells me that he understands he may have new metastatic cancer and that he does want to pursue treatment if it does not significant impact his quality of life. We discussed hospice vs palliative care. He states he is not ready for hospice but is interested in palliative care for symptom management if and when he states treatment. He wants to continue with full aggressive treatment, including CPR and intubation if needed. We discussed advanced directives. He tells me that he has never completed any documentation but he would like information on them. We talked about brunilda term life support and he does not believe he would want that. He does not have any family locally but has friends that are involved in his care. He is agreeable to completing a health care representative and has a friend in mind. Reji palliative  will follow up with patient to complete documentation. We will continue to follow.       Advanced Directives: Patient does not have advance directive  Health Care Directive on file: No  Health Care Surrogate:      Palliative Performance Scale Score:    Comments:           Decisional Capacity: yes  Patient's understanding of illness: adequate  Patient goals of care:  Full code, full intervention      Thank you for this consult and allowing us to participate in patient's plan of care. Palliative Care Team will continue to follow patient.       I spent 46 minutes reviewing providers documentation, medication records, assessing and examining patient, discussing with family, answering questions, providing guidance about a plan of care, and coordinating care with other healthcare members. More than 50% of time spent face to face discussing disease education, current clinical status, and medication  management.     I spent an additional 22 minutes on advanced care planning, goals of care, and code status discussion.  I obtained consent for ACP discussion.     Ana Masters, APRN  5/2/2023

## 2023-05-02 NOTE — PLAN OF CARE
Goal Outcome Evaluation:   Patient is from [facility] but does not want to return. CM has given patient a list of facilities to choose from and precert was started 5/1/23.

## 2023-05-02 NOTE — PLAN OF CARE
"Goal Outcome Evaluation:      Assessment: Anthony Moss needs to increase out of bed activity. Would not agree to get out of bed until 3:30 PM this date. He presents with functional mobility impairments which indicate the need for skilled intervention. Tolerating session today without incident. Will continue to follow and progress as tolerated.     Plan/Recommendations:   Moderate Intensity Therapy recommended post-acute care. This is recommended as therapy feels the patient would require 3-4 days per week and wouldn't tolerate \"3 hour daily\" rehab intensity. SNF would be the preferred choice. If the patient does not agree to SNF, arrange HH or OP depending on home bound status. If patient is medically complex, consider LTACH.. Pt requires no DME at discharge.     Pt desires Skilled Rehab placement at discharge. Pt cooperative; agreeable to therapeutic recommendations and plan of care.            "

## 2023-05-02 NOTE — THERAPY TREATMENT NOTE
"Subjective: Pt agreeable to therapeutic plan of care.Pt expressing anger & frustration of his CA dx. Pt able to talk to this therapist & able to express his feelings in a healthy discussion. Pt stating he felt better after talking.  Cognition: oriented to Person, Place, Time and Situation    Objective:     Bed Mobility: Modified-Independent   Functional Transfers: CGA     Balance: sitting EOB Independent, standing =CGA.       Bathing: Supervision for UB bathing, sitting EOB.  ADL Position: edge of bed sitting  ADL Comments: good endurance.     Grooming: Modified-Independent for oral care & grooming.   ADL Position: edge of bed sitting  ADL Comments:     Vitals: WNL    Pain: 0 VAS  Education: Provided education on the importance of mobility in the acute care setting and Verbal/Tactile Cues.       Assessment: Anthony Moss presents with ADL impairments affecting function including balance and endurance / activity tolerance. Pt provided with emotional support today as he expressed feelings of anger & frustration of his CA dx & general medical status. He continues to demonstrated functioning below baseline abilities indicate the need for continued skilled intervention while inpatient. Tolerating session today without incident. Will continue to follow and progress as tolerated.     Plan/Recommendations:   Moderate Intensity Therapy recommended post-acute care. This is recommended as therapy feels the patient would require 3-4 days per week and wouldn't tolerate \"3 hour daily\" rehab intensity. SNF would be the preferred choice. If the patient does not agree to SNF, arrange HH or OP depending on home bound status. If patient is medically complex, consider LTACH.. Pt requires no DME at discharge.     Pt desires Skilled Rehab placement at discharge. Pt cooperative; agreeable to therapeutic recommendations and plan of care.     Modified Thea: N/A = No pre-op stroke/TIA    Post-Tx Position: Supine with HOB Elevated and " Call light and personal items within reach  PPE: gloves and gown

## 2023-05-02 NOTE — CASE MANAGEMENT/SOCIAL WORK
Continued Stay Note  RADHA Ruffin     Patient Name: Anthony Moss  MRN: [id number]  Today's Date: 5/2/2023    Admit Date: 4/28/2023    Plan: Referral to [facility] pending vs. return to [facility], precert approved 5/2.   Discharge Plan       Row Name 05/02/23 1501       Plan    Plan Referral to [facility] pending vs. return to [facility], precert approved 5/2.    Patient/Family in Agreement with Plan yes    Plan Comments CM visited patient to see if alternative facility choices were available. Pt reports he didn't have time to review yesterday 5/1. LSW followed back up with pt and obtained choices of 1) [facility] and 2) [facility]. CM messaged [facility] charlotte Salgado, who reports they do not have any beds available. Referral sent to [facility] and charlotte Alicia. Updated that pt's precert is approved.           Megan Naegele, RN      Office Phone: [phone]  Office Cell: [phone]
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
## 2023-05-02 NOTE — CASE MANAGEMENT/SOCIAL WORK
Case Management/Social Work    Patient Name:  Anthony Moss  YOB: 1955  MRN: [id number]  Admit Date:  4/28/2023 05/02/23 1323   Post Acute Pre-Cert Documentation   Date Post Acute Pre-Cert Completed 05/02/23  (Auth #: [id number])   Response Accepted   Post Acute Pre-Cert Initiated Comment RAMEZ verified SNF precert approval via [employer] portal, valid 5/3-5/9. Auth #: [id number]. Approval letter indexed to chart. RNTIFFANIE made aware.     Electronically signed by:  RAMEZ Hoffmann  05/02/23 13:23 EDT

## 2023-05-03 LAB
ABO GROUP BLD: NORMAL
ANION GAP SERPL CALCULATED.3IONS-SCNC: 9 MMOL/L (ref 5–15)
BACTERIA SPEC AEROBE CULT: NORMAL
BACTERIA SPEC AEROBE CULT: NORMAL
BLD GP AB SCN SERPL QL: NEGATIVE
BUN SERPL-MCNC: 22 MG/DL (ref 8–23)
BUN/CREAT SERPL: 12.8 (ref 7–25)
CALCIUM SPEC-SCNC: 8.1 MG/DL (ref 8.6–10.5)
CHLORIDE SERPL-SCNC: 108 MMOL/L (ref 98–107)
CO2 SERPL-SCNC: 24 MMOL/L (ref 22–29)
CREAT SERPL-MCNC: 1.72 MG/DL (ref 0.76–1.27)
DEPRECATED RDW RBC AUTO: 56.9 FL (ref 37–54)
EGFRCR SERPLBLD CKD-EPI 2021: 43.6 ML/MIN/1.73
ERYTHROCYTE [DISTWIDTH] IN BLOOD BY AUTOMATED COUNT: 18.5 % (ref 12.3–15.4)
GLUCOSE SERPL-MCNC: 110 MG/DL (ref 65–99)
HCT VFR BLD AUTO: 23.1 % (ref 37.5–51)
HCT VFR BLD AUTO: 25.9 % (ref 37.5–51)
HGB BLD-MCNC: 7.4 G/DL (ref 13–17.7)
HGB BLD-MCNC: 8.5 G/DL (ref 13–17.7)
MCH RBC QN AUTO: 26.6 PG (ref 26.6–33)
MCHC RBC AUTO-ENTMCNC: 31.9 G/DL (ref 31.5–35.7)
MCV RBC AUTO: 83.1 FL (ref 79–97)
PLATELET # BLD AUTO: 185 10*3/MM3 (ref 140–450)
PMV BLD AUTO: 8.4 FL (ref 6–12)
POTASSIUM SERPL-SCNC: 3.8 MMOL/L (ref 3.5–5.2)
RBC # BLD AUTO: 2.78 10*6/MM3 (ref 4.14–5.8)
RH BLD: POSITIVE
SODIUM SERPL-SCNC: 141 MMOL/L (ref 136–145)
T&S EXPIRATION DATE: NORMAL
WBC NRBC COR # BLD: 11 10*3/MM3 (ref 3.4–10.8)

## 2023-05-03 PROCEDURE — 85014 HEMATOCRIT: CPT | Performed by: HOSPITALIST

## 2023-05-03 PROCEDURE — 0 CEFEPIME PER 500 MG: Performed by: NURSE PRACTITIONER

## 2023-05-03 PROCEDURE — 86923 COMPATIBILITY TEST ELECTRIC: CPT

## 2023-05-03 PROCEDURE — 85018 HEMOGLOBIN: CPT | Performed by: HOSPITALIST

## 2023-05-03 PROCEDURE — 86900 BLOOD TYPING SEROLOGIC ABO: CPT

## 2023-05-03 PROCEDURE — P9016 RBC LEUKOCYTES REDUCED: HCPCS

## 2023-05-03 PROCEDURE — 25010000002 MORPHINE PER 10 MG: Performed by: STUDENT IN AN ORGANIZED HEALTH CARE EDUCATION/TRAINING PROGRAM

## 2023-05-03 PROCEDURE — P9040 RBC LEUKOREDUCED IRRADIATED: HCPCS

## 2023-05-03 PROCEDURE — 86900 BLOOD TYPING SEROLOGIC ABO: CPT | Performed by: INTERNAL MEDICINE

## 2023-05-03 PROCEDURE — 86901 BLOOD TYPING SEROLOGIC RH(D): CPT | Performed by: INTERNAL MEDICINE

## 2023-05-03 PROCEDURE — 85027 COMPLETE CBC AUTOMATED: CPT | Performed by: STUDENT IN AN ORGANIZED HEALTH CARE EDUCATION/TRAINING PROGRAM

## 2023-05-03 PROCEDURE — 36430 TRANSFUSION BLD/BLD COMPNT: CPT

## 2023-05-03 PROCEDURE — 80048 BASIC METABOLIC PNL TOTAL CA: CPT | Performed by: INTERNAL MEDICINE

## 2023-05-03 PROCEDURE — 86850 RBC ANTIBODY SCREEN: CPT | Performed by: INTERNAL MEDICINE

## 2023-05-03 PROCEDURE — 36415 COLL VENOUS BLD VENIPUNCTURE: CPT | Performed by: STUDENT IN AN ORGANIZED HEALTH CARE EDUCATION/TRAINING PROGRAM

## 2023-05-03 RX ORDER — FERROUS SULFATE TAB EC 324 MG (65 MG FE EQUIVALENT) 324 (65 FE) MG
324 TABLET DELAYED RESPONSE ORAL
Status: DISCONTINUED | OUTPATIENT
Start: 2023-05-04 | End: 2023-05-05 | Stop reason: HOSPADM

## 2023-05-03 RX ADMIN — METRONIDAZOLE 500 MG: 500 TABLET ORAL at 05:11

## 2023-05-03 RX ADMIN — TAMSULOSIN HYDROCHLORIDE 0.4 MG: 0.4 CAPSULE ORAL at 08:38

## 2023-05-03 RX ADMIN — METRONIDAZOLE 500 MG: 500 TABLET ORAL at 21:58

## 2023-05-03 RX ADMIN — SODIUM CHLORIDE, POTASSIUM CHLORIDE, SODIUM LACTATE AND CALCIUM CHLORIDE 75 ML/HR: 600; 310; 30; 20 INJECTION, SOLUTION INTRAVENOUS at 05:11

## 2023-05-03 RX ADMIN — CEFEPIME 2 G: 2 INJECTION, POWDER, FOR SOLUTION INTRAVENOUS at 08:38

## 2023-05-03 RX ADMIN — Medication 10 ML: at 08:38

## 2023-05-03 RX ADMIN — TAMSULOSIN HYDROCHLORIDE 0.4 MG: 0.4 CAPSULE ORAL at 21:58

## 2023-05-03 RX ADMIN — HYDROXYZINE HYDROCHLORIDE 25 MG: 25 TABLET, FILM COATED ORAL at 21:57

## 2023-05-03 RX ADMIN — METRONIDAZOLE 500 MG: 500 TABLET ORAL at 13:48

## 2023-05-03 RX ADMIN — MORPHINE SULFATE 2 MG: 2 INJECTION, SOLUTION INTRAMUSCULAR; INTRAVENOUS at 23:21

## 2023-05-03 RX ADMIN — Medication 10 ML: at 21:58

## 2023-05-03 RX ADMIN — MORPHINE SULFATE 2 MG: 2 INJECTION, SOLUTION INTRAMUSCULAR; INTRAVENOUS at 18:33

## 2023-05-03 RX ADMIN — CEFEPIME 2 G: 2 INJECTION, POWDER, FOR SOLUTION INTRAVENOUS at 18:13

## 2023-05-03 NOTE — PROGRESS NOTES
PROGRESS NOTE      Patient Name: Anthony Moss  : 1955  MRN: [id number]  Primary Care Physician: Provider, No Known  Date of admission: 2023    Patient Care Team:  Provider, No Known as PCP - General        Subjective   Subjective:     Feels better  Review of systems:  Weak otherwise negative.tolerating trujillo catheter.      Allergies:  No Known Allergies    Objective   Exam:     Vital Signs  Temp:  [97.3 °F (36.3 °C)-97.9 °F (36.6 °C)] 97.8 °F (36.6 °C)  Heart Rate:  [] 106  Resp:  [15-26] 16  BP: ()/(63-85) 123/85  SpO2:  [94 %-98 %] 95 %  on   ;   Device (Oxygen Therapy): room air  Body mass index is 25.47 kg/m².    General:     male in no acute distress.    Head:      Normocephalic and atraumatic.    Eyes:      PERRL/EOM intact, conjunctiva and sclera clear with out nystagmus.    Neck:      No masses, thyromegaly,  trachea central with normal respiratory effort   Lungs:    Clear bilaterally to auscultation.    Heart:      Regular rate and rhythm, no murmur no gallop  Abd:        Soft, nontender, not distended, bowel sounds positive, no shifting dullness   Pulses:   Pulses palpable  Extr:       + edema.    Neuro:    No focal deficits.   alert oriented x3  Skin:       Intact without lesions or rashes.    Psych:    Alert and cooperative; normal mood and affect; .      Results Review:  I have personally reviewed most recent Data :  CBC    Results from last 7 days   Lab Units 23  0119 23  0414 23  0849 23  04123  1438   WBC 10*3/mm3 11.00* 11.30* 9.90 9.70 10.90* 13.00*   HEMOGLOBIN g/dL 7.4* 8.1* 7.9* 8.1* 7.7* 8.7*   PLATELETS 10*3/mm3 185 198 177 199 204 270     CMP   Results from last 7 days   Lab Units 23  0119 23  0414 23  0727 23  0849 23  0417 23  1438   SODIUM mmol/L 141 143 144 143 140 140   POTASSIUM mmol/L 3.8 3.7 3.6 3.7 3.8 4.3   CHLORIDE mmol/L 108* 109* 112* 110* 108* 105   CO2 mmol/L 24.0  23.0 20.0* 21.0* 21.0* 22.0   BUN mg/dL 22 22 25* 35* 45* 50*   CREATININE mg/dL 1.72* 1.69* 1.70* 2.01* 2.20* 2.39*   GLUCOSE mg/dL 110* 126* 101* 140* 100* 131*   ALBUMIN g/dL  --   --   --  2.7*  --  3.0*   BILIRUBIN mg/dL  --   --   --  0.3  --  0.3   ALK PHOS U/L  --   --   --  207*  --  307*   AST (SGOT) U/L  --   --   --  23  --  32   ALT (SGPT) U/L  --   --   --  16  --  25     ABG      No radiology results for the last day    Results for orders placed during the hospital encounter of 01/05/22    Adult Transthoracic Echo Complete w/ Color, Spectral and Contrast if Necessary Per Protocol    Interpretation Summary  · Left ventricular ejection fraction appears to be 56 - 60%.  · The right ventricular cavity is moderately dilated.  · No pericardial effusion noted    Scheduled Meds:cefepime, 2 g, Intravenous, Q12H  metroNIDAZOLE, 500 mg, Oral, Q8H  sodium chloride, 10 mL, Intravenous, Q12H  tamsulosin, 0.4 mg, Oral, BID      Continuous Infusions:lactated ringers, 75 mL/hr, Last Rate: 75 mL/hr (05/03/23 0511)  Pharmacy to Dose Cefepime,       PRN Meds:•  HYDROcodone-acetaminophen  •  hydrOXYzine  •  Morphine  •  nitroglycerin  •  ondansetron **OR** ondansetron  •  Pharmacy to Dose Cefepime  •  sodium chloride  •  sodium chloride    Assessment & Plan   Assessment and Plan:         Abdominal pain, unspecified abdominal location    Moderate Malnutrition (HCC)    ASSESSMENT:  • Acute kidney injury, stage III, status post radical cystectomy,ileal conduit.  • Metabolic acidosis secondary to CKD start low-dose sodium bicarbonate  • History of bladder cancer, recent cystectomy  • Suprapubic catheter.  Infected abdomen wall with significant purulent drainage.  Cultures are growing Streptococcus anginosus, Enterobacter cloacae complex, and Morganella Morgagni  -Blood cultures are negative so far     S/p radical cystectomy with ileal conduit on March 28, 2023 for advanced prostate cancer.  That followed with wound dehiscence  repair by general surgery service on April 6, 2023  • History of alcohol use,  • Chronic back pain,using NSAID in the past           Plan:      • Continue iv hydration with ringers lactate.  As patient blood pressure is still borderline continue to decrease IV fluid will stop IV fluid if patient started eating and drinking better  • At this time patient with history of bladder cancer status post cystectomy urine output is adequate ,renal function acceptable but at risk of atn secondary to underlying infection.  • Suprapubic urostomy placed  • Renal function slowly stabilizing from a peak of 2.2 with underlying  progressive CKD   • Significant acidosis noted continue high dose of sodium bicarbonate we will follow-up tomorrow.  • Abx per inf disease for abd wall wound infxn,cultures.  • aEchocardiogram done last time showed ejection fraction 56 to 60%  • Repeat labs tomorrow morning            Electronically signed by Jamie Houston MD,   [facility] kidney consultant  [phone]  5/3/2023  11:29 EDT

## 2023-05-03 NOTE — PLAN OF CARE
Patient received a unit of PRBC, recheck hemoglobin collected and pending. Vitals WNL. Will continue to monitor.

## 2023-05-03 NOTE — NURSING NOTE
WOCN note:    WOCN follow up for wound VAC dressing change to midline abdominal wound. Patient is s/p cystectomy with ileal conduit urostomy placement on 3/28/23 and exploratory laparotomy with repair of wound dehiscence on 4/6/23. Patient was readmitted on 4/28/23 with abdominal pain.      Approximately 100cc of thin dark serosanguinous exudate is noted in the VAC canister.   The wound bed is beefy red and granular. The depth at the distal end of the wound is smaller today. There is a small amount of loose yellow fibrous tissue at the base.   The wound was cleansed and soaked with Vashe wound solution. Skin barrier spray was applied to the eva-wound skin. One piece of black granufoam was wrapped in fenestrated silicone sheet and packed into the wound. A second piece of foam was used to support the trac pad. Drape was applied to all intact skin. The dressing was reattached to 125mmHg continuous negative pressure with good seal obtained. Patient tolerated well.   Urostomy pouch is intact to RLQ and draining to bedside drainage bag. We will continue to follow.

## 2023-05-03 NOTE — PROGRESS NOTES
Nutrition Services    Patient Name: Anthony Moss  YOB: 1955  MRN: [id number]  Admission date: 4/28/2023    PPE Documentation        PPE Worn By Provider Did not enter room for this encounter   PPE Worn By Patient  N/A     PROGRESS NOTE      Encounter Information: Checking in on PO intake.        PO Diet: Diet: Regular/House Diet; Texture: Regular Texture (IDDSI 7); Fluid Consistency: Thin (IDDSI 0)   PO Supplements: -   PO Intake:  50%       Current nutrition support: -   Nutrition support review: -       Labs (reviewed below): Reviewed, management per attending.        GI Function:  Last documented BM on 4/30       Nutrition Intervention Updates: Addition of Boost Plus BID (Provides 720 kcals, 28 g protein if consumed).       Results from last 7 days   Lab Units 05/03/23  0119 05/02/23  0414 05/01/23  0727 04/30/23  0849 04/29/23  0417 04/28/23  1438   SODIUM mmol/L 141 143 144 143   < > 140   POTASSIUM mmol/L 3.8 3.7 3.6 3.7   < > 4.3   CHLORIDE mmol/L 108* 109* 112* 110*   < > 105   CO2 mmol/L 24.0 23.0 20.0* 21.0*   < > 22.0   BUN mg/dL 22 22 25* 35*   < > 50*   CREATININE mg/dL 1.72* 1.69* 1.70* 2.01*   < > 2.39*   CALCIUM mg/dL 8.1* 8.7 8.9 8.5*   < > 9.2   BILIRUBIN mg/dL  --   --   --  0.3  --  0.3   ALK PHOS U/L  --   --   --  207*  --  307*   ALT (SGPT) U/L  --   --   --  16  --  25   AST (SGOT) U/L  --   --   --  23  --  32   GLUCOSE mg/dL 110* 126* 101* 140*   < > 131*    < > = values in this interval not displayed.     Results from last 7 days   Lab Units 05/03/23  0119   HEMOGLOBIN g/dL 7.4*   HEMATOCRIT % 23.1*     COVID19   Date Value Ref Range Status   03/01/2023 Not Detected Not Detected - Ref. Range Final     Lab Results   Component Value Date    HGBA1C 4.8 03/01/2023       RD to follow up per protocol.    Electronically signed by:  Deneen Pantoja RD  05/03/23 07:49 EDT

## 2023-05-03 NOTE — CASE MANAGEMENT/SOCIAL WORK
Continued Stay Note   [place]     Patient Name: Anthony Moss  MRN: [id number]  Today's Date: 5/3/2023    Admit Date: 4/28/2023    Plan: Return to [facility], precert approved 5/2 vs. alternative SNF placement.   Discharge Plan       Row Name 05/03/23 1639       Plan    Plan Return to [facility], skilled, precert approved 5/2 vs. alternative SNF placement.    Patient/Family in Agreement with Plan yes    Plan Comments CM contacted [facility] liaison Maral regarding referral. Notified that she would be in this AM to speak with pt. Received update this afternoon that d/t cost, they would not be able to accept pt. CM met with pt at bedside and notified of him denials to [facility] and that he had not provided any other choices. Safe discharge plan in place would be to return to [facility]. Pt states he does not want to return but needs to speak to friends Maximo and Annita. Assisted pt with contacting them but had to leave voicemails. CM contacted Maximo directly and reviewed plan. He states he/Annita will be in later today and will review list with pt and contact CM back. Still have not received return phone call at this time. Pt received unit of PRBC's today. Anticipate discharge tomorrow back to [facility] unless other SNF choices provided.             Megan Naegele, RN     Office Phone: [phone]  Office Cell: [phone]

## 2023-05-03 NOTE — PROGRESS NOTES
[facility] Medicine Services Daily Progress Note    Patient Name: Anthony Moss  : 1955  MRN: [id number]  Primary Care Physician:  Maria Guadalupe, No Known  Date of admission: 2023      Subjective      Chief Complaint: Abdominal pain    Subjective  2023; patient still having some abdominal discomfort but better, denies for any other active complaint.  Resting in the bed, no family at bedside, spoke with the RN.  2023; patient is resting in the bed denies any nausea vomiting or abdominal pain tolerating diet, spoke with the bedside RN.  5/3/2023; afebrile vital signs are stable, resting in the bed, denies any chest pain or shortness of breath, tolerating diet no new symptoms endorsed, hemoglobin 7.4 the patient is getting 1 unit PRBC as per hematology oncology, recheck posttransfusion.  ROS   Abdominal pain, rest of systems are negative    Objective      Vitals:   Temp:  [97.6 °F (36.4 °C)-98.1 °F (36.7 °C)] 97.8 °F (36.6 °C)  Heart Rate:  [] 105  Resp:  [15-26] 16  BP: (104-123)/(63-85) 117/79    Physical Exam  Vitals and nursing note reviewed.   Constitutional:       General: He is not in acute distress.     Appearance: Normal appearance. He is well-developed. He is not ill-appearing, toxic-appearing or diaphoretic.   HENT:      Head: Normocephalic and atraumatic.      Right Ear: Ear canal and external ear normal.      Left Ear: Ear canal and external ear normal.      Nose: Nose normal. No congestion or rhinorrhea.      Mouth/Throat:      Mouth: Mucous membranes are moist.      Pharynx: No oropharyngeal exudate.   Eyes:      General: No scleral icterus.        Right eye: No discharge.         Left eye: No discharge.      Extraocular Movements: Extraocular movements intact.      Conjunctiva/sclera: Conjunctivae normal.      Pupils: Pupils are equal, round, and reactive to light.   Neck:      Thyroid: No thyromegaly.      Vascular: No JVD.      Trachea: No tracheal  deviation.   Cardiovascular:      Rate and Rhythm: Normal rate and regular rhythm.      Pulses: Normal pulses.      Heart sounds: Normal heart sounds. No murmur heard.    No friction rub. No gallop.   Pulmonary:      Effort: Pulmonary effort is normal. No respiratory distress.      Breath sounds: Normal breath sounds. No stridor. No wheezing, rhonchi or rales.   Chest:      Chest wall: No tenderness.   Abdominal:      Tenderness: There is no rebound.      Comments: Abdominal wound dehiscence with wound VAC, ileal conduit   Musculoskeletal:      Cervical back: No muscular tenderness.   Skin:     General: Skin is warm and dry.      Coloration: Skin is not jaundiced or pale.      Findings: No bruising, erythema or rash.   Neurological:      General: No focal deficit present.      Mental Status: He is alert and oriented to person, place, and time. Mental status is at baseline.      Cranial Nerves: No cranial nerve deficit.      Sensory: No sensory deficit.      Motor: No weakness or abnormal muscle tone.      Coordination: Coordination normal.   Psychiatric:         Mood and Affect: Mood normal.         Behavior: Behavior normal.         Thought Content: Thought content normal.         Judgment: Judgment normal.        Abdomen;Soft,nontender,  Wound lower portion of fascia with approx 2-3 cm defect with purulent drainage remainder of wound granulating well, Urostomy p/p/p           Result Review    Result Review:  I have personally reviewed the results from the time of this admission to 5/3/2023 16:24 EDT and agree with these findings:  [x]  Laboratory  [x]  Microbiology  [x]  Radiology  [x]  EKG/Telemetry   []  Cardiology/Vascular   []  Pathology  []  Old records  []  Other:  Most notable findings include:     Wounds (last 24 hours)     LDA Wound     Row Name 05/03/23 0800 05/02/23 2053          Wound 04/06/23 2243 abdomen Incision    Wound - Properties Group Placement Date: 04/06/23  -PH Placement Time: 2243  -PH  Present on Hospital Admission: Y  -PH Location: abdomen  -PH Primary Wound Type: Incision  -PH    Closure STEFF  -CP STEFF  -KF     Base dressing in place, unable to visualize  wound vac in place  -CP dressing in place, unable to visualize  -KF     Retired Wound - Properties Group Placement Date: 04/06/23  -PH Placement Time: 2243  -PH Present on Hospital Admission: Y  -PH Location: abdomen  -PH Primary Wound Type: Incision  -PH    Retired Wound - Properties Group Date first assessed: 04/06/23  -PH Time first assessed: 2243  -PH Present on Hospital Admission: Y  -PH Location: abdomen  -PH Primary Wound Type: Incision  -PH          User Key  (r) = Recorded By, (t) = Taken By, (c) = Cosigned By    Initials Name Provider Type     Carolyn Marie, RN Registered Nurse    Clara Quintanilla LPN Licensed Nurse    Denia Correa RN Registered Nurse            CT Abdomen Pelvis Without Contrast    Result Date: 4/28/2023  Impression: 1. Postsurgical changes of cystoprostatectomy with bilateral nephroureteral stent placement with ileal conduit and right lower quadrant urostomy. Moderate left hydronephrosis. No hydronephrosis on right. 2. Postsurgical changes at midline abdominal wall with multiple small foci of subcutaneous air. No organized fluid collection or abscess. No recurrent bowel herniation. 3. Fluid and air-filled mildly dilated proximal small bowel loops measuring up to 4 cm could relate to postoperative ileus, low-grade partial obstruction difficult to exclude. No discrete transition point. 4. Development of new sclerotic osseous lesions most concerning for osseous metastases. 5. Additional incidental chronic findings above. Electronically Signed: Kuldip Daniels  4/28/2023 6:02 PM EDT  Workstation ID: [id number]        Assessment & Plan      Brief Patient Summary:  Anthony Moss is a 67 y.o. male who       cefepime, 2 g, Intravenous, Q12H  metroNIDAZOLE, 500 mg, Oral, Q8H  sodium chloride, 10 mL,  Intravenous, Q12H  tamsulosin, 0.4 mg, Oral, BID       lactated ringers, 50 mL/hr, Last Rate: 50 mL/hr (05/03/23 1151)  Pharmacy to Dose Cefepime,          Active Hospital Problems:  Active Hospital Problems    Diagnosis    • **Abdominal pain, unspecified abdominal location    • Moderate Malnutrition (HCC)      Plan:     #Abdominal Wound infection    - s/p Radical cystectomy and ileal conduit urinary diversion,   complicated by wound dehiscence    - s/p ex-lap with repair of wound dehiscence on 4/6/23    - hospitalized at [facility] from 03/28-4/19/23    - abdominal pain and malodorous pus like discharge    - Due to recent hospital stay, broad spectrum abx coverage    - Cefepime, pharm to dose end of treatment 5/12/2023    - Flagyl 500mg IV q8h switched to p.o. Flagyl 500 mg 3 times daily end of treatment 5/12/2023    - blood cultures x2 no growth to date.    - wound cultures Enterobacter cloacae plus Streptococcus anginosus, and Morganella morgagnii    - general surgery consulted ... tolerating regular diet.    - WOCN    - LR @ 50 ml per hour.    - pain control    - pt/ot    -CM  --Wound VAC in place  - consulted ID .... Antibiotics as above.     #UTI    - UA suggests UTI    - urine culture 50,000 CFU per mL of mixed elizabeth    - blood cultures x2 no growth to date.    - wbc 13.0, trend     - lactate 0.8         #Bladder cancer  #Osseous mets    - s/p surgery    - CT a/p shows postsurgical changes of cystoprostatectomy with bilateral nephroureteral stent placement with ileal conduit and RLQ urostomy.  Mode left hydronephrosis. Development of new sclerotic osseous lesions concerning for bone mets    - heme/onc consulted and following, outpatient management planned.    - pain control     #Ileus    - CT a/p shows possible post-op ileus resolved    - NPO--> advance to diet as tolerated.    - LR @ 75/hr    - surgery consulted and following.     #CARLOS ALBERTO on CKD continue to improve.  Nephrology managing  #Metabolic acidosis; serum  bicarbonate  #Hydronephrosis, left    - Cr 2.39 on admission--> 1.77--> 1.69--> 1.49,, base is around 1.3    - suspect 2/2 hydro    - evident on CT a/p    - urology following    - LR @ 75/hr  -- nephrology managing..     #Anemia   - hgb 8.7 on admission, base around 9.0    - checked iron and iron saturation low, iron deficiency, oral ferrous sulfate 325 mg daily.    - no overt bleeding, follow labs  --Trended down to 7.4 status post 1 unit of PRBC transfusion on 5/3/2023  #Anxiety    - Vistaril PRN        DVT prophylaxis: SCDs      DVT prophylaxis:  Mechanical DVT prophylaxis orders are present.    CODE STATUS:    Code Status (Patient has no pulse and is not breathing): CPR (Attempt to Resuscitate)  Medical Interventions (Patient has pulse or is breathing): Full Support      Disposition:  I expect patient to be discharged as per clinical course..    This patient has been examined wearing appropriate Personal Protective Equipment and discussed with hospital infection control department. 05/03/23      Electronically signed by Shira Meadows MD, 05/03/23, 16:24 EDT.  [facility] Hospitalist Team

## 2023-05-03 NOTE — PROGRESS NOTES
Palliative Care Social Work Progress Note    Code Status:full code    Goals of Care: Full Treatment    Narrative: Palliative care  met with pt to assess for goals of care.  Advance care planning discussed, and pt is agreeable to completing a healthcare surrogate naming Maximo Isaacs. Form completed and uploaded into chart.  Mr. Isaacs was called to confirm his willingness and he did express that he could be pt's healthcare surrogate.  I discussed completing living will with pt as well, but he reports he wants to discuss with his HCS first before completing anything.  Pt reports that he is aware that he has stage 4 cancer, and that his prognosis is poor.  However, pt wishes to make as much of a recovery as he possibly can at this point. Outpatient palliative services were discussed and pt is amenable to referral to [facility].  Emotional support provided.    Plan:  -[facility] referral  -Will continue to follow.          RAMEZ Pollard

## 2023-05-03 NOTE — PROGRESS NOTES
Infectious Diseases Progress Note      LOS: 5 days   Patient Care Team:  Provider, No Known as PCP - General    Chief Complaint: Drainage from abdominal wall    Subjective       The patient has been afebrile for the last 24 hours.  The patient is on room air, hemodynamically stable, and is tolerating antimicrobial therapy.  Patient states he is feeling better today-is very fatigued today      Review of Systems:   Review of Systems   Constitutional: Positive for fatigue.   HENT: Negative.    Eyes: Negative.    Respiratory: Negative.    Cardiovascular: Negative.    Gastrointestinal: Positive for abdominal pain.   Endocrine: Negative.    Genitourinary: Negative.    Musculoskeletal: Negative.    Skin: Positive for wound.   Neurological: Negative.    Psychiatric/Behavioral: Negative.    All other systems reviewed and are negative.       Objective     Vital Signs  Temp:  [97.6 °F (36.4 °C)-98.1 °F (36.7 °C)] 97.8 °F (36.6 °C)  Heart Rate:  [] 105  Resp:  [15-26] 16  BP: (104-123)/(63-85) 117/79    Physical Exam:  Physical Exam  Vitals and nursing note reviewed.   Constitutional:       General: He is not in acute distress.     Appearance: Normal appearance. He is well-developed and normal weight. He is not diaphoretic.   HENT:      Head: Normocephalic and atraumatic.   Eyes:      Conjunctiva/sclera: Conjunctivae normal.      Pupils: Pupils are equal, round, and reactive to light.   Cardiovascular:      Rate and Rhythm: Normal rate and regular rhythm.      Heart sounds: Normal heart sounds, S1 normal and S2 normal.   Pulmonary:      Effort: Pulmonary effort is normal. No respiratory distress.      Breath sounds: Normal breath sounds. No stridor. No wheezing or rales.   Abdominal:      General: Bowel sounds are normal. There is no distension.      Palpations: Abdomen is soft. There is no mass.      Tenderness: There is abdominal tenderness. There is no guarding.      Comments: Presence of ileal conduit on the right  lower quadrant    Mid abdomen incision with purulent drainage.  RN states there is only one small area that is draining purulent drainage today.  Currently with dressing in place   Musculoskeletal:         General: No deformity. Normal range of motion.      Cervical back: Neck supple.   Skin:     General: Skin is warm and dry.      Coloration: Skin is not pale.      Findings: No erythema or rash.   Neurological:      Mental Status: He is alert and oriented to person, place, and time.      Cranial Nerves: No cranial nerve deficit.   Psychiatric:         Mood and Affect: Mood normal.       Wound VAC dressing changed today       Results Review:    I have reviewed all clinical data, test, lab, and imaging results.     Radiology  No Radiology Exams Resulted Within Past 24 Hours    Cardiology    Laboratory    Results from last 7 days   Lab Units 05/03/23  0119 05/02/23  0414 05/01/23  0727 04/30/23  0849 04/29/23  0417 04/28/23  1438   WBC 10*3/mm3 11.00* 11.30* 9.90 9.70 10.90* 13.00*   HEMOGLOBIN g/dL 7.4* 8.1* 7.9* 8.1* 7.7* 8.7*   HEMATOCRIT % 23.1* 25.2* 25.1* 25.2* 24.4* 27.3*   PLATELETS 10*3/mm3 185 198 177 199 204 270     Results from last 7 days   Lab Units 05/03/23  0119 05/02/23  0414 05/01/23  0727 04/30/23  0849 04/29/23  0417 04/28/23  1438   SODIUM mmol/L 141 143 144 143 140 140   POTASSIUM mmol/L 3.8 3.7 3.6 3.7 3.8 4.3   CHLORIDE mmol/L 108* 109* 112* 110* 108* 105   CO2 mmol/L 24.0 23.0 20.0* 21.0* 21.0* 22.0   BUN mg/dL 22 22 25* 35* 45* 50*   CREATININE mg/dL 1.72* 1.69* 1.70* 2.01* 2.20* 2.39*   GLUCOSE mg/dL 110* 126* 101* 140* 100* 131*   ALBUMIN g/dL  --   --   --  2.7*  --  3.0*   BILIRUBIN mg/dL  --   --   --  0.3  --  0.3   ALK PHOS U/L  --   --   --  207*  --  307*   AST (SGOT) U/L  --   --   --  23  --  32   ALT (SGPT) U/L  --   --   --  16  --  25   CALCIUM mg/dL 8.1* 8.7 8.9 8.5* 8.9 9.2                 Microbiology   Microbiology Results (last 10 days)     Procedure Component Value -  Date/Time    MRSA Screen, PCR (Inpatient) - Swab, Nares [[id number]]  (Normal) Collected: 04/29/23 1238    Lab Status: Final result Specimen: Swab from Nares Updated: 04/29/23 1448     MRSA PCR No MRSA Detected    Narrative:      The negative predictive value of this diagnostic test is high and should only be used to consider de-escalating anti-MRSA therapy. A positive result may indicate colonization with MRSA and must be correlated clinically.    Wound Culture - Wound, Abdominal Cavity [[id number]]  (Abnormal)  (Susceptibility) Collected: 04/29/23 0428    Lab Status: Final result Specimen: Wound from Abdominal Cavity Updated: 05/02/23 0957     Wound Culture Moderate growth (3+) Streptococcus anginosus      Rare Enterobacter cloacae complex      Rare Morganella morganii ssp morganii     Gram Stain Many (4+) WBCs per low power field      Rare (1+) Gram negative bacilli    Susceptibility      Streptococcus anginosus      ALEX      Ceftriaxone Susceptible      Penicillin G Susceptible      Vancomycin Susceptible                       Susceptibility      Enterobacter cloacae complex      ALEX      Cefepime Susceptible      Ceftazidime Susceptible      Ceftriaxone Intermediate      Gentamicin Susceptible      Levofloxacin Susceptible      Piperacillin + Tazobactam Susceptible      Tetracycline Susceptible      Trimethoprim + Sulfamethoxazole Susceptible                       Susceptibility      Morganella morganii ssp morganii      ALEX      Ampicillin Resistant     Ampicillin + Sulbactam Susceptible      Cefepime Susceptible      Ceftazidime Susceptible      Ceftriaxone Susceptible      Gentamicin Susceptible      Levofloxacin Intermediate      Piperacillin + Tazobactam Susceptible      Tetracycline Susceptible      Trimethoprim + Sulfamethoxazole Resistant                      Susceptibility Comments     Enterobacter cloacae complex    Cefazolin sensitivity will not be reported for Enterobacteriaceae in non-urine isolates.  If cefazolin is preferred, please call the microbiology lab to request an E-test.  With the exception of urinary-sourced infections, aminoglycosides should not be used as monotherapy.    Morganella morganii ssp morganii    Cefazolin sensitivity will not be reported for Enterobacteriaceae in non-urine isolates. If cefazolin is preferred, please call the microbiology lab to request an E-test.  With the exception of urinary-sourced infections, aminoglycosides should not be used as monotherapy.             Urine Culture - Urine, Urine, Clean Catch [[id number]] Collected: 04/28/23 1648    Lab Status: Final result Specimen: Urine, Clean Catch Updated: 04/29/23 1459     Urine Culture 50,000 CFU/mL Mixed Nidia Isolated    Narrative:      Specimen contains mixed organisms of questionable pathogenicity suggestive of contamination. If symptoms persist, suggest recollection.  Colonization of the urinary tract without infection is common. Treatment is discouraged unless the patient is symptomatic, pregnant, or undergoing an invasive urologic procedure.    Blood Culture - Blood, Arm, Left [[id number]]  (Normal) Collected: 04/28/23 1538    Lab Status: Final result Specimen: Blood from Arm, Left Updated: 05/03/23 1545     Blood Culture No growth at 5 days    Blood Culture - Blood, Arm, Right [[id number]]  (Normal) Collected: 04/28/23 1438    Lab Status: Final result Specimen: Blood from Arm, Right Updated: 05/03/23 1445     Blood Culture No growth at 5 days          Medication Review:       Schedule Meds  cefepime, 2 g, Intravenous, Q12H  [START ON 5/4/2023] ferrous sulfate, 324 mg, Oral, Daily With Breakfast  metroNIDAZOLE, 500 mg, Oral, Q8H  sodium chloride, 10 mL, Intravenous, Q12H  tamsulosin, 0.4 mg, Oral, BID        Infusion Meds  lactated ringers, 50 mL/hr, Last Rate: 50 mL/hr (05/03/23 1151)  Pharmacy to Dose Cefepime,         PRN Meds  •  HYDROcodone-acetaminophen  •  hydrOXYzine  •  Morphine  •  nitroglycerin  •  ondansetron  **OR** ondansetron  •  Pharmacy to Dose Cefepime  •  sodium chloride  •  sodium chloride        Assessment & Plan       Antimicrobial Therapy   1.  IV cefepime        2.  IV/p.o. Flagyl        3.          4.        5.            Assessment     Infected abdomen wall with significant purulent drainage.  Cultures are growing Streptococcus anginosus, Enterobacter cloacae complex, and Morganella Morgagni  -Blood cultures are negative so far     S/p radical cystectomy with ileal conduit on March 28, 2023 for advanced prostate cancer.  That followed with wound dehiscence repair by general surgery service on April 6, 2023     History of alcohol abuse     Chronic kidney disease     Plan     Continue IV cefepime 2 g every 12 hours for 2 weeks total treatment-last day on 5/12/2023  Continue p.o. Flagyl 500 mg 3 times daily for 2 weeks of treatment  Patient already has a PICC line-please remove once IV antibiotics are completed   CBC and creatinine in 4-5 days     Okay to discharge from Infectious Disease standpoint  Not much more to add from infectious disease standpoint-we will sign off at this time-please call with any questions.    Please fax all post discharge lab results, imaging studies and correspondence to this fax number [phone]  For any question or concern please contact our service number [phone]       DANIS Lisa  05/03/23  16:45 EDT    Note is dictated utilizing voice recognition software/Dragon

## 2023-05-03 NOTE — PROGRESS NOTES
"    [facility] Medicine Services   Daily Progress Note    Patient Name: Anthony Moss  : 1955  MRN: [id number]  Primary Care Physician:  Provider, No Known  Date of admission: 2023  Date and Time of Service: 5/3/2023 at 0 930      Subjective      Chief Complaint: Abdominal pain      Brief Patient Summary:  Anthony Moss is a 67 y.o. male that underwent radical cystectomy and ileal conduit on 3/28/2023 and had complication with wound dehiscence requiring exploratory lap repair on 2023, presented to the emergency department with complaint of abdominal pain and malodorous abdominal wound drainage.  CT abdomen pelvis without fascial defect, potential ileus on CT has since resolved, surgery consulted, no plan for surgical intervention.  Patient with acute kidney injury with creatinine 2.4 on CKD, nephrology following, metabolic acidosis resolved, creatinine stable at 1.7.  Concern for development of new sclerotic osseous lesions, oncology following, will follow as an outpatient for potential hormonal manipulation therapy.  Wound cultures with Streptococcus anginosus, Enterobacter cloacae complex, and Morganella Morgagni, on IV cefepime and p.o. Flagyl for total of 2 weeks of treatment per Infectious Disease specialist, PICC line in place to be removed after IV antibiotics completed.    Patient Reports doing okay    Subjective:  Symptoms:  No shortness of breath or chest pain.    Diet:  No nausea.          Objective      Vitals:   Temp:  [97.6 °F (36.4 °C)-98.1 °F (36.7 °C)] 97.8 °F (36.6 °C)  Heart Rate:  [] 105  Resp:  [15-26] 16  BP: (104-123)/(63-85) 117/79  Objective:  General Appearance:  Comfortable.    Vital signs: (most recent): Blood pressure 117/79, pulse 105, temperature 97.8 °F (36.6 °C), temperature source Oral, resp. rate 16, height 188 cm (74\"), weight 90 kg (198 lb 6.6 oz), SpO2 97 %.    Lungs:  Normal effort and normal respiratory rate.  Breath sounds clear to " auscultation.    Heart: Normal rate.  Regular rhythm.  S1 normal and S2 normal.    Abdomen: Abdomen is soft and non-distended.    Extremities: There is no dependent edema.    Neurological: Patient is alert and oriented to person, place and time.    Skin:  Warm and dry.  (Abdominal wound vac in place)                  Result Review    Result Review:  I have personally reviewed the results from the time of this admission to 5/3/2023 16:26 EDT and agree with these findings:  []  Laboratory  []  Microbiology  []  Radiology  []  EKG/Telemetry   []  Cardiology/Vascular   []  Pathology  []  Old records  []  Other:  Most notable findings include: See summary and plan    Wounds (last 24 hours)     LDA Wound     Row Name 05/03/23 0800 05/02/23 2053          Wound 04/06/23 2243 abdomen Incision    Wound - Properties Group Placement Date: 04/06/23  -PH Placement Time: 2243  -PH Present on Hospital Admission: Y  -PH Location: abdomen  -PH Primary Wound Type: Incision  -PH    Closure STEFF  -CP STEFF  -KF     Base dressing in place, unable to visualize  wound vac in place  -CP dressing in place, unable to visualize  -KF     Retired Wound - Properties Group Placement Date: 04/06/23  -PH Placement Time: 2243  -PH Present on Hospital Admission: Y  -PH Location: abdomen  -PH Primary Wound Type: Incision  -PH    Retired Wound - Properties Group Date first assessed: 04/06/23  -PH Time first assessed: 2243  -PH Present on Hospital Admission: Y  -PH Location: abdomen  -PH Primary Wound Type: Incision  -PH          User Key  (r) = Recorded By, (t) = Taken By, (c) = Cosigned By    Initials Name Provider Type    PH Carolyn Marie, RN Registered Nurse    Clara Quintanilla LPN Licensed Nurse    Denia Correa RN Registered Nurse                  Assessment & Plan        cefepime, 2 g, Intravenous, Q12H  metroNIDAZOLE, 500 mg, Oral, Q8H  sodium chloride, 10 mL, Intravenous, Q12H  tamsulosin, 0.4 mg, Oral, BID       lactated ringers,  50 mL/hr, Last Rate: 50 mL/hr (05/03/23 1151)  Pharmacy to Dose Cefepime,          Active Hospital Problems:  Active Hospital Problems    Diagnosis    • **Abdominal pain, unspecified abdominal location    • Moderate Malnutrition (HCC)      Plan:   #Abdominal Wound infection    - s/p Radical cystectomy and ileal conduit urinary diversion,   complicated by wound dehiscence    - s/p ex-lap with repair of wound dehiscence on 4/6/23    - hospitalized at [facility] from 03/28-4/19/23    - Cefepime, pharm to dose    - Flagyl 500mg IV q8h    - blood cultures x2 no growth to date.    - wound cultures with Streptococcus anginosus, Enterobacter cloacae complex, and Morganella Morgagni    - general surgery consulted, no surgery needed    - WOCN    - LR @ 50 ml per hour.    - pain control    - pt/ot    -CM  --Wound VAC in place  - ID following      #Bladder cancer  #Osseous mets    - s/p surgery    - CT a/p shows postsurgical changes of cystoprostatectomy with bilateral nephroureteral stent placement with ileal conduit and RLQ urostomy.  Mode left hydronephrosis. Development of new sclerotic osseous lesions concerning for bone mets    - heme/onc following, outpt management planned    - pain control     #Ileus    - resolved     #CARLOS ALBERTO on CKD continue to improve.  Nephrology managing  #Metabolic acidosis  #Hydronephrosis, left    - Cr 2.39 on admission--> 1.77--> 1.69,, base is around 1.3    - suspect 2/2 hydro    - evident on CT a/p    - urology following  -- nephrology managing..     #Anemia  -Hb stable     #Anxiety    - Vistaril PRN    DVT prophylaxis:  Mechanical DVT prophylaxis orders are present.    CODE STATUS:    Code Status (Patient has no pulse and is not breathing): CPR (Attempt to Resuscitate)  Medical Interventions (Patient has pulse or is breathing): Full Support      Disposition:  I expect patient to be discharged 1 day.    Plan of care discussed with patient, bedside nurse    This patient has been examined wearing  appropriate Personal Protective Equipment   Electronically signed by DANIS Rees, 05/03/23, 16:26 EDT.  [facility]

## 2023-05-03 NOTE — DISCHARGE PLACEMENT REQUEST
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 examined wearing appropriate Personal Protective Equipment and discussed with hospital infection control department.  05/02/23      Electronically signed by Shira Meadows MD, 05/02/23, 12:41 EDT.  [facility] Hospitalist Team             Electronically signed by Shira Meadows MD at 05/02/23 1252       Consult Notes (last 24 hours)  Notes from 05/02/23 1044 through 05/03/23 1044   No notes of this type exist for this encounter.

## 2023-05-04 LAB
ANION GAP SERPL CALCULATED.3IONS-SCNC: 12 MMOL/L (ref 5–15)
BH BB BLOOD EXPIRATION DATE: NORMAL
BH BB BLOOD TYPE BARCODE: 5100
BH BB DISPENSE STATUS: NORMAL
BH BB PRODUCT CODE: NORMAL
BH BB UNIT NUMBER: NORMAL
BUN SERPL-MCNC: 23 MG/DL (ref 8–23)
BUN/CREAT SERPL: 15.4 (ref 7–25)
CALCIUM SPEC-SCNC: 8.5 MG/DL (ref 8.6–10.5)
CHLORIDE SERPL-SCNC: 106 MMOL/L (ref 98–107)
CO2 SERPL-SCNC: 22 MMOL/L (ref 22–29)
CREAT SERPL-MCNC: 1.49 MG/DL (ref 0.76–1.27)
CROSSMATCH INTERPRETATION: NORMAL
DEPRECATED RDW RBC AUTO: 56.4 FL (ref 37–54)
EGFRCR SERPLBLD CKD-EPI 2021: 51.8 ML/MIN/1.73
ERYTHROCYTE [DISTWIDTH] IN BLOOD BY AUTOMATED COUNT: 18.3 % (ref 12.3–15.4)
GLUCOSE SERPL-MCNC: 142 MG/DL (ref 65–99)
HCT VFR BLD AUTO: 25 % (ref 37.5–51)
HGB BLD-MCNC: 8.1 G/DL (ref 13–17.7)
MCH RBC QN AUTO: 27 PG (ref 26.6–33)
MCHC RBC AUTO-ENTMCNC: 32.6 G/DL (ref 31.5–35.7)
MCV RBC AUTO: 82.8 FL (ref 79–97)
PLATELET # BLD AUTO: 190 10*3/MM3 (ref 140–450)
PMV BLD AUTO: 8.6 FL (ref 6–12)
POTASSIUM SERPL-SCNC: 3.9 MMOL/L (ref 3.5–5.2)
RBC # BLD AUTO: 3.02 10*6/MM3 (ref 4.14–5.8)
SODIUM SERPL-SCNC: 140 MMOL/L (ref 136–145)
UNIT  ABO: NORMAL
UNIT  RH: NORMAL
WBC NRBC COR # BLD: 13.4 10*3/MM3 (ref 3.4–10.8)

## 2023-05-04 PROCEDURE — 0 CEFEPIME PER 500 MG: Performed by: NURSE PRACTITIONER

## 2023-05-04 PROCEDURE — 97535 SELF CARE MNGMENT TRAINING: CPT

## 2023-05-04 PROCEDURE — 80048 BASIC METABOLIC PNL TOTAL CA: CPT | Performed by: NURSE PRACTITIONER

## 2023-05-04 PROCEDURE — 97116 GAIT TRAINING THERAPY: CPT

## 2023-05-04 PROCEDURE — 97530 THERAPEUTIC ACTIVITIES: CPT

## 2023-05-04 PROCEDURE — 25010000002 MORPHINE PER 10 MG: Performed by: STUDENT IN AN ORGANIZED HEALTH CARE EDUCATION/TRAINING PROGRAM

## 2023-05-04 PROCEDURE — 85027 COMPLETE CBC AUTOMATED: CPT | Performed by: STUDENT IN AN ORGANIZED HEALTH CARE EDUCATION/TRAINING PROGRAM

## 2023-05-04 PROCEDURE — 36415 COLL VENOUS BLD VENIPUNCTURE: CPT | Performed by: STUDENT IN AN ORGANIZED HEALTH CARE EDUCATION/TRAINING PROGRAM

## 2023-05-04 RX ORDER — HYDROXYZINE HYDROCHLORIDE 25 MG/1
25 TABLET, FILM COATED ORAL 4 TIMES DAILY PRN
Qty: 30 TABLET | Refills: 1 | Status: SHIPPED | OUTPATIENT
Start: 2023-05-04

## 2023-05-04 RX ORDER — METRONIDAZOLE 500 MG/1
500 TABLET ORAL EVERY 8 HOURS SCHEDULED
Qty: 28 TABLET | Refills: 0 | Status: SHIPPED | OUTPATIENT
Start: 2023-05-04 | End: 2023-05-14

## 2023-05-04 RX ORDER — FERROUS SULFATE TAB EC 324 MG (65 MG FE EQUIVALENT) 324 (65 FE) MG
324 TABLET DELAYED RESPONSE ORAL
Qty: 30 TABLET | Refills: 2 | Status: SHIPPED | OUTPATIENT
Start: 2023-05-05

## 2023-05-04 RX ORDER — HYDROCODONE BITARTRATE AND ACETAMINOPHEN 7.5; 325 MG/1; MG/1
1 TABLET ORAL EVERY 6 HOURS PRN
Qty: 30 TABLET | Refills: 0 | Status: SHIPPED | OUTPATIENT
Start: 2023-05-04 | End: 2023-05-12

## 2023-05-04 RX ADMIN — MORPHINE SULFATE 2 MG: 2 INJECTION, SOLUTION INTRAMUSCULAR; INTRAVENOUS at 21:27

## 2023-05-04 RX ADMIN — CEFEPIME 2 G: 2 INJECTION, POWDER, FOR SOLUTION INTRAVENOUS at 18:10

## 2023-05-04 RX ADMIN — CEFEPIME 2 G: 2 INJECTION, POWDER, FOR SOLUTION INTRAVENOUS at 06:10

## 2023-05-04 RX ADMIN — SODIUM CHLORIDE, POTASSIUM CHLORIDE, SODIUM LACTATE AND CALCIUM CHLORIDE 50 ML/HR: 600; 310; 30; 20 INJECTION, SOLUTION INTRAVENOUS at 03:15

## 2023-05-04 RX ADMIN — METRONIDAZOLE 500 MG: 500 TABLET ORAL at 21:27

## 2023-05-04 RX ADMIN — METRONIDAZOLE 500 MG: 500 TABLET ORAL at 14:45

## 2023-05-04 RX ADMIN — TAMSULOSIN HYDROCHLORIDE 0.4 MG: 0.4 CAPSULE ORAL at 09:36

## 2023-05-04 RX ADMIN — TAMSULOSIN HYDROCHLORIDE 0.4 MG: 0.4 CAPSULE ORAL at 21:27

## 2023-05-04 RX ADMIN — Medication 10 ML: at 21:28

## 2023-05-04 RX ADMIN — HYDROCODONE BITARTRATE AND ACETAMINOPHEN 1 TABLET: 7.5; 325 TABLET ORAL at 03:15

## 2023-05-04 RX ADMIN — METRONIDAZOLE 500 MG: 500 TABLET ORAL at 06:10

## 2023-05-04 RX ADMIN — FERROUS SULFATE TAB EC 324 MG (65 MG FE EQUIVALENT) 324 MG: 324 (65 FE) TABLET DELAYED RESPONSE at 09:36

## 2023-05-04 RX ADMIN — Medication 10 ML: at 09:36

## 2023-05-04 NOTE — PROGRESS NOTES
[facility] Medicine Services   Daily Progress Note    Patient Name: Anthony Moss  : 1955  MRN: [id number]  Primary Care Physician:  Provider, No Known  Date of admission: 2023  Date and Time of Service: 2023 at 1200      Subjective      Chief Complaint: Abdominal pain      Brief Patient Summary:  Anthony Moss is a 67 y.o. male that underwent radical cystectomy and ileal conduit on 3/28/2023 and had complication with wound dehiscence requiring exploratory lap repair on 2023, presented to the emergency department with complaint of abdominal pain and malodorous abdominal wound drainage.  CT abdomen pelvis without fascial defect, potential ileus on CT has since resolved, surgery consulted, no plan for surgical intervention.  Patient with acute kidney injury with creatinine 2.4 on admission on CKD, nephrology following, metabolic acidosis resolved, creatinine improved to 1.4 today. Concern for development of new sclerotic osseous lesions, oncology following, will follow as an outpatient for potential hormonal manipulation therapy.  Wound cultures with Streptococcus anginosus, Enterobacter cloacae complex, and Morganella Morgagni, on IV cefepime and p.o. Flagyl for total of 2 weeks of treatment per Infectious Disease specialist, PICC line in place to be removed after IV antibiotics completed.  Discharge orders placed this am, patient appealing discharge as he does not want to return to current nursing facility    Patient Reports doing ok    Subjective:  Symptoms:  No shortness of breath or chest pain.    Diet:  No nausea.    Pain:  He reports no pain.          Objective      Vitals:   Temp:  [97.5 °F (36.4 °C)-98.1 °F (36.7 °C)] 97.5 °F (36.4 °C)  Heart Rate:  [] 102  Resp:  [16-21] 20  BP: ()/(60-84) 114/73  Objective:  General Appearance:  Comfortable.    Vital signs: (most recent): Blood pressure 107/71, pulse 102, temperature 97.7 °F (36.5 °C), temperature source  "Oral, resp. rate 16, height 188 cm (74\"), weight 91.5 kg (201 lb 11.5 oz), SpO2 95 %.    Lungs:  Normal effort and normal respiratory rate.  Breath sounds clear to auscultation.    Heart: Normal rate.  Regular rhythm.  S1 normal and S2 normal.    Extremities: There is no dependent edema.    Neurological: Patient is alert and oriented to person, place and time.    Skin:  Warm and dry.  (Wound vac dressing intact)                  Result Review    Result Review:  I have personally reviewed the results from the time of this admission to 5/4/2023 12:06 EDT and agree with these findings:  [x]  Laboratory  []  Microbiology  []  Radiology  []  EKG/Telemetry   []  Cardiology/Vascular   []  Pathology  []  Old records  []  Other:  Most notable findings include: See summary and plan    Wounds (last 24 hours)     LDA Wound     Row Name 05/04/23 0800 05/03/23 2010          Wound 04/06/23 2243 abdomen Incision    Wound - Properties Group Placement Date: 04/06/23  -PH Placement Time: 2243  -PH Present on Hospital Admission: Y  -PH Location: abdomen  -PH Primary Wound Type: Incision  -PH    Dressing Appearance -- copious drainage  -KF     Closure STEFF  -DM STEFF  -KF     Base dressing in place, unable to visualize  -DM dressing in place, unable to visualize  -KF     Drainage Characteristics/Odor purulent;creamy;yellow  -DM --     Drainage Amount copious  -DM --     Retired Wound - Properties Group Placement Date: 04/06/23  -PH Placement Time: 2243  -PH Present on Hospital Admission: Y  -PH Location: abdomen  -PH Primary Wound Type: Incision  -PH    Retired Wound - Properties Group Date first assessed: 04/06/23  -PH Time first assessed: 2243  -PH Present on Hospital Admission: Y  -PH Location: abdomen  -PH Primary Wound Type: Incision  -PH          User Key  (r) = Recorded By, (t) = Taken By, (c) = Cosigned By    Initials Name Provider Type    PH Carolyn Marie RN Registered Nurse    Clara Quintanilla LPN Licensed Nurse    LILLIAM " Meagan Ulrich LPN Licensed Nurse                  Assessment & Plan        cefepime, 2 g, Intravenous, Q12H  ferrous sulfate, 324 mg, Oral, Daily With Breakfast  metroNIDAZOLE, 500 mg, Oral, Q8H  sodium chloride, 10 mL, Intravenous, Q12H  tamsulosin, 0.4 mg, Oral, BID             Active Hospital Problems:  Active Hospital Problems    Diagnosis    • **Abdominal pain, unspecified abdominal location    • Moderate Malnutrition (HCC)      Plan:   #Abdominal Wound infection    - s/p Radical cystectomy and ileal conduit urinary diversion,   complicated by wound dehiscence    - s/p ex-lap with repair of wound dehiscence on 4/6/23    - hospitalized at [facility] from 03/28-4/19/23    - Cefepime, pharm to dose    - Flagyl 500mg IV q8h    - blood cultures x2 no growth to date.    - wound cultures with Streptococcus anginosus, Enterobacter cloacae complex, and Morganella Morgagni    - general surgery consulted, no surgery needed    - WOCN    - LR @ 50 ml per hour.    - pain control    - pt/ot    -CM  --Wound VAC in place  - ID following      #Bladder cancer  #Osseous mets    - s/p surgery    - CT a/p shows postsurgical changes of cystoprostatectomy with bilateral nephroureteral stent placement with ileal conduit and RLQ urostomy.  Mode left hydronephrosis. Development of new sclerotic osseous lesions concerning for bone mets    - heme/onc following, outpt management planned    - pain control     #Ileus    - resolved     #CARLOS ALBERTO on CKD continue to improve.  Nephrology managing  #Metabolic acidosis  #Hydronephrosis, left    - Cr 2.39 on admission--> 1.77--> 1.69--->1.49, base is around 1.3    - suspect 2/2 hydro    - evident on CT a/p    - urology following  -- nephrology managing..     #Anemia  -Hb stable     #Anxiety    - Vistaril PRN    DVT prophylaxis:  Mechanical DVT prophylaxis orders are present.    CODE STATUS:    Code Status (Patient has no pulse and is not breathing): CPR (Attempt to Resuscitate)  Medical Interventions  (Patient has pulse or is breathing): Full Support      Disposition:  I expect patient to be discharged tomorrow.    Plan of care discussed with patient, attending MD, bedside RN    This patient has been examined wearing appropriate Personal Protective Equipment   Electronically signed by DANIS Rees, 05/04/23, 12:06 EDT.  [facility] Hospitalist Team

## 2023-05-04 NOTE — CASE MANAGEMENT/SOCIAL WORK
Case Management/Social Work    Patient Name:  Anthony Moss  YOB: 1955  MRN: [id number]  Admit Date:  4/28/2023        Patient has active discharge appeal with [facility] and will remain inhouse until determination has been received from [facility].      Records sent to [facility] as requested.  There is a copy of appeal records bundle in media if patient requests copy of records sent as per patient rights.    Appeal information and confirmation of records upload follow.            Electronically signed by:  Bijan Bryan CMA  05/04/23 13:44 EDT     Bijan Bryan  Case Management Associate  [address] [address]  P: [phone]  F: [phone]

## 2023-05-04 NOTE — PROGRESS NOTES
PROGRESS NOTE      Patient Name: Anthony Moss  : 1955  MRN: [id number]  Primary Care Physician: Provider, No Known  Date of admission: 2023    Patient Care Team:  Provider, No Known as PCP - General        Subjective   Subjective:     Feels better  Review of systems:  Weak otherwise negative.tolerating trujillo catheter.      Allergies:  No Known Allergies    Objective   Exam:     Vital Signs  Temp:  [97.5 °F (36.4 °C)-98.1 °F (36.7 °C)] 97.5 °F (36.4 °C)  Heart Rate:  [] 102  Resp:  [16-21] 20  BP: ()/(60-84) 114/73  SpO2:  [92 %-97 %] 95 %  on   ;   Device (Oxygen Therapy): room air  Body mass index is 25.9 kg/m².    General:     male in no acute distress.    Head:      Normocephalic and atraumatic.    Eyes:      PERRL/EOM intact, conjunctiva and sclera clear with out nystagmus.    Neck:      No masses, thyromegaly,  trachea central with normal respiratory effort   Lungs:    Clear bilaterally to auscultation.    Heart:      Regular rate and rhythm, no murmur no gallop  Abd:        Soft, nontender, not distended, bowel sounds positive, no shifting dullness   Pulses:   Pulses palpable  Extr:       + edema.    Neuro:    No focal deficits.   alert oriented x3  Skin:       Intact without lesions or rashes.    Psych:    Alert and cooperative; normal mood and affect; .      Results Review:  I have personally reviewed most recent Data :  CBC    Results from last 7 days   Lab Units 23  0030 23  1657 23  0119 23  0414 23  0727 23  0849 23  0417 23  1438   WBC 10*3/mm3 13.40*  --  11.00* 11.30* 9.90 9.70 10.90* 13.00*   HEMOGLOBIN g/dL 8.1* 8.5* 7.4* 8.1* 7.9* 8.1* 7.7* 8.7*   PLATELETS 10*3/mm3 190  --  185 198 177 199 204 270     CMP   Results from last 7 days   Lab Units 23  0030 23  0119 23  0414 23  0727 23  0849 23  0417 23  1438   SODIUM mmol/L 140 141 143 144 143 140 140   POTASSIUM mmol/L 3.9 3.8  3.7 3.6 3.7 3.8 4.3   CHLORIDE mmol/L 106 108* 109* 112* 110* 108* 105   CO2 mmol/L 22.0 24.0 23.0 20.0* 21.0* 21.0* 22.0   BUN mg/dL 23 22 22 25* 35* 45* 50*   CREATININE mg/dL 1.49* 1.72* 1.69* 1.70* 2.01* 2.20* 2.39*   GLUCOSE mg/dL 142* 110* 126* 101* 140* 100* 131*   ALBUMIN g/dL  --   --   --   --  2.7*  --  3.0*   BILIRUBIN mg/dL  --   --   --   --  0.3  --  0.3   ALK PHOS U/L  --   --   --   --  207*  --  307*   AST (SGOT) U/L  --   --   --   --  23  --  32   ALT (SGPT) U/L  --   --   --   --  16  --  25     ABG      No radiology results for the last day    Results for orders placed during the hospital encounter of 01/05/22    Adult Transthoracic Echo Complete w/ Color, Spectral and Contrast if Necessary Per Protocol    Interpretation Summary  · Left ventricular ejection fraction appears to be 56 - 60%.  · The right ventricular cavity is moderately dilated.  · No pericardial effusion noted    Scheduled Meds:cefepime, 2 g, Intravenous, Q12H  ferrous sulfate, 324 mg, Oral, Daily With Breakfast  metroNIDAZOLE, 500 mg, Oral, Q8H  sodium chloride, 10 mL, Intravenous, Q12H  tamsulosin, 0.4 mg, Oral, BID      Continuous Infusions:lactated ringers, 50 mL/hr, Last Rate: 50 mL/hr (05/04/23 0315)      PRN Meds:•  HYDROcodone-acetaminophen  •  hydrOXYzine  •  Morphine  •  nitroglycerin  •  ondansetron **OR** ondansetron  •  sodium chloride  •  sodium chloride    Assessment & Plan   Assessment and Plan:         Abdominal pain, unspecified abdominal location    Moderate Malnutrition (HCC)    ASSESSMENT:  • Acute kidney injury, stage III, status post radical cystectomy,ileal conduit.  • Metabolic acidosis secondary to CKD start low-dose sodium bicarbonate  • History of bladder cancer, recent cystectomy  • Suprapubic catheter.  Infected abdomen wall with significant purulent drainage.  Cultures are growing Streptococcus anginosus, Enterobacter cloacae complex, and Morganella Morgagni  -Blood cultures are negative so  far     S/p radical cystectomy with ileal conduit on March 28, 2023 for advanced prostate cancer.  That followed with wound dehiscence repair by general surgery service on April 6, 2023  • History of alcohol use,  • Chronic back pain,using NSAID in the past           Plan:      • Renal functions continue to improve discontinue IV fluid at this time  • At this time patient with history of bladder cancer status post cystectomy urine output is adequate ,renal function acceptable but at risk of atn secondary to underlying infection.  • Creatinine back to baseline  • Suprapubic urostomy placed  • Renal function slowly stabilizing from a peak of 2.2 with underlying  progressive CKD   • Significant acidosis noted continue high dose of sodium bicarbonate we will follow-up tomorrow.  • Abx per inf disease for abd wall wound infxn,cultures.  • aEchocardiogram done last time showed ejection fraction 56 to 60%  • Repeat labs tomorrow morning            Electronically signed by Jamie Houston MD,   [facility] kidney consultant  [phone]  5/4/2023  10:39 EDT

## 2023-05-04 NOTE — CASE MANAGEMENT/SOCIAL WORK
"Continued Stay Note  [facility]     Patient Name: Anthony Moss  MRN: [id number]  Today's Date: 5/4/2023    Admit Date: 4/28/2023    Plan: Return to [facility], skilled vs. [facility] (accepted). Will need precert changed. [facility] appeal pending.   Discharge Plan       Row Name 05/04/23 1630       Plan    Plan Return to [facility], skilled vs. [facility] (accepted). Will need precert changed. [facility] appeal pending.    Patient/Family in Agreement with Plan yes    Plan Comments CM contacted patient's friend Maximo this AM after receiving voicemail from him evening before. He wanted [facility] to be looked into as an option. CM sent referral and notified liaison Kari. She reports they do not have any beds available. Updated Maximo by phone and explained that pt was medically ready for discharge and alternative placement had been attempted. Explained that pt could appeal his discharge if he did not feel ready to discharge. Maximo gave additional choice of [facility] and asked that CM let pt know. Referral sent in [facility] and to liajunior [facility]. Updated [facility] liaison Vanessa. Updated care team that discharge orders could be placed as [facility] can accept back and pt has safe discharge plan. Met with pt at bedside and provided signed IMM copy and instructed pt on how to call to do an appeal and he reports he plans to do so. Also reviewed that his friend Maximo wanted CM to try [facility]. Updated by Matilda liajunior Reed that they are able to accept pt and would order wound VAC. Just would need precert transferred to them. Pt is undecided about [facility] and wants to speak to Maximo. Precert has not been switched over. [facility] appeal is pending. Provided pt with \"Detailed Notice of Discharge\" form.               Megan Naegele, RN     Office Phone: [phone]  Office Cell: [phone]     "

## 2023-05-04 NOTE — PLAN OF CARE
"Goal Outcome Evaluation:      Assessment: Anthony Moss was agreeable to PT/OT after strong encouragement from staff. States he wants to get better so he can go home to see his dogs. Happy when he discusses dogs. He was able to amb in hallway for short distance, but became tachycardic w/ HR of 161. Needed chair brought up for seated rest. He presents with functional mobility impairments which indicate the need for skilled intervention. Tolerating session today without incident. Will continue to follow and progress as tolerated.     Plan/Recommendations:   Moderate Intensity Therapy recommended post-acute care. This is recommended as therapy feels the patient would require 3-4 days per week and wouldn't tolerate \"3 hour daily\" rehab intensity. SNF would be the preferred choice. If the patient does not agree to SNF, arrange HH or OP depending on home bound status. If patient is medically complex, consider LTACH.. Pt requires no DME at discharge.     Pt desires Skilled Rehab placement at discharge. Pt cooperative; agreeable to therapeutic recommendations and plan of care.            "

## 2023-05-04 NOTE — SIGNIFICANT NOTE
05/04/23 1153   OTHER   Discipline occupational therapist   Rehab Time/Intention   Session Not Performed other (see comments)  (OT attempted to see pt this AM. Pt with current d/c orders in however was filing for appeal for d/c. OT will follow up this PM if schedule allows)   Therapy Assessment/Plan (PT)   Criteria for Skilled Interventions Met (PT) yes;meets criteria;skilled treatment is necessary   Recommendation   OT - Next Appointment 05/05/23

## 2023-05-04 NOTE — DISCHARGE SUMMARY
[facility] Medicine Services  Discharge Summary    Date of Service: 2023  Patient Name: Anthony Moss  : 1955  MRN: [id number]    Date of Admission: 2023  Date of Discharge: 2023    Discharge Diagnosis: Abdominal wound infection status post radical cystectomy and ileal conduit and subsequent complication with wound dehiscence, bladder cancer/prostate cancer with probable osseous metastatic, CKD, chronic iron deficiency anemia, anxiety    Primary Care Physician: Provider, No Known      Presenting Problem:   CARLOS ALBERTO (acute kidney injury) [N17.9]  Acute cystitis with hematuria [N30.01]  Abdominal pain, unspecified abdominal location [R10.9]    Active and Resolved Hospital Problems:  Active Hospital Problems    Diagnosis POA   • Moderate Malnutrition (HCC) [E44.0] Yes      Resolved Hospital Problems    Diagnosis POA   • **Abdominal pain, unspecified abdominal location [R10.9] Yes         Hospital Course     Hospital Course:  Anthony Moss is a 67 y.o. male that underwent radical cystectomy and ileal conduit on 3/28/2023 and had complication with wound dehiscence requiring exploratory lap repair on 2023, presented to the emergency department with complaint of abdominal pain and malodorous abdominal wound drainage.  CT abdomen pelvis without fascial defect, potential ileus on CT has since resolved, surgery consulted, no plan for surgical intervention.  Patient with acute kidney injury with creatinine 2.4 on CKD, nephrology following, metabolic acidosis resolved, creatinine improving, 1.49 today.  Concern for development of new sclerotic osseous lesions, oncology following, will follow as an outpatient for potential hormonal manipulation therapy.  Wound cultures with Streptococcus anginosus, Enterobacter cloacae complex, and Morganella Morgagni, on IV cefepime and p.o. Flagyl for total of 2 weeks of treatment per Infectious Disease specialist, PICC line in place to be  removed after IV antibiotics completed, and CBC/BMP to be drawn next week and fax to their office.      DISCHARGE Follow Up Recommendations for labs and diagnostics: CBC and BMP next week, results to infectious disease      Reasons For Change In Medications and Indications for New Medications: Cefepime and Flagyl for total of 2-week treatment, iron supplement for anemia      Day of Discharge     Vital Signs:  Temp:  [97.3 °F (36.3 °C)-98.7 °F (37.1 °C)] 97.9 °F (36.6 °C)  Heart Rate:  [] 99  Resp:  [12-17] 16  BP: ()/(49-76) 112/74  Flow (L/min):  [2] 2    Physical Exam:  Physical Exam  Cardiovascular:      Rate and Rhythm: Normal rate and regular rhythm.      Pulses: Normal pulses.      Heart sounds: No murmur heard.  Pulmonary:      Effort: Pulmonary effort is normal.      Breath sounds: Normal breath sounds. No wheezing or rales.   Abdominal:      General: There is no distension.   Musculoskeletal:      Right lower leg: No edema.      Left lower leg: No edema.   Skin:     General: Skin is warm and dry.   Neurological:      Mental Status: He is alert and oriented to person, place, and time.   Psychiatric:         Mood and Affect: Mood normal.            Pertinent  and/or Most Recent Results     LAB RESULTS:      Lab 05/05/23  0156 05/04/23  0030 05/03/23  1657 05/03/23  0119 05/02/23  0414 05/01/23  0727 04/30/23  0849   WBC 12.80* 13.40*  --  11.00* 11.30* 9.90 9.70   HEMOGLOBIN 9.0* 8.1* 8.5* 7.4* 8.1* 7.9* 8.1*   HEMATOCRIT 28.3* 25.0* 25.9* 23.1* 25.2* 25.1* 25.2*   PLATELETS 203 190  --  185 198 177 199   NEUTROS ABS  --   --   --   --  7.80*  --  6.21   EOS ABS  --   --   --   --  0.45*  --  0.78*   MCV 84.1 82.8  --  83.1 82.4 84.6 82.9         Lab 05/05/23  0156 05/04/23  0030 05/03/23  0119 05/02/23  0414 05/01/23  0727   SODIUM 139 140 141 143 144   POTASSIUM 3.5 3.9 3.8 3.7 3.6   CHLORIDE 104 106 108* 109* 112*   CO2 24.0 22.0 24.0 23.0 20.0*   ANION GAP 11.0 12.0 9.0 11.0 12.0   BUN 20 23  22 22 25*   CREATININE 1.64* 1.49* 1.72* 1.69* 1.70*   EGFR 46.1* 51.8* 43.6* 44.5* 44.2*   GLUCOSE 202* 142* 110* 126* 101*   CALCIUM 8.8 8.5* 8.1* 8.7 8.9         Lab 04/30/23  0849   TOTAL PROTEIN 6.7   ALBUMIN 2.7*   GLOBULIN 4.0   ALT (SGPT) 16   AST (SGOT) 23   BILIRUBIN 0.3   ALK PHOS 207*                 Lab 05/03/23  1203   ABO TYPING O   RH TYPING Positive   ANTIBODY SCREEN Negative         Brief Urine Lab Results  (Last result in the past 365 days)      Color   Clarity   Blood   Leuk Est   Nitrite   Protein   CREAT   Urine HCG        04/28/23 1648 Yellow   Slightly Cloudy   Moderate (2+)   Large (3+)   Negative   100 mg/dL (2+)               Microbiology Results (last 10 days)     Procedure Component Value - Date/Time    CANDIDA AURIS SCREEN - Swab, Axilla Right, Axilla Left and Groin [[id number]]  (Normal) Collected: 05/02/23 1903    Lab Status: Preliminary result Specimen: Swab from Axilla Right, Axilla Left and Groin Updated: 05/04/23 2115     Candida Auris Screen Culture No Candida auris isolated at 2 days    MRSA Screen, PCR (Inpatient) - Swab, Nares [[id number]]  (Normal) Collected: 04/29/23 1238    Lab Status: Final result Specimen: Swab from Nares Updated: 04/29/23 1448     MRSA PCR No MRSA Detected    Narrative:      The negative predictive value of this diagnostic test is high and should only be used to consider de-escalating anti-MRSA therapy. A positive result may indicate colonization with MRSA and must be correlated clinically.    Wound Culture - Wound, Abdominal Cavity [[id number]]  (Abnormal)  (Susceptibility) Collected: 04/29/23 0428    Lab Status: Final result Specimen: Wound from Abdominal Cavity Updated: 05/02/23 0957     Wound Culture Moderate growth (3+) Streptococcus anginosus      Rare Enterobacter cloacae complex      Rare Morganella morganii ssp morganii     Gram Stain Many (4+) WBCs per low power field      Rare (1+) Gram negative bacilli    Susceptibility      Streptococcus  anginosus      ALEX      Ceftriaxone Susceptible      Penicillin G Susceptible      Vancomycin Susceptible                       Susceptibility      Enterobacter cloacae complex      ALEX      Cefepime Susceptible      Ceftazidime Susceptible      Ceftriaxone Intermediate      Gentamicin Susceptible      Levofloxacin Susceptible      Piperacillin + Tazobactam Susceptible      Tetracycline Susceptible      Trimethoprim + Sulfamethoxazole Susceptible                       Susceptibility      Morganella morganii ssp morganii      ALEX      Ampicillin Resistant     Ampicillin + Sulbactam Susceptible      Cefepime Susceptible      Ceftazidime Susceptible      Ceftriaxone Susceptible      Gentamicin Susceptible      Levofloxacin Intermediate      Piperacillin + Tazobactam Susceptible      Tetracycline Susceptible      Trimethoprim + Sulfamethoxazole Resistant                      Susceptibility Comments     Enterobacter cloacae complex    Cefazolin sensitivity will not be reported for Enterobacteriaceae in non-urine isolates. If cefazolin is preferred, please call the microbiology lab to request an E-test.  With the exception of urinary-sourced infections, aminoglycosides should not be used as monotherapy.    Morganella morganii ssp morganii    Cefazolin sensitivity will not be reported for Enterobacteriaceae in non-urine isolates. If cefazolin is preferred, please call the microbiology lab to request an E-test.  With the exception of urinary-sourced infections, aminoglycosides should not be used as monotherapy.             Urine Culture - Urine, Urine, Clean Catch [[id number]] Collected: 04/28/23 1648    Lab Status: Final result Specimen: Urine, Clean Catch Updated: 04/29/23 1459     Urine Culture 50,000 CFU/mL Mixed Nidia Isolated    Narrative:      Specimen contains mixed organisms of questionable pathogenicity suggestive of contamination. If symptoms persist, suggest recollection.  Colonization of the urinary tract  without infection is common. Treatment is discouraged unless the patient is symptomatic, pregnant, or undergoing an invasive urologic procedure.    Blood Culture - Blood, Arm, Left [[id number]]  (Normal) Collected: 04/28/23 1538    Lab Status: Final result Specimen: Blood from Arm, Left Updated: 05/03/23 1545     Blood Culture No growth at 5 days    Blood Culture - Blood, Arm, Right [[id number]]  (Normal) Collected: 04/28/23 1438    Lab Status: Final result Specimen: Blood from Arm, Right Updated: 05/03/23 1445     Blood Culture No growth at 5 days          CT Abdomen Pelvis Without Contrast    Result Date: 4/28/2023  Impression: Impression: 1. Postsurgical changes of cystoprostatectomy with bilateral nephroureteral stent placement with ileal conduit and right lower quadrant urostomy. Moderate left hydronephrosis. No hydronephrosis on right. 2. Postsurgical changes at midline abdominal wall with multiple small foci of subcutaneous air. No organized fluid collection or abscess. No recurrent bowel herniation. 3. Fluid and air-filled mildly dilated proximal small bowel loops measuring up to 4 cm could relate to postoperative ileus, low-grade partial obstruction difficult to exclude. No discrete transition point. 4. Development of new sclerotic osseous lesions most concerning for osseous metastases. 5. Additional incidental chronic findings above. Electronically Signed: Kuldip Daniels  4/28/2023 6:02 PM EDT  Workstation ID: [id number]    XR Chest 1 View    Result Date: 4/7/2023  Impression: 1.  Low lung volumes, accentuate the heart size. 2.  Basilar opacities (atelectasis, aspiration, infection). Electronically signed by:  Dinora Nguyen M.D.  4/7/2023 3:20 AM Mountain Time              Results for orders placed during the hospital encounter of 01/05/22    Adult Transthoracic Echo Complete w/ Color, Spectral and Contrast if Necessary Per Protocol    Interpretation Summary  · Left ventricular ejection fraction appears to be  56 - 60%.  · The right ventricular cavity is moderately dilated.  · No pericardial effusion noted      Labs Pending at Discharge:  Pending Labs     Order Current Status    CANDIDA AURIS SCREEN - Swab, Axilla Right, Axilla Left and Groin Preliminary result          Procedures Performed           Consults:   Consults     Date and Time Order Name Status Description    5/1/2023  4:50 PM Inpatient Nephrology Consult      5/1/2023  3:28 PM Inpatient Nephrology Consult      4/29/2023 12:47 PM Inpatient Infectious Diseases Consult Completed     4/29/2023  7:17 AM Inpatient General Surgery Consult      4/28/2023  7:52 PM Hematology & Oncology Inpatient Consult      4/28/2023  7:52 PM Inpatient General Surgery Consult      4/28/2023  6:21 PM Hospitalist (on-call MD unless specified)      4/6/2023  3:03 PM Inpatient Infectious Diseases Consult Completed     4/2/2023  2:55 PM Inpatient General Surgery Consult Completed     3/28/2023  7:38 PM Inpatient Hospitalist Consult Completed             Discharge Details        Discharge Medications      New Medications      Instructions Start Date   cefepime 2 gm IVPB in 100 ml NS (MBP)   2 g, Intravenous, Every 12 Hours      ferrous sulfate 324 (65 Fe) MG tablet delayed-release EC tablet   324 mg, Oral, Daily With Breakfast      HYDROcodone-acetaminophen 7.5-325 MG per tablet  Commonly known as: NORCO   1 tablet, Oral, Every 6 Hours PRN      hydrOXYzine 25 MG tablet  Commonly known as: ATARAX   25 mg, Oral, 4 Times Daily PRN      metroNIDAZOLE 500 MG tablet  Commonly known as: FLAGYL   500 mg, Oral, Every 8 Hours Scheduled         Continue These Medications      Instructions Start Date   tamsulosin 0.4 MG capsule 24 hr capsule  Commonly known as: FLOMAX   1 capsule, Oral, 2 Times Daily         Stop These Medications    nitrofurantoin 50 MG capsule  Commonly known as: MACRODANTIN            No Known Allergies      Discharge Disposition: Stable  Skilled Nursing Facility (DC -  External)    Diet:  Hospital:  Diet Order   Procedures   • Diet: Regular/House Diet; Texture: Regular Texture (IDDSI 7); Fluid Consistency: Thin (IDDSI 0)         Discharge Activity:   Activity Instructions     Activity as Tolerated              CODE STATUS:  Code Status and Medical Interventions:   Ordered at: 04/28/23 1952     Code Status (Patient has no pulse and is not breathing):    CPR (Attempt to Resuscitate)     Medical Interventions (Patient has pulse or is breathing):    Full Support         No future appointments.    Additional Instructions for the Follow-ups that You Need to Schedule     Discharge Follow-up with PCP   As directed       Currently Documented PCP:    Provider, No Known    PCP Phone Number:    None     Follow Up Details: 1-2 weeks, call for appoinment         Discharge Follow-up with Specialty: general surgery, dehiscence wound care follow up; 3 Weeks   As directed      Specialty: general surgery, dehiscence wound care follow up    Follow Up: 3 Weeks         Discharge Follow-up with Specified Provider: Hematology/oncology, call for appointment for metastatic cancer follow up   As directed      To: Hematology/oncology, call for appointment for metastatic cancer follow up         Discharge Follow-up with Specified Provider: Nephrologist, call for appointment; 1 Month   As directed      To: Nephrologist, call for appointment    Follow Up: 1 Month         Discharge Follow-up with Specified Provider: Urosurgery, follow up for cystectomy and wound care follow up, call for appointment; 3 Weeks   As directed      To: Urosurgery, follow up for cystectomy and wound care follow up, call for appointment    Follow Up: 3 Weeks         Basic metabolic panel    May 08, 2023 (Approximate)      Please fax all post discharge lab results, imaging studies and correspondence to this fax number [phone]  For any question or concern please contact our service number [phone]    Order Comments: Please  fax all post discharge lab results, imaging studies and correspondence to this fax number [phone] For any question or concern please contact our service number [phone]     Release to patient: Routine Release         CBC w AUTO Differential    May 08, 2023 (Approximate)      Please fax all post discharge lab results, imaging studies and correspondence to this fax number [phone]  For any question or concern please contact our service number [phone]    Order Comments: Please fax all post discharge lab results, imaging studies and correspondence to this fax number [phone] For any question or concern please contact our service number [phone]     Manual Differential: No               Time spent on Discharge including face to face service: More than 30 minutes    Patient was examined using appropriate personal protective equipment    Signature: Electronically signed by Shira Meadows MD, 05/05/23, 15:19 EDT.  [facility] Hospitalist Team

## 2023-05-04 NOTE — PLAN OF CARE
"Assessment: Anthony Moss presents with ADL impairments affecting function including endurance / activity tolerance and pain. Demonstrated functioning below baseline abilities indicate the need for continued skilled intervention while inpatient. Pt requires max encouragement to participate in therapy, however once agreeable, eager to participate. Pt noted to be tachypneic and tachycardic with functional mobility, requiring x2 standing rest breaks and x1 seated rest break. Pt required chair to be pulled up to recover. Pt agreeable to sit up in chair post session to increase activity tolerance with OOB activity. Tolerating session today without incident. Will continue to follow and progress as tolerated.     Plan/Recommendations:   Moderate Intensity Therapy recommended post-acute care. This is recommended as therapy feels the patient would require 3-4 days per week and wouldn't tolerate \"3 hour daily\" rehab intensity. SNF would be the preferred choice. If the patient does not agree to SNF, arrange HH or OP depending on home bound status. If patient is medically complex, consider LTACH.. Pt requires no DME at discharge.     Pt desires Skilled Rehab placement at discharge. Pt cooperative; agreeable to therapeutic recommendations and plan of care.   "

## 2023-05-04 NOTE — THERAPY TREATMENT NOTE
"Subjective: Pt agreeable to therapeutic plan of care. Initially refused PT/OT, but then agreed when staff explained importance of mobility for improving strength, pain.    Objective:     Bed mobility - CGA  Transfers - Min-A and with rolling walker  Ambulation - 60 feet, then required standing rest; only amb additional 25 ft before needed chair brought up for seated rest. Became tachycardic and soa. Recovered well w/ seated rest.  Min-A, Assist x 2 and with rolling walker    Vitals: Tachycardic    Pain: 9 VAS   Location: abdomen  Intervention for pain: Repositioned, Increased Activity and Therapeutic Presence    Education: Provided education on the importance of mobility in the acute care setting, Verbal/Tactile Cues, Transfer Training, Gait Training and Energy conservation strategies    Assessment: Anthony Moss was agreeable to PT/OT after strong encouragement from staff. States he wants to get better so he can go home to see his dogs. Happy when he discusses dogs. He was able to amb in hallway for short distance, but became tachycardic w/ HR of 161. Needed chair brought up for seated rest. He presents with functional mobility impairments which indicate the need for skilled intervention. Tolerating session today without incident. Will continue to follow and progress as tolerated.     Plan/Recommendations:   Moderate Intensity Therapy recommended post-acute care. This is recommended as therapy feels the patient would require 3-4 days per week and wouldn't tolerate \"3 hour daily\" rehab intensity. SNF would be the preferred choice. If the patient does not agree to SNF, arrange HH or OP depending on home bound status. If patient is medically complex, consider LTACH.. Pt requires no DME at discharge.     Pt desires Skilled Rehab placement at discharge. Pt cooperative; agreeable to therapeutic recommendations and plan of care.         Basic Mobility 6-click:  Rollin = Total, A lot = 2, A little = 3; 4 = " None  Supine>Sit:   1 = Total, A lot = 2, A little = 3; 4 = None   Sit>Stand with arms:  1 = Total, A lot = 2, A little = 3; 4 = None  Bed>Chair:   1 = Total, A lot = 2, A little = 3; 4 = None  Ambulate in room:  1 = Total, A lot = 2, A little = 3; 4 = None  3-5 Steps with railin = Total, A lot = 2, A little = 3; 4 = None  Score: 18    Modified [place]: N/A = No pre-op stroke/TIA    Post-Tx Position: Up in Chair and Call light and personal items within reach  PPE: gloves, surgical mask and gown

## 2023-05-04 NOTE — THERAPY TREATMENT NOTE
Subjective: Pt agreeable to therapeutic plan of care. Pt required max encouragement to participate with therapy this date. Pt initially refusing, however agreed to OOB activity with therapy.     Cognition: oriented to Person, Place, Time and Situation    Objective:     Bed Mobility: CGA   Functional Transfers: Min-A and with rolling walker     Balance: supported and dynamic CGA and with rolling walker  Functional Ambulation: Min-A and with rolling walker     Pt required x2 standing rest breaks to recover while ambulating becoming tachycardia and tachypneic   Pt requires seated rest break in chair to recover     Lower Body Dressing: Max-A  ADL Position: edge of bed sitting  ADL Comments: don socks, difficulty d/t abdominal incision      Vitals: Tachycardic Tachypneic   RR increased to 38   HR increased to 161 with ambulation       Pain: 9 VAS  Location: abdomen  Interventions for pain: Repositioned, Increased Activity and Therapeutic Presence  Education: Provided education on the importance of mobility in the acute care setting and Verbal/Tactile Cues      Assessment: Anthony Moss presents with ADL impairments affecting function including endurance / activity tolerance and pain. Demonstrated functioning below baseline abilities indicate the need for continued skilled intervention while inpatient. Pt requires max encouragement to participate in therapy, however once agreeable, eager to participate. Pt noted to be tachypneic and tachycardic with functional mobility, requiring x2 standing rest breaks and x1 seated rest break. Pt required chair to be pulled up to recover. Pt agreeable to sit up in chair post session to increase activity tolerance with OOB activity. Tolerating session today without incident. Will continue to follow and progress as tolerated.     Plan/Recommendations:   Moderate Intensity Therapy recommended post-acute care. This is recommended as therapy feels the patient would require 3-4 days per  "week and wouldn't tolerate \"3 hour daily\" rehab intensity. SNF would be the preferred choice. If the patient does not agree to SNF, arrange HH or OP depending on home bound status. If patient is medically complex, consider LTACH.. Pt requires no DME at discharge.     Pt desires Skilled Rehab placement at discharge. Pt cooperative; agreeable to therapeutic recommendations and plan of care.     Modified [place]: N/A = No pre-op stroke/TIA    Post-Tx Position: Up in Chair, Alarms activated and Call light and personal items within reach  PPE: gloves and gown.    "

## 2023-05-04 NOTE — PLAN OF CARE
Goal Outcome Evaluation:              Outcome Evaluation: Took over patients care at 1500. No changes at this time

## 2023-05-05 VITALS
TEMPERATURE: 97.9 F | WEIGHT: 201.72 LBS | RESPIRATION RATE: 16 BRPM | DIASTOLIC BLOOD PRESSURE: 74 MMHG | HEART RATE: 99 BPM | HEIGHT: 74 IN | SYSTOLIC BLOOD PRESSURE: 112 MMHG | OXYGEN SATURATION: 94 % | BODY MASS INDEX: 25.89 KG/M2

## 2023-05-05 PROBLEM — R10.9 ABDOMINAL PAIN, UNSPECIFIED ABDOMINAL LOCATION: Status: RESOLVED | Noted: 2023-04-28 | Resolved: 2023-05-05

## 2023-05-05 LAB
ANION GAP SERPL CALCULATED.3IONS-SCNC: 11 MMOL/L (ref 5–15)
BUN SERPL-MCNC: 20 MG/DL (ref 8–23)
BUN/CREAT SERPL: 12.2 (ref 7–25)
CALCIUM SPEC-SCNC: 8.8 MG/DL (ref 8.6–10.5)
CHLORIDE SERPL-SCNC: 104 MMOL/L (ref 98–107)
CO2 SERPL-SCNC: 24 MMOL/L (ref 22–29)
CREAT SERPL-MCNC: 1.64 MG/DL (ref 0.76–1.27)
DEPRECATED RDW RBC AUTO: 54.7 FL (ref 37–54)
EGFRCR SERPLBLD CKD-EPI 2021: 46.1 ML/MIN/1.73
ERYTHROCYTE [DISTWIDTH] IN BLOOD BY AUTOMATED COUNT: 18.4 % (ref 12.3–15.4)
GLUCOSE SERPL-MCNC: 202 MG/DL (ref 65–99)
HCT VFR BLD AUTO: 28.3 % (ref 37.5–51)
HGB BLD-MCNC: 9 G/DL (ref 13–17.7)
MCH RBC QN AUTO: 26.7 PG (ref 26.6–33)
MCHC RBC AUTO-ENTMCNC: 31.7 G/DL (ref 31.5–35.7)
MCV RBC AUTO: 84.1 FL (ref 79–97)
PLATELET # BLD AUTO: 203 10*3/MM3 (ref 140–450)
PMV BLD AUTO: 8.1 FL (ref 6–12)
POTASSIUM SERPL-SCNC: 3.5 MMOL/L (ref 3.5–5.2)
RBC # BLD AUTO: 3.36 10*6/MM3 (ref 4.14–5.8)
SODIUM SERPL-SCNC: 139 MMOL/L (ref 136–145)
WBC NRBC COR # BLD: 12.8 10*3/MM3 (ref 3.4–10.8)

## 2023-05-05 PROCEDURE — 99232 SBSQ HOSP IP/OBS MODERATE 35: CPT | Performed by: INTERNAL MEDICINE

## 2023-05-05 PROCEDURE — 97530 THERAPEUTIC ACTIVITIES: CPT

## 2023-05-05 PROCEDURE — 80048 BASIC METABOLIC PNL TOTAL CA: CPT | Performed by: NURSE PRACTITIONER

## 2023-05-05 PROCEDURE — 97116 GAIT TRAINING THERAPY: CPT

## 2023-05-05 PROCEDURE — 0 CEFEPIME PER 500 MG: Performed by: NURSE PRACTITIONER

## 2023-05-05 PROCEDURE — 36415 COLL VENOUS BLD VENIPUNCTURE: CPT | Performed by: STUDENT IN AN ORGANIZED HEALTH CARE EDUCATION/TRAINING PROGRAM

## 2023-05-05 PROCEDURE — 85027 COMPLETE CBC AUTOMATED: CPT | Performed by: STUDENT IN AN ORGANIZED HEALTH CARE EDUCATION/TRAINING PROGRAM

## 2023-05-05 RX ADMIN — Medication 10 ML: at 09:30

## 2023-05-05 RX ADMIN — METRONIDAZOLE 500 MG: 500 TABLET ORAL at 06:01

## 2023-05-05 RX ADMIN — HYDROCODONE BITARTRATE AND ACETAMINOPHEN 1 TABLET: 7.5; 325 TABLET ORAL at 00:27

## 2023-05-05 RX ADMIN — SODIUM CHLORIDE 500 ML: 9 INJECTION, SOLUTION INTRAVENOUS at 01:59

## 2023-05-05 RX ADMIN — HYDROCODONE BITARTRATE AND ACETAMINOPHEN 1 TABLET: 7.5; 325 TABLET ORAL at 16:26

## 2023-05-05 RX ADMIN — FERROUS SULFATE TAB EC 324 MG (65 MG FE EQUIVALENT) 324 MG: 324 (65 FE) TABLET DELAYED RESPONSE at 09:30

## 2023-05-05 RX ADMIN — METRONIDAZOLE 500 MG: 500 TABLET ORAL at 13:25

## 2023-05-05 RX ADMIN — CEFEPIME 2 G: 2 INJECTION, POWDER, FOR SOLUTION INTRAVENOUS at 06:01

## 2023-05-05 RX ADMIN — TAMSULOSIN HYDROCHLORIDE 0.4 MG: 0.4 CAPSULE ORAL at 09:30

## 2023-05-05 NOTE — NURSING NOTE
Wound vac removed. Sterile water soaked gauze placed in would, covered with abd pad, and foam tape.

## 2023-05-05 NOTE — PLAN OF CARE
"Assessment: Anthony Moss presents with functional mobility impairments which indicate the need for skilled intervention. Tolerating session today without incident. Will continue to follow and progress as tolerated. Pt continues with tachycardia with highest noted pulse this date 130 bpm at end of gait.     Plan/Recommendations:   Moderate Intensity Therapy recommended post-acute care. This is recommended as therapy feels the patient would require 3-4 days per week and wouldn't tolerate \"3 hour daily\" rehab intensity. SNF would be the preferred choice. If the patient does not agree to SNF, arrange HH or OP depending on home bound status. If patient is medically complex, consider LTACH.. Pt requires no DME at discharge.     Pt desires Skilled Rehab placement at discharge. Pt cooperative; agreeable to therapeutic recommendations and plan of care.                  "

## 2023-05-05 NOTE — NURSING NOTE
Placed call to provider R/t c/o SOA. Tachycardia and Hypotension, denies chest pain. Pt placed on O2 at 2 lpm via N[place] and pt O2 sat improved and patient stated he was less SOA.   Awaiting return call.

## 2023-05-05 NOTE — THERAPY TREATMENT NOTE
"Subjective: Pt agreeable to therapeutic plan of care.    Objective:     Bed mobility - CGA  Transfers - Min-A and with rolling walker  Ambulation - 30 feet Min-A and with rolling walker - SaO2 post 93% with room air, pulse 130 bpm with recovery <110 bpm within 2 min seated rest.     Vitals: Tachycardic    Pain: 3 VAS   Location: abdomen   Intervention for pain: Repositioned    Education: Provided education on the importance of mobility in the acute care setting, Verbal/Tactile Cues, Transfer Training, Gait Training and Energy conservation strategies    Assessment: Anthony Moss presents with functional mobility impairments which indicate the need for skilled intervention. Tolerating session today without incident. Will continue to follow and progress as tolerated. Pt continues with tachycardia with highest noted pulse this date 130 bpm at end of gait.     Plan/Recommendations:   Moderate Intensity Therapy recommended post-acute care. This is recommended as therapy feels the patient would require 3-4 days per week and wouldn't tolerate \"3 hour daily\" rehab intensity. SNF would be the preferred choice. If the patient does not agree to SNF, arrange HH or OP depending on home bound status. If patient is medically complex, consider LTACH.. Pt requires no DME at discharge.     Pt desires Skilled Rehab placement at discharge. Pt cooperative; agreeable to therapeutic recommendations and plan of care.       Post-Tx Position: Up in Chair, Alarms activated and Call light and personal items within reach  PPE: gloves, surgical mask, gown and eye protection  "

## 2023-05-05 NOTE — CASE MANAGEMENT/SOCIAL WORK
Continued Stay Note  [facility]     Patient Name: Anthony Moss  MRN: [id number]  Today's Date: 5/5/2023    Admit Date: 4/28/2023    Plan: Return to [place], skilled. [employer] appeal denied 5/5.   Discharge Plan       Row Name 05/05/23 1631       Plan    Plan Return to [place], skilled. [employer] appeal denied 5/5.    Patient/Family in Agreement with Plan no    Plan Comments CM followed up with pt this AM to see if he had received phone call from insurance regarding appeal determination. Pt states he has not. ANA Thakkar continued to check [employer] portal throughout the day. Received update this afternoon that pt was denied appeal. Met with him at bedside and notified him of need for discharge. Secure chat sent to Dr Meadows and nursing. Pt verbalized being unhappy with his return to [facility], but CM explained process of working on transfer from [facility] to alternative SNF. Tahir, liaison with [facility], met with pt and contacted CM regarding pt wanting to return home. Discussed need for pt to complete 2 weeks of IV abx and pt not having a PCP to follow for HHC orders. [facility] is not able to do IV abx. Tahir discussed with pt for plan to get patient home with their services once he had completed his IV abx (end date is 5/12) at SNF. Nursing reports pt being more agreeable to this idea. CM contacted [facility] EMS coordinator, Chris, and set up w/c van transportation back to [place].             Megan Naegele, RN     Office Phone: [phone]  Office Cell: [phone]

## 2023-05-05 NOTE — PLAN OF CARE
Goal Outcome Evaluation:  Plan of Care Reviewed With: patient        Progress: no change  Outcome Evaluation: Patient has rested most of the day. Did get up and work with PT, but he got SOA. BP has remained stable this shift. Appealed discharge yesterday, still waiting to hear back from insurance company. Wound vac and urostomy changed by wound care nurse today.

## 2023-05-05 NOTE — NURSING NOTE
Attempted to call report to [facility]. Nurse had stepped away, waiting for call back.

## 2023-05-05 NOTE — NURSING NOTE
WOCN note:    WOCN follow up for wound VAC dressing change to midline abdominal wound and urostomy pouch change. Patient is s/p cystectomy with ileal conduit urostomy placement on 3/28/23 and exploratory laparotomy with repair of wound dehiscence on 4/6/23. Patient was readmitted on 4/28/23 with abdominal pain.     An additional 50cc of brownish exudate noted in the VAC canister today from last assessment on 5/3. The proximal portion of the wound remains beefy red and granular. The depth at the distal portion is smaller and I am unable to visualize the fascia. A small amount of yellow fibrous tissue was removed with the removal of the VAC dressing.     The wound was cleansed with Vashe wound solution. One piece of black granufoam was wrapped in fenestrated silicone sheet and packed into the wound. An additional piece of foam was used at the surface of the abdomen to support the trac pad. Drape was applied to all intact skin. The dressing was reattached to 125mmHg continuous negative pressure with good seal obtained.     The RLQ urostomy pouch was changed. The eva-stomal skin is intact as well as 2 stents. The stoma is pink moist and budded but sits just beneath an abdominal fold and is partially covered even in the supine position.   The skin was cleansed and dried. An [employer] barrier ring was placed around the stoma and a Leslee 2 piece Ceramide soft convexity barrier was applied with urostomy pouch and belt. The belt was padded with washcloths along the front and lateral edges. Patient tolerated well. We will continue to follow.

## 2023-05-05 NOTE — PLAN OF CARE
Goal Outcome Evaluation:  Plan of Care Reviewed With: patient        Progress: improving  Outcome Evaluation: pt resting in bed, Pain medication provided. 500ml  Bolus given r/t hypotension and tachycardia.  pt place on O2 r/t c/o soa.  Will continue with [facility] orders careplans and monitoring.

## 2023-05-05 NOTE — CASE MANAGEMENT/SOCIAL WORK
"Case Management/Social Work    Patient Name:  Anthony Moss  YOB: 1955  MRN: [id number]  Admit Date:  4/28/2023        RECEIVED DETERMINATION FROM [employer] DISCHARGE APPEAL -- PER [employer] PORTAL \"MT AGREES WITH TERMINATION OF HOSPITAL SERVICES. BENEFICIARY LIABILITY STARTS 5/6/2023\"    CM MADE AWARE, PATIENT ABLE TO BE DISCHARGED.           Electronically signed by:  Bijan Bryan  05/05/23 15:04 EDT  "

## 2023-05-05 NOTE — PROGRESS NOTES
PROGRESS NOTE      Patient Name: Anthony Moss  : 1955  MRN: [id number]  Primary Care Physician: Provider, No Known  Date of admission: 2023    Patient Care Team:  Provider, No Known as PCP - General        Subjective   Subjective:     Feels better  Review of systems:  Weak otherwise negative.tolerating trujillo catheter.      Allergies:  No Known Allergies    Objective   Exam:     Vital Signs  Temp:  [97.3 °F (36.3 °C)-98.7 °F (37.1 °C)] 98.2 °F (36.8 °C)  Heart Rate:  [] 97  Resp:  [12-17] 17  BP: ()/(49-76) 109/72  SpO2:  [93 %-98 %] 95 %  on  Flow (L/min):  [2] 2;   Device (Oxygen Therapy): room air  Body mass index is 25.9 kg/m².    General:     male in no acute distress.    Head:      Normocephalic and atraumatic.    Eyes:      PERRL/EOM intact, conjunctiva and sclera clear with out nystagmus.    Neck:      No masses, thyromegaly,  trachea central with normal respiratory effort   Lungs:    Clear bilaterally to auscultation.    Heart:      Regular rate and rhythm, no murmur no gallop  Abd:        Soft, nontender, not distended, bowel sounds positive, no shifting dullness   Pulses:   Pulses palpable  Extr:       + edema.    Neuro:    No focal deficits.   alert oriented x3  Skin:       Intact without lesions or rashes.    Psych:    Alert and cooperative; normal mood and affect; .      Results Review:  I have personally reviewed most recent Data :  CBC    Results from last 7 days   Lab Units 23  0156 23  0030 23  1657 23  0119 23  0414 23  0727 23  0849 23  0417   WBC 10*3/mm3 12.80* 13.40*  --  11.00* 11.30* 9.90 9.70 10.90*   HEMOGLOBIN g/dL 9.0* 8.1* 8.5* 7.4* 8.1* 7.9* 8.1* 7.7*   PLATELETS 10*3/mm3 203 190  --  185 198 177 199 204     CMP   Results from last 7 days   Lab Units 23  0156 23  0030 23  0119 23  0414 23  0727 23  0849 23  0417 23  1438   SODIUM mmol/L 139 140 141 143 144 143  140 140   POTASSIUM mmol/L 3.5 3.9 3.8 3.7 3.6 3.7 3.8 4.3   CHLORIDE mmol/L 104 106 108* 109* 112* 110* 108* 105   CO2 mmol/L 24.0 22.0 24.0 23.0 20.0* 21.0* 21.0* 22.0   BUN mg/dL 20 23 22 22 25* 35* 45* 50*   CREATININE mg/dL 1.64* 1.49* 1.72* 1.69* 1.70* 2.01* 2.20* 2.39*   GLUCOSE mg/dL 202* 142* 110* 126* 101* 140* 100* 131*   ALBUMIN g/dL  --   --   --   --   --  2.7*  --  3.0*   BILIRUBIN mg/dL  --   --   --   --   --  0.3  --  0.3   ALK PHOS U/L  --   --   --   --   --  207*  --  307*   AST (SGOT) U/L  --   --   --   --   --  23  --  32   ALT (SGPT) U/L  --   --   --   --   --  16  --  25     ABG      No radiology results for the last day    Results for orders placed during the hospital encounter of 01/05/22    Adult Transthoracic Echo Complete w/ Color, Spectral and Contrast if Necessary Per Protocol    Interpretation Summary  · Left ventricular ejection fraction appears to be 56 - 60%.  · The right ventricular cavity is moderately dilated.  · No pericardial effusion noted    Scheduled Meds:cefepime, 2 g, Intravenous, Q12H  ferrous sulfate, 324 mg, Oral, Daily With Breakfast  metroNIDAZOLE, 500 mg, Oral, Q8H  sodium chloride, 10 mL, Intravenous, Q12H  tamsulosin, 0.4 mg, Oral, BID      Continuous Infusions:   PRN Meds:•  HYDROcodone-acetaminophen  •  hydrOXYzine  •  Morphine  •  nitroglycerin  •  ondansetron **OR** ondansetron  •  sodium chloride  •  sodium chloride    Assessment & Plan   Assessment and Plan:         Abdominal pain, unspecified abdominal location    Moderate Malnutrition (HCC)    ASSESSMENT:  • Acute kidney injury, stage III, status post radical cystectomy,ileal conduit.  • Metabolic acidosis secondary to CKD start low-dose sodium bicarbonate  • History of bladder cancer, recent cystectomy  • Suprapubic catheter.  · Infected abdomen wall with significant purulent drainage.  Cultures are growing Streptococcus anginosus, Enterobacter cloacae complex, and Morganella Morgagni  · -Blood  cultures are negative so far  · History of bladder cancer with osseous met  · S/p radical cystectomy with ileal conduit on March 28, 2023 for advanced prostate cancer.  That followed with wound dehiscence repair by general surgery service on April 6, 2023  • History of alcohol use,  • Chronic back pain,using NSAID in the past           Plan:      • Patient creatinine still going up because of significant CKD in the presence of some decreased p.o. intake  •  at this time patient with history of bladder cancer status post cystectomy urine output is adequate ,renal function acceptable but at risk of atn secondary to underlying infection.  • Creatinine back to baseline  • If creatinine start increasing again okay to restart IV fluid  • Suprapubic urostomy placed  • Renal function slowly stabilizing from a peak of 2.2 with underlying  progressive CKD   • Significant acidosis noted continue high dose of sodium bicarbonate we will follow-up tomorrow.  • Abx per inf disease for abd wall wound infxn,cultures.  • aEchocardiogram done last time showed ejection fraction 56 to 60%  • Repeat labs tomorrow morning            Electronically signed by Jamie Houston MD,   [facility] kidney consultant  [phone]  5/5/2023  10:13 EDT
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
## 2023-05-07 LAB — BACTERIA ISLT: NORMAL

## 2023-05-08 NOTE — CASE MANAGEMENT/SOCIAL WORK
Case Management Discharge Note      Final Note: [facility].    Selected Continued Care - Discharged on 5/5/2023 Admission date: 4/28/2023 - Discharge disposition: Skilled Nursing Facility (DC - External)      Destination Coordination complete.      Service Provider Selected Services Address Phone Fax Patient Preferred    [facility] [address] [facility] [place] [address] [phone] --              Home Medical Care Coordination complete.      Service Provider Selected Services Address Phone Fax Patient Preferred    [facility] [address] [place] [address] [place] [address] [phone] --                   Transportation Services  W/C Van: Other ([facility] van)    Final Discharge Disposition Code: 03 - skilled nursing facility (SNF)

## 2023-05-11 NOTE — PROGRESS NOTES
"  [facility] DAILY PROGRESS NOTE    Patient Identification  Name: Anthony Moss  Age: 67 y.o.  Sex: male  :  1955  MRN: [id number]    Date: 3/30/2023             Subjective:  Interval History: Recovering well, no bowel function yet, tolerating clears    Objective:    Scheduled Meds:enoxaparin, 40 mg, Subcutaneous, Daily  sodium bicarbonate, 650 mg, Oral, TID  sodium chloride, 10 mL, Intravenous, Q12H  vitamin D3, 5,000 Units, Oral, Daily      Continuous Infusions:sodium chloride, 100 mL/hr, Last Rate: 100 mL/hr (23 1614)      PRN Meds:•  acetaminophen **OR** acetaminophen  •  docusate sodium  •  HYDROcodone-acetaminophen  •  HYDROmorphone **AND** naloxone  •  ondansetron **OR** ondansetron  •  sodium chloride  •  sodium chloride    Vital signs in last 24 hours:  Temp:  [98.1 °F (36.7 °C)-99.9 °F (37.7 °C)] 98.6 °F (37 °C)  Heart Rate:  [] 103  Resp:  [16-18] 16  BP: (109-146)/(65-84) 117/76    Intake/Output:    Intake/Output Summary (Last 24 hours) at 3/30/2023 0743  Last data filed at 3/30/2023 0452  Gross per 24 hour   Intake 4348 ml   Output 955 ml   Net 3393 ml       Exam:  /76 (BP Location: Right arm, Patient Position: Lying)   Pulse 103   Temp 98.6 °F (37 °C) (Oral)   Resp 16   Ht 188 cm (74\")   Wt 94 kg (207 lb 3.7 oz)   SpO2 95%   BMI 26.61 kg/m²     General Appearance:    Alert, cooperative, no distress, appears stated age               Abdomen:     Soft, ND   :   Conduit with clear urine, ARAIVND serosang                Data Review:  All labs (24hrs):   Recent Results (from the past 24 hour(s))   Hemoglobin & Hematocrit, Blood    Collection Time: 23  3:55 PM    Specimen: Blood   Result Value Ref Range    Hemoglobin 8.9 (L) 13.0 - 17.7 g/dL    Hematocrit 29.0 (L) 37.5 - 51.0 %   Hemoglobin & Hematocrit, Blood    Collection Time: 23  8:52 PM    Specimen: Blood   Result Value Ref Range    Hemoglobin 8.0 (L) 13.0 - 17.7 g/dL    Hematocrit 24.6 (L) 37.5 - 51.0 % " no lesions, no deformities, no traumatic injuries, no significant scars are present, chest wall non-tender, no masses present, breathing is unlabored without accessory muscle use,normal breath sounds   Basic Metabolic Panel    Collection Time: 03/29/23 10:52 PM    Specimen: Blood   Result Value Ref Range    Glucose 151 (H) 65 - 99 mg/dL    BUN 25 (H) 8 - 23 mg/dL    Creatinine 1.82 (H) 0.76 - 1.27 mg/dL    Sodium 133 (L) 136 - 145 mmol/L    Potassium 4.6 3.5 - 5.2 mmol/L    Chloride 101 98 - 107 mmol/L    CO2 21.0 (L) 22.0 - 29.0 mmol/L    Calcium 8.1 (L) 8.6 - 10.5 mg/dL    BUN/Creatinine Ratio 13.7 7.0 - 25.0    Anion Gap 11.0 5.0 - 15.0 mmol/L    eGFR 40.7 (L) >60.0 mL/min/1.73   CBC Auto Differential    Collection Time: 03/29/23 10:52 PM    Specimen: Blood   Result Value Ref Range    WBC 14.30 (H) 3.40 - 10.80 10*3/mm3    RBC 2.84 (L) 4.14 - 5.80 10*6/mm3    Hemoglobin 8.2 (L) 13.0 - 17.7 g/dL    Hematocrit 24.4 (L) 37.5 - 51.0 %    MCV 85.8 79.0 - 97.0 fL    MCH 28.9 26.6 - 33.0 pg    MCHC 33.7 31.5 - 35.7 g/dL    RDW 16.9 (H) 12.3 - 15.4 %    RDW-SD 53.4 37.0 - 54.0 fl    MPV 8.7 6.0 - 12.0 fL    Platelets 162 140 - 450 10*3/mm3    Neutrophil % 79.3 (H) 42.7 - 76.0 %    Lymphocyte % 8.8 (L) 19.6 - 45.3 %    Monocyte % 11.7 5.0 - 12.0 %    Eosinophil % 0.0 (L) 0.3 - 6.2 %    Basophil % 0.2 0.0 - 1.5 %    Neutrophils, Absolute 11.30 (H) 1.70 - 7.00 10*3/mm3    Lymphocytes, Absolute 1.30 0.70 - 3.10 10*3/mm3    Monocytes, Absolute 1.70 (H) 0.10 - 0.90 10*3/mm3    Eosinophils, Absolute 0.00 0.00 - 0.40 10*3/mm3    Basophils, Absolute 0.00 0.00 - 0.20 10*3/mm3    nRBC 0.0 0.0 - 0.2 /100 WBC   Prepare RBC, 3 Units    Collection Time: 03/30/23  2:00 AM   Result Value Ref Range    Product Code Q0642I40     Unit Number [id number]     UNIT  ABO O     UNIT  RH POS     Crossmatch Interpretation Compatible     Dispense Status PT     Blood Expiration Date 202304132359     Blood Type Barcode 5100     Product Code M1061Z49     Unit Number [id number]     UNIT  ABO O     UNIT  RH POS     Crossmatch Interpretation Compatible     Dispense Status PT     Blood Expiration Date 202304132359     Blood Type Barcode 5100      Product Code C7610R46     Unit Number [id number]     UNIT  ABO O     UNIT  RH POS     Crossmatch Interpretation Compatible     Dispense Status RE     Blood Expiration Date 840454955595     Blood Type Barcode 5100       Imaging Results (Last 24 Hours)     ** No results found for the last 24 hours. **           Assessment:    Bladder cancer (HCC)    History of alcohol use disorder    Prostate cancer (HCC)    Elevated serum creatinine      Cystectomy and ileal conduit 3/28/2023    Plan:    Recovering well, continue clears advance to full's as tolerated  Continue Lovenox and I-S, pain control and encourage ambulation  Labs stable  Maintain ARAVIND for now  We will remove nephrostomy tubes prior to discharge      Xu Montero MD  [facility]  [facility] [address], [address]  [place] [address]  Office: [phone]  Cell: [phone]  Also available via Epic Secure Chat  03/30/23  07:43 EDT

## 2023-05-19 ENCOUNTER — HOSPITAL ENCOUNTER (EMERGENCY)
Facility: HOSPITAL | Age: 68
Discharge: HOME | End: 2023-05-20
Attending: EMERGENCY MEDICINE
Payer: MEDICARE

## 2023-05-19 ENCOUNTER — APPOINTMENT (OUTPATIENT)
Dept: CT IMAGING | Facility: HOSPITAL | Age: 68
End: 2023-05-19
Payer: MEDICARE

## 2023-05-19 DIAGNOSIS — T83.9XXA FOLEY CATHETER PROBLEM, INITIAL ENCOUNTER: Primary | ICD-10-CM

## 2023-05-19 DIAGNOSIS — N39.0 ACUTE UTI: ICD-10-CM

## 2023-05-19 LAB
ALBUMIN SERPL-MCNC: 3.6 G/DL (ref 3.5–5.2)
ALBUMIN/GLOB SERPL: 0.9 G/DL
ALP SERPL-CCNC: 151 U/L (ref 39–117)
ALT SERPL W P-5'-P-CCNC: 5 U/L (ref 1–41)
ANION GAP SERPL CALCULATED.3IONS-SCNC: 12 MMOL/L (ref 5–15)
AST SERPL-CCNC: 11 U/L (ref 1–40)
BILIRUB SERPL-MCNC: 0.2 MG/DL (ref 0–1.2)
BUN SERPL-MCNC: 47 MG/DL (ref 8–23)
BUN/CREAT SERPL: 33.1 (ref 7–25)
CALCIUM SPEC-SCNC: 9.4 MG/DL (ref 8.6–10.5)
CHLORIDE SERPL-SCNC: 103 MMOL/L (ref 98–107)
CO2 SERPL-SCNC: 20 MMOL/L (ref 22–29)
CREAT SERPL-MCNC: 1.42 MG/DL (ref 0.76–1.27)
DEPRECATED RDW RBC AUTO: 62.6 FL (ref 37–54)
EGFRCR SERPLBLD CKD-EPI 2021: 54.8 ML/MIN/1.73
EOSINOPHIL # BLD MANUAL: 0.81 10*3/MM3 (ref 0–0.4)
EOSINOPHIL NFR BLD MANUAL: 7 % (ref 0.3–6.2)
ERYTHROCYTE [DISTWIDTH] IN BLOOD BY AUTOMATED COUNT: 20.1 % (ref 12.3–15.4)
GLOBULIN UR ELPH-MCNC: 3.9 GM/DL
GLUCOSE SERPL-MCNC: 111 MG/DL (ref 65–99)
HCT VFR BLD AUTO: 31.3 % (ref 37.5–51)
HGB BLD-MCNC: 10 G/DL (ref 13–17.7)
LYMPHOCYTES # BLD MANUAL: 4.83 10*3/MM3 (ref 0.7–3.1)
LYMPHOCYTES NFR BLD MANUAL: 4 % (ref 5–12)
MCH RBC QN AUTO: 27 PG (ref 26.6–33)
MCHC RBC AUTO-ENTMCNC: 31.8 G/DL (ref 31.5–35.7)
MCV RBC AUTO: 85 FL (ref 79–97)
MONOCYTES # BLD: 0.46 10*3/MM3 (ref 0.1–0.9)
NEUTROPHILS # BLD AUTO: 5.41 10*3/MM3 (ref 1.7–7)
NEUTROPHILS NFR BLD MANUAL: 47 % (ref 42.7–76)
PLAT MORPH BLD: NORMAL
PLATELET # BLD AUTO: 163 10*3/MM3 (ref 140–450)
PMV BLD AUTO: 8.8 FL (ref 6–12)
POTASSIUM SERPL-SCNC: 3.9 MMOL/L (ref 3.5–5.2)
PROT SERPL-MCNC: 7.5 G/DL (ref 6–8.5)
RBC # BLD AUTO: 3.69 10*6/MM3 (ref 4.14–5.8)
RBC MORPH BLD: NORMAL
SCAN SLIDE: NORMAL
SODIUM SERPL-SCNC: 135 MMOL/L (ref 136–145)
VARIANT LYMPHS NFR BLD MANUAL: 42 % (ref 19.6–45.3)
WBC MORPH BLD: NORMAL
WBC NRBC COR # BLD: 11.5 10*3/MM3 (ref 3.4–10.8)

## 2023-05-19 PROCEDURE — 80053 COMPREHEN METABOLIC PANEL: CPT | Performed by: PHYSICIAN ASSISTANT

## 2023-05-19 PROCEDURE — 74176 CT ABD & PELVIS W/O CONTRAST: CPT

## 2023-05-19 PROCEDURE — 85007 BL SMEAR W/DIFF WBC COUNT: CPT | Performed by: PHYSICIAN ASSISTANT

## 2023-05-19 PROCEDURE — 51702 INSERT TEMP BLADDER CATH: CPT

## 2023-05-19 PROCEDURE — 36415 COLL VENOUS BLD VENIPUNCTURE: CPT

## 2023-05-19 PROCEDURE — 85025 COMPLETE CBC W/AUTO DIFF WBC: CPT | Performed by: PHYSICIAN ASSISTANT

## 2023-05-19 PROCEDURE — 99283 EMERGENCY DEPT VISIT LOW MDM: CPT

## 2023-05-19 RX ORDER — SODIUM CHLORIDE 0.9 % (FLUSH) 0.9 %
10 SYRINGE (ML) INJECTION AS NEEDED
Status: DISCONTINUED | OUTPATIENT
Start: 2023-05-19 | End: 2023-05-20 | Stop reason: HOSPADM
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
## 2023-05-19 NOTE — Clinical Note
[facility] EMERGENCY DEPARTMENT  [address] [facility] [place] [address]  Phone: [phone]    Anthony Moss was seen and treated in our emergency department on 5/19/2023.  He may return to work on 05/21/2023.         Thank you for choosing [facility].    Javi Williamson PA

## 2023-05-19 NOTE — ED NOTES
Pt reports both ureteral drains came out about noon today. Pt still voiding. Drains placed by Dr. Bautista. In Feb.

## 2023-05-20 VITALS
HEART RATE: 93 BPM | BODY MASS INDEX: 25.8 KG/M2 | OXYGEN SATURATION: 100 % | HEIGHT: 74 IN | DIASTOLIC BLOOD PRESSURE: 76 MMHG | SYSTOLIC BLOOD PRESSURE: 110 MMHG | TEMPERATURE: 97.3 F | RESPIRATION RATE: 18 BRPM | WEIGHT: 201 LBS

## 2023-05-20 LAB
BACTERIA UR QL AUTO: ABNORMAL /HPF
BILIRUB UR QL STRIP: NEGATIVE
CLARITY UR: CLEAR
COLOR UR: YELLOW
GLUCOSE UR STRIP-MCNC: NEGATIVE MG/DL
HGB UR QL STRIP.AUTO: ABNORMAL
HYALINE CASTS UR QL AUTO: ABNORMAL /LPF
KETONES UR QL STRIP: NEGATIVE
LEUKOCYTE ESTERASE UR QL STRIP.AUTO: ABNORMAL
NITRITE UR QL STRIP: NEGATIVE
PH UR STRIP.AUTO: 7.5 [PH] (ref 5–8)
PROT UR QL STRIP: ABNORMAL
RBC # UR STRIP: ABNORMAL /HPF
REF LAB TEST METHOD: ABNORMAL
SP GR UR STRIP: 1.01 (ref 1–1.03)
SQUAMOUS #/AREA URNS HPF: ABNORMAL /HPF
UROBILINOGEN UR QL STRIP: ABNORMAL
WBC # UR STRIP: ABNORMAL /HPF

## 2023-05-20 PROCEDURE — 81001 URINALYSIS AUTO W/SCOPE: CPT | Performed by: PHYSICIAN ASSISTANT

## 2023-05-20 PROCEDURE — 87086 URINE CULTURE/COLONY COUNT: CPT | Performed by: PHYSICIAN ASSISTANT

## 2023-05-20 PROCEDURE — 96374 THER/PROPH/DIAG INJ IV PUSH: CPT

## 2023-05-20 PROCEDURE — 25010000002 MORPHINE PER 10 MG: Performed by: PHYSICIAN ASSISTANT

## 2023-05-20 RX ORDER — CEFDINIR 300 MG/1
300 CAPSULE ORAL ONCE
Status: COMPLETED | OUTPATIENT
Start: 2023-05-20 | End: 2023-05-20

## 2023-05-20 RX ORDER — CEFDINIR 300 MG/1
300 CAPSULE ORAL 2 TIMES DAILY
Qty: 10 CAPSULE | Refills: 0 | Status: SHIPPED | OUTPATIENT
Start: 2023-05-20 | End: 2023-05-25

## 2023-05-20 RX ORDER — MORPHINE SULFATE 2 MG/ML
2 INJECTION, SOLUTION INTRAMUSCULAR; INTRAVENOUS ONCE
Status: COMPLETED | OUTPATIENT
Start: 2023-05-20 | End: 2023-05-20

## 2023-05-20 RX ORDER — CEFDINIR 300 MG/1
300 CAPSULE ORAL 2 TIMES DAILY
Qty: 14 CAPSULE | Refills: 0 | Status: SHIPPED | OUTPATIENT
Start: 2023-05-20 | End: 2023-05-20 | Stop reason: SDUPTHER

## 2023-05-20 RX ADMIN — CEFDINIR 300 MG: 300 CAPSULE ORAL at 01:20

## 2023-05-20 RX ADMIN — SODIUM CHLORIDE 500 ML: 9 INJECTION, SOLUTION INTRAVENOUS at 00:29

## 2023-05-20 RX ADMIN — MORPHINE SULFATE 2 MG: 2 INJECTION, SOLUTION INTRAMUSCULAR; INTRAVENOUS at 01:20

## 2023-05-20 NOTE — DISCHARGE INSTRUCTIONS
Please follow-up with urology, Dr. Montero for follow-up in 3 days regarding your Roberts catheter.  Please come back to the ER spike a high fever and this falls out again as you will need reevaluation at that time.  Please take antibiotic to completion even if feeling better

## 2023-05-21 ENCOUNTER — HOSPITAL ENCOUNTER (EMERGENCY)
Facility: HOSPITAL | Age: 68
Discharge: HOME | End: 2023-05-21
Attending: EMERGENCY MEDICINE | Admitting: EMERGENCY MEDICINE
Payer: MEDICARE

## 2023-05-21 VITALS
HEIGHT: 74 IN | OXYGEN SATURATION: 97 % | WEIGHT: 188.71 LBS | BODY MASS INDEX: 24.22 KG/M2 | DIASTOLIC BLOOD PRESSURE: 55 MMHG | SYSTOLIC BLOOD PRESSURE: 90 MMHG | TEMPERATURE: 98 F | RESPIRATION RATE: 18 BRPM | HEART RATE: 91 BPM

## 2023-05-21 DIAGNOSIS — Z46.6 ENCOUNTER FOR FOLEY CATHETER REPLACEMENT: Primary | ICD-10-CM

## 2023-05-21 LAB — BACTERIA SPEC AEROBE CULT: NO GROWTH

## 2023-05-21 PROCEDURE — 51702 INSERT TEMP BLADDER CATH: CPT

## 2023-05-21 PROCEDURE — 99282 EMERGENCY DEPT VISIT SF MDM: CPT
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
## 2023-05-21 NOTE — DISCHARGE INSTRUCTIONS
Continue taking antibiotics as prescribed 2 days ago    Try to keep the catheter attached as best as possible    Follow-up with urologist as needed    Return to the ER for new or worsening symptoms

## 2024-06-27 NOTE — PLAN OF CARE
Goal Outcome Evaluation:              Outcome Evaluation: No acute changes over night. VS stable.   Order faxed with contraception card

## 2025-03-05 NOTE — CONSULTS
March 7, 2025       Elvin Torres DO  [address] [place] [address]  Via In Basket      Patient: Yared Dietrich   YOB: 1990   Date of Visit: 3/5/2025       Dear Dr. Torres:    Thank you for referring Yared Dietrich to me for evaluation. Below are my notes for this visit with him.    If you have questions, please do not hesitate to call me. I look forward to following your patient along with you.      Sincerely,        Samuel Chavez MD        CC: No Recipients    Samuel Chavez MD  3/7/2025  4:54 PM  Signed  Subjective  Yared is a 34 year old male who presents with RIGHT shoulder pain  PCP: Elvin Torres DO  Patient referred for consultation by Elvin Torres DO  Type of Consult: Verbal      Chief Complaint:   Chief Complaint   Patient presents with   • Right Shoulder - Pain   • Office Visit        HPI:   The patient is a 34-year-old male who came in for right shoulder pain, referred by Dr. Medrano.    He started having severe pain in his right arm in late September or early October, which he thought was due to sleeping in a weird position. The pain was so bad that he couldn't move his arm much. He went to the doctor and was told to start physical therapy, which he began in mid-October. At first, the pain was at the bottom of his shoulder blade and went down the back of his arm. Physical therapy helped a bit, but around Thanksgiving, he stopped making progress. After Thanksgiving, he couldn't move his arm past a certain point and described it as if his arm was locked. The initial pain went away, but he started having a sharp, stabbing pain at the top of his arm when he woke up, along with stiffness in his shoulder blade. He also felt like someone was dragging a fork through his triceps when he moved his arm too quickly or reached up too high. He mentioned slipping on ice around [place] and throwing his arm back to catch himself, which felt like his muscles were tearing. He  Nutrition Services    Patient Name: Anthony Moss  YOB: 1955  MRN: [id number]  Admission date: 2/28/2023    PPE Documentation        PPE Worn By Provider Did not enter room for encounter    PPE Worn By Patient  N/a      NUTRITION SCREENING      Encounter Information: Nutrition assessment r/t LOS 10 days. Pt appears to be eating well, %.        PO Diet: Diet: Regular/House Diet; Texture: Regular Texture (IDDSI 7); Fluid Consistency: Thin (IDDSI 0)   PO Supplements: None    PO Intake:  %       Labs (reviewed below): Reviewed        GI Function:  Stool Output  Stool (mL): 0 mL (03/10/23 0500)  Stool Unmeasured Occurrence: 1 (03/10/23 1301)  Bowel Incontinence: No (03/10/23 1301)  Stool Amount: small (03/10/23 1301)          Skin: No pressure injuries       Weight Hx Review: Wt Readings from Last 10 Encounters:   03/10/23 90 kg (198 lb 6.6 oz)   05/25/22 99.4 kg (219 lb 3.2 oz)   01/12/22 111 kg (245 lb 9.5 oz)   01/23/21 102 kg (225 lb 5 oz)   02/27/19 98.9 kg (218 lb)   01/16/19 98 kg (216 lb)     Wt down 9% x 8 months       Nutrition Intervention: Continue to encourage PO intake      Results from last 7 days   Lab Units 03/10/23  0220 03/09/23  0039 03/08/23  0000   SODIUM mmol/L 137 137 137   POTASSIUM mmol/L 5.1 5.2 5.1   CHLORIDE mmol/L 104 103 103   CO2 mmol/L 22.0 22.0 22.0   BUN mg/dL 52* 55* 53*   CREATININE mg/dL 2.32* 2.30* 2.21*   CALCIUM mg/dL 8.5* 8.5* 8.7   BILIRUBIN mg/dL <0.2 0.2 <0.2   ALK PHOS U/L 92 83 84   ALT (SGPT) U/L 9 8 6   AST (SGOT) U/L 15 17 19   GLUCOSE mg/dL 123* 129* 143*     Results from last 7 days   Lab Units 03/10/23  0611 03/10/23  0220 03/09/23  0722 03/09/23  0039 03/08/23  0623 03/08/23  0000   MAGNESIUM mg/dL  --  1.7  --  1.7  --  1.8   PHOSPHORUS mg/dL  --  4.7*  --  4.1  --  4.5   HEMOGLOBIN g/dL 7.5* 7.3*  7.3*   < > 7.7*   < > 7.7*   HEMATOCRIT % 23.0* 22.5*  22.5*   < > 23.9*   < > 23.8*    < > = values in this interval not displayed.      COVID19   Date Value Ref Range Status   03/01/2023 Not Detected Not Detected - Ref. Range Final     Lab Results   Component Value Date    HGBA1C 4.8 03/01/2023       RD to follow up per protocol.    Electronically signed by:  Gabriela Schmitt RD  03/10/23 14:08 EST     hasn't had a cortisone shot. An x-ray was done in late November because his arm still hurt and he couldn't move it much. He got COVID-19 during [place] and started physical therapy again afterward, but after six more sessions, there was no significant improvement. An MRI was scheduled for mid-January but was delayed until February due to paperwork issues. During the MRI, he felt shoulder spasms and increased pain. He has no history of diabetes, thyroid issues, or autoimmune diseases. He is right-handed and currently unemployed. He was prescribed diclofenac but hasn't picked it up from the pharmacy yet.      SOCIAL HISTORY  He is currently unemployed.    Date of Injury: 10/2024  Work Related: no  Current Position: NA  Employer: [employer]  Job Description: NA    Patient's medications, allergies, past medical, surgical, social and family histories were reviewed and updated as appropriate.    Review of Systems   Constitutional: Negative.    HENT: Negative.    Eyes: Negative.    Respiratory: Negative.    Cardiovascular: Negative.    Gastrointestinal: Negative.    Endocrine: Negative.    Genitourinary: Negative.    Musculoskeletal: Positive for arthralgias.   Skin: Negative.    Allergic/Immunologic: Negative.    Neurological: Negative.    Hematological: Negative.    Psychiatric/Behavioral: Negative.    All other systems reviewed and are negative.    Objective    Physical Examination:     Constitutional:  Well-developed, well-nourished male in no acute distress.    Neuro: Alert and oriented.    Psychiatric:  Normal affect  Skin: Warm, dry, intact without rash or lesion.   Neck: Normal appearing neck  Pulmonary: No labored respirations  CV: normal rate  Musculoskeletal:    RIGHT shoulder:   There is no muscular atrophy present.  There is evidence of scapular protraction.  Scapular assist test is positive.  Skin is intact.    Range of motion:  Forward elevation: 130° actively/140° passively  External rotation at the side: 50°  actively/50° passively  Abducted 90° in the plane of the scapula, external rotation is 70° and internal rotation is 20°.      Muscle strength:  Ginny's testing (Supraspinatus) 5/5 strength with pain  External rotation at the side (Infraspinatus): 5/5 without pain  Belly press testing: negative  Lift off test: negative  Bear hug test: negative    Neer's test: positive  Hawkin's test: positive  Negative Yergason's  negative [place]'s    There is no tenderness to palpation at the AC joint, bicipital groove, and posteromedial scapular border otherwise no other areas of tenderness.      Sensory: Sensation is intact to light touch bilaterally. Biceps tendon reflexes are symmetric.     Motor: Fires extensor pollicus brevis, abductor pollicus longus, and interossei.      Vascular: Radial pulse is 2+.       Imaging Reading/Results:  XR SHOULDER 1 VIEW RIGHT  Axillary view of the RIGHT shoulder was obtained in our office dated   3/5/2025 and reviewed by myself: There is no fracture or dislocation, no   degenerative joint disease, central glenohumeral alignment      Assessment/Plan:  Problem List Items Addressed This Visit    None  Visit Diagnoses       Subacromial bursitis of right shoulder joint    -  Primary            Plan:   1.  We extensively discussed both nonoperative and operative treatment options.  Nonoperative treatment consists of rest, ice, nonsteroidal anti-inflammatory drugs, formal physical therapy, and injections.  2.  At this time, the patient has exhausted nonoperative treatment options  3.  The plan for surgery would be surgical arthroscopy of the RIGHT shoulder with rotator cuff debridement versus repair.  The risks of surgery include but are not limited to infection, bleeding, nerve damage, stiffness, persistent pain, retear, incomplete resolution of symptoms, DVT, medical comorbidities, death, hardware complications, and need for further surgery.  The benefits include improvement of pain and function.   The extensive postoperative rehabilitation course was discussed.  Full recovery will likely take 4 to 6 months.  4.  At this time, the patient wishes to proceed with surgery.   They will require pre-operative medical clearance.  I will see the patient the day of surgery.  At his preop visit we will clarify exactly what surgery he plans to have.    All questions were answered.  The patient understands the plan and wishes to proceed as such.      Samuel Chavez MD  Orthopaedic Surgeon, Adult & Pediatric Sports Medicine  [facility]  P: [phone]  F: [phone]    ________________________________________________________________________________________________________________________________________________  Surgeon: Kathy    Procedure: Surgical arthroscopy of the RIGHT shoulder with rotator cuff debridement versus repair    [facility]    Assist: Yes (HEATHER Lawrence)    Anesthesia: General    Block: Interscalene single shot exparel injection    Duration: 2 hours    Antibiotics: Yes, ancef or clinda if PCN allergy    Labs: Yes    Equipment: Standard Arthrex rotator cuff instrumentation, Arthrex 1.8mm fibertak anchor for biceps tenodesis    Position: Beachchair    Table: Beachchair    DME: [employer] and abductor sling    Need pre-op clearance: Yes    Follow-Up: Standard        CC: Elvin Torres DO

## (undated) DEVICE — LP VESL MAXI 2.5X1MM RED 2PK

## (undated) DEVICE — SOL IRR SORBITOL 3PCT BG 3000ML

## (undated) DEVICE — SUT SILK 2/0 FS BLK 18IN 685G

## (undated) DEVICE — CVR HNDL LT SURG ACCSSRY BLU STRL

## (undated) DEVICE — DRN WND EVAC BULB 100CC

## (undated) DEVICE — CATH FOL COUNCL 2WY 22F 5CC

## (undated) DEVICE — SUT VIC 2/0 SH 27IN

## (undated) DEVICE — KT SURG TURNOVER 050

## (undated) DEVICE — NEEDLE, QUINCKE, 18GX3.5": Brand: MEDLINE

## (undated) DEVICE — SOL IRR NACL 0.9PCT 1000ML

## (undated) DEVICE — PK MAJ LAPAROTOMY 50

## (undated) DEVICE — TUBING, SUCTION, 1/4" X 12', STRAIGHT: Brand: MEDLINE

## (undated) DEVICE — CATHETER,FOLEY,100%SILICONE,18FR,10ML,LF: Brand: MEDLINE

## (undated) DEVICE — 9165 UNIVERSAL PATIENT PLATE: Brand: 3M™

## (undated) DEVICE — TOTAL TRAY, 16FR 10ML SIL FOLEY, URN: Brand: MEDLINE

## (undated) DEVICE — ENSEAL 20 CM SHAFT, LARGE JAW: Brand: ENSEAL X1

## (undated) DEVICE — BLAKE SILICONE DRAIN, 19 FR ROUND, HUBLESS WITH 1/4" BENDABLE TROCAR: Brand: BLAKE

## (undated) DEVICE — PAD,ABDOMINAL,5"X9",STERILE,LF,1/PK: Brand: MEDLINE INDUSTRIES, INC.

## (undated) DEVICE — 2-PIECE OSTOMY SKIN BARRIER, FORMAFLEX: Brand: NEW IMAGE

## (undated) DEVICE — SUT SILK 3/0 SH CR8 30IN C017D

## (undated) DEVICE — TOWEL,OR,DSP,ST,NATURAL,DLX,4/PK,20PK/CS: Brand: MEDLINE

## (undated) DEVICE — Device

## (undated) DEVICE — SOL IRRG H2O BG 3000ML STRL

## (undated) DEVICE — TP SXN YANKR BULB STRL

## (undated) DEVICE — CATHETER,URETHRAL,REDRUBBER,STRL,12FR: Brand: MEDLINE INDUSTRIES, INC.

## (undated) DEVICE — SLV SCD CALF HEMOFORCE DVT THERP REPROC MD

## (undated) DEVICE — TOWEL,OR,DSP,ST,WHITE,DLX,XR,4/PK,20PK/C: Brand: MEDLINE

## (undated) DEVICE — ANTIBACTERIAL UNDYED BRAIDED (POLYGLACTIN 910), SYNTHETIC ABSORBABLE SUTURE: Brand: COATED VICRYL

## (undated) DEVICE — ELECTRD BLD EZ CLN STD 6.5IN

## (undated) DEVICE — DRAPE SHEET ULTRAGARD: Brand: MEDLINE

## (undated) DEVICE — DRSNG WND BORDR/ADHS NONADHR/GZ LF 4X4IN STRL

## (undated) DEVICE — BINDER: Brand: DEROYAL

## (undated) DEVICE — 450 ML BOTTLE OF 0.05% CHLORHEXIDINE GLUCONATE IN 99.95% STERILE WATER FOR IRRIGATION, USP AND APPLICATOR.: Brand: IRRISEPT ANTIMICROBIAL WOUND LAVAGE

## (undated) DEVICE — GOWN,REINFRCE,POLY,SIRUS,BREATH SLV,XXLG: Brand: MEDLINE

## (undated) DEVICE — SUT PDS 5/0 RB1 27IN Z303H

## (undated) DEVICE — SUT GUT CHRM 2/0 SH 27IN G123H

## (undated) DEVICE — NITINOL WIRE WITH HYDROPHILIC TIP: Brand: SENSOR

## (undated) DEVICE — INTENDED FOR TISSUE SEPARATION, AND OTHER PROCEDURES THAT REQUIRE A SHARP SURGICAL BLADE TO PUNCTURE OR CUT.: Brand: BARD-PARKER ® CARBON RIB-BACK BLADES

## (undated) DEVICE — PROXIMATE RH ROTATING HEAD SKIN STAPLERS (35 WIDE) CONTAINS 35 STAINLESS STEEL STAPLES: Brand: PROXIMATE

## (undated) DEVICE — PENCL HND ROCKRSWTCH HOLSTR EZ CLEAN TP CRD 10FT

## (undated) DEVICE — SOLUTION,WATER,IRRIGATION,1000ML,STERILE: Brand: MEDLINE

## (undated) DEVICE — GLV SURG SIGNATURE ESSENTIAL PF LTX SZ7

## (undated) DEVICE — SUT SILK 2/0 30IN A305H

## (undated) DEVICE — DUAL LUMEN URETERAL CATHETER

## (undated) DEVICE — BANDAGE,GAUZE,BULKEE II,4.5"X4.1YD,STRL: Brand: MEDLINE

## (undated) DEVICE — TOWEL,OR,DSP,ST,WHITE,DLX,4/PK,20PK/CS: Brand: MEDLINE

## (undated) DEVICE — DRSNG TELFA PAD NONADH STR 1S 3X8IN

## (undated) DEVICE — PK CYSTO 50

## (undated) DEVICE — CATH FOL COUNCL 2WY 20F 5CC

## (undated) DEVICE — SPNG DRN AMD EXCILON 6PLY 4X4IN PK/2

## (undated) DEVICE — SUT 1/0 27 PDS II VIO MONO CT Z353H

## (undated) DEVICE — SUT SILK 0 TIES 30IN A306H

## (undated) DEVICE — COUNT NDL FOAM STRIP W/MAG 60CT

## (undated) DEVICE — GLV SURG SIGNATURE ESSENTIAL PF LTX SZ8

## (undated) DEVICE — SUT GUT CHRM 3/0 RB1 27IN U204H

## (undated) DEVICE — TOTAL TRAY, DB, 100% SILI FOLEY, 16FR 10: Brand: MEDLINE

## (undated) DEVICE — COVER,MAYO STAND,STERILE: Brand: MEDLINE

## (undated) DEVICE — 2-PIECE UROSTOMY POUCH: Brand: NEW IMAGE

## (undated) DEVICE — DECANTER: Brand: UNBRANDED

## (undated) DEVICE — GAUZE,SPONGE,4"X4",32PLY,XRAY,STRL,LF: Brand: MEDLINE

## (undated) DEVICE — ST DIL URETH SCURVE 8TO20FR

## (undated) DEVICE — VIOLET BRAIDED (POLYGLACTIN 910), SYNTHETIC ABSORBABLE SUTURE: Brand: COATED VICRYL

## (undated) DEVICE — UNDERGLV SURG BIOGEL/PI PF SYNTH SURG SZ8.5 BLU 50/BX

## (undated) DEVICE — SOL IRRIG NACL 1000ML

## (undated) DEVICE — SUT SILK 3/0 SH CR8 18IN C013D

## (undated) DEVICE — SUT PDS 1 TP1 48IN Z880G BX/12

## (undated) DEVICE — SOL IRR NACL 0.9PCT ARTHROMATIC 3000ML

## (undated) DEVICE — BAG,DRAINAGE,4L,A/R TOWER,LL,SLIDE TAP: Brand: MEDLINE

## (undated) DEVICE — SUT PDS2 CTX 60IN

## (undated) DEVICE — KT INTRO FOL CATH SUPRAPUB 16F ORNG

## (undated) DEVICE — SPNG LAP PREWSH SFTPK 18X18IN STRL PK/5